# Patient Record
Sex: FEMALE | Race: BLACK OR AFRICAN AMERICAN | NOT HISPANIC OR LATINO | Employment: UNEMPLOYED | URBAN - METROPOLITAN AREA
[De-identification: names, ages, dates, MRNs, and addresses within clinical notes are randomized per-mention and may not be internally consistent; named-entity substitution may affect disease eponyms.]

---

## 2017-01-19 RX ORDER — TIZANIDINE HYDROCHLORIDE 2 MG/1
2 CAPSULE, GELATIN COATED ORAL 3 TIMES DAILY PRN
Qty: 90 CAPSULE | Refills: 1 | Status: SHIPPED | OUTPATIENT
Start: 2017-01-19 | End: 2017-04-20 | Stop reason: SDUPTHER

## 2017-02-09 ENCOUNTER — TELEPHONE (OUTPATIENT)
Dept: PAIN MEDICINE | Facility: CLINIC | Age: 57
End: 2017-02-09

## 2017-02-09 NOTE — TELEPHONE ENCOUNTER
----- Message from Beverley Esparza sent at 2/9/2017  1:11 PM CST -----  Contact: self  Patient called regarding refill of medication. Please contact [phone] ([phone]    [id number] - [place], [place] - [address] [address]  Phone: [phone] Fax: [phone]

## 2017-02-10 ENCOUNTER — PATIENT MESSAGE (OUTPATIENT)
Dept: PAIN MEDICINE | Facility: CLINIC | Age: 57
End: 2017-02-10

## 2017-02-10 RX ORDER — HYDROCODONE BITARTRATE AND ACETAMINOPHEN 10; 325 MG/1; MG/1
1 TABLET ORAL 3 TIMES DAILY PRN
Qty: 90 TABLET | Refills: 0 | Status: SHIPPED | OUTPATIENT
Start: 2017-02-10 | End: 2017-02-16 | Stop reason: SDUPTHER

## 2017-02-16 ENCOUNTER — OFFICE VISIT (OUTPATIENT)
Dept: PAIN MEDICINE | Facility: CLINIC | Age: 57
End: 2017-02-16
Payer: COMMERCIAL

## 2017-02-16 VITALS
RESPIRATION RATE: 18 BRPM | HEART RATE: 74 BPM | DIASTOLIC BLOOD PRESSURE: 80 MMHG | BODY MASS INDEX: 33.73 KG/M2 | WEIGHT: 209 LBS | TEMPERATURE: 99 F | SYSTOLIC BLOOD PRESSURE: 130 MMHG

## 2017-02-16 DIAGNOSIS — M54.12 CERVICAL RADICULOPATHY: Primary | ICD-10-CM

## 2017-02-16 DIAGNOSIS — M48.02 CERVICAL SPINAL STENOSIS: ICD-10-CM

## 2017-02-16 DIAGNOSIS — M50.30 DDD (DEGENERATIVE DISC DISEASE), CERVICAL: ICD-10-CM

## 2017-02-16 DIAGNOSIS — M79.642 LEFT HAND PAIN: ICD-10-CM

## 2017-02-16 PROCEDURE — 99213 OFFICE O/P EST LOW 20 MIN: CPT | Mod: S$GLB,,, | Performed by: PHYSICIAN ASSISTANT

## 2017-02-16 PROCEDURE — 99999 PR PBB SHADOW E&M-EST. PATIENT-LVL IV: CPT | Mod: PBBFAC,,, | Performed by: PHYSICIAN ASSISTANT

## 2017-02-16 RX ORDER — HYDROCODONE BITARTRATE AND ACETAMINOPHEN 10; 325 MG/1; MG/1
1 TABLET ORAL 3 TIMES DAILY PRN
Qty: 90 TABLET | Refills: 0 | Status: SHIPPED | OUTPATIENT
Start: 2017-05-11 | End: 2017-03-10 | Stop reason: SDUPTHER

## 2017-02-16 RX ORDER — HYDROCODONE BITARTRATE AND ACETAMINOPHEN 10; 325 MG/1; MG/1
1 TABLET ORAL 3 TIMES DAILY PRN
Qty: 90 TABLET | Refills: 0 | Status: SHIPPED | OUTPATIENT
Start: 2017-04-11 | End: 2017-04-12 | Stop reason: SDUPTHER

## 2017-02-16 RX ORDER — HYDROCODONE BITARTRATE AND ACETAMINOPHEN 10; 325 MG/1; MG/1
1 TABLET ORAL 3 TIMES DAILY PRN
Qty: 90 TABLET | Refills: 0 | Status: SHIPPED | OUTPATIENT
Start: 2017-03-12 | End: 2017-04-11

## 2017-02-16 NOTE — PROGRESS NOTES
This note was completed with dictation software and grammatical errors may exist.    CC: Neck pain, arm pain    HPI: The patient is a 56-year-old woman with a history of diabetes, hypertension who presents in referral from [facility] for neck pain radiating into the arms and low back pain radiating into the left leg.  She returns in follow-up today with neck pain.  She complains of bilateral neck pain radiating to the right trapezius muscle and intermittently down the left arm.  She also complains of right hand numbness and left hand pain and numbness into the third and fourth digits.  She states that she cannot close her left hand for the past couple months.  She reports weakness in the left hand.  She denies bladder or bowel incontinence.    Pain intervention history: She has been taking hydrocodone 10/325 3 times a day for the last year, previous to this she was taking Percocet 3 times a day but states that it was causing her panic attacks, did help with her pain slightly better.  She has been taking gabapentin 600mg twice a day and Zanaflex at night with some relief.  She had undergone 3 epidurals for low back pain with only up to 3 weeks of relief each time.  She is status post C7-T1 cervical interlaminar epidural steroid injection on 1/29/16 with 50% relief.   She is status post C7-T1 cervical interlaminar epidural steroid injection on 3/1/16 with mild additional relief.  She is status post C7-T1 cervical interlaminar epidural steroid injection on 5/13/16 with 70-75% relief.  She is status post right C3, 4, 5 and 6 medial branch radiofrequency ablation on 7/15/16 with 30-60% relief.      Urine drug screen 1/5/16  Negative and inconsistent for hydrocodone but positive and consistent for its metabolite hydromorphone    Urine drug screen 2/11/16  Negative and inconsistent for hydrocodone    ROS: She reports headaches, hoarse voice, joint stiffness, joint swelling, back pain, difficulty  sleeping, anxiety and loss of balance.  Balance of review of systems is negative.    Medical, surgical, family and social history reviewed elsewhere in record.    Medications/Allergies: See med card    Vitals:    17 1111   BP: 130/80   Pulse: 74   Resp: 18   Temp: 98.6 °F (37 °C)   TempSrc: Oral   Weight: 94.8 kg (208 lb 15.9 oz)   PainSc:   4   PainLoc: Neck         Physical exam:  Gen: A and O x3, pleasant, well-groomed  Skin: No rashes or obvious lesions  HEENT: PERRLA, no obvious deformities on ears or in canals.Trachea midline.  CVS: Regular rate and rhythm, normal palpable pulses.  Resp: Clear to auscultation bilaterally, no wheezes or rales.  Abdomen: Soft, NT/ND.  Musculoskeletal: No antalgic gait.     Neuro:  Upper extremities: 5/5 strength bilaterally, except for left hand  strength, unable to close hand.   Lower extremities: 5/5 strength bilaterally.  Reflexes: Brachioradialis 2+, Bicep 2+, Tricep 2+.  Patellar and Achilles reflexes 2+ bilaterally.  Sensory: Intact and symmetrical to light touch and pinprick in C2-T1 dermatomes bilaterally.    Cervical Spine:  Cervical spine: Range of motion is mildly reduced with flexion extension and lateral rotation with increased bilateral neck and right trapezius muscle pain during each maneuver.  Spurling's maneuver causes neck pain to either side.    Myofascial exam: Tenderness to palpation throughout right cervical paraspinous and trapezius muscles, greater than left.    Imagin/24/15 C-spine MRI  1. C3-4 left posterior paracentral disk extrusion causing left paracentral spinal cord compression and severe left foraminal stenosis.  2. C4-5 based disk extrusion with spinal cord impingement and moderate bilateral foraminal stenosis.  3. C5-6 posterior central disk extrusion with spinal cord compression and severe bilateral foraminal stenosis.  4. C6-7 mild disk bulge with severe bilateral foraminal stenosis.  5. Solitary mildly enlarged left  submandibular lymph node of uncertain significance.    Assessment:  The patient is a 56-year-old woman with a history of diabetes, hypertension who presents in referral from [facility] for neck pain radiating into the arms and low back pain radiating into the left leg.     1. Cervical radiculopathy     2. Cervical spinal stenosis     3. DDD (degenerative disc disease), cervical     4. Left hand pain           Plan:  1.  She continues to take hydrocodone-acetaminophen 10/325 mg up to 3 times a day as needed for pain.  I have reviewed the [place] Board of Pharmacy website and there are no abberancies.  She will call for her next 2 prescriptions.  2.  If her neck pain worsens, she may benefit from repeating a cervical HERACLIO.  3.  I advised her to make an appointment with orthopedics to evaluate her left hand.  4.  Follow-up in 3 months or sooner as needed.

## 2017-03-10 ENCOUNTER — PATIENT MESSAGE (OUTPATIENT)
Dept: PAIN MEDICINE | Facility: CLINIC | Age: 57
End: 2017-03-10

## 2017-03-10 RX ORDER — HYDROCODONE BITARTRATE AND ACETAMINOPHEN 10; 325 MG/1; MG/1
1 TABLET ORAL 3 TIMES DAILY PRN
Qty: 90 TABLET | Refills: 0 | Status: SHIPPED | OUTPATIENT
Start: 2017-03-10 | End: 2017-04-09

## 2017-03-10 NOTE — TELEPHONE ENCOUNTER
Spoke with Erasmo and that is the day the printer was messed up and patient had to leave and she didn't get the prescription. Erasmo states can you send it to her pharmacy please.

## 2017-03-17 DIAGNOSIS — M79.642 LEFT HAND PAIN: Primary | ICD-10-CM

## 2017-03-20 ENCOUNTER — HOSPITAL ENCOUNTER (OUTPATIENT)
Dept: RADIOLOGY | Facility: HOSPITAL | Age: 57
Discharge: HOME | End: 2017-03-20
Attending: ORTHOPAEDIC SURGERY
Payer: COMMERCIAL

## 2017-03-20 ENCOUNTER — OFFICE VISIT (OUTPATIENT)
Dept: ORTHOPEDICS | Facility: CLINIC | Age: 57
End: 2017-03-20
Payer: COMMERCIAL

## 2017-03-20 VITALS
DIASTOLIC BLOOD PRESSURE: 82 MMHG | WEIGHT: 206.81 LBS | HEART RATE: 84 BPM | SYSTOLIC BLOOD PRESSURE: 132 MMHG | BODY MASS INDEX: 33.24 KG/M2 | HEIGHT: 66 IN

## 2017-03-20 DIAGNOSIS — M65.342 TRIGGER FINGER, LEFT RING FINGER: Primary | ICD-10-CM

## 2017-03-20 DIAGNOSIS — M65.332 TRIGGER FINGER, LEFT MIDDLE FINGER: ICD-10-CM

## 2017-03-20 DIAGNOSIS — M79.642 LEFT HAND PAIN: ICD-10-CM

## 2017-03-20 PROCEDURE — 99999 PR PBB SHADOW E&M-EST. PATIENT-LVL III: CPT | Mod: PBBFAC,,, | Performed by: ORTHOPAEDIC SURGERY

## 2017-03-20 PROCEDURE — 20550 NJX 1 TENDON SHEATH/LIGAMENT: CPT | Mod: 59,F2,S$GLB, | Performed by: ORTHOPAEDIC SURGERY

## 2017-03-20 PROCEDURE — 73130 X-RAY EXAM OF HAND: CPT | Mod: TC,PO,LT

## 2017-03-20 PROCEDURE — 1160F RVW MEDS BY RX/DR IN RCRD: CPT | Mod: S$GLB,,, | Performed by: ORTHOPAEDIC SURGERY

## 2017-03-20 PROCEDURE — 73130 X-RAY EXAM OF HAND: CPT | Mod: 26,LT,, | Performed by: RADIOLOGY

## 2017-03-20 PROCEDURE — 99213 OFFICE O/P EST LOW 20 MIN: CPT | Mod: 25,S$GLB,, | Performed by: ORTHOPAEDIC SURGERY

## 2017-03-20 RX ORDER — TRIAMCINOLONE ACETONIDE 40 MG/ML
40 INJECTION, SUSPENSION INTRA-ARTICULAR; INTRAMUSCULAR
Status: DISCONTINUED | OUTPATIENT
Start: 2017-03-20 | End: 2017-03-20 | Stop reason: HOSPADM

## 2017-03-20 RX ADMIN — TRIAMCINOLONE ACETONIDE 40 MG: 40 INJECTION, SUSPENSION INTRA-ARTICULAR; INTRAMUSCULAR at 09:03

## 2017-03-20 NOTE — PROGRESS NOTES
Subjective:          Chief Complaint: Aaron Garcia is a 56 y.o. female who had concerns including Pain of the Left Hand.    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    Pain: 4/10    She is having right hand numbness and tingling that is stemming from cervical spine pathology that is currently being treated by pain management. She does not have left hand numbness/tingling.      Review of Systems   Constitution: Negative for chills and fever.   Musculoskeletal: Positive for joint pain and stiffness.   Neurological: Positive for numbness and paresthesias.                   Objective:        General: Aaron is well-developed, well-nourished, appears stated age, in no acute distress, alert and oriented to time, place and person.     General    Vitals reviewed.  Constitutional: She is oriented to person, place, and time. She appears well-developed and well-nourished. No distress.   HENT:   Head: Normocephalic and atraumatic.   Nose: Nose normal.   Eyes: Pupils are equal, round, and reactive to light.   Cardiovascular: Normal rate.    Pulmonary/Chest: Effort normal.   Neurological: She is alert and oriented to person, place, and time.   Psychiatric: She has a normal mood and affect. Her behavior is normal. Judgment and thought content normal.             Right Hand/Wrist Exam     Inspection   Scars: Wrist - absent Hand -  absent  Effusion: Wrist - absent Hand -  absent  Bruising: Wrist - absent Hand -  absent  Deformity: Wrist - deformity Hand -  deformity    Tests     Atrophy   Thenar:  negative  Hypothenar:  negative  Intrinsic:  negative  1st Dorsal Interosseous: negative    Other     Neuorologic Exam    Median Distribution: abnormal  Radial Distribution: abnormal    Comments:  Full ROM of the right hand with some decreased  strength      Left Hand/Wrist Exam     Pain   Hand - The patient exhibits pain of the ring MCP and middle MCP.    Swelling   Hand - The patient is swollen on the middle MCP, ring IP, ring MCP and middle IP.    Tenderness   The patient is tender to palpation of the vásquez area.     Range of Motion     Finger Flexion   PIP Middle: abnormal   PIP Ring: abnormal   DIP Middle: abnormal   DIP Ring: abnormal     Tests     Atrophy  Thenar:  Negative  Hypothenar:  negative  Intrinsic: negative  1st Dorsal Interosseous:  negative    Other     Sensory Exam  Median Distribution: normal  Ulnar Distribution: normal  Radial Distribution: normal    Comments:  Decreased  strength secondary to finger stiffness          Muscle Strength   Right Upper Extremity   Wrist Extension: 5/5/5   Wrist Flexion: 5/5/5   : 5/5/5   Index Finger: 5/5  Middle Finger: 5/5  Ring Finger: 5/5  Little Finger: 5/5  Thumb - APB: 5/5  Thumb - FPL: 5/5  Pinch Mechanism: 5/5  Left Upper Extremity  Wrist Extension: 5/5/5   Wrist Flexion: 5/5/5   :  5/5/5   Index Finger: 5/5  Middle Finger: 5/5  Ring Finger: 5/5  Little Finger: 5/5  Thumb - APB: 5/5  Thumb - FPL: 5/5  Pinch Mechanism: 5/5    Vascular Exam       Capillary Refill  Left Hand: normal capillary refill    Current and previous radiographic studies and results were reviewed with the patient:   Findings: There is mild multiarticular degenerative change.  No periarticular erosions.  No acute fracture or dislocation.          Assessment:       Encounter Diagnoses   Name Primary?    Left hand pain Yes    Trigger finger, left middle finger     Trigger finger, left ring finger           Plan:         Left middle and ring tendon sheath injections  Occupational Therapy  Continue with current medications  F/U in 6 weeks

## 2017-03-20 NOTE — MR AVS SNAPSHOT
[facility]  [address] [facility] [address]  Phone: [phone]                  Aaron Garcia   3/20/2017 10:45 AM   Office Visit    Description:  Female : 1960   Provider:  Jef Reyes MD   Department:  [facility]           Reason for Visit     Left Hand - Pain           Diagnoses this Visit        Comments    Left hand pain    -  Primary     Trigger finger, left middle finger         Trigger finger, left ring finger                To Do List           Future Appointments        Provider Department Dept Phone    2017 10:15 AM Jef Reyes MD [facility] [phone]    2017 11:30 AM [facility] [phone]      Goals (5 Years of Data)     None      Follow-Up and Disposition     Return in about 6 weeks (around 2017).      [facility]     [facility] On Call Nurse [facility] -  Assistance  Registered nurses in the [facility] On Call Center provide clinical advisement, health education, appointment booking, and other advisory services.  Call for this free service at [phone].             Medications           Message regarding Medications     Verify the changes and/or additions to your medication regime listed below are the same as discussed with your clinician today.  If any of these changes or additions are incorrect, please notify your healthcare provider.             Verify that the below list of medications is an accurate representation of the medications you are currently taking.  If none reported, the list may be blank. If incorrect, please contact your healthcare provider. Carry this list with you in case of emergency.           Current Medications     ALBUTEROL SULFATE (PROAIR HFA INHL) Inhale into the lungs.    amitriptyline (ELAVIL) 25 MG tablet Take 25 mg by mouth nightly as needed for Insomnia.    amlodipine (NORVASC) 10 MG tablet 10 mg.     benazepril (LOTENSIN) 40 MG  "tablet Take 40 mg by mouth once daily.    diphenhydrAMINE (BENADRYL) 25 mg capsule Take 25 mg by mouth every 6 (six) hours as needed for Itching.    estradiol (ESTRACE) 1 MG tablet Take 1 mg by mouth once daily.    gabapentin (NEURONTIN) 600 MG tablet Take 600 mg by mouth 2 (two) times daily.    GLIPIZIDE ORAL Take 5 mg by mouth 2 (two) times daily.     hydrocodone-acetaminophen 10-325mg (NORCO)  mg Tab Take 1 tablet by mouth 3 (three) times daily as needed.    hydrocodone-acetaminophen 10-325mg (NORCO)  mg Tab Starting on Apr 11, 2017. Take 1 tablet by mouth 3 (three) times daily as needed.    hydrocodone-acetaminophen 10-325mg (NORCO)  mg Tab Take 1 tablet by mouth 3 (three) times daily as needed.    insulin NPH (NOVOLIN N) 100 unit/mL injection Inject into the skin 3 (three) times daily as needed.    medroxyPROGESTERone (PROVERA) 2.5 MG tablet Take 2.5 mg by mouth once daily.    meloxicam (MOBIC) 15 MG tablet Take 1 tablet (15 mg total) by mouth once daily.    nebivolol (BYSTOLIC) 10 MG Tab Take 10 mg by mouth once daily.    tizanidine 2 mg Cap Take 1 capsule (2 mg total) by mouth 3 (three) times daily as needed.           Clinical Reference Information           Your Vitals Were     BP Pulse Height Weight BMI    132/82 84 5' 6" (1.676 m) 93.8 kg (206 lb 12.8 oz) 33.38 kg/m2      Blood Pressure          Most Recent Value    BP  132/82      Allergies as of 3/20/2017     Pcn [Penicillins]      Immunizations Administered on Date of Encounter - 3/20/2017     None      Orders Placed During Today's Visit      Normal Orders This Visit    Ambulatory Referral to Physical/Occupational Therapy       Language Assistance Services     ATTENTION: Language assistance services are available, free of charge. Please call [phone].      ATENCIÓN: Si habla kailash, tiene a vazquez disposición servicios gratuitos de asistencia lingüística. Llame al [phone].     CHÚ Ý: N?u b?n nói Ti?ng Vi?t, có các d?ch v? h? " tr? lilia collins? mi?n phí dành cho b?n. G?i s? [phone].         [facility] complies with applicable Federal civil rights laws and does not discriminate on the basis of race, color, national origin, age, disability, or sex.

## 2017-03-21 NOTE — PROCEDURES
Tendon Sheath  Date/Time: 3/20/2017 9:05 PM  Performed by: BRANT MARTIN  Authorized by: BRANT MARTIN     Consent Done?: Yes (Verbal)  Timeout: prior to procedure the correct patient, procedure, and site was verified   Indications:  Pain  Site marked: the procedure site was marked    Timeout: prior to procedure the correct patient, procedure, and site was verified    Location:  Ring finger  Site:  L ring MCP  Prep: patient was prepped and draped in usual sterile fashion    Ultrasonic guidance for needle placement?: No    Needle size:  25 G  Approach:  Volar  Medications:  40 mg triamcinolone acetonide 40 mg/mL  Patient tolerance:  Patient tolerated the procedure well with no immediate complications

## 2017-03-21 NOTE — PROCEDURES
Tendon Sheath  Date/Time: 3/20/2017 9:04 PM  Performed by: BRANT MARTIN  Authorized by: BRANT MARTIN     Consent Done?:  Yes (Verbal)  Timeout: prior to procedure the correct patient, procedure, and site was verified    Indications:  Pain  Site marked: the procedure site was marked    Timeout: prior to procedure the correct patient, procedure, and site was verified    Location:  Long finger  Site:  L long MCP  Prep: patient was prepped and draped in usual sterile fashion    Ultrasonic guidance for needle placement?: No    Needle size:  25 G  Approach:  Volar  Medications:  40 mg triamcinolone acetonide 40 mg/mL  Patient tolerance:  Patient tolerated the procedure well with no immediate complications

## 2017-03-31 ENCOUNTER — OFFICE VISIT (OUTPATIENT)
Dept: PAIN MEDICINE | Facility: CLINIC | Age: 57
End: 2017-03-31
Payer: COMMERCIAL

## 2017-03-31 VITALS
HEIGHT: 66 IN | RESPIRATION RATE: 20 BRPM | HEART RATE: 78 BPM | BODY MASS INDEX: 33.45 KG/M2 | SYSTOLIC BLOOD PRESSURE: 130 MMHG | WEIGHT: 208.13 LBS | DIASTOLIC BLOOD PRESSURE: 78 MMHG

## 2017-03-31 DIAGNOSIS — M51.36 DDD (DEGENERATIVE DISC DISEASE), LUMBAR: Primary | ICD-10-CM

## 2017-03-31 DIAGNOSIS — M50.30 DDD (DEGENERATIVE DISC DISEASE), CERVICAL: ICD-10-CM

## 2017-03-31 PROCEDURE — 1160F RVW MEDS BY RX/DR IN RCRD: CPT | Mod: S$GLB,,, | Performed by: PHYSICIAN ASSISTANT

## 2017-03-31 PROCEDURE — 99999 PR PBB SHADOW E&M-EST. PATIENT-LVL IV: CPT | Mod: PBBFAC,,, | Performed by: PHYSICIAN ASSISTANT

## 2017-03-31 PROCEDURE — 99214 OFFICE O/P EST MOD 30 MIN: CPT | Mod: S$GLB,,, | Performed by: PHYSICIAN ASSISTANT

## 2017-03-31 RX ORDER — ALPRAZOLAM 1 MG/1
1 TABLET ORAL
Qty: 1 TABLET | Refills: 0 | Status: ON HOLD | OUTPATIENT
Start: 2017-03-31 | End: 2017-05-16 | Stop reason: CLARIF

## 2017-03-31 RX ORDER — ONDANSETRON 4 MG/1
4 TABLET, ORALLY DISINTEGRATING ORAL
Status: ON HOLD | COMMUNITY
Start: 2017-03-20 | End: 2017-12-29

## 2017-04-03 NOTE — PROGRESS NOTES
This note was completed with dictation software and grammatical errors may exist.    CC: Neck pain, arm pain    HPI: The patient is a 56-year-old woman with a history of diabetes, hypertension who presents in referral from [facility] for neck pain radiating into the arms and low back pain radiating into the left leg.  She returns for an early follow-up today for back pain.  She states she was doing laundry yesterday and developed left low back pain radiating to the left lateral thigh and lateral shin.  She states that this happened a few years ago and was relieved with epidural steroid injections.  She describes her pain as sharp, worse with sitting and going from a seated to standing position.  She does have relief with standing for short period time.  She reports mild weakness in her left leg.  She denies numbness, bladder or bowel incontinence.    Pain intervention history: She has been taking hydrocodone 10/325 3 times a day for the last year, previous to this she was taking Percocet 3 times a day but states that it was causing her panic attacks, did help with her pain slightly better.  She has been taking gabapentin 600mg twice a day and Zanaflex at night with some relief.  She had undergone 3 epidurals for low back pain with only up to 3 weeks of relief each time.  She is status post C7-T1 cervical interlaminar epidural steroid injection on 1/29/16 with 50% relief.   She is status post C7-T1 cervical interlaminar epidural steroid injection on 3/1/16 with mild additional relief.  She is status post C7-T1 cervical interlaminar epidural steroid injection on 5/13/16 with 70-75% relief.  She is status post right C3, 4, 5 and 6 medial branch radiofrequency ablation on 7/15/16 with 30-60% relief.      Urine drug screen 1/5/16  Negative and inconsistent for hydrocodone but positive and consistent for its metabolite hydromorphone    Urine drug screen 2/11/16  Negative and inconsistent for  "hydrocodone    ROS: She reports headaches, hoarse voice, joint stiffness, joint swelling, back pain, difficulty sleeping, anxiety and loss of balance.  Balance of review of systems is negative.    Medical, surgical, family and social history reviewed elsewhere in record.    Medications/Allergies: See med card    Vitals:    03/31/17 1434   BP: 130/78   Pulse: 78   Resp: 20   Weight: 94.4 kg (208 lb 1.6 oz)   Height: 5' 6" (1.676 m)   PainSc:   5   PainLoc: Back         Physical exam:  Gen: A and O x3, pleasant, well-groomed  Skin: No rashes or obvious lesions  HEENT: PERRLA, no obvious deformities on ears or in canals.Trachea midline.  CVS: Regular rate and rhythm, normal palpable pulses.  Resp: Clear to auscultation bilaterally, no wheezes or rales.  Abdomen: Soft, NT/ND.  Musculoskeletal: No antalgic gait.     Neuro:  Upper extremities: 5/5 strength bilaterally  Lower extremities: 5/5 strength bilaterally.  Reflexes: Brachioradialis 2+, Bicep 2+, Tricep 2+.  Patellar and Achilles reflexes 2+ bilaterally.  Sensory: Intact and symmetrical to light touch and pinprick in C2-T1 dermatomes bilaterally.    Cervical Spine:  Cervical spine: Range of motion is mildly reduced with flexion extension and lateral rotation with increased bilateral neck and right trapezius muscle pain during each maneuver.  Spurling's maneuver causes neck pain to either side.    Myofascial exam: Tenderness to palpation throughout right cervical paraspinous and trapezius muscles, greater than left.    Lumbar spine:  Range of motion is moderately limited with flexion and severely limited with extension with increased pain in the left low back during each maneuver, especially with flexion and left oblique extension.  Khanh's test is negative bilaterally.  Straight leg raise causes left lateral thigh pain.  Internal and external rotation of hip is negative bilaterally.  Myofascial exam: Mild tenderness palpation to the left lumbar paraspinous " muscles.    Imagin/24/15 C-spine MRI  1. C3-4 left posterior paracentral disk extrusion causing left paracentral spinal cord compression and severe left foraminal stenosis.  2. C4-5 based disk extrusion with spinal cord impingement and moderate bilateral foraminal stenosis.  3. C5-6 posterior central disk extrusion with spinal cord compression and severe bilateral foraminal stenosis.  4. C6-7 mild disk bulge with severe bilateral foraminal stenosis.  5. Solitary mildly enlarged left submandibular lymph node of uncertain significance.    Assessment:  The patient is a 56-year-old woman with a history of diabetes, hypertension who presents in referral from [facility] for neck pain radiating into the arms and low back pain radiating into the left leg.     1. DDD (degenerative disc disease), lumbar  MRI Lumbar Spine Without Contrast   2. DDD (degenerative disc disease), cervical           Plan:  1.  I have ordered a new lumbar spine MRI and we will call her with results and recommendations.  Consideration can be made for a lumbar HERACLIO.  She reports that she is on a sliding scale and she will call us with her latest hemoglobin A1c.    Greater than 50% of this 25 minute visit was spent on counseling the patient.

## 2017-04-10 ENCOUNTER — HOSPITAL ENCOUNTER (OUTPATIENT)
Dept: RADIOLOGY | Facility: HOSPITAL | Age: 57
Discharge: HOME | End: 2017-04-10
Attending: ANESTHESIOLOGY
Payer: COMMERCIAL

## 2017-04-10 DIAGNOSIS — M51.36 DDD (DEGENERATIVE DISC DISEASE), LUMBAR: ICD-10-CM

## 2017-04-10 PROCEDURE — 72148 MRI LUMBAR SPINE W/O DYE: CPT | Mod: TC,PO

## 2017-04-10 PROCEDURE — 72148 MRI LUMBAR SPINE W/O DYE: CPT | Mod: 26,,, | Performed by: RADIOLOGY

## 2017-04-11 RX ORDER — HYDROCODONE BITARTRATE AND ACETAMINOPHEN 10; 325 MG/1; MG/1
1 TABLET ORAL 3 TIMES DAILY PRN
Qty: 90 TABLET | Refills: 0 | Status: CANCELLED | OUTPATIENT
Start: 2017-04-11 | End: 2017-05-11

## 2017-04-12 ENCOUNTER — TELEPHONE (OUTPATIENT)
Dept: PAIN MEDICINE | Facility: CLINIC | Age: 57
End: 2017-04-12

## 2017-04-12 RX ORDER — HYDROCODONE BITARTRATE AND ACETAMINOPHEN 10; 325 MG/1; MG/1
1 TABLET ORAL 3 TIMES DAILY PRN
Qty: 90 TABLET | Refills: 0 | Status: SHIPPED | OUTPATIENT
Start: 2017-04-12 | End: 2017-05-11 | Stop reason: SDUPTHER

## 2017-04-12 NOTE — TELEPHONE ENCOUNTER
----- Message from Louie Garcia sent at 4/12/2017  7:41 AM CDT -----  Contact: self    [phone]  Patient returning the nurse phone call regarding rx norco. Thanks!

## 2017-04-12 NOTE — TELEPHONE ENCOUNTER
Please let the patient know I reviewed her lumbar spine MRI and she has a left-sided disc bulge at L5/S1 likely causing her pain.  I would like to schedule her for a left L5 TF HERACLIO.  During the visit, she told me she was going to find out what her latest hemoglobin A1c was.  We need to know this before we schedule the injection.

## 2017-04-20 DIAGNOSIS — M94.262 CHONDROMALACIA, LEFT KNEE: ICD-10-CM

## 2017-04-20 DIAGNOSIS — M25.562 LEFT KNEE PAIN, UNSPECIFIED CHRONICITY: ICD-10-CM

## 2017-04-20 RX ORDER — MELOXICAM 15 MG/1
15 TABLET ORAL DAILY
Qty: 30 TABLET | Refills: 3 | Status: SHIPPED | OUTPATIENT
Start: 2017-04-20 | End: 2017-08-27 | Stop reason: SDUPTHER

## 2017-04-20 RX ORDER — TIZANIDINE HYDROCHLORIDE 2 MG/1
2 CAPSULE, GELATIN COATED ORAL 3 TIMES DAILY PRN
Qty: 90 CAPSULE | Refills: 1 | Status: SHIPPED | OUTPATIENT
Start: 2017-04-20 | End: 2017-06-14 | Stop reason: SDUPTHER

## 2017-04-27 ENCOUNTER — OFFICE VISIT (OUTPATIENT)
Dept: PAIN MEDICINE | Facility: CLINIC | Age: 57
End: 2017-04-27
Payer: COMMERCIAL

## 2017-04-27 VITALS
HEART RATE: 76 BPM | RESPIRATION RATE: 18 BRPM | WEIGHT: 211.44 LBS | BODY MASS INDEX: 34.12 KG/M2 | TEMPERATURE: 98 F | SYSTOLIC BLOOD PRESSURE: 130 MMHG | DIASTOLIC BLOOD PRESSURE: 70 MMHG

## 2017-04-27 DIAGNOSIS — M48.02 CERVICAL SPINAL STENOSIS: ICD-10-CM

## 2017-04-27 DIAGNOSIS — M50.30 DDD (DEGENERATIVE DISC DISEASE), CERVICAL: ICD-10-CM

## 2017-04-27 DIAGNOSIS — M51.36 DDD (DEGENERATIVE DISC DISEASE), LUMBAR: ICD-10-CM

## 2017-04-27 DIAGNOSIS — M54.16 LUMBAR RADICULOPATHY: Primary | ICD-10-CM

## 2017-04-27 PROCEDURE — 1160F RVW MEDS BY RX/DR IN RCRD: CPT | Mod: S$GLB,,, | Performed by: PHYSICIAN ASSISTANT

## 2017-04-27 PROCEDURE — 99214 OFFICE O/P EST MOD 30 MIN: CPT | Mod: S$GLB,,, | Performed by: PHYSICIAN ASSISTANT

## 2017-04-27 PROCEDURE — 99999 PR PBB SHADOW E&M-EST. PATIENT-LVL V: CPT | Mod: PBBFAC,,, | Performed by: PHYSICIAN ASSISTANT

## 2017-04-27 RX ORDER — ALPRAZOLAM 0.5 MG/1
1 TABLET, ORALLY DISINTEGRATING ORAL ONCE AS NEEDED
Status: CANCELLED | OUTPATIENT
Start: 2017-05-16 | End: 2017-05-16

## 2017-04-28 NOTE — PROGRESS NOTES
This note was completed with dictation software and grammatical errors may exist.    CC: Neck pain, arm pain    HPI: The patient is a 56-year-old woman with a history of diabetes, hypertension who presents in referral from [facility] for neck pain radiating into the arms and low back pain radiating into the left leg.  She returns in follow-up today to review her lumbar spine MRI results.  She complains of sharp right low back pain radiating to the left buttock and left lateral thigh.  She also complains of muscle spasms in her right posterior thigh.  She continues to have neck pain but currently her low back and buttock are bothering her more than her neck.  It is worse with going from a seated to standing position and sitting long periods of time.  She complains of weakness in her left leg.  She denies numbness, bladder or bowel incontinence.    Pain intervention history: She has been taking hydrocodone 10/325 3 times a day for the last year, previous to this she was taking Percocet 3 times a day but states that it was causing her panic attacks, did help with her pain slightly better.  She has been taking gabapentin 600mg twice a day and Zanaflex at night with some relief.  She had undergone 3 epidurals for low back pain with only up to 3 weeks of relief each time.  She is status post C7-T1 cervical interlaminar epidural steroid injection on 1/29/16 with 50% relief.   She is status post C7-T1 cervical interlaminar epidural steroid injection on 3/1/16 with mild additional relief.  She is status post C7-T1 cervical interlaminar epidural steroid injection on 5/13/16 with 70-75% relief.  She is status post right C3, 4, 5 and 6 medial branch radiofrequency ablation on 7/15/16 with 30-60% relief.      Urine drug screen 1/5/16  Negative and inconsistent for hydrocodone but positive and consistent for its metabolite hydromorphone    Urine drug screen 2/11/16  Negative and inconsistent for  hydrocodone    ROS: She reports headaches, hoarse voice, joint stiffness, joint swelling, back pain, difficulty sleeping, anxiety and loss of balance.  Balance of review of systems is negative.    Medical, surgical, family and social history reviewed elsewhere in record.    Medications/Allergies: See med card    Vitals:    04/27/17 0938   BP: 130/70   Pulse: 76   Resp: 18   Temp: 98.1 °F (36.7 °C)   TempSrc: Oral   Weight: 95.9 kg (211 lb 6.7 oz)   PainSc:   4   PainLoc: Neck         Physical exam:  Gen: A and O x3, pleasant, well-groomed  Skin: No rashes or obvious lesions  HEENT: PERRLA, no obvious deformities on ears or in canals.Trachea midline.  CVS: Regular rate and rhythm, normal palpable pulses.  Resp: Clear to auscultation bilaterally, no wheezes or rales.  Abdomen: Soft, NT/ND.  Musculoskeletal: No antalgic gait.     Neuro:  Upper extremities: 5/5 strength bilaterally  Lower extremities: 5/5 strength bilaterally.  Reflexes: Brachioradialis 2+, Bicep 2+, Tricep 2+.  Patellar and Achilles reflexes 2+ bilaterally.  Sensory: Intact and symmetrical to light touch and pinprick in C2-T1 dermatomes bilaterally.    Cervical Spine:  Cervical spine: Range of motion is mildly reduced with flexion extension and lateral rotation with increased bilateral neck and right trapezius muscle pain during each maneuver.  Spurling's maneuver causes neck pain to either side.    Myofascial exam: Tenderness to palpation throughout cervical paraspinous and trapezius muscles.    Lumbar spine:  Range of motion is moderately limited with flexion and severely limited with extension with increased pain in the left low back during each maneuver, especially with flexion and left oblique extension.  Khanh's test is negative bilaterally.  Straight leg raise causes left lateral thigh pain.  Internal and external rotation of hip is negative bilaterally.  Myofascial exam: Mild tenderness palpation to the left lumbar paraspinous  muscles.    Imagin/24/15 C-spine MRI  1. C3-4 left posterior paracentral disk extrusion causing left paracentral spinal cord compression and severe left foraminal stenosis.  2. C4-5 based disk extrusion with spinal cord impingement and moderate bilateral foraminal stenosis.  3. C5-6 posterior central disk extrusion with spinal cord compression and severe bilateral foraminal stenosis.  4. C6-7 mild disk bulge with severe bilateral foraminal stenosis.  5. Solitary mildly enlarged left submandibular lymph node of uncertain significance.    4/10/17 MRI lumbar spine  T12-L1: No central canal or neuroforaminal stenosis. No disc protrusion or extrusion.  L1-L2: There is ligamentum flavum thickening and facet arthropathy.  No central canal or neuroforaminal stenosis. No disc protrusion or extrusion.  L2-L3: There is ligamentum flavum thickening and facet arthropathy.  No central canal or neuroforaminal stenosis. No disc protrusion or extrusion.  L3-L4: There is ligamentum flavum thickening and facet arthropathy present. No central canal or neuroforaminal stenosis. No disc protrusion or extrusion.  L4-L5: There is a broad disc bulge which extends into both neural foramina.  There is ligamentum flavum thickening and facet arthropathy.  There is right lateral recess stenosis.  There is abutment of the descending right L5 nerve in the right lateral recess.  Please correlate clinically for symptoms referable to the right L5 nerve.  There is mild central canal stenosis.  There is mild left neuroforaminal stenosis.  No right neuroforaminal stenosis.  L5-S1: There is a broad central/right paracentral disc protrusion which encroaches upon the descending right S1 nerve in the right lateral recess.  Please correlate clinically for symptoms referable to the right S1 nerve.  There is mild-moderate right neuroforaminal stenosis.  No left neuroforaminal stenosis.  No central canal stenosis.  There is ligamentum flavum thickening and  facet arthropathy.  There is a broad left extraforaminal disc protrusion at L5-S1 which abuts the exited left L5 nerve in the left extraforaminal soft tissues (series 6 image 17), nonspecific.  Please correlate clinically for symptoms referable to the left L5 nerve.      Assessment:  The patient is a 56-year-old woman with a history of diabetes, hypertension who presents in referral from [facility] for neck pain radiating into the arms and low back pain radiating into the left leg.     1. Lumbar radiculopathy  Place in Outpatient    Vital signs    Activity as tolerated    Verify informed consent    Notify physician     Notify physician     Notify physician (specify)    Diet NPO    alprazolam ODT dissolvable tablet 1 mg    Case Request Operating Room: INJECTION-STEROID-EPIDURAL-LUMBAR   2. DDD (degenerative disc disease), lumbar     3. DDD (degenerative disc disease), cervical     4. Cervical spinal stenosis           Plan:  1.  I reviewed the patient's lumbar spine MRI with her and we discussed that she has a disc protrusion at L5-S1 abutting the left L5 nerve likely causing her left buttock and left lateral thigh pain.  I suggest an L5/S1 interlaminar HERACLIO but we need to check her hemoglobin A1c and she states that she recently had this done and will call us tomorrow with the results so we can schedule the injection over the phone.

## 2017-05-11 RX ORDER — HYDROCODONE BITARTRATE AND ACETAMINOPHEN 10; 325 MG/1; MG/1
1 TABLET ORAL 3 TIMES DAILY PRN
Qty: 90 TABLET | Refills: 0 | Status: SHIPPED | OUTPATIENT
Start: 2017-05-12 | End: 2017-06-09 | Stop reason: SDUPTHER

## 2017-05-15 RX ORDER — INSULIN GLARGINE 100 [IU]/ML
45 INJECTION, SOLUTION SUBCUTANEOUS NIGHTLY
COMMUNITY
End: 2019-03-26 | Stop reason: CLARIF

## 2017-05-16 ENCOUNTER — SURGERY (OUTPATIENT)
Age: 57
End: 2017-05-16

## 2017-05-16 ENCOUNTER — HOSPITAL ENCOUNTER (OUTPATIENT)
Facility: HOSPITAL | Age: 57
Discharge: HOME | End: 2017-05-16
Attending: ANESTHESIOLOGY | Admitting: ANESTHESIOLOGY
Payer: COMMERCIAL

## 2017-05-16 ENCOUNTER — HOSPITAL ENCOUNTER (OUTPATIENT)
Dept: RADIOLOGY | Facility: HOSPITAL | Age: 57
Discharge: HOME | End: 2017-05-16
Attending: ANESTHESIOLOGY | Admitting: ANESTHESIOLOGY
Payer: COMMERCIAL

## 2017-05-16 DIAGNOSIS — M54.16 LUMBAR RADICULOPATHY: ICD-10-CM

## 2017-05-16 DIAGNOSIS — M51.36 DDD (DEGENERATIVE DISC DISEASE), LUMBAR: ICD-10-CM

## 2017-05-16 LAB
B-HCG UR QL: NEGATIVE
CTP QC/QA: YES

## 2017-05-16 PROCEDURE — 76000 FLUOROSCOPY <1 HR PHYS/QHP: CPT | Mod: TC,PO

## 2017-05-16 PROCEDURE — 62323 NJX INTERLAMINAR LMBR/SAC: CPT | Mod: ,,, | Performed by: ANESTHESIOLOGY

## 2017-05-16 PROCEDURE — 25500020 PHARM REV CODE 255: Mod: PO | Performed by: ANESTHESIOLOGY

## 2017-05-16 PROCEDURE — 63600175 PHARM REV CODE 636 W HCPCS: Mod: PO | Performed by: ANESTHESIOLOGY

## 2017-05-16 PROCEDURE — 62323 NJX INTERLAMINAR LMBR/SAC: CPT | Mod: PO | Performed by: ANESTHESIOLOGY

## 2017-05-16 PROCEDURE — 81025 URINE PREGNANCY TEST: CPT | Mod: PO | Performed by: ANESTHESIOLOGY

## 2017-05-16 PROCEDURE — 25000003 PHARM REV CODE 250: Mod: PO | Performed by: ANESTHESIOLOGY

## 2017-05-16 PROCEDURE — 62322 NJX INTERLAMINAR LMBR/SAC: CPT | Mod: PO | Performed by: ANESTHESIOLOGY

## 2017-05-16 RX ORDER — ROSUVASTATIN CALCIUM 10 MG/1
10 TABLET, COATED ORAL NIGHTLY
COMMUNITY

## 2017-05-16 RX ORDER — ALPRAZOLAM 0.5 MG/1
1 TABLET, ORALLY DISINTEGRATING ORAL ONCE AS NEEDED
Status: COMPLETED | OUTPATIENT
Start: 2017-05-16 | End: 2017-05-16

## 2017-05-16 RX ORDER — SODIUM CHLORIDE 9 MG/ML
INJECTION, SOLUTION INTRAMUSCULAR; INTRAVENOUS; SUBCUTANEOUS
Status: DISCONTINUED | OUTPATIENT
Start: 2017-05-16 | End: 2017-05-16 | Stop reason: HOSPADM

## 2017-05-16 RX ORDER — LIDOCAINE HYDROCHLORIDE 10 MG/ML
INJECTION, SOLUTION EPIDURAL; INFILTRATION; INTRACAUDAL; PERINEURAL
Status: DISCONTINUED | OUTPATIENT
Start: 2017-05-16 | End: 2017-05-16 | Stop reason: HOSPADM

## 2017-05-16 RX ORDER — METHYLPREDNISOLONE ACETATE 80 MG/ML
INJECTION, SUSPENSION INTRA-ARTICULAR; INTRALESIONAL; INTRAMUSCULAR; SOFT TISSUE
Status: DISCONTINUED | OUTPATIENT
Start: 2017-05-16 | End: 2017-05-16 | Stop reason: HOSPADM

## 2017-05-16 RX ADMIN — ALPRAZOLAM 1 MG: 0.5 TABLET, ORALLY DISINTEGRATING ORAL at 02:05

## 2017-05-16 RX ADMIN — METHYLPREDNISOLONE ACETATE 80 MG: 80 INJECTION, SUSPENSION INTRA-ARTICULAR; INTRALESIONAL; INTRAMUSCULAR; SOFT TISSUE at 04:05

## 2017-05-16 RX ADMIN — SODIUM CHLORIDE 3 ML: 9 INJECTION INTRAMUSCULAR; INTRAVENOUS; SUBCUTANEOUS at 04:05

## 2017-05-16 RX ADMIN — LIDOCAINE HYDROCHLORIDE 10 ML: 10 INJECTION, SOLUTION EPIDURAL; INFILTRATION; INTRACAUDAL; PERINEURAL at 04:05

## 2017-05-16 RX ADMIN — IOHEXOL 3 ML: 300 INJECTION, SOLUTION INTRAVENOUS at 04:05

## 2017-05-16 NOTE — OP NOTE

## 2017-05-16 NOTE — IP AVS SNAPSHOT
[facility]  [address] [facility] [address]  [place] [address]  Phone: [phone]           Patient Discharge Instructions   Our goal is to set you up for success. This packet includes information on your condition, medications, and your home care.  It will help you care for yourself to prevent having to return to the hospital.     Please ask your nurse if you have any questions.      There are many details to remember when preparing to leave the hospital. Here is what you will need to do:    1. Take your medicine. If you are prescribed medications, review your Medication List on the following pages. You may have new medications to  at the pharmacy and others that you'll need to stop taking. Review the instructions for how and when to take your medications. Talk with your doctor or nurses if you are unsure of what to do.     2. Go to your follow-up appointments. Specific follow-up information is listed in the following pages. Your may be contacted by a nurse or clinical provider about future appointments. Be sure we have all of the phone numbers to reach you. Please contact your provider's office if you are unable to make an appointment.     3. Watch for warning signs. Your doctor or nurse will give you detailed warning signs to watch for and when to call for assistance. These instructions may also include educational information about your condition. If you experience any of warning signs to your health, call your doctor.           [facility]  Unless otherwise directed by your provider, please   contact [facility], our nurse care line   that is available for 24/7 assistance.     [phone] (toll-free)     Registered nurses in the [facility]   provide: appointment scheduling, clinical advisement, health education, and other advisory services.                  ** Verify the list of medication(s) below is accurate and up to date. Carry this with you in case of  emergency. If your medications have changed, please notify your healthcare provider.             Medication List      CONTINUE taking these medications        Additional Info                      amitriptyline 25 MG tablet   Commonly known as:  ELAVIL   Refills:  0   Dose:  25 mg    Instructions:  Take 25 mg by mouth nightly as needed for Insomnia.     Begin Date    AM    Noon    PM    Bedtime       amlodipine 10 MG tablet   Commonly known as:  NORVASC   Refills:  0   Dose:  10 mg    Instructions:  10 mg.     Begin Date    AM    Noon    PM    Bedtime       benazepril 40 MG tablet   Commonly known as:  LOTENSIN   Refills:  0   Dose:  40 mg    Instructions:  Take 40 mg by mouth once daily.     Begin Date    AM    Noon    PM    Bedtime       diphenhydrAMINE 25 mg capsule   Commonly known as:  BENADRYL   Refills:  0   Dose:  25 mg    Instructions:  Take 25 mg by mouth every 6 (six) hours as needed for Itching.     Begin Date    AM    Noon    PM    Bedtime       estradiol 1 MG tablet   Commonly known as:  ESTRACE   Refills:  0   Dose:  1 mg    Instructions:  Take 1 mg by mouth once daily.     Begin Date    AM    Noon    PM    Bedtime       gabapentin 600 MG tablet   Commonly known as:  NEURONTIN   Refills:  0   Dose:  600 mg    Instructions:  Take 600 mg by mouth 2 (two) times daily.     Begin Date    AM    Noon    PM    Bedtime       GLIPIZIDE ORAL   Refills:  0   Dose:  5 mg    Instructions:  Take 5 mg by mouth 2 (two) times daily.     Begin Date    AM    Noon    PM    Bedtime       hydrocodone-acetaminophen 10-325mg  mg Tab   Commonly known as:  NORCO   Quantity:  90 tablet   Refills:  0   Dose:  1 tablet    Instructions:  Take 1 tablet by mouth 3 (three) times daily as needed.     Begin Date    AM    Noon    PM    Bedtime       LANTUS 100 unit/mL injection   Refills:  0   Dose:  45 Units   Generic drug:  insulin glargine    Instructions:  Inject 45 Units into the skin every evening.     Begin Date    AM    Noon     PM    Bedtime       medroxyPROGESTERone 2.5 MG tablet   Commonly known as:  PROVERA   Refills:  0   Dose:  2.5 mg    Instructions:  Take 2.5 mg by mouth once daily.     Begin Date    AM    Noon    PM    Bedtime       meloxicam 15 MG tablet   Commonly known as:  MOBIC   Quantity:  30 tablet   Refills:  3   Dose:  15 mg    Instructions:  Take 1 tablet (15 mg total) by mouth once daily.     Begin Date    AM    Noon    PM    Bedtime       nebivolol 10 MG Tab   Commonly known as:  BYSTOLIC   Refills:  0   Dose:  10 mg    Instructions:  Take 10 mg by mouth once daily.     Begin Date    AM    Noon    PM    Bedtime       ondansetron 4 MG Tbdl   Commonly known as:  ZOFRAN-ODT   Refills:  0   Dose:  4 mg    Instructions:  Take 4 mg by mouth.     Begin Date    AM    Noon    PM    Bedtime       PROAIR HFA INHL   Refills:  0    Instructions:  Inhale into the lungs.     Begin Date    AM    Noon    PM    Bedtime       rosuvastatin 10 MG tablet   Commonly known as:  CRESTOR   Refills:  0   Dose:  10 mg    Instructions:  Take 10 mg by mouth once daily.     Begin Date    AM    Noon    PM    Bedtime       tizanidine 2 mg Cap   Quantity:  90 capsule   Refills:  1   Dose:  2 mg    Instructions:  Take 1 capsule (2 mg total) by mouth 3 (three) times daily as needed.     Begin Date    AM    Noon    PM    Bedtime                  Please bring to all follow up appointments:    1. A copy of your discharge instructions.  2. All medicines you are currently taking in their original bottles.  3. Identification and insurance card.    Please arrive 15 minutes ahead of scheduled appointment time.    Please call 24 hours in advance if you must reschedule your appointment and/or time.        Your Scheduled Appointments     May 24, 2017 10:15 AM CDT   Established Patient Visit with MD Kina Valdez - Orthopedics ([facility])    [id number] [facility] [address]   [phone]            Jun 14, 2017 10:30 AM CDT    Established Patient Visit with [facility] - Pain Management ([facility] [place])    [address] [facility] [address] [place] [id number] [phone]                Discharge Instructions     Future Orders    Activity as tolerated     Call MD for:  redness, tenderness, or signs of infection (pain, swelling, redness, odor or green/yellow discharge around incision site)     Call MD for:  severe persistent headache     Call MD for:  severe uncontrolled pain     Call MD for:  temperature >100.4     Diet general     Questions:    Total calories:      Fat restriction, if any:      Protein restriction, if any:      Na restriction, if any:      Fluid restriction:      Additional restrictions:      No dressing needed         Discharge Instructions       Home care instructions  Apply ice pack to the injection site for 20 minutes periods for the first 24 hrs for soreness/discomfort at injection site DO NOT USE HEAT FOR 24 HOURS  Keep site clean and dry for 24 hours, remove bandaid when desired  Do not drive until tomorrow  Take care when walking after a lumbar injection  Avoid strenuous activities for 2 days  Make take 2 weeks to feel the full effects   Resume home medication as prescribed today  Resume Aspirin, Plavix, or Coumadin the day after the procedure unless otherwise instructed.    SEE IMMEDIATE MEDICAL HELP FOR:  Severe increase in your usual pain or appearance of new pain  Prolonged or increasing weakness or numbness in the legs or arms  Drainage, redness, active bleeding, or increased swelling at the injection site  Temperature over 100.0 degrees F.  Headache that increases when your head is upright and decreases when you lie flat    CALL 911 OR GO DIRECTLY TO EMERGENCY DEPARTMENT FOR:  Shortness of breath, chest pain, or problems breathing      Admission Information     Date & Time Provider Department [facility]    5/16/2017  1:57 PM Erik Galan MD [facility] [id number]     "  Care Providers     Provider Role Specialty Primary office phone    Erik Galan MD Attending Provider Pain Medicine [phone]    Erik Galan MD Surgeon  Pain Medicine [phone]      Your Vitals Were     BP Pulse Temp Resp Height Weight    168/79 (BP Location: Left arm, BP Method: Automatic) 80 97.3 °F (36.3 °C) (Skin) 18 5' 6" (1.676 m) 93.4 kg (206 lb)    Last Period SpO2 BMI          02/16/2017 100% 33.25 kg/m2        Recent Lab Values     No lab values to display.      Allergies as of 5/16/2017        Reactions    Pcn [Penicillins] Itching      Advance Directives     An advance directive is a document which, in the event you are no longer able to make decisions for yourself, tells your healthcare team what kind of treatment you do or do not want to receive, or who you would like to make those decisions for you.  If you do not currently have an advance directive, [facility] encourages you to create one.  For more information call:  [phone]), [phone] ([phone]),  or log on to [email or web address].        Language Assistance Services     ATTENTION: Language assistance services are available, free of charge. Please call [phone].      ATENCIÓN: Si habla español, tiene a vazquez disposición servicios gratuitos de asistencia lingüística. Llame al [phone].     CHÚ Ý: N?u b?n nói Ti?ng Vi?t, có các d?ch v? h? tr? ngôn ng? mi?n phí dành cho b?n. G?i s? [phone].         [facility] complies with applicable Federal civil rights laws and does not discriminate on the basis of race, color, national origin, age, disability, or sex.        "

## 2017-05-17 VITALS
TEMPERATURE: 97 F | HEIGHT: 66 IN | WEIGHT: 206 LBS | BODY MASS INDEX: 33.11 KG/M2 | SYSTOLIC BLOOD PRESSURE: 168 MMHG | RESPIRATION RATE: 18 BRPM | HEART RATE: 80 BPM | DIASTOLIC BLOOD PRESSURE: 79 MMHG | OXYGEN SATURATION: 100 %

## 2017-05-24 ENCOUNTER — OFFICE VISIT (OUTPATIENT)
Dept: ORTHOPEDICS | Facility: CLINIC | Age: 57
End: 2017-05-24
Payer: COMMERCIAL

## 2017-05-24 VITALS
HEIGHT: 66 IN | SYSTOLIC BLOOD PRESSURE: 146 MMHG | DIASTOLIC BLOOD PRESSURE: 70 MMHG | BODY MASS INDEX: 33.94 KG/M2 | HEART RATE: 92 BPM | WEIGHT: 211.19 LBS

## 2017-05-24 DIAGNOSIS — M79.642 LEFT HAND PAIN: ICD-10-CM

## 2017-05-24 DIAGNOSIS — R20.0 NUMBNESS AND TINGLING IN RIGHT HAND: ICD-10-CM

## 2017-05-24 DIAGNOSIS — G56.01 RIGHT CARPAL TUNNEL SYNDROME: Primary | ICD-10-CM

## 2017-05-24 DIAGNOSIS — M65.342 TRIGGER FINGER, LEFT RING FINGER: ICD-10-CM

## 2017-05-24 DIAGNOSIS — R20.2 NUMBNESS AND TINGLING IN RIGHT HAND: ICD-10-CM

## 2017-05-24 DIAGNOSIS — M65.332 TRIGGER FINGER, LEFT MIDDLE FINGER: ICD-10-CM

## 2017-05-24 PROCEDURE — 99999 PR PBB SHADOW E&M-EST. PATIENT-LVL III: CPT | Mod: PBBFAC,,, | Performed by: ORTHOPAEDIC SURGERY

## 2017-05-24 PROCEDURE — 99213 OFFICE O/P EST LOW 20 MIN: CPT | Mod: S$GLB,,, | Performed by: ORTHOPAEDIC SURGERY

## 2017-05-24 PROCEDURE — 1160F RVW MEDS BY RX/DR IN RCRD: CPT | Mod: S$GLB,,, | Performed by: ORTHOPAEDIC SURGERY

## 2017-05-24 NOTE — PROGRESS NOTES
Subjective:          Chief Complaint: Aaron Garcia is a 56 y.o. female who had concerns including Pain of the Left Hand.    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 She did have injections 6 weeks ago and started OT    Pain: 4/10    She is having right hand numbness and tingling that is stemming from cervical spine pathology that is currently being treated by pain management.However the symptoms have not gotten any better. She does not have left hand numbness/tingling.      Pain   Associated symptoms include numbness. Pertinent negatives include no chills or fever.       Review of Systems   Constitution: Negative for chills and fever.   Musculoskeletal: Positive for joint pain and stiffness.   Neurological: Positive for numbness and paresthesias.   All other systems reviewed and are negative.                  Objective:        General: Aaron is well-developed, well-nourished, appears stated age, in no acute distress, alert and oriented to time, place and person.     General    Vitals reviewed.  Constitutional: She is oriented to person, place, and time. She appears well-developed and well-nourished. No distress.   HENT:   Head: Normocephalic and atraumatic.   Nose: Nose normal.   Eyes: Pupils are equal, round, and reactive to light.   Cardiovascular: Normal rate.    Pulmonary/Chest: Effort normal.   Neurological: She is alert and oriented to person, place, and time.   Psychiatric: She has a normal mood and affect. Her behavior is normal. Judgment and thought content normal.             Right Hand/Wrist Exam     Inspection   Scars: Wrist - absent Hand -  absent  Effusion: Wrist - absent Hand -  absent  Bruising: Wrist - absent Hand -  absent  Deformity: Wrist - deformity Hand -  deformity    Tests   Phalens Sign: positive  Tinels Sign (Medial Nerve): positive  Finkelstein: negative  Carpal Tunnel  Compression Test: positive    Atrophy   Thenar:  negative  Hypothenar:  negative  Intrinsic:  negative  1st Dorsal Interosseous: negative    Other     Neuorologic Exam    Median Distribution: abnormal  Ulnar Distribution: normal  Radial Distribution: normal    Comments:  Full ROM of the right hand with some decreased strength      Left Hand/Wrist Exam     Inspection   Scars: Wrist - absent Hand -  present  Effusion: Wrist - absent Hand -  absent  Bruising: Wrist - absent Hand -  absent  Deformity: Hand -  absent    Swelling   Hand - The patient is swollen on the middle MCP, ring IP, ring MCP and middle IP.    Tenderness   The patient is tender to palpation of the vásquez area.     Range of Motion     Finger Flexion   PIP Middle: normal   PIP Ring: normal   DIP Middle: normal   DIP Ring: normal     Tests     Atrophy  Thenar:  Negative  Hypothenar:  negative  Intrinsic: negative  1st Dorsal Interosseous:  negative    Other     Sensory Exam  Median Distribution: normal  Ulnar Distribution: normal  Radial Distribution: normal    Comments:  Decreased  strength secondary to finger stiffness          Muscle Strength   Right Upper Extremity   Wrist Extension: 5/5/5   Wrist Flexion: 5/5/5   : 5/5/5   Index Finger: 5/5  Middle Finger: 5/5  Ring Finger: 5/5  Little Finger: 5/5  Thumb - APB: 5/5  Thumb - FPL: 5/5  Pinch Mechanism: 5/5  Left Upper Extremity  Wrist Extension: 5/5/5   Wrist Flexion: 5/5/5   :  5/5/5   Index Finger: 5/5  Middle Finger: 5/5  Ring Finger: 5/5  Little Finger: 5/5  Thumb - APB: 5/5  Thumb - FPL: 5/5  Pinch Mechanism: 5/5    Vascular Exam       Capillary Refill  Right Hand: normal capillary refill  Left Hand: normal capillary refill    Current and previous radiographic studies and results were reviewed with the patient:   Findings: There is mild multiarticular degenerative change.  No periarticular erosions.  No acute fracture or dislocation.    Previous EMG/NCV in early 2000s with evidence  of right CTS      Assessment:       Encounter Diagnoses   Name Primary?    Left hand pain     Trigger finger, left middle finger     Trigger finger, left ring finger     Right carpal tunnel syndrome Yes    Numbness and tingling in right hand           Plan:         Patient will call regarding right CTR surgery  Can continue with activities as tolerated

## 2017-06-09 RX ORDER — HYDROCODONE BITARTRATE AND ACETAMINOPHEN 10; 325 MG/1; MG/1
1 TABLET ORAL 3 TIMES DAILY PRN
Qty: 90 TABLET | Refills: 0 | Status: SHIPPED | OUTPATIENT
Start: 2017-06-11 | End: 2017-06-14 | Stop reason: SDUPTHER

## 2017-06-12 RX ORDER — HYDROCODONE BITARTRATE AND ACETAMINOPHEN 10; 325 MG/1; MG/1
1 TABLET ORAL 3 TIMES DAILY PRN
Qty: 90 TABLET | Refills: 0 | Status: CANCELLED | OUTPATIENT
Start: 2017-06-12 | End: 2017-07-12

## 2017-06-14 ENCOUNTER — OFFICE VISIT (OUTPATIENT)
Dept: PAIN MEDICINE | Facility: CLINIC | Age: 57
End: 2017-06-14
Payer: COMMERCIAL

## 2017-06-14 VITALS
SYSTOLIC BLOOD PRESSURE: 130 MMHG | RESPIRATION RATE: 18 BRPM | WEIGHT: 212.81 LBS | HEIGHT: 66 IN | DIASTOLIC BLOOD PRESSURE: 78 MMHG | BODY MASS INDEX: 34.2 KG/M2

## 2017-06-14 DIAGNOSIS — M48.02 CERVICAL SPINAL STENOSIS: ICD-10-CM

## 2017-06-14 DIAGNOSIS — M50.30 DDD (DEGENERATIVE DISC DISEASE), CERVICAL: ICD-10-CM

## 2017-06-14 DIAGNOSIS — Z79.891 OPIOID CONTRACT EXISTS: ICD-10-CM

## 2017-06-14 DIAGNOSIS — M51.36 DDD (DEGENERATIVE DISC DISEASE), LUMBAR: ICD-10-CM

## 2017-06-14 DIAGNOSIS — M54.16 LUMBAR RADICULOPATHY: Primary | ICD-10-CM

## 2017-06-14 DIAGNOSIS — M54.12 CERVICAL RADICULOPATHY: ICD-10-CM

## 2017-06-14 PROCEDURE — 80307 DRUG TEST PRSMV CHEM ANLYZR: CPT

## 2017-06-14 PROCEDURE — 99213 OFFICE O/P EST LOW 20 MIN: CPT | Mod: S$GLB,,, | Performed by: PHYSICIAN ASSISTANT

## 2017-06-14 PROCEDURE — 99999 PR PBB SHADOW E&M-EST. PATIENT-LVL IV: CPT | Mod: PBBFAC,,, | Performed by: PHYSICIAN ASSISTANT

## 2017-06-14 RX ORDER — AMITRIPTYLINE HYDROCHLORIDE 25 MG/1
25 TABLET, FILM COATED ORAL
COMMUNITY
Start: 2017-06-02 | End: 2018-10-31

## 2017-06-14 RX ORDER — TIZANIDINE HYDROCHLORIDE 2 MG/1
2 CAPSULE, GELATIN COATED ORAL 3 TIMES DAILY PRN
Qty: 90 CAPSULE | Refills: 2 | Status: SHIPPED | OUTPATIENT
Start: 2017-06-14 | End: 2017-09-27 | Stop reason: SDUPTHER

## 2017-06-14 RX ORDER — HYDROCODONE BITARTRATE AND ACETAMINOPHEN 10; 325 MG/1; MG/1
1 TABLET ORAL 3 TIMES DAILY PRN
Qty: 90 TABLET | Refills: 0 | Status: SHIPPED | OUTPATIENT
Start: 2017-08-10 | End: 2017-09-06 | Stop reason: SDUPTHER

## 2017-06-14 RX ORDER — HYDROCODONE BITARTRATE AND ACETAMINOPHEN 10; 325 MG/1; MG/1
1 TABLET ORAL 3 TIMES DAILY PRN
Qty: 90 TABLET | Refills: 0 | Status: SHIPPED | OUTPATIENT
Start: 2017-07-11 | End: 2017-08-10

## 2017-06-15 NOTE — PROGRESS NOTES
This note was completed with dictation software and grammatical errors may exist.    CC: Neck pain, arm pain    HPI: The patient is a 56-year-old woman with a history of diabetes, hypertension who presents in referral from [facility] for neck pain radiating into the arms and low back pain radiating into the left leg.  She is status post L5/S1 interlaminar epidural steroid injection on 5/16/17 with 50% relief.  She describes shocking pain in her low back radiating to her lateral hips but not as intense as prior to the injection.  She continues to have neck pain and reports that this is radiating to her trapezius muscles and shoulders.  Her pain is typically worse with activity and improved with her pain medication.  She currently denies weakness, numbness, bladder or bowel incontinence.    Pain intervention history: She has been taking hydrocodone 10/325 3 times a day for the last year, previous to this she was taking Percocet 3 times a day but states that it was causing her panic attacks, did help with her pain slightly better.  She has been taking gabapentin 600mg twice a day and Zanaflex at night with some relief.  She had undergone 3 epidurals for low back pain with only up to 3 weeks of relief each time.  She is status post C7-T1 cervical interlaminar epidural steroid injection on 1/29/16 with 50% relief.   She is status post C7-T1 cervical interlaminar epidural steroid injection on 3/1/16 with mild additional relief.  She is status post C7-T1 cervical interlaminar epidural steroid injection on 5/13/16 with 70-75% relief.  She is status post right C3, 4, 5 and 6 medial branch radiofrequency ablation on 7/15/16 with 30-60% relief.  She is status post L5/S1 interlaminar epidural steroid injection on 5/16/17 with 50% relief.    Urine drug screen 1/5/16  Negative and inconsistent for hydrocodone but positive and consistent for its metabolite hydromorphone    Urine drug screen 2/11/16  Negative  "and inconsistent for hydrocodone    ROS: She reports headaches, hoarse voice, joint stiffness, joint swelling, back pain, difficulty sleeping, anxiety and loss of balance.  Balance of review of systems is negative.    Medical, surgical, family and social history reviewed elsewhere in record.    Medications/Allergies: See med card    Vitals:    06/14/17 1050   BP: 130/78   Resp: 18   Weight: 96.5 kg (212 lb 12.8 oz)   Height: 5' 6" (1.676 m)   PainSc:   4   PainLoc: Shoulder         Physical exam:  Gen: A and O x3, pleasant, well-groomed  Skin: No rashes or obvious lesions  HEENT: PERRLA, no obvious deformities on ears or in canals.Trachea midline.  CVS: Regular rate and rhythm, normal palpable pulses.  Resp: Clear to auscultation bilaterally, no wheezes or rales.  Abdomen: Soft, NT/ND.  Musculoskeletal: No antalgic gait.     Neuro:  Upper extremities: 5/5 strength bilaterally  Lower extremities: 5/5 strength bilaterally.  Reflexes: Brachioradialis 2+, Bicep 2+, Tricep 2+.  Patellar and Achilles reflexes 2+ bilaterally.  Sensory: Intact and symmetrical to light touch and pinprick in C2-T1 dermatomes bilaterally.    Cervical Spine:  Cervical spine: Range of motion is mildly reduced with flexion extension and lateral rotation with increased bilateral neck and right greater than left trapezius muscle pain during each maneuver.  Spurling's maneuver causes neck pain to either side.    Myofascial exam: Tenderness to palpation throughout cervical paraspinous and right greater than left trapezius muscles.    Lumbar spine:  Range of motion is moderately limited with flexion and moderately limited with extension with mild increased pain in the left low back during each maneuver, especially with flexion and left oblique extension.  Khanh's test is negative bilaterally.  Straight leg raise causes left lateral hip pain.  Internal and external rotation of hip is negative bilaterally.  Myofascial exam: Mild tenderness palpation " to the left lumbar paraspinous muscles.    Imagin/24/15 C-spine MRI  1. C3-4 left posterior paracentral disk extrusion causing left paracentral spinal cord compression and severe left foraminal stenosis.  2. C4-5 based disk extrusion with spinal cord impingement and moderate bilateral foraminal stenosis.  3. C5-6 posterior central disk extrusion with spinal cord compression and severe bilateral foraminal stenosis.  4. C6-7 mild disk bulge with severe bilateral foraminal stenosis.  5. Solitary mildly enlarged left submandibular lymph node of uncertain significance.    4/10/17 MRI lumbar spine  T12-L1: No central canal or neuroforaminal stenosis. No disc protrusion or extrusion.  L1-L2: There is ligamentum flavum thickening and facet arthropathy.  No central canal or neuroforaminal stenosis. No disc protrusion or extrusion.  L2-L3: There is ligamentum flavum thickening and facet arthropathy.  No central canal or neuroforaminal stenosis. No disc protrusion or extrusion.  L3-L4: There is ligamentum flavum thickening and facet arthropathy present. No central canal or neuroforaminal stenosis. No disc protrusion or extrusion.  L4-L5: There is a broad disc bulge which extends into both neural foramina.  There is ligamentum flavum thickening and facet arthropathy.  There is right lateral recess stenosis.  There is abutment of the descending right L5 nerve in the right lateral recess.  Please correlate clinically for symptoms referable to the right L5 nerve.  There is mild central canal stenosis.  There is mild left neuroforaminal stenosis.  No right neuroforaminal stenosis.  L5-S1: There is a broad central/right paracentral disc protrusion which encroaches upon the descending right S1 nerve in the right lateral recess.  Please correlate clinically for symptoms referable to the right S1 nerve.  There is mild-moderate right neuroforaminal stenosis.  No left neuroforaminal stenosis.  No central canal stenosis.  There is  ligamentum flavum thickening and facet arthropathy.  There is a broad left extraforaminal disc protrusion at L5-S1 which abuts the exited left L5 nerve in the left extraforaminal soft tissues (series 6 image 17), nonspecific.  Please correlate clinically for symptoms referable to the left L5 nerve.      Assessment:  The patient is a 56-year-old woman with a history of diabetes, hypertension who presents in referral from [facility] for neck pain radiating into the arms and low back pain radiating into the left leg.     1. Lumbar radiculopathy     2. Opioid contract exists  [facility] Drug Screen   3. DDD (degenerative disc disease), lumbar     4. DDD (degenerative disc disease), cervical     5. Cervical spinal stenosis     6. Cervical radiculopathy           Plan:  1.  The patient did well following the lumbar HERACLIO.  This can be repeated in the future if necessary.  2.  Dr. Galan provided prescriptions for hydrocodone-acetaminophen 10/325 mg up to 3 times a day as needed for pain.  A urine drug screen was completed today.  I have reviewed the [place] [employer] of [employer] website and there are no abberancies.    3.  Follow-up in 3 months or sooner as needed.

## 2017-06-18 LAB
6MAM UR QL: NOT DETECTED
7AMINOCLONAZEPAM UR QL: NOT DETECTED
A-OH ALPRAZ UR QL: NOT DETECTED
ALPRAZ UR QL: NOT DETECTED
AMPHET UR QL SCN: NOT DETECTED
ANNOTATION COMMENT IMP: NORMAL
ANNOTATION COMMENT IMP: NORMAL
BARBITURATES UR QL: NOT DETECTED
BUPRENORPHINE UR QL: NOT DETECTED
BZE UR QL: NOT DETECTED
CARBOXYTHC UR QL: NOT DETECTED
CARISOPRODOL UR QL: NOT DETECTED
CLONAZEPAM UR QL: NOT DETECTED
CODEINE UR QL: NOT DETECTED
CREAT UR-MCNC: 44.6 MG/DL (ref 20–400)
DIAZEPAM UR QL: NOT DETECTED
ETHYL GLUCURONIDE UR QL: NOT DETECTED
FENTANYL UR QL: NOT DETECTED
HYDROCODONE UR QL: PRESENT
HYDROMORPHONE UR QL: NOT DETECTED
LORAZEPAM UR QL: NOT DETECTED
MDA UR QL: NOT DETECTED
MDEA UR QL: NOT DETECTED
MDMA UR QL: NOT DETECTED
ME-PHENIDATE UR QL: NOT DETECTED
MEPERIDINE UR QL: NOT DETECTED
METHADONE UR QL: NOT DETECTED
METHAMPHET UR QL: NOT DETECTED
MIDAZOLAM UR QL SCN: NOT DETECTED
MORPHINE UR QL: NOT DETECTED
NORBUPRENORPHINE UR QL CFM: NOT DETECTED
NORDIAZEPAM UR QL: NOT DETECTED
NORFENTANYL UR QL: NOT DETECTED
NORHYDROCODONE UR QL CFM: PRESENT
NOROXYCODONE UR QL CFM: NOT DETECTED
OXAZEPAM UR QL: NOT DETECTED
OXYCODONE UR QL: NOT DETECTED
OXYMORPHONE UR QL: NOT DETECTED
OXYMORPHONE UR QL: NOT DETECTED
PATHOLOGY STUDY: NORMAL
PCP UR QL: NOT DETECTED
PHENTERMINE UR QL: NOT DETECTED
PROPOXYPH UR QL: NOT DETECTED
SERVICE CMNT-IMP: NORMAL
TAPENTADOL UR QL SCN: NOT DETECTED
TAPENTADOL UR QL SCN: NOT DETECTED
TEMAZEPAM UR QL: NOT DETECTED
TRAMADOL UR QL: NOT DETECTED
ZOLPIDEM UR QL: NOT DETECTED

## 2017-08-27 DIAGNOSIS — M25.562 LEFT KNEE PAIN, UNSPECIFIED CHRONICITY: ICD-10-CM

## 2017-08-27 DIAGNOSIS — M94.262 CHONDROMALACIA, LEFT KNEE: ICD-10-CM

## 2017-08-28 RX ORDER — MELOXICAM 15 MG/1
TABLET ORAL
Qty: 30 TABLET | Refills: 2 | Status: SHIPPED | OUTPATIENT
Start: 2017-08-28 | End: 2017-12-01 | Stop reason: SDUPTHER

## 2017-09-06 ENCOUNTER — OFFICE VISIT (OUTPATIENT)
Dept: PAIN MEDICINE | Facility: CLINIC | Age: 57
End: 2017-09-06
Payer: COMMERCIAL

## 2017-09-06 VITALS
HEART RATE: 80 BPM | SYSTOLIC BLOOD PRESSURE: 126 MMHG | RESPIRATION RATE: 17 BRPM | DIASTOLIC BLOOD PRESSURE: 78 MMHG | HEIGHT: 66 IN | WEIGHT: 204.13 LBS | BODY MASS INDEX: 32.81 KG/M2

## 2017-09-06 DIAGNOSIS — M51.36 DDD (DEGENERATIVE DISC DISEASE), LUMBAR: ICD-10-CM

## 2017-09-06 DIAGNOSIS — Z79.891 OPIOID CONTRACT EXISTS: ICD-10-CM

## 2017-09-06 DIAGNOSIS — M50.30 DDD (DEGENERATIVE DISC DISEASE), CERVICAL: ICD-10-CM

## 2017-09-06 DIAGNOSIS — M54.12 CERVICAL RADICULOPATHY: ICD-10-CM

## 2017-09-06 DIAGNOSIS — M54.16 LUMBAR RADICULOPATHY: Primary | ICD-10-CM

## 2017-09-06 DIAGNOSIS — M48.02 CERVICAL SPINAL STENOSIS: ICD-10-CM

## 2017-09-06 PROCEDURE — 3008F BODY MASS INDEX DOCD: CPT | Mod: S$GLB,,, | Performed by: PHYSICIAN ASSISTANT

## 2017-09-06 PROCEDURE — 99999 PR PBB SHADOW E&M-EST. PATIENT-LVL IV: CPT | Mod: PBBFAC,,, | Performed by: PHYSICIAN ASSISTANT

## 2017-09-06 PROCEDURE — 99213 OFFICE O/P EST LOW 20 MIN: CPT | Mod: S$GLB,,, | Performed by: PHYSICIAN ASSISTANT

## 2017-09-06 RX ORDER — HYDROCODONE BITARTRATE AND ACETAMINOPHEN 10; 325 MG/1; MG/1
1 TABLET ORAL 3 TIMES DAILY PRN
Qty: 90 TABLET | Refills: 0 | Status: SHIPPED | OUTPATIENT
Start: 2017-11-08 | End: 2017-12-06 | Stop reason: SDUPTHER

## 2017-09-06 RX ORDER — HYDROCODONE BITARTRATE AND ACETAMINOPHEN 10; 325 MG/1; MG/1
1 TABLET ORAL 3 TIMES DAILY PRN
Qty: 90 TABLET | Refills: 0 | Status: SHIPPED | OUTPATIENT
Start: 2017-10-09 | End: 2017-11-08

## 2017-09-06 RX ORDER — DESLORATADINE 5 MG/1
5 TABLET ORAL DAILY
COMMUNITY
End: 2018-08-08

## 2017-09-06 RX ORDER — HYDROCODONE BITARTRATE AND ACETAMINOPHEN 10; 325 MG/1; MG/1
1 TABLET ORAL 3 TIMES DAILY PRN
Qty: 90 TABLET | Refills: 0 | Status: SHIPPED | OUTPATIENT
Start: 2017-09-09 | End: 2017-10-09

## 2017-09-06 NOTE — PROGRESS NOTES
This note was completed with dictation software and grammatical errors may exist.    CC: Neck pain, arm pain, back pain and leg pain    HPI: The patient is a 56-year-old woman with a history of diabetes, hypertension who presents in referral from [facility] for neck pain radiating into the arms and low back pain radiating into the left leg.  She returns in follow-up today for neck pain and low back pain.  She complains of bilateral neck pain radiating to the trapezius muscles and shoulders.  She also complains of right low back pain radiating to the right lateral hip and lateral thigh.  She has pain radiating into the right lateral shin but also describes a burning pain in the right medial calf.  Her back pain is worse with sleeping on her right side and she states her calf pain is random.  She has relief with her pain medication.  She would like another lumbar HERACLIO but states that she is going to have sinus surgery in the near future.  She denies weakness, numbness, bladder or bowel incontinence.    Pain intervention history: She has been taking hydrocodone 10/325 3 times a day for the last year, previous to this she was taking Percocet 3 times a day but states that it was causing her panic attacks, did help with her pain slightly better.  She has been taking gabapentin 600mg twice a day and Zanaflex at night with some relief.  She had undergone 3 epidurals for low back pain with only up to 3 weeks of relief each time.  She is status post C7-T1 cervical interlaminar epidural steroid injection on 1/29/16 with 50% relief.   She is status post C7-T1 cervical interlaminar epidural steroid injection on 3/1/16 with mild additional relief.  She is status post C7-T1 cervical interlaminar epidural steroid injection on 5/13/16 with 70-75% relief.  She is status post right C3, 4, 5 and 6 medial branch radiofrequency ablation on 7/15/16 with 30-60% relief.  She is status post L5/S1 interlaminar epidural  "steroid injection on 5/16/17 with 50% relief.    Urine drug screen 1/5/16  Negative and inconsistent for hydrocodone but positive and consistent for its metabolite hydromorphone    Urine drug screen 2/11/16  Negative and inconsistent for hydrocodone    ROS: She reports headaches, hoarse voice, joint stiffness, joint swelling, back pain, difficulty sleeping, anxiety and loss of balance.  Balance of review of systems is negative.    Medical, surgical, family and social history reviewed elsewhere in record.    Medications/Allergies: See med card    Vitals:    09/06/17 1110   BP: 126/78   Pulse: 80   Resp: 17   Weight: 92.6 kg (204 lb 2.3 oz)   Height: 5' 6" (1.676 m)   PainSc:   4   PainLoc: Leg         Physical exam:  Gen: A and O x3, pleasant, well-groomed  Skin: No rashes or obvious lesions  HEENT: PERRLA, no obvious deformities on ears or in canals.Trachea midline.  CVS: Regular rate and rhythm, normal palpable pulses.  Resp: Clear to auscultation bilaterally, no wheezes or rales.  Abdomen: Soft, NT/ND.  Musculoskeletal: No antalgic gait.     Neuro:  Upper extremities: 5/5 strength bilaterally  Lower extremities: 5/5 strength bilaterally.  Reflexes: Brachioradialis 2+, Bicep 2+, Tricep 2+.  Patellar and Achilles reflexes 2+ bilaterally.  Sensory: Intact and symmetrical to light touch and pinprick in C2-T1 dermatomes bilaterally.    Cervical Spine:  Cervical spine: Range of motion is mildly reduced with flexion extension and lateral rotation with increased bilateral neck and right greater than left trapezius muscle pain during each maneuver.  Spurling's maneuver causes neck pain to either side.    Myofascial exam: Tenderness to palpation throughout cervical paraspinous and right trapezius muscle.    Lumbar spine:  Range of motion is moderately limited with flexion and extension with mild increased pain in the right low back during each maneuver.  Khanh's test is negative bilaterally.  Straight leg raise causes " right lateral hip pain.  Internal and external rotation of hip is negative bilaterally.  Myofascial exam: Mild tenderness palpation to the right lumbar paraspinous muscles.    Imagin/24/15 C-spine MRI  1. C3-4 left posterior paracentral disk extrusion causing left paracentral spinal cord compression and severe left foraminal stenosis.  2. C4-5 based disk extrusion with spinal cord impingement and moderate bilateral foraminal stenosis.  3. C5-6 posterior central disk extrusion with spinal cord compression and severe bilateral foraminal stenosis.  4. C6-7 mild disk bulge with severe bilateral foraminal stenosis.  5. Solitary mildly enlarged left submandibular lymph node of uncertain significance.    4/10/17 MRI lumbar spine  T12-L1: No central canal or neuroforaminal stenosis. No disc protrusion or extrusion.  L1-L2: There is ligamentum flavum thickening and facet arthropathy.  No central canal or neuroforaminal stenosis. No disc protrusion or extrusion.  L2-L3: There is ligamentum flavum thickening and facet arthropathy.  No central canal or neuroforaminal stenosis. No disc protrusion or extrusion.  L3-L4: There is ligamentum flavum thickening and facet arthropathy present. No central canal or neuroforaminal stenosis. No disc protrusion or extrusion.  L4-L5: There is a broad disc bulge which extends into both neural foramina.  There is ligamentum flavum thickening and facet arthropathy.  There is right lateral recess stenosis.  There is abutment of the descending right L5 nerve in the right lateral recess.  Please correlate clinically for symptoms referable to the right L5 nerve.  There is mild central canal stenosis.  There is mild left neuroforaminal stenosis.  No right neuroforaminal stenosis.  L5-S1: There is a broad central/right paracentral disc protrusion which encroaches upon the descending right S1 nerve in the right lateral recess.  Please correlate clinically for symptoms referable to the right S1  nerve.  There is mild-moderate right neuroforaminal stenosis.  No left neuroforaminal stenosis.  No central canal stenosis.  There is ligamentum flavum thickening and facet arthropathy.  There is a broad left extraforaminal disc protrusion at L5-S1 which abuts the exited left L5 nerve in the left extraforaminal soft tissues (series 6 image 17), nonspecific.  Please correlate clinically for symptoms referable to the left L5 nerve.      Assessment:  The patient is a 56-year-old woman with a history of diabetes, hypertension who presents in referral from [facility] for neck pain radiating into the arms and low back pain radiating into the left leg.     1. Lumbar radiculopathy     2. DDD (degenerative disc disease), lumbar     3. DDD (degenerative disc disease), cervical     4. Cervical spinal stenosis     5. Cervical radiculopathy     6. Opioid contract exists           Plan:  1.  Dr. Galan provided prescriptions for hydrocodone-acetaminophen 10/325 mg up to 3 times a day as needed for pain.  Her last urine drug screen was consistent.  I have reviewed the [place] Board of Pharmacy website and there are no abberancies.    2.  We discussed repeating another lumbar HERACLIO but she is going to have sinus surgery in the near future.  She will call afterwards to likely schedule an L5/S1 interlaminar HERACLIO.  3.  Follow-up in 3 months or sooner as needed.

## 2017-09-27 RX ORDER — TIZANIDINE HYDROCHLORIDE 2 MG/1
CAPSULE, GELATIN COATED ORAL
Qty: 90 CAPSULE | Refills: 1 | Status: SHIPPED | OUTPATIENT
Start: 2017-09-27 | End: 2017-12-01 | Stop reason: SDUPTHER

## 2017-12-01 DIAGNOSIS — M25.562 LEFT KNEE PAIN, UNSPECIFIED CHRONICITY: ICD-10-CM

## 2017-12-01 DIAGNOSIS — M94.262 CHONDROMALACIA, LEFT KNEE: ICD-10-CM

## 2017-12-04 RX ORDER — TIZANIDINE HYDROCHLORIDE 2 MG/1
CAPSULE, GELATIN COATED ORAL
Qty: 90 CAPSULE | Refills: 0 | Status: SHIPPED | OUTPATIENT
Start: 2017-12-04 | End: 2017-12-29 | Stop reason: SDUPTHER

## 2017-12-06 ENCOUNTER — OFFICE VISIT (OUTPATIENT)
Dept: PAIN MEDICINE | Facility: CLINIC | Age: 57
End: 2017-12-06
Payer: COMMERCIAL

## 2017-12-06 VITALS
BODY MASS INDEX: 32.95 KG/M2 | WEIGHT: 204.13 LBS | TEMPERATURE: 98 F | RESPIRATION RATE: 18 BRPM | DIASTOLIC BLOOD PRESSURE: 74 MMHG | SYSTOLIC BLOOD PRESSURE: 120 MMHG | HEART RATE: 76 BPM

## 2017-12-06 DIAGNOSIS — M50.30 DDD (DEGENERATIVE DISC DISEASE), CERVICAL: ICD-10-CM

## 2017-12-06 DIAGNOSIS — Z79.891 OPIOID CONTRACT EXISTS: ICD-10-CM

## 2017-12-06 DIAGNOSIS — M54.12 CERVICAL RADICULOPATHY: ICD-10-CM

## 2017-12-06 DIAGNOSIS — M51.36 DDD (DEGENERATIVE DISC DISEASE), LUMBAR: ICD-10-CM

## 2017-12-06 DIAGNOSIS — M48.02 CERVICAL SPINAL STENOSIS: ICD-10-CM

## 2017-12-06 DIAGNOSIS — M54.16 LUMBAR RADICULOPATHY: Primary | ICD-10-CM

## 2017-12-06 PROCEDURE — 99213 OFFICE O/P EST LOW 20 MIN: CPT | Mod: S$GLB,,, | Performed by: PHYSICIAN ASSISTANT

## 2017-12-06 PROCEDURE — 99999 PR PBB SHADOW E&M-EST. PATIENT-LVL V: CPT | Mod: PBBFAC,,, | Performed by: PHYSICIAN ASSISTANT

## 2017-12-06 RX ORDER — HYDROCODONE BITARTRATE AND ACETAMINOPHEN 10; 325 MG/1; MG/1
1 TABLET ORAL 3 TIMES DAILY PRN
Qty: 90 TABLET | Refills: 0 | Status: SHIPPED | OUTPATIENT
Start: 2017-12-08 | End: 2018-01-07

## 2017-12-06 RX ORDER — HYDROCODONE BITARTRATE AND ACETAMINOPHEN 10; 325 MG/1; MG/1
1 TABLET ORAL 3 TIMES DAILY PRN
Qty: 90 TABLET | Refills: 0 | Status: SHIPPED | OUTPATIENT
Start: 2018-02-06 | End: 2018-02-23 | Stop reason: SDUPTHER

## 2017-12-06 RX ORDER — ALPRAZOLAM 0.5 MG/1
1 TABLET, ORALLY DISINTEGRATING ORAL ONCE AS NEEDED
Status: CANCELLED | OUTPATIENT
Start: 2017-12-29 | End: 2017-12-29

## 2017-12-06 RX ORDER — MELOXICAM 15 MG/1
TABLET ORAL
Qty: 30 TABLET | Refills: 1 | Status: SHIPPED | OUTPATIENT
Start: 2017-12-06 | End: 2020-02-21

## 2017-12-06 RX ORDER — HYDROCODONE BITARTRATE AND ACETAMINOPHEN 10; 325 MG/1; MG/1
1 TABLET ORAL 3 TIMES DAILY PRN
Qty: 90 TABLET | Refills: 0 | Status: SHIPPED | OUTPATIENT
Start: 2018-01-07 | End: 2018-02-06

## 2017-12-08 NOTE — PROGRESS NOTES
This note was completed with dictation software and grammatical errors may exist.    CC: Neck pain, arm pain, back pain and leg pain    HPI: The patient is a 57-year-old woman with a history of diabetes, hypertension who presents in referral from [facility] for neck pain radiating into the arms and low back pain radiating into the left leg.  She returns in follow-up today with neck and back pain.  She denies any major changes to this.  Her low back is bothering her more than her neck.  This is associated with muscle spasms traveling down her right leg.  Her pain is worse with activity or staying in the same position for an extended period time.  She has relief with her medication.  She feels that it is time for another injection.  She had 50% relief lasting several months earlier this year.  She denies weakness, numbness, bladder or bowel incontinence.    Pain intervention history: She has been taking hydrocodone 10/325 3 times a day for the last year, previous to this she was taking Percocet 3 times a day but states that it was causing her panic attacks, did help with her pain slightly better.  She has been taking gabapentin 600mg twice a day and Zanaflex at night with some relief.  She had undergone 3 epidurals for low back pain with only up to 3 weeks of relief each time.  She is status post C7-T1 cervical interlaminar epidural steroid injection on 1/29/16 with 50% relief.   She is status post C7-T1 cervical interlaminar epidural steroid injection on 3/1/16 with mild additional relief.  She is status post C7-T1 cervical interlaminar epidural steroid injection on 5/13/16 with 70-75% relief.  She is status post right C3, 4, 5 and 6 medial branch radiofrequency ablation on 7/15/16 with 30-60% relief.  She is status post L5/S1 interlaminar epidural steroid injection on 5/16/17 with 50% relief.    Urine drug screen 1/5/16  Negative and inconsistent for hydrocodone but positive and consistent for  its metabolite hydromorphone    Urine drug screen 2/11/16  Negative and inconsistent for hydrocodone    ROS: She reports headaches, hoarse voice, joint stiffness, joint swelling, back pain, difficulty sleeping, anxiety and loss of balance.  Balance of review of systems is negative.    Medical, surgical, family and social history reviewed elsewhere in record.    Medications/Allergies: See med card    Vitals:    12/06/17 1108   BP: 120/74   Pulse: 76   Resp: 18   Temp: 97.8 °F (36.6 °C)   TempSrc: Oral   Weight: 92.6 kg (204 lb 2.3 oz)   PainSc:   4   PainLoc: Back         Physical exam:  Gen: A and O x3, pleasant, well-groomed  Skin: No rashes or obvious lesions  HEENT: PERRLA, no obvious deformities on ears or in canals.Trachea midline.  CVS: Regular rate and rhythm, normal palpable pulses.  Resp: Clear to auscultation bilaterally, no wheezes or rales.  Abdomen: Soft, NT/ND.  Musculoskeletal: No antalgic gait.     Neuro:  Upper extremities: 5/5 strength bilaterally  Lower extremities: 5/5 strength bilaterally.  Reflexes: Brachioradialis 2+, Bicep 2+, Tricep 2+.  Patellar and Achilles reflexes 2+ bilaterally.  Sensory: Intact and symmetrical to light touch and pinprick in C2-T1 dermatomes bilaterally.    Cervical Spine:  Cervical spine: Range of motion is mildly reduced with flexion extension and lateral rotation with increased bilateral neck and right greater than left trapezius muscle pain during each maneuver.  Spurling's maneuver causes neck pain to either side.    Myofascial exam: Tenderness to palpation throughout cervical paraspinous and right trapezius muscle.    Lumbar spine:  Range of motion is moderately limited with flexion and extension with mild increased pain in the right low back during each maneuver.  Khanh's test is negative bilaterally.  Straight leg raise causes right lateral hip pain.  Internal and external rotation of hip is negative bilaterally.  Myofascial exam: Moderate tenderness palpation  to the right lumbar paraspinous muscles.    Imagin/24/15 C-spine MRI  1. C3-4 left posterior paracentral disk extrusion causing left paracentral spinal cord compression and severe left foraminal stenosis.  2. C4-5 based disk extrusion with spinal cord impingement and moderate bilateral foraminal stenosis.  3. C5-6 posterior central disk extrusion with spinal cord compression and severe bilateral foraminal stenosis.  4. C6-7 mild disk bulge with severe bilateral foraminal stenosis.  5. Solitary mildly enlarged left submandibular lymph node of uncertain significance.    4/10/17 MRI lumbar spine  T12-L1: No central canal or neuroforaminal stenosis. No disc protrusion or extrusion.  L1-L2: There is ligamentum flavum thickening and facet arthropathy.  No central canal or neuroforaminal stenosis. No disc protrusion or extrusion.  L2-L3: There is ligamentum flavum thickening and facet arthropathy.  No central canal or neuroforaminal stenosis. No disc protrusion or extrusion.  L3-L4: There is ligamentum flavum thickening and facet arthropathy present. No central canal or neuroforaminal stenosis. No disc protrusion or extrusion.  L4-L5: There is a broad disc bulge which extends into both neural foramina.  There is ligamentum flavum thickening and facet arthropathy.  There is right lateral recess stenosis.  There is abutment of the descending right L5 nerve in the right lateral recess.  Please correlate clinically for symptoms referable to the right L5 nerve.  There is mild central canal stenosis.  There is mild left neuroforaminal stenosis.  No right neuroforaminal stenosis.  L5-S1: There is a broad central/right paracentral disc protrusion which encroaches upon the descending right S1 nerve in the right lateral recess.  Please correlate clinically for symptoms referable to the right S1 nerve.  There is mild-moderate right neuroforaminal stenosis.  No left neuroforaminal stenosis.  No central canal stenosis.  There is  ligamentum flavum thickening and facet arthropathy.  There is a broad left extraforaminal disc protrusion at L5-S1 which abuts the exited left L5 nerve in the left extraforaminal soft tissues (series 6 image 17), nonspecific.  Please correlate clinically for symptoms referable to the left L5 nerve.      Assessment:  The patient is a 57-year-old woman with a history of diabetes, hypertension who presents in referral from [facility] for neck pain radiating into the arms and low back pain radiating into the left leg.     1. Lumbar radiculopathy  Vital signs    Activity as tolerated    Verify informed consent    Notify physician     Notify physician     Notify physician (specify)    Diet NPO    Case Request Operating Room: INJECTION-STEROID-EPIDURAL-LUMBAR    Place in Outpatient    alprazolam ODT dissolvable tablet 1 mg   2. DDD (degenerative disc disease), lumbar     3. DDD (degenerative disc disease), cervical     4. Cervical spinal stenosis     5. Cervical radiculopathy     6. Opioid contract exists           Plan:  1.  Dr. Galan provided prescriptions for hydrocodone-acetaminophen 10/325 mg up to 3 times a day as needed for pain.  I have reviewed the [place] Board of Pharmacy website and there are no abberancies.    2.  I will schedule patient to repeat an L5/S1 interlaminar HERACLIO.  She has done well with this in the past.  3.  Follow-up in 4 weeks post procedure sooner as needed.

## 2017-12-22 DIAGNOSIS — M51.36 DDD (DEGENERATIVE DISC DISEASE), LUMBAR: Primary | ICD-10-CM

## 2017-12-29 ENCOUNTER — HOSPITAL ENCOUNTER (OUTPATIENT)
Facility: HOSPITAL | Age: 57
Discharge: HOME | End: 2017-12-29
Attending: ANESTHESIOLOGY | Admitting: ANESTHESIOLOGY
Payer: COMMERCIAL

## 2017-12-29 ENCOUNTER — HOSPITAL ENCOUNTER (OUTPATIENT)
Dept: RADIOLOGY | Facility: HOSPITAL | Age: 57
Discharge: HOME | End: 2017-12-29
Attending: ANESTHESIOLOGY | Admitting: ANESTHESIOLOGY
Payer: COMMERCIAL

## 2017-12-29 ENCOUNTER — SURGERY (OUTPATIENT)
Age: 57
End: 2017-12-29

## 2017-12-29 VITALS
HEART RATE: 79 BPM | SYSTOLIC BLOOD PRESSURE: 153 MMHG | WEIGHT: 202 LBS | OXYGEN SATURATION: 98 % | BODY MASS INDEX: 32.47 KG/M2 | HEIGHT: 66 IN | TEMPERATURE: 98 F | DIASTOLIC BLOOD PRESSURE: 70 MMHG | RESPIRATION RATE: 16 BRPM

## 2017-12-29 DIAGNOSIS — M54.16 LUMBAR RADICULOPATHY: Primary | ICD-10-CM

## 2017-12-29 DIAGNOSIS — M51.36 DDD (DEGENERATIVE DISC DISEASE), LUMBAR: ICD-10-CM

## 2017-12-29 LAB
B-HCG UR QL: NEGATIVE
CTP QC/QA: NORMAL
GLUCOSE SERPL-MCNC: 137 MG/DL (ref 70–110)

## 2017-12-29 PROCEDURE — 25500020 PHARM REV CODE 255: Mod: PO | Performed by: ANESTHESIOLOGY

## 2017-12-29 PROCEDURE — 81025 URINE PREGNANCY TEST: CPT | Mod: PO | Performed by: ANESTHESIOLOGY

## 2017-12-29 PROCEDURE — 62323 NJX INTERLAMINAR LMBR/SAC: CPT | Mod: ,,, | Performed by: ANESTHESIOLOGY

## 2017-12-29 PROCEDURE — 76000 FLUOROSCOPY <1 HR PHYS/QHP: CPT | Mod: TC,PO

## 2017-12-29 PROCEDURE — A4216 STERILE WATER/SALINE, 10 ML: HCPCS | Mod: PO | Performed by: ANESTHESIOLOGY

## 2017-12-29 PROCEDURE — 62323 NJX INTERLAMINAR LMBR/SAC: CPT | Mod: PO | Performed by: ANESTHESIOLOGY

## 2017-12-29 PROCEDURE — 25000003 PHARM REV CODE 250: Mod: PO | Performed by: ANESTHESIOLOGY

## 2017-12-29 PROCEDURE — 63600175 PHARM REV CODE 636 W HCPCS: Mod: PO | Performed by: ANESTHESIOLOGY

## 2017-12-29 RX ORDER — ALPRAZOLAM 0.5 MG/1
1 TABLET, ORALLY DISINTEGRATING ORAL ONCE AS NEEDED
Status: COMPLETED | OUTPATIENT
Start: 2017-12-29 | End: 2017-12-29

## 2017-12-29 RX ORDER — LIDOCAINE HYDROCHLORIDE 10 MG/ML
INJECTION, SOLUTION EPIDURAL; INFILTRATION; INTRACAUDAL; PERINEURAL
Status: DISCONTINUED | OUTPATIENT
Start: 2017-12-29 | End: 2017-12-29 | Stop reason: HOSPADM

## 2017-12-29 RX ORDER — METHYLPREDNISOLONE ACETATE 80 MG/ML
INJECTION, SUSPENSION INTRA-ARTICULAR; INTRALESIONAL; INTRAMUSCULAR; SOFT TISSUE
Status: DISCONTINUED | OUTPATIENT
Start: 2017-12-29 | End: 2017-12-29 | Stop reason: HOSPADM

## 2017-12-29 RX ORDER — SODIUM CHLORIDE 9 MG/ML
INJECTION, SOLUTION INTRAMUSCULAR; INTRAVENOUS; SUBCUTANEOUS
Status: DISCONTINUED | OUTPATIENT
Start: 2017-12-29 | End: 2017-12-29 | Stop reason: HOSPADM

## 2017-12-29 RX ADMIN — IOHEXOL 3 ML: 300 INJECTION, SOLUTION INTRAVENOUS at 03:12

## 2017-12-29 RX ADMIN — METHYLPREDNISOLONE ACETATE 80 MG: 80 INJECTION, SUSPENSION INTRA-ARTICULAR; INTRALESIONAL; INTRAMUSCULAR; SOFT TISSUE at 03:12

## 2017-12-29 RX ADMIN — LIDOCAINE HYDROCHLORIDE 10 ML: 10 INJECTION, SOLUTION EPIDURAL; INFILTRATION; INTRACAUDAL; PERINEURAL at 03:12

## 2017-12-29 RX ADMIN — ALPRAZOLAM 1 MG: 0.5 TABLET, ORALLY DISINTEGRATING ORAL at 02:12

## 2017-12-29 RX ADMIN — SODIUM CHLORIDE 4 ML: 9 INJECTION INTRAMUSCULAR; INTRAVENOUS; SUBCUTANEOUS at 03:12

## 2017-12-29 NOTE — OP NOTE

## 2017-12-29 NOTE — DISCHARGE SUMMARY
[facility]  Discharge Note  Short Stay    Admit Date: 12/29/2017    Discharge Date: 12/29/2017    Attending Physician: Erik Galan MD     Discharge Provider: Erik Galan    Diagnoses:  Active Hospital Problems    Diagnosis  POA    *Lumbar radiculopathy [M54.16]  Yes      Resolved Hospital Problems    Diagnosis Date Resolved POA   No resolved problems to display.       Discharged Condition: good    Final Diagnoses: Lumbar radiculopathy [M54.16]    Disposition: Home or Self Care    Hospital Course: no complications, uneventful    Outcome of Hospitalization, Treatment, Procedure, or Surgery:  Patient was admitted for outpatient procedure. The patient underwent procedure without complications and are discharged home    Follow up/Patient Instructions:  Follow up as scheduled/Patient has received instructions and follow up date    Medications:  Continue previous medications      Discharge Procedure Orders  Call MD for:  temperature >100.4     Call MD for:  severe uncontrolled pain     Call MD for:  redness, tenderness, or signs of infection (pain, swelling, redness, odor or green/yellow discharge around incision site)     Call MD for:  severe persistent headache     No dressing needed           Discharge Procedure Orders (must include Diet, Follow-up, Activity):    Discharge Procedure Orders (must include Diet, Follow-up, Activity)  Call MD for:  temperature >100.4     Call MD for:  severe uncontrolled pain     Call MD for:  redness, tenderness, or signs of infection (pain, swelling, redness, odor or green/yellow discharge around incision site)     Call MD for:  severe persistent headache     No dressing needed        f

## 2017-12-29 NOTE — PLAN OF CARE
Patient tolerating oral liquids without difficulty. No apparent s&s of distress noted at this time, no complaints voiced at this time. Injection site free from redness and drainage. Discharge instructions reviewed with patient/family/friend with good verbal feedback received. Patient ready for discharge

## 2018-01-02 RX ORDER — TIZANIDINE HYDROCHLORIDE 2 MG/1
CAPSULE, GELATIN COATED ORAL
Qty: 90 CAPSULE | Refills: 0 | Status: SHIPPED | OUTPATIENT
Start: 2018-01-02 | End: 2018-03-19 | Stop reason: SDUPTHER

## 2018-02-23 ENCOUNTER — OFFICE VISIT (OUTPATIENT)
Dept: PAIN MEDICINE | Facility: CLINIC | Age: 58
End: 2018-02-23
Payer: COMMERCIAL

## 2018-02-23 VITALS
HEART RATE: 76 BPM | BODY MASS INDEX: 33.07 KG/M2 | WEIGHT: 204.94 LBS | TEMPERATURE: 98 F | RESPIRATION RATE: 18 BRPM | SYSTOLIC BLOOD PRESSURE: 120 MMHG | DIASTOLIC BLOOD PRESSURE: 70 MMHG

## 2018-02-23 DIAGNOSIS — M50.30 DDD (DEGENERATIVE DISC DISEASE), CERVICAL: ICD-10-CM

## 2018-02-23 DIAGNOSIS — Z79.891 OPIOID CONTRACT EXISTS: ICD-10-CM

## 2018-02-23 DIAGNOSIS — M54.12 CERVICAL RADICULOPATHY: Primary | ICD-10-CM

## 2018-02-23 DIAGNOSIS — M48.02 CERVICAL SPINAL STENOSIS: ICD-10-CM

## 2018-02-23 DIAGNOSIS — M54.16 LUMBAR RADICULOPATHY: ICD-10-CM

## 2018-02-23 DIAGNOSIS — M51.36 DDD (DEGENERATIVE DISC DISEASE), LUMBAR: ICD-10-CM

## 2018-02-23 PROCEDURE — 99214 OFFICE O/P EST MOD 30 MIN: CPT | Mod: S$GLB,,, | Performed by: PHYSICIAN ASSISTANT

## 2018-02-23 PROCEDURE — 99999 PR PBB SHADOW E&M-EST. PATIENT-LVL IV: CPT | Mod: PBBFAC,,, | Performed by: PHYSICIAN ASSISTANT

## 2018-02-23 PROCEDURE — 3008F BODY MASS INDEX DOCD: CPT | Mod: S$GLB,,, | Performed by: PHYSICIAN ASSISTANT

## 2018-02-23 RX ORDER — HYDROCODONE BITARTRATE AND ACETAMINOPHEN 10; 325 MG/1; MG/1
1 TABLET ORAL 3 TIMES DAILY PRN
Qty: 90 TABLET | Refills: 0 | Status: SHIPPED | OUTPATIENT
Start: 2018-03-08 | End: 2018-04-07

## 2018-02-23 RX ORDER — HYDROCODONE BITARTRATE AND ACETAMINOPHEN 10; 325 MG/1; MG/1
1 TABLET ORAL 3 TIMES DAILY PRN
Qty: 90 TABLET | Refills: 0 | Status: SHIPPED | OUTPATIENT
Start: 2018-05-07 | End: 2018-06-06

## 2018-02-23 RX ORDER — HYDROCODONE BITARTRATE AND ACETAMINOPHEN 10; 325 MG/1; MG/1
1 TABLET ORAL 3 TIMES DAILY PRN
Qty: 90 TABLET | Refills: 0 | Status: SHIPPED | OUTPATIENT
Start: 2018-04-07 | End: 2018-04-25 | Stop reason: SDUPTHER

## 2018-02-23 RX ORDER — SODIUM CHLORIDE, SODIUM LACTATE, POTASSIUM CHLORIDE, CALCIUM CHLORIDE 600; 310; 30; 20 MG/100ML; MG/100ML; MG/100ML; MG/100ML
INJECTION, SOLUTION INTRAVENOUS CONTINUOUS
Status: CANCELLED | OUTPATIENT
Start: 2018-03-14

## 2018-02-23 NOTE — PROGRESS NOTES
This note was completed with dictation software and grammatical errors may exist.    CC: Neck pain, arm pain, back pain and leg pain    HPI: The patient is a 57-year-old woman with a history of diabetes, hypertension who presents in referral from [facility] for neck pain radiating into the arms and low back pain radiating into the left leg.  She is status post L5/S1 interlaminar epidural steroid injection on 12/29/17 with excellent relief lasting 2 weeks, now reporting minimal relief of her low back and buttock pain but ongoing 100% relief of her right leg pain.  She reports right greater than left buttock pain worse with sitting and laying down.  However, her main complaint today is worsening neck pain.  She states that she fell off of her toilet and went to the emergency department because she hit her head.  She states she had a cervical spine and brain CT.  The brain CT was negative but she states they told her she has significant disc bulging at C5-6 which we are already aware of.  She complains of left greater than right neck pain radiating to left trapezius muscle, left shoulder down the left arm.  She also reports having muscle spasms.  She reports some twitching in her hands and thinks that tizanidine may be causing this.  She denies numbness, bladder and bowel incontinence.  She reports weakness in her left leg.  She also complains of intermittent swelling in her left leg.    Pain intervention history: She has been taking hydrocodone 10/325 3 times a day for the last year, previous to this she was taking Percocet 3 times a day but states that it was causing her panic attacks, did help with her pain slightly better.  She has been taking gabapentin 600mg twice a day and Zanaflex at night with some relief.  She had undergone 3 epidurals for low back pain with only up to 3 weeks of relief each time.  She is status post C7-T1 cervical interlaminar epidural steroid injection on 1/29/16 with  50% relief.   She is status post C7-T1 cervical interlaminar epidural steroid injection on 3/1/16 with mild additional relief.  She is status post C7-T1 cervical interlaminar epidural steroid injection on 5/13/16 with 70-75% relief.  She is status post right C3, 4, 5 and 6 medial branch radiofrequency ablation on 7/15/16 with 30-60% relief.  She is status post L5/S1 interlaminar epidural steroid injection on 5/16/17 with 50% relief.  She is status post L5/S1 interlaminar epidural steroid injection on 12/29/17 with excellent relief lasting 2 weeks, now reporting minimal relief of her low back and buttock pain but ongoing 100% relief of her right leg pain.    Urine drug screen 1/5/16  Negative and inconsistent for hydrocodone but positive and consistent for its metabolite hydromorphone    Urine drug screen 2/11/16  Negative and inconsistent for hydrocodone    ROS: She reports headaches, hoarse voice, joint stiffness, joint swelling, back pain, difficulty sleeping, anxiety and loss of balance.  Balance of review of systems is negative.    Medical, surgical, family and social history reviewed elsewhere in record.    Medications/Allergies: See med card    Vitals:    02/23/18 0814   BP: 120/70   Pulse: 76   Resp: 18   Temp: 98.3 °F (36.8 °C)   TempSrc: Oral   Weight: 92.9 kg (204 lb 14.7 oz)   PainSc:   4   PainLoc: Back         Physical exam:  Gen: A and O x3, pleasant, well-groomed  Skin: No rashes or obvious lesions  HEENT: PERRLA, no obvious deformities on ears or in canals.Trachea midline.  CVS: Regular rate and rhythm, normal palpable pulses.  Resp: Clear to auscultation bilaterally, no wheezes or rales.  Abdomen: Soft, NT/ND.  Musculoskeletal: No antalgic gait.     Neuro:  Upper extremities: 5/5 strength bilaterally  Lower extremities: 5/5 strength bilaterally.  Reflexes: Brachioradialis 2+, Bicep 2+, Tricep 2+.  Patellar and Achilles reflexes 2+ bilaterally.  Sensory: Intact and symmetrical to light touch and  pinprick in C2-T1 dermatomes bilaterally.    Cervical Spine:  Cervical spine: Range of motion is mildly reduced with flexion extension and lateral rotation with increased bilateral neck and trapezius muscle pain during each maneuver, greater on the left.  Spurling's maneuver causes neck pain to either side.    Myofascial exam: Mild tenderness palpation to the left cervical paraspinous muscles and left greater than right trapezius muscles.    Lumbar spine:  Range of motion is moderately limited with flexion and extension with mild increased pain in the right buttock back during each maneuver.  Khanh's test is negative bilaterally.  Straight leg raise causes right buttock pain.  Internal and external rotation of hip is negative bilaterally.  Myofascial exam: No tenderness palpation to the cervical paraspinous muscles.    Imagin/24/15 C-spine MRI  1. C3-4 left posterior paracentral disk extrusion causing left paracentral spinal cord compression and severe left foraminal stenosis.  2. C4-5 based disk extrusion with spinal cord impingement and moderate bilateral foraminal stenosis.  3. C5-6 posterior central disk extrusion with spinal cord compression and severe bilateral foraminal stenosis.  4. C6-7 mild disk bulge with severe bilateral foraminal stenosis.  5. Solitary mildly enlarged left submandibular lymph node of uncertain significance.    4/10/17 MRI lumbar spine  T12-L1: No central canal or neuroforaminal stenosis. No disc protrusion or extrusion.  L1-L2: There is ligamentum flavum thickening and facet arthropathy.  No central canal or neuroforaminal stenosis. No disc protrusion or extrusion.  L2-L3: There is ligamentum flavum thickening and facet arthropathy.  No central canal or neuroforaminal stenosis. No disc protrusion or extrusion.  L3-L4: There is ligamentum flavum thickening and facet arthropathy present. No central canal or neuroforaminal stenosis. No disc protrusion or extrusion.  L4-L5: There is  a broad disc bulge which extends into both neural foramina.  There is ligamentum flavum thickening and facet arthropathy.  There is right lateral recess stenosis.  There is abutment of the descending right L5 nerve in the right lateral recess.  Please correlate clinically for symptoms referable to the right L5 nerve.  There is mild central canal stenosis.  There is mild left neuroforaminal stenosis.  No right neuroforaminal stenosis.  L5-S1: There is a broad central/right paracentral disc protrusion which encroaches upon the descending right S1 nerve in the right lateral recess.  Please correlate clinically for symptoms referable to the right S1 nerve.  There is mild-moderate right neuroforaminal stenosis.  No left neuroforaminal stenosis.  No central canal stenosis.  There is ligamentum flavum thickening and facet arthropathy.  There is a broad left extraforaminal disc protrusion at L5-S1 which abuts the exited left L5 nerve in the left extraforaminal soft tissues (series 6 image 17), nonspecific.  Please correlate clinically for symptoms referable to the left L5 nerve.      Assessment:  The patient is a 57-year-old woman with a history of diabetes, hypertension who presents in referral from [facility] for neck pain radiating into the arms and low back pain radiating into the left leg.     1. Cervical radiculopathy     2. DDD (degenerative disc disease), cervical     3. Cervical spinal stenosis     4. Lumbar radiculopathy     5. DDD (degenerative disc disease), lumbar     6. Opioid contract exists           Plan:  1.  The patient has ongoing relief of her right leg pain following the lumbar HERACLIO.  This can be repeated in the future if necessary.  2.  We discussed her cervical spine imaging and that she has cord compression at C5-6.  I will set her up for a cervical HERACLIO.  It has almost been 2 years since her last cervical epidural steroid injection.  If she does not have relief, I will have her  see neurosurgery for further evaluation.  3.  I advised her to discuss her intermittent leg swelling with her primary care physician at [place].  4.  She believes tizanidine is causing twitching in her hands.  I advised her to stop the medication and if it resolves we can possibly try a different muscle relaxer for her muscle spasms.  If it does not resolve, she will restart the medication because it helps with her muscle spasms and I will refer her to neurology for further evaluation.  5.  Dr. Galan provided prescription for hydrocodone-acetaminophen 10/325 mg up to 3 times a day as needed for pain.  I have reviewed the [place] Board of Pharmacy website and there are no abberancies.    6.  Follow-up in 4 weeks post procedure or sooner as needed.

## 2018-03-09 DIAGNOSIS — M50.30 DDD (DEGENERATIVE DISC DISEASE), CERVICAL: Primary | ICD-10-CM

## 2018-03-14 ENCOUNTER — HOSPITAL ENCOUNTER (OUTPATIENT)
Facility: HOSPITAL | Age: 58
Discharge: HOME | End: 2018-03-14
Attending: ANESTHESIOLOGY | Admitting: ANESTHESIOLOGY
Payer: COMMERCIAL

## 2018-03-14 ENCOUNTER — SURGERY (OUTPATIENT)
Age: 58
End: 2018-03-14

## 2018-03-14 ENCOUNTER — HOSPITAL ENCOUNTER (OUTPATIENT)
Dept: RADIOLOGY | Facility: HOSPITAL | Age: 58
Discharge: HOME | End: 2018-03-14
Attending: ANESTHESIOLOGY | Admitting: ANESTHESIOLOGY
Payer: COMMERCIAL

## 2018-03-14 VITALS
RESPIRATION RATE: 17 BRPM | SYSTOLIC BLOOD PRESSURE: 129 MMHG | TEMPERATURE: 98 F | OXYGEN SATURATION: 97 % | HEART RATE: 67 BPM | DIASTOLIC BLOOD PRESSURE: 70 MMHG

## 2018-03-14 DIAGNOSIS — M54.12 CERVICAL RADICULOPATHY: Primary | ICD-10-CM

## 2018-03-14 DIAGNOSIS — M50.30 DDD (DEGENERATIVE DISC DISEASE), CERVICAL: ICD-10-CM

## 2018-03-14 LAB
GLUCOSE SERPL-MCNC: 142 MG/DL (ref 70–110)
GLUCOSE SERPL-MCNC: 55 MG/DL (ref 70–110)
GLUCOSE SERPL-MCNC: 64 MG/DL (ref 70–110)

## 2018-03-14 PROCEDURE — A4216 STERILE WATER/SALINE, 10 ML: HCPCS | Mod: PO | Performed by: ANESTHESIOLOGY

## 2018-03-14 PROCEDURE — 63600175 PHARM REV CODE 636 W HCPCS: Mod: PO | Performed by: ANESTHESIOLOGY

## 2018-03-14 PROCEDURE — 62321 NJX INTERLAMINAR CRV/THRC: CPT | Mod: ,,, | Performed by: ANESTHESIOLOGY

## 2018-03-14 PROCEDURE — 99152 MOD SED SAME PHYS/QHP 5/>YRS: CPT | Mod: ,,, | Performed by: ANESTHESIOLOGY

## 2018-03-14 PROCEDURE — 25000003 PHARM REV CODE 250: Mod: PO | Performed by: ANESTHESIOLOGY

## 2018-03-14 PROCEDURE — 25500020 PHARM REV CODE 255: Mod: PO | Performed by: ANESTHESIOLOGY

## 2018-03-14 PROCEDURE — 62321 NJX INTERLAMINAR CRV/THRC: CPT | Mod: PO | Performed by: ANESTHESIOLOGY

## 2018-03-14 PROCEDURE — 76000 FLUOROSCOPY <1 HR PHYS/QHP: CPT | Mod: TC,PO

## 2018-03-14 RX ORDER — DEXTROSE MONOHYDRATE 50 MG/ML
INJECTION, SOLUTION INTRAVENOUS CONTINUOUS
Status: DISCONTINUED | OUTPATIENT
Start: 2018-03-14 | End: 2018-03-14 | Stop reason: HOSPADM

## 2018-03-14 RX ORDER — SODIUM CHLORIDE 9 MG/ML
INJECTION, SOLUTION INTRAMUSCULAR; INTRAVENOUS; SUBCUTANEOUS
Status: DISCONTINUED | OUTPATIENT
Start: 2018-03-14 | End: 2018-03-14 | Stop reason: HOSPADM

## 2018-03-14 RX ORDER — LIDOCAINE HYDROCHLORIDE 10 MG/ML
INJECTION, SOLUTION EPIDURAL; INFILTRATION; INTRACAUDAL; PERINEURAL
Status: DISCONTINUED | OUTPATIENT
Start: 2018-03-14 | End: 2018-03-14 | Stop reason: HOSPADM

## 2018-03-14 RX ORDER — MIDAZOLAM HYDROCHLORIDE 5 MG/ML
INJECTION INTRAMUSCULAR; INTRAVENOUS
Status: DISCONTINUED | OUTPATIENT
Start: 2018-03-14 | End: 2018-03-14 | Stop reason: HOSPADM

## 2018-03-14 RX ORDER — METHYLPREDNISOLONE ACETATE 80 MG/ML
INJECTION, SUSPENSION INTRA-ARTICULAR; INTRALESIONAL; INTRAMUSCULAR; SOFT TISSUE
Status: DISCONTINUED | OUTPATIENT
Start: 2018-03-14 | End: 2018-03-14 | Stop reason: HOSPADM

## 2018-03-14 RX ORDER — SODIUM CHLORIDE, SODIUM LACTATE, POTASSIUM CHLORIDE, CALCIUM CHLORIDE 600; 310; 30; 20 MG/100ML; MG/100ML; MG/100ML; MG/100ML
INJECTION, SOLUTION INTRAVENOUS CONTINUOUS
Status: DISCONTINUED | OUTPATIENT
Start: 2018-03-14 | End: 2018-03-14 | Stop reason: HOSPADM

## 2018-03-14 RX ADMIN — SODIUM CHLORIDE 4 ML: 9 INJECTION INTRAMUSCULAR; INTRAVENOUS; SUBCUTANEOUS at 03:03

## 2018-03-14 RX ADMIN — LIDOCAINE HYDROCHLORIDE 10 ML: 10 INJECTION, SOLUTION EPIDURAL; INFILTRATION; INTRACAUDAL; PERINEURAL at 03:03

## 2018-03-14 RX ADMIN — IOHEXOL 3 ML: 300 INJECTION, SOLUTION INTRAVENOUS at 03:03

## 2018-03-14 RX ADMIN — MIDAZOLAM HYDROCHLORIDE 2 MG: 5 INJECTION INTRAMUSCULAR; INTRAVENOUS at 03:03

## 2018-03-14 RX ADMIN — METHYLPREDNISOLONE ACETATE 80 MG: 80 INJECTION, SUSPENSION INTRA-ARTICULAR; INTRALESIONAL; INTRAMUSCULAR; SOFT TISSUE at 03:03

## 2018-03-14 RX ADMIN — SODIUM CHLORIDE, SODIUM LACTATE, POTASSIUM CHLORIDE, CALCIUM CHLORIDE: 600; 310; 30; 20 INJECTION, SOLUTION INTRAVENOUS at 02:03

## 2018-03-14 NOTE — DISCHARGE INSTRUCTIONS
Recovery After Procedural Sedation (Adult)  You have been given medicine by vein to make you sleep during your surgery. This may have included both a pain medicine and sleeping medicine. Most of the effects have worn off. But you may still have some drowsiness for the next 6 to 8 hours.  Home care  Follow these guidelines when you get home:  · For the next 8 hours, you should be watched by a responsible adult. This person should make sure your condition is not getting worse.  · Don't drink any alcohol for the next 24 hours.  · Don't drive, operate dangerous machinery, or make important business or personal decisions during the next 24 hours.  Note: Your healthcare provider may tell you not to take any medicine by mouth for pain or sleep in the next 4 hours. These medicines may react with the medicines you were given in the hospital. This could cause a much stronger response than usual.  Follow-up care  Follow up with your healthcare provider if you are not alert and back to your usual level of activity within 12 hours.  When to seek medical advice  Call your healthcare provider right away if any of these occur:  · Drowsiness gets worse  · Weakness or dizziness gets worse  · Repeated vomiting  · You can't be awakened   Date Last Reviewed: 10/18/2016  © 5956-8038 [employer]. [address] [place] [address], [place] [address]. All rights reserved. This information is not intended as a substitute for professional medical care. Always follow your healthcare professional's instructions.            Home care instructions  Apply ice pack to the injection site for 20 minutes periods for the first 24 hrs for soreness/discomfort at injection site DO NOT USE HEAT FOR 24 HOURS  Keep site clean and dry for 24 hours, remove bandaid when desired  Do not drive until tomorrow  Take care when walking after a CERVICAL EPIDURAL STEROID injection  Avoid strenuous activities for 2 days  Make take 2 weeks to feel the full effects    Resume home medication as prescribed today  Resume Aspirin, Plavix, or Coumadin the day after the procedure unless otherwise instructed.    SEE IMMEDIATE MEDICAL HELP FOR:  Severe increase in your usual pain or appearance of new pain  Prolonged or increasing weakness or numbness in the legs or arms  Drainage, redness, active bleeding, or increased swelling at the injection site  Temperature over 100.0 degrees F.  Headache that increases when your head is upright and decreases when you lie flat    CALL 911 OR GO DIRECTLY TO EMERGENCY DEPARTMENT FOR:  Shortness of breath, chest pain, or problems breathing

## 2018-03-14 NOTE — H&P (VIEW-ONLY)
This note was completed with dictation software and grammatical errors may exist.    CC: Neck pain, arm pain, back pain and leg pain    HPI: The patient is a 57-year-old woman with a history of diabetes, hypertension who presents in referral from [facility] for neck pain radiating into the arms and low back pain radiating into the left leg.  She is status post L5/S1 interlaminar epidural steroid injection on 12/29/17 with excellent relief lasting 2 weeks, now reporting minimal relief of her low back and buttock pain but ongoing 100% relief of her right leg pain.  She reports right greater than left buttock pain worse with sitting and laying down.  However, her main complaint today is worsening neck pain.  She states that she fell off of her toilet and went to the emergency department because she hit her head.  She states she had a cervical spine and brain CT.  The brain CT was negative but she states they told her she has significant disc bulging at C5-6 which we are already aware of.  She complains of left greater than right neck pain radiating to left trapezius muscle, left shoulder down the left arm.  She also reports having muscle spasms.  She reports some twitching in her hands and thinks that tizanidine may be causing this.  She denies numbness, bladder and bowel incontinence.  She reports weakness in her left leg.  She also complains of intermittent swelling in her left leg.    Pain intervention history: She has been taking hydrocodone 10/325 3 times a day for the last year, previous to this she was taking Percocet 3 times a day but states that it was causing her panic attacks, did help with her pain slightly better.  She has been taking gabapentin 600mg twice a day and Zanaflex at night with some relief.  She had undergone 3 epidurals for low back pain with only up to 3 weeks of relief each time.  She is status post C7-T1 cervical interlaminar epidural steroid injection on 1/29/16 with  50% relief.   She is status post C7-T1 cervical interlaminar epidural steroid injection on 3/1/16 with mild additional relief.  She is status post C7-T1 cervical interlaminar epidural steroid injection on 5/13/16 with 70-75% relief.  She is status post right C3, 4, 5 and 6 medial branch radiofrequency ablation on 7/15/16 with 30-60% relief.  She is status post L5/S1 interlaminar epidural steroid injection on 5/16/17 with 50% relief.  She is status post L5/S1 interlaminar epidural steroid injection on 12/29/17 with excellent relief lasting 2 weeks, now reporting minimal relief of her low back and buttock pain but ongoing 100% relief of her right leg pain.    Urine drug screen 1/5/16  Negative and inconsistent for hydrocodone but positive and consistent for its metabolite hydromorphone    Urine drug screen 2/11/16  Negative and inconsistent for hydrocodone    ROS: She reports headaches, hoarse voice, joint stiffness, joint swelling, back pain, difficulty sleeping, anxiety and loss of balance.  Balance of review of systems is negative.    Medical, surgical, family and social history reviewed elsewhere in record.    Medications/Allergies: See med card    Vitals:    02/23/18 0814   BP: 120/70   Pulse: 76   Resp: 18   Temp: 98.3 °F (36.8 °C)   TempSrc: Oral   Weight: 92.9 kg (204 lb 14.7 oz)   PainSc:   4   PainLoc: Back         Physical exam:  Gen: A and O x3, pleasant, well-groomed  Skin: No rashes or obvious lesions  HEENT: PERRLA, no obvious deformities on ears or in canals.Trachea midline.  CVS: Regular rate and rhythm, normal palpable pulses.  Resp: Clear to auscultation bilaterally, no wheezes or rales.  Abdomen: Soft, NT/ND.  Musculoskeletal: No antalgic gait.     Neuro:  Upper extremities: 5/5 strength bilaterally  Lower extremities: 5/5 strength bilaterally.  Reflexes: Brachioradialis 2+, Bicep 2+, Tricep 2+.  Patellar and Achilles reflexes 2+ bilaterally.  Sensory: Intact and symmetrical to light touch and  pinprick in C2-T1 dermatomes bilaterally.    Cervical Spine:  Cervical spine: Range of motion is mildly reduced with flexion extension and lateral rotation with increased bilateral neck and trapezius muscle pain during each maneuver, greater on the left.  Spurling's maneuver causes neck pain to either side.    Myofascial exam: Mild tenderness palpation to the left cervical paraspinous muscles and left greater than right trapezius muscles.    Lumbar spine:  Range of motion is moderately limited with flexion and extension with mild increased pain in the right buttock back during each maneuver.  Khanh's test is negative bilaterally.  Straight leg raise causes right buttock pain.  Internal and external rotation of hip is negative bilaterally.  Myofascial exam: No tenderness palpation to the cervical paraspinous muscles.    Imagin/24/15 C-spine MRI  1. C3-4 left posterior paracentral disk extrusion causing left paracentral spinal cord compression and severe left foraminal stenosis.  2. C4-5 based disk extrusion with spinal cord impingement and moderate bilateral foraminal stenosis.  3. C5-6 posterior central disk extrusion with spinal cord compression and severe bilateral foraminal stenosis.  4. C6-7 mild disk bulge with severe bilateral foraminal stenosis.  5. Solitary mildly enlarged left submandibular lymph node of uncertain significance.    4/10/17 MRI lumbar spine  T12-L1: No central canal or neuroforaminal stenosis. No disc protrusion or extrusion.  L1-L2: There is ligamentum flavum thickening and facet arthropathy.  No central canal or neuroforaminal stenosis. No disc protrusion or extrusion.  L2-L3: There is ligamentum flavum thickening and facet arthropathy.  No central canal or neuroforaminal stenosis. No disc protrusion or extrusion.  L3-L4: There is ligamentum flavum thickening and facet arthropathy present. No central canal or neuroforaminal stenosis. No disc protrusion or extrusion.  L4-L5: There is  a broad disc bulge which extends into both neural foramina.  There is ligamentum flavum thickening and facet arthropathy.  There is right lateral recess stenosis.  There is abutment of the descending right L5 nerve in the right lateral recess.  Please correlate clinically for symptoms referable to the right L5 nerve.  There is mild central canal stenosis.  There is mild left neuroforaminal stenosis.  No right neuroforaminal stenosis.  L5-S1: There is a broad central/right paracentral disc protrusion which encroaches upon the descending right S1 nerve in the right lateral recess.  Please correlate clinically for symptoms referable to the right S1 nerve.  There is mild-moderate right neuroforaminal stenosis.  No left neuroforaminal stenosis.  No central canal stenosis.  There is ligamentum flavum thickening and facet arthropathy.  There is a broad left extraforaminal disc protrusion at L5-S1 which abuts the exited left L5 nerve in the left extraforaminal soft tissues (series 6 image 17), nonspecific.  Please correlate clinically for symptoms referable to the left L5 nerve.      Assessment:  The patient is a 57-year-old woman with a history of diabetes, hypertension who presents in referral from [facility] for neck pain radiating into the arms and low back pain radiating into the left leg.     1. Cervical radiculopathy     2. DDD (degenerative disc disease), cervical     3. Cervical spinal stenosis     4. Lumbar radiculopathy     5. DDD (degenerative disc disease), lumbar     6. Opioid contract exists           Plan:  1.  The patient has ongoing relief of her right leg pain following the lumbar HERACLIO.  This can be repeated in the future if necessary.  2.  We discussed her cervical spine imaging and that she has cord compression at C5-6.  I will set her up for a cervical HERACLIO.  It has almost been 2 years since her last cervical epidural steroid injection.  If she does not have relief, I will have her  see neurosurgery for further evaluation.  3.  I advised her to discuss her intermittent leg swelling with her primary care physician at [place].  4.  She believes tizanidine is causing twitching in her hands.  I advised her to stop the medication and if it resolves we can possibly try a different muscle relaxer for her muscle spasms.  If it does not resolve, she will restart the medication because it helps with her muscle spasms and I will refer her to neurology for further evaluation.  5.  Dr. Galan provided prescription for hydrocodone-acetaminophen 10/325 mg up to 3 times a day as needed for pain.  I have reviewed the [place] Board of Pharmacy website and there are no abberancies.    6.  Follow-up in 4 weeks post procedure or sooner as needed.

## 2018-03-14 NOTE — OP NOTE
PROCEDURE DATE: 3/14/2018    Procedure: C7-T1 cervical interlaminar epidural steroid injection under utilizing fluoroscopy.    Diagnosis: Cervical Radiculopathy    POSTOP DIAGNOSIS: SAME    Physician: Erik Galan MD    Medications injected:  Methylprednisone 40mg followed by a slow injection of 2 mL sterile, preservative-free normal saline (Had pain with injection so less volume given).    Local anesthetic used: Lidocaine 1%, 4 ml.    Sedation Medications: 4mg versed    Complications:  none    Estimated blood loss: none    Technique:  A time-out was taken to identify patient and procedure prior to starting the procedure.  With the patient laying in a prone position with the neck in a mid-flexed forward position, the area was prepped and draped in the usual sterile fashion using ChloraPrep and a fenestrated drape.  The area was determined under AP fluoroscopic guidance.  Local anesthetic was given using a 25-gauge 1.5 inch needle by raising a wheal and then infiltrating ventrally.  A 3.5 inch 20-gauge Touhy needle was introduced under fluoroscopic guidance to meet the lamina of C7.  The needle was then hinged under the lamina then advanced using loss of resistance technique.  Once the tip of the needle was in the desired position, the contrast dye Omnipaque was injected to determine placement and no uptake.  The steroid was then injected slowly followed by a slow injection of 2 mL of the sterile preservative-free normal saline.  The patient tolerated the procedure well.    The patient was monitored after the procedure and was given post-procedure and discharge instructions to follow at home. The patient was discharged in a stable condition.

## 2018-03-14 NOTE — DISCHARGE SUMMARY
[facility]  Discharge Note  Short Stay    Admit Date: 3/14/2018    Discharge Date: 3/14/2018    Attending Physician: Erik Galan MD     Discharge Provider: Erik Galan    Diagnoses:  Active Hospital Problems    Diagnosis  POA    *Cervical radiculopathy [M54.12]  Yes      Resolved Hospital Problems    Diagnosis Date Resolved POA   No resolved problems to display.       Discharged Condition: good    Final Diagnoses: Cervical radiculopathy [M54.12]    Disposition: Home or Self Care    Hospital Course: no complications, uneventful    Outcome of Hospitalization, Treatment, Procedure, or Surgery:  Patient was admitted for outpatient procedure. The patient underwent procedure without complications and are discharged home    Follow up/Patient Instructions:  Follow up as scheduled/Patient has received instructions and follow up date    Medications:  Continue previous medications      Discharge Procedure Orders  Call MD for:  temperature >100.4     Call MD for:  severe uncontrolled pain     Call MD for:  redness, tenderness, or signs of infection (pain, swelling, redness, odor or green/yellow discharge around incision site)     Call MD for:  severe persistent headache     No dressing needed           Discharge Procedure Orders (must include Diet, Follow-up, Activity):    Discharge Procedure Orders (must include Diet, Follow-up, Activity)  Call MD for:  temperature >100.4     Call MD for:  severe uncontrolled pain     Call MD for:  redness, tenderness, or signs of infection (pain, swelling, redness, odor or green/yellow discharge around incision site)     Call MD for:  severe persistent headache     No dressing needed

## 2018-03-14 NOTE — PLAN OF CARE
Patient states she feels much better now. Denies any low blood sugar symptoms. Family at bedside, call light in reach.

## 2018-03-19 RX ORDER — TIZANIDINE HYDROCHLORIDE 2 MG/1
1 CAPSULE, GELATIN COATED ORAL 3 TIMES DAILY PRN
Qty: 90 CAPSULE | Refills: 2 | Status: SHIPPED | OUTPATIENT
Start: 2018-03-19 | End: 2018-08-13 | Stop reason: SDUPTHER

## 2018-03-27 DIAGNOSIS — M79.642 LEFT HAND PAIN: Primary | ICD-10-CM

## 2018-03-28 ENCOUNTER — OFFICE VISIT (OUTPATIENT)
Dept: ORTHOPEDICS | Facility: CLINIC | Age: 58
End: 2018-03-28
Payer: COMMERCIAL

## 2018-03-28 ENCOUNTER — HOSPITAL ENCOUNTER (OUTPATIENT)
Dept: RADIOLOGY | Facility: HOSPITAL | Age: 58
Discharge: HOME | End: 2018-03-28
Attending: ORTHOPAEDIC SURGERY
Payer: COMMERCIAL

## 2018-03-28 VITALS — WEIGHT: 204 LBS | BODY MASS INDEX: 32.78 KG/M2 | HEIGHT: 66 IN

## 2018-03-28 DIAGNOSIS — M65.342 TRIGGER FINGER, LEFT RING FINGER: Primary | ICD-10-CM

## 2018-03-28 DIAGNOSIS — M79.642 LEFT HAND PAIN: ICD-10-CM

## 2018-03-28 PROCEDURE — 20550 NJX 1 TENDON SHEATH/LIGAMENT: CPT | Mod: F3,S$GLB,, | Performed by: ORTHOPAEDIC SURGERY

## 2018-03-28 PROCEDURE — 99999 PR PBB SHADOW E&M-EST. PATIENT-LVL II: CPT | Mod: PBBFAC,,, | Performed by: ORTHOPAEDIC SURGERY

## 2018-03-28 PROCEDURE — 99213 OFFICE O/P EST LOW 20 MIN: CPT | Mod: 25,S$GLB,, | Performed by: ORTHOPAEDIC SURGERY

## 2018-03-28 PROCEDURE — 73130 X-RAY EXAM OF HAND: CPT | Mod: TC,PO,LT

## 2018-03-28 PROCEDURE — 73130 X-RAY EXAM OF HAND: CPT | Mod: 26,LT,, | Performed by: RADIOLOGY

## 2018-03-28 RX ORDER — TRIAMCINOLONE ACETONIDE 40 MG/ML
40 INJECTION, SUSPENSION INTRA-ARTICULAR; INTRAMUSCULAR
Status: DISCONTINUED | OUTPATIENT
Start: 2018-03-28 | End: 2018-03-28 | Stop reason: HOSPADM

## 2018-03-28 RX ADMIN — TRIAMCINOLONE ACETONIDE 40 MG: 40 INJECTION, SUSPENSION INTRA-ARTICULAR; INTRAMUSCULAR at 11:03

## 2018-03-28 NOTE — LETTER
March 28, 2018      Erik Galan MD  [address] [facility] [address] [place] [address]           [facility]  [address] [facility] [address] [address]  Phone: [phone]          Patient: Aaron Garcia   MR Number: [id number]   YOB: 1960   Date of Visit: 3/28/2018       Dear Dr. Erik Galan:    Thank you for referring Aaron Garcia to me for evaluation. Attached you will find relevant portions of my assessment and plan of care.    If you have questions, please do not hesitate to call me. I look forward to following Aaron Garcia along with you.    Sincerely,    Stone Solomon MD    Enclosure  CC:  No Recipients    If you would like to receive this communication electronically, please contact [email or web address] or [phone] to request more information on [employer] access.    For providers and/or their staff who would like to refer a patient to [facility], please contact us through our one-stop-shop provider referral line, Alberta Jacob, at [phone].    If you feel you have received this communication in error or would no longer like to receive these types of communications, please e-mail [email or web address]

## 2018-03-28 NOTE — PROCEDURES
Tendon Sheath  Date/Time: 3/28/2018 11:50 AM  Performed by: BORIS BENZ  Authorized by: BORIS BENZ     Consent Done?:  Yes (Verbal)  Timeout: prior to procedure the correct patient, procedure, and site was verified    Indications:  Pain  Site marked: the procedure site was marked    Timeout: prior to procedure the correct patient, procedure, and site was verified    Location:  Ring finger (Left)  Ring finger joint: Flexor tendon sheath over the A1 pulley.  Prep: patient was prepped and draped in usual sterile fashion    Ultrasonic guidance for needle placement?: No    Needle size:  25 G  Approach:  Volar  Medications:  40 mg triamcinolone acetonide 40 mg/mL  Patient tolerance:  Patient tolerated the procedure well with no immediate complications

## 2018-03-28 NOTE — PROGRESS NOTES
"DATE: 3/28/2018  PATIENT: Aaron Garcia    Attending Physician: Stone Solomon M.D.    HISTORY:  Aaron Garcia is a 57 y.o. female who returns for follow up evaluation of  her left hand.  She is previously seen by Dr. Reyes.  She is diagnosed with trigger fingers of the long and ring finger.  She states ring finger worse than the long.  She notes mild return of her symptoms.  She denies any jamal catching.  Is reported at 3/10 today.  She presents for reevaluation.    PMH/PSH/FamHx/SocHx:  Reviewed and unchanged from prior visit    ROS:  Constitution: Negative for chills, fever, and sweats. Negative for unexplained weight loss.  HENT: Negative for headaches and blurry vision.   Cardiovascular: Negative for chest pain, irregular heartbeat, leg swelling and palpitations.   Respiratory: Negative for cough and shortness of breath.   Gastrointestinal: Negative for abdominal pain, heartburn, nausea and vomiting.   Genitourinary: Negative for bladder incontinence and dysuria.   Musculoskeletal: Negative for systemic arthritis, joint swelling, muscle weakness and myalgias.   Neurological: Negative for numbness.   Psychiatric/Behavioral: Negative for depression.   Endocrine: Negative for polyuria.   Hematologic/Lymphatic: Negative for bleeding disorders.  Skin: Negative for poor wound healing.       EXAM:  Ht 5' 6" (1.676 m)   Wt 92.5 kg (204 lb)   BMI 32.93 kg/m²   Healthy-appearing female no acute distress.  Examination left hand reveals no ecchymosis, swelling or effusion.  Tenderness over the A1 pulley of the ring and long fingers.  No catching or locking of either digit can be elicited today.  Sensation is intact all digits.    IMAGING:   X-rays a left hand are performed and personally reviewed.  No acute fractures or dislocations are seen.  No bony or soft tissue abnormalities    ASSESSMENT:  Left ring and long trigger fingers    PLAN:  The implications of the patient's evolution of symptoms and " findings were explained to the patient in detail.  Tierra is requesting repeat injections today.  As the ring finger bothers her more in the long not so much, we've agreed to inject just the ring finger.  This was performed today (see procedure note).  She'll monitor her symptoms and return status.  Follow-up as needed.          This note was dictated using voice recognition software. Please excuse any grammatical or typographical errors.

## 2018-04-25 ENCOUNTER — OFFICE VISIT (OUTPATIENT)
Dept: PAIN MEDICINE | Facility: CLINIC | Age: 58
End: 2018-04-25
Payer: COMMERCIAL

## 2018-04-25 VITALS
HEART RATE: 74 BPM | BODY MASS INDEX: 32.33 KG/M2 | TEMPERATURE: 98 F | RESPIRATION RATE: 18 BRPM | SYSTOLIC BLOOD PRESSURE: 130 MMHG | WEIGHT: 200.31 LBS | DIASTOLIC BLOOD PRESSURE: 78 MMHG

## 2018-04-25 DIAGNOSIS — M51.36 DDD (DEGENERATIVE DISC DISEASE), LUMBAR: ICD-10-CM

## 2018-04-25 DIAGNOSIS — M54.12 CERVICAL RADICULOPATHY: Primary | ICD-10-CM

## 2018-04-25 DIAGNOSIS — M50.30 DDD (DEGENERATIVE DISC DISEASE), CERVICAL: ICD-10-CM

## 2018-04-25 DIAGNOSIS — M54.16 LUMBAR RADICULOPATHY: ICD-10-CM

## 2018-04-25 DIAGNOSIS — Z79.891 OPIOID CONTRACT EXISTS: ICD-10-CM

## 2018-04-25 DIAGNOSIS — M48.02 CERVICAL SPINAL STENOSIS: ICD-10-CM

## 2018-04-25 PROCEDURE — 99999 PR PBB SHADOW E&M-EST. PATIENT-LVL IV: CPT | Mod: PBBFAC,,, | Performed by: PHYSICIAN ASSISTANT

## 2018-04-25 PROCEDURE — 99213 OFFICE O/P EST LOW 20 MIN: CPT | Mod: S$GLB,,, | Performed by: PHYSICIAN ASSISTANT

## 2018-04-25 RX ORDER — HYDROCODONE BITARTRATE AND ACETAMINOPHEN 10; 325 MG/1; MG/1
1 TABLET ORAL 3 TIMES DAILY PRN
Qty: 90 TABLET | Refills: 0 | Status: SHIPPED | OUTPATIENT
Start: 2018-06-06 | End: 2018-06-22 | Stop reason: SDUPTHER

## 2018-04-25 RX ORDER — HYDROCODONE BITARTRATE AND ACETAMINOPHEN 10; 325 MG/1; MG/1
1 TABLET ORAL 3 TIMES DAILY PRN
Qty: 90 TABLET | Refills: 0 | Status: SHIPPED | OUTPATIENT
Start: 2018-07-06 | End: 2018-08-05

## 2018-04-25 NOTE — PROGRESS NOTES
This note was completed with dictation software and grammatical errors may exist.    CC: Neck pain, arm pain, back pain and leg pain    HPI: The patient is a 57-year-old woman with a history of diabetes, hypertension who presents in referral from [facility] for neck pain radiating into the arms and low back pain radiating into the left leg.  She is status post C7-T1 cervical interlaminar epidural steroid injection on 3/14/18 with 75% relief.  She complains of mild neck pain at this time but no longer reports left arm pain following the injection.  She continues to have bilateral low back and buttock pain.  She denies any right leg pain at this time.  She reports adequate control of her current pain with her pain medication and is pleased with the results of the cervical HERACLIO.  She reports having numbness in the right lateral thigh as well as weakness in the left greater than right legs.  She denies bladder or bowel incontinence.    Pain intervention history: She has been taking hydrocodone 10/325 3 times a day for the last year, previous to this she was taking Percocet 3 times a day but states that it was causing her panic attacks, did help with her pain slightly better.  She has been taking gabapentin 600mg twice a day and Zanaflex at night with some relief.  She had undergone 3 epidurals for low back pain with only up to 3 weeks of relief each time.  She is status post C7-T1 cervical interlaminar epidural steroid injection on 1/29/16 with 50% relief.   She is status post C7-T1 cervical interlaminar epidural steroid injection on 3/1/16 with mild additional relief.  She is status post C7-T1 cervical interlaminar epidural steroid injection on 5/13/16 with 70-75% relief.  She is status post right C3, 4, 5 and 6 medial branch radiofrequency ablation on 7/15/16 with 30-60% relief.  She is status post L5/S1 interlaminar epidural steroid injection on 5/16/17 with 50% relief.  She is status post L5/S1  interlaminar epidural steroid injection on 12/29/17 with excellent relief lasting 2 weeks, now reporting minimal relief of her low back and buttock pain but ongoing 100% relief of her right leg pain.  She is status post C7-T1 cervical interlaminar epidural steroid injection on 3/14/18 with 75% relief.     Urine drug screen 1/5/16  Negative and inconsistent for hydrocodone but positive and consistent for its metabolite hydromorphone    Urine drug screen 2/11/16  Negative and inconsistent for hydrocodone    ROS: She reports headaches, hoarse voice, joint stiffness, joint swelling, back pain, difficulty sleeping, anxiety and loss of balance.  Balance of review of systems is negative.    Medical, surgical, family and social history reviewed elsewhere in record.    Medications/Allergies: See med card    Vitals:    04/25/18 1126   BP: 130/78   Pulse: 74   Resp: 18   Temp: 98.1 °F (36.7 °C)   TempSrc: Oral   Weight: 90.9 kg (200 lb 4.6 oz)   PainSc:   4   PainLoc: Neck         Physical exam:  Gen: A and O x3, pleasant, well-groomed  Skin: No rashes or obvious lesions  HEENT: PERRLA, no obvious deformities on ears or in canals.Trachea midline.  CVS: Regular rate and rhythm, normal palpable pulses.  Resp: Clear to auscultation bilaterally, no wheezes or rales.  Abdomen: Soft, NT/ND.  Musculoskeletal: No antalgic gait.     Neuro:  Upper extremities: 5/5 strength bilaterally  Lower extremities: 5/5 strength bilaterally.  Reflexes: Brachioradialis 2+, Bicep 2+, Tricep 2+.  Patellar and Achilles reflexes 2+ bilaterally.  Sensory: Intact and symmetrical to light touch and pinprick in C2-T1 dermatomes bilaterally.    Cervical Spine:  Cervical spine: Range of motion is mildly reduced with flexion extension and lateral rotation without increased pain.  Spurling's maneuver causes neck pain to either side.    Myofascial exam: No tenderness to palpation to the cervical paraspinous muscles.    Lumbar spine:  Range of motion is  moderately limited with flexion and extension with mild increased pain in the right buttock back during each maneuver.  Khanh's test is negative bilaterally.  Straight leg raise causes right buttock pain.  Internal and external rotation of hip is negative bilaterally.  Myofascial exam: No tenderness palpation to the cervical paraspinous muscles.    Imagin/24/15 C-spine MRI  1. C3-4 left posterior paracentral disk extrusion causing left paracentral spinal cord compression and severe left foraminal stenosis.  2. C4-5 based disk extrusion with spinal cord impingement and moderate bilateral foraminal stenosis.  3. C5-6 posterior central disk extrusion with spinal cord compression and severe bilateral foraminal stenosis.  4. C6-7 mild disk bulge with severe bilateral foraminal stenosis.  5. Solitary mildly enlarged left submandibular lymph node of uncertain significance.    4/10/17 MRI lumbar spine  T12-L1: No central canal or neuroforaminal stenosis. No disc protrusion or extrusion.  L1-L2: There is ligamentum flavum thickening and facet arthropathy.  No central canal or neuroforaminal stenosis. No disc protrusion or extrusion.  L2-L3: There is ligamentum flavum thickening and facet arthropathy.  No central canal or neuroforaminal stenosis. No disc protrusion or extrusion.  L3-L4: There is ligamentum flavum thickening and facet arthropathy present. No central canal or neuroforaminal stenosis. No disc protrusion or extrusion.  L4-L5: There is a broad disc bulge which extends into both neural foramina.  There is ligamentum flavum thickening and facet arthropathy.  There is right lateral recess stenosis.  There is abutment of the descending right L5 nerve in the right lateral recess.  Please correlate clinically for symptoms referable to the right L5 nerve.  There is mild central canal stenosis.  There is mild left neuroforaminal stenosis.  No right neuroforaminal stenosis.  L5-S1: There is a broad central/right  paracentral disc protrusion which encroaches upon the descending right S1 nerve in the right lateral recess.  Please correlate clinically for symptoms referable to the right S1 nerve.  There is mild-moderate right neuroforaminal stenosis.  No left neuroforaminal stenosis.  No central canal stenosis.  There is ligamentum flavum thickening and facet arthropathy.  There is a broad left extraforaminal disc protrusion at L5-S1 which abuts the exited left L5 nerve in the left extraforaminal soft tissues (series 6 image 17), nonspecific.  Please correlate clinically for symptoms referable to the left L5 nerve.      Assessment:  The patient is a 57-year-old woman with a history of diabetes, hypertension who presents in referral from [facility] for neck pain radiating into the arms and low back pain radiating into the left leg.     1. Cervical radiculopathy     2. DDD (degenerative disc disease), cervical     3. Cervical spinal stenosis     4. Lumbar radiculopathy     5. DDD (degenerative disc disease), lumbar     6. Opioid contract exists           Plan:  1.  The patient very well following the cervical HERACLIO and is now more functional.  This can be repeated in the future if necessary.  2.  If her right leg pain returns, I will set her up to repeat her lumbar HERACLIO.  3.  Dr. Galan provided prescriptions for hydrocodone-acetaminophen 10/325 mg up to 3 times a day as needed pain.  I have reviewed the [place] Board of Pharmacy website and there are no abberancies.    4.  Follow-up in 3 months or sooner as needed.

## 2018-06-22 RX ORDER — HYDROCODONE BITARTRATE AND ACETAMINOPHEN 10; 325 MG/1; MG/1
1 TABLET ORAL 3 TIMES DAILY PRN
Qty: 90 TABLET | Refills: 0 | Status: SHIPPED | OUTPATIENT
Start: 2018-08-05 | End: 2018-09-04

## 2018-06-22 RX ORDER — HYDROCODONE BITARTRATE AND ACETAMINOPHEN 10; 325 MG/1; MG/1
1 TABLET ORAL 3 TIMES DAILY PRN
Qty: 90 TABLET | Refills: 0 | Status: ON HOLD | OUTPATIENT
Start: 2018-09-04 | End: 2018-09-14 | Stop reason: SDUPTHER

## 2018-07-24 ENCOUNTER — OFFICE VISIT (OUTPATIENT)
Dept: PAIN MEDICINE | Facility: CLINIC | Age: 58
End: 2018-07-24
Payer: COMMERCIAL

## 2018-07-24 VITALS
TEMPERATURE: 96 F | DIASTOLIC BLOOD PRESSURE: 68 MMHG | BODY MASS INDEX: 31.78 KG/M2 | HEART RATE: 81 BPM | RESPIRATION RATE: 18 BRPM | OXYGEN SATURATION: 99 % | WEIGHT: 196.88 LBS | SYSTOLIC BLOOD PRESSURE: 150 MMHG

## 2018-07-24 DIAGNOSIS — M48.02 CERVICAL SPINAL STENOSIS: Primary | ICD-10-CM

## 2018-07-24 DIAGNOSIS — Z79.891 OPIOID CONTRACT EXISTS: ICD-10-CM

## 2018-07-24 DIAGNOSIS — M51.36 DDD (DEGENERATIVE DISC DISEASE), LUMBAR: ICD-10-CM

## 2018-07-24 DIAGNOSIS — M50.30 DDD (DEGENERATIVE DISC DISEASE), CERVICAL: ICD-10-CM

## 2018-07-24 DIAGNOSIS — R26.81 GAIT INSTABILITY: ICD-10-CM

## 2018-07-24 PROCEDURE — 99999 PR PBB SHADOW E&M-EST. PATIENT-LVL V: CPT | Mod: PBBFAC,,, | Performed by: PHYSICIAN ASSISTANT

## 2018-07-24 PROCEDURE — 99213 OFFICE O/P EST LOW 20 MIN: CPT | Mod: S$GLB,,, | Performed by: PHYSICIAN ASSISTANT

## 2018-07-24 PROCEDURE — 3008F BODY MASS INDEX DOCD: CPT | Mod: CPTII,S$GLB,, | Performed by: PHYSICIAN ASSISTANT

## 2018-07-25 NOTE — PROGRESS NOTES
This note was completed with dictation software and grammatical errors may exist.    CC: Neck pain, arm pain, back pain and leg pain    HPI: The patient is a 57-year-old woman with a history of diabetes, hypertension who presents in referral from [facility] for neck pain radiating into the arms and low back pain radiating into the left leg.  She returns in follow-up today with neck pain and back pain.  She feels that her pain is worsening and her most recent cervical HERACLIO has worn off.  She continues to have neck pain radiating to the left arm as well as back pain radiating to right leg.  Her pain is worse with walking but also with laying on her right side.  She reports some relief with pain medication but states that she may want to see neurosurgery for her neck in the near future.  She reports some difficulty with her balance as well as bilateral lower extremity weakness.  She continues to have numbness in her right lateral thigh.  She denies bladder or bowel incontinence.    Pain intervention history: She has been taking hydrocodone 10/325 3 times a day for the last year, previous to this she was taking Percocet 3 times a day but states that it was causing her panic attacks, did help with her pain slightly better.  She has been taking gabapentin 600mg twice a day and Zanaflex at night with some relief.  She had undergone 3 epidurals for low back pain with only up to 3 weeks of relief each time.  She is status post C7-T1 cervical interlaminar epidural steroid injection on 1/29/16 with 50% relief.   She is status post C7-T1 cervical interlaminar epidural steroid injection on 3/1/16 with mild additional relief.  She is status post C7-T1 cervical interlaminar epidural steroid injection on 5/13/16 with 70-75% relief.  She is status post right C3, 4, 5 and 6 medial branch radiofrequency ablation on 7/15/16 with 30-60% relief.  She is status post L5/S1 interlaminar epidural steroid injection on  5/16/17 with 50% relief.  She is status post L5/S1 interlaminar epidural steroid injection on 12/29/17 with excellent relief lasting 2 weeks, now reporting minimal relief of her low back and buttock pain but ongoing 100% relief of her right leg pain.  She is status post C7-T1 cervical interlaminar epidural steroid injection on 3/14/18 with 75% relief.     Urine drug screen 1/5/16  Negative and inconsistent for hydrocodone but positive and consistent for its metabolite hydromorphone    Urine drug screen 2/11/16  Negative and inconsistent for hydrocodone    ROS: She reports headaches, hoarse voice, joint stiffness, joint swelling, back pain, difficulty sleeping, anxiety and loss of balance.  Balance of review of systems is negative.    Medical, surgical, family and social history reviewed elsewhere in record.    Medications/Allergies: See med card    Vitals:    07/24/18 1436   BP: (!) 150/68   Pulse: 81   Resp: 18   Temp: 96.3 °F (35.7 °C)   TempSrc: Oral   SpO2: 99%   Weight: 89.3 kg (196 lb 13.9 oz)   PainSc:   4   PainLoc: Neck         Physical exam:  Gen: A and O x3, pleasant, well-groomed  Skin: No rashes or obvious lesions  HEENT: PERRLA, no obvious deformities on ears or in canals.Trachea midline.  CVS: Regular rate and rhythm, normal palpable pulses.  Resp: Clear to auscultation bilaterally, no wheezes or rales.  Abdomen: Soft, NT/ND.  Musculoskeletal: No antalgic gait.     Neuro:  Upper extremities: 5/5 strength bilaterally  Lower extremities: 5/5 strength bilaterally.  Reflexes: Brachioradialis 2+, Bicep 2+, Tricep 2+.  Patellar and Achilles reflexes 2+ bilaterally.  Sensory: Intact and symmetrical to light touch and pinprick in C2-T1 dermatomes bilaterally.    Cervical Spine:  Cervical spine: Range of motion is mildly reduced with flexion extension and lateral rotation with increased bilateral neck pain.  Spurling's maneuver causes neck pain to either side.    Myofascial exam: No tenderness to palpation to  the cervical paraspinous muscles.    Lumbar spine:  Range of motion is moderately limited with flexion and extension with mild increased pain in the right buttock and right greater than left low back during each maneuver.  Khanh's test is negative bilaterally.  Straight leg raise causes right buttock pain.  Internal and external rotation of hip is negative bilaterally.  Myofascial exam: No tenderness palpation to the lumbar paraspinous muscles.    Imagin/24/15 C-spine MRI  1. C3-4 left posterior paracentral disk extrusion causing left paracentral spinal cord compression and severe left foraminal stenosis.  2. C4-5 based disk extrusion with spinal cord impingement and moderate bilateral foraminal stenosis.  3. C5-6 posterior central disk extrusion with spinal cord compression and severe bilateral foraminal stenosis.  4. C6-7 mild disk bulge with severe bilateral foraminal stenosis.  5. Solitary mildly enlarged left submandibular lymph node of uncertain significance.    4/10/17 MRI lumbar spine  T12-L1: No central canal or neuroforaminal stenosis. No disc protrusion or extrusion.  L1-L2: There is ligamentum flavum thickening and facet arthropathy.  No central canal or neuroforaminal stenosis. No disc protrusion or extrusion.  L2-L3: There is ligamentum flavum thickening and facet arthropathy.  No central canal or neuroforaminal stenosis. No disc protrusion or extrusion.  L3-L4: There is ligamentum flavum thickening and facet arthropathy present. No central canal or neuroforaminal stenosis. No disc protrusion or extrusion.  L4-L5: There is a broad disc bulge which extends into both neural foramina.  There is ligamentum flavum thickening and facet arthropathy.  There is right lateral recess stenosis.  There is abutment of the descending right L5 nerve in the right lateral recess.  Please correlate clinically for symptoms referable to the right L5 nerve.  There is mild central canal stenosis.  There is mild left  neuroforaminal stenosis.  No right neuroforaminal stenosis.  L5-S1: There is a broad central/right paracentral disc protrusion which encroaches upon the descending right S1 nerve in the right lateral recess.  Please correlate clinically for symptoms referable to the right S1 nerve.  There is mild-moderate right neuroforaminal stenosis.  No left neuroforaminal stenosis.  No central canal stenosis.  There is ligamentum flavum thickening and facet arthropathy.  There is a broad left extraforaminal disc protrusion at L5-S1 which abuts the exited left L5 nerve in the left extraforaminal soft tissues (series 6 image 17), nonspecific.  Please correlate clinically for symptoms referable to the left L5 nerve.      Assessment:  The patient is a 57-year-old woman with a history of diabetes, hypertension who presents in referral from [facility] for neck pain radiating into the arms and low back pain radiating into the left leg.     1. Cervical spinal stenosis  Ambulatory Referral to Physical/Occupational Therapy   2. DDD (degenerative disc disease), cervical  Ambulatory Referral to Physical/Occupational Therapy   3. DDD (degenerative disc disease), lumbar  Ambulatory Referral to Physical/Occupational Therapy   4. Gait instability     5. Opioid contract exists           Plan:  1.  She did not have lasting relief following the most recent cervical HERACLIO.  We discussed updating her cervical spine MRI and having her see neurosurgery.  She would like to think about this.  2.  Dr. Galan provided prescriptions for hydrocodone-acetaminophen 10/325 mg up to 3 times a day as needed for pain.  I have reviewed the [place] Board of Pharmacy website and there are no abberancies.    3.  I completed orders for physical therapy and [facility] for both the cervical and lumbar spine.  4.  Follow-up in 3 months or sooner as needed.

## 2018-08-08 ENCOUNTER — HOSPITAL ENCOUNTER (OUTPATIENT)
Dept: RADIOLOGY | Facility: HOSPITAL | Age: 58
Discharge: HOME | End: 2018-08-08
Attending: ORTHOPAEDIC SURGERY
Payer: COMMERCIAL

## 2018-08-08 ENCOUNTER — OFFICE VISIT (OUTPATIENT)
Dept: ORTHOPEDICS | Facility: CLINIC | Age: 58
End: 2018-08-08
Payer: COMMERCIAL

## 2018-08-08 VITALS — HEIGHT: 66 IN | BODY MASS INDEX: 31.5 KG/M2 | WEIGHT: 196 LBS

## 2018-08-08 DIAGNOSIS — M79.641 RIGHT HAND PAIN: Primary | ICD-10-CM

## 2018-08-08 DIAGNOSIS — M65.332 TRIGGER FINGER, LEFT MIDDLE FINGER: ICD-10-CM

## 2018-08-08 DIAGNOSIS — M79.641 RIGHT HAND PAIN: ICD-10-CM

## 2018-08-08 DIAGNOSIS — G56.01 CARPAL TUNNEL SYNDROME OF RIGHT WRIST: Primary | ICD-10-CM

## 2018-08-08 PROCEDURE — 99214 OFFICE O/P EST MOD 30 MIN: CPT | Mod: 25,S$GLB,, | Performed by: ORTHOPAEDIC SURGERY

## 2018-08-08 PROCEDURE — 3008F BODY MASS INDEX DOCD: CPT | Mod: CPTII,S$GLB,, | Performed by: ORTHOPAEDIC SURGERY

## 2018-08-08 PROCEDURE — 99999 PR PBB SHADOW E&M-EST. PATIENT-LVL III: CPT | Mod: PBBFAC,,, | Performed by: ORTHOPAEDIC SURGERY

## 2018-08-08 PROCEDURE — 73130 X-RAY EXAM OF HAND: CPT | Mod: 26,RT,, | Performed by: RADIOLOGY

## 2018-08-08 PROCEDURE — 20550 NJX 1 TENDON SHEATH/LIGAMENT: CPT | Mod: F2,S$GLB,, | Performed by: ORTHOPAEDIC SURGERY

## 2018-08-08 PROCEDURE — 73130 X-RAY EXAM OF HAND: CPT | Mod: TC,PO,RT

## 2018-08-08 RX ORDER — BUDESONIDE AND FORMOTEROL FUMARATE DIHYDRATE 160; 4.5 UG/1; UG/1
AEROSOL RESPIRATORY (INHALATION)
COMMUNITY
Start: 2018-08-06 | End: 2021-01-08 | Stop reason: CLARIF

## 2018-08-08 RX ORDER — GLIPIZIDE 5 MG/1
5 TABLET ORAL
COMMUNITY
Start: 2018-04-30 | End: 2019-01-31 | Stop reason: SDUPTHER

## 2018-08-08 RX ORDER — DESLORATADINE 5 MG/1
5 TABLET ORAL NIGHTLY
COMMUNITY
Start: 2018-08-06 | End: 2023-10-04

## 2018-08-08 RX ORDER — NEBIVOLOL 10 MG/1
10 TABLET ORAL NIGHTLY
COMMUNITY
Start: 2018-08-03

## 2018-08-08 RX ORDER — BENZONATATE 100 MG/1
100 CAPSULE ORAL
COMMUNITY
Start: 2018-07-05 | End: 2020-04-28

## 2018-08-08 RX ORDER — MONTELUKAST SODIUM 10 MG/1
10 TABLET ORAL NIGHTLY
COMMUNITY
Start: 2018-08-03

## 2018-08-08 RX ORDER — TRIAMCINOLONE ACETONIDE 40 MG/ML
40 INJECTION, SUSPENSION INTRA-ARTICULAR; INTRAMUSCULAR
Status: DISCONTINUED | OUTPATIENT
Start: 2018-08-08 | End: 2018-08-08 | Stop reason: HOSPADM

## 2018-08-08 RX ORDER — CALCIUM CITRATE/VITAMIN D3 200MG-6.25
TABLET ORAL
COMMUNITY
Start: 2018-08-06

## 2018-08-08 RX ORDER — MOMETASONE FUROATE 50 UG/1
SPRAY, METERED NASAL
COMMUNITY
Start: 2018-08-06 | End: 2020-04-28

## 2018-08-08 RX ADMIN — TRIAMCINOLONE ACETONIDE 40 MG: 40 INJECTION, SUSPENSION INTRA-ARTICULAR; INTRAMUSCULAR at 12:08

## 2018-08-08 NOTE — PROGRESS NOTES
"DATE: 8/8/2018  PATIENT: Aaron Garcia    Attending Physician: Stone Solomon M.D.    HISTORY:  Aaron Garcia is a 57 y.o. female who returns for follow up evaluation of her left ring finger and long finger trigger fingers as well as a new problem regarding her right hand.  She underwent trigger finger injection to the ring finger on 03/28/2018 which she states helped significantly.  She still has locking to the long finger and is requesting injection. She also notes numbness and tingling into the hand on the right.  She has previous history of carpal tunnel surgery on the left feels that it is similar.  She notes pain in her right hand.  She notes that she is dropping things.  Pain is reported at 5/10 today.     PMH/PSH/FamHx/SocHx:  Reviewed and unchanged from prior visit    ROS:  Constitution: Negative for chills, fever, and sweats. Negative for unexplained weight loss.  HENT: Negative for headaches and blurry vision.   Cardiovascular: Negative for chest pain, irregular heartbeat, leg swelling and palpitations.   Respiratory: Negative for cough and shortness of breath.   Gastrointestinal: Negative for abdominal pain, heartburn, nausea and vomiting.   Genitourinary: Negative for bladder incontinence and dysuria.   Musculoskeletal: Negative for systemic arthritis, joint swelling, muscle weakness and myalgias.   Neurological: Negative for numbness.   Psychiatric/Behavioral: Negative for depression.   Endocrine: Negative for polyuria.   Hematologic/Lymphatic: Negative for bleeding disorders.  Skin: Negative for poor wound healing.       EXAM:  Ht 5' 6" (1.676 m)   Wt 88.9 kg (196 lb)   BMI 31.64 kg/m²   Healthy-appearing female in no acute distress. Examination of the left hand reveals no catching of the ring finger but there is still catching but no jamal locking of the long finger.  Sensation is intact all digits.  Examination the right hand reveals numbness in the radial 4 digits. Phalen's is " positive and Tinel's is positive as well. Range of motion of the wrist within normal limits    IMAGING:   X-ray of theright hand is personally reviewed.  No acute fractures or dislocations are seen. No significant degenerative changes noted    ASSESSMENT:  Left long finger trigger finger  Right carpal tunnel syndrome    PLAN:  The implications of the patient's evolution of symptoms and findings were explained to the patient in detail. As she had a good response to cortisone injection to the left ring finger, I recommend injection to the left long finger flexor tendon sheath over the A1 pulley (see procedure note).  With regards to her right hand symptoms, I recommended referral to Dr. singleton that she for nerve conduction velocity/EMG studies and possible cortisone injection.  Should she have severe carpal tunnel syndrome she will be referred to a hand surgeon for definitive treatment.  Follow-up as needed.        This note was dictated using voice recognition software. Please excuse any grammatical or typographical errors.

## 2018-08-08 NOTE — PROCEDURES
Tendon Sheath  Date/Time: 8/8/2018 12:13 PM  Performed by: BORIS BENZ  Authorized by: BORIS BENZ     Consent Done?:  Yes (Verbal)  Timeout: prior to procedure the correct patient, procedure, and site was verified    Indications:  Pain  Site marked: the procedure site was marked    Timeout: prior to procedure the correct patient, procedure, and site was verified    Location:  Long finger  Long finger joint: left long finger flexor tendon sheath at the level of the A1 pulley.  Prep: patient was prepped and draped in usual sterile fashion    Ultrasonic guidance for needle placement?: No    Needle size:  25 G  Approach:  Volar  Medications:  40 mg triamcinolone acetonide 40 mg/mL  Patient tolerance:  Patient tolerated the procedure well with no immediate complications

## 2018-08-13 RX ORDER — TIZANIDINE HYDROCHLORIDE 2 MG/1
1 CAPSULE, GELATIN COATED ORAL 3 TIMES DAILY PRN
Qty: 90 CAPSULE | Refills: 2 | Status: SHIPPED | OUTPATIENT
Start: 2018-08-13 | End: 2018-11-21 | Stop reason: SDUPTHER

## 2018-08-14 ENCOUNTER — INITIAL CONSULT (OUTPATIENT)
Dept: PHYSICAL MEDICINE AND REHAB | Facility: CLINIC | Age: 58
End: 2018-08-14
Payer: COMMERCIAL

## 2018-08-14 VITALS
WEIGHT: 196 LBS | SYSTOLIC BLOOD PRESSURE: 164 MMHG | HEART RATE: 79 BPM | DIASTOLIC BLOOD PRESSURE: 79 MMHG | HEIGHT: 66 IN | BODY MASS INDEX: 31.5 KG/M2

## 2018-08-14 DIAGNOSIS — G56.03 CARPAL TUNNEL SYNDROME, BILATERAL: Primary | ICD-10-CM

## 2018-08-14 PROCEDURE — 99244 OFF/OP CNSLTJ NEW/EST MOD 40: CPT | Mod: S$GLB,,, | Performed by: PHYSICAL MEDICINE & REHABILITATION

## 2018-08-14 PROCEDURE — 99999 PR PBB SHADOW E&M-EST. PATIENT-LVL IV: CPT | Mod: PBBFAC,,, | Performed by: PHYSICAL MEDICINE & REHABILITATION

## 2018-08-14 NOTE — PROGRESS NOTES
[facility]    Consulting Provider: Dr. Stone Solomon    CHIEF COMPLAINT:   Chief Complaint   Patient presents with    Carpal Tunnel     right sided with numbness and tingling     HISTORY OF PRESENT ILLNESS: Aaron Garcia is a 57 y.o. female who presents to me  for evaluation of right hand pain/numbness. She reports numbness/tingling for years in her right hand that has gradually gotten worse. She had an EMG/NCS and subsequent left carpal tunnel release in . The numbness and tingling sensation is primarily in the first 4 digits but sometimes the 5th digit as well on her right hand. She reports dropping objects and difficulty holding on to things. She also reports neck pain with radiation into both arms as well as right elbow pain that shoots into her hand on occasion.     Review of Systems   Constitutional: Negative for fever.   HENT: Negative for drooling.    Eyes: Negative for discharge.   Respiratory: Negative for choking.    Cardiovascular: Negative for chest pain.   Genitourinary: Negative for flank pain.   Skin: Negative for wound.   Allergic/Immunologic: Negative for immunocompromised state.   Neurological: Negative for tremors and syncope.   Psychiatric/Behavioral: Negative for behavioral problems.     Past Medical History:   Past Medical History:   Diagnosis Date    Arthritis     Asthma     Diabetes mellitus, type 2     Hypertension     Mitral valve prolapse     RYLAN (obstructive sleep apnea)     Snores     never had sleep study, unable to lay flat, sleeps elevated    Thyroid disease     Goiter       Past Surgical History:   Past Surgical History:   Procedure Laterality Date    CARPAL TUNNEL RELEASE Left      SECTION      CHOLECYSTECTOMY      DILATION AND CURETTAGE OF UTERUS      Epidural steroid Injection      Pain management, cervical    ulcer on tongue removed      WOUND DEBRIDEMENT Left     leg at ankle level       Family History:   Family  History   Problem Relation Age of Onset    Diabetes Father     Diabetes Sister     Heart disease Brother        Medications:   Current Outpatient Medications on File Prior to Visit   Medication Sig Dispense Refill    ALBUTEROL SULFATE (PROAIR HFA INHL) Inhale into the lungs.      amitriptyline (ELAVIL) 25 MG tablet Take 25 mg by mouth nightly as needed for Insomnia.      amitriptyline (ELAVIL) 25 MG tablet Take 25 mg by mouth.      amlodipine (NORVASC) 10 MG tablet 10 mg.       benazepril (LOTENSIN) 40 MG tablet Take 40 mg by mouth once daily.      benzonatate (TESSALON) 100 MG capsule Take 100 mg by mouth.      desloratadine (CLARINEX) 5 mg tablet Take 5 mg by mouth.      gabapentin (NEURONTIN) 600 MG tablet Take 600 mg by mouth 2 (two) times daily.      glipiZIDE (GLUCOTROL) 5 MG tablet Take 5 mg by mouth.      GLIPIZIDE ORAL Take 5 mg by mouth 2 (two) times daily.       HYDROcodone-acetaminophen (NORCO)  mg per tablet Take 1 tablet by mouth 3 (three) times daily as needed. 90 tablet 0    [START ON 9/4/2018] HYDROcodone-acetaminophen (NORCO)  mg per tablet Take 1 tablet by mouth 3 (three) times daily as needed. 90 tablet 0    insulin glargine (LANTUS) 100 unit/mL injection Inject 45 Units into the skin every evening.      meloxicam (MOBIC) 15 MG tablet TAKE  ONE TABLET BY MOUTH DAILY 30 tablet 1    mometasone (NASONEX) 50 mcg/actuation nasal spray       montelukast (SINGULAIR) 10 mg tablet Take 10 mg by mouth.      nebivolol (BYSTOLIC) 10 MG Tab Take 10 mg by mouth.      ranitidine (ZANTAC) 300 MG tablet Take 300 mg by mouth.      rosuvastatin (CRESTOR) 10 MG tablet Take 10 mg by mouth once daily.      SYMBICORT 160-4.5 mcg/actuation HFAA       tiZANidine 2 mg Cap Take 1 capsule (2 mg total) by mouth 3 (three) times daily as needed. 90 capsule 2    TRUE METRIX GLUCOSE TEST STRIP Strp        No current facility-administered medications on file prior to visit.        Allergies:  "  Review of patient's allergies indicates:   Allergen Reactions    Pcn [penicillins] Itching       Social History:   Social History     Socioeconomic History    Marital status:      Spouse name: None    Number of children: None    Years of education: None    Highest education level: None   Social Needs    Financial resource strain: None    Food insecurity - worry: None    Food insecurity - inability: None    Transportation needs - medical: None    Transportation needs - non-medical: None   Occupational History    None   Tobacco Use    Smoking status: Never Smoker    Smokeless tobacco: Never Used   Substance and Sexual Activity    Alcohol use: No     Alcohol/week: 0.0 oz    Drug use: Yes     Types: Hydrocodone    Sexual activity: Yes     Partners: Male   Other Topics Concern    None   Social History Narrative    None     PHYSICAL EXAMINATION:   General    Vitals:    08/14/18 0906   BP: (!) 164/79   Pulse: 79   Weight: 88.9 kg (195 lb 15.8 oz)   Height: 5' 6" (1.676 m)     Constitutional: Oriented to person, place, and time. No apparent distress. Appears well-developed and well-nourished. Pleasant.  HENT:   Head: Normocephalic and atraumatic.   Eyes: Right eye exhibits no discharge. Left eye exhibits no discharge. No scleral icterus.   Pulmonary/Chest: Effort normal. No respiratory distress.   Abdominal: There is no guarding.   Neurological: Alert and oriented to person, place, and time.   Psychiatric: Behavior is normal.   Right Hand Exam     Tenderness   The patient is experiencing no tenderness.     Range of Motion   Wrist   Extension: 45   Flexion: 80   Pronation: normal   Supination: normal     Muscle Strength   Wrist extension: 5/5   Wrist flexion: 5/5   : 5/5     Tests   Phalens sign: positive  Tinel's sign (median nerve): positive    Other   Erythema: absent  Scars: absent  Sensation: decreased (reduced over median innervated digits)  Pulse: present    Comments:  Weakness in APB, " no obvious atrophy      Left Hand Exam     Tenderness   The patient is experiencing no tenderness.     Muscle Strength   Wrist extension: 5/5   Wrist flexion: 5/5   :  5/5     Other   Erythema: absent  Scars: present  Sensation: normal  Pulse: present        INSPECTION: There is no swelling, ecchymoses, erythema or gross deformity.  Palpation: There is no tenderness to palpation about the cervical spine the midline. There is tenderness to palpation about the right cervical paraspinals and trapezius musculature.  Range of motion: There is approximately 50° of cervical extension with some discomfort, there is 45° of cervical flexion. There is approximately 45° of lateral bending bilaterally. There is approximately 75° of lateral rotation bilaterally.  Neurologic: Spurling's test produces axial neck pain, but no radicular symptoms. Manual muscle testing reveals 5 out of 5 strength throughout bilateral upper extremity's with the exception of weakness in the right APB. Tone is normal about the bilateral upper extremity's. Reflexes are 2+ throughout bilateral upper extremities. Osuna's reflex is absent bilaterally.    Imaging  R hand xray:   No acute osseous or soft tissue abnormality.  Joint spaces maintained.  No erosions.        Data Reviewed: X-ray    Ultrasound Right wrist: brief ultrasound performed today in our clinic demonstrated right median nerve with a cross-sectional area of 25 mm(2)    ASSESSMENT: Right carpal tunnel syndrome    PLAN:     1. Time was spent reviewing the above diagnosis in depth with Aaron today, including acute management and rehabilitation.     2. The patient's history, exam findings, and ultrasound are consistent with right carpal tunnel severity.  Diagnostic ultrasound exam of the right median nerve at the level of the carpal tunnel measures 0.25 centimeters squared.  Given the patient's long history of symptoms, weakness in her right APB, and history of diabetes we recommend EMG  "and nerve conduction studies to assess for severity of CTS and to rule out cervical radiculopathy and cubital tunnel as contributing factors. Consider referral to hand surgeon pending test results.      3. RTC for EMG/NCS    This is a consult from Dr. Stone Solomon. Please see the "Communications" section of Epic to see how the consulting physician received the report of today's findings and recommendations. If it's an [facility] physician, it will be forwarded to his/her "in basket".    The above note was completed, in part, with the aid of Dragon dictation software/hardware. Translation errors may be present.    "

## 2018-08-14 NOTE — LETTER
August 14, 2018      Stone Solomon MD  [address] [facility] [address] [place] [address]           [facility]  [address] [facility] [address] [place] [address]  Phone: [phone]  Fax: [phone]          Patient: Aaron Garcia   MR Number: [id number]   YOB: 1960   Date of Visit: 8/14/2018       Dear Dr. Stone Solomon:    Thank you for referring Aaron Garcia to me for evaluation. Attached you will find relevant portions of my assessment and plan of care.    If you have questions, please do not hesitate to call me. I look forward to following Aaron Garcia along with you.    Sincerely,    Nitesh Cleveland MD    Enclosure  CC:  No Recipients    If you would like to receive this communication electronically, please contact [email or web address] or [phone] to request more information on [employer] Link access.    For providers and/or their staff who would like to refer a patient to [facility], please contact us through our one-stop-shop provider referral line, [facility], at [phone].    If you feel you have received this communication in error or would no longer like to receive these types of communications, please e-mail [email or web address]

## 2018-09-06 ENCOUNTER — OFFICE VISIT (OUTPATIENT)
Dept: PHYSICAL MEDICINE AND REHAB | Facility: CLINIC | Age: 58
End: 2018-09-06
Payer: COMMERCIAL

## 2018-09-06 DIAGNOSIS — G56.03 CARPAL TUNNEL SYNDROME, BILATERAL: ICD-10-CM

## 2018-09-06 PROCEDURE — 99499 UNLISTED E&M SERVICE: CPT | Mod: S$GLB,,, | Performed by: PHYSICAL MEDICINE & REHABILITATION

## 2018-09-06 PROCEDURE — 99999 PR PBB SHADOW E&M-EST. PATIENT-LVL I: CPT | Mod: PBBFAC,,, | Performed by: PHYSICAL MEDICINE & REHABILITATION

## 2018-09-06 PROCEDURE — 95910 NRV CNDJ TEST 7-8 STUDIES: CPT | Mod: S$GLB,,, | Performed by: PHYSICAL MEDICINE & REHABILITATION

## 2018-09-06 PROCEDURE — 95886 MUSC TEST DONE W/N TEST COMP: CPT | Mod: S$GLB,,, | Performed by: PHYSICAL MEDICINE & REHABILITATION

## 2018-09-07 NOTE — PROGRESS NOTES
[facility]  [address] [place] [address]             Full Name: KAIA RUBY Gender: Female  Patient ID: [id number] YOB: 1960  History: Pt reports right hand paresthesias.      Visit Date: 9/6/2018 12:39  Age: 57 Years 10 Months Old  Examining Physician: MIKAL  Referring Physician: MIKAL      Sensory NCS      Nerve / Sites Rec. Site Onset Lat Peak Lat NP Amp PP Amp Segments Distance Velocity     ms ms µV µV  cm m/s   L Median - Digit III (Antidromic)      Wrist Dig III 3.65 4.53 29.1 61.6 Wrist - Dig III 14 38      Mid palm Dig III 1.25 2.14 48.5 81.1 Mid palm - Dig III 7 56   R Median - Digit III (Antidromic)      Wrist Dig III NR NR NR NR Wrist - Dig III 14 NR      Mid palm Dig III NR NR NR NR Mid palm - Dig III 7 NR   L Ulnar - Digit V (Antidromic)      Wrist Dig V 2.71 3.59 34.2 56.3 Wrist - Dig V 14 52   R Ulnar - Digit V (Antidromic)      Wrist Dig V 2.76 3.65 33.2 55.4 Wrist - Dig V 14 51       Motor NCS      Nerve / Sites Muscle Latency Amplitude Amp % Duration Segments Distance Lat Diff Velocity     ms mV % ms  cm ms m/s   R Median - APB      Wrist APB 5.52 4.9 100 6.61 Wrist - APB 8        Elbow APB 11.46 3.1 64.5 6.41 Elbow - Wrist 25 5.94 42   L Median - APB      Wrist APB 4.38 10.4 100 7.76 Wrist - APB 8        Elbow APB 9.43 9.9 95.2 7.86 Elbow - Wrist 25 5.05 49   R Ulnar - ADM      Wrist ADM 3.23 9.9 100 7.40 Wrist - ADM 8        B.Elbow ADM 6.88 9.9 100 7.55 B.Elbow - Wrist 20 3.65 55      A.Elbow ADM 8.54 9.4 94.9 7.81 A.Elbow - B.Elbow 10 1.67 60       EMG         EMG Summary Table     Spontaneous MUAP Recruitment   Muscle IA Fib PSW Fasc H.F. Amp Dur. PPP Pattern   R. Deltoid N None None None None N N N N   R. Biceps brachii N None None None None N N N N   R. Triceps brachii N None None None None N N N N   R. Pronator teres N None None None None N N N N   R. First dorsal interosseous N None None None None N N N N   R. Abductor pollicis brevis 1+ None  2+ None None N 1+ N N   R. Cervical paraspinals 1+ None None None None           Summary    The motor conduction test was performed on 3 nerve(s). The results were normal in 2 nerve(s): L Median - APB, R Ulnar - ADM. Results outside the specified normal range were found in 1 nerve(s), as follows:   In the R Median - APB study  o the take off latency result was increased for Wrist stimulation  o the take off velocity result was reduced for Elbow - Wrist segment    The sensory conduction test had results within normal limits in 2 of the tested nerves: R Ulnar - Digit V (Antidromic) , L Ulnar - Digit V (Antidromic) .  There were results outside of the specified normal range in 2 of the tested nerves:   In the L Median - Digit III (Antidromic) study  o the peak latency result was increased for Wrist stimulation   In the R Median - Digit III (Antidromic) study  o the response was considered absent for Wrist stimulation  o the response was considered absent for Mid palm stimulation    The needle EMG examination was performed in 7 muscles. It was normal in 5 muscle(s): R. Deltoid, R. Biceps brachii, R. Triceps brachii, R. Pronator teres, R. First dorsal interosseous. The study was abnormal in 2 muscle(s), with the following distribution:   Abnormal spontaneous/insertional activity was found in R. Abductor pollicis brevis, R. Cervical paraspinals.   The MUP waveform abnormality was found in R. Abductor pollicis brevis.      Electrodiagnostic Impression:  1. There is electrodiagnostic evidence of mild left and severe right median nerve neuropathy at the wrists.  Needle EMG exam of the median nerve innervated adductor pollicis brevis muscle on the right showed signs of active axonal denervation and chronic neurogenic change.  2. Needle EMG exam of the right cervical paraspinal musculature showed findings of chronic neurogenic change.  This may represent a history of cervical radiculopathy.  3. There was insufficient  electrodiagnostic evidence for diagnoses of peripheral polyneuropathy, myopathy, or active axonal cervical radiculopathy/brachial plexopathy of the right upper extremity.    Recommendations:  Ms. Garcia has chronic his symptoms, right thumb weakness, significant enlargement of the median nerve seen on diagnostic ultrasound exam, and the electrodiagnostic findings of severe carpal tunnel syndrome with active axonal denervation.  I have recommended she seek surgical consultation for carpal tunnel release.  We will help facilitate her being set up with 1 of our surgeons.      -------------------------------  Nitesh Cleveland M.D.

## 2018-09-12 ENCOUNTER — OFFICE VISIT (OUTPATIENT)
Dept: ORTHOPEDICS | Facility: CLINIC | Age: 58
End: 2018-09-12
Payer: COMMERCIAL

## 2018-09-12 ENCOUNTER — LAB VISIT (OUTPATIENT)
Dept: LAB | Facility: HOSPITAL | Age: 58
End: 2018-09-12
Attending: ORTHOPAEDIC SURGERY
Payer: COMMERCIAL

## 2018-09-12 VITALS
HEART RATE: 64 BPM | DIASTOLIC BLOOD PRESSURE: 83 MMHG | SYSTOLIC BLOOD PRESSURE: 135 MMHG | WEIGHT: 195 LBS | BODY MASS INDEX: 31.34 KG/M2 | HEIGHT: 66 IN

## 2018-09-12 DIAGNOSIS — Z01.818 PREOP EXAMINATION: Primary | ICD-10-CM

## 2018-09-12 DIAGNOSIS — Z01.818 PREOP EXAMINATION: ICD-10-CM

## 2018-09-12 DIAGNOSIS — G56.01 CARPAL TUNNEL SYNDROME OF RIGHT WRIST: Primary | ICD-10-CM

## 2018-09-12 LAB
ANION GAP SERPL CALC-SCNC: 8 MMOL/L
BASOPHILS # BLD AUTO: 0.05 K/UL
BASOPHILS NFR BLD: 0.7 %
BUN SERPL-MCNC: 9 MG/DL
CALCIUM SERPL-MCNC: 9.7 MG/DL
CHLORIDE SERPL-SCNC: 104 MMOL/L
CO2 SERPL-SCNC: 30 MMOL/L
CREAT SERPL-MCNC: 0.7 MG/DL
DIFFERENTIAL METHOD: ABNORMAL
EOSINOPHIL # BLD AUTO: 0.2 K/UL
EOSINOPHIL NFR BLD: 2.8 %
ERYTHROCYTE [DISTWIDTH] IN BLOOD BY AUTOMATED COUNT: 12.5 %
EST. GFR  (AFRICAN AMERICAN): >60 ML/MIN/1.73 M^2
EST. GFR  (NON AFRICAN AMERICAN): >60 ML/MIN/1.73 M^2
GLUCOSE SERPL-MCNC: 78 MG/DL
HCT VFR BLD AUTO: 41.1 %
HGB BLD-MCNC: 13.1 G/DL
IMM GRANULOCYTES # BLD AUTO: 0.01 K/UL
IMM GRANULOCYTES NFR BLD AUTO: 0.1 %
LYMPHOCYTES # BLD AUTO: 3.1 K/UL
LYMPHOCYTES NFR BLD: 42.5 %
MCH RBC QN AUTO: 30.2 PG
MCHC RBC AUTO-ENTMCNC: 31.9 G/DL
MCV RBC AUTO: 95 FL
MONOCYTES # BLD AUTO: 1 K/UL
MONOCYTES NFR BLD: 14.1 %
NEUTROPHILS # BLD AUTO: 2.9 K/UL
NEUTROPHILS NFR BLD: 39.8 %
NRBC BLD-RTO: 0 /100 WBC
PLATELET # BLD AUTO: 303 K/UL
PMV BLD AUTO: 11.1 FL
POTASSIUM SERPL-SCNC: 4.4 MMOL/L
RBC # BLD AUTO: 4.34 M/UL
SODIUM SERPL-SCNC: 142 MMOL/L
WBC # BLD AUTO: 7.39 K/UL

## 2018-09-12 PROCEDURE — 99244 OFF/OP CNSLTJ NEW/EST MOD 40: CPT | Mod: 57,S$GLB,, | Performed by: ORTHOPAEDIC SURGERY

## 2018-09-12 PROCEDURE — 36415 COLL VENOUS BLD VENIPUNCTURE: CPT | Mod: PO

## 2018-09-12 PROCEDURE — 99999 PR PBB SHADOW E&M-EST. PATIENT-LVL V: CPT | Mod: PBBFAC,,, | Performed by: ORTHOPAEDIC SURGERY

## 2018-09-12 PROCEDURE — 85025 COMPLETE CBC W/AUTO DIFF WBC: CPT

## 2018-09-12 PROCEDURE — 80048 BASIC METABOLIC PNL TOTAL CA: CPT

## 2018-09-12 RX ORDER — SODIUM CHLORIDE 9 MG/ML
INJECTION, SOLUTION INTRAVENOUS CONTINUOUS
Status: CANCELLED | OUTPATIENT
Start: 2018-09-12

## 2018-09-12 RX ORDER — MUPIROCIN 20 MG/G
OINTMENT TOPICAL
Status: CANCELLED | OUTPATIENT
Start: 2018-09-12

## 2018-09-12 NOTE — LETTER
September 12, 2018      Nitesh Cleveland MD  [address] [place] [address] [address] [facility] [address]  [facility] [address]           [facility]  [address] [facility] [address] [place] [address]  Phone: [phone]          Patient: Aaron Garcia   MR Number: [id number]   YOB: 1960   Date of Visit: 9/12/2018       Dear Dr. Nitesh Cleveland:    Thank you for referring Aaron Garcia to me for evaluation. Attached you will find relevant portions of my assessment and plan of care.    If you have questions, please do not hesitate to call me. I look forward to following Aaron Garcia along with you.    Sincerely,    Anurag Mathis MD    Enclosure  CC:  No Recipients    If you would like to receive this communication electronically, please contact [email or web address] or [phone] to request more information on [employer] Link access.    For providers and/or their staff who would like to refer a patient to [facility], please contact us through our one-stop-shop provider referral line, [facility], at [phone].    If you feel you have received this communication in error or would no longer like to receive these types of communications, please e-mail [email or web address]

## 2018-09-12 NOTE — H&P (VIEW-ONLY)
Past Medical History:   Diagnosis Date    Arthritis     Asthma     Diabetes mellitus, type 2     Hypertension     Mitral valve prolapse     RYLAN (obstructive sleep apnea)     Snores     never had sleep study, unable to lay flat, sleeps elevated    Thyroid disease     Goiter       Past Surgical History:   Procedure Laterality Date    BLOCK-NERVE-MEDIAL BRANCH-CERVICAL C 3,4,5,6 Right 2016    [facility] OR    CARPAL TUNNEL RELEASE Left      SECTION      CHOLECYSTECTOMY      DILATION AND CURETTAGE OF UTERUS      Epidural steroid Injection      Pain management, cervical    INJECTION-STEROID-EPIDURAL-CERVICAL N/A 3/14/2018    [facility] at [facility] OR    INJECTION-STEROID-EPIDURAL-CERVICAL N/A 2016    [facility] at [facility] OR    INJECTION-STEROID-EPIDURAL-CERVICAL N/A 3/1/2016    [facility] at [facility] OR    INJECTION-STEROID-EPIDURAL-CERVICAL N/A 2016    [facility] at [facility] OR    INJECTION-STEROID-EPIDURAL-LUMBAR N/A 2017    [facility] at [facility] OR    INJECTION-STEROID-EPIDURAL-LUMBAR N/A 2017    [facility] at [facility] OR    RADIOFREQUENCY THERMOCOAGULATION (RFTC)-NERVE-MEDIAN BRANCH-CERVICAL C3,4,5,6 Right 7/15/2016    [facility] at [facility] OR    ulcer on tongue removed      WOUND DEBRIDEMENT Left     leg at ankle level       Current Outpatient Medications   Medication Sig    ALBUTEROL SULFATE (PROAIR HFA INHL) Inhale into the lungs.    amitriptyline (ELAVIL) 25 MG tablet Take 25 mg by mouth nightly as needed for Insomnia.    amitriptyline (ELAVIL) 25 MG tablet Take 25 mg by mouth.    amlodipine (NORVASC) 10 MG tablet 10 mg.     benazepril (LOTENSIN) 40 MG tablet Take 40 mg by mouth once daily.    benzonatate (TESSALON) 100 MG capsule Take 100 mg by mouth.     desloratadine (CLARINEX) 5 mg tablet Take 5 mg by mouth.    gabapentin (NEURONTIN) 600 MG tablet Take 600 mg by mouth 2 (two) times daily.    glipiZIDE (GLUCOTROL) 5 MG tablet Take 5 mg by mouth.    GLIPIZIDE ORAL Take 5 mg by mouth 2 (two) times daily.     HYDROcodone-acetaminophen (NORCO)  mg per tablet Take 1 tablet by mouth 3 (three) times daily as needed.    insulin glargine (LANTUS) 100 unit/mL injection Inject 45 Units into the skin every evening.    meloxicam (MOBIC) 15 MG tablet TAKE  ONE TABLET BY MOUTH DAILY    mometasone (NASONEX) 50 mcg/actuation nasal spray     montelukast (SINGULAIR) 10 mg tablet Take 10 mg by mouth.    nebivolol (BYSTOLIC) 10 MG Tab Take 10 mg by mouth.    ranitidine (ZANTAC) 300 MG tablet Take 300 mg by mouth.    rosuvastatin (CRESTOR) 10 MG tablet Take 10 mg by mouth once daily.    SYMBICORT 160-4.5 mcg/actuation HFAA     tiZANidine 2 mg Cap Take 1 capsule (2 mg total) by mouth 3 (three) times daily as needed.    TRUE METRIX GLUCOSE TEST STRIP Strp      No current facility-administered medications for this visit.        Review of patient's allergies indicates:   Allergen Reactions    Pcn [penicillins] Itching       Family History   Problem Relation Age of Onset    Diabetes Father     Diabetes Sister     Heart disease Brother        Social History     Socioeconomic History    Marital status:      Spouse name: Not on file    Number of children: Not on file    Years of education: Not on file    Highest education level: Not on file   Social Needs    Financial resource strain: Not on file    Food insecurity - worry: Not on file    Food insecurity - inability: Not on file    Transportation needs - medical: Not on file    Transportation needs - non-medical: Not on file   Occupational History    Not on file   Tobacco Use    Smoking status: Never Smoker    Smokeless tobacco: Never Used   Substance and Sexual Activity    Alcohol use: No      Alcohol/week: 0.0 oz    Drug use: Yes     Types: Hydrocodone    Sexual activity: Yes     Partners: Male   Other Topics Concern    Not on file   Social History Narrative    Not on file       Chief Complaint:   Chief Complaint   Patient presents with    Right Wrist - Pain       History of present illness:  This is a 57-year-old female seen in consultation for Dr. Cleveland.  Patient has had a history of bilateral carpal tunnel syndrome for several years now.  She had her left carpal tunnel released a few years ago because that was the 1 that hurts her more even though the nerve study showed the right was worse than the left.  EMG done recently showed severe right carpal tunnel syndrome.  Pain is a 5/10.  Starting to drop things.      Review of Systems:    Constitution: Negative for chills, fever, and sweats.  Negative for unexplained weight loss.    HENT:  Negative for headaches and blurry vision.    Cardiovascular:Negative for chest pain or irregular heart beat. Negative for hypertension.    Respiratory:  Negative for cough and shortness of breath.    Gastrointestinal: Negative for abdominal pain, heartburn, melena, nausea, and vomitting.    Genitourinary:  Negative bladder incontinence and dysuria.    Musculoskeletal:  See HPI    Neurological:  Positive for numbness.    Psychiatric/Behavioral: Negative for depression.  The patient is not nervous/anxious.      Endocrine: Negative for polyuria    Hematologic/Lymphatic: Negative for bleeding problem.  Does not bruise/bleed easily.    Skin: Negative for poor would healing and rash      Physical Examination:    Vital Signs:    Vitals:    09/12/18 1404   BP: 135/83   Pulse: 64       Body mass index is 31.47 kg/m².    This a well-developed, well nourished patient in no acute distress.  They are alert and oriented and cooperative to examination.  Pt. walks without an antalgic gait.      Examination of the right hand and wrist shows no signs of rashes or erythema.  Patient has no masses ecchymosis or effusions. Patient has full range of motion of the wrist in flexion and extension as well as ulnar and radial deviation. The patient also has full range of motion of all joints in the hand. There are 2+ radial pulse and intact light touch sensation in all 5 digits. Nontender over the anatomic snuffbox.  Positive median Tinel's. Negative Finkelstein's test.     Examination of the left hand and wrist shows no signs of rashes or erythema.  Previous carpal tunnel scar noted. Patient has no masses ecchymosis or effusions. Patient has full range of motion of the wrist in flexion and extension as well as ulnar and radial deviation. The patient also has full range of motion of all joints in the hand. There are 2+ radial pulse and intact light touch sensation in all 5 digits. Nontender over the anatomic snuffbox. Negative Tinel's. Negative Finkelstein's test.         X-rays:  None     Assessment::  Severe right carpal tunnel syndrome    Plan:  I reviewed the findings with her today.  She is very familiar with the problem.  We discussed carpal tunnel release.  She will schedule at her convenience.  Risks, benefits, and alternatives to the procedure were explained to the patient including but not limited to damage to nerves, arteries, blood vessels, bones, tendons, ligaments, stiffness, instability, infection, DVT, PE, as well as general anesthetic complications including seizure, stroke, heart attack and even death. The patient understood these risks and wished to proceed and signed the informed consent.       This note was created using [employer] voice recognition software that occasionally misinterpreted phrases or words.    Consult note is delivered via Epic messaging service.

## 2018-09-12 NOTE — PROGRESS NOTES
Past Medical History:   Diagnosis Date    Arthritis     Asthma     Diabetes mellitus, type 2     Hypertension     Mitral valve prolapse     RYLAN (obstructive sleep apnea)     Snores     never had sleep study, unable to lay flat, sleeps elevated    Thyroid disease     Goiter       Past Surgical History:   Procedure Laterality Date    BLOCK-NERVE-MEDIAL BRANCH-CERVICAL C 3,4,5,6 Right 2016    [facility] OR    CARPAL TUNNEL RELEASE Left      SECTION      CHOLECYSTECTOMY      DILATION AND CURETTAGE OF UTERUS      Epidural steroid Injection      Pain management, cervical    INJECTION-STEROID-EPIDURAL-CERVICAL N/A 3/14/2018    [facility]    INJECTION-STEROID-EPIDURAL-CERVICAL N/A 2016    [facility] OR    INJECTION-STEROID-EPIDURAL-CERVICAL N/A 3/1/2016    [facility]    INJECTION-STEROID-EPIDURAL-CERVICAL N/A 2016    [facility]    INJECTION-STEROID-EPIDURAL-LUMBAR N/A 2017    [facility]    INJECTION-STEROID-EPIDURAL-LUMBAR N/A 2017    [facility] OR    RADIOFREQUENCY THERMOCOAGULATION (RFTC)-NERVE-MEDIAN BRANCH-CERVICAL C3,4,5,6 Right 7/15/2016    [facility] OR    ulcer on tongue removed      WOUND DEBRIDEMENT Left     leg at ankle level       Current Outpatient Medications   Medication Sig    ALBUTEROL SULFATE (PROAIR HFA INHL) Inhale into the lungs.    amitriptyline (ELAVIL) 25 MG tablet Take 25 mg by mouth nightly as needed for Insomnia.    amitriptyline (ELAVIL) 25 MG tablet Take 25 mg by mouth.    amlodipine (NORVASC) 10 MG tablet 10 mg.     benazepril (LOTENSIN) 40 MG tablet Take 40 mg by mouth once daily.    benzonatate (TESSALON) 100 MG capsule Take 100 mg by mouth.     desloratadine (CLARINEX) 5 mg tablet Take 5 mg by mouth.    gabapentin (NEURONTIN) 600 MG tablet Take 600 mg by mouth 2 (two) times daily.    glipiZIDE (GLUCOTROL) 5 MG tablet Take 5 mg by mouth.    GLIPIZIDE ORAL Take 5 mg by mouth 2 (two) times daily.     HYDROcodone-acetaminophen (NORCO)  mg per tablet Take 1 tablet by mouth 3 (three) times daily as needed.    insulin glargine (LANTUS) 100 unit/mL injection Inject 45 Units into the skin every evening.    meloxicam (MOBIC) 15 MG tablet TAKE  ONE TABLET BY MOUTH DAILY    mometasone (NASONEX) 50 mcg/actuation nasal spray     montelukast (SINGULAIR) 10 mg tablet Take 10 mg by mouth.    nebivolol (BYSTOLIC) 10 MG Tab Take 10 mg by mouth.    ranitidine (ZANTAC) 300 MG tablet Take 300 mg by mouth.    rosuvastatin (CRESTOR) 10 MG tablet Take 10 mg by mouth once daily.    SYMBICORT 160-4.5 mcg/actuation HFAA     tiZANidine 2 mg Cap Take 1 capsule (2 mg total) by mouth 3 (three) times daily as needed.    TRUE METRIX GLUCOSE TEST STRIP Strp      No current facility-administered medications for this visit.        Review of patient's allergies indicates:   Allergen Reactions    Pcn [penicillins] Itching       Family History   Problem Relation Age of Onset    Diabetes Father     Diabetes Sister     Heart disease Brother        Social History     Socioeconomic History    Marital status:      Spouse name: Not on file    Number of children: Not on file    Years of education: Not on file    Highest education level: Not on file   Social Needs    Financial resource strain: Not on file    Food insecurity - worry: Not on file    Food insecurity - inability: Not on file    Transportation needs - medical: Not on file    Transportation needs - non-medical: Not on file   Occupational History    Not on file   Tobacco Use    Smoking status: Never Smoker    Smokeless tobacco: Never Used   Substance and Sexual Activity    Alcohol use: No      Alcohol/week: 0.0 oz    Drug use: Yes     Types: Hydrocodone    Sexual activity: Yes     Partners: Male   Other Topics Concern    Not on file   Social History Narrative    Not on file       Chief Complaint:   Chief Complaint   Patient presents with    Right Wrist - Pain       History of present illness:  This is a 57-year-old female seen in consultation for Dr. Cleveland.  Patient has had a history of bilateral carpal tunnel syndrome for several years now.  She had her left carpal tunnel released a few years ago because that was the 1 that hurts her more even though the nerve study showed the right was worse than the left.  EMG done recently showed severe right carpal tunnel syndrome.  Pain is a 5/10.  Starting to drop things.      Review of Systems:    Constitution: Negative for chills, fever, and sweats.  Negative for unexplained weight loss.    HENT:  Negative for headaches and blurry vision.    Cardiovascular:Negative for chest pain or irregular heart beat. Negative for hypertension.    Respiratory:  Negative for cough and shortness of breath.    Gastrointestinal: Negative for abdominal pain, heartburn, melena, nausea, and vomitting.    Genitourinary:  Negative bladder incontinence and dysuria.    Musculoskeletal:  See HPI    Neurological:  Positive for numbness.    Psychiatric/Behavioral: Negative for depression.  The patient is not nervous/anxious.      Endocrine: Negative for polyuria    Hematologic/Lymphatic: Negative for bleeding problem.  Does not bruise/bleed easily.    Skin: Negative for poor would healing and rash      Physical Examination:    Vital Signs:    Vitals:    09/12/18 1404   BP: 135/83   Pulse: 64       Body mass index is 31.47 kg/m².    This a well-developed, well nourished patient in no acute distress.  They are alert and oriented and cooperative to examination.  Pt. walks without an antalgic gait.      Examination of the right hand and wrist shows no signs of rashes or erythema.  Patient has no masses ecchymosis or effusions. Patient has full range of motion of the wrist in flexion and extension as well as ulnar and radial deviation. The patient also has full range of motion of all joints in the hand. There are 2+ radial pulse and intact light touch sensation in all 5 digits. Nontender over the anatomic snuffbox.  Positive median Tinel's. Negative Finkelstein's test.     Examination of the left hand and wrist shows no signs of rashes or erythema.  Previous carpal tunnel scar noted. Patient has no masses ecchymosis or effusions. Patient has full range of motion of the wrist in flexion and extension as well as ulnar and radial deviation. The patient also has full range of motion of all joints in the hand. There are 2+ radial pulse and intact light touch sensation in all 5 digits. Nontender over the anatomic snuffbox. Negative Tinel's. Negative Finkelstein's test.         X-rays:  None     Assessment::  Severe right carpal tunnel syndrome    Plan:  I reviewed the findings with her today.  She is very familiar with the problem.  We discussed carpal tunnel release.  She will schedule at her convenience.  Risks, benefits, and alternatives to the procedure were explained to the patient including but not limited to damage to nerves, arteries, blood vessels, bones, tendons, ligaments, stiffness, instability, infection, DVT, PE, as well as general anesthetic complications including seizure, stroke, heart attack and even death. The patient understood these risks and wished to proceed and signed the informed consent.       This note was created using [employer] voice recognition software that occasionally misinterpreted phrases or words.    Consult note is delivered via Epic messaging service.

## 2018-09-13 ENCOUNTER — ANESTHESIA EVENT (OUTPATIENT)
Dept: SURGERY | Facility: HOSPITAL | Age: 58
End: 2018-09-13
Payer: COMMERCIAL

## 2018-09-14 ENCOUNTER — HOSPITAL ENCOUNTER (OUTPATIENT)
Facility: HOSPITAL | Age: 58
Discharge: HOME | End: 2018-09-14
Attending: ORTHOPAEDIC SURGERY | Admitting: ORTHOPAEDIC SURGERY
Payer: COMMERCIAL

## 2018-09-14 ENCOUNTER — ANESTHESIA (OUTPATIENT)
Dept: SURGERY | Facility: HOSPITAL | Age: 58
End: 2018-09-14
Payer: COMMERCIAL

## 2018-09-14 VITALS
SYSTOLIC BLOOD PRESSURE: 132 MMHG | TEMPERATURE: 97 F | HEART RATE: 71 BPM | RESPIRATION RATE: 12 BRPM | DIASTOLIC BLOOD PRESSURE: 68 MMHG | OXYGEN SATURATION: 100 %

## 2018-09-14 DIAGNOSIS — G56.01 CARPAL TUNNEL SYNDROME OF RIGHT WRIST: ICD-10-CM

## 2018-09-14 LAB — GLUCOSE SERPL-MCNC: 149 MG/DL (ref 70–110)

## 2018-09-14 PROCEDURE — D9220A PRA ANESTHESIA: Mod: CRNA,,, | Performed by: NURSE ANESTHETIST, CERTIFIED REGISTERED

## 2018-09-14 PROCEDURE — 82962 GLUCOSE BLOOD TEST: CPT | Mod: PO | Performed by: ORTHOPAEDIC SURGERY

## 2018-09-14 PROCEDURE — 25000003 PHARM REV CODE 250: Mod: PO | Performed by: ORTHOPAEDIC SURGERY

## 2018-09-14 PROCEDURE — 71000033 HC RECOVERY, INTIAL HOUR: Mod: PO | Performed by: ORTHOPAEDIC SURGERY

## 2018-09-14 PROCEDURE — 64721 CARPAL TUNNEL SURGERY: CPT | Mod: RT,,, | Performed by: ORTHOPAEDIC SURGERY

## 2018-09-14 PROCEDURE — 25000003 PHARM REV CODE 250: Mod: PO | Performed by: NURSE ANESTHETIST, CERTIFIED REGISTERED

## 2018-09-14 PROCEDURE — 36000707: Mod: PO | Performed by: ORTHOPAEDIC SURGERY

## 2018-09-14 PROCEDURE — D9220A PRA ANESTHESIA: Mod: ANES,,, | Performed by: ANESTHESIOLOGY

## 2018-09-14 PROCEDURE — 37000008 HC ANESTHESIA 1ST 15 MINUTES: Mod: PO | Performed by: ORTHOPAEDIC SURGERY

## 2018-09-14 PROCEDURE — S0077 INJECTION, CLINDAMYCIN PHOSP: HCPCS | Mod: PO | Performed by: ORTHOPAEDIC SURGERY

## 2018-09-14 PROCEDURE — S0020 INJECTION, BUPIVICAINE HYDRO: HCPCS | Mod: PO | Performed by: ORTHOPAEDIC SURGERY

## 2018-09-14 PROCEDURE — 63600175 PHARM REV CODE 636 W HCPCS: Mod: PO | Performed by: NURSE ANESTHETIST, CERTIFIED REGISTERED

## 2018-09-14 PROCEDURE — 37000009 HC ANESTHESIA EA ADD 15 MINS: Mod: PO | Performed by: ORTHOPAEDIC SURGERY

## 2018-09-14 PROCEDURE — 36000706: Mod: PO | Performed by: ORTHOPAEDIC SURGERY

## 2018-09-14 RX ORDER — HYDROCODONE BITARTRATE AND ACETAMINOPHEN 10; 325 MG/1; MG/1
1 TABLET ORAL EVERY 6 HOURS PRN
Qty: 20 TABLET | Refills: 0 | Status: SHIPPED | OUTPATIENT
Start: 2018-09-14 | End: 2018-10-14

## 2018-09-14 RX ORDER — CLINDAMYCIN PHOSPHATE 900 MG/50ML
900 INJECTION, SOLUTION INTRAVENOUS
Status: COMPLETED | OUTPATIENT
Start: 2018-09-14 | End: 2018-09-14

## 2018-09-14 RX ORDER — FENTANYL CITRATE 50 UG/ML
25 INJECTION, SOLUTION INTRAMUSCULAR; INTRAVENOUS EVERY 5 MIN PRN
Status: DISCONTINUED | OUTPATIENT
Start: 2018-09-14 | End: 2018-09-14 | Stop reason: HOSPADM

## 2018-09-14 RX ORDER — PROPOFOL 10 MG/ML
VIAL (ML) INTRAVENOUS
Status: DISCONTINUED | OUTPATIENT
Start: 2018-09-14 | End: 2018-09-14

## 2018-09-14 RX ORDER — SODIUM CHLORIDE 0.9 % (FLUSH) 0.9 %
3 SYRINGE (ML) INJECTION
Status: DISCONTINUED | OUTPATIENT
Start: 2018-09-14 | End: 2018-09-14 | Stop reason: HOSPADM

## 2018-09-14 RX ORDER — LIDOCAINE HCL/PF 100 MG/5ML
SYRINGE (ML) INTRAVENOUS
Status: DISCONTINUED | OUTPATIENT
Start: 2018-09-14 | End: 2018-09-14

## 2018-09-14 RX ORDER — MUPIROCIN 20 MG/G
OINTMENT TOPICAL
Status: DISCONTINUED | OUTPATIENT
Start: 2018-09-14 | End: 2018-09-14 | Stop reason: HOSPADM

## 2018-09-14 RX ORDER — METOCLOPRAMIDE HYDROCHLORIDE 5 MG/ML
10 INJECTION INTRAMUSCULAR; INTRAVENOUS EVERY 10 MIN PRN
Status: DISCONTINUED | OUTPATIENT
Start: 2018-09-14 | End: 2018-09-14 | Stop reason: HOSPADM

## 2018-09-14 RX ORDER — SODIUM CHLORIDE 9 MG/ML
INJECTION, SOLUTION INTRAVENOUS CONTINUOUS
Status: DISCONTINUED | OUTPATIENT
Start: 2018-09-14 | End: 2018-09-14

## 2018-09-14 RX ORDER — KETAMINE HYDROCHLORIDE 100 MG/ML
INJECTION, SOLUTION INTRAMUSCULAR; INTRAVENOUS
Status: DISCONTINUED | OUTPATIENT
Start: 2018-09-14 | End: 2018-09-14

## 2018-09-14 RX ORDER — MIDAZOLAM HYDROCHLORIDE 1 MG/ML
INJECTION, SOLUTION INTRAMUSCULAR; INTRAVENOUS
Status: DISCONTINUED | OUTPATIENT
Start: 2018-09-14 | End: 2018-09-14

## 2018-09-14 RX ORDER — ONDANSETRON 2 MG/ML
INJECTION INTRAMUSCULAR; INTRAVENOUS
Status: DISCONTINUED | OUTPATIENT
Start: 2018-09-14 | End: 2018-09-14

## 2018-09-14 RX ORDER — ACETAMINOPHEN 10 MG/ML
INJECTION, SOLUTION INTRAVENOUS
Status: DISCONTINUED | OUTPATIENT
Start: 2018-09-14 | End: 2018-09-14

## 2018-09-14 RX ORDER — KETOROLAC TROMETHAMINE 30 MG/ML
INJECTION, SOLUTION INTRAMUSCULAR; INTRAVENOUS
Status: DISCONTINUED | OUTPATIENT
Start: 2018-09-14 | End: 2018-09-14

## 2018-09-14 RX ORDER — BUPIVACAINE HYDROCHLORIDE 5 MG/ML
INJECTION, SOLUTION EPIDURAL; INTRACAUDAL
Status: DISCONTINUED | OUTPATIENT
Start: 2018-09-14 | End: 2018-09-14 | Stop reason: HOSPADM

## 2018-09-14 RX ORDER — OXYCODONE HYDROCHLORIDE 5 MG/1
5 TABLET ORAL
Status: DISCONTINUED | OUTPATIENT
Start: 2018-09-14 | End: 2018-09-14 | Stop reason: HOSPADM

## 2018-09-14 RX ADMIN — KETOROLAC TROMETHAMINE 30 MG: 30 INJECTION, SOLUTION INTRAMUSCULAR; INTRAVENOUS at 07:09

## 2018-09-14 RX ADMIN — CLINDAMYCIN IN 5 PERCENT DEXTROSE 900 MG: 18 INJECTION, SOLUTION INTRAVENOUS at 06:09

## 2018-09-14 RX ADMIN — MUPIROCIN: 20 OINTMENT TOPICAL at 06:09

## 2018-09-14 RX ADMIN — MIDAZOLAM HYDROCHLORIDE 2 MG: 1 INJECTION, SOLUTION INTRAMUSCULAR; INTRAVENOUS at 06:09

## 2018-09-14 RX ADMIN — LIDOCAINE HYDROCHLORIDE 50 MG: 20 INJECTION PARENTERAL at 06:09

## 2018-09-14 RX ADMIN — SODIUM CHLORIDE, SODIUM LACTATE, POTASSIUM CHLORIDE, AND CALCIUM CHLORIDE 250 ML: .6; .31; .03; .02 INJECTION, SOLUTION INTRAVENOUS at 06:09

## 2018-09-14 RX ADMIN — SODIUM CHLORIDE, SODIUM LACTATE, POTASSIUM CHLORIDE, AND CALCIUM CHLORIDE 1000 ML: .6; .31; .03; .02 INJECTION, SOLUTION INTRAVENOUS at 06:09

## 2018-09-14 RX ADMIN — ONDANSETRON 4 MG: 2 INJECTION, SOLUTION INTRAMUSCULAR; INTRAVENOUS at 07:09

## 2018-09-14 RX ADMIN — ACETAMINOPHEN 1000 MG: 10 INJECTION, SOLUTION INTRAVENOUS at 07:09

## 2018-09-14 RX ADMIN — KETAMINE HYDROCHLORIDE 20 MG: 100 INJECTION, SOLUTION, CONCENTRATE INTRAMUSCULAR; INTRAVENOUS at 07:09

## 2018-09-14 RX ADMIN — PROPOFOL 250 MG: 10 INJECTION, EMULSION INTRAVENOUS at 06:09

## 2018-09-14 NOTE — ANESTHESIA PREPROCEDURE EVALUATION
09/14/2018  Aaron Garcia is a 57 y.o., female.    Anesthesia Evaluation    I have reviewed the Patient Summary Reports.    I have reviewed the Nursing Notes.   I have reviewed the Medications.     Review of Systems  Anesthesia Hx:  Denies Family Hx of Anesthesia complications.   Denies Personal Hx of Anesthesia complications.   Cardiovascular:   Hypertension    Pulmonary:   Asthma Sleep Apnea Claustrophobia- can't tolerate    Musculoskeletal:   Arthritis     Neurological:   Chronic Pain Syndrome   Endocrine:   Diabetes, type 2        Physical Exam  General:  Obesity    Airway/Jaw/Neck:  Airway Findings: Mouth Opening: Normal Tongue: Normal  General Airway Assessment: Adult  Mallampati: III  TM Distance: Normal, at least 6 cm  Jaw/Neck Findings:  Neck ROM: Normal ROM  Neck Findings:  Girth Increased      Dental:  Dental Findings: Upper Dentures   Chest/Lungs:  Chest/Lungs Clear    Heart/Vascular:  Heart Findings: Normal            Anesthesia Plan  Type of Anesthesia, risks & benefits discussed:  Anesthesia Type:  general  Patient's Preference:   Intra-op Monitoring Plan: standard ASA monitors  Intra-op Monitoring Plan Comments:   Post Op Pain Control Plan:   Post Op Pain Control Plan Comments:   Induction:   IV  Beta Blocker:  Patient is on a Beta-Blocker and has received one dose within the past 24 hours (No further documentation required).       Informed Consent: Patient understands risks and agrees with Anesthesia plan.  Questions answered. Anesthesia consent signed with patient.  ASA Score: 3     Day of Surgery Review of History & Physical:    H&P update referred to the surgeon.         Ready For Surgery From Anesthesia Perspective.

## 2018-09-14 NOTE — INTERVAL H&P NOTE
The patient has been examined and the H&P has been reviewed:    I concur with the findings and no changes have occurred since H&P was written.    Anesthesia/Surgery risks, benefits and alternative options discussed and understood by patient/family.          Active Hospital Problems    Diagnosis  POA    Carpal tunnel syndrome of right wrist [G56.01]  Yes      Resolved Hospital Problems   No resolved problems to display.

## 2018-09-14 NOTE — OP NOTE
[facility]  Orthopedic Surgery  Operative Note    SUMMARY     Date of Procedure: 9/14/2018     Procedure: Procedure(s) (LRB):  RELEASE, CARPAL TUNNEL (Right)       Surgeon(s) and Role:     * Anurag Mathis MD - Primary    Assistant: Mitch Wolfe    Pre-Operative Diagnosis: Carpal tunnel syndrome of right wrist [G56.01]    Post-Operative Diagnosis: Post-Op Diagnosis Codes:     * Carpal tunnel syndrome of right wrist [G56.01]    Anesthesia: General    Complications: No    Estimated Blood Loss (EBL): 3mL    Tourniquet time: 3min at 250mmHg           Implants: * No implants in log *    Specimens:   Specimen (12h ago, onward)    None                  Condition: Good    Disposition: PACU - hemodynamically stable.    Attestation: I was present and scrubbed for the entire procedure.    Indications for the procedure: This is a 57 -year-old female with a history of carpal tunnel syndrome.  Patient had an EMG and nerve conduction velocity that confirmed the diagnosis.  Patient has failed conservative measures and wished to proceed with surgery.    Procedure IN DETAIL: The risks, benefits and alternatives of the procedure were   explained to the patient including, but not limited to damage to nerves, arteries   and blood vessels. I also explained risk of infection, pillar pain, possible   continued pain due to permanent injury to the nerve as well as recurrence and   general anesthetic complications including seizure, stroke, heart attack and   death. Pt understood this and signed informed consent. The patient's  Right  Hand was marked prior to coming to the operating room. Once there, a formal   time out was done in which the correct patient, procedure, and op site were all   correctly identified and confirmed by the entire operating team,2g of   Ancef was given prior to surgical incision. General anesthesia was induced. The Right  upper extremity was prepped and draped in normal sterile  fashion. Arm was exsanguinated and tourniquet inflated up to 250 mmHg.   Standard carpal tunnel incision was made just ulnar to the central palmar   crease. This was taken down through skin and soft tissue until the transverse   carpal ligament was identified. Under loupe magnification the transverse carpal   ligament was incised proximally, once a little gap was made in the carpal   tunnel, a hemostat was used to slip underneath the ligament and under direct   visualization, the ligament was sharply incised directly over the median nerve.   Blunt dissection was then used to release the tissue distally. Once it was   completely free we then dorsiflexed the wrist and under direct visualization   released the rest of the proximal portion of the transverse carpal ligament.   The nerve was then inspected and seemed to be intact. There were no masses in the carpal tunnel under the nerve or flexor tendons. Hemostat was used to   spread both proximally and distally and there were no residual adhesions. We   then proceeded with closing. The wound was copiously irrigated using normal   saline with  irrigant. Tourniquet was released. Meticulous hemostasis was   obtained with the bipolar cautery. We then closed the skin using a 3-0 nylon in   a simple interrupted fashion. A sterile dressing was applied including   Adaptic, 4 x 4s, Kerlex as well as an Coban. The patient was   awakened, transferred from the operating room to the recovery room in stable   condition. Postop course will be for a carpal tunnel release.

## 2018-09-14 NOTE — TRANSFER OF CARE
Anesthesia Transfer of Care Note    Patient: Aaron Garcia    Procedure(s) Performed: Procedure(s) (LRB):  RELEASE, CARPAL TUNNEL (Right)    Patient location: PACU    Anesthesia Type: general    Transport from OR: Transported from OR on room air with adequate spontaneous ventilation    Post pain: adequate analgesia    Post assessment: no apparent anesthetic complications    Post vital signs: stable    Level of consciousness: sedated    Nausea/Vomiting: no nausea/vomiting    Complications: none    Transfer of care protocol was followed      Last vitals:   Visit Vitals  BP (!) 104/59   Pulse 65   Temp 36.2 °C (97.2 °F) (Skin)   Resp 12   LMP 03/13/2017   SpO2 (!) 94%

## 2018-09-14 NOTE — PLAN OF CARE
Patient tolerating oral liquids without difficulty. No apparent s&s of distress noted at this time, no complaints voiced at this time. Dressing C,D, intact to right hand.  Discharge instructions reviewed with patient/family/friend with good verbal feedback received. Patient ready for discharge

## 2018-09-14 NOTE — DISCHARGE INSTRUCTIONS
POST-OPERATIVE DISCHARGE INSTRUCTIONS  CARPAL TUNNEL RELEASE  Overview:  Although night time symptoms are often relieved immediately, other symptoms, such as constant numbness, weakness or clumsiness are due to nerve damage and may not be completely relieved. These resolve very gradually, and recovery may be incomplete. Maximum improvement may take 6 to 12 months. Occasionally, the numbness may be more obvious after surgery because the pain and tingling are improved.   Gentle exercise and light use of the hand are encouraged beginning the day after surgery. Tenderness around the scar usually lasts for 8-12 weeks after surgery. This may take 6 months to resolve completely.  You may not be able to return to all activities at home or at work immediately after surgery and activity modification and work modification may be necessary. A cast or splint may be helpful if strenuous activity is unavoidable.   Range of motion of the fingers is important after surgery and encouraged. It helps the nerve to glide so adhesions dont form. However, simultaneous wrist and finger flexion should be avoided for at least 3 weeks after surgery.       As the wound heals the scar tissue shrinks and matures. This results in adhesions that pull on the median nerve and often result in brief shooting or electrical pains with motion. This commonly happens when you stretch your hand out to reach an item at arm's length. Sudden shooting or electrical shock pains may also occur spontaneously while you are doing nothing. Both of these are normal and improve with time.   Scar formation results in a lump at the base of the palm. This is noticeable when you lean on the hand or push off. This is normal and improves with time and massage. Edema (swelling) control, scar massage and desensitization are initiated when the incision is healed. Scar massage can be started approximately 2 weeks after surgery if the wound has healed. Use a non-perfumed lotion  (Vitamin E lotion is OK). Gentle but firm pressure is applied to the wound and massaged in a circular fashion. This can be performed for a few minutes 5 times a day at first and over time less frequently as the tenderness improves.  strength is usually weak for 2 to 3 months following surgery. Full recovery is expected but may take 6 months.   You can resume light activities within your own tolerance, including driving, as soon as you feel comfortable enough to do so. Please use common sense and avoid activities that hurt. Sutures are removed approximately 2 weeks after surgery.      AFTER HAND SURGERY    DOS:   Keep affected wrist elevated above the level of your heart   Check circulation frequently in fingers by pressing on the nail bed. Nail bed should turn white and then pink when released.   Exercise fingers to promote circulation.   Advance diet as tolerated   Resume home medications ________________________    Carpal Tunnel/Trigger Finger Release  Remove dressing in two days.    DONT:   No driving for 24 hours or while taking narcotic pain medication   DO NOT TAKE ADDITIONAL TYLENOL/ACETAMINOPHEN WHILE TAKING NARCOTIC PAIN MEDICATION THAT CONTAINS TYLENOL/ACETAMINOPHEN.    CALL PHYSICIAN FOR:   Obvious bleeding   Excessive swelling   Drainage (pus) from the wound   Pain unrelieved by pain medication.  Make return appointment for _____2 weeks___________________________  FOR EMERGENCIES:  Contact  _______Del______________ at [phone]      Discharge Instructions: After Your Surgery  Youve just had surgery. During surgery, you were given medicine called anesthesia to keep you relaxed and free of pain. After surgery, you may have some pain or nausea. This is common. Here are some tips for feeling better and getting well after surgery.     Stay on schedule with your medicine.   Going home  Your healthcare provider will show you how to take care of yourself when you go home. He or she  will also answer your questions. Have an adult family member or friend drive you home. For the first 24 hours after your surgery:  · Do not drive or use heavy equipment.  · Do not make important decisions or sign legal papers.  · Do not drink alcohol.  · Have someone stay with you, if needed. He or she can watch for problems and help keep you safe.  Be sure to go to all follow-up visits with your healthcare provider. And rest after your surgery for as long as your healthcare provider tells you to.  Coping with pain  If you have pain after surgery, pain medicine will help you feel better. Take it as told, before pain becomes severe. Also, ask your healthcare provider or pharmacist about other ways to control pain. This might be with heat, ice, or relaxation. And follow any other instructions your surgeon or nurse gives you.  Tips for taking pain medicine  To get the best relief possible, remember these points:  · Pain medicines can upset your stomach. Taking them with a little food may help.  · Most pain relievers taken by mouth need at least 20 to 30 minutes to start to work.  · Taking medicine on a schedule can help you remember to take it. Try to time your medicine so that you can take it before starting an activity. This might be before you get dressed, go for a walk, or sit down for dinner.  · Constipation is a common side effect of pain medicines. Call your healthcare provider before taking any medicines such as laxatives or stool softeners to help ease constipation. Also ask if you should skip any foods. Drinking lots of fluids and eating foods such as fruits and vegetables that are high in fiber can also help. Remember, do not take laxatives unless your surgeon has prescribed them.  · Drinking alcohol and taking pain medicine can cause dizziness and slow your breathing. It can even be deadly. Do not drink alcohol while taking pain medicine.  · Pain medicine can make you react more slowly to things. Do not  drive or run machinery while taking pain medicine.  Your healthcare provider may tell you to take acetaminophen to help ease your pain. Ask him or her how much you are supposed to take each day. Acetaminophen or other pain relievers may interact with your prescription medicines or other over-the-counter (OTC) medicines. Some prescription medicines have acetaminophen and other ingredients. Using both prescription and OTC acetaminophen for pain can cause you to overdose. Read the labels on your OTC medicines with care. This will help you to clearly know the list of ingredients, how much to take, and any warnings. It may also help you not take too much acetaminophen. If you have questions or do not understand the information, ask your pharmacist or healthcare provider to explain it to you before you take the OTC medicine.  Managing nausea  Some people have an upset stomach after surgery. This is often because of anesthesia, pain, or pain medicine, or the stress of surgery. These tips will help you handle nausea and eat healthy foods as you get better. If you were on a special food plan before surgery, ask your healthcare provider if you should follow it while you get better. These tips may help:  · Do not push yourself to eat. Your body will tell you when to eat and how much.  · Start off with clear liquids and soup. They are easier to digest.  · Next try semi-solid foods, such as mashed potatoes, applesauce, and gelatin, as you feel ready.  · Slowly move to solid foods. Dont eat fatty, rich, or spicy foods at first.  · Do not force yourself to have 3 large meals a day. Instead eat smaller amounts more often.  · Take pain medicines with a small amount of solid food, such as crackers or toast, to avoid nausea.     Call your surgeon if  · You still have pain an hour after taking medicine. The medicine may not be strong enough.  · You feel too sleepy, dizzy, or groggy. The medicine may be too strong.  · You have side  effects like nausea, vomiting, or skin changes, such as rash, itching, or hives.       If you have obstructive sleep apnea  You were given anesthesia medicine during surgery to keep you comfortable and free of pain. After surgery, you may have more apnea spells because of this medicine and other medicines you were given. The spells may last longer than usual.   At home:  · Keep using the continuous positive airway pressure (CPAP) device when you sleep. Unless your healthcare provider tells you not to, use it when you sleep, day or night. CPAP is a common device used to treat obstructive sleep apnea.  · Talk with your provider before taking any pain medicine, muscle relaxants, or sedatives. Your provider will tell you about the possible dangers of taking these medicines.  Date Last Reviewed: 12/1/2016  © 7360-2515 [employer]. [address] [place] [address], [place] [address]. All rights reserved. This information is not intended as a substitute for professional medical care. Always follow your healthcare professional's instructions.

## 2018-09-14 NOTE — DISCHARGE SUMMARY
[facility]  Discharge Note  Short Stay    Admit Date: 9/14/2018    Discharge Date and Time: 9/14/2018    Attending Physician: Anurag Mathis MD     Discharge Provider: Anurag Mathis    Diagnoses:  Active Hospital Problems    Diagnosis  POA    *Carpal tunnel syndrome of right wrist [G56.01]  Yes      Resolved Hospital Problems   No resolved problems to display.       Discharged Condition: good    Hospital Course: Patient was admitted for an outpatient procedure and tolerated the procedure well with no complications.    Final Diagnoses: Same as principal problem.    Disposition: Home or Self Care    Follow up/Patient Instructions:    Medications:  Reconciled Home Medications:      Medication List      CHANGE how you take these medications    HYDROcodone-acetaminophen  mg per tablet  Commonly known as:  NORCO  Take 1 tablet by mouth every 6 (six) hours as needed.  What changed:  when to take this        CONTINUE taking these medications    * amitriptyline 25 MG tablet  Commonly known as:  ELAVIL  Take 25 mg by mouth nightly as needed for Insomnia.     * amitriptyline 25 MG tablet  Commonly known as:  ELAVIL  Take 25 mg by mouth.     amLODIPine 10 MG tablet  Commonly known as:  NORVASC  10 mg.     benazepril 40 MG tablet  Commonly known as:  LOTENSIN  Take 40 mg by mouth once daily.     benzonatate 100 MG capsule  Commonly known as:  TESSALON  Take 100 mg by mouth.     desloratadine 5 mg tablet  Commonly known as:  CLARINEX  Take 5 mg by mouth.     gabapentin 600 MG tablet  Commonly known as:  NEURONTIN  Take 600 mg by mouth 2 (two) times daily.     * GLIPIZIDE ORAL  Take 5 mg by mouth 2 (two) times daily.     * glipiZIDE 5 MG tablet  Commonly known as:  GLUCOTROL  Take 5 mg by mouth.     LANTUS U-100 INSULIN 100 unit/mL injection  Generic drug:  insulin glargine  Inject 45 Units into the skin every evening.     meloxicam 15 MG tablet  Commonly known as:  MOBIC  TAKE  ONE TABLET  BY MOUTH DAILY     mometasone 50 mcg/actuation nasal spray  Commonly known as:  NASONEX     montelukast 10 mg tablet  Commonly known as:  SINGULAIR  Take 10 mg by mouth.     nebivolol 10 MG Tab  Commonly known as:  BYSTOLIC  Take 10 mg by mouth.     PROAIR HFA INHL  Inhale into the lungs.     ranitidine 300 MG tablet  Commonly known as:  ZANTAC  Take 300 mg by mouth.     rosuvastatin 10 MG tablet  Commonly known as:  CRESTOR  Take 10 mg by mouth once daily.     SYMBICORT 160-4.5 mcg/actuation Hfaa  Generic drug:  budesonide-formoterol 160-4.5 mcg     tiZANidine 2 mg Cap  Take 1 capsule (2 mg total) by mouth 3 (three) times daily as needed.     TRUE METRIX GLUCOSE TEST STRIP Strp  Generic drug:  blood sugar diagnostic         * This list has 4 medication(s) that are the same as other medications prescribed for you. Read the directions carefully, and ask your doctor or other care provider to review them with you.              Discharge Procedure Orders   Call MD for:  temperature >100.4     Call MD for:  severe uncontrolled pain     Call MD for:  redness, tenderness, or signs of infection (pain, swelling, redness, odor or green/yellow discharge around incision site)     Remove dressing in 48 hours         Discharge Procedure Orders (must include Diet, Follow-up, Activity):   Discharge Procedure Orders (must include Diet, Follow-up, Activity)   Call MD for:  temperature >100.4     Call MD for:  severe uncontrolled pain     Call MD for:  redness, tenderness, or signs of infection (pain, swelling, redness, odor or green/yellow discharge around incision site)     Remove dressing in 48 hours

## 2018-09-14 NOTE — ANESTHESIA POSTPROCEDURE EVALUATION
Anesthesia Post Evaluation    Patient: Aaron Garcia    Procedure(s) Performed: Procedure(s) (LRB):  RELEASE, CARPAL TUNNEL (Right)    Final Anesthesia Type: general  Patient location during evaluation: PACU  Patient participation: Yes- Able to Participate  Level of consciousness: awake and alert  Post-procedure vital signs: reviewed and stable  Pain management: adequate  Airway patency: patent  PONV status at discharge: No PONV  Anesthetic complications: no      Cardiovascular status: blood pressure returned to baseline  Respiratory status: unassisted  Hydration status: euvolemic  Follow-up not needed.        Visit Vitals  /68   Pulse 71   Temp 36.2 °C (97.2 °F) (Skin)   Resp 12   LMP 03/13/2017   SpO2 100%       Pain/Ml Score: Pain Assessment Performed: Yes (9/14/2018  7:25 AM)  Presence of Pain: denies (9/14/2018  8:15 AM)  Ml Score: 10 (9/14/2018  8:15 AM)

## 2018-09-26 ENCOUNTER — OFFICE VISIT (OUTPATIENT)
Dept: ORTHOPEDICS | Facility: CLINIC | Age: 58
End: 2018-09-26
Payer: COMMERCIAL

## 2018-09-26 VITALS
BODY MASS INDEX: 31.34 KG/M2 | HEIGHT: 66 IN | WEIGHT: 195 LBS | DIASTOLIC BLOOD PRESSURE: 74 MMHG | SYSTOLIC BLOOD PRESSURE: 152 MMHG | HEART RATE: 78 BPM

## 2018-09-26 DIAGNOSIS — G56.01 CARPAL TUNNEL SYNDROME OF RIGHT WRIST: Primary | ICD-10-CM

## 2018-09-26 PROCEDURE — 99999 PR PBB SHADOW E&M-EST. PATIENT-LVL III: CPT | Mod: PBBFAC,,, | Performed by: ORTHOPAEDIC SURGERY

## 2018-09-26 PROCEDURE — 99024 POSTOP FOLLOW-UP VISIT: CPT | Mod: S$GLB,,, | Performed by: ORTHOPAEDIC SURGERY

## 2018-09-26 NOTE — PROGRESS NOTES
Past Medical History:   Diagnosis Date    Arthritis     Asthma     Diabetes mellitus, type 2     Hypertension     Mitral valve prolapse     RYLAN (obstructive sleep apnea)     Snores     never had sleep study, unable to lay flat, sleeps elevated    Thyroid disease     Goiter       Past Surgical History:   Procedure Laterality Date    BLOCK-NERVE-MEDIAL BRANCH-CERVICAL C 3,4,5,6 Right 2016    Performed by Erik Galan MD at [facility] OR    CARPAL TUNNEL RELEASE Left     CARPAL TUNNEL RELEASE Right 2018    Procedure: RELEASE, CARPAL TUNNEL;  Surgeon: Anurag Mathis MD;  Location: [facility] OR;  Service: Orthopedics;  Laterality: Right;     SECTION      CHOLECYSTECTOMY      DILATION AND CURETTAGE OF UTERUS      Epidural steroid Injection      Pain management, cervical    INJECTION-STEROID-EPIDURAL-CERVICAL N/A 3/14/2018    Performed by [facility] at [facility] OR    INJECTION-STEROID-EPIDURAL-CERVICAL N/A 2016    Performed by Erik [facility]an [facility] at [facility] OR    INJECTION-STEROID-EPIDURAL-CERVICAL N/A 3/1/2016    Performed by [facility] at [facility] OR    INJECTION-STEROID-EPIDURAL-CERVICAL N/A 2016    Performed by Erik Galan MD at [facility] OR    INJECTION-STEROID-EPIDURAL-LUMBAR N/A 2017    Performed by [facility] at [facility] OR    INJECTION-STEROID-EPIDURAL-LUMBAR N/A 2017    Performed by [facility]an [facility] at [facility] OR    RADIOFREQUENCY THERMOCOAGULATION (RFTC)-NERVE-MEDIAN BRANCH-CERVICAL C3,4,5,6 Right 7/15/2016    Performed by Erik [facility]an [facility] at [facility] OR    RELEASE, CARPAL TUNNEL Right 2018    Performed by [facility]urag Mathis MD at [facility] OR    ulcer on tongue removed      WOUND DEBRIDEMENT Left     leg at ankle level       Current Outpatient Medications   Medication Sig    ALBUTEROL SULFATE (PROAIR HFA INHL) Inhale into the lungs.    amitriptyline (ELAVIL) 25 MG tablet Take 25 mg by mouth nightly as  needed for Insomnia.    amitriptyline (ELAVIL) 25 MG tablet Take 25 mg by mouth.    amlodipine (NORVASC) 10 MG tablet 10 mg.     benazepril (LOTENSIN) 40 MG tablet Take 40 mg by mouth once daily.    benzonatate (TESSALON) 100 MG capsule Take 100 mg by mouth.    desloratadine (CLARINEX) 5 mg tablet Take 5 mg by mouth.    gabapentin (NEURONTIN) 600 MG tablet Take 600 mg by mouth 2 (two) times daily.    glipiZIDE (GLUCOTROL) 5 MG tablet Take 5 mg by mouth.    GLIPIZIDE ORAL Take 5 mg by mouth 2 (two) times daily.     HYDROcodone-acetaminophen (NORCO)  mg per tablet Take 1 tablet by mouth every 6 (six) hours as needed.    insulin glargine (LANTUS) 100 unit/mL injection Inject 45 Units into the skin every evening.    meloxicam (MOBIC) 15 MG tablet TAKE  ONE TABLET BY MOUTH DAILY    mometasone (NASONEX) 50 mcg/actuation nasal spray     montelukast (SINGULAIR) 10 mg tablet Take 10 mg by mouth.    nebivolol (BYSTOLIC) 10 MG Tab Take 10 mg by mouth.    ranitidine (ZANTAC) 300 MG tablet Take 300 mg by mouth.    rosuvastatin (CRESTOR) 10 MG tablet Take 10 mg by mouth once daily.    SYMBICORT 160-4.5 mcg/actuation HFAA     tiZANidine 2 mg Cap Take 1 capsule (2 mg total) by mouth 3 (three) times daily as needed.    TRUE METRIX GLUCOSE TEST STRIP Strp      No current facility-administered medications for this visit.        Review of patient's allergies indicates:   Allergen Reactions    Pcn [penicillins] Itching       Family History   Problem Relation Age of Onset    Diabetes Father     Diabetes Sister     Heart disease Brother        Social History     Socioeconomic History    Marital status:      Spouse name: Not on file    Number of children: Not on file    Years of education: Not on file    Highest education level: Not on file   Social Needs    Financial resource strain: Not on file    Food insecurity - worry: Not on file    Food insecurity - inability: Not on file    Transportation  needs - medical: Not on file    Transportation needs - non-medical: Not on file   Occupational History    Not on file   Tobacco Use    Smoking status: Never Smoker    Smokeless tobacco: Never Used   Substance and Sexual Activity    Alcohol use: No     Alcohol/week: 0.0 oz    Drug use: Yes     Types: Hydrocodone    Sexual activity: Yes     Partners: Male   Other Topics Concern    Not on file   Social History Narrative    Not on file       Chief Complaint:   Chief Complaint   Patient presents with    Hand Pain     R hand s/p CTR 9/14/18       Date of surgery:  September 14, 2018    History of present illness:  57-year-old female who underwent right carpal tunnel release.  Patient is doing well. Pain is a 2/10.  Numbness and tingling in the ulnar distribution is improved but still has some in the median distribution but not as severe.  No wound issues      Review of Systems:    Musculoskeletal:  See HPI        Physical Examination:    Vital Signs:    Vitals:    09/26/18 1306   BP: (!) 152/74   Pulse: 78       Body mass index is 31.47 kg/m².    This a well-developed, well nourished patient in no acute distress.  They are alert and oriented and cooperative to examination.  Pt. walks without an antalgic gait.      Examination of the right hand shows well-healing surgical incision.  No erythema or drainage.  Full range of motion.    X-rays:  None     Assessment::  Status post right carpal tunnel Release    Plan:  I took out the stitches and placed some Steri-Strips.  Placed her in Gel-Foam brace.  I will check her wound again in 2 weeks.    This note was created using [employer] voice recognition software that occasionally misinterpreted phrases or words.

## 2018-10-03 ENCOUNTER — TELEPHONE (OUTPATIENT)
Dept: PAIN MEDICINE | Facility: CLINIC | Age: 58
End: 2018-10-03

## 2018-10-03 NOTE — TELEPHONE ENCOUNTER
----- Message from Debra Robles sent at 10/3/2018 12:52 PM CDT -----  Type: Needs Medication ordered    Who Called:  Patient  Best Call Back Number: [phone] or [phone]  Additional Information: Requesting to speak with nurse concerning medication: Norco/stated was deleted in system and unable to refill in system/please call patient back to advise.

## 2018-10-04 ENCOUNTER — TELEPHONE (OUTPATIENT)
Dept: PAIN MEDICINE | Facility: CLINIC | Age: 58
End: 2018-10-04

## 2018-10-04 NOTE — TELEPHONE ENCOUNTER
----- Message from Terrie Schwab sent at 10/4/2018  3:54 PM CDT -----  Contact: patient  Type: Needs Medical Advice    Who Called:  patient  Symptoms (please be specific):    How long has patient had these symptoms:    Pharmacy name and phone #:    Best Call Back Number: [phone]  Additional Information: called regarding pain medication.requesting a call back

## 2018-10-04 NOTE — TELEPHONE ENCOUNTER
Patient needs a refill on her pain medication. She stated she has a printed prescription from Dr. Mathis dated 9/14. She stated she has not filled this because you typically prescribe her pain medication. Please advise on this.

## 2018-10-05 ENCOUNTER — PATIENT MESSAGE (OUTPATIENT)
Dept: PAIN MEDICINE | Facility: CLINIC | Age: 58
End: 2018-10-05

## 2018-10-05 RX ORDER — HYDROCODONE BITARTRATE AND ACETAMINOPHEN 10; 325 MG/1; MG/1
1 TABLET ORAL 3 TIMES DAILY PRN
Qty: 90 TABLET | Refills: 0 | Status: SHIPPED | OUTPATIENT
Start: 2018-10-05 | End: 2018-10-26 | Stop reason: SDUPTHER

## 2018-10-26 ENCOUNTER — OFFICE VISIT (OUTPATIENT)
Dept: PAIN MEDICINE | Facility: CLINIC | Age: 58
End: 2018-10-26
Payer: COMMERCIAL

## 2018-10-26 VITALS
DIASTOLIC BLOOD PRESSURE: 60 MMHG | HEART RATE: 81 BPM | SYSTOLIC BLOOD PRESSURE: 129 MMHG | BODY MASS INDEX: 33.06 KG/M2 | TEMPERATURE: 97 F | OXYGEN SATURATION: 99 % | RESPIRATION RATE: 18 BRPM | WEIGHT: 204.81 LBS

## 2018-10-26 DIAGNOSIS — Z79.891 OPIOID CONTRACT EXISTS: ICD-10-CM

## 2018-10-26 DIAGNOSIS — M48.02 CERVICAL SPINAL STENOSIS: Primary | ICD-10-CM

## 2018-10-26 DIAGNOSIS — M51.36 DDD (DEGENERATIVE DISC DISEASE), LUMBAR: ICD-10-CM

## 2018-10-26 DIAGNOSIS — M50.30 DDD (DEGENERATIVE DISC DISEASE), CERVICAL: ICD-10-CM

## 2018-10-26 PROCEDURE — 99213 OFFICE O/P EST LOW 20 MIN: CPT | Mod: S$GLB,,, | Performed by: PHYSICIAN ASSISTANT

## 2018-10-26 PROCEDURE — 99999 PR PBB SHADOW E&M-EST. PATIENT-LVL V: CPT | Mod: PBBFAC,,, | Performed by: PHYSICIAN ASSISTANT

## 2018-10-26 PROCEDURE — 3008F BODY MASS INDEX DOCD: CPT | Mod: CPTII,S$GLB,, | Performed by: PHYSICIAN ASSISTANT

## 2018-10-26 RX ORDER — HYDROCODONE BITARTRATE AND ACETAMINOPHEN 10; 325 MG/1; MG/1
1 TABLET ORAL 3 TIMES DAILY PRN
Qty: 90 TABLET | Refills: 0 | Status: SHIPPED | OUTPATIENT
Start: 2018-11-04 | End: 2018-12-04

## 2018-10-26 RX ORDER — HYDROCODONE BITARTRATE AND ACETAMINOPHEN 10; 325 MG/1; MG/1
1 TABLET ORAL 3 TIMES DAILY PRN
Qty: 90 TABLET | Refills: 0 | Status: SHIPPED | OUTPATIENT
Start: 2018-12-04 | End: 2019-01-03

## 2018-10-26 RX ORDER — HYDROCODONE BITARTRATE AND ACETAMINOPHEN 10; 325 MG/1; MG/1
1 TABLET ORAL 3 TIMES DAILY PRN
Qty: 90 TABLET | Refills: 0 | Status: SHIPPED | OUTPATIENT
Start: 2019-01-03 | End: 2018-12-05 | Stop reason: SDUPTHER

## 2018-10-26 NOTE — PROGRESS NOTES
This note was completed with dictation software and grammatical errors may exist.    CC: Neck pain, arm pain, back pain and leg pain    HPI: The patient is a 57-year-old woman with a history of diabetes, hypertension who presents in referral from [facility] for neck pain radiating into the arms and low back pain radiating into the left leg.  She returns in follow-up today with neck pain and back pain. Since her last visit, she underwent right carpal tunnel surgery and also had a septoplasty.  She is recovering from these 2 surgeries and continues to have her same right neck pain radiating into her right arm and low back pain radiating to the right lateral thigh and lateral shin.  Her pain is worse with activity, improved with medication and rest.  She reports having right lateral thigh numbness and intermittent weakness in her right leg.  She denies bladder or bowel incontinence.    Pain intervention history: She has been taking hydrocodone 10/325 3 times a day for the last year, previous to this she was taking Percocet 3 times a day but states that it was causing her panic attacks, did help with her pain slightly better.  She has been taking gabapentin 600mg twice a day and Zanaflex at night with some relief.  She had undergone 3 epidurals for low back pain with only up to 3 weeks of relief each time.  She is status post C7-T1 cervical interlaminar epidural steroid injection on 1/29/16 with 50% relief.   She is status post C7-T1 cervical interlaminar epidural steroid injection on 3/1/16 with mild additional relief.  She is status post C7-T1 cervical interlaminar epidural steroid injection on 5/13/16 with 70-75% relief.  She is status post right C3, 4, 5 and 6 medial branch radiofrequency ablation on 7/15/16 with 30-60% relief.  She is status post L5/S1 interlaminar epidural steroid injection on 5/16/17 with 50% relief.  She is status post L5/S1 interlaminar epidural steroid injection on  12/29/17 with excellent relief lasting 2 weeks, now reporting minimal relief of her low back and buttock pain but ongoing 100% relief of her right leg pain.  She is status post C7-T1 cervical interlaminar epidural steroid injection on 3/14/18 with 75% relief.     Urine drug screen 1/5/16  Negative and inconsistent for hydrocodone but positive and consistent for its metabolite hydromorphone    Urine drug screen 2/11/16  Negative and inconsistent for hydrocodone    ROS: She reports headaches, hoarse voice, joint stiffness, joint swelling, back pain, difficulty sleeping, anxiety and loss of balance.  Balance of review of systems is negative.    Medical, surgical, family and social history reviewed elsewhere in record.    Medications/Allergies: See med card    Vitals:    10/26/18 0909   BP: 129/60   Pulse: 81   Resp: 18   Temp: 97.4 °F (36.3 °C)   TempSrc: Oral   SpO2: 99%   Weight: 92.9 kg (204 lb 12.9 oz)   PainSc:   4   PainLoc: Neck         Physical exam:  Gen: A and O x3, pleasant, well-groomed  Skin: No rashes or obvious lesions  HEENT: PERRLA, no obvious deformities on ears or in canals.Trachea midline.  CVS: Regular rate and rhythm, normal palpable pulses.  Resp: Clear to auscultation bilaterally, no wheezes or rales.  Abdomen: Soft, NT/ND.  Musculoskeletal: No antalgic gait.     Neuro:  Upper extremities: 5/5 strength bilaterally  Lower extremities: 5/5 strength left leg, 4/5 strength right leg  Reflexes: Brachioradialis 2+, Bicep 2+, Tricep 2+.  Patellar and Achilles reflexes 2+ bilaterally.  Sensory: Intact and symmetrical to light touch and pinprick in C2-T1 dermatomes bilaterally.    Cervical Spine:  Cervical spine: Range of motion is mildly reduced with flexion extension and lateral rotation with increased bilateral neck pain.  Spurling's maneuver causes neck pain to either side.    Myofascial exam: No tenderness to palpation to the cervical paraspinous muscles.    Lumbar spine:  Range of motion is  moderately limited with flexion and extension with mild increased pain in the right buttock and right greater than left low back during each maneuver.  Khanh's test is negative bilaterally.  Straight leg raise causes right buttock pain.  Internal and external rotation of hip is negative bilaterally.  Myofascial exam: No tenderness palpation to the lumbar paraspinous muscles.    Imagin/24/15 C-spine MRI  1. C3-4 left posterior paracentral disk extrusion causing left paracentral spinal cord compression and severe left foraminal stenosis.  2. C4-5 based disk extrusion with spinal cord impingement and moderate bilateral foraminal stenosis.  3. C5-6 posterior central disk extrusion with spinal cord compression and severe bilateral foraminal stenosis.  4. C6-7 mild disk bulge with severe bilateral foraminal stenosis.  5. Solitary mildly enlarged left submandibular lymph node of uncertain significance.    4/10/17 MRI lumbar spine  T12-L1: No central canal or neuroforaminal stenosis. No disc protrusion or extrusion.  L1-L2: There is ligamentum flavum thickening and facet arthropathy.  No central canal or neuroforaminal stenosis. No disc protrusion or extrusion.  L2-L3: There is ligamentum flavum thickening and facet arthropathy.  No central canal or neuroforaminal stenosis. No disc protrusion or extrusion.  L3-L4: There is ligamentum flavum thickening and facet arthropathy present. No central canal or neuroforaminal stenosis. No disc protrusion or extrusion.  L4-L5: There is a broad disc bulge which extends into both neural foramina.  There is ligamentum flavum thickening and facet arthropathy.  There is right lateral recess stenosis.  There is abutment of the descending right L5 nerve in the right lateral recess.  Please correlate clinically for symptoms referable to the right L5 nerve.  There is mild central canal stenosis.  There is mild left neuroforaminal stenosis.  No right neuroforaminal stenosis.  L5-S1:  There is a broad central/right paracentral disc protrusion which encroaches upon the descending right S1 nerve in the right lateral recess.  Please correlate clinically for symptoms referable to the right S1 nerve.  There is mild-moderate right neuroforaminal stenosis.  No left neuroforaminal stenosis.  No central canal stenosis.  There is ligamentum flavum thickening and facet arthropathy.  There is a broad left extraforaminal disc protrusion at L5-S1 which abuts the exited left L5 nerve in the left extraforaminal soft tissues (series 6 image 17), nonspecific.  Please correlate clinically for symptoms referable to the left L5 nerve.      Assessment:  The patient is a 57-year-old woman with a history of diabetes, hypertension who presents in referral from [facility] for neck pain radiating into the arms and low back pain radiating into the left leg.     1. Cervical spinal stenosis     2. DDD (degenerative disc disease), cervical     3. DDD (degenerative disc disease), lumbar     4. Opioid contract exists           Plan:  1.  Dr. Galan provided prescriptions for hydrocodone-acetaminophen 10/325 mg up to 3 times a day as needed for pain.  I have reviewed the [place] Board of Pharmacy website and there are no abberancies.  She received Percocet following her septoplasty.  2.  Once she recovers from her surgeries, she may call to update her cervical spine MRI and see Neurosurgery.  3.  Follow-up in 3 months or sooner as needed.

## 2018-10-31 ENCOUNTER — OFFICE VISIT (OUTPATIENT)
Dept: ORTHOPEDICS | Facility: CLINIC | Age: 58
End: 2018-10-31
Payer: COMMERCIAL

## 2018-10-31 VITALS
BODY MASS INDEX: 32.92 KG/M2 | DIASTOLIC BLOOD PRESSURE: 76 MMHG | HEART RATE: 66 BPM | SYSTOLIC BLOOD PRESSURE: 119 MMHG | WEIGHT: 204.81 LBS | HEIGHT: 66 IN

## 2018-10-31 DIAGNOSIS — G56.01 CARPAL TUNNEL SYNDROME OF RIGHT WRIST: Primary | ICD-10-CM

## 2018-10-31 PROCEDURE — 99024 POSTOP FOLLOW-UP VISIT: CPT | Mod: S$GLB,,, | Performed by: ORTHOPAEDIC SURGERY

## 2018-10-31 PROCEDURE — 99999 PR PBB SHADOW E&M-EST. PATIENT-LVL III: CPT | Mod: PBBFAC,,, | Performed by: ORTHOPAEDIC SURGERY

## 2018-10-31 NOTE — PROGRESS NOTES
Past Medical History:   Diagnosis Date    Arthritis     Asthma     Diabetes mellitus, type 2     Hypertension     Mitral valve prolapse     RYLAN (obstructive sleep apnea)     Snores     never had sleep study, unable to lay flat, sleeps elevated    Thyroid disease     Goiter       Past Surgical History:   Procedure Laterality Date    BLOCK-NERVE-MEDIAL BRANCH-CERVICAL C 3,4,5,6 Right 2016    Performed by Erik [facility]an [facility] at [facility] OR    CARPAL TUNNEL RELEASE Left     CARPAL TUNNEL RELEASE Right 2018    Procedure: RELEASE, CARPAL TUNNEL;  Surgeon: Anurag Mathis MD;  Location: [facility] OR;  Service: Orthopedics;  Laterality: Right;     SECTION      CHOLECYSTECTOMY      DILATION AND CURETTAGE OF UTERUS      Epidural steroid Injection      Pain management, cervical    INJECTION-STEROID-EPIDURAL-CERVICAL N/A 3/14/2018    [facility]    INJECTION-STEROID-EPIDURAL-CERVICAL N/A 2016    [facility] at [facility]    INJECTION-STEROID-EPIDURAL-CERVICAL N/A 3/1/2016    [facility] at [facility]    INJECTION-STEROID-EPIDURAL-CERVICAL N/A 2016    [facility] at [facility]    INJECTION-STEROID-EPIDURAL-LUMBAR N/A 2017    [facility]    INJECTION-STEROID-EPIDURAL-LUMBAR N/A 2017    [facility] at [facility]    RADIOFREQUENCY THERMOCOAGULATION (RFTC)-NERVE-MEDIAN BRANCH-CERVICAL C3,4,5,6 Right 7/15/2016    [facility] at [facility] OR    RELEASE, CARPAL TUNNEL Right 2018    [facility]is [facility] at [facility] OR    ulcer on tongue removed      WOUND DEBRIDEMENT Left     leg at ankle level       Current Outpatient Medications   Medication Sig    ALBUTEROL SULFATE (PROAIR HFA INHL) Inhale into the lungs.    amitriptyline (ELAVIL) 25 MG tablet Take 25 mg by mouth nightly as  needed for Insomnia.    amlodipine (NORVASC) 10 MG tablet 10 mg.     benazepril (LOTENSIN) 40 MG tablet Take 40 mg by mouth once daily.    benzonatate (TESSALON) 100 MG capsule Take 100 mg by mouth.    desloratadine (CLARINEX) 5 mg tablet Take 5 mg by mouth.    gabapentin (NEURONTIN) 600 MG tablet Take 600 mg by mouth 2 (two) times daily.    glipiZIDE (GLUCOTROL) 5 MG tablet Take 5 mg by mouth.    [START ON 11/4/2018] HYDROcodone-acetaminophen (NORCO)  mg per tablet Take 1 tablet by mouth 3 (three) times daily as needed.    [START ON 12/4/2018] HYDROcodone-acetaminophen (NORCO)  mg per tablet Take 1 tablet by mouth 3 (three) times daily as needed.    [START ON 1/3/2019] HYDROcodone-acetaminophen (NORCO)  mg per tablet Take 1 tablet by mouth 3 (three) times daily as needed.    insulin glargine (LANTUS) 100 unit/mL injection Inject 45 Units into the skin every evening.    meloxicam (MOBIC) 15 MG tablet TAKE  ONE TABLET BY MOUTH DAILY    mometasone (NASONEX) 50 mcg/actuation nasal spray     montelukast (SINGULAIR) 10 mg tablet Take 10 mg by mouth.    nebivolol (BYSTOLIC) 10 MG Tab Take 10 mg by mouth.    ranitidine (ZANTAC) 300 MG tablet Take 300 mg by mouth.    rosuvastatin (CRESTOR) 10 MG tablet Take 10 mg by mouth once daily.    SYMBICORT 160-4.5 mcg/actuation HFAA     tiZANidine 2 mg Cap Take 1 capsule (2 mg total) by mouth 3 (three) times daily as needed.    TRUE METRIX GLUCOSE TEST STRIP Strp      No current facility-administered medications for this visit.        Review of patient's allergies indicates:   Allergen Reactions    Pcn [penicillins] Itching       Family History   Problem Relation Age of Onset    Diabetes Father     Diabetes Sister     Heart disease Brother        Social History     Socioeconomic History    Marital status:      Spouse name: Not on file    Number of children: Not on file    Years of education: Not on file    Highest education level:  Not on file   Social Needs    Financial resource strain: Not on file    Food insecurity - worry: Not on file    Food insecurity - inability: Not on file    Transportation needs - medical: Not on file    Transportation needs - non-medical: Not on file   Occupational History    Not on file   Tobacco Use    Smoking status: Never Smoker    Smokeless tobacco: Never Used   Substance and Sexual Activity    Alcohol use: No     Alcohol/week: 0.0 oz    Drug use: Yes     Types: Hydrocodone    Sexual activity: Yes     Partners: Male   Other Topics Concern    Not on file   Social History Narrative    Not on file       Chief Complaint:   Chief Complaint   Patient presents with    Right Hand - Post-op Evaluation    Post-op Evaluation     S/P CTR 9/14/18       Date of surgery:  September 14, 2018    History of present illness:  57-year-old female who underwent right carpal tunnel release.  Patient is doing well. Pain is a 0/10.  Numbness and tingling in the ulnar distribution is improved but still has some in the median distribution but not as severe.  No wound issues      Review of Systems:    Musculoskeletal:  See HPI        Physical Examination:    Vital Signs:    Vitals:    10/31/18 1111   BP: 119/76   Pulse: 66       Body mass index is 33.06 kg/m².    This a well-developed, well nourished patient in no acute distress.  They are alert and oriented and cooperative to examination.  Pt. walks without an antalgic gait.      Examination of the right hand shows healed surgical incision.  No erythema or drainage.  Full range of motion.    X-rays:  None     Assessment::  Status post right carpal tunnel Release    Plan:  Wound is fully healed.  Follow-up as needed.    This note was created using [employer] voice recognition software that occasionally misinterpreted phrases or words.

## 2018-11-26 RX ORDER — TIZANIDINE HYDROCHLORIDE 2 MG/1
1 CAPSULE, GELATIN COATED ORAL 3 TIMES DAILY PRN
Qty: 90 CAPSULE | Refills: 2 | Status: SHIPPED | OUTPATIENT
Start: 2018-11-26 | End: 2019-02-01 | Stop reason: SDUPTHER

## 2018-11-26 RX ORDER — TIZANIDINE HYDROCHLORIDE 2 MG/1
CAPSULE, GELATIN COATED ORAL
Qty: 90 CAPSULE | Refills: 1 | Status: SHIPPED | OUTPATIENT
Start: 2018-11-26 | End: 2018-12-05 | Stop reason: SDUPTHER

## 2018-12-05 ENCOUNTER — OFFICE VISIT (OUTPATIENT)
Dept: ENDOCRINOLOGY | Facility: CLINIC | Age: 58
End: 2018-12-05
Payer: COMMERCIAL

## 2018-12-05 ENCOUNTER — LAB VISIT (OUTPATIENT)
Dept: LAB | Facility: HOSPITAL | Age: 58
End: 2018-12-05
Attending: NURSE PRACTITIONER
Payer: COMMERCIAL

## 2018-12-05 VITALS — RESPIRATION RATE: 18 BRPM | SYSTOLIC BLOOD PRESSURE: 144 MMHG | DIASTOLIC BLOOD PRESSURE: 84 MMHG | HEART RATE: 72 BPM

## 2018-12-05 DIAGNOSIS — G89.29 OTHER CHRONIC PAIN: ICD-10-CM

## 2018-12-05 DIAGNOSIS — Z79.4 TYPE 2 DIABETES MELLITUS WITHOUT COMPLICATION, WITH LONG-TERM CURRENT USE OF INSULIN: ICD-10-CM

## 2018-12-05 DIAGNOSIS — E78.5 HYPERLIPIDEMIA, UNSPECIFIED HYPERLIPIDEMIA TYPE: ICD-10-CM

## 2018-12-05 DIAGNOSIS — E11.9 TYPE 2 DIABETES MELLITUS WITHOUT COMPLICATION, WITH LONG-TERM CURRENT USE OF INSULIN: ICD-10-CM

## 2018-12-05 DIAGNOSIS — Z79.4 TYPE 2 DIABETES MELLITUS WITHOUT COMPLICATION, WITH LONG-TERM CURRENT USE OF INSULIN: Primary | ICD-10-CM

## 2018-12-05 DIAGNOSIS — E11.9 TYPE 2 DIABETES MELLITUS WITHOUT COMPLICATION, WITH LONG-TERM CURRENT USE OF INSULIN: Primary | ICD-10-CM

## 2018-12-05 DIAGNOSIS — I10 HYPERTENSION, UNSPECIFIED TYPE: ICD-10-CM

## 2018-12-05 LAB
25(OH)D3+25(OH)D2 SERPL-MCNC: 5 NG/ML
ALBUMIN SERPL BCP-MCNC: 4 G/DL
ALP SERPL-CCNC: 68 U/L
ALT SERPL W/O P-5'-P-CCNC: 15 U/L
ANION GAP SERPL CALC-SCNC: 7 MMOL/L
AST SERPL-CCNC: 18 U/L
BASOPHILS # BLD AUTO: 0.07 K/UL
BASOPHILS NFR BLD: 0.9 %
BILIRUB SERPL-MCNC: 0.6 MG/DL
BUN SERPL-MCNC: 6 MG/DL
CALCIUM SERPL-MCNC: 9.4 MG/DL
CHLORIDE SERPL-SCNC: 109 MMOL/L
CHOLEST SERPL-MCNC: 156 MG/DL
CHOLEST/HDLC SERPL: 3.1 {RATIO}
CO2 SERPL-SCNC: 28 MMOL/L
CREAT SERPL-MCNC: 0.7 MG/DL
DIFFERENTIAL METHOD: NORMAL
EOSINOPHIL # BLD AUTO: 0.2 K/UL
EOSINOPHIL NFR BLD: 2.2 %
ERYTHROCYTE [DISTWIDTH] IN BLOOD BY AUTOMATED COUNT: 12.9 %
EST. GFR  (AFRICAN AMERICAN): >60 ML/MIN/1.73 M^2
EST. GFR  (NON AFRICAN AMERICAN): >60 ML/MIN/1.73 M^2
ESTIMATED AVG GLUCOSE: 160 MG/DL
GLUCOSE SERPL-MCNC: 93 MG/DL
HBA1C MFR BLD HPLC: 7.2 %
HCT VFR BLD AUTO: 41.2 %
HDLC SERPL-MCNC: 50 MG/DL
HDLC SERPL: 32.1 %
HGB BLD-MCNC: 13.4 G/DL
IMM GRANULOCYTES # BLD AUTO: 0.02 K/UL
IMM GRANULOCYTES NFR BLD AUTO: 0.3 %
LDLC SERPL CALC-MCNC: 87.6 MG/DL
LYMPHOCYTES # BLD AUTO: 2.5 K/UL
LYMPHOCYTES NFR BLD: 32.6 %
MCH RBC QN AUTO: 30.5 PG
MCHC RBC AUTO-ENTMCNC: 32.5 G/DL
MCV RBC AUTO: 94 FL
MONOCYTES # BLD AUTO: 0.9 K/UL
MONOCYTES NFR BLD: 11.3 %
NEUTROPHILS # BLD AUTO: 4 K/UL
NEUTROPHILS NFR BLD: 52.7 %
NONHDLC SERPL-MCNC: 106 MG/DL
NRBC BLD-RTO: 0 /100 WBC
PLATELET # BLD AUTO: 281 K/UL
PMV BLD AUTO: 11 FL
POTASSIUM SERPL-SCNC: 4.1 MMOL/L
PROT SERPL-MCNC: 8.1 G/DL
RBC # BLD AUTO: 4.39 M/UL
SODIUM SERPL-SCNC: 144 MMOL/L
TRIGL SERPL-MCNC: 92 MG/DL
TSH SERPL DL<=0.005 MIU/L-ACNC: 0.84 UIU/ML
WBC # BLD AUTO: 7.6 K/UL

## 2018-12-05 PROCEDURE — 82306 VITAMIN D 25 HYDROXY: CPT

## 2018-12-05 PROCEDURE — 84443 ASSAY THYROID STIM HORMONE: CPT

## 2018-12-05 PROCEDURE — 99999 PR PBB SHADOW E&M-EST. PATIENT-LVL V: CPT | Mod: PBBFAC,,, | Performed by: NURSE PRACTITIONER

## 2018-12-05 PROCEDURE — 80053 COMPREHEN METABOLIC PANEL: CPT

## 2018-12-05 PROCEDURE — 80061 LIPID PANEL: CPT

## 2018-12-05 PROCEDURE — 83036 HEMOGLOBIN GLYCOSYLATED A1C: CPT

## 2018-12-05 PROCEDURE — 99204 OFFICE O/P NEW MOD 45 MIN: CPT | Mod: S$GLB,,, | Performed by: NURSE PRACTITIONER

## 2018-12-05 PROCEDURE — 85025 COMPLETE CBC W/AUTO DIFF WBC: CPT

## 2018-12-05 PROCEDURE — 36415 COLL VENOUS BLD VENIPUNCTURE: CPT | Mod: PO

## 2018-12-05 RX ORDER — INSULIN GLARGINE 300 [IU]/ML
45 INJECTION, SOLUTION SUBCUTANEOUS NIGHTLY
COMMUNITY
Start: 2018-10-31 | End: 2019-05-02 | Stop reason: SDUPTHER

## 2018-12-05 RX ORDER — INSULIN ASPART 100 [IU]/ML
INJECTION, SOLUTION INTRAVENOUS; SUBCUTANEOUS
Qty: 1 BOX | Status: SHIPPED | OUTPATIENT
Start: 2018-12-05 | End: 2019-12-24

## 2018-12-05 NOTE — PROGRESS NOTES
Subjective:       Patient ID: Aaron Garcia is a 58 y.o. female.    Chief Complaint: Type 2 Dm     HPI Pt is a 58 y.o. AAF  with a diagnosis of Type 2 diabetes mellitus diagnosed approximately 2001, as well as chronic conditions pending review including HTN, HLP.  Other pertinent medical and social information noted includes, but not limited to: chronic pain r/t cervical radiculopathy affecting UA.  Pt self referred in response to ER visit and admit several months ago.  Pt with R CTS surgery, and then surgery for Deviated septum and also tongue ulcer in October.  States glucoses were markedly elevated in 300's. Pt states she  got up at one point and Left leg was numb, unable to walk, also had a burning through her stomach.  Went to ER via ambulance.  States dehydrated, treated with fluid and insulin. She was also somnolent, and having trouble staying awake, and initially medical personnel treated her with Narcan.  Pt does not know what glucoses were on arrival.  Outside labs reviewed but nothing >300 mg/dl noted. She has also had 3 steroid injection in her neck over the last year with the last on approx Summer 2018. She is taking Toujeo 45 qhs, but sounds like alternating glipizide on some days, and Tradjenta on the other.  She was given a regular insulin with sliding scale for an  Abbreviated use.  She states if glucoses were >200, she was to start with 10 units.  .     States checking glucoses 4 or 5 times a day   bert  Lunch 150-160  Supper-150-160  -150     Interim Events:     Diabetes Flow Sheet:  Diabetes Medications: Toujeo 45 qhs, tradjenta 5 mg, glipizide 5 mg bid       If insulin vial or pen preference:   Prior failed/or not tolerated medication therapies:  Diabetes Complications:   Aspirin:    Statin: crestor 10   ACE/ARB:benazepril 40   Last Urine Microalbumin:    Last Eye exam:   Last Diabetic Education: 18 mo   Glucometer:Truemetrix       Review of Systems   Constitutional: Negative  for activity change and fatigue.   HENT: Negative for hearing loss and trouble swallowing.    Eyes: Negative for photophobia and visual disturbance.        Last Eye Exam   Respiratory: Negative for cough and shortness of breath.    Cardiovascular: Negative for chest pain and palpitations.   Gastrointestinal: Negative for constipation and diarrhea.   Genitourinary: Negative for frequency and urgency.   Musculoskeletal: Negative for arthralgias and myalgias.   Skin: Negative for rash and wound.   Allergic/Immunologic: Negative for food allergies.   Neurological: Negative for weakness and numbness.   Psychiatric/Behavioral: Negative for sleep disturbance. The patient is not nervous/anxious.        Objective:      Physical Exam   Constitutional: She is oriented to person, place, and time. She appears well-developed and well-nourished.   Appears approx age, appropriately groomed    HENT:   Head: Normocephalic and atraumatic.   Nose: Nose normal.   Mouth/Throat: Oropharynx is clear and moist.   Upper dentures, MM pale    Eyes: Conjunctivae and EOM are normal. Pupils are equal, round, and reactive to light.   Neck: Normal range of motion. Neck supple. No tracheal deviation present. Thyromegaly present.   Cardiovascular: Normal rate, regular rhythm, normal heart sounds and intact distal pulses.   Pulmonary/Chest: Effort normal and breath sounds normal.   Musculoskeletal: Normal range of motion. She exhibits no deformity.   Feet: no open wounds or calluses.  Good pedal care.   Vibratory sensation to feet intact bilaterally,    Neurological: She is alert and oriented to person, place, and time.   Skin: Skin is warm and dry.   Acanthosis nigricans   Psychiatric: She has a normal mood and affect. Her behavior is normal. Judgment and thought content normal.           Assessment:       1. Type 2 diabetes mellitus without complication, with long-term current use of insulin  HM DIABETES FOOT EXAM  Chronic-uncontrolled-see plan      Comprehensive metabolic panel    Hemoglobin A1c    Lipid panel    Microalbumin/creatinine urine ratio    TSH    Vitamin D    CBC auto differential    Ambulatory referral to Optometry    Ambulatory Referral to Diabetes Education    insulin aspart U-100 (NOVOLOG FLEXPEN U-100 INSULIN) 100 unit/mL InPn pen     DIABETES FOOT EXAM    Hemoglobin A1c   2. Hypertension, unspecified type  Chronic-above goal-continue benazepril 40, and amlodipine    3. Hyperlipidemia, unspecified hyperlipidemia type  -chronic-monitor-cont 10 mg rosuvastatin    4. Other chronic pain  -crhonic-exacerbation can raise glucoses          Plan:       Advised pt not to alternate glipizide and tradjenta--take both everyday as directed.   Continue  Toujeo 45 qhs  Add Novolog ss prn --especially in event glucoses are higher with illness or steroids.   Bring logs to all visits.     ORDERS 12/05/2018  Today--fasting cmp,  Lipids, a1c, tsh, vit d, cbc, urine m/c     Cons diabetes ed--log review--dm review, check on use of SS, sick day care--    Cons optometry    F/u me in 3 mo with a1c.

## 2018-12-07 ENCOUNTER — TELEPHONE (OUTPATIENT)
Dept: ENDOCRINOLOGY | Facility: CLINIC | Age: 58
End: 2018-12-07

## 2018-12-07 DIAGNOSIS — E55.9 VITAMIN D DEFICIENCY: Primary | ICD-10-CM

## 2018-12-07 RX ORDER — ERGOCALCIFEROL 1.25 MG/1
50000 CAPSULE ORAL
Qty: 12 CAPSULE | Refills: 3 | Status: SHIPPED | OUTPATIENT
Start: 2018-12-07 | End: 2020-04-28

## 2019-01-25 ENCOUNTER — OFFICE VISIT (OUTPATIENT)
Dept: OPTOMETRY | Facility: CLINIC | Age: 59
End: 2019-01-25
Payer: COMMERCIAL

## 2019-01-25 ENCOUNTER — OFFICE VISIT (OUTPATIENT)
Dept: PAIN MEDICINE | Facility: CLINIC | Age: 59
End: 2019-01-25
Payer: COMMERCIAL

## 2019-01-25 VITALS
RESPIRATION RATE: 20 BRPM | HEART RATE: 72 BPM | BODY MASS INDEX: 32.19 KG/M2 | SYSTOLIC BLOOD PRESSURE: 150 MMHG | OXYGEN SATURATION: 100 % | WEIGHT: 199.44 LBS | DIASTOLIC BLOOD PRESSURE: 69 MMHG | TEMPERATURE: 97 F

## 2019-01-25 DIAGNOSIS — M48.02 CERVICAL SPINAL STENOSIS: Primary | ICD-10-CM

## 2019-01-25 DIAGNOSIS — H43.393 VITREOUS FLOATERS, BILATERAL: ICD-10-CM

## 2019-01-25 DIAGNOSIS — M50.30 DDD (DEGENERATIVE DISC DISEASE), CERVICAL: ICD-10-CM

## 2019-01-25 DIAGNOSIS — M51.36 DDD (DEGENERATIVE DISC DISEASE), LUMBAR: ICD-10-CM

## 2019-01-25 DIAGNOSIS — Z79.891 OPIOID CONTRACT EXISTS: ICD-10-CM

## 2019-01-25 DIAGNOSIS — H25.13 NUCLEAR SCLEROSIS, BILATERAL: ICD-10-CM

## 2019-01-25 DIAGNOSIS — H52.203 HYPEROPIA WITH ASTIGMATISM AND PRESBYOPIA, BILATERAL: ICD-10-CM

## 2019-01-25 DIAGNOSIS — Z13.5 GLAUCOMA SCREENING: ICD-10-CM

## 2019-01-25 DIAGNOSIS — H52.4 HYPEROPIA WITH ASTIGMATISM AND PRESBYOPIA, BILATERAL: ICD-10-CM

## 2019-01-25 DIAGNOSIS — H52.03 HYPEROPIA WITH ASTIGMATISM AND PRESBYOPIA, BILATERAL: ICD-10-CM

## 2019-01-25 DIAGNOSIS — E11.9 DIABETES MELLITUS TYPE 2 WITHOUT RETINOPATHY: Primary | ICD-10-CM

## 2019-01-25 PROCEDURE — 3077F PR MOST RECENT SYSTOLIC BLOOD PRESSURE >= 140 MM HG: ICD-10-PCS | Mod: CPTII,S$GLB,, | Performed by: PHYSICIAN ASSISTANT

## 2019-01-25 PROCEDURE — 99213 OFFICE O/P EST LOW 20 MIN: CPT | Mod: S$GLB,,, | Performed by: PHYSICIAN ASSISTANT

## 2019-01-25 PROCEDURE — 92015 PR REFRACTION: ICD-10-PCS | Mod: S$GLB,,, | Performed by: OPTOMETRIST

## 2019-01-25 PROCEDURE — 92015 DETERMINE REFRACTIVE STATE: CPT | Mod: S$GLB,,, | Performed by: OPTOMETRIST

## 2019-01-25 PROCEDURE — 99999 PR PBB SHADOW E&M-EST. PATIENT-LVL III: CPT | Mod: PBBFAC,,, | Performed by: OPTOMETRIST

## 2019-01-25 PROCEDURE — 3008F BODY MASS INDEX DOCD: CPT | Mod: CPTII,S$GLB,, | Performed by: PHYSICIAN ASSISTANT

## 2019-01-25 PROCEDURE — 99999 PR PBB SHADOW E&M-EST. PATIENT-LVL III: ICD-10-PCS | Mod: PBBFAC,,, | Performed by: OPTOMETRIST

## 2019-01-25 PROCEDURE — 3077F SYST BP >= 140 MM HG: CPT | Mod: CPTII,S$GLB,, | Performed by: PHYSICIAN ASSISTANT

## 2019-01-25 PROCEDURE — 92004 COMPRE OPH EXAM NEW PT 1/>: CPT | Mod: S$GLB,,, | Performed by: OPTOMETRIST

## 2019-01-25 PROCEDURE — 3078F PR MOST RECENT DIASTOLIC BLOOD PRESSURE < 80 MM HG: ICD-10-PCS | Mod: CPTII,S$GLB,, | Performed by: PHYSICIAN ASSISTANT

## 2019-01-25 PROCEDURE — 99999 PR PBB SHADOW E&M-EST. PATIENT-LVL V: CPT | Mod: PBBFAC,,, | Performed by: PHYSICIAN ASSISTANT

## 2019-01-25 PROCEDURE — 3078F DIAST BP <80 MM HG: CPT | Mod: CPTII,S$GLB,, | Performed by: PHYSICIAN ASSISTANT

## 2019-01-25 PROCEDURE — 3008F PR BODY MASS INDEX (BMI) DOCUMENTED: ICD-10-PCS | Mod: CPTII,S$GLB,, | Performed by: PHYSICIAN ASSISTANT

## 2019-01-25 PROCEDURE — 99213 PR OFFICE/OUTPT VISIT, EST, LEVL III, 20-29 MIN: ICD-10-PCS | Mod: S$GLB,,, | Performed by: PHYSICIAN ASSISTANT

## 2019-01-25 PROCEDURE — 99999 PR PBB SHADOW E&M-EST. PATIENT-LVL V: ICD-10-PCS | Mod: PBBFAC,,, | Performed by: PHYSICIAN ASSISTANT

## 2019-01-25 PROCEDURE — 92004 PR EYE EXAM, NEW PATIENT,COMPREHESV: ICD-10-PCS | Mod: S$GLB,,, | Performed by: OPTOMETRIST

## 2019-01-25 RX ORDER — HYDROCODONE BITARTRATE AND ACETAMINOPHEN 10; 325 MG/1; MG/1
1 TABLET ORAL 3 TIMES DAILY PRN
Qty: 90 TABLET | Refills: 0 | Status: SHIPPED | OUTPATIENT
Start: 2019-02-02 | End: 2019-03-04

## 2019-01-25 RX ORDER — HYDROCODONE BITARTRATE AND ACETAMINOPHEN 10; 325 MG/1; MG/1
1 TABLET ORAL 3 TIMES DAILY PRN
Qty: 90 TABLET | Refills: 0 | Status: SHIPPED | OUTPATIENT
Start: 2019-04-03 | End: 2019-04-26 | Stop reason: SDUPTHER

## 2019-01-25 RX ORDER — HYDROCODONE BITARTRATE AND ACETAMINOPHEN 10; 325 MG/1; MG/1
1 TABLET ORAL 3 TIMES DAILY PRN
Qty: 90 TABLET | Refills: 0 | Status: SHIPPED | OUTPATIENT
Start: 2019-03-04 | End: 2019-04-03

## 2019-01-25 NOTE — PROGRESS NOTES
HPI     Annual Exam      Additional comments: DLE x 2 yrs (outside [facility])   ocular health exam              Diabetic Eye Exam      Additional comments: BSL fluctuates              Blurred Vision      Additional comments: at both near & distance               Comments     Hemoglobin A1C       Date                     Value               Ref Range             Status                12/05/2018               7.2 (H)             4.0 - 5.6 %           Final              Comment:    ADA Screening Guidelines:  5.7-6.4%  Consistent with   prediabetes  >or=6.5%  Consistent with diabetes  High levels of fetal   hemoglobin interfere with the HbA1C  assay. Heterozygous hemoglobin   variants (HbS, HgC, etc)do  not significantly interfere with this assay.     However, presence of multiple variants may affect accuracy.    ----------    DM2 x 15 + years  Notes transient blurred vision  BP reported doing well  otc only for near            Last edited by LIZ Menchaca, OD on 1/25/2019 11:05 AM. (History)        ROS     Positive for: Eyes    Negative for: Constitutional, Gastrointestinal, Neurological, Skin,   Genitourinary, Musculoskeletal, HENT, Endocrine, Cardiovascular,   Respiratory, Psychiatric, Allergic/Imm, Heme/Lymph    Last edited by LIZ Menchaca, OD on 1/25/2019 11:05 AM. (History)        Assessment /Plan     For exam results, see Encounter Report.    Diabetes mellitus type 2 without retinopathy    Vitreous floaters, bilateral    Nuclear sclerosis, bilateral    Glaucoma screening    Hyperopia with astigmatism and presbyopia, bilateral      1. No ret/ no csme, gave info, control glucose, annual DFE  2. Mild OU, RD precautions given  3. Very early, not vis sig--monitor for changes  4. Not suspect   5. Updated specs rx, gave copy--advised for driving ivana at night    Discussed and educated patient on current findings /plan.  RTC 1 year, prn if any changes / issues

## 2019-01-25 NOTE — LETTER
January 25, 2019      EVELIN Gomes,ANP-C  [address] [facility] [address] [place] [address]           [facility]  [address] [facility] [address] [place] [address]  Phone: [phone]  Fax: [phone]          Patient: Aaron Garcia   MR Number: [id number]   YOB: 1960   Date of Visit: 1/25/2019       Dear Karissa Rasheed:    Thank you for referring Aaron Garcia to me for evaluation. Attached you will find relevant portions of my assessment and plan of care.    If you have questions, please do not hesitate to call me. I look forward to following Aaron Garcia along with you.    Sincerely,    LIZ Menchaca, OD    Enclosure  CC:  No Recipients    If you would like to receive this communication electronically, please contact [email or web address] or [phone] to request more information on [facility] access.    For providers and/or their staff who would like to refer a patient to [facility], please contact us through our one-stop-shop provider referral line, [facility], at [phone].    If you feel you have received this communication in error or would no longer like to receive these types of communications, please e-mail [email or web address]

## 2019-01-27 NOTE — PROGRESS NOTES
This note was completed with dictation software and grammatical errors may exist.    CC: Neck pain, arm pain, back pain and leg pain    HPI: The patient is a 58-year-old woman with a history of diabetes, hypertension who presents in referral from [facility] for neck pain radiating into the arms and low back pain radiating into the left leg.  She returns in follow-up today with neck pain greater than back pain. She complains of right-sided neck pain radiating throughout her right arm as well as low back pain radiating to the right leg.  Her pain is worse with activity, improved with medication.  She reports weakness in her right leg, right arm as well as numbness in her right thigh.  She denies bladder or bowel incontinence.    Pain intervention history: She has been taking hydrocodone 10/325 3 times a day for the last year, previous to this she was taking Percocet 3 times a day but states that it was causing her panic attacks, did help with her pain slightly better.  She has been taking gabapentin 600mg twice a day and Zanaflex at night with some relief.  She had undergone 3 epidurals for low back pain with only up to 3 weeks of relief each time.  She is status post C7-T1 cervical interlaminar epidural steroid injection on 1/29/16 with 50% relief.   She is status post C7-T1 cervical interlaminar epidural steroid injection on 3/1/16 with mild additional relief.  She is status post C7-T1 cervical interlaminar epidural steroid injection on 5/13/16 with 70-75% relief.  She is status post right C3, 4, 5 and 6 medial branch radiofrequency ablation on 7/15/16 with 30-60% relief.  She is status post L5/S1 interlaminar epidural steroid injection on 5/16/17 with 50% relief.  She is status post L5/S1 interlaminar epidural steroid injection on 12/29/17 with excellent relief lasting 2 weeks, now reporting minimal relief of her low back and buttock pain but ongoing 100% relief of her right leg pain.  She is  status post C7-T1 cervical interlaminar epidural steroid injection on 3/14/18 with 75% relief but this was not long lasting.     Urine drug screen 1/5/16  Negative and inconsistent for hydrocodone but positive and consistent for its metabolite hydromorphone    Urine drug screen 2/11/16  Negative and inconsistent for hydrocodone    ROS: She reports headaches, hoarse voice, joint stiffness, joint swelling, back pain, difficulty sleeping, anxiety and loss of balance.  Balance of review of systems is negative.    Medical, surgical, family and social history reviewed elsewhere in record.    Medications/Allergies: See med card    Vitals:    01/25/19 0940   BP: (!) 150/69   Pulse: 72   Resp: 20   Temp: 97.4 °F (36.3 °C)   TempSrc: Oral   SpO2: 100%   Weight: 90.5 kg (199 lb 6.5 oz)   PainSc:   6   PainLoc: Neck         Physical exam:  Gen: A and O x3, pleasant, well-groomed  Skin: No rashes or obvious lesions  HEENT: PERRLA, no obvious deformities on ears or in canals.Trachea midline.  CVS: Regular rate and rhythm, normal palpable pulses.  Resp: Clear to auscultation bilaterally, no wheezes or rales.  Abdomen: Soft, NT/ND.  Musculoskeletal: No antalgic gait.     Neuro:  Upper extremities: 5/5 strength bilaterally  Lower extremities: 5/5 strength left leg, 4/5 strength right leg  Reflexes: Brachioradialis 2+, Bicep 2+, Tricep 2+.  Patellar and Achilles reflexes 2+ bilaterally.  Sensory: Intact and symmetrical to light touch and pinprick in C2-T1 dermatomes bilaterally.    Cervical Spine:  Cervical spine: Range of motion is mildly reduced with flexion extension and lateral rotation with increased bilateral neck pain.  Spurling's maneuver causes neck pain to either side.    Myofascial exam: No tenderness to palpation to the cervical paraspinous muscles.    Lumbar spine:  Range of motion is moderately limited with flexion and extension with mild increased pain in the right buttock and right greater than left low back during  each maneuver.  Khanh's test is negative bilaterally.  Straight leg raise causes right buttock pain.  Internal and external rotation of hip is negative bilaterally.  Myofascial exam: No tenderness palpation to the lumbar paraspinous muscles.    Imagin/24/15 C-spine MRI  1. C3-4 left posterior paracentral disk extrusion causing left paracentral spinal cord compression and severe left foraminal stenosis.  2. C4-5 based disk extrusion with spinal cord impingement and moderate bilateral foraminal stenosis.  3. C5-6 posterior central disk extrusion with spinal cord compression and severe bilateral foraminal stenosis.  4. C6-7 mild disk bulge with severe bilateral foraminal stenosis.  5. Solitary mildly enlarged left submandibular lymph node of uncertain significance.    4/10/17 MRI lumbar spine  T12-L1: No central canal or neuroforaminal stenosis. No disc protrusion or extrusion.  L1-L2: There is ligamentum flavum thickening and facet arthropathy.  No central canal or neuroforaminal stenosis. No disc protrusion or extrusion.  L2-L3: There is ligamentum flavum thickening and facet arthropathy.  No central canal or neuroforaminal stenosis. No disc protrusion or extrusion.  L3-L4: There is ligamentum flavum thickening and facet arthropathy present. No central canal or neuroforaminal stenosis. No disc protrusion or extrusion.  L4-L5: There is a broad disc bulge which extends into both neural foramina.  There is ligamentum flavum thickening and facet arthropathy.  There is right lateral recess stenosis.  There is abutment of the descending right L5 nerve in the right lateral recess.  Please correlate clinically for symptoms referable to the right L5 nerve.  There is mild central canal stenosis.  There is mild left neuroforaminal stenosis.  No right neuroforaminal stenosis.  L5-S1: There is a broad central/right paracentral disc protrusion which encroaches upon the descending right S1 nerve in the right lateral recess.   Please correlate clinically for symptoms referable to the right S1 nerve.  There is mild-moderate right neuroforaminal stenosis.  No left neuroforaminal stenosis.  No central canal stenosis.  There is ligamentum flavum thickening and facet arthropathy.  There is a broad left extraforaminal disc protrusion at L5-S1 which abuts the exited left L5 nerve in the left extraforaminal soft tissues (series 6 image 17), nonspecific.  Please correlate clinically for symptoms referable to the left L5 nerve.      Assessment:  The patient is a 58-year-old woman with a history of diabetes, hypertension who presents in referral from [facility] for neck pain radiating into the arms and low back pain radiating into the left leg.     1. Cervical spinal stenosis     2. DDD (degenerative disc disease), cervical     3. DDD (degenerative disc disease), lumbar     4. Opioid contract exists           Plan:  1.  Dr. Galan provided prescriptions for hydrocodone-acetaminophen 10/325 mg up to 3 times a day as needed for pain.  I have reviewed the [place] Board of Pharmacy website and there are no abberancies.    2.  I again suggested updating her cervical and lumbar spine MRIs and having her see Neurosurgery.  She has declined for now.  She states she may want to try another lumbar HERACLIO in the future.  I also suggested physical therapy and aquatic therapy which she also declined for now.  3.  Follow-up in 3 months or sooner as needed.

## 2019-01-31 RX ORDER — GLIPIZIDE 5 MG/1
5 TABLET ORAL 2 TIMES DAILY WITH MEALS
Qty: 60 TABLET | Refills: 11 | Status: SHIPPED | OUTPATIENT
Start: 2019-01-31 | End: 2020-02-26

## 2019-01-31 NOTE — TELEPHONE ENCOUNTER
----- Message from Ulises Lagos sent at 1/31/2019  3:22 PM CST -----  Contact: Patient  Type:  RX Refill Request    Who Called:  Patient  Refill or New Rx:  Refill  RX Name and Strength:  glipiZIDE (GLUCOTROL) 5 MG tablet  How is the patient currently taking it? (ex. 1XDay):  x2 per day  Is this a 30 day or 90 day RX:  30  Preferred Pharmacy with phone number:    [id number] - [place], [place] - [address] [address]  Phone: [phone] Fax: [phone]  Local or Mail Order:  Local  Ordering Provider:  Wili Sr Call Back Number:  [phone]  Additional Information:  Patient is currently out of medication. Her primary told patient to have Wili fill medication. Please advise

## 2019-02-01 RX ORDER — TIZANIDINE HYDROCHLORIDE 2 MG/1
1 CAPSULE, GELATIN COATED ORAL 3 TIMES DAILY PRN
Qty: 90 CAPSULE | Refills: 2 | Status: SHIPPED | OUTPATIENT
Start: 2019-02-01 | End: 2019-09-20 | Stop reason: SDUPTHER

## 2019-02-27 ENCOUNTER — LAB VISIT (OUTPATIENT)
Dept: LAB | Facility: HOSPITAL | Age: 59
End: 2019-02-27
Attending: RADIOLOGY
Payer: COMMERCIAL

## 2019-02-27 DIAGNOSIS — E11.9 TYPE 2 DIABETES MELLITUS WITHOUT COMPLICATION, WITH LONG-TERM CURRENT USE OF INSULIN: ICD-10-CM

## 2019-02-27 DIAGNOSIS — Z79.4 TYPE 2 DIABETES MELLITUS WITHOUT COMPLICATION, WITH LONG-TERM CURRENT USE OF INSULIN: ICD-10-CM

## 2019-02-27 DIAGNOSIS — E55.9 VITAMIN D DEFICIENCY: ICD-10-CM

## 2019-02-27 LAB
25(OH)D3+25(OH)D2 SERPL-MCNC: 29 NG/ML
ESTIMATED AVG GLUCOSE: 186 MG/DL
HBA1C MFR BLD HPLC: 8.1 %

## 2019-02-27 PROCEDURE — 83036 HEMOGLOBIN GLYCOSYLATED A1C: CPT

## 2019-02-27 PROCEDURE — 36415 COLL VENOUS BLD VENIPUNCTURE: CPT | Mod: PO

## 2019-02-27 PROCEDURE — 82306 VITAMIN D 25 HYDROXY: CPT

## 2019-02-28 ENCOUNTER — PATIENT MESSAGE (OUTPATIENT)
Dept: ENDOCRINOLOGY | Facility: CLINIC | Age: 59
End: 2019-02-28

## 2019-03-26 ENCOUNTER — OFFICE VISIT (OUTPATIENT)
Dept: ENDOCRINOLOGY | Facility: CLINIC | Age: 59
End: 2019-03-26
Payer: COMMERCIAL

## 2019-03-26 VITALS
BODY MASS INDEX: 31.18 KG/M2 | SYSTOLIC BLOOD PRESSURE: 142 MMHG | HEIGHT: 66 IN | HEART RATE: 73 BPM | WEIGHT: 194 LBS | DIASTOLIC BLOOD PRESSURE: 78 MMHG

## 2019-03-26 DIAGNOSIS — Z79.4 TYPE 2 DIABETES MELLITUS WITHOUT COMPLICATION, WITH LONG-TERM CURRENT USE OF INSULIN: Primary | ICD-10-CM

## 2019-03-26 DIAGNOSIS — E78.49 OTHER HYPERLIPIDEMIA: ICD-10-CM

## 2019-03-26 DIAGNOSIS — E11.9 TYPE 2 DIABETES MELLITUS WITHOUT COMPLICATION, WITH LONG-TERM CURRENT USE OF INSULIN: Primary | ICD-10-CM

## 2019-03-26 DIAGNOSIS — E55.9 VITAMIN D DEFICIENCY: ICD-10-CM

## 2019-03-26 DIAGNOSIS — I10 ESSENTIAL HYPERTENSION: ICD-10-CM

## 2019-03-26 PROCEDURE — 99999 PR PBB SHADOW E&M-EST. PATIENT-LVL V: ICD-10-PCS | Mod: PBBFAC,,, | Performed by: NURSE PRACTITIONER

## 2019-03-26 PROCEDURE — 99214 OFFICE O/P EST MOD 30 MIN: CPT | Mod: S$GLB,,, | Performed by: NURSE PRACTITIONER

## 2019-03-26 PROCEDURE — 99999 PR PBB SHADOW E&M-EST. PATIENT-LVL V: CPT | Mod: PBBFAC,,, | Performed by: NURSE PRACTITIONER

## 2019-03-26 PROCEDURE — 99214 PR OFFICE/OUTPT VISIT, EST, LEVL IV, 30-39 MIN: ICD-10-PCS | Mod: S$GLB,,, | Performed by: NURSE PRACTITIONER

## 2019-03-26 NOTE — PROGRESS NOTES
Attempted to contact the parents of patient. No answer and unable to leave a message (mailbox full). Subjective:       Patient ID: Aaron Garcia is a 58 y.o. female.    Chief Complaint: Type 2 Dm      Pt is a 58 y.o. AAF  with a diagnosis of Type 2 diabetes mellitus diagnosed approximately 2001, as well as chronic conditions pending review including HTN, HLP.  Other pertinent medical and social information noted includes, but not limited to: chronic pain r/t cervical radiculopathy affecting UA.  Pt self referred in response to ER visit and admit several months ago.  Pt with R CTS surgery, and then surgery for Deviated septum and also tongue ulcer in October.  States glucoses were markedly elevated in 300's. Pt states she  got up at one point and Left leg was numb, unable to walk, also had a burning through her stomach.  Went to ER via ambulance.  States dehydrated, treated with fluid and insulin. She was also somnolent, and having trouble staying awake, and initially medical personnel treated her with Narcan.  Pt does not know what glucoses were on arrival.  Outside labs reviewed but nothing >300 mg/dl noted. She has also had 3 steroid injection in her neck over the last year with the last on approx Summer 2018. She is taking Toujeo 45 qhs, but sounds like alternating glipizide on some days, and Tradjenta on the other.  She was given a regular insulin with sliding scale for an  Abbreviated use.  She states if glucoses were >200, she was to start with 10 units.  .       Interim Events:  Out of tradjenta since January r/t costs. Brings glucose logs.  Readings look fairly reasonable. Did have sinusitis and some elevated glucoses are reflected during that time.  No reports of hypoglycemia.             Diabetes Flow Sheet:  Diabetes Medications: Toujeo 45 qhs, tradjenta 5 mg, glipizide 5 mg bid       If insulin vial or pen preference:   Prior failed/or not tolerated medication therapies:  Diabetes Complications:   Aspirin:    Statin: crestor 10   ACE/ARB:benazepril 40   Last Urine Microalbumin:    Last Eye exam:    Last Diabetic Education: 18 mo   Glucometer:Truemetrix       Review of Systems   Constitutional: Negative for activity change and fatigue.   HENT: Negative for hearing loss and trouble swallowing.    Eyes: Negative for photophobia and visual disturbance.        Last Eye Exam   Respiratory: Negative for cough and shortness of breath.    Cardiovascular: Negative for chest pain and palpitations.   Gastrointestinal: Negative for constipation and diarrhea.   Genitourinary: Negative for frequency and urgency.   Musculoskeletal: Negative for arthralgias and myalgias.   Skin: Negative for rash and wound.   Allergic/Immunologic: Negative for food allergies.   Neurological: Negative for weakness and numbness.   Psychiatric/Behavioral: Negative for sleep disturbance. The patient is not nervous/anxious.        Objective:      Physical Exam   Constitutional: She is oriented to person, place, and time. She appears well-developed and well-nourished.   Appears approx age, appropriately groomed    HENT:   Head: Normocephalic and atraumatic.   Nose: Nose normal.   Mouth/Throat: Oropharynx is clear and moist.   Upper dentures, MM pale    Eyes: Pupils are equal, round, and reactive to light. Conjunctivae and EOM are normal.   Neck: Normal range of motion. Neck supple. No tracheal deviation present. Thyromegaly present.   Cardiovascular: Normal rate, regular rhythm, normal heart sounds and intact distal pulses.   Pulmonary/Chest: Effort normal and breath sounds normal.   Musculoskeletal: Normal range of motion. She exhibits no deformity.   Deferred  Feet: no open wounds or calluses.  Good pedal care.   Vibratory sensation to feet intact bilaterally,    Neurological: She is alert and oriented to person, place, and time.   Skin: Skin is warm and dry.   Acanthosis nigricans   Psychiatric: She has a normal mood and affect. Her behavior is normal. Judgment and thought content normal.       Hemoglobin A1C   Date Value Ref Range Status    02/27/2019 8.1 (H) 4.0 - 5.6 % Final     Comment:     ADA Screening Guidelines:  5.7-6.4%  Consistent with prediabetes  >or=6.5%  Consistent with diabetes  High levels of fetal hemoglobin interfere with the HbA1C  assay. Heterozygous hemoglobin variants (HbS, HgC, etc)do  not significantly interfere with this assay.   However, presence of multiple variants may affect accuracy.     12/05/2018 7.2 (H) 4.0 - 5.6 % Final     Comment:     ADA Screening Guidelines:  5.7-6.4%  Consistent with prediabetes  >or=6.5%  Consistent with diabetes  High levels of fetal hemoglobin interfere with the HbA1C  assay. Heterozygous hemoglobin variants (HbS, HgC, etc)do  not significantly interfere with this assay.   However, presence of multiple variants may affect accuracy.         Chemistry        Component Value Date/Time     12/05/2018 1132    K 4.1 12/05/2018 1132     12/05/2018 1132    CO2 28 12/05/2018 1132    BUN 6 12/05/2018 1132    CREATININE 0.7 12/05/2018 1132    GLU 93 12/05/2018 1132        Component Value Date/Time    CALCIUM 9.4 12/05/2018 1132    ALKPHOS 68 12/05/2018 1132    AST 18 12/05/2018 1132    ALT 15 12/05/2018 1132    BILITOT 0.6 12/05/2018 1132    ESTGFRAFRICA >60.0 12/05/2018 1132    EGFRNONAA >60.0 12/05/2018 1132        Lab Results   Component Value Date    LDLCALC 87.6 12/05/2018     Lab Results   Component Value Date    TSH 0.836 12/05/2018         Assessment:         1. Type 2 diabetes mellitus without complication, with long-term current use of insulin  Hemoglobin A1c  Chronic-stable-no change    2. Essential hypertension  Chronic-stable-cont benazepril 40 mg    3. Other hyperlipidemia  knmniuf-jfuuep-sodo rosuvastatin 40 mg    4. Vitamin D deficiency  mtwlohc-[id number]-[id number] Vit D 2000 iu qd         Plan:         Continue  Toujeo 45 qhs  Continue glipizide  Coupon for tradjenta provided--pt meeting deductible so will be able to resume.     Add Novolog ss prn --especially in event glucoses are  higher with illness or steroids.   Bring logs to all visits.     ORDERS 03/26/2019    F/u me in 3 mo with a1c.

## 2019-04-26 ENCOUNTER — OFFICE VISIT (OUTPATIENT)
Dept: PAIN MEDICINE | Facility: CLINIC | Age: 59
End: 2019-04-26
Payer: COMMERCIAL

## 2019-04-26 VITALS
DIASTOLIC BLOOD PRESSURE: 78 MMHG | BODY MASS INDEX: 31.18 KG/M2 | HEIGHT: 66 IN | SYSTOLIC BLOOD PRESSURE: 125 MMHG | WEIGHT: 194 LBS | HEART RATE: 79 BPM

## 2019-04-26 DIAGNOSIS — M51.36 DDD (DEGENERATIVE DISC DISEASE), LUMBAR: ICD-10-CM

## 2019-04-26 DIAGNOSIS — M48.02 CERVICAL SPINAL STENOSIS: ICD-10-CM

## 2019-04-26 DIAGNOSIS — M50.30 DDD (DEGENERATIVE DISC DISEASE), CERVICAL: Primary | ICD-10-CM

## 2019-04-26 DIAGNOSIS — Z79.891 OPIOID CONTRACT EXISTS: ICD-10-CM

## 2019-04-26 PROCEDURE — 80307 DRUG TEST PRSMV CHEM ANLYZR: CPT

## 2019-04-26 PROCEDURE — 99213 PR OFFICE/OUTPT VISIT, EST, LEVL III, 20-29 MIN: ICD-10-PCS | Mod: S$GLB,,, | Performed by: PHYSICIAN ASSISTANT

## 2019-04-26 PROCEDURE — 3008F PR BODY MASS INDEX (BMI) DOCUMENTED: ICD-10-PCS | Mod: CPTII,S$GLB,, | Performed by: PHYSICIAN ASSISTANT

## 2019-04-26 PROCEDURE — 3078F PR MOST RECENT DIASTOLIC BLOOD PRESSURE < 80 MM HG: ICD-10-PCS | Mod: CPTII,S$GLB,, | Performed by: PHYSICIAN ASSISTANT

## 2019-04-26 PROCEDURE — 3008F BODY MASS INDEX DOCD: CPT | Mod: CPTII,S$GLB,, | Performed by: PHYSICIAN ASSISTANT

## 2019-04-26 PROCEDURE — 3074F PR MOST RECENT SYSTOLIC BLOOD PRESSURE < 130 MM HG: ICD-10-PCS | Mod: CPTII,S$GLB,, | Performed by: PHYSICIAN ASSISTANT

## 2019-04-26 PROCEDURE — 3074F SYST BP LT 130 MM HG: CPT | Mod: CPTII,S$GLB,, | Performed by: PHYSICIAN ASSISTANT

## 2019-04-26 PROCEDURE — 99213 OFFICE O/P EST LOW 20 MIN: CPT | Mod: S$GLB,,, | Performed by: PHYSICIAN ASSISTANT

## 2019-04-26 PROCEDURE — 3078F DIAST BP <80 MM HG: CPT | Mod: CPTII,S$GLB,, | Performed by: PHYSICIAN ASSISTANT

## 2019-04-26 PROCEDURE — 99999 PR PBB SHADOW E&M-EST. PATIENT-LVL V: ICD-10-PCS | Mod: PBBFAC,,, | Performed by: PHYSICIAN ASSISTANT

## 2019-04-26 PROCEDURE — 99999 PR PBB SHADOW E&M-EST. PATIENT-LVL V: CPT | Mod: PBBFAC,,, | Performed by: PHYSICIAN ASSISTANT

## 2019-04-26 RX ORDER — HYDROCODONE BITARTRATE AND ACETAMINOPHEN 10; 325 MG/1; MG/1
1 TABLET ORAL 3 TIMES DAILY PRN
Qty: 90 TABLET | Refills: 0 | Status: SHIPPED | OUTPATIENT
Start: 2019-06-02 | End: 2019-07-02

## 2019-04-26 RX ORDER — HYDROCODONE BITARTRATE AND ACETAMINOPHEN 10; 325 MG/1; MG/1
1 TABLET ORAL 3 TIMES DAILY PRN
Qty: 90 TABLET | Refills: 0 | Status: SHIPPED | OUTPATIENT
Start: 2019-07-02 | End: 2019-07-17 | Stop reason: SDUPTHER

## 2019-04-26 RX ORDER — HYDROCODONE BITARTRATE AND ACETAMINOPHEN 10; 325 MG/1; MG/1
1 TABLET ORAL 3 TIMES DAILY PRN
Qty: 90 TABLET | Refills: 0 | Status: SHIPPED | OUTPATIENT
Start: 2019-05-03 | End: 2019-06-02

## 2019-04-26 NOTE — PROGRESS NOTES
This note was completed with dictation software and grammatical errors may exist.    CC: Neck pain, arm pain, back pain and leg pain    HPI: The patient is a 58-year-old woman with a history of diabetes, hypertension who presents in referral from [facility] for neck pain radiating into the arms and low back pain radiating into the left leg.  She returns in follow-up today with worsening neck and back pain. She describes neck pain that radiates the right greater than left arms and low back pain that radiates to the right buttock, posterior thigh and lower leg as well. The pain is worse with activity and improved with medication but she feels that things are getting worse.  She reports intermittent weakness in her right leg and right arm.  She reports numbness in her right thigh.  She denies bladder or bowel incontinence.    Pain intervention history: She has been taking hydrocodone 10/325 3 times a day for the last year, previous to this she was taking Percocet 3 times a day but states that it was causing her panic attacks, did help with her pain slightly better.  She has been taking gabapentin 600mg twice a day and Zanaflex at night with some relief.  She had undergone 3 epidurals for low back pain with only up to 3 weeks of relief each time.  She is status post C7-T1 cervical interlaminar epidural steroid injection on 1/29/16 with 50% relief.   She is status post C7-T1 cervical interlaminar epidural steroid injection on 3/1/16 with mild additional relief.  She is status post C7-T1 cervical interlaminar epidural steroid injection on 5/13/16 with 70-75% relief.  She is status post right C3, 4, 5 and 6 medial branch radiofrequency ablation on 7/15/16 with 30-60% relief.  She is status post L5/S1 interlaminar epidural steroid injection on 5/16/17 with 50% relief.  She is status post L5/S1 interlaminar epidural steroid injection on 12/29/17 with excellent relief lasting 2 weeks, now reporting  "minimal relief of her low back and buttock pain but ongoing 100% relief of her right leg pain.  She is status post C7-T1 cervical interlaminar epidural steroid injection on 3/14/18 with 75% relief but this was not long lasting.     Urine drug screen 1/5/16  Negative and inconsistent for hydrocodone but positive and consistent for its metabolite hydromorphone    Urine drug screen 2/11/16  Negative and inconsistent for hydrocodone    ROS: She reports headaches, hoarse voice, joint stiffness, joint swelling, back pain, difficulty sleeping, anxiety and loss of balance.  Balance of review of systems is negative.    Medical, surgical, family and social history reviewed elsewhere in record.    Medications/Allergies: See med card    Vitals:    04/26/19 0944   BP: 125/78   Pulse: 79   Weight: 88 kg (194 lb 0.1 oz)   Height: 5' 6" (1.676 m)   PainSc:   3   PainLoc: Back         Physical exam:  Gen: A and O x3, pleasant, well-groomed  Skin: No rashes or obvious lesions  HEENT: PERRLA, no obvious deformities on ears or in canals.Trachea midline.  CVS: Regular rate and rhythm, normal palpable pulses.  Resp: Clear to auscultation bilaterally, no wheezes or rales.  Abdomen: Soft, NT/ND.  Musculoskeletal: No antalgic gait.     Neuro:  Upper extremities: 5/5 strength bilaterally  Lower extremities: 5/5 strength left leg, 4/5 strength right leg  Reflexes: Brachioradialis 2+, Bicep 2+, Tricep 2+.  Patellar and Achilles reflexes 2+ bilaterally.  Sensory: Intact and symmetrical to light touch and pinprick in C2-T1 dermatomes bilaterally.    Cervical Spine:  Cervical spine: Range of motion is mildly reduced with flexion extension and lateral rotation with increased bilateral neck pain.  Spurling's maneuver causes neck pain to either side.    Myofascial exam: No tenderness to palpation to the cervical paraspinous muscles.    Lumbar spine:  Range of motion is moderately limited with flexion and extension with mild increased pain in the " right buttock and right greater than left low back during each maneuver.  Khanh's test is negative bilaterally.  Straight leg raise causes right buttock pain.  Internal and external rotation of hip is negative bilaterally.  Myofascial exam: No tenderness palpation to the lumbar paraspinous muscles.    Imagin/24/15 C-spine MRI  1. C3-4 left posterior paracentral disk extrusion causing left paracentral spinal cord compression and severe left foraminal stenosis.  2. C4-5 based disk extrusion with spinal cord impingement and moderate bilateral foraminal stenosis.  3. C5-6 posterior central disk extrusion with spinal cord compression and severe bilateral foraminal stenosis.  4. C6-7 mild disk bulge with severe bilateral foraminal stenosis.  5. Solitary mildly enlarged left submandibular lymph node of uncertain significance.    4/10/17 MRI lumbar spine  T12-L1: No central canal or neuroforaminal stenosis. No disc protrusion or extrusion.  L1-L2: There is ligamentum flavum thickening and facet arthropathy.  No central canal or neuroforaminal stenosis. No disc protrusion or extrusion.  L2-L3: There is ligamentum flavum thickening and facet arthropathy.  No central canal or neuroforaminal stenosis. No disc protrusion or extrusion.  L3-L4: There is ligamentum flavum thickening and facet arthropathy present. No central canal or neuroforaminal stenosis. No disc protrusion or extrusion.  L4-L5: There is a broad disc bulge which extends into both neural foramina.  There is ligamentum flavum thickening and facet arthropathy.  There is right lateral recess stenosis.  There is abutment of the descending right L5 nerve in the right lateral recess.  Please correlate clinically for symptoms referable to the right L5 nerve.  There is mild central canal stenosis.  There is mild left neuroforaminal stenosis.  No right neuroforaminal stenosis.  L5-S1: There is a broad central/right paracentral disc protrusion which encroaches upon  the descending right S1 nerve in the right lateral recess.  Please correlate clinically for symptoms referable to the right S1 nerve.  There is mild-moderate right neuroforaminal stenosis.  No left neuroforaminal stenosis.  No central canal stenosis.  There is ligamentum flavum thickening and facet arthropathy.  There is a broad left extraforaminal disc protrusion at L5-S1 which abuts the exited left L5 nerve in the left extraforaminal soft tissues (series 6 image 17), nonspecific.  Please correlate clinically for symptoms referable to the left L5 nerve.      Assessment:  The patient is a 58-year-old woman with a history of diabetes, hypertension who presents in referral from [facility] for neck pain radiating into the arms and low back pain radiating into the left leg.     1. DDD (degenerative disc disease), cervical  MRI Cervical Spine Without Contrast   2. DDD (degenerative disc disease), lumbar  MRI Lumbar Spine Without Contrast   3. Cervical spinal stenosis     4. Opioid contract exists  [facility] Drug Screen         Plan:  1.  Dr. Galan provided prescriptions for hydrocodone-acetaminophen 10/325 mg up to 3 times a day as needed for pain.  I have reviewed the [place] Board of Pharmacy website and there are no abberancies.  A urine drug screen was completed today.  2.  I have ordered a new lumbar and cervical spine MRIs.  I will have her follow-up to review and we may consider a referral to Neurosurgery at that time.

## 2019-04-30 LAB
6MAM UR QL: NOT DETECTED
7AMINOCLONAZEPAM UR QL: NOT DETECTED
A-OH ALPRAZ UR QL: NOT DETECTED
ALPRAZ UR QL: NOT DETECTED
AMPHET UR QL SCN: NOT DETECTED
ANNOTATION COMMENT IMP: NORMAL
ANNOTATION COMMENT IMP: NORMAL
BARBITURATES UR QL: NOT DETECTED
BUPRENORPHINE UR QL: NOT DETECTED
BZE UR QL: NOT DETECTED
CARBOXYTHC UR QL: NOT DETECTED
CARISOPRODOL UR QL: NOT DETECTED
CLONAZEPAM UR QL: NOT DETECTED
CODEINE UR QL: NOT DETECTED
CREAT UR-MCNC: 141.8 MG/DL (ref 20–400)
DIAZEPAM UR QL: NOT DETECTED
ETHYL GLUCURONIDE UR QL: NOT DETECTED
FENTANYL UR QL: NOT DETECTED
HYDROCODONE UR QL: PRESENT
HYDROMORPHONE UR QL: NOT DETECTED
LORAZEPAM UR QL: NOT DETECTED
MDA UR QL: NOT DETECTED
MDEA UR QL: NOT DETECTED
MDMA UR QL: NOT DETECTED
ME-PHENIDATE UR QL: NOT DETECTED
MEPERIDINE UR QL: NOT DETECTED
METHADONE UR QL: NOT DETECTED
METHAMPHET UR QL: NOT DETECTED
MIDAZOLAM UR QL SCN: NOT DETECTED
MORPHINE UR QL: NOT DETECTED
NORBUPRENORPHINE UR QL CFM: NOT DETECTED
NORDIAZEPAM UR QL: NOT DETECTED
NORFENTANYL UR QL: NOT DETECTED
NORHYDROCODONE UR QL CFM: PRESENT
NOROXYCODONE UR QL CFM: NOT DETECTED
NOROXYMORPHONE: NOT DETECTED
OXAZEPAM UR QL: NOT DETECTED
OXYCODONE UR QL: NOT DETECTED
OXYMORPHONE UR QL: NOT DETECTED
PATHOLOGY STUDY: NORMAL
PCP UR QL: NOT DETECTED
PHENTERMINE UR QL: NOT DETECTED
PROPOXYPH UR QL: NOT DETECTED
SERVICE CMNT-IMP: NORMAL
TAPENTADOL UR QL SCN: NOT DETECTED
TAPENTADOL-O-SULF: NOT DETECTED
TEMAZEPAM UR QL: NOT DETECTED
TRAMADOL UR QL: NOT DETECTED
ZOLPIDEM UR QL: NOT DETECTED

## 2019-05-02 RX ORDER — INSULIN GLARGINE 300 [IU]/ML
45 INJECTION, SOLUTION SUBCUTANEOUS NIGHTLY
Qty: 5 ML | Refills: 11 | Status: SHIPPED | OUTPATIENT
Start: 2019-05-02 | End: 2020-04-03

## 2019-05-02 NOTE — TELEPHONE ENCOUNTER
----- Message from Анна Campa sent at 5/2/2019 10:09 AM CDT -----  Contact: pt  Pt calling states needing her insulin glargine, TOUJEO, (TOUJEO SOLOSTAR U-300 INSULIN) 300 unit/mL (1.5 mL) InPn pen.Route: Inject 45 Units into the skin every evening. - Subcutaneous. Because her primary will not fill her diabetic medication....[phone]        .  [facility] [id number] [place], [place]  [address] [place] [id number]  Phone: [phone] Fax: [phone]

## 2019-05-07 DIAGNOSIS — M79.642 LEFT HAND PAIN: Primary | ICD-10-CM

## 2019-05-15 ENCOUNTER — HOSPITAL ENCOUNTER (OUTPATIENT)
Dept: RADIOLOGY | Facility: HOSPITAL | Age: 59
Discharge: HOME | End: 2019-05-15
Attending: ORTHOPAEDIC SURGERY
Payer: COMMERCIAL

## 2019-05-15 ENCOUNTER — OFFICE VISIT (OUTPATIENT)
Dept: ORTHOPEDICS | Facility: CLINIC | Age: 59
End: 2019-05-15
Payer: COMMERCIAL

## 2019-05-15 VITALS — RESPIRATION RATE: 20 BRPM | WEIGHT: 194 LBS | BODY MASS INDEX: 31.18 KG/M2 | HEIGHT: 66 IN

## 2019-05-15 DIAGNOSIS — M79.642 LEFT HAND PAIN: ICD-10-CM

## 2019-05-15 DIAGNOSIS — M65.342 TRIGGER FINGER, LEFT RING FINGER: Primary | ICD-10-CM

## 2019-05-15 PROCEDURE — 3008F BODY MASS INDEX DOCD: CPT | Mod: CPTII,S$GLB,, | Performed by: ORTHOPAEDIC SURGERY

## 2019-05-15 PROCEDURE — 99213 OFFICE O/P EST LOW 20 MIN: CPT | Mod: 25,S$GLB,, | Performed by: ORTHOPAEDIC SURGERY

## 2019-05-15 PROCEDURE — 73130 X-RAY EXAM OF HAND: CPT | Mod: TC,PO,LT

## 2019-05-15 PROCEDURE — 99213 PR OFFICE/OUTPT VISIT, EST, LEVL III, 20-29 MIN: ICD-10-PCS | Mod: 25,S$GLB,, | Performed by: ORTHOPAEDIC SURGERY

## 2019-05-15 PROCEDURE — 20550 NJX 1 TENDON SHEATH/LIGAMENT: CPT | Mod: LT,S$GLB,, | Performed by: ORTHOPAEDIC SURGERY

## 2019-05-15 PROCEDURE — 99999 PR PBB SHADOW E&M-EST. PATIENT-LVL III: CPT | Mod: PBBFAC,,, | Performed by: ORTHOPAEDIC SURGERY

## 2019-05-15 PROCEDURE — 3008F PR BODY MASS INDEX (BMI) DOCUMENTED: ICD-10-PCS | Mod: CPTII,S$GLB,, | Performed by: ORTHOPAEDIC SURGERY

## 2019-05-15 PROCEDURE — 20550 TENDON SHEATH: L RING MCP: ICD-10-PCS | Mod: LT,S$GLB,, | Performed by: ORTHOPAEDIC SURGERY

## 2019-05-15 PROCEDURE — 73130 X-RAY EXAM OF HAND: CPT | Mod: 26,LT,, | Performed by: RADIOLOGY

## 2019-05-15 PROCEDURE — 73130 XR HAND COMPLETE 3 VIEW LEFT: ICD-10-PCS | Mod: 26,LT,, | Performed by: RADIOLOGY

## 2019-05-15 PROCEDURE — 99999 PR PBB SHADOW E&M-EST. PATIENT-LVL III: ICD-10-PCS | Mod: PBBFAC,,, | Performed by: ORTHOPAEDIC SURGERY

## 2019-05-15 RX ORDER — TRIAMCINOLONE ACETONIDE 40 MG/ML
40 INJECTION, SUSPENSION INTRA-ARTICULAR; INTRAMUSCULAR
Status: DISCONTINUED | OUTPATIENT
Start: 2019-05-15 | End: 2019-05-15 | Stop reason: HOSPADM

## 2019-05-15 RX ADMIN — TRIAMCINOLONE ACETONIDE 40 MG: 40 INJECTION, SUSPENSION INTRA-ARTICULAR; INTRAMUSCULAR at 01:05

## 2019-05-15 NOTE — PROGRESS NOTES
Past Medical History:   Diagnosis Date    Arthritis     Asthma     Cataract     OU    Diabetes mellitus, type 2     Gastric ulcer     Hypertension     Mitral valve prolapse     RYLAN (obstructive sleep apnea)     Snores     never had sleep study, unable to lay flat, sleeps elevated    Thyroid disease     Goiter       Past Surgical History:   Procedure Laterality Date    BLOCK-NERVE-MEDIAL BRANCH-CERVICAL C 3,4,5,6 Right 2016    [facility] OR    CARPAL TUNNEL RELEASE Left      SECTION      CHOLECYSTECTOMY      DILATION AND CURETTAGE OF UTERUS      Epidural steroid Injection      Pain management, cervical    ESOPHAGEAL DILATION      INJECTION-STEROID-EPIDURAL-CERVICAL N/A 3/14/2018    [facility]    INJECTION-STEROID-EPIDURAL-CERVICAL N/A 2016    [facility]    INJECTION-STEROID-EPIDURAL-CERVICAL N/A 3/1/2016    [facility]    INJECTION-STEROID-EPIDURAL-CERVICAL N/A 2016    [facility]    INJECTION-STEROID-EPIDURAL-LUMBAR N/A 2017    [facility]    INJECTION-STEROID-EPIDURAL-LUMBAR N/A 2017    [facility] at [facility]    RADIOFREQUENCY THERMOCOAGULATION (RFTC)-NERVE-MEDIAN BRANCH-CERVICAL C3,4,5,6 Right 7/15/2016    [facility] at [facility] OR    RELEASE, CARPAL TUNNEL Right 2018    [facility]ag Mathis [facility] at [facility] OR    ulcer on tongue removed      WOUND DEBRIDEMENT Left     leg at ankle level       Current Outpatient Medications   Medication Sig    ALBUTEROL SULFATE (PROAIR HFA INHL) Inhale into the lungs.    amitriptyline (ELAVIL) 25 MG tablet Take 25 mg by mouth nightly as needed for Insomnia.    amlodipine (NORVASC) 10 MG tablet 10 mg.     benazepril (LOTENSIN) 40 MG tablet Take  40 mg by mouth once daily.    benzonatate (TESSALON) 100 MG capsule Take 100 mg by mouth.    desloratadine (CLARINEX) 5 mg tablet Take 5 mg by mouth.    ergocalciferol (ERGOCALCIFEROL) 50,000 unit Cap Take 1 capsule (50,000 Units total) by mouth every 7 days.    gabapentin (NEURONTIN) 600 MG tablet Take 600 mg by mouth 2 (two) times daily.    glipiZIDE (GLUCOTROL) 5 MG tablet Take 1 tablet (5 mg total) by mouth 2 (two) times daily with meals.    HYDROcodone-acetaminophen (NORCO)  mg per tablet Take 1 tablet by mouth 3 (three) times daily as needed.    [START ON 6/2/2019] HYDROcodone-acetaminophen (NORCO)  mg per tablet Take 1 tablet by mouth 3 (three) times daily as needed.    [START ON 7/2/2019] HYDROcodone-acetaminophen (NORCO)  mg per tablet Take 1 tablet by mouth 3 (three) times daily as needed.    insulin aspart U-100 (NOVOLOG FLEXPEN U-100 INSULIN) 100 unit/mL InPn pen Use on premeal readings; 150-200=+2, 201-250=+4; 251-300=+6; 301-350=+8, over 350, + 10 unitsUse on premeal readings; 150-200=+2, 201-250=+4; 251-300=+6; 301-350=+8, over 350, + 10 units    insulin glargine, TOUJEO, (TOUJEO SOLOSTAR U-300 INSULIN) 300 unit/mL (1.5 mL) InPn pen Inject 45 Units into the skin every evening.    linagliptin (TRADJENTA) 5 mg Tab tablet Take 5 mg by mouth.    meloxicam (MOBIC) 15 MG tablet TAKE  ONE TABLET BY MOUTH DAILY    mometasone (NASONEX) 50 mcg/actuation nasal spray     montelukast (SINGULAIR) 10 mg tablet Take 10 mg by mouth.    nebivolol (BYSTOLIC) 10 MG Tab Take 10 mg by mouth.    ranitidine (ZANTAC) 300 MG tablet Take 300 mg by mouth.    rosuvastatin (CRESTOR) 10 MG tablet Take 10 mg by mouth once daily.    SYMBICORT 160-4.5 mcg/actuation HFAA     tiZANidine 2 mg Cap Take 1 capsule (2 mg total) by mouth 3 (three) times daily as needed.    TRUE METRIX GLUCOSE TEST STRIP Strp      No current facility-administered medications for this visit.        Review of patient's  allergies indicates:   Allergen Reactions    Pcn [penicillins] Itching       Family History   Problem Relation Age of Onset    Diabetes Father     Diabetes Sister     Heart disease Brother     Glaucoma Paternal Aunt     Glaucoma Daughter         suspect       Social History     Socioeconomic History    Marital status:      Spouse name: Not on file    Number of children: Not on file    Years of education: Not on file    Highest education level: Not on file   Occupational History    Not on file   Social Needs    Financial resource strain: Not on file    Food insecurity:     Worry: Not on file     Inability: Not on file    Transportation needs:     Medical: Not on file     Non-medical: Not on file   Tobacco Use    Smoking status: Never Smoker    Smokeless tobacco: Never Used   Substance and Sexual Activity    Alcohol use: No     Alcohol/week: 0.0 oz    Drug use: Yes     Types: Hydrocodone    Sexual activity: Yes     Partners: Male   Lifestyle    Physical activity:     Days per week: Not on file     Minutes per session: Not on file    Stress: Not on file   Relationships    Social connections:     Talks on phone: Not on file     Gets together: Not on file     Attends Sabianism service: Not on file     Active member of club or organization: Not on file     Attends meetings of clubs or organizations: Not on file     Relationship status: Not on file   Other Topics Concern    Not on file   Social History Narrative        5 children    Prior secretarial work        Chief Complaint:   Chief Complaint   Patient presents with    Left Hand - Pain       Date of surgery:  September 14, 2018    History of present illness:  58-year-old female right-hand-dominant female seen for recurrent left ring trigger ring.  Patient last had an injection by Dr. Solomon back in August of 2018.  It does not lock yet on her.  Pain when grabbing or bending the finger.  Pain is up to 6/10 again.    Review of  Systems:    Musculoskeletal:  See HPI        Physical Examination:    Vital Signs:    Vitals:    05/15/19 1337   Resp: 20       Body mass index is 31.31 kg/m².    This a well-developed, well nourished patient in no acute distress.  They are alert and oriented and cooperative to examination.  Pt. walks without an antalgic gait.      Examination of the left hand shows no rashes or erythema.  Pain over the A1 pulley of the ring finger.  Full range of motion of the hand and wrist. 2+ radial pulse.  Intact light touch sensation.    X-rays:  X-rays left hand are ordered and reviewed which show no significant pathology     Assessment:  Left ring trigger finger    Plan:  I reviewed the findings with her today.  We talked about repeating the trigger finger injections and she agreed.  We talked about trigger finger release as well.  I injected her left ring trigger finger.  Follow-up as needed.    This note was created using M Mod[employer] voice recognition software that occasionally misinterpreted phrases or words.

## 2019-05-15 NOTE — PROCEDURES
Tendon Sheath: L ring MCP  Date/Time: 5/15/2019 1:58 PM  Performed by: Anurag Mathis MD  Authorized by: Anurag Mathis MD     Consent Done?: Yes (Verbal)  Timeout: prior to procedure the correct patient, procedure, and site was verified   Indications:  Pain  Site marked: the procedure site was marked    Timeout: prior to procedure the correct patient, procedure, and site was verified    Location:  Ring finger  Site:  L ring MCP  Prep: patient was prepped and draped in usual sterile fashion    Ultrasonic guidance for needle placement?: No    Needle size:  22 G  Approach:  Volar  Medications:  40 mg triamcinolone acetonide 40 mg/mL  Patient tolerance:  Patient tolerated the procedure well with no immediate complications

## 2019-05-28 ENCOUNTER — HOSPITAL ENCOUNTER (OUTPATIENT)
Dept: RADIOLOGY | Facility: HOSPITAL | Age: 59
Discharge: HOME | End: 2019-05-28
Attending: PHYSICIAN ASSISTANT
Payer: COMMERCIAL

## 2019-05-28 DIAGNOSIS — M51.36 DDD (DEGENERATIVE DISC DISEASE), LUMBAR: ICD-10-CM

## 2019-05-28 DIAGNOSIS — M50.30 DDD (DEGENERATIVE DISC DISEASE), CERVICAL: ICD-10-CM

## 2019-05-28 PROCEDURE — 72141 MRI NECK SPINE W/O DYE: CPT | Mod: TC,PO

## 2019-05-28 PROCEDURE — 72141 MRI CERVICAL SPINE WITHOUT CONTRAST: ICD-10-PCS | Mod: 26,,, | Performed by: RADIOLOGY

## 2019-05-28 PROCEDURE — 72141 MRI NECK SPINE W/O DYE: CPT | Mod: 26,,, | Performed by: RADIOLOGY

## 2019-05-28 PROCEDURE — 72148 MRI LUMBAR SPINE WITHOUT CONTRAST: ICD-10-PCS | Mod: 26,,, | Performed by: RADIOLOGY

## 2019-05-28 PROCEDURE — 72148 MRI LUMBAR SPINE W/O DYE: CPT | Mod: TC,PO

## 2019-05-28 PROCEDURE — 72148 MRI LUMBAR SPINE W/O DYE: CPT | Mod: 26,,, | Performed by: RADIOLOGY

## 2019-07-10 ENCOUNTER — LAB VISIT (OUTPATIENT)
Dept: LAB | Facility: HOSPITAL | Age: 59
End: 2019-07-10
Attending: NURSE PRACTITIONER
Payer: COMMERCIAL

## 2019-07-10 DIAGNOSIS — E11.9 TYPE 2 DIABETES MELLITUS WITHOUT COMPLICATION, WITH LONG-TERM CURRENT USE OF INSULIN: ICD-10-CM

## 2019-07-10 DIAGNOSIS — Z79.4 TYPE 2 DIABETES MELLITUS WITHOUT COMPLICATION, WITH LONG-TERM CURRENT USE OF INSULIN: ICD-10-CM

## 2019-07-10 LAB
ESTIMATED AVG GLUCOSE: 146 MG/DL (ref 68–131)
HBA1C MFR BLD HPLC: 6.7 % (ref 4–5.6)

## 2019-07-10 PROCEDURE — 83036 HEMOGLOBIN GLYCOSYLATED A1C: CPT

## 2019-07-10 PROCEDURE — 36415 COLL VENOUS BLD VENIPUNCTURE: CPT | Mod: PO

## 2019-07-17 ENCOUNTER — OFFICE VISIT (OUTPATIENT)
Dept: ENDOCRINOLOGY | Facility: CLINIC | Age: 59
End: 2019-07-17
Payer: COMMERCIAL

## 2019-07-17 ENCOUNTER — OFFICE VISIT (OUTPATIENT)
Dept: PAIN MEDICINE | Facility: CLINIC | Age: 59
End: 2019-07-17
Payer: COMMERCIAL

## 2019-07-17 VITALS
HEART RATE: 71 BPM | DIASTOLIC BLOOD PRESSURE: 70 MMHG | TEMPERATURE: 97 F | WEIGHT: 199.06 LBS | BODY MASS INDEX: 32.13 KG/M2 | SYSTOLIC BLOOD PRESSURE: 147 MMHG | RESPIRATION RATE: 18 BRPM | OXYGEN SATURATION: 97 %

## 2019-07-17 VITALS
DIASTOLIC BLOOD PRESSURE: 78 MMHG | HEART RATE: 71 BPM | WEIGHT: 196.44 LBS | HEIGHT: 66 IN | SYSTOLIC BLOOD PRESSURE: 150 MMHG | BODY MASS INDEX: 31.57 KG/M2

## 2019-07-17 DIAGNOSIS — E78.49 OTHER HYPERLIPIDEMIA: ICD-10-CM

## 2019-07-17 DIAGNOSIS — M54.12 CERVICAL RADICULOPATHY: Primary | ICD-10-CM

## 2019-07-17 DIAGNOSIS — M50.30 DDD (DEGENERATIVE DISC DISEASE), CERVICAL: ICD-10-CM

## 2019-07-17 DIAGNOSIS — T38.0X5S ADVERSE EFFECT OF CORTICOSTEROIDS, SEQUELA: ICD-10-CM

## 2019-07-17 DIAGNOSIS — E11.9 TYPE 2 DIABETES MELLITUS WITHOUT COMPLICATION, WITH LONG-TERM CURRENT USE OF INSULIN: Primary | ICD-10-CM

## 2019-07-17 DIAGNOSIS — I10 ESSENTIAL HYPERTENSION: ICD-10-CM

## 2019-07-17 DIAGNOSIS — M51.36 DDD (DEGENERATIVE DISC DISEASE), LUMBAR: ICD-10-CM

## 2019-07-17 DIAGNOSIS — Z79.4 TYPE 2 DIABETES MELLITUS WITHOUT COMPLICATION, WITH LONG-TERM CURRENT USE OF INSULIN: Primary | ICD-10-CM

## 2019-07-17 DIAGNOSIS — M54.16 LUMBAR RADICULOPATHY: ICD-10-CM

## 2019-07-17 DIAGNOSIS — E55.9 VITAMIN D DEFICIENCY: ICD-10-CM

## 2019-07-17 DIAGNOSIS — M48.02 CERVICAL SPINAL STENOSIS: ICD-10-CM

## 2019-07-17 DIAGNOSIS — Z79.891 OPIOID CONTRACT EXISTS: ICD-10-CM

## 2019-07-17 PROCEDURE — 99214 OFFICE O/P EST MOD 30 MIN: CPT | Mod: S$GLB,,, | Performed by: PHYSICIAN ASSISTANT

## 2019-07-17 PROCEDURE — 99214 PR OFFICE/OUTPT VISIT, EST, LEVL IV, 30-39 MIN: ICD-10-PCS | Mod: S$GLB,,, | Performed by: NURSE PRACTITIONER

## 2019-07-17 PROCEDURE — 99999 PR PBB SHADOW E&M-EST. PATIENT-LVL V: ICD-10-PCS | Mod: PBBFAC,,, | Performed by: PHYSICIAN ASSISTANT

## 2019-07-17 PROCEDURE — 3077F PR MOST RECENT SYSTOLIC BLOOD PRESSURE >= 140 MM HG: ICD-10-PCS | Mod: CPTII,S$GLB,, | Performed by: PHYSICIAN ASSISTANT

## 2019-07-17 PROCEDURE — 3077F SYST BP >= 140 MM HG: CPT | Mod: CPTII,S$GLB,, | Performed by: PHYSICIAN ASSISTANT

## 2019-07-17 PROCEDURE — 99214 OFFICE O/P EST MOD 30 MIN: CPT | Mod: S$GLB,,, | Performed by: NURSE PRACTITIONER

## 2019-07-17 PROCEDURE — 3008F PR BODY MASS INDEX (BMI) DOCUMENTED: ICD-10-PCS | Mod: CPTII,S$GLB,, | Performed by: PHYSICIAN ASSISTANT

## 2019-07-17 PROCEDURE — 99999 PR PBB SHADOW E&M-EST. PATIENT-LVL V: CPT | Mod: PBBFAC,,, | Performed by: PHYSICIAN ASSISTANT

## 2019-07-17 PROCEDURE — 99214 PR OFFICE/OUTPT VISIT, EST, LEVL IV, 30-39 MIN: ICD-10-PCS | Mod: S$GLB,,, | Performed by: PHYSICIAN ASSISTANT

## 2019-07-17 PROCEDURE — 99999 PR PBB SHADOW E&M-EST. PATIENT-LVL V: ICD-10-PCS | Mod: PBBFAC,,, | Performed by: NURSE PRACTITIONER

## 2019-07-17 PROCEDURE — 3078F PR MOST RECENT DIASTOLIC BLOOD PRESSURE < 80 MM HG: ICD-10-PCS | Mod: CPTII,S$GLB,, | Performed by: PHYSICIAN ASSISTANT

## 2019-07-17 PROCEDURE — 99999 PR PBB SHADOW E&M-EST. PATIENT-LVL V: CPT | Mod: PBBFAC,,, | Performed by: NURSE PRACTITIONER

## 2019-07-17 PROCEDURE — 3078F DIAST BP <80 MM HG: CPT | Mod: CPTII,S$GLB,, | Performed by: PHYSICIAN ASSISTANT

## 2019-07-17 PROCEDURE — 3008F BODY MASS INDEX DOCD: CPT | Mod: CPTII,S$GLB,, | Performed by: PHYSICIAN ASSISTANT

## 2019-07-17 RX ORDER — SODIUM CHLORIDE, SODIUM LACTATE, POTASSIUM CHLORIDE, CALCIUM CHLORIDE 600; 310; 30; 20 MG/100ML; MG/100ML; MG/100ML; MG/100ML
INJECTION, SOLUTION INTRAVENOUS CONTINUOUS
Status: CANCELLED | OUTPATIENT
Start: 2019-08-07

## 2019-07-17 RX ORDER — HYDROCODONE BITARTRATE AND ACETAMINOPHEN 10; 325 MG/1; MG/1
1 TABLET ORAL 3 TIMES DAILY PRN
Qty: 90 TABLET | Refills: 0 | Status: SHIPPED | OUTPATIENT
Start: 2019-08-01 | End: 2019-08-31

## 2019-07-17 RX ORDER — HYDROCODONE BITARTRATE AND ACETAMINOPHEN 10; 325 MG/1; MG/1
1 TABLET ORAL 3 TIMES DAILY PRN
Qty: 90 TABLET | Refills: 0 | Status: SHIPPED | OUTPATIENT
Start: 2019-09-30 | End: 2019-10-08 | Stop reason: SDUPTHER

## 2019-07-17 RX ORDER — HYDROCODONE BITARTRATE AND ACETAMINOPHEN 10; 325 MG/1; MG/1
1 TABLET ORAL 3 TIMES DAILY PRN
Qty: 90 TABLET | Refills: 0 | Status: SHIPPED | OUTPATIENT
Start: 2019-08-31 | End: 2019-09-30

## 2019-07-17 NOTE — PROGRESS NOTES
Subjective:       Patient ID: Aaron Garcia is a 58 y.o. female.    Chief Complaint: Type 2 Dm      Pt is a 58 y.o. AAF  with a diagnosis of Type 2 diabetes mellitus diagnosed approximately 2001, as well as chronic conditions pending review including HTN, HLP.  Other pertinent medical and social information noted includes, but not limited to: chronic pain r/t cervical radiculopathy affecting UA.      Interim Events:  No new acute events.  Pending another steroid shot in neck early August.  Forgot logs, but glucoses much improved.  Some c/o mild hypoglycemia--glucoses in 60's but it was a day she was also out shopping.  If Bt glucose in , she may only take 25 Toujeo.       FBS--less than 150,  But has been in the 60's.   Lunch--130-140   Supper--160-170  BT--120.          Diabetes Flow Sheet:  Diabetes Medications: Toujeo 45 qhs, tradjenta 5 mg, glipizide 5 mg bid       If insulin vial or pen preference:   Prior failed/or not tolerated medication therapies:  Diabetes Complications:   Aspirin:    Statin: crestor 10   ACE/ARB:benazepril 40   Last Urine Microalbumin:    Last Eye exam:   Last Diabetic Education: 18 mo   Glucometer:Truemetrix       Review of Systems   Constitutional: Negative for activity change and fatigue.   HENT: Negative for hearing loss and trouble swallowing.    Eyes: Negative for photophobia and visual disturbance.        Last Eye Exam   Respiratory: Negative for cough and shortness of breath.    Cardiovascular: Negative for chest pain and palpitations.   Gastrointestinal: Negative for constipation and diarrhea.   Genitourinary: Negative for frequency and urgency.   Musculoskeletal: Negative for arthralgias and myalgias.   Skin: Negative for rash and wound.   Allergic/Immunologic: Negative for food allergies.   Neurological: Positive for dizziness and light-headedness (in the evening with bp 90/60). Negative for weakness and numbness.   Psychiatric/Behavioral: Negative for sleep  disturbance. The patient is not nervous/anxious.        Objective:      Physical Exam   Constitutional: She is oriented to person, place, and time. She appears well-developed and well-nourished.   Appears approx age, appropriately groomed    HENT:   Head: Normocephalic and atraumatic.   Nose: Nose normal.   Mouth/Throat: Oropharynx is clear and moist.   Upper dentures, MM pale    Eyes: Pupils are equal, round, and reactive to light. Conjunctivae and EOM are normal.   Neck: Normal range of motion. Neck supple. No tracheal deviation present. Thyromegaly present.   Cardiovascular: Normal rate, regular rhythm, normal heart sounds and intact distal pulses.   Pulmonary/Chest: Effort normal and breath sounds normal.   Musculoskeletal: Normal range of motion. She exhibits no deformity.   Feet: no open wounds or calluses.  Good pedal care.   Vibratory sensation to feet intact bilaterally,    Neurological: She is alert and oriented to person, place, and time.   Skin: Skin is warm and dry.   Acanthosis nigricans   Psychiatric: She has a normal mood and affect. Her behavior is normal. Judgment and thought content normal.       Hemoglobin A1C   Date Value Ref Range Status   07/10/2019 6.7 (H) 4.0 - 5.6 % Final     Comment:     ADA Screening Guidelines:  5.7-6.4%  Consistent with prediabetes  >or=6.5%  Consistent with diabetes  High levels of fetal hemoglobin interfere with the HbA1C  assay. Heterozygous hemoglobin variants (HbS, HgC, etc)do  not significantly interfere with this assay.   However, presence of multiple variants may affect accuracy.     02/27/2019 8.1 (H) 4.0 - 5.6 % Final     Comment:     ADA Screening Guidelines:  5.7-6.4%  Consistent with prediabetes  >or=6.5%  Consistent with diabetes  High levels of fetal hemoglobin interfere with the HbA1C  assay. Heterozygous hemoglobin variants (HbS, HgC, etc)do  not significantly interfere with this assay.   However, presence of multiple variants may affect accuracy.      12/05/2018 7.2 (H) 4.0 - 5.6 % Final     Comment:     ADA Screening Guidelines:  5.7-6.4%  Consistent with prediabetes  >or=6.5%  Consistent with diabetes  High levels of fetal hemoglobin interfere with the HbA1C  assay. Heterozygous hemoglobin variants (HbS, HgC, etc)do  not significantly interfere with this assay.   However, presence of multiple variants may affect accuracy.         Chemistry        Component Value Date/Time     12/05/2018 1132    K 4.1 12/05/2018 1132     12/05/2018 1132    CO2 28 12/05/2018 1132    BUN 6 12/05/2018 1132    CREATININE 0.7 12/05/2018 1132    GLU 93 12/05/2018 1132        Component Value Date/Time    CALCIUM 9.4 12/05/2018 1132    ALKPHOS 68 12/05/2018 1132    AST 18 12/05/2018 1132    ALT 15 12/05/2018 1132    BILITOT 0.6 12/05/2018 1132    ESTGFRAFRICA >60.0 12/05/2018 1132    EGFRNONAA >60.0 12/05/2018 1132        Lab Results   Component Value Date    LDLCALC 87.6 12/05/2018     Lab Results   Component Value Date    TSH 0.836 12/05/2018         Assessment:         1. Type 2 diabetes mellitus without complication, with long-term current use of insulin  Hemoglobin A1c  Chronic-stable-no change    2. Essential hypertension  Chronic-stable-cont benazepril 40 mg    3. Other hyperlipidemia  tsnxmow-kwxxpc-juen rosuvastatin 40 mg    4. Vitamin D deficiency  ijrihly-rtimly-qzco Vit D 2000 iu qd   5.     Adverse effect of steroids.        Plan:         Decrease   Toujeo 45   To 40 units  qhs  Continue glipizide  Continue tradjenta     See D/c instructions for recs with steroids.     ORDERS 07/17/2019    F/u me in 4  mo with fasting cmp, lipids, a1c, urine m/c prior

## 2019-07-17 NOTE — PATIENT INSTRUCTIONS
The day you get steroid  Shot:      IF glucose by that evening is > 350----increase Toujeo from 40 to 50 units for ONE NIGHT ONLY.      If glucoses are only in 250 range--can increase to 45 units ONE TIME ONLY    YOu may also add the Novolog ss to premeal, and prebedtime readings if needed.         Cut benazepril in 1/2--try for a week , see if dizziness improves and monitor you BP.  F/u with primary.

## 2019-07-21 NOTE — PROGRESS NOTES
This note was completed with dictation software and grammatical errors may exist.    CC: Neck pain, arm pain, back pain and leg pain    HPI: The patient is a 58-year-old woman with a history of diabetes, hypertension who presents in referral from [facility] for neck pain radiating into the arms and low back pain radiating into the left leg.  She returns in follow-up today to review her new lumbar and cervical spine MRIs.  She complains of neck pain greater than back pain.  Her neck pain radiates throughout her right greater than left arms and her back pain radiates to the right buttock, posterior thigh and right lower leg.  Her pain is worse with activity and mildly improved with rest and medication.  She reports some numbness in her right thigh and intermittent numbness in her arms.  She reports intermittent weakness in her right leg and right greater than left arms.  She denies bladder or bowel incontinence.    Pain intervention history: She has been taking hydrocodone 10/325 3 times a day for the last year, previous to this she was taking Percocet 3 times a day but states that it was causing her panic attacks, did help with her pain slightly better.  She has been taking gabapentin 600mg twice a day and Zanaflex at night with some relief.  She had undergone 3 epidurals for low back pain with only up to 3 weeks of relief each time.  She is status post C7-T1 cervical interlaminar epidural steroid injection on 1/29/16 with 50% relief.   She is status post C7-T1 cervical interlaminar epidural steroid injection on 3/1/16 with mild additional relief.  She is status post C7-T1 cervical interlaminar epidural steroid injection on 5/13/16 with 70-75% relief.  She is status post right C3, 4, 5 and 6 medial branch radiofrequency ablation on 7/15/16 with 30-60% relief.  She is status post L5/S1 interlaminar epidural steroid injection on 5/16/17 with 50% relief.  She is status post L5/S1 interlaminar  epidural steroid injection on 12/29/17 with excellent relief lasting 2 weeks, now reporting minimal relief of her low back and buttock pain but ongoing 100% relief of her right leg pain.  She is status post C7-T1 cervical interlaminar epidural steroid injection on 3/14/18 with 75% relief but this was not long lasting.     Urine drug screen 1/5/16  Negative and inconsistent for hydrocodone but positive and consistent for its metabolite hydromorphone    Urine drug screen 2/11/16  Negative and inconsistent for hydrocodone    ROS: She reports headaches, hoarse voice, joint stiffness, joint swelling, back pain, difficulty sleeping, anxiety and loss of balance.  Balance of review of systems is negative.    Medical, surgical, family and social history reviewed elsewhere in record.    Medications/Allergies: See med card    Vitals:    07/17/19 1316   BP: (!) 147/70   Pulse: 71   Resp: 18   Temp: 96.7 °F (35.9 °C)   TempSrc: Oral   SpO2: 97%   Weight: 90.3 kg (199 lb 1.2 oz)   PainSc:   6   PainLoc: Back         Physical exam:  Gen: A and O x3, pleasant, well-groomed  Skin: No rashes or obvious lesions  HEENT: PERRLA, no obvious deformities on ears or in canals.Trachea midline.  CVS: Regular rate and rhythm, normal palpable pulses.  Resp: Clear to auscultation bilaterally, no wheezes or rales.  Abdomen: Soft, NT/ND.  Musculoskeletal: No antalgic gait.     Neuro:  Upper extremities: 5/5 strength bilaterally  Lower extremities: 5/5 strength left leg, 4/5 strength right leg  Reflexes: Brachioradialis 2+, Bicep 2+, Tricep 2+.  Patellar and Achilles reflexes 2+ bilaterally.  Sensory: Intact and symmetrical to light touch and pinprick in C2-T1 dermatomes bilaterally.    Cervical Spine:  Cervical spine: Range of motion is mildly reduced with flexion extension and lateral rotation with increased bilateral neck pain.  Spurling's maneuver causes neck pain to either side.    Myofascial exam: No tenderness to palpation to the cervical  paraspinous muscles.    Lumbar spine:  Range of motion is moderately limited with flexion and extension with mild increased pain in the right buttock and right greater than left low back during each maneuver.  Khanh's test is negative bilaterally.  Straight leg raise causes right buttock pain.  Internal and external rotation of hip is negative bilaterally.  Myofascial exam: No tenderness palpation to the lumbar paraspinous muscles.    Imagin/24/15 C-spine MRI  1. C3-4 left posterior paracentral disk extrusion causing left paracentral spinal cord compression and severe left foraminal stenosis.  2. C4-5 based disk extrusion with spinal cord impingement and moderate bilateral foraminal stenosis.  3. C5-6 posterior central disk extrusion with spinal cord compression and severe bilateral foraminal stenosis.  4. C6-7 mild disk bulge with severe bilateral foraminal stenosis.  5. Solitary mildly enlarged left submandibular lymph node of uncertain significance.    4/10/17 MRI lumbar spine  T12-L1: No central canal or neuroforaminal stenosis. No disc protrusion or extrusion.  L1-L2: There is ligamentum flavum thickening and facet arthropathy.  No central canal or neuroforaminal stenosis. No disc protrusion or extrusion.  L2-L3: There is ligamentum flavum thickening and facet arthropathy.  No central canal or neuroforaminal stenosis. No disc protrusion or extrusion.  L3-L4: There is ligamentum flavum thickening and facet arthropathy present. No central canal or neuroforaminal stenosis. No disc protrusion or extrusion.  L4-L5: There is a broad disc bulge which extends into both neural foramina.  There is ligamentum flavum thickening and facet arthropathy.  There is right lateral recess stenosis.  There is abutment of the descending right L5 nerve in the right lateral recess.  Please correlate clinically for symptoms referable to the right L5 nerve.  There is mild central canal stenosis.  There is mild left  neuroforaminal stenosis.  No right neuroforaminal stenosis.  L5-S1: There is a broad central/right paracentral disc protrusion which encroaches upon the descending right S1 nerve in the right lateral recess.  Please correlate clinically for symptoms referable to the right S1 nerve.  There is mild-moderate right neuroforaminal stenosis.  No left neuroforaminal stenosis.  No central canal stenosis.  There is ligamentum flavum thickening and facet arthropathy.  There is a broad left extraforaminal disc protrusion at L5-S1 which abuts the exited left L5 nerve in the left extraforaminal soft tissues (series 6 image 17), nonspecific.  Please correlate clinically for symptoms referable to the left L5 nerve.    05/28/2019 MRI cervical spine  C2-3: No disc herniation, spinal canal narrowing, or neuroforaminal narrowing.  C3-4: There is moderate broad-based posterior disc osteophyte complex formation with left paracentral predominance.  This along with facet arthropathy contributes to mild spinal canal narrowing and moderate left neuroforaminal narrowing.  C4-5: There is moderate broad-based posterior disc osteophyte complex formation with left paracentral prominence.  This along with facet arthropathy contributes to mild bilateral neuroforaminal narrowing and mild spinal canal narrowing.  C5-6: Moderate broad-based posterior disc osteophyte complex formation and facet arthropathy result in moderate spinal canal narrowing and moderate right and mild left neuroforaminal narrowing.  C6-7: Moderate broad-based posterior disc osteophyte complex formation and facet arthropathy result in mild spinal canal narrowing along with moderate right and severe left neuroforaminal narrowing.  C7-T1: Minimal broad-based posterior disc osteophyte complex formation and bilateral facet arthropathy result in mild bilateral neuroforaminal narrowing.      05/28/2019 MRI lumbar spine  T12-L1: No disc herniation, spinal canal narrowing, or  neuroforaminal narrowing.  L1-2: No disc herniation, spinal canal narrowing, or neuroforaminal narrowing.  L2-3: No disc herniation, spinal canal narrowing, or neuroforaminal narrowing.  L3-4: No disc herniation, spinal canal narrowing, or neuroforaminal narrowing.  L4-5: There is moderate generalized disc bulging and bilateral facet arthropathy, which result in mild spinal canal narrowing and mild bilateral neuroforaminal narrowing.  L5-S1: There is a small right paracentral disc protrusion.  This effaces the right lateral recess and may impinge upon the descending right S1 nerve root.  Bilateral facet arthropathy is present and there is resultant overall mild spinal canal narrowing.  Moderate right and mild left neuroforaminal narrowing are also present.        Assessment:  The patient is a 58-year-old woman with a history of diabetes, hypertension who presents in referral from [facility] for neck pain radiating into the arms and low back pain radiating into the left leg.     1. Cervical radiculopathy  Vital signs    Place 18-22 gauage peripheral IV     Verify informed consent    Notify physician     Notify physician     Notify physician (specify)    Diet NPO    Case Request Operating Room: Injection-steroid-epidural-cervical    Place in Outpatient    lactated ringers infusion   2. DDD (degenerative disc disease), cervical     3. Cervical spinal stenosis     4. Lumbar radiculopathy     5. DDD (degenerative disc disease), lumbar     6. Opioid contract exists           Plan:  1.  I reviewed her MRI results with her and we discussed that her neck pain is likely due to several levels of canal stenosis but most significantly at C5-6.  Her low back and right leg pain is likely due to L5/S1 where she has a right disc protrusion possibly impinging the descending right S1 nerve.  We discussed seeing Neurosurgery but she would like to avoid this for now.  I will set her up for a cervical HERACLIO but if she  does not have relief I am going to have her see Neurosurgery for further evaluation.  We can consider a lumbar HERACLIO in the future as well.  2.  Dr. Galan provided prescriptions for hydrocodone-acetaminophen 10/325 mg up to 3 times a day as needed for pain.  I have reviewed the [place] Board of Pharmacy website and there are no abberancies.   3.  Follow-up in 4 weeks postprocedure or sooner as needed.

## 2019-08-05 ENCOUNTER — TELEPHONE (OUTPATIENT)
Dept: SURGERY | Facility: HOSPITAL | Age: 59
End: 2019-08-05

## 2019-08-05 NOTE — TELEPHONE ENCOUNTER
When doing pre op call, patient stated that she was currently on azithromycin for a sinus infection. Her last dose will be tomorrow, August 6th. Her cervical HERACLIO is scheduled for Wednesday, August 7th.  She is aware that her procedure will most likely be cancelled. She would like confirmation of this from the office, and to reschedule as soon as possible if necessary.  Thank you

## 2019-08-05 NOTE — TELEPHONE ENCOUNTER
Spoke with the patient and her injection has been rescheduled to Aug 14. She was advised that if her antibiotics get extended we will need to move the injection further back.

## 2019-08-12 ENCOUNTER — TELEPHONE (OUTPATIENT)
Dept: PAIN MEDICINE | Facility: CLINIC | Age: 59
End: 2019-08-12

## 2019-08-12 NOTE — TELEPHONE ENCOUNTER
Spoke with the patient and she is trying to get in to see her ENT on Wednesday. She will call us back to get her injection scheduled. This is in the depot for future booking.

## 2019-08-12 NOTE — TELEPHONE ENCOUNTER
----- Message from Brielle Chester sent at 8/12/2019  2:26 PM CDT -----  Contact: self [phone]  She is supposed to have a procedure on Wednesday, 8/14, she said she still has a sinus infection, can she still have the procedure?  Please call her.  Thank you!

## 2019-08-29 ENCOUNTER — TELEPHONE (OUTPATIENT)
Dept: PAIN MEDICINE | Facility: CLINIC | Age: 59
End: 2019-08-29

## 2019-08-29 NOTE — TELEPHONE ENCOUNTER
Spoke to patient and she is currently done with antibiotics and symptom free. She has been rescheduled.

## 2019-08-29 NOTE — TELEPHONE ENCOUNTER
----- Message from Louie Garcia sent at 8/29/2019  8:55 AM CDT -----  Type:  Sooner Apoointment Request    Caller is requesting a sooner appointment.  Caller declined first available appointment listed below.  Caller will not accept being placed on the waitlist and is requesting a message be sent to doctor.    Name of Caller:  Self   When is the first available appointment?    Symptoms:    Best Call Back Number:  [phone]  Additional Information:  Patient requesting to reschedule procedure.

## 2019-09-16 DIAGNOSIS — M50.30 DDD (DEGENERATIVE DISC DISEASE), CERVICAL: Primary | ICD-10-CM

## 2019-09-17 ENCOUNTER — HOSPITAL ENCOUNTER (OUTPATIENT)
Dept: RADIOLOGY | Facility: HOSPITAL | Age: 59
Discharge: HOME | End: 2019-09-17
Attending: ANESTHESIOLOGY
Payer: COMMERCIAL

## 2019-09-17 ENCOUNTER — TELEPHONE (OUTPATIENT)
Dept: SURGERY | Facility: HOSPITAL | Age: 59
End: 2019-09-17

## 2019-09-17 ENCOUNTER — TELEPHONE (OUTPATIENT)
Dept: PAIN MEDICINE | Facility: CLINIC | Age: 59
End: 2019-09-17

## 2019-09-17 DIAGNOSIS — M50.30 DDD (DEGENERATIVE DISC DISEASE), CERVICAL: ICD-10-CM

## 2019-09-17 NOTE — TELEPHONE ENCOUNTER
----- Message from Zee Rivera sent at 9/17/2019 10:37 AM CDT -----  Contact: Sarah Beth Ramirez  Type: Needs Medical Advice    Who Called:  Sarah Beth Ramirez  Best Call Back Number: [phone]  Additional Information: patient going to hospital & couldn't get procedure today--please advise--thank you

## 2019-09-17 NOTE — TELEPHONE ENCOUNTER
Attempted to reach patient regarding procedure scheduled for today with Dr. Galan. Patient called [facility] and states that she left a message earlier today that she is ill and will not make it in today for her procedure. Please contact patient to reschedule as indicated.

## 2019-09-18 NOTE — TELEPHONE ENCOUNTER
Spoke with patient and she has an ongoing sinus infection and will call once this has cleared up to schedule.

## 2019-09-21 RX ORDER — TIZANIDINE HYDROCHLORIDE 2 MG/1
1 CAPSULE, GELATIN COATED ORAL 3 TIMES DAILY PRN
Qty: 90 CAPSULE | Refills: 2 | Status: SHIPPED | OUTPATIENT
Start: 2019-09-21 | End: 2020-01-02 | Stop reason: SDUPTHER

## 2019-10-08 ENCOUNTER — OFFICE VISIT (OUTPATIENT)
Dept: PAIN MEDICINE | Facility: CLINIC | Age: 59
End: 2019-10-08
Payer: COMMERCIAL

## 2019-10-08 VITALS
BODY MASS INDEX: 32.38 KG/M2 | DIASTOLIC BLOOD PRESSURE: 63 MMHG | SYSTOLIC BLOOD PRESSURE: 150 MMHG | RESPIRATION RATE: 20 BRPM | OXYGEN SATURATION: 99 % | HEART RATE: 75 BPM | WEIGHT: 200.63 LBS | TEMPERATURE: 96 F

## 2019-10-08 DIAGNOSIS — M50.30 DDD (DEGENERATIVE DISC DISEASE), CERVICAL: ICD-10-CM

## 2019-10-08 DIAGNOSIS — M54.12 CERVICAL RADICULOPATHY: Primary | ICD-10-CM

## 2019-10-08 DIAGNOSIS — M48.02 CERVICAL SPINAL STENOSIS: ICD-10-CM

## 2019-10-08 DIAGNOSIS — Z79.891 OPIOID CONTRACT EXISTS: ICD-10-CM

## 2019-10-08 DIAGNOSIS — M54.16 LUMBAR RADICULOPATHY: ICD-10-CM

## 2019-10-08 DIAGNOSIS — M51.36 DDD (DEGENERATIVE DISC DISEASE), LUMBAR: ICD-10-CM

## 2019-10-08 PROCEDURE — 3077F SYST BP >= 140 MM HG: CPT | Mod: CPTII,S$GLB,, | Performed by: PHYSICIAN ASSISTANT

## 2019-10-08 PROCEDURE — 99213 PR OFFICE/OUTPT VISIT, EST, LEVL III, 20-29 MIN: ICD-10-PCS | Mod: S$GLB,,, | Performed by: PHYSICIAN ASSISTANT

## 2019-10-08 PROCEDURE — 3008F BODY MASS INDEX DOCD: CPT | Mod: CPTII,S$GLB,, | Performed by: PHYSICIAN ASSISTANT

## 2019-10-08 PROCEDURE — 3077F PR MOST RECENT SYSTOLIC BLOOD PRESSURE >= 140 MM HG: ICD-10-PCS | Mod: CPTII,S$GLB,, | Performed by: PHYSICIAN ASSISTANT

## 2019-10-08 PROCEDURE — 3078F PR MOST RECENT DIASTOLIC BLOOD PRESSURE < 80 MM HG: ICD-10-PCS | Mod: CPTII,S$GLB,, | Performed by: PHYSICIAN ASSISTANT

## 2019-10-08 PROCEDURE — 99999 PR PBB SHADOW E&M-EST. PATIENT-LVL V: CPT | Mod: PBBFAC,,, | Performed by: PHYSICIAN ASSISTANT

## 2019-10-08 PROCEDURE — 99999 PR PBB SHADOW E&M-EST. PATIENT-LVL V: ICD-10-PCS | Mod: PBBFAC,,, | Performed by: PHYSICIAN ASSISTANT

## 2019-10-08 PROCEDURE — 99213 OFFICE O/P EST LOW 20 MIN: CPT | Mod: S$GLB,,, | Performed by: PHYSICIAN ASSISTANT

## 2019-10-08 PROCEDURE — 3078F DIAST BP <80 MM HG: CPT | Mod: CPTII,S$GLB,, | Performed by: PHYSICIAN ASSISTANT

## 2019-10-08 PROCEDURE — 3008F PR BODY MASS INDEX (BMI) DOCUMENTED: ICD-10-PCS | Mod: CPTII,S$GLB,, | Performed by: PHYSICIAN ASSISTANT

## 2019-10-08 RX ORDER — HYDROCODONE BITARTRATE AND ACETAMINOPHEN 10; 325 MG/1; MG/1
1 TABLET ORAL 3 TIMES DAILY PRN
Qty: 90 TABLET | Refills: 0 | Status: SHIPPED | OUTPATIENT
Start: 2019-12-29 | End: 2020-01-07 | Stop reason: SDUPTHER

## 2019-10-08 RX ORDER — HYDROCODONE BITARTRATE AND ACETAMINOPHEN 10; 325 MG/1; MG/1
1 TABLET ORAL 3 TIMES DAILY PRN
Qty: 90 TABLET | Refills: 0 | Status: SHIPPED | OUTPATIENT
Start: 2019-10-30 | End: 2019-11-29

## 2019-10-08 RX ORDER — HYDROCODONE BITARTRATE AND ACETAMINOPHEN 10; 325 MG/1; MG/1
1 TABLET ORAL 3 TIMES DAILY PRN
Qty: 90 TABLET | Refills: 0 | Status: SHIPPED | OUTPATIENT
Start: 2019-11-29 | End: 2019-12-29

## 2019-10-08 NOTE — PROGRESS NOTES
This note was completed with dictation software and grammatical errors may exist.    CC: Neck pain, arm pain, back pain and leg pain    HPI: The patient is a 58-year-old woman with a history of diabetes, hypertension who presents in referral from [facility] for neck pain radiating into the arms and low back pain radiating into the left leg.  She returns in follow-up today with neck pain and back pain. At last visit I had schedule her for a cervical HERACLIO.  However, she has canceled several times due to recurrent suspected sinus infections.  She follows up with her ENT he today.  She continues to have neck pain radiating to the arms and low back pain radiating to the right buttock and right leg intermittently.  She has been taking pain medication with relief but hopes to be well soon so that she can have procedures.  She also reports spasms in her neck, arms and low back.  She reports intermittent weakness in her right leg and both arms.  She reports intermittent numbness in her arms and anterior thighs.  She denies bladder or bowel incontinence.    Pain intervention history: She has been taking hydrocodone 10/325 3 times a day for the last year, previous to this she was taking Percocet 3 times a day but states that it was causing her panic attacks, did help with her pain slightly better.  She has been taking gabapentin 600mg twice a day and Zanaflex at night with some relief.  She had undergone 3 epidurals for low back pain with only up to 3 weeks of relief each time.  She is status post C7-T1 cervical interlaminar epidural steroid injection on 1/29/16 with 50% relief.   She is status post C7-T1 cervical interlaminar epidural steroid injection on 3/1/16 with mild additional relief.  She is status post C7-T1 cervical interlaminar epidural steroid injection on 5/13/16 with 70-75% relief.  She is status post right C3, 4, 5 and 6 medial branch radiofrequency ablation on 7/15/16 with 30-60% relief.   She is status post L5/S1 interlaminar epidural steroid injection on 5/16/17 with 50% relief.  She is status post L5/S1 interlaminar epidural steroid injection on 12/29/17 with excellent relief lasting 2 weeks, now reporting minimal relief of her low back and buttock pain but ongoing 100% relief of her right leg pain.  She is status post C7-T1 cervical interlaminar epidural steroid injection on 3/14/18 with 75% relief but this was not long lasting.     Urine drug screen 1/5/16  Negative and inconsistent for hydrocodone but positive and consistent for its metabolite hydromorphone    Urine drug screen 2/11/16  Negative and inconsistent for hydrocodone    ROS: She reports headaches, hoarse voice, joint stiffness, joint swelling, back pain, difficulty sleeping, anxiety and loss of balance.  Balance of review of systems is negative.    Medical, surgical, family and social history reviewed elsewhere in record.    Medications/Allergies: See med card    Vitals:    10/08/19 1004   BP: (!) 150/63   Pulse: 75   Resp: 20   Temp: 96 °F (35.6 °C)   TempSrc: Oral   SpO2: 99%   Weight: 91 kg (200 lb 9.9 oz)   PainSc:   6   PainLoc: Neck         Physical exam:  Gen: A and O x3, pleasant, well-groomed  Skin: No rashes or obvious lesions  HEENT: PERRLA, no obvious deformities on ears or in canals.Trachea midline.  CVS: Regular rate and rhythm, normal palpable pulses.  Resp: Clear to auscultation bilaterally, no wheezes or rales.  Abdomen: Soft, NT/ND.  Musculoskeletal: No antalgic gait.     Neuro:  Upper extremities: 4/5 strength bilaterally but question effort  Lower extremities:  4/5 strength bilaterally but question effort  Reflexes: Brachioradialis 2+, Bicep 2+, Tricep 2+.  Patellar and Achilles reflexes 2+ bilaterally.  Sensory: Intact and symmetrical to light touch and pinprick in C2-T1 dermatomes bilaterally.    Cervical Spine:  Cervical spine: Range of motion is mildly reduced with flexion extension and lateral rotation with  increased bilateral neck pain.  Spurling's maneuver causes neck pain to either side.    Myofascial exam: No tenderness to palpation to the cervical paraspinous muscles.    Lumbar spine:  Range of motion is moderately limited with flexion and extension with mild increased pain in the right buttock and right greater than left low back during each maneuver.  Khanh's test is negative bilaterally.  Straight leg raise causes right buttock pain.  Internal and external rotation of hip is negative bilaterally.  Myofascial exam: No tenderness palpation to the lumbar paraspinous muscles.    Imagin/24/15 C-spine MRI  1. C3-4 left posterior paracentral disk extrusion causing left paracentral spinal cord compression and severe left foraminal stenosis.  2. C4-5 based disk extrusion with spinal cord impingement and moderate bilateral foraminal stenosis.  3. C5-6 posterior central disk extrusion with spinal cord compression and severe bilateral foraminal stenosis.  4. C6-7 mild disk bulge with severe bilateral foraminal stenosis.  5. Solitary mildly enlarged left submandibular lymph node of uncertain significance.    4/10/17 MRI lumbar spine  T12-L1: No central canal or neuroforaminal stenosis. No disc protrusion or extrusion.  L1-L2: There is ligamentum flavum thickening and facet arthropathy.  No central canal or neuroforaminal stenosis. No disc protrusion or extrusion.  L2-L3: There is ligamentum flavum thickening and facet arthropathy.  No central canal or neuroforaminal stenosis. No disc protrusion or extrusion.  L3-L4: There is ligamentum flavum thickening and facet arthropathy present. No central canal or neuroforaminal stenosis. No disc protrusion or extrusion.  L4-L5: There is a broad disc bulge which extends into both neural foramina.  There is ligamentum flavum thickening and facet arthropathy.  There is right lateral recess stenosis.  There is abutment of the descending right L5 nerve in the right lateral recess.   Please correlate clinically for symptoms referable to the right L5 nerve.  There is mild central canal stenosis.  There is mild left neuroforaminal stenosis.  No right neuroforaminal stenosis.  L5-S1: There is a broad central/right paracentral disc protrusion which encroaches upon the descending right S1 nerve in the right lateral recess.  Please correlate clinically for symptoms referable to the right S1 nerve.  There is mild-moderate right neuroforaminal stenosis.  No left neuroforaminal stenosis.  No central canal stenosis.  There is ligamentum flavum thickening and facet arthropathy.  There is a broad left extraforaminal disc protrusion at L5-S1 which abuts the exited left L5 nerve in the left extraforaminal soft tissues (series 6 image 17), nonspecific.  Please correlate clinically for symptoms referable to the left L5 nerve.    05/28/2019 MRI cervical spine  C2-3: No disc herniation, spinal canal narrowing, or neuroforaminal narrowing.  C3-4: There is moderate broad-based posterior disc osteophyte complex formation with left paracentral predominance.  This along with facet arthropathy contributes to mild spinal canal narrowing and moderate left neuroforaminal narrowing.  C4-5: There is moderate broad-based posterior disc osteophyte complex formation with left paracentral prominence.  This along with facet arthropathy contributes to mild bilateral neuroforaminal narrowing and mild spinal canal narrowing.  C5-6: Moderate broad-based posterior disc osteophyte complex formation and facet arthropathy result in moderate spinal canal narrowing and moderate right and mild left neuroforaminal narrowing.  C6-7: Moderate broad-based posterior disc osteophyte complex formation and facet arthropathy result in mild spinal canal narrowing along with moderate right and severe left neuroforaminal narrowing.  C7-T1: Minimal broad-based posterior disc osteophyte complex formation and bilateral facet arthropathy result in mild  bilateral neuroforaminal narrowing.      05/28/2019 MRI lumbar spine  T12-L1: No disc herniation, spinal canal narrowing, or neuroforaminal narrowing.  L1-2: No disc herniation, spinal canal narrowing, or neuroforaminal narrowing.  L2-3: No disc herniation, spinal canal narrowing, or neuroforaminal narrowing.  L3-4: No disc herniation, spinal canal narrowing, or neuroforaminal narrowing.  L4-5: There is moderate generalized disc bulging and bilateral facet arthropathy, which result in mild spinal canal narrowing and mild bilateral neuroforaminal narrowing.  L5-S1: There is a small right paracentral disc protrusion.  This effaces the right lateral recess and may impinge upon the descending right S1 nerve root.  Bilateral facet arthropathy is present and there is resultant overall mild spinal canal narrowing.  Moderate right and mild left neuroforaminal narrowing are also present.        Assessment:  The patient is a 58-year-old woman with a history of diabetes, hypertension who presents in referral from [facility] for neck pain radiating into the arms and low back pain radiating into the left leg.     1. Cervical radiculopathy     2. DDD (degenerative disc disease), cervical     3. Cervical spinal stenosis     4. Lumbar radiculopathy     5. DDD (degenerative disc disease), lumbar     6. Opioid contract exists           Plan:  1.  Dr. Galan provided prescriptions for hydrocodone-acetaminophen 10/325 mg up to 3 times daily as needed for pain. Her last urine drug screen was consistent.  This will be repeated when she is due again for prescriptions.  I have reviewed the [place] Board of Pharmacy website and there are no abberancies.    2.  She would likely benefit from a cervical HERACLIO.  However, she has been having recurrent sinus infections and is going to follow-up with ENT this afternoon.  Once this resolves, she will call to schedule a cervical HERACLIO.  If she does not have lasting relief, I  will send her to Neurosurgery.  We also discussed scheduling a lumbar epidural steroid injection in the future if necessary.  3.  Follow-up in 3 months or sooner as needed.

## 2019-11-14 ENCOUNTER — LAB VISIT (OUTPATIENT)
Dept: LAB | Facility: HOSPITAL | Age: 59
End: 2019-11-14
Attending: NURSE PRACTITIONER
Payer: COMMERCIAL

## 2019-11-14 DIAGNOSIS — E11.9 TYPE 2 DIABETES MELLITUS WITHOUT COMPLICATION, WITH LONG-TERM CURRENT USE OF INSULIN: ICD-10-CM

## 2019-11-14 DIAGNOSIS — Z79.4 TYPE 2 DIABETES MELLITUS WITHOUT COMPLICATION, WITH LONG-TERM CURRENT USE OF INSULIN: ICD-10-CM

## 2019-11-14 LAB
ALBUMIN SERPL BCP-MCNC: 4 G/DL (ref 3.5–5.2)
ALP SERPL-CCNC: 66 U/L (ref 55–135)
ALT SERPL W/O P-5'-P-CCNC: 15 U/L (ref 10–44)
ANION GAP SERPL CALC-SCNC: 10 MMOL/L (ref 8–16)
AST SERPL-CCNC: 17 U/L (ref 10–40)
BILIRUB SERPL-MCNC: 0.7 MG/DL (ref 0.1–1)
BUN SERPL-MCNC: 8 MG/DL (ref 6–20)
CALCIUM SERPL-MCNC: 9.8 MG/DL (ref 8.7–10.5)
CHLORIDE SERPL-SCNC: 107 MMOL/L (ref 95–110)
CHOLEST SERPL-MCNC: 130 MG/DL (ref 120–199)
CHOLEST/HDLC SERPL: 3.2 {RATIO} (ref 2–5)
CO2 SERPL-SCNC: 25 MMOL/L (ref 23–29)
CREAT SERPL-MCNC: 0.7 MG/DL (ref 0.5–1.4)
EST. GFR  (AFRICAN AMERICAN): >60 ML/MIN/1.73 M^2
EST. GFR  (NON AFRICAN AMERICAN): >60 ML/MIN/1.73 M^2
ESTIMATED AVG GLUCOSE: 177 MG/DL (ref 68–131)
GLUCOSE SERPL-MCNC: 133 MG/DL (ref 70–110)
HBA1C MFR BLD HPLC: 7.8 % (ref 4–5.6)
HDLC SERPL-MCNC: 41 MG/DL (ref 40–75)
HDLC SERPL: 31.5 % (ref 20–50)
LDLC SERPL CALC-MCNC: 70.8 MG/DL (ref 63–159)
NONHDLC SERPL-MCNC: 89 MG/DL
POTASSIUM SERPL-SCNC: 3.7 MMOL/L (ref 3.5–5.1)
PROT SERPL-MCNC: 7.8 G/DL (ref 6–8.4)
SODIUM SERPL-SCNC: 142 MMOL/L (ref 136–145)
TRIGL SERPL-MCNC: 91 MG/DL (ref 30–150)

## 2019-11-14 PROCEDURE — 80053 COMPREHEN METABOLIC PANEL: CPT

## 2019-11-14 PROCEDURE — 83036 HEMOGLOBIN GLYCOSYLATED A1C: CPT

## 2019-11-14 PROCEDURE — 36415 COLL VENOUS BLD VENIPUNCTURE: CPT | Mod: PO

## 2019-11-14 PROCEDURE — 80061 LIPID PANEL: CPT

## 2019-11-27 ENCOUNTER — OFFICE VISIT (OUTPATIENT)
Dept: ORTHOPEDICS | Facility: CLINIC | Age: 59
End: 2019-11-27
Payer: COMMERCIAL

## 2019-11-27 VITALS — HEIGHT: 66 IN | RESPIRATION RATE: 18 BRPM | BODY MASS INDEX: 32.24 KG/M2 | WEIGHT: 200.63 LBS

## 2019-11-27 DIAGNOSIS — M65.30 ACQUIRED TRIGGER FINGER: Primary | ICD-10-CM

## 2019-11-27 PROCEDURE — 20550 TENDON SHEATH: L THUMB MCP: ICD-10-PCS | Mod: 51,F5,S$GLB, | Performed by: ORTHOPAEDIC SURGERY

## 2019-11-27 PROCEDURE — 99213 OFFICE O/P EST LOW 20 MIN: CPT | Mod: 25,S$GLB,, | Performed by: ORTHOPAEDIC SURGERY

## 2019-11-27 PROCEDURE — 3008F BODY MASS INDEX DOCD: CPT | Mod: CPTII,S$GLB,, | Performed by: ORTHOPAEDIC SURGERY

## 2019-11-27 PROCEDURE — 99999 PR PBB SHADOW E&M-EST. PATIENT-LVL III: ICD-10-PCS | Mod: PBBFAC,,, | Performed by: ORTHOPAEDIC SURGERY

## 2019-11-27 PROCEDURE — 99999 PR PBB SHADOW E&M-EST. PATIENT-LVL III: CPT | Mod: PBBFAC,,, | Performed by: ORTHOPAEDIC SURGERY

## 2019-11-27 PROCEDURE — 20550 NJX 1 TENDON SHEATH/LIGAMENT: CPT | Mod: 51,F5,S$GLB, | Performed by: ORTHOPAEDIC SURGERY

## 2019-11-27 PROCEDURE — 3008F PR BODY MASS INDEX (BMI) DOCUMENTED: ICD-10-PCS | Mod: CPTII,S$GLB,, | Performed by: ORTHOPAEDIC SURGERY

## 2019-11-27 PROCEDURE — 20550 NJX 1 TENDON SHEATH/LIGAMENT: CPT | Mod: F7,S$GLB,, | Performed by: ORTHOPAEDIC SURGERY

## 2019-11-27 PROCEDURE — 99213 PR OFFICE/OUTPT VISIT, EST, LEVL III, 20-29 MIN: ICD-10-PCS | Mod: 25,S$GLB,, | Performed by: ORTHOPAEDIC SURGERY

## 2019-11-27 RX ORDER — TRIAMCINOLONE ACETONIDE 40 MG/ML
40 INJECTION, SUSPENSION INTRA-ARTICULAR; INTRAMUSCULAR
Status: DISCONTINUED | OUTPATIENT
Start: 2019-11-27 | End: 2019-11-27 | Stop reason: HOSPADM

## 2019-11-27 RX ADMIN — TRIAMCINOLONE ACETONIDE 40 MG: 40 INJECTION, SUSPENSION INTRA-ARTICULAR; INTRAMUSCULAR at 01:11

## 2019-11-27 NOTE — PROCEDURES
Tendon Sheath: R long MCP  Date/Time: 11/27/2019 1:00 PM  Performed by: Anurag Mathis MD  Authorized by: Anurag Mathis MD     Consent Done?: Yes (Verbal)  Timeout: prior to procedure the correct patient, procedure, and site was verified   Indications:  Pain  Site marked: the procedure site was marked    Timeout: prior to procedure the correct patient, procedure, and site was verified    Location:  Long finger  Site:  R long MCP  Prep: patient was prepped and draped in usual sterile fashion    Ultrasonic guidance for needle placement?: No    Needle size:  22 G  Approach:  Volar  Medications:  40 mg triamcinolone acetonide 40 mg/mL  Patient tolerance:  Patient tolerated the procedure well with no immediate complications

## 2019-11-27 NOTE — PROGRESS NOTES
Past Medical History:   Diagnosis Date    Arthritis     Asthma     Cataract     OU    Diabetes mellitus, type 2     Gastric ulcer     Hypertension     Mitral valve prolapse     RYLAN (obstructive sleep apnea)     Snores     never had sleep study, unable to lay flat, sleeps elevated    Thyroid disease     Goiter       Past Surgical History:   Procedure Laterality Date    CARPAL TUNNEL RELEASE Left     CARPAL TUNNEL RELEASE Right 2018    Procedure: RELEASE, CARPAL TUNNEL;  Surgeon: Anurag Mathis MD;  Location: [facility];  Service: Orthopedics;  Laterality: Right;     SECTION      CHOLECYSTECTOMY      DILATION AND CURETTAGE OF UTERUS      Epidural steroid Injection      Pain management, cervical    ESOPHAGEAL DILATION      ulcer on tongue removed      WOUND DEBRIDEMENT Left     leg at ankle level       Current Outpatient Medications   Medication Sig    ALBUTEROL SULFATE (PROAIR HFA INHL) Inhale into the lungs.    amitriptyline (ELAVIL) 25 MG tablet Take 25 mg by mouth nightly as needed for Insomnia.    amlodipine (NORVASC) 10 MG tablet 10 mg.     benazepril (LOTENSIN) 40 MG tablet Take 40 mg by mouth once daily.    benzonatate (TESSALON) 100 MG capsule Take 100 mg by mouth.    desloratadine (CLARINEX) 5 mg tablet Take 5 mg by mouth.    ergocalciferol (ERGOCALCIFEROL) 50,000 unit Cap Take 1 capsule (50,000 Units total) by mouth every 7 days.    gabapentin (NEURONTIN) 600 MG tablet Take 600 mg by mouth 2 (two) times daily.    glipiZIDE (GLUCOTROL) 5 MG tablet Take 1 tablet (5 mg total) by mouth 2 (two) times daily with meals.    HYDROcodone-acetaminophen (NORCO)  mg per tablet Take 1 tablet by mouth 3 (three) times daily as needed.    [START ON 2019] HYDROcodone-acetaminophen (NORCO)  mg per tablet Take 1 tablet by mouth 3 (three) times daily as needed.    [START ON 2019] HYDROcodone-acetaminophen (NORCO)  mg per tablet Take 1 tablet  by mouth 3 (three) times daily as needed.    insulin aspart U-100 (NOVOLOG FLEXPEN U-100 INSULIN) 100 unit/mL InPn pen Use on premeal readings; 150-200=+2, 201-250=+4; 251-300=+6; 301-350=+8, over 350, + 10 unitsUse on premeal readings; 150-200=+2, 201-250=+4; 251-300=+6; 301-350=+8, over 350, + 10 units    insulin glargine, TOUJEO, (TOUJEO SOLOSTAR U-300 INSULIN) 300 unit/mL (1.5 mL) InPn pen Inject 45 Units into the skin every evening.    linaGLIPtin (TRADJENTA) 5 mg Tab tablet Take 1 tablet (5 mg total) by mouth once daily.    meloxicam (MOBIC) 15 MG tablet TAKE  ONE TABLET BY MOUTH DAILY    mometasone (NASONEX) 50 mcg/actuation nasal spray     montelukast (SINGULAIR) 10 mg tablet Take 10 mg by mouth.    nebivolol (BYSTOLIC) 10 MG Tab Take 10 mg by mouth.    ranitidine (ZANTAC) 300 MG tablet Take 300 mg by mouth.    rosuvastatin (CRESTOR) 10 MG tablet Take 10 mg by mouth once daily.    SYMBICORT 160-4.5 mcg/actuation HFAA     tiZANidine 2 mg Cap Take 1 capsule (2 mg total) by mouth 3 (three) times daily as needed.    TRUE METRIX GLUCOSE TEST STRIP Strp      No current facility-administered medications for this visit.        Review of patient's allergies indicates:   Allergen Reactions    Pcn [penicillins] Itching       Family History   Problem Relation Age of Onset    Diabetes Father     Diabetes Sister     Heart disease Brother     Glaucoma Paternal Aunt     Glaucoma Daughter         suspect       Social History     Socioeconomic History    Marital status:      Spouse name: Not on file    Number of children: Not on file    Years of education: Not on file    Highest education level: Not on file   Occupational History    Not on file   Social Needs    Financial resource strain: Not on file    Food insecurity:     Worry: Not on file     Inability: Not on file    Transportation needs:     Medical: Not on file     Non-medical: Not on file   Tobacco Use    Smoking status: Never Smoker     Smokeless tobacco: Never Used   Substance and Sexual Activity    Alcohol use: No     Alcohol/week: 0.0 standard drinks    Drug use: Yes     Types: Hydrocodone    Sexual activity: Yes     Partners: Male   Lifestyle    Physical activity:     Days per week: Not on file     Minutes per session: Not on file    Stress: Not on file   Relationships    Social connections:     Talks on phone: Not on file     Gets together: Not on file     Attends Confucianism service: Not on file     Active member of club or organization: Not on file     Attends meetings of clubs or organizations: Not on file     Relationship status: Not on file   Other Topics Concern    Not on file   Social History Narrative        5 children    Prior secretarial work        Chief Complaint:   Chief Complaint   Patient presents with    Hand Pain     R hand        Date of surgery:  September 14, 2018    History of present illness:  59-year-old female right-hand-dominant female seen for right middle finger triggering as well as some left thumb pain and weakness. Pain is 6/10.  No prior treatment for either of these digits.    Review of Systems:    Musculoskeletal:  See HPI        Physical Examination:    Vital Signs:    Vitals:    11/27/19 1323   Resp: 18       Body mass index is 32.38 kg/m².    This a well-developed, well nourished patient in no acute distress.  They are alert and oriented and cooperative to examination.  Pt. walks without an antalgic gait.      Examination of the left hand shows no rashes or erythema.  Pain over the A1 pulley of the thumb.  Full range of motion of the hand and wrist. 2+ radial pulse.  Intact light touch sensation.    Examination of the right hand shows no rashes or erythema.  Tenderness over the A1 pulley of the middle finger.  She is able to make a full fist.  2+ radial pulse intact light touch sensation.    X-rays:  X-rays left hand are  reviewed which show no significant pathology     Assessment:  Right  middle finger trigger finger.  Left thumb tendinitis    Plan:  I reviewed the findings with her today.  We talked about repeating the trigger finger injections and she agreed.  We talked about trigger finger release as well.  I injected her right middle and left thumb trigger finger.  Follow-up as needed.    This note was created using [employer] voice recognition software that occasionally misinterpreted phrases or words.

## 2019-11-27 NOTE — PROCEDURES
Tendon Sheath: L thumb MCP  Date/Time: 11/27/2019 1:00 PM  Performed by: Anurag Mathis MD  Authorized by: Anurag Mathis MD     Consent Done?: Yes (Verbal)  Timeout: prior to procedure the correct patient, procedure, and site was verified   Indications:  Pain  Site marked: the procedure site was marked    Timeout: prior to procedure the correct patient, procedure, and site was verified    Location:  Thumb  Site:  L thumb MCP  Prep: patient was prepped and draped in usual sterile fashion    Ultrasonic guidance for needle placement?: No    Needle size:  22 G  Approach:  Volar  Medications:  40 mg triamcinolone acetonide 40 mg/mL  Patient tolerance:  Patient tolerated the procedure well with no immediate complications

## 2019-12-10 ENCOUNTER — OFFICE VISIT (OUTPATIENT)
Dept: ENDOCRINOLOGY | Facility: CLINIC | Age: 59
End: 2019-12-10
Payer: COMMERCIAL

## 2019-12-10 VITALS
WEIGHT: 198.94 LBS | HEIGHT: 66 IN | SYSTOLIC BLOOD PRESSURE: 136 MMHG | BODY MASS INDEX: 31.97 KG/M2 | RESPIRATION RATE: 16 BRPM | HEART RATE: 74 BPM | DIASTOLIC BLOOD PRESSURE: 70 MMHG

## 2019-12-10 DIAGNOSIS — E11.9 TYPE 2 DIABETES MELLITUS WITHOUT COMPLICATION, WITH LONG-TERM CURRENT USE OF INSULIN: Primary | ICD-10-CM

## 2019-12-10 DIAGNOSIS — Z79.4 TYPE 2 DIABETES MELLITUS WITHOUT COMPLICATION, WITH LONG-TERM CURRENT USE OF INSULIN: Primary | ICD-10-CM

## 2019-12-10 PROCEDURE — 99214 PR OFFICE/OUTPT VISIT, EST, LEVL IV, 30-39 MIN: ICD-10-PCS | Mod: S$GLB,,, | Performed by: NURSE PRACTITIONER

## 2019-12-10 PROCEDURE — 99214 OFFICE O/P EST MOD 30 MIN: CPT | Mod: S$GLB,,, | Performed by: NURSE PRACTITIONER

## 2019-12-10 PROCEDURE — 99999 PR PBB SHADOW E&M-EST. PATIENT-LVL IV: ICD-10-PCS | Mod: PBBFAC,,, | Performed by: NURSE PRACTITIONER

## 2019-12-10 PROCEDURE — 99999 PR PBB SHADOW E&M-EST. PATIENT-LVL IV: CPT | Mod: PBBFAC,,, | Performed by: NURSE PRACTITIONER

## 2019-12-10 NOTE — PROGRESS NOTES
Subjective:       Patient ID: Aaron Garcia is a 59 y.o. female.    Chief Complaint: Type 2 Dm      Pt is a 59 y.o. AAF  with a diagnosis of Type 2 diabetes mellitus diagnosed approximately 2001, as well as chronic conditions pending review including HTN, HLP.  Other pertinent medical and social information noted includes, but not limited to: chronic pain r/t cervical radiculopathy affecting UA.      Interim Events: States had sinusitis in interim, as well as steroid shot hands, and also TG, so glucoses were elevated. Remote isolated incident of hypoglycemia associated with use of novolog ss during illness.  No focal complaints.     .  Checks glucoses 3 x a day   FBS--100 or slightly over   Lunch--140-150  Supper--130-140.     Diabetes Flow Sheet:  Diabetes Medications: Toujeo 45 qhs, tradjenta 5 mg, glipizide 5 mg bid       If insulin vial or pen preference:   Prior failed/or not tolerated medication therapies:  Diabetes Complications:   Aspirin:    Statin: crestor 10   ACE/ARB:benazepril 40   Last Urine Microalbumin:    Last Eye exam:   Last Diabetic Education: 18 mo   Glucometer:Truemetrix       Review of Systems   Constitutional: Negative for activity change and fatigue.   HENT: Negative for hearing loss and trouble swallowing.    Eyes: Negative for photophobia and visual disturbance.        Last Eye Exam   Respiratory: Negative for cough and shortness of breath.    Cardiovascular: Negative for chest pain and palpitations.   Gastrointestinal: Negative for constipation and diarrhea.   Genitourinary: Negative for frequency and urgency.   Musculoskeletal: Negative for arthralgias and myalgias.   Skin: Negative for rash and wound.   Allergic/Immunologic: Negative for food allergies.   Neurological: Negative for dizziness, weakness, light-headedness and numbness.   Psychiatric/Behavioral: Negative for sleep disturbance. The patient is not nervous/anxious.        Objective:      Physical Exam   Constitutional:  She is oriented to person, place, and time. She appears well-developed and well-nourished.   Appears approx age, appropriately groomed    HENT:   Head: Normocephalic and atraumatic.   Nose: Nose normal.   Mouth/Throat: Oropharynx is clear and moist.   Upper dentures, MM pale    Eyes: Pupils are equal, round, and reactive to light. Conjunctivae and EOM are normal.   Neck: Normal range of motion. Neck supple. No tracheal deviation present. Thyromegaly present.   Cardiovascular: Normal rate, regular rhythm, normal heart sounds and intact distal pulses.   Pulmonary/Chest: Effort normal and breath sounds normal.   Musculoskeletal: Normal range of motion. She exhibits no deformity.   Deferred  Feet: no open wounds or calluses.  Good pedal care.   Vibratory sensation to feet intact bilaterally,    Neurological: She is alert and oriented to person, place, and time.   Skin: Skin is warm and dry.   Acanthosis nigricans   Psychiatric: She has a normal mood and affect. Her behavior is normal. Judgment and thought content normal.       Hemoglobin A1C   Date Value Ref Range Status   11/14/2019 7.8 (H) 4.0 - 5.6 % Final     Comment:     ADA Screening Guidelines:  5.7-6.4%  Consistent with prediabetes  >or=6.5%  Consistent with diabetes  High levels of fetal hemoglobin interfere with the HbA1C  assay. Heterozygous hemoglobin variants (HbS, HgC, etc)do  not significantly interfere with this assay.   However, presence of multiple variants may affect accuracy.     07/10/2019 6.7 (H) 4.0 - 5.6 % Final     Comment:     ADA Screening Guidelines:  5.7-6.4%  Consistent with prediabetes  >or=6.5%  Consistent with diabetes  High levels of fetal hemoglobin interfere with the HbA1C  assay. Heterozygous hemoglobin variants (HbS, HgC, etc)do  not significantly interfere with this assay.   However, presence of multiple variants may affect accuracy.     02/27/2019 8.1 (H) 4.0 - 5.6 % Final     Comment:     ADA Screening Guidelines:  5.7-6.4%   Consistent with prediabetes  >or=6.5%  Consistent with diabetes  High levels of fetal hemoglobin interfere with the HbA1C  assay. Heterozygous hemoglobin variants (HbS, HgC, etc)do  not significantly interfere with this assay.   However, presence of multiple variants may affect accuracy.         Chemistry        Component Value Date/Time     11/14/2019 1113    K 3.7 11/14/2019 1113     11/14/2019 1113    CO2 25 11/14/2019 1113    BUN 8 11/14/2019 1113    CREATININE 0.7 11/14/2019 1113     (H) 11/14/2019 1113        Component Value Date/Time    CALCIUM 9.8 11/14/2019 1113    ALKPHOS 66 11/14/2019 1113    AST 17 11/14/2019 1113    ALT 15 11/14/2019 1113    BILITOT 0.7 11/14/2019 1113    ESTGFRAFRICA >60.0 11/14/2019 1113    EGFRNONAA >60.0 11/14/2019 1113        Lab Results   Component Value Date    LDLCALC 70.8 11/14/2019     Lab Results   Component Value Date    TSH 0.836 12/05/2018         Assessment:         1. Type 2 diabetes mellitus without complication, with long-term current use of insulin  Hemoglobin A1c  Chronic-stable-no change    2. Essential hypertension  Chronic-stable-cont benazepril 40 mg    3. Other hyperlipidemia  [id number] rosuvastatin 40 mg    4. Vitamin D deficiency  [id number] Vit D 2000 iu qd   5.     Adverse effect of steroids.        Plan:         Toujeo 45    40 units  qhs  Continue glipizide  Continue tradjenta   Novolog SS prn (steroid use).     See D/c instructions for recs with steroids.     ORDERS 12/10/2019    F/u me in 6  Mo with a1c prior

## 2019-12-22 DIAGNOSIS — Z79.4 TYPE 2 DIABETES MELLITUS WITHOUT COMPLICATION, WITH LONG-TERM CURRENT USE OF INSULIN: ICD-10-CM

## 2019-12-22 DIAGNOSIS — E11.9 TYPE 2 DIABETES MELLITUS WITHOUT COMPLICATION, WITH LONG-TERM CURRENT USE OF INSULIN: ICD-10-CM

## 2019-12-24 DIAGNOSIS — Z79.4 TYPE 2 DIABETES MELLITUS WITHOUT COMPLICATION, WITH LONG-TERM CURRENT USE OF INSULIN: ICD-10-CM

## 2019-12-24 DIAGNOSIS — E11.9 TYPE 2 DIABETES MELLITUS WITHOUT COMPLICATION, WITH LONG-TERM CURRENT USE OF INSULIN: ICD-10-CM

## 2019-12-24 RX ORDER — INSULIN ASPART 100 [IU]/ML
INJECTION, SOLUTION INTRAVENOUS; SUBCUTANEOUS
Qty: 3 ML | Status: SHIPPED | OUTPATIENT
Start: 2019-12-24 | End: 2020-04-28

## 2019-12-24 RX ORDER — INSULIN ASPART 100 [IU]/ML
INJECTION, SOLUTION INTRAVENOUS; SUBCUTANEOUS
Qty: 1 BOX | Status: SHIPPED | OUTPATIENT
Start: 2019-12-24 | End: 2020-04-28

## 2020-01-02 RX ORDER — TIZANIDINE HYDROCHLORIDE 2 MG/1
1 CAPSULE, GELATIN COATED ORAL 3 TIMES DAILY PRN
Qty: 90 CAPSULE | Refills: 2 | Status: SHIPPED | OUTPATIENT
Start: 2020-01-02 | End: 2020-04-06 | Stop reason: SDUPTHER

## 2020-01-07 ENCOUNTER — OFFICE VISIT (OUTPATIENT)
Dept: PAIN MEDICINE | Facility: CLINIC | Age: 60
End: 2020-01-07
Payer: COMMERCIAL

## 2020-01-07 VITALS
TEMPERATURE: 97 F | RESPIRATION RATE: 18 BRPM | DIASTOLIC BLOOD PRESSURE: 66 MMHG | OXYGEN SATURATION: 95 % | BODY MASS INDEX: 32.36 KG/M2 | WEIGHT: 200.5 LBS | HEART RATE: 77 BPM | SYSTOLIC BLOOD PRESSURE: 139 MMHG

## 2020-01-07 DIAGNOSIS — Z79.891 OPIOID CONTRACT EXISTS: ICD-10-CM

## 2020-01-07 DIAGNOSIS — M54.16 LUMBAR RADICULOPATHY: ICD-10-CM

## 2020-01-07 DIAGNOSIS — M54.12 CERVICAL RADICULOPATHY: Primary | ICD-10-CM

## 2020-01-07 DIAGNOSIS — M51.36 DDD (DEGENERATIVE DISC DISEASE), LUMBAR: ICD-10-CM

## 2020-01-07 DIAGNOSIS — M50.30 DDD (DEGENERATIVE DISC DISEASE), CERVICAL: ICD-10-CM

## 2020-01-07 DIAGNOSIS — M48.02 CERVICAL SPINAL STENOSIS: ICD-10-CM

## 2020-01-07 PROCEDURE — 80307 DRUG TEST PRSMV CHEM ANLYZR: CPT

## 2020-01-07 PROCEDURE — 3008F BODY MASS INDEX DOCD: CPT | Mod: CPTII,S$GLB,, | Performed by: PHYSICIAN ASSISTANT

## 2020-01-07 PROCEDURE — 3075F SYST BP GE 130 - 139MM HG: CPT | Mod: CPTII,S$GLB,, | Performed by: PHYSICIAN ASSISTANT

## 2020-01-07 PROCEDURE — 99999 PR PBB SHADOW E&M-EST. PATIENT-LVL V: ICD-10-PCS | Mod: PBBFAC,,, | Performed by: PHYSICIAN ASSISTANT

## 2020-01-07 PROCEDURE — 3008F PR BODY MASS INDEX (BMI) DOCUMENTED: ICD-10-PCS | Mod: CPTII,S$GLB,, | Performed by: PHYSICIAN ASSISTANT

## 2020-01-07 PROCEDURE — 99213 OFFICE O/P EST LOW 20 MIN: CPT | Mod: S$GLB,,, | Performed by: PHYSICIAN ASSISTANT

## 2020-01-07 PROCEDURE — 3075F PR MOST RECENT SYSTOLIC BLOOD PRESS GE 130-139MM HG: ICD-10-PCS | Mod: CPTII,S$GLB,, | Performed by: PHYSICIAN ASSISTANT

## 2020-01-07 PROCEDURE — 99213 PR OFFICE/OUTPT VISIT, EST, LEVL III, 20-29 MIN: ICD-10-PCS | Mod: S$GLB,,, | Performed by: PHYSICIAN ASSISTANT

## 2020-01-07 PROCEDURE — 3078F DIAST BP <80 MM HG: CPT | Mod: CPTII,S$GLB,, | Performed by: PHYSICIAN ASSISTANT

## 2020-01-07 PROCEDURE — 99999 PR PBB SHADOW E&M-EST. PATIENT-LVL V: CPT | Mod: PBBFAC,,, | Performed by: PHYSICIAN ASSISTANT

## 2020-01-07 PROCEDURE — 3078F PR MOST RECENT DIASTOLIC BLOOD PRESSURE < 80 MM HG: ICD-10-PCS | Mod: CPTII,S$GLB,, | Performed by: PHYSICIAN ASSISTANT

## 2020-01-07 RX ORDER — HYDROCODONE BITARTRATE AND ACETAMINOPHEN 10; 325 MG/1; MG/1
1 TABLET ORAL 3 TIMES DAILY PRN
Qty: 90 TABLET | Refills: 0 | Status: SHIPPED | OUTPATIENT
Start: 2020-01-28 | End: 2020-02-27

## 2020-01-07 RX ORDER — HYDROCODONE BITARTRATE AND ACETAMINOPHEN 10; 325 MG/1; MG/1
1 TABLET ORAL 3 TIMES DAILY PRN
Qty: 90 TABLET | Refills: 0 | Status: SHIPPED | OUTPATIENT
Start: 2020-02-27 | End: 2020-03-28

## 2020-01-07 RX ORDER — HYDROCODONE BITARTRATE AND ACETAMINOPHEN 10; 325 MG/1; MG/1
1 TABLET ORAL 3 TIMES DAILY PRN
Qty: 90 TABLET | Refills: 0 | Status: SHIPPED | OUTPATIENT
Start: 2020-03-28 | End: 2020-04-27 | Stop reason: SDUPTHER

## 2020-01-08 NOTE — PROGRESS NOTES
This note was completed with dictation software and grammatical errors may exist.    CC: Neck pain, arm pain, back pain and leg pain    HPI: The patient is a 59-year-old woman with a history of diabetes, hypertension who presents in referral from [facility] for neck pain radiating into the arms and low back pain radiating into the left leg.  She returns in follow-up today with neck pain greater than back pain. She states that her neck pain is about the same and her back pain may be slightly better than usual.  Her neck pain radiates into both arms in her low back pain radiates into the right buttock and right leg.  She had wanted to have an epidural steroid injection for her neck but has been having some recurrent sinus infections and has been following with ENT.  She reports having some intermittent upper extremity weakness.  She reports intermittent bladder incontinence.  She denies bowel incontinence or any numbness at this time.    Pain intervention history: She has been taking hydrocodone 10/325 3 times a day for the last year, previous to this she was taking Percocet 3 times a day but states that it was causing her panic attacks, did help with her pain slightly better.  She has been taking gabapentin 600mg twice a day and Zanaflex at night with some relief.  She had undergone 3 epidurals for low back pain with only up to 3 weeks of relief each time.  She is status post C7-T1 cervical interlaminar epidural steroid injection on 1/29/16 with 50% relief.   She is status post C7-T1 cervical interlaminar epidural steroid injection on 3/1/16 with mild additional relief.  She is status post C7-T1 cervical interlaminar epidural steroid injection on 5/13/16 with 70-75% relief.  She is status post right C3, 4, 5 and 6 medial branch radiofrequency ablation on 7/15/16 with 30-60% relief.  She is status post L5/S1 interlaminar epidural steroid injection on 5/16/17 with 50% relief.  She is status post  L5/S1 interlaminar epidural steroid injection on 12/29/17 with excellent relief lasting 2 weeks, now reporting minimal relief of her low back and buttock pain but ongoing 100% relief of her right leg pain.  She is status post C7-T1 cervical interlaminar epidural steroid injection on 3/14/18 with 75% relief but this was not long lasting.     Urine drug screen 1/5/16  Negative and inconsistent for hydrocodone but positive and consistent for its metabolite hydromorphone    Urine drug screen 2/11/16  Negative and inconsistent for hydrocodone    ROS: She reports headaches, hoarse voice, joint stiffness, joint swelling, back pain, difficulty sleeping, anxiety and loss of balance.  Balance of review of systems is negative.    Medical, surgical, family and social history reviewed elsewhere in record.    Medications/Allergies: See med card    Vitals:    01/07/20 1026   BP: 139/66   Pulse: 77   Resp: 18   Temp: 97.3 °F (36.3 °C)   TempSrc: Oral   SpO2: 95%   Weight: 90.9 kg (200 lb 8.1 oz)   PainSc:   4   PainLoc: Neck         Physical exam:  Gen: A and O x3, pleasant, well-groomed  Skin: No rashes or obvious lesions  HEENT: PERRLA, no obvious deformities on ears or in canals.Trachea midline.  CVS: Regular rate and rhythm, normal palpable pulses.  Resp: Clear to auscultation bilaterally, no wheezes or rales.  Abdomen: Soft, NT/ND.  Musculoskeletal: No antalgic gait.     Neuro:  Upper extremities: 4/5 strength bilaterally but question effort  Lower extremities:  4/5 strength bilaterally but question effort  Reflexes: Brachioradialis 2+, Bicep 2+, Tricep 2+.  Patellar and Achilles reflexes 2+ bilaterally.  Sensory: Intact and symmetrical to light touch and pinprick in C2-T1 dermatomes bilaterally.    Cervical Spine:  Cervical spine: Range of motion is mildly reduced with flexion extension and lateral rotation with increased bilateral neck pain.  Spurling's maneuver causes neck pain to either side.    Myofascial exam: No  tenderness to palpation to the cervical paraspinous muscles.    Lumbar spine:  Range of motion is moderately limited with flexion and extension with mild increased pain in the right buttock and right greater than left low back during each maneuver.  Khanh's test is negative bilaterally.  Straight leg raise causes right buttock pain.  Internal and external rotation of hip is negative bilaterally.  Myofascial exam: No tenderness palpation to the lumbar paraspinous muscles.    Imagin/24/15 C-spine MRI  1. C3-4 left posterior paracentral disk extrusion causing left paracentral spinal cord compression and severe left foraminal stenosis.  2. C4-5 based disk extrusion with spinal cord impingement and moderate bilateral foraminal stenosis.  3. C5-6 posterior central disk extrusion with spinal cord compression and severe bilateral foraminal stenosis.  4. C6-7 mild disk bulge with severe bilateral foraminal stenosis.  5. Solitary mildly enlarged left submandibular lymph node of uncertain significance.    4/10/17 MRI lumbar spine  T12-L1: No central canal or neuroforaminal stenosis. No disc protrusion or extrusion.  L1-L2: There is ligamentum flavum thickening and facet arthropathy.  No central canal or neuroforaminal stenosis. No disc protrusion or extrusion.  L2-L3: There is ligamentum flavum thickening and facet arthropathy.  No central canal or neuroforaminal stenosis. No disc protrusion or extrusion.  L3-L4: There is ligamentum flavum thickening and facet arthropathy present. No central canal or neuroforaminal stenosis. No disc protrusion or extrusion.  L4-L5: There is a broad disc bulge which extends into both neural foramina.  There is ligamentum flavum thickening and facet arthropathy.  There is right lateral recess stenosis.  There is abutment of the descending right L5 nerve in the right lateral recess.  Please correlate clinically for symptoms referable to the right L5 nerve.  There is mild central canal  stenosis.  There is mild left neuroforaminal stenosis.  No right neuroforaminal stenosis.  L5-S1: There is a broad central/right paracentral disc protrusion which encroaches upon the descending right S1 nerve in the right lateral recess.  Please correlate clinically for symptoms referable to the right S1 nerve.  There is mild-moderate right neuroforaminal stenosis.  No left neuroforaminal stenosis.  No central canal stenosis.  There is ligamentum flavum thickening and facet arthropathy.  There is a broad left extraforaminal disc protrusion at L5-S1 which abuts the exited left L5 nerve in the left extraforaminal soft tissues (series 6 image 17), nonspecific.  Please correlate clinically for symptoms referable to the left L5 nerve.    05/28/2019 MRI cervical spine  C2-3: No disc herniation, spinal canal narrowing, or neuroforaminal narrowing.  C3-4: There is moderate broad-based posterior disc osteophyte complex formation with left paracentral predominance.  This along with facet arthropathy contributes to mild spinal canal narrowing and moderate left neuroforaminal narrowing.  C4-5: There is moderate broad-based posterior disc osteophyte complex formation with left paracentral prominence.  This along with facet arthropathy contributes to mild bilateral neuroforaminal narrowing and mild spinal canal narrowing.  C5-6: Moderate broad-based posterior disc osteophyte complex formation and facet arthropathy result in moderate spinal canal narrowing and moderate right and mild left neuroforaminal narrowing.  C6-7: Moderate broad-based posterior disc osteophyte complex formation and facet arthropathy result in mild spinal canal narrowing along with moderate right and severe left neuroforaminal narrowing.  C7-T1: Minimal broad-based posterior disc osteophyte complex formation and bilateral facet arthropathy result in mild bilateral neuroforaminal narrowing.      05/28/2019 MRI lumbar spine  T12-L1: No disc herniation, spinal  canal narrowing, or neuroforaminal narrowing.  L1-2: No disc herniation, spinal canal narrowing, or neuroforaminal narrowing.  L2-3: No disc herniation, spinal canal narrowing, or neuroforaminal narrowing.  L3-4: No disc herniation, spinal canal narrowing, or neuroforaminal narrowing.  L4-5: There is moderate generalized disc bulging and bilateral facet arthropathy, which result in mild spinal canal narrowing and mild bilateral neuroforaminal narrowing.  L5-S1: There is a small right paracentral disc protrusion.  This effaces the right lateral recess and may impinge upon the descending right S1 nerve root.  Bilateral facet arthropathy is present and there is resultant overall mild spinal canal narrowing.  Moderate right and mild left neuroforaminal narrowing are also present.        Assessment:  The patient is a 59-year-old woman with a history of diabetes, hypertension who presents in referral from [facility] for neck pain radiating into the arms and low back pain radiating into the left leg.     1. Cervical radiculopathy     2. DDD (degenerative disc disease), cervical     3. Cervical spinal stenosis     4. Lumbar radiculopathy     5. DDD (degenerative disc disease), lumbar     6. Opioid contract exists  [facility] Drug Screen         Plan:  1.  Dr. Galan provided prescriptions for hydrocodone-acetaminophen 10/325 mg up to 3 times daily as needed for pain. A urine drug screen was completed today.  I have reviewed the [place] Board of Pharmacy website and there are no abberancies.    2.  She will call in the future to possibly schedule a cervical HERACLIO but we will need to make sure her hemoglobin A1c is within an acceptable range.  3.  Follow-up in 3 months or sooner as needed.

## 2020-01-11 LAB
6MAM UR QL: NOT DETECTED
7AMINOCLONAZEPAM UR QL: NOT DETECTED
A-OH ALPRAZ UR QL: NOT DETECTED
ALPRAZ UR QL: NOT DETECTED
AMPHET UR QL SCN: NOT DETECTED
ANNOTATION COMMENT IMP: NORMAL
ANNOTATION COMMENT IMP: NORMAL
BARBITURATES UR QL: NOT DETECTED
BUPRENORPHINE UR QL: NOT DETECTED
BZE UR QL: NOT DETECTED
CARBOXYTHC UR QL: NOT DETECTED
CARISOPRODOL UR QL: NOT DETECTED
CLONAZEPAM UR QL: NOT DETECTED
CODEINE UR QL: NOT DETECTED
CREAT UR-MCNC: 71.8 MG/DL (ref 20–400)
DIAZEPAM UR QL: NOT DETECTED
ETHYL GLUCURONIDE UR QL: NOT DETECTED
FENTANYL UR QL: NOT DETECTED
HYDROCODONE UR QL: PRESENT
HYDROMORPHONE UR QL: NOT DETECTED
LORAZEPAM UR QL: NOT DETECTED
MDA UR QL: NOT DETECTED
MDEA UR QL: NOT DETECTED
MDMA UR QL: NOT DETECTED
ME-PHENIDATE UR QL: NOT DETECTED
MEPERIDINE UR QL: NOT DETECTED
METHADONE UR QL: NOT DETECTED
METHAMPHET UR QL: NOT DETECTED
MIDAZOLAM UR QL SCN: NOT DETECTED
MORPHINE UR QL: NOT DETECTED
NORBUPRENORPHINE UR QL CFM: NOT DETECTED
NORDIAZEPAM UR QL: NOT DETECTED
NORFENTANYL UR QL: NOT DETECTED
NORHYDROCODONE UR QL CFM: PRESENT
NOROXYCODONE UR QL CFM: NOT DETECTED
NOROXYMORPHONE: NOT DETECTED
OXAZEPAM UR QL: NOT DETECTED
OXYCODONE UR QL: NOT DETECTED
OXYMORPHONE UR QL: NOT DETECTED
PATHOLOGY STUDY: NORMAL
PCP UR QL: NOT DETECTED
PHENTERMINE UR QL: NOT DETECTED
PROPOXYPH UR QL: NOT DETECTED
SERVICE CMNT-IMP: NORMAL
TAPENTADOL UR QL SCN: NOT DETECTED
TAPENTADOL-O-SULF: NOT DETECTED
TEMAZEPAM UR QL: NOT DETECTED
TRAMADOL UR QL: NOT DETECTED
ZOLPIDEM UR QL: NOT DETECTED

## 2020-02-21 ENCOUNTER — TELEPHONE (OUTPATIENT)
Dept: PAIN MEDICINE | Facility: CLINIC | Age: 60
End: 2020-02-21

## 2020-02-21 ENCOUNTER — OFFICE VISIT (OUTPATIENT)
Dept: PAIN MEDICINE | Facility: CLINIC | Age: 60
End: 2020-02-21
Payer: COMMERCIAL

## 2020-02-21 ENCOUNTER — HOSPITAL ENCOUNTER (OUTPATIENT)
Dept: RADIOLOGY | Facility: HOSPITAL | Age: 60
Discharge: HOME | End: 2020-02-21
Attending: PHYSICIAN ASSISTANT
Payer: COMMERCIAL

## 2020-02-21 VITALS
TEMPERATURE: 96 F | BODY MASS INDEX: 33.23 KG/M2 | RESPIRATION RATE: 18 BRPM | OXYGEN SATURATION: 97 % | SYSTOLIC BLOOD PRESSURE: 118 MMHG | WEIGHT: 205.94 LBS | HEART RATE: 68 BPM | DIASTOLIC BLOOD PRESSURE: 60 MMHG

## 2020-02-21 DIAGNOSIS — Z79.891 OPIOID CONTRACT EXISTS: ICD-10-CM

## 2020-02-21 DIAGNOSIS — M54.16 LUMBAR RADICULOPATHY: ICD-10-CM

## 2020-02-21 DIAGNOSIS — M50.30 DDD (DEGENERATIVE DISC DISEASE), CERVICAL: ICD-10-CM

## 2020-02-21 DIAGNOSIS — M51.36 DDD (DEGENERATIVE DISC DISEASE), LUMBAR: Primary | ICD-10-CM

## 2020-02-21 DIAGNOSIS — M51.36 DDD (DEGENERATIVE DISC DISEASE), LUMBAR: ICD-10-CM

## 2020-02-21 DIAGNOSIS — M48.02 CERVICAL SPINAL STENOSIS: ICD-10-CM

## 2020-02-21 DIAGNOSIS — M54.12 CERVICAL RADICULOPATHY: ICD-10-CM

## 2020-02-21 PROCEDURE — 3008F BODY MASS INDEX DOCD: CPT | Mod: CPTII,S$GLB,, | Performed by: PHYSICIAN ASSISTANT

## 2020-02-21 PROCEDURE — 99999 PR PBB SHADOW E&M-EST. PATIENT-LVL V: ICD-10-PCS | Mod: PBBFAC,,, | Performed by: PHYSICIAN ASSISTANT

## 2020-02-21 PROCEDURE — 3078F DIAST BP <80 MM HG: CPT | Mod: CPTII,S$GLB,, | Performed by: PHYSICIAN ASSISTANT

## 2020-02-21 PROCEDURE — 72110 X-RAY EXAM L-2 SPINE 4/>VWS: CPT | Mod: TC,PO

## 2020-02-21 PROCEDURE — 99999 PR PBB SHADOW E&M-EST. PATIENT-LVL V: CPT | Mod: PBBFAC,,, | Performed by: PHYSICIAN ASSISTANT

## 2020-02-21 PROCEDURE — 72110 XR LUMBAR SPINE COMPLETE 5 VIEW: ICD-10-PCS | Mod: 26,,, | Performed by: RADIOLOGY

## 2020-02-21 PROCEDURE — 99214 PR OFFICE/OUTPT VISIT, EST, LEVL IV, 30-39 MIN: ICD-10-PCS | Mod: S$GLB,,, | Performed by: PHYSICIAN ASSISTANT

## 2020-02-21 PROCEDURE — 72110 X-RAY EXAM L-2 SPINE 4/>VWS: CPT | Mod: 26,,, | Performed by: RADIOLOGY

## 2020-02-21 PROCEDURE — 3074F PR MOST RECENT SYSTOLIC BLOOD PRESSURE < 130 MM HG: ICD-10-PCS | Mod: CPTII,S$GLB,, | Performed by: PHYSICIAN ASSISTANT

## 2020-02-21 PROCEDURE — 3074F SYST BP LT 130 MM HG: CPT | Mod: CPTII,S$GLB,, | Performed by: PHYSICIAN ASSISTANT

## 2020-02-21 PROCEDURE — 3008F PR BODY MASS INDEX (BMI) DOCUMENTED: ICD-10-PCS | Mod: CPTII,S$GLB,, | Performed by: PHYSICIAN ASSISTANT

## 2020-02-21 PROCEDURE — 99214 OFFICE O/P EST MOD 30 MIN: CPT | Mod: S$GLB,,, | Performed by: PHYSICIAN ASSISTANT

## 2020-02-21 PROCEDURE — 3078F PR MOST RECENT DIASTOLIC BLOOD PRESSURE < 80 MM HG: ICD-10-PCS | Mod: CPTII,S$GLB,, | Performed by: PHYSICIAN ASSISTANT

## 2020-02-21 RX ORDER — KETOROLAC TROMETHAMINE 30 MG/ML
60 INJECTION, SOLUTION INTRAMUSCULAR; INTRAVENOUS
Status: DISCONTINUED | OUTPATIENT
Start: 2020-02-21 | End: 2020-04-28

## 2020-02-21 NOTE — TELEPHONE ENCOUNTER
Please let patient know that I reviewed her x-rays and she does have any fractures.  Have her contact us next week with update on how she is doing.

## 2020-02-23 NOTE — PROGRESS NOTES
This note was completed with dictation software and grammatical errors may exist.    CC: Neck pain, arm pain, back pain and leg pain    HPI: The patient is a 59-year-old woman with a history of diabetes, hypertension who presents in referral from [facility] for neck pain radiating into the arms and low back pain radiating into the left leg.  She returns for an early follow-up today because of a fall.  This occurred 2 nights ago.  She was playing with her grandchild and slipped and fell onto her buttocks in her home.  Since then she has had an increased low back pain that she describes as stabbing.  Was located across the low back with radiation into both legs.  She describes burning in her left leg with walking and pain in her right leg when lying on her right side.  Her low back pain is much worse with standing.  She continues to have neck pain and spasms mainly on the right.  She denies numbness but reports the same intermittent upper extremity weakness.  She denies bowel incontinence.  She reports intermittent bladder incontinence.    Pain intervention history: She has been taking hydrocodone 10/325 3 times a day for the last year, previous to this she was taking Percocet 3 times a day but states that it was causing her panic attacks, did help with her pain slightly better.  She has been taking gabapentin 600mg twice a day and Zanaflex at night with some relief.  She had undergone 3 epidurals for low back pain with only up to 3 weeks of relief each time.  She is status post C7-T1 cervical interlaminar epidural steroid injection on 1/29/16 with 50% relief.   She is status post C7-T1 cervical interlaminar epidural steroid injection on 3/1/16 with mild additional relief.  She is status post C7-T1 cervical interlaminar epidural steroid injection on 5/13/16 with 70-75% relief.  She is status post right C3, 4, 5 and 6 medial branch radiofrequency ablation on 7/15/16 with 30-60% relief.  She is  status post L5/S1 interlaminar epidural steroid injection on 5/16/17 with 50% relief.  She is status post L5/S1 interlaminar epidural steroid injection on 12/29/17 with excellent relief lasting 2 weeks, now reporting minimal relief of her low back and buttock pain but ongoing 100% relief of her right leg pain.  She is status post C7-T1 cervical interlaminar epidural steroid injection on 3/14/18 with 75% relief but this was not long lasting.     Urine drug screen 1/5/16  Negative and inconsistent for hydrocodone but positive and consistent for its metabolite hydromorphone    Urine drug screen 2/11/16  Negative and inconsistent for hydrocodone    ROS: She reports headaches, hoarse voice, joint stiffness, joint swelling, back pain, difficulty sleeping, anxiety and loss of balance.  Balance of review of systems is negative.    Medical, surgical, family and social history reviewed elsewhere in record.    Medications/Allergies: See med card    Vitals:    02/21/20 1306   BP: 118/60   Pulse: 68   Resp: 18   Temp: 96.1 °F (35.6 °C)   TempSrc: Oral   SpO2: 97%   Weight: 93.4 kg (205 lb 14.6 oz)   PainSc:   6   PainLoc: Back         Physical exam:  Gen: A and O x3, pleasant, well-groomed  Skin: No rashes or obvious lesions  HEENT: PERRLA, no obvious deformities on ears or in canals.Trachea midline.  CVS: Regular rate and rhythm, normal palpable pulses.  Resp: Clear to auscultation bilaterally, no wheezes or rales.  Abdomen: Soft, NT/ND.  Musculoskeletal: No antalgic gait.     Neuro:  Upper extremities: 4/5 strength bilaterally but question effort  Lower extremities:  4/5 strength bilaterally but question effort  Reflexes: Brachioradialis 2+, Bicep 2+, Tricep 2+.  Patellar and Achilles reflexes 2+ bilaterally.  Sensory: Intact and symmetrical to light touch and pinprick in C2-T1 dermatomes bilaterally.    Cervical Spine:  Cervical spine: Range of motion is mildly reduced with flexion extension and lateral rotation with  increased bilateral neck pain, worse on the right.  Spurling's maneuver causes neck pain to either side.    Myofascial exam: No tenderness to palpation to the cervical paraspinous muscles.    Lumbar spine:  Range of motion is severely reduced with flexion, extension, oblique extension and lateral rotation with increased midline low back pain during each maneuver.  Khanh's test is negative bilaterally.  Straight leg raise causes back pain only.  Internal and external rotation of hip is negative bilaterally.  Myofascial exam:  Moderate tenderness to palpation to the lumbar paraspinous muscles and lower lumbar spinous processes.  Slight tenderness to percussion.    Imagin/24/15 C-spine MRI  1. C3-4 left posterior paracentral disk extrusion causing left paracentral spinal cord compression and severe left foraminal stenosis.  2. C4-5 based disk extrusion with spinal cord impingement and moderate bilateral foraminal stenosis.  3. C5-6 posterior central disk extrusion with spinal cord compression and severe bilateral foraminal stenosis.  4. C6-7 mild disk bulge with severe bilateral foraminal stenosis.  5. Solitary mildly enlarged left submandibular lymph node of uncertain significance.    4/10/17 MRI lumbar spine  T12-L1: No central canal or neuroforaminal stenosis. No disc protrusion or extrusion.  L1-L2: There is ligamentum flavum thickening and facet arthropathy.  No central canal or neuroforaminal stenosis. No disc protrusion or extrusion.  L2-L3: There is ligamentum flavum thickening and facet arthropathy.  No central canal or neuroforaminal stenosis. No disc protrusion or extrusion.  L3-L4: There is ligamentum flavum thickening and facet arthropathy present. No central canal or neuroforaminal stenosis. No disc protrusion or extrusion.  L4-L5: There is a broad disc bulge which extends into both neural foramina.  There is ligamentum flavum thickening and facet arthropathy.  There is right lateral recess  stenosis.  There is abutment of the descending right L5 nerve in the right lateral recess.  Please correlate clinically for symptoms referable to the right L5 nerve.  There is mild central canal stenosis.  There is mild left neuroforaminal stenosis.  No right neuroforaminal stenosis.  L5-S1: There is a broad central/right paracentral disc protrusion which encroaches upon the descending right S1 nerve in the right lateral recess.  Please correlate clinically for symptoms referable to the right S1 nerve.  There is mild-moderate right neuroforaminal stenosis.  No left neuroforaminal stenosis.  No central canal stenosis.  There is ligamentum flavum thickening and facet arthropathy.  There is a broad left extraforaminal disc protrusion at L5-S1 which abuts the exited left L5 nerve in the left extraforaminal soft tissues (series 6 image 17), nonspecific.  Please correlate clinically for symptoms referable to the left L5 nerve.    05/28/2019 MRI cervical spine  C2-3: No disc herniation, spinal canal narrowing, or neuroforaminal narrowing.  C3-4: There is moderate broad-based posterior disc osteophyte complex formation with left paracentral predominance.  This along with facet arthropathy contributes to mild spinal canal narrowing and moderate left neuroforaminal narrowing.  C4-5: There is moderate broad-based posterior disc osteophyte complex formation with left paracentral prominence.  This along with facet arthropathy contributes to mild bilateral neuroforaminal narrowing and mild spinal canal narrowing.  C5-6: Moderate broad-based posterior disc osteophyte complex formation and facet arthropathy result in moderate spinal canal narrowing and moderate right and mild left neuroforaminal narrowing.  C6-7: Moderate broad-based posterior disc osteophyte complex formation and facet arthropathy result in mild spinal canal narrowing along with moderate right and severe left neuroforaminal narrowing.  C7-T1: Minimal broad-based  posterior disc osteophyte complex formation and bilateral facet arthropathy result in mild bilateral neuroforaminal narrowing.      05/28/2019 MRI lumbar spine  T12-L1: No disc herniation, spinal canal narrowing, or neuroforaminal narrowing.  L1-2: No disc herniation, spinal canal narrowing, or neuroforaminal narrowing.  L2-3: No disc herniation, spinal canal narrowing, or neuroforaminal narrowing.  L3-4: No disc herniation, spinal canal narrowing, or neuroforaminal narrowing.  L4-5: There is moderate generalized disc bulging and bilateral facet arthropathy, which result in mild spinal canal narrowing and mild bilateral neuroforaminal narrowing.  L5-S1: There is a small right paracentral disc protrusion.  This effaces the right lateral recess and may impinge upon the descending right S1 nerve root.  Bilateral facet arthropathy is present and there is resultant overall mild spinal canal narrowing.  Moderate right and mild left neuroforaminal narrowing are also present.        Assessment:  The patient is a 59-year-old woman with a history of diabetes, hypertension who presents in referral from [facility] for neck pain radiating into the arms and low back pain radiating into the left leg.     1. DDD (degenerative disc disease), lumbar  X-Ray Lumbar Spine Complete 5 View   2. Lumbar radiculopathy     3. Cervical spinal stenosis     4. Cervical radiculopathy     5. DDD (degenerative disc disease), cervical     6. Opioid contract exists           Plan:  1.  I have ordered x-rays to rule out a fracture.  I reviewed these and she does not have any fractures visualized.  We provided a Toradol injection for her today and discussed the risks involved.  2.  She has a follow-up appointment for medication refill with Dr. Galan.  I advised her to keep this appointment.  She has had several follow-up appointments with Dr. Galan that she has switched to me for convenience but I explained that she needs to  see the physician as well and she verbalizes her understanding.  She continues to take hydrocodone 3 times a day and currently does not need a prescription because this was an early follow-up.  3.  During our last visit we had discussed considering a cervical HERACLIO but we will need to check her hemoglobin A1c 1st.  4.  Follow-up in 6 weeks or sooner as needed.

## 2020-02-26 RX ORDER — GLIPIZIDE 5 MG/1
5 TABLET ORAL 2 TIMES DAILY WITH MEALS
Qty: 180 TABLET | Refills: 3 | Status: SHIPPED | OUTPATIENT
Start: 2020-02-26 | End: 2020-06-25 | Stop reason: ALTCHOICE

## 2020-02-27 DIAGNOSIS — M51.36 DDD (DEGENERATIVE DISC DISEASE), LUMBAR: Primary | ICD-10-CM

## 2020-02-27 NOTE — TELEPHONE ENCOUNTER
Spoke with patient and she stated the Toradol shot only lasted a couple of day and is requesting an MRI. She complains of mid lower back back pain going into the hips and thighs. Please advise. Thanks.

## 2020-02-27 NOTE — TELEPHONE ENCOUNTER
----- Message from Wili Fairbanks sent at 2/26/2020 11:03 AM CST -----  Contact: Ptnt  [phone]  Type: Needs Medical Advice    Who Called:  Ptnt  [phone]    Symptoms (please be specific): Has fallen recently. Has back pain radiating down her leg and neck pain to shoulder.    How long has patient had these symptoms:  Since 02-19-20     Best Call Back Number: Ptnt  [phone]    Additional Information: Advised would like to have an MRI scheduled. Please advise when orders are issued for scheduling.

## 2020-03-03 RX ORDER — OXYCODONE AND ACETAMINOPHEN 10; 325 MG/1; MG/1
1 TABLET ORAL
Qty: 1 TABLET | Refills: 0 | Status: SHIPPED | OUTPATIENT
Start: 2020-03-03 | End: 2020-04-28

## 2020-03-03 NOTE — TELEPHONE ENCOUNTER
Reviewed with Dr. Galan.  He will send a prescription for Percocet # 1 tablet to take prior to the MRI.  Please advise her to have someone drive her.

## 2020-03-03 NOTE — TELEPHONE ENCOUNTER
Patient stopped by the clinic and stated that she was unable to do MRI because she cannot lay flat on her back. Please advise. She also has an appointment with you on Thursday. Please advise. Thanks.

## 2020-03-07 ENCOUNTER — HOSPITAL ENCOUNTER (OUTPATIENT)
Dept: RADIOLOGY | Facility: HOSPITAL | Age: 60
Discharge: HOME | End: 2020-03-07
Attending: PHYSICIAN ASSISTANT
Payer: COMMERCIAL

## 2020-03-07 DIAGNOSIS — M51.36 DDD (DEGENERATIVE DISC DISEASE), LUMBAR: ICD-10-CM

## 2020-03-07 PROCEDURE — 72148 MRI LUMBAR SPINE W/O DYE: CPT | Mod: 26,,, | Performed by: RADIOLOGY

## 2020-03-07 PROCEDURE — 72148 MRI LUMBAR SPINE W/O DYE: CPT | Mod: TC,PO

## 2020-03-07 PROCEDURE — 72148 MRI LUMBAR SPINE WITHOUT CONTRAST: ICD-10-PCS | Mod: 26,,, | Performed by: RADIOLOGY

## 2020-03-13 ENCOUNTER — TELEPHONE (OUTPATIENT)
Dept: PAIN MEDICINE | Facility: CLINIC | Age: 60
End: 2020-03-13

## 2020-03-13 ENCOUNTER — CLINICAL SUPPORT (OUTPATIENT)
Dept: PAIN MEDICINE | Facility: CLINIC | Age: 60
End: 2020-03-13
Payer: COMMERCIAL

## 2020-03-13 DIAGNOSIS — M54.16 LUMBAR RADICULOPATHY: Primary | ICD-10-CM

## 2020-03-13 PROCEDURE — 96372 THER/PROPH/DIAG INJ SC/IM: CPT | Mod: S$GLB,,, | Performed by: PHYSICIAN ASSISTANT

## 2020-03-13 PROCEDURE — 99999 PR PBB SHADOW E&M-EST. PATIENT-LVL III: ICD-10-PCS | Mod: PBBFAC,,, | Performed by: PHYSICIAN ASSISTANT

## 2020-03-13 PROCEDURE — 96372 PR INJECTION,THERAP/PROPH/DIAG2ST, IM OR SUBCUT: ICD-10-PCS | Mod: S$GLB,,, | Performed by: PHYSICIAN ASSISTANT

## 2020-03-13 PROCEDURE — 99999 PR PBB SHADOW E&M-EST. PATIENT-LVL III: CPT | Mod: PBBFAC,,, | Performed by: PHYSICIAN ASSISTANT

## 2020-03-13 RX ORDER — KETOROLAC TROMETHAMINE 30 MG/ML
60 INJECTION, SOLUTION INTRAMUSCULAR; INTRAVENOUS ONCE
Status: COMPLETED | OUTPATIENT
Start: 2020-03-13 | End: 2020-03-13

## 2020-03-13 RX ADMIN — KETOROLAC TROMETHAMINE 60 MG: 30 INJECTION, SOLUTION INTRAMUSCULAR; INTRAVENOUS at 03:03

## 2020-03-13 NOTE — TELEPHONE ENCOUNTER
She can come in this afternoon and get a shot of Toradol 60 mg if she has not taken any anti-inflammatories today.

## 2020-03-13 NOTE — TELEPHONE ENCOUNTER
Spoke with patient and she is coming in for a Toradol injection. She has not taken any antiinflammatories today.

## 2020-03-13 NOTE — TELEPHONE ENCOUNTER
----- Message from Huber CLINE Frisard sent at 3/13/2020 10:01 AM CDT -----  Contact: same  Patient called in and stated she has an appointment on Monday 3/16/2020 but wanted to see if she could come in today Friday 3/13/2020 to get some type of injection.  Patient stated she is in a lot of pain.    Patient call back number is [phone]

## 2020-03-13 NOTE — TELEPHONE ENCOUNTER
Patient is complaining of lower back pain into her left buttock. She is requesting to come in today to get a shot to help with her pain. Please advise

## 2020-03-19 ENCOUNTER — TELEPHONE (OUTPATIENT)
Dept: PAIN MEDICINE | Facility: CLINIC | Age: 60
End: 2020-03-19

## 2020-03-20 ENCOUNTER — TELEPHONE (OUTPATIENT)
Dept: PAIN MEDICINE | Facility: CLINIC | Age: 60
End: 2020-03-20

## 2020-03-20 NOTE — TELEPHONE ENCOUNTER
----- Message from Corinne Stiles sent at 3/20/2020 11:41 AM CDT -----  Contact: Patient  Type: Needs Medical Advice    Who Called: Patient  Best Call Back Number: [phone] (home)   Additional Information: The pt is asking for a call back she said she needs to speak to a nurse in regards to the results of an MRI

## 2020-03-20 NOTE — TELEPHONE ENCOUNTER
Spoke with patient for telephone visit in place of her clinic visit.  She came in for a Toradol injection 1 week ago which did give her some relief.  However, she continues to have worsening low back pain with radiation into both legs.  The pain is worse with standing and walking and going from a seated to standing position.  She reports some relief with medicine and rest.  However, she has difficulty sleeping at night when she lays on her side and has to constantly change positions.  We discussed her MRI results and there have been no changes but she does have moderate canal stenosis at L4/5 which these symptoms are likely related to.  We discuss that when she fell this likely created significant nerve irritation.  She reports current poor glucose control.  I advised her to work on this over the next month and contact me.  We can consider an HERACLIO once scheduling verses a follow-up with Neurosurgery.

## 2020-03-26 ENCOUNTER — TELEPHONE (OUTPATIENT)
Dept: PAIN MEDICINE | Facility: CLINIC | Age: 60
End: 2020-03-26

## 2020-03-26 RX ORDER — NABUMETONE 750 MG/1
750 TABLET, FILM COATED ORAL 2 TIMES DAILY
Qty: 12 TABLET | Refills: 0 | Status: SHIPPED | OUTPATIENT
Start: 2020-03-26 | End: 2020-04-28 | Stop reason: ALTCHOICE

## 2020-03-26 NOTE — TELEPHONE ENCOUNTER
Spoke with patient. Informed patient that medication refill is not due until 3/28. Patient stated she is not requesting more pain medication. Patient stated is there a medication that can be prescribed for inflammation that will ease her leg pain.

## 2020-03-26 NOTE — TELEPHONE ENCOUNTER
----- Message from Adelina Ronquillo sent at 3/26/2020  9:07 AM CDT -----  Contact: self [phone]  Patient is requesting a call back from the nurse stated she's having a lot of pain to back and left leg. Patient requesting medication to be called in.   Please call the patient upon request at phone number [phone].     Patient will be using   [id number] [place], [place] - [address] [address]  Phone: [phone] Fax: [phone]    Thanks!

## 2020-04-02 ENCOUNTER — PATIENT MESSAGE (OUTPATIENT)
Dept: ENDOCRINOLOGY | Facility: CLINIC | Age: 60
End: 2020-04-02

## 2020-04-03 DIAGNOSIS — Z79.4 TYPE 2 DIABETES MELLITUS WITHOUT COMPLICATION, WITH LONG-TERM CURRENT USE OF INSULIN: Primary | ICD-10-CM

## 2020-04-03 DIAGNOSIS — E11.9 TYPE 2 DIABETES MELLITUS WITHOUT COMPLICATION, WITH LONG-TERM CURRENT USE OF INSULIN: Primary | ICD-10-CM

## 2020-04-03 RX ORDER — INSULIN GLARGINE 300 [IU]/ML
60 INJECTION, SOLUTION SUBCUTANEOUS NIGHTLY
Qty: 4 SYRINGE | Refills: 11 | Status: SHIPPED | OUTPATIENT
Start: 2020-04-03 | End: 2021-01-20

## 2020-04-06 RX ORDER — TIZANIDINE HYDROCHLORIDE 2 MG/1
1 CAPSULE, GELATIN COATED ORAL 3 TIMES DAILY PRN
Qty: 90 CAPSULE | Refills: 2 | Status: SHIPPED | OUTPATIENT
Start: 2020-04-06 | End: 2020-07-02 | Stop reason: SDUPTHER

## 2020-04-27 RX ORDER — HYDROCODONE BITARTRATE AND ACETAMINOPHEN 10; 325 MG/1; MG/1
1 TABLET ORAL 3 TIMES DAILY PRN
Qty: 90 TABLET | Refills: 0 | Status: SHIPPED | OUTPATIENT
Start: 2020-04-27 | End: 2020-04-28

## 2020-04-28 ENCOUNTER — OFFICE VISIT (OUTPATIENT)
Dept: ENDOCRINOLOGY | Facility: CLINIC | Age: 60
End: 2020-04-28
Payer: COMMERCIAL

## 2020-04-28 ENCOUNTER — PATIENT MESSAGE (OUTPATIENT)
Dept: ENDOCRINOLOGY | Facility: CLINIC | Age: 60
End: 2020-04-28

## 2020-04-28 DIAGNOSIS — J01.10 ACUTE NON-RECURRENT FRONTAL SINUSITIS: ICD-10-CM

## 2020-04-28 DIAGNOSIS — E11.9 TYPE 2 DIABETES MELLITUS WITHOUT COMPLICATION, WITH LONG-TERM CURRENT USE OF INSULIN: Primary | ICD-10-CM

## 2020-04-28 DIAGNOSIS — Z79.4 TYPE 2 DIABETES MELLITUS WITHOUT COMPLICATION, WITH LONG-TERM CURRENT USE OF INSULIN: Primary | ICD-10-CM

## 2020-04-28 DIAGNOSIS — E78.49 OTHER HYPERLIPIDEMIA: ICD-10-CM

## 2020-04-28 DIAGNOSIS — I10 ESSENTIAL HYPERTENSION: ICD-10-CM

## 2020-04-28 PROBLEM — J32.1 FRONTAL SINUSITIS: Status: ACTIVE | Noted: 2020-04-28

## 2020-04-28 PROCEDURE — 99213 OFFICE O/P EST LOW 20 MIN: CPT | Mod: 95,,, | Performed by: NURSE PRACTITIONER

## 2020-04-28 PROCEDURE — 99213 PR OFFICE/OUTPT VISIT, EST, LEVL III, 20-29 MIN: ICD-10-PCS | Mod: 95,,, | Performed by: NURSE PRACTITIONER

## 2020-04-28 RX ORDER — INSULIN ASPART 100 [IU]/ML
INJECTION, SOLUTION INTRAVENOUS; SUBCUTANEOUS
Qty: 1 BOX | OUTPATIENT
Start: 2020-04-28 | End: 2020-05-13 | Stop reason: SDUPTHER

## 2020-04-28 RX ORDER — PANTOPRAZOLE SODIUM 40 MG/1
40 TABLET, DELAYED RELEASE ORAL DAILY
COMMUNITY
End: 2023-10-04

## 2020-04-28 RX ORDER — AMOXICILLIN AND CLAVULANATE POTASSIUM 500; 125 MG/1; MG/1
1 TABLET, FILM COATED ORAL 3 TIMES DAILY
Qty: 15 TABLET | Refills: 0 | Status: SHIPPED | OUTPATIENT
Start: 2020-04-28 | End: 2020-06-25 | Stop reason: ALTCHOICE

## 2020-04-28 NOTE — PROGRESS NOTES
"Established Patient - Audio Only Telehealth Visit      The patient location is: home   The chief complaint leading to consultation is:DM d/u   Visit type: Virtual visit with audio only (telephone)     The reason for the audio only service rather than synchronous audio and video virtual visit was related to technical difficulties or patient preference/necessity.     Each patient to whom I provide medical services by telemedicine is:  (1) informed of the relationship between the physician and patient and the respective role of any other health care provider with respect to management of the patient; and (2) notified that they may decline to receive medical services by telemedicine and may withdraw from such care at any time. Patient verbally consented to receive this service via voice-only telephone call.     r/t COvid, poor internet servicse  TIme 2:39-3:10 =31 minues   Subjective:       Patient ID: Aaron Garcia is a 59 y.o. female.    Chief Complaint: Type 2 Dm      Pt is a 59 y.o. AAF  with a diagnosis of Type 2 diabetes mellitus diagnosed approximately 2001, as well as chronic conditions pending review including HTN, HLP.  Other pertinent medical and social information noted includes, but not limited to: chronic pain r/t cervical radiculopathy affecting UA.      Interim Events:  C/o sinus infection and marked hyperglycemia.  States frontal headache, with sinus drainage "like [place]" for a month, but turning more green for the last week.  Also Ear pain, L >R , rates as a 4.  Denies fever.    Had reported to nurse glucoses running 350-400 last couple of weeks, but reports differently to me. Toujeo had been increased from 45 to 60 over the last couple of weeks.     Reports FBS: 160-170, though outlier of 102  Lunches 245 (which is 2 hr pp)  Supper 195-200  .   .      Diabetes Flow Sheet:  Diabetes Medications: Toujeo 60 qhs, tradjenta 5 mg, glipizide 5 mg bid       If insulin vial or pen preference:   Prior " failed/or not tolerated medication therapies:metformin, diarrhea  Diabetes Complications:   Aspirin:    Statin: crestor 10   ACE/ARB:benazepril 40   Last Urine Microalbumin:    Last Eye exam:   Last Diabetic Education: 18 mo   Glucometer:Truemetrix       Review of Systems   Constitutional: Negative for activity change and fatigue., denies fever    HENT: Negative for hearing loss and trouble swallowing. + ear pain, rhinorrea   Eyes: Negative for photophobia and visual disturbance.        Last Eye Exam   Respiratory: Negative for cough and shortness of breath.    Cardiovascular: Negative for chest pain and palpitations.   Gastrointestinal: Negative for constipation and diarrhea. +nausea  Genitourinary: Negative for frequency and urgency.   Musculoskeletal: Negative for arthralgias and myalgias.   Skin: Negative for rash and wound.   Allergic/Immunologic: Negative for food allergies.   Neurological: Negative for dizziness, weakness, light-headedness and numbness.   Psychiatric/Behavioral: Negative for sleep disturbance. The patient is not nervous/anxious.        Objective:      behavior is normal. Judgment and thought content normal.       Hemoglobin A1C   Date Value Ref Range Status   11/14/2019 7.8 (H) 4.0 - 5.6 % Final     Comment:     ADA Screening Guidelines:  5.7-6.4%  Consistent with prediabetes  >or=6.5%  Consistent with diabetes  High levels of fetal hemoglobin interfere with the HbA1C  assay. Heterozygous hemoglobin variants (HbS, HgC, etc)do  not significantly interfere with this assay.   However, presence of multiple variants may affect accuracy.     07/10/2019 6.7 (H) 4.0 - 5.6 % Final     Comment:     ADA Screening Guidelines:  5.7-6.4%  Consistent with prediabetes  >or=6.5%  Consistent with diabetes  High levels of fetal hemoglobin interfere with the HbA1C  assay. Heterozygous hemoglobin variants (HbS, HgC, etc)do  not significantly interfere with this assay.   However, presence of multiple variants may  affect accuracy.     02/27/2019 8.1 (H) 4.0 - 5.6 % Final     Comment:     ADA Screening Guidelines:  5.7-6.4%  Consistent with prediabetes  >or=6.5%  Consistent with diabetes  High levels of fetal hemoglobin interfere with the HbA1C  assay. Heterozygous hemoglobin variants (HbS, HgC, etc)do  not significantly interfere with this assay.   However, presence of multiple variants may affect accuracy.         Chemistry        Component Value Date/Time     11/14/2019 1113    K 3.7 11/14/2019 1113     11/14/2019 1113    CO2 25 11/14/2019 1113    BUN 8 11/14/2019 1113    CREATININE 0.7 11/14/2019 1113     (H) 11/14/2019 1113        Component Value Date/Time    CALCIUM 9.8 11/14/2019 1113    ALKPHOS 66 11/14/2019 1113    AST 17 11/14/2019 1113    ALT 15 11/14/2019 1113    BILITOT 0.7 11/14/2019 1113    ESTGFRAFRICA >60.0 11/14/2019 1113    EGFRNONAA >60.0 11/14/2019 1113        Lab Results   Component Value Date    LDLCALC 70.8 11/14/2019     Lab Results   Component Value Date    TSH 0.836 12/05/2018         Assessment:        steroids.      1. Type 2 diabetes mellitus without complication, with long-term current use of insulin  insulin aspart U-100 (NOVOLOG FLEXPEN U-100 INSULIN) 100 unit/mL (3 mL) InPn pen  Chronic-uncontrolled-see plan    2. Essential hypertension  Chronic-cont benazepril   3. Other hyperlipidemia  Chronic-continue rosuvasatin    4. Acute non-recurrent frontal sinusitis    New dx-acute.          Plan:        Continue Toujeo 60 units at bedtime   Add  Novolog 10 units with any meal containing any starch/carbohydrates---and add a Novolog SS dose to the meal dose if needed based on that premeal glucose reading.    Check glucoses 4 times a day,  Log glucoses and amount of insulin taken.    Send log to me via portal in 2 weeks.      Continue glipizide (for now)  Continue tradjenta (For now)     Augmentin 500 mg bid.     ORDERS 04/28/2020    2 mo with a1c prior--     This service was not  originating from a related E/M service provided within the previous 7 days nor will  to an E/M service or procedure within the next 24 hours or my soonest available appointment.  Prevailing standard of care was able to be met in this audio-only visit.    Time 2:39-3:10--this was a complicated visit r/t audio and trying to explain basal/bolus, carb concept, and obtain info r/t sinus  infection

## 2020-04-28 NOTE — PATIENT INSTRUCTIONS
Continue Toujeo 60 units at bedtime    Add  Novolog 10 units with any meal containing any starch/carbohydrates---and add a Novolog SS dose to the meal dose if needed based on that premeal glucose reading.      Check glucoses 4 times a day,  Log glucoses and amount of insulin taken.    Send log to me via portal in 2 weeks.        Continue glipizide (for now)    ontinue tradjenta (For now)     amoxicillan clauvanate 3 times a day for sinus infection.

## 2020-05-13 ENCOUNTER — PATIENT MESSAGE (OUTPATIENT)
Dept: ENDOCRINOLOGY | Facility: CLINIC | Age: 60
End: 2020-05-13

## 2020-05-13 DIAGNOSIS — E11.9 TYPE 2 DIABETES MELLITUS WITHOUT COMPLICATION, WITH LONG-TERM CURRENT USE OF INSULIN: ICD-10-CM

## 2020-05-13 DIAGNOSIS — Z79.4 TYPE 2 DIABETES MELLITUS WITHOUT COMPLICATION, WITH LONG-TERM CURRENT USE OF INSULIN: ICD-10-CM

## 2020-05-13 RX ORDER — INSULIN ASPART 100 [IU]/ML
INJECTION, SOLUTION INTRAVENOUS; SUBCUTANEOUS
Qty: 18 ML | Refills: 11 | Status: SHIPPED | OUTPATIENT
Start: 2020-05-13 | End: 2021-02-14 | Stop reason: SDUPTHER

## 2020-05-18 ENCOUNTER — OFFICE VISIT (OUTPATIENT)
Dept: PHYSICAL MEDICINE AND REHAB | Facility: CLINIC | Age: 60
End: 2020-05-18
Payer: COMMERCIAL

## 2020-05-18 ENCOUNTER — LAB VISIT (OUTPATIENT)
Dept: LAB | Facility: HOSPITAL | Age: 60
End: 2020-05-18
Attending: STUDENT IN AN ORGANIZED HEALTH CARE EDUCATION/TRAINING PROGRAM
Payer: COMMERCIAL

## 2020-05-18 VITALS — HEIGHT: 66 IN | BODY MASS INDEX: 32.95 KG/M2 | WEIGHT: 205 LBS

## 2020-05-18 DIAGNOSIS — M25.50 ARTHRALGIA, UNSPECIFIED JOINT: ICD-10-CM

## 2020-05-18 DIAGNOSIS — M25.541 ARTHRALGIA OF BOTH HANDS: Primary | ICD-10-CM

## 2020-05-18 DIAGNOSIS — M25.542 ARTHRALGIA OF BOTH HANDS: Primary | ICD-10-CM

## 2020-05-18 LAB
CRP SERPL-MCNC: 2 MG/L (ref 0–8.2)
ERYTHROCYTE [SEDIMENTATION RATE] IN BLOOD BY WESTERGREN METHOD: 16 MM/HR (ref 0–20)

## 2020-05-18 PROCEDURE — 86039 ANTINUCLEAR ANTIBODIES (ANA): CPT

## 2020-05-18 PROCEDURE — 86140 C-REACTIVE PROTEIN: CPT

## 2020-05-18 PROCEDURE — 85651 RBC SED RATE NONAUTOMATED: CPT | Mod: PO

## 2020-05-18 PROCEDURE — 36415 COLL VENOUS BLD VENIPUNCTURE: CPT | Mod: PO

## 2020-05-18 PROCEDURE — 86431 RHEUMATOID FACTOR QUANT: CPT

## 2020-05-18 PROCEDURE — 99999 PR PBB SHADOW E&M-EST. PATIENT-LVL II: CPT | Mod: PBBFAC,,, | Performed by: PHYSICAL MEDICINE & REHABILITATION

## 2020-05-18 PROCEDURE — 86038 ANTINUCLEAR ANTIBODIES: CPT

## 2020-05-18 PROCEDURE — 3008F PR BODY MASS INDEX (BMI) DOCUMENTED: ICD-10-PCS | Mod: CPTII,S$GLB,, | Performed by: PHYSICAL MEDICINE & REHABILITATION

## 2020-05-18 PROCEDURE — 99214 OFFICE O/P EST MOD 30 MIN: CPT | Mod: S$GLB,,, | Performed by: PHYSICAL MEDICINE & REHABILITATION

## 2020-05-18 PROCEDURE — 86235 NUCLEAR ANTIGEN ANTIBODY: CPT | Mod: 59

## 2020-05-18 PROCEDURE — 99999 PR PBB SHADOW E&M-EST. PATIENT-LVL II: ICD-10-PCS | Mod: PBBFAC,,, | Performed by: PHYSICAL MEDICINE & REHABILITATION

## 2020-05-18 PROCEDURE — 99214 PR OFFICE/OUTPT VISIT, EST, LEVL IV, 30-39 MIN: ICD-10-PCS | Mod: S$GLB,,, | Performed by: PHYSICAL MEDICINE & REHABILITATION

## 2020-05-18 PROCEDURE — 3008F BODY MASS INDEX DOCD: CPT | Mod: CPTII,S$GLB,, | Performed by: PHYSICAL MEDICINE & REHABILITATION

## 2020-05-18 RX ORDER — DICLOFENAC SODIUM 10 MG/G
2 GEL TOPICAL 3 TIMES DAILY PRN
Qty: 2 TUBE | Refills: 3 | Status: ON HOLD | OUTPATIENT
Start: 2020-05-18 | End: 2021-01-21 | Stop reason: HOSPADM

## 2020-05-18 NOTE — PROGRESS NOTES
[facility]    CHIEF COMPLAINT:   Chief Complaint   Patient presents with    Hand Pain     Bilateral     HISTORY OF PRESENT ILLNESS: Aaron Garcia is a 59 y.o. female who presents to me for f/u of BL hand pain that has been present for years. Her LCV was on 8/14 then performed EMG/NCS on 9/6 that diagnosed her with mild left and severe carpal tunnel syndrome with active axonal denervation. We subsequently sent her to Dr. Mathis who performed a release on the right. She additionally had a release for the left CTS in 2002.     Today, she complains pain in both of her hands and forearms. For the left hand, this has been present for the last 2 months. She a history of trigger finger in her thumb, long and ring fingers on the left. She has had injections for trigger finger in the past which only provided temporary relief. She reports 80% relief that lasted approx 3 months of relief. She denies current triggering in her fingers. She notes diffuse swelling and stiffness as well as weakness from time to time. She has taken gabapentin, zanaflex and norco without significant relief.     For the right hand, she noticed a tenderness about the MTP joints for the last year. She notes a swelling similar to the left hand. She denies any weakness. She has been diagnosed with CTS and has a release sometime in 2018 from what she can recall. She has tried relefen without improvement.     She has neck pain that radiates into both of her arms (L>R). She has had multiple neck and back injections with him in the past.     Review of Systems   Constitutional: Negative for fever.   HENT: Negative for drooling.    Eyes: Negative for discharge.   Respiratory: Negative for choking.    Cardiovascular: Negative for chest pain.   Genitourinary: Negative for flank pain.   Skin: Negative for wound.   Allergic/Immunologic: Negative for immunocompromised state.   Neurological: Negative for tremors and syncope.    Psychiatric/Behavioral: Negative for behavioral problems.     Past Medical History:   Past Medical History:   Diagnosis Date    Arthritis     Asthma     Cataract     OU    Diabetes mellitus, type 2     Gastric ulcer     Hypertension     Mitral valve prolapse     RYLAN (obstructive sleep apnea)     Snores     never had sleep study, unable to lay flat, sleeps elevated    Thyroid disease     Goiter       Past Surgical History:   Past Surgical History:   Procedure Laterality Date    CARPAL TUNNEL RELEASE Left     CARPAL TUNNEL RELEASE Right 2018    Procedure: RELEASE, CARPAL TUNNEL;  Surgeon: Anurag Mathis MD;  Location: [facility];  Service: Orthopedics;  Laterality: Right;     SECTION      CHOLECYSTECTOMY      DILATION AND CURETTAGE OF UTERUS      Epidural steroid Injection      Pain management, cervical    ESOPHAGEAL DILATION      ulcer on tongue removed      WOUND DEBRIDEMENT Left     leg at ankle level       Family History:   Family History   Problem Relation Age of Onset    Diabetes Father     Diabetes Sister     Heart disease Brother     Glaucoma Paternal Aunt     Glaucoma Daughter         suspect       Medications:   Current Outpatient Medications on File Prior to Visit   Medication Sig Dispense Refill    ALBUTEROL SULFATE (PROAIR HFA INHL) Inhale into the lungs.      amitriptyline (ELAVIL) 25 MG tablet Take 25 mg by mouth nightly as needed for Insomnia.      amlodipine (NORVASC) 10 MG tablet 10 mg.       amoxicillin-clavulanate 500-125mg (AUGMENTIN) 500-125 mg Tab Take 1 tablet (500 mg total) by mouth 3 (three) times daily. 15 tablet 0    benazepril (LOTENSIN) 40 MG tablet Take 40 mg by mouth once daily.      desloratadine (CLARINEX) 5 mg tablet Take 5 mg by mouth.      gabapentin (NEURONTIN) 600 MG tablet Take 600 mg by mouth 2 (two) times daily.      glipiZIDE (GLUCOTROL) 5 MG tablet TAKE 1 TABLET (5 MG TOTAL) BY MOUTH 2 (TWO) TIMES DAILY WITH MEALS.  180 tablet 3    insulin aspart U-100 (NOVOLOG FLEXPEN U-100 INSULIN) 100 unit/mL (3 mL) InPn pen 10 units with meals plus sliding scale.  150-200=+2, 201-250=+4; 251-300=+6; 301-350=+8, over 350, + 10 units 18 mL 11    insulin glargine, TOUJEO, (TOUJEO SOLOSTAR U-300 INSULIN) 300 unit/mL (1.5 mL) InPn pen Inject 60 Units into the skin every evening. 4 Syringe 11    linaGLIPtin (TRADJENTA) 5 mg Tab tablet Take 1 tablet (5 mg total) by mouth once daily. 30 tablet 5    montelukast (SINGULAIR) 10 mg tablet Take 10 mg by mouth.      nebivolol (BYSTOLIC) 10 MG Tab Take 10 mg by mouth.      pantoprazole (PROTONIX) 40 MG tablet Take 40 mg by mouth once daily.      rosuvastatin (CRESTOR) 10 MG tablet Take 10 mg by mouth once daily.      SYMBICORT 160-4.5 mcg/actuation HFAA       tiZANidine 2 mg Cap Take 1 capsule (2 mg total) by mouth 3 (three) times daily as needed. 90 capsule 2    TRUE METRIX GLUCOSE TEST STRIP Strp        No current facility-administered medications on file prior to visit.        Allergies:   Review of patient's allergies indicates:   Allergen Reactions    Pcn [penicillins] Itching       Social History:   Social History     Socioeconomic History    Marital status:      Spouse name: Not on file    Number of children: Not on file    Years of education: Not on file    Highest education level: Not on file   Occupational History    Not on file   Social Needs    Financial resource strain: Not on file    Food insecurity:     Worry: Not on file     Inability: Not on file    Transportation needs:     Medical: Not on file     Non-medical: Not on file   Tobacco Use    Smoking status: Never Smoker    Smokeless tobacco: Never Used   Substance and Sexual Activity    Alcohol use: No     Alcohol/week: 0.0 standard drinks    Drug use: Yes     Types: Hydrocodone    Sexual activity: Yes     Partners: Male   Lifestyle    Physical activity:     Days per week: Not on file     Minutes per  "session: Not on file    Stress: Not on file   Relationships    Social connections:     Talks on phone: Not on file     Gets together: Not on file     Attends Sikhism service: Not on file     Active member of club or organization: Not on file     Attends meetings of clubs or organizations: Not on file     Relationship status: Not on file   Other Topics Concern    Not on file   Social History Narrative        5 children    Prior secretarial work      PHYSICAL EXAMINATION:   General    Vitals:    05/18/20 1333   Weight: 93 kg (205 lb)   Height: 5' 6" (1.676 m)     Constitutional: Oriented to person, place, and time. No apparent distress. Pleasant.  HENT:   Head: Normocephalic and atraumatic.   Eyes: Right eye exhibits no discharge. Left eye exhibits no discharge. No scleral icterus.   Pulmonary/Chest: Effort normal. No respiratory distress.   Abdominal: There is no guarding.   Neurological: Alert and oriented to person, place, and time.   Psychiatric: Behavior is normal.   Right Hand Exam     Tenderness   The patient is experiencing tenderness in the vásquez area.    Range of Motion   Wrist   Extension: 45   Flexion: 80     Muscle Strength   Wrist extension: 5/5   Wrist flexion: 5/5   : 5/5     Tests   Phalens Sign: negative  Tinel's sign (median nerve): positive  Finkelstein's test: negative    Other   Erythema: absent  Scars: present  Sensation: decreased (reduced over median innervated digits)  Pulse: present    Comments:  (-) Collette      Left Hand Exam     Tenderness   The patient is experiencing tenderness in the vásquez area.     Range of Motion   Wrist   Extension: 45   Flexion: 80     Muscle Strength   Wrist extension: 5/5   Wrist flexion: 5/5   :  5/5     Tests   Phalens Sign: negative  Tinel's sign (median nerve): negative  Finkelstein's test: negative    Other   Erythema: absent  Scars: present  Sensation: normal  Pulse: present    Comments:  (-) Lans      Right Elbow Exam     Muscle " Strength   Pronation:  5/5   Supination:  5/5     Comments:  (-) Prerna      Left Elbow Exam     Muscle Strength   Pronation:  5/5   Supination:  5/5     Comments:  (-) Prerna        INSPECTION: There is no ecchymoses, erythema or gross deformity of the hands.  No gross thenar atrophy bilaterally.  Palpation: There is no tenderness to palpation about the cervical spine the midline.  Neurologic: Spurling's test produces axial neck pain, but no radicular symptoms. Manual muscle testing reveals 5 out of 5 strength throughout bilateral upper extremities. Tone is normal about the bilateral upper extremity's. Reflexes are 2+ throughout bilateral upper extremities. Osuna's reflex is absent bilaterally.    Imaging  R hand xray:   No acute osseous or soft tissue abnormality.  Joint spaces maintained.  No erosions.        Data Reviewed: X-ray    Ultrasound Right wrist: brief ultrasound performed today in our clinic demonstrated right median nerve with a cross-sectional area of 25 mm(2)    ASSESSMENT:  Encounter Diagnosis   Name Primary?    Arthralgia of both hands Yes     PLAN:     1. Time was spent reviewing the above diagnosis in depth with Aaron today, including acute management and rehabilitation.     2. The patient's history, exam findings, and ultrasound suggest CTS vs rheumatologic disorder.  Diagnostic ultrasound exam of the right median nerve at the level of the carpal tunnel measures 0.25 centimeters squared which is similar to the previous US exam from 2018. Previous NCS findings suggested CTS although her diffuse symptoms throughout her hands and forearms as well as her daughter's history of rheumatoid arthritis question some rheumatologic component. Ordered ESR, CRP, ZAIRA, Rf for rheumatological w/u. If negative, consider repeat NCS/EMG of both UEs vs BL CTS injections.     3. She reports taking gabapentin, Zanaflex and norco as well as a trial of Relafen without significant improvement. We will order  diclofenac gel prn for her hands.     4.  If lab values return normal, we will likely schedule for EMG/NCS.    The above note was completed, in part, with the aid of Dragon dictation software/hardware. Translation errors may be present.

## 2020-05-19 LAB — RHEUMATOID FACT SERPL-ACNC: <10 IU/ML (ref 0–15)

## 2020-05-20 LAB
ANA PATTERN 1: NORMAL
ANA SER QL IF: POSITIVE
ANA TITR SER IF: NORMAL {TITER}

## 2020-05-21 ENCOUNTER — TELEPHONE (OUTPATIENT)
Dept: PHYSICAL MEDICINE AND REHAB | Facility: CLINIC | Age: 60
End: 2020-05-21

## 2020-05-21 DIAGNOSIS — M25.541 ARTHRALGIA OF BOTH HANDS: Primary | ICD-10-CM

## 2020-05-21 DIAGNOSIS — G56.03 CARPAL TUNNEL SYNDROME, BILATERAL: ICD-10-CM

## 2020-05-21 DIAGNOSIS — M25.542 ARTHRALGIA OF BOTH HANDS: Primary | ICD-10-CM

## 2020-05-21 LAB
ANTI SM ANTIBODY: 0.13 RATIO (ref 0–0.99)
ANTI SM/RNP ANTIBODY: 0.08 RATIO (ref 0–0.99)
ANTI-SM INTERPRETATION: NEGATIVE
ANTI-SM/RNP INTERPRETATION: NEGATIVE
ANTI-SSA ANTIBODY: 0.06 RATIO (ref 0–0.99)
ANTI-SSA INTERPRETATION: NEGATIVE
ANTI-SSB ANTIBODY: 0.1 RATIO (ref 0–0.99)
ANTI-SSB INTERPRETATION: NEGATIVE
DSDNA AB SER-ACNC: NORMAL [IU]/ML

## 2020-05-21 NOTE — TELEPHONE ENCOUNTER
----- Message from Nitesh Cleveland MD sent at 5/21/2020 10:51 AM CDT -----  She needs a rheum referral, and an appt with me for EMG.

## 2020-05-26 ENCOUNTER — TELEPHONE (OUTPATIENT)
Dept: PAIN MEDICINE | Facility: CLINIC | Age: 60
End: 2020-05-26

## 2020-05-26 ENCOUNTER — TELEPHONE (OUTPATIENT)
Dept: RHEUMATOLOGY | Facility: CLINIC | Age: 60
End: 2020-05-26

## 2020-05-26 ENCOUNTER — OFFICE VISIT (OUTPATIENT)
Dept: PHYSICAL MEDICINE AND REHAB | Facility: CLINIC | Age: 60
End: 2020-05-26
Payer: COMMERCIAL

## 2020-05-26 DIAGNOSIS — M25.542 ARTHRALGIA OF BOTH HANDS: ICD-10-CM

## 2020-05-26 DIAGNOSIS — G56.03 CARPAL TUNNEL SYNDROME, BILATERAL: ICD-10-CM

## 2020-05-26 DIAGNOSIS — M25.541 ARTHRALGIA OF BOTH HANDS: ICD-10-CM

## 2020-05-26 DIAGNOSIS — G56.01 CARPAL TUNNEL SYNDROME OF RIGHT WRIST: ICD-10-CM

## 2020-05-26 PROCEDURE — 99999 PR PBB SHADOW E&M-EST. PATIENT-LVL II: ICD-10-PCS | Mod: PBBFAC,,, | Performed by: PHYSICAL MEDICINE & REHABILITATION

## 2020-05-26 PROCEDURE — 99999 PR PBB SHADOW E&M-EST. PATIENT-LVL II: CPT | Mod: PBBFAC,,, | Performed by: PHYSICAL MEDICINE & REHABILITATION

## 2020-05-26 PROCEDURE — 95886 MUSC TEST DONE W/N TEST COMP: CPT | Mod: S$GLB,,, | Performed by: PHYSICAL MEDICINE & REHABILITATION

## 2020-05-26 PROCEDURE — 99499 NO LOS: ICD-10-PCS | Mod: S$GLB,,, | Performed by: PHYSICAL MEDICINE & REHABILITATION

## 2020-05-26 PROCEDURE — 95910 PR NERVE CONDUCTION STUDY; 7-8 STUDIES: ICD-10-PCS | Mod: S$GLB,,, | Performed by: PHYSICAL MEDICINE & REHABILITATION

## 2020-05-26 PROCEDURE — 99499 UNLISTED E&M SERVICE: CPT | Mod: S$GLB,,, | Performed by: PHYSICAL MEDICINE & REHABILITATION

## 2020-05-26 PROCEDURE — 95886 PR EMG COMPLETE, W/ NERVE CONDUCTION STUDIES, 5+ MUSCLES: ICD-10-PCS | Mod: S$GLB,,, | Performed by: PHYSICAL MEDICINE & REHABILITATION

## 2020-05-26 PROCEDURE — 95910 NRV CNDJ TEST 7-8 STUDIES: CPT | Mod: S$GLB,,, | Performed by: PHYSICAL MEDICINE & REHABILITATION

## 2020-05-26 RX ORDER — HYDROCODONE BITARTRATE AND ACETAMINOPHEN 10; 325 MG/1; MG/1
1 TABLET ORAL 3 TIMES DAILY PRN
Qty: 90 TABLET | Refills: 0 | Status: SHIPPED | OUTPATIENT
Start: 2020-05-27 | End: 2020-06-25 | Stop reason: SDUPTHER

## 2020-05-26 NOTE — TELEPHONE ENCOUNTER
----- Message from Ivonne Irizarry sent at 5/26/2020  3:56 PM CDT -----  Contact: Patient  Type: Needs Medical Advice  Who Called:  Patient  Best Call Back Number:   Additional Information: Calling to schedule new patient appointment from referral.

## 2020-05-26 NOTE — TELEPHONE ENCOUNTER
----- Message from Zulay Calderon sent at 5/26/2020  9:59 AM CDT -----  Contact: Self [phone]  Patient is calling to get refills on her medication sent to [id number] - [place], [place] - [address] [facility] [phone] (Phone) [phone] (Fax)    1. HYDROcodone-acetaminophen (NORCO)  mg per tablet 90 tablet

## 2020-05-26 NOTE — PROGRESS NOTES
[facility]  [address] [place] [address]             Full Name: KAIA RUBY Gender: Female  Patient ID: [id number] YOB: 1960  History: Pt reports bilateral hand paresthesias.      Visit Date: 5/26/2020 08:44  Age: 59 Years 6 Months Old  Examining Physician: Dr. Cleveland   Referring Physician: Dr. Cleveland  Height: 5 feet 6 inch      Sensory NCS      Nerve / Sites Rec. Site Onset Lat Peak Lat NP Amp PP Amp Segments Distance Velocity     ms ms µV µV  cm m/s   L Median - Digit III (Antidromic)      Wrist Dig III 3.13 4.01 22.2 45.8 Wrist - Dig III 14 45      Mid palm Dig III 1.35 1.98 31.4 8.6 Mid palm - Dig III 7 52   R Median - Digit III (Antidromic)      Wrist Dig III 6.09 7.60 1.7 7.0 Wrist - Dig III 14 23      Mid palm Dig III NR NR NR NR Mid palm - Dig III 7 NR   L Ulnar - Digit V (Antidromic)      Wrist Dig V 2.50 3.39 21.1 29.6 Wrist - Dig V 14 56   R Ulnar - Digit V (Antidromic)      Wrist Dig V 2.81 3.65 19.5 26.2 Wrist - Dig V 14 50       Motor NCS      Nerve / Sites Muscle Latency Amplitude Amp % Duration Segments Distance Lat Diff Velocity     ms mV % ms  cm ms m/s   R Median - APB      Wrist APB 6.15 1.0 100 8.75 Wrist - APB 8        Elbow APB 10.94 0.8 79.6 11.77 Elbow - Wrist 27 4.79 56   L Median - APB      Wrist APB 4.22 2.9 100 9.01 Wrist - APB 8        Elbow APB 9.38 3.0 103 7.81 Elbow - Wrist 27 5.16 52   R Ulnar - ADM      Wrist ADM 3.02 6.4 100 7.86 Wrist - ADM 8        B.Elbow ADM 6.98 7.1 111 7.86 B.Elbow - Wrist 21 3.96 53      A.Elbow ADM 8.65 7.1 110 8.02 A.Elbow - B.Elbow 10 1.67 60       EMG         EMG Summary Table     Spontaneous MUAP Recruitment   Muscle IA Fib PSW Fasc H.F. Amp Dur. PPP Pattern   R. Deltoid N None None None None N N N N   R. Triceps brachii N None None None None N N N N   R. Pronator teres N None None None None N N N N   R. First dorsal interosseous N None None None None N N N N   R. Abductor pollicis brevis 1+ None 1+ None  None 1+ N N N   L. Abductor pollicis brevis N None None None None N N N N       Summary    The motor conduction test was performed on 3 nerve(s). The results were normal in 1 nerve(s): R Ulnar - ADM. Results outside the specified normal range were found in 2 nerve(s), as follows:   In the R Median - APB study  o the take off latency result was increased for Wrist stimulation  o the peak amplitude result was reduced for Wrist stimulation   In the L Median - APB study  o the peak amplitude result was reduced for Wrist stimulation    The sensory conduction test had results within normal range in 2 of the tested nerves: L Ulnar - Digit V (Antidromic), R Ulnar - Digit V (Antidromic). There were results outside of the specified normal range in 2 of the tested nerves:   In the L Median - Digit III (Antidromic) study  o the peak latency result was increased for Wrist stimulation   In the R Median - Digit III (Antidromic) study  o the peak latency result was increased for Wrist stimulation  o the peak amplitude result was reduced for Wrist stimulation  o the response was considered absent for Mid palm stimulation    The needle EMG examination was performed in 6 muscles. It was normal in 5 muscle(s): R. Deltoid, R. Triceps brachii, R. Pronator teres, R. First dorsal interosseous, L. Abductor pollicis brevis. The study was abnormal in 1 muscle(s), with the following distribution:   The R. Abductor pollicis brevis had abnormal spontaneous activity, abnormal MUP waveforms.      Electrodiagnostic Impression:  1. 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  Needle EMG examination of the median nerve innervated abductor pollicis brevis muscle on the right showed signs of active axonal denervation in addition to chronic neurogenic change.  2. There is insufficient electrodiagnostic evidence for diagnoses of peripheral polyneuropathy, myopathy, or active  axonal cervical radiculopathy/brachial plexopathy of the right upper extremity.    Summary:  Moderate left and severe right carpal tunnel syndrome.    Plan:  Today's electrodiagnostic test results are somewhat worsened compared to previous EMG/nerve conduction studies from 2018, despite surgical release on the right side.  Additionally, there is obvious reduction in amplitude of the left median nerve on motor nerve conduction studies in comparison to previous testing from 2018.  At this point, I recommended consulting with our hand surgeon, Dr. Mata.       -------------------------------  Nitesh Cleveland M.D.

## 2020-06-18 ENCOUNTER — LAB VISIT (OUTPATIENT)
Dept: LAB | Facility: HOSPITAL | Age: 60
End: 2020-06-18
Attending: NURSE PRACTITIONER
Payer: COMMERCIAL

## 2020-06-18 DIAGNOSIS — Z79.4 TYPE 2 DIABETES MELLITUS WITHOUT COMPLICATION, WITH LONG-TERM CURRENT USE OF INSULIN: ICD-10-CM

## 2020-06-18 DIAGNOSIS — E11.9 TYPE 2 DIABETES MELLITUS WITHOUT COMPLICATION, WITH LONG-TERM CURRENT USE OF INSULIN: ICD-10-CM

## 2020-06-18 LAB
ESTIMATED AVG GLUCOSE: 171 MG/DL (ref 68–131)
HBA1C MFR BLD HPLC: 7.6 % (ref 4–5.6)

## 2020-06-18 PROCEDURE — 83036 HEMOGLOBIN GLYCOSYLATED A1C: CPT

## 2020-06-18 PROCEDURE — 36415 COLL VENOUS BLD VENIPUNCTURE: CPT | Mod: PO

## 2020-06-22 ENCOUNTER — OFFICE VISIT (OUTPATIENT)
Dept: ORTHOPEDICS | Facility: CLINIC | Age: 60
End: 2020-06-22
Payer: COMMERCIAL

## 2020-06-22 VITALS
HEIGHT: 66 IN | SYSTOLIC BLOOD PRESSURE: 141 MMHG | DIASTOLIC BLOOD PRESSURE: 65 MMHG | BODY MASS INDEX: 35.29 KG/M2 | TEMPERATURE: 100 F | RESPIRATION RATE: 18 BRPM | HEART RATE: 80 BPM | WEIGHT: 219.56 LBS

## 2020-06-22 DIAGNOSIS — M65.332 TRIGGER FINGER, LEFT MIDDLE FINGER: ICD-10-CM

## 2020-06-22 DIAGNOSIS — Z01.818 PRE-OP TESTING: Primary | ICD-10-CM

## 2020-06-22 DIAGNOSIS — M65.312 TRIGGER FINGER OF LEFT THUMB: ICD-10-CM

## 2020-06-22 PROCEDURE — 3008F PR BODY MASS INDEX (BMI) DOCUMENTED: ICD-10-PCS | Mod: CPTII,S$GLB,, | Performed by: ORTHOPAEDIC SURGERY

## 2020-06-22 PROCEDURE — 3078F PR MOST RECENT DIASTOLIC BLOOD PRESSURE < 80 MM HG: ICD-10-PCS | Mod: CPTII,S$GLB,, | Performed by: ORTHOPAEDIC SURGERY

## 2020-06-22 PROCEDURE — 99999 PR PBB SHADOW E&M-EST. PATIENT-LVL V: CPT | Mod: PBBFAC,,, | Performed by: ORTHOPAEDIC SURGERY

## 2020-06-22 PROCEDURE — 3077F SYST BP >= 140 MM HG: CPT | Mod: CPTII,S$GLB,, | Performed by: ORTHOPAEDIC SURGERY

## 2020-06-22 PROCEDURE — 99999 PR PBB SHADOW E&M-EST. PATIENT-LVL V: ICD-10-PCS | Mod: PBBFAC,,, | Performed by: ORTHOPAEDIC SURGERY

## 2020-06-22 PROCEDURE — 3078F DIAST BP <80 MM HG: CPT | Mod: CPTII,S$GLB,, | Performed by: ORTHOPAEDIC SURGERY

## 2020-06-22 PROCEDURE — 3077F PR MOST RECENT SYSTOLIC BLOOD PRESSURE >= 140 MM HG: ICD-10-PCS | Mod: CPTII,S$GLB,, | Performed by: ORTHOPAEDIC SURGERY

## 2020-06-22 PROCEDURE — 3008F BODY MASS INDEX DOCD: CPT | Mod: CPTII,S$GLB,, | Performed by: ORTHOPAEDIC SURGERY

## 2020-06-22 PROCEDURE — 99203 PR OFFICE/OUTPT VISIT, NEW, LEVL III, 30-44 MIN: ICD-10-PCS | Mod: S$GLB,,, | Performed by: ORTHOPAEDIC SURGERY

## 2020-06-22 PROCEDURE — 99203 OFFICE O/P NEW LOW 30 MIN: CPT | Mod: S$GLB,,, | Performed by: ORTHOPAEDIC SURGERY

## 2020-06-22 RX ORDER — PEN NEEDLE, DIABETIC 31 GX5/16"
NEEDLE, DISPOSABLE MISCELLANEOUS
COMMUNITY
Start: 2020-06-05

## 2020-06-22 RX ORDER — AZELASTINE 1 MG/ML
1 SPRAY, METERED NASAL DAILY PRN
COMMUNITY
Start: 2020-05-11

## 2020-06-22 RX ORDER — FLUTICASONE PROPIONATE 50 MCG
1 SPRAY, SUSPENSION (ML) NASAL DAILY PRN
COMMUNITY
Start: 2020-05-11

## 2020-06-22 RX ORDER — LANCETS 30 GAUGE
EACH MISCELLANEOUS
COMMUNITY
Start: 2020-03-27

## 2020-06-22 NOTE — H&P (VIEW-ONLY)
2020    Chief Complaint:  Chief Complaint   Patient presents with    Right Hand - Pain    Left Hand - Pain       HPI:  Aaron Garcia is a 59 y.o. female, who presents to clinic today she has a history of bilateral hand pain.  She states that the left is worse than the right.  She does have a history of bilateral carpal tunnel releases.  She states that she still has occasional numbness and tingling in her fingers.  States that the most significant problem is the pain in her hands and catching that she experiences.  States that she has had multiple injections for trigger fingers in both hands.  States that her left thumb and middle finger been injected several times.  She is here today for treatment options and further workup.  She recently had a repeat nerve conduction study.    PMHX:  Past Medical History:   Diagnosis Date    Arthritis     Asthma     Cataract     OU    Diabetes mellitus, type 2     Gastric ulcer     Hypertension     Mitral valve prolapse     RYLAN (obstructive sleep apnea)     Snores     never had sleep study, unable to lay flat, sleeps elevated    Thyroid disease     Goiter       PSHX:  Past Surgical History:   Procedure Laterality Date    CARPAL TUNNEL RELEASE Left     CARPAL TUNNEL RELEASE Right 2018    Procedure: RELEASE, CARPAL TUNNEL;  Surgeon: Anurag Mathis MD;  Location: [facility] OR;  Service: Orthopedics;  Laterality: Right;     SECTION      CHOLECYSTECTOMY      DILATION AND CURETTAGE OF UTERUS      Epidural steroid Injection      Pain management, cervical    ESOPHAGEAL DILATION      ulcer on tongue removed      WOUND DEBRIDEMENT Left     leg at ankle level       FMHX:  Family History   Problem Relation Age of Onset    Diabetes Father     Diabetes Sister     Heart disease Brother     Glaucoma Paternal Aunt     Glaucoma Daughter         suspect       SOCHX:  Social History     Tobacco Use    Smoking status: Never Smoker     "Smokeless tobacco: Never Used   Substance Use Topics    Alcohol use: No     Alcohol/week: 0.0 standard drinks       ALLERGIES:  Hydrocodone, Metformin, Penicillins, and [place]    CURRENT MEDICATIONS:  Current Outpatient Medications on File Prior to Visit   Medication Sig Dispense Refill    ALBUTEROL SULFATE (PROAIR HFA INHL) Inhale into the lungs.      amitriptyline (ELAVIL) 25 MG tablet Take 25 mg by mouth nightly as needed for Insomnia.      amlodipine (NORVASC) 10 MG tablet 10 mg.       azelastine (ASTELIN) 137 mcg (0.1 %) nasal spray       BD ULTRA-FINE GISELA PEN NEEDLE 32 gauge x 5/32" Ndle       benazepril (LOTENSIN) 40 MG tablet Take 40 mg by mouth once daily.      desloratadine (CLARINEX) 5 mg tablet Take 5 mg by mouth.      diclofenac sodium (VOLTAREN) 1 % Gel Apply 2 g topically 3 (three) times daily as needed. To both hands 2 Tube 3    fluticasone propionate (FLONASE) 50 mcg/actuation nasal spray       gabapentin (NEURONTIN) 600 MG tablet Take 600 mg by mouth 2 (two) times daily.      glipiZIDE (GLUCOTROL) 5 MG tablet TAKE 1 TABLET (5 MG TOTAL) BY MOUTH 2 (TWO) TIMES DAILY WITH MEALS. 180 tablet 3    HYDROcodone-acetaminophen (NORCO)  mg per tablet Take 1 tablet by mouth 3 (three) times daily as needed. 90 tablet 0    insulin aspart U-100 (NOVOLOG FLEXPEN U-100 INSULIN) 100 unit/mL (3 mL) InPn pen 10 units with meals plus sliding scale.  150-200=+2, 201-250=+4; 251-300=+6; 301-350=+8, over 350, + 10 units 18 mL 11    insulin glargine, TOUJEO, (TOUJEO SOLOSTAR U-300 INSULIN) 300 unit/mL (1.5 mL) InPn pen Inject 60 Units into the skin every evening. 4 Syringe 11    linaGLIPtin (TRADJENTA) 5 mg Tab tablet Take 1 tablet (5 mg total) by mouth once daily. 30 tablet 5    montelukast (SINGULAIR) 10 mg tablet Take 10 mg by mouth.      nebivolol (BYSTOLIC) 10 MG Tab Take 10 mg by mouth.      ON CALL LANCET 30 gauge Misc       pantoprazole (PROTONIX) 40 MG tablet Take 40 mg by mouth once " "daily.      rosuvastatin (CRESTOR) 10 MG tablet Take 10 mg by mouth once daily.      SYMBICORT 160-4.5 mcg/actuation HFAA       tiZANidine 2 mg Cap Take 1 capsule (2 mg total) by mouth 3 (three) times daily as needed. 90 capsule 2    TRUE METRIX GLUCOSE TEST STRIP Strp       amoxicillin-clavulanate 500-125mg (AUGMENTIN) 500-125 mg Tab Take 1 tablet (500 mg total) by mouth 3 (three) times daily. 15 tablet 0     No current facility-administered medications on file prior to visit.        REVIEW OF SYSTEMS:  Review of Systems   Constitutional: Negative.    HENT: Negative.    Eyes: Negative.    Respiratory: Negative.    Cardiovascular: Negative.    Gastrointestinal: Negative.    Genitourinary: Negative.    Musculoskeletal: Positive for back pain, joint pain and neck pain. Negative for falls and myalgias.   Skin: Negative.    Neurological: Negative.    Endo/Heme/Allergies: Positive for environmental allergies. Negative for polydipsia. Does not bruise/bleed easily.   Psychiatric/Behavioral: Negative.        GENERAL PHYSICAL EXAM:   BP (!) 141/65   Pulse 80   Temp 99.6 °F (37.6 °C) (Temporal)   Resp 18   Ht 5' 6" (1.676 m)   Wt 99.6 kg (219 lb 9.3 oz)   BMI 35.44 kg/m²    GEN: well developed, well nourished, no acute distress   HENT: Normocephalic, atraumatic   EYES: No discharge, conjunctiva normal   NECK: Supple, non-tender   PULM: No wheezing, no respiratory distress   CV: RRR   ABD: Soft, non-tender    ORTHO EXAM:   Examination of bilateral hands and wrist reveals that there is no edema.  There are no major skin changes.  She does have well-healed incisions over the carpal tunnels.  Palpation produces moderate tenderness over the A1 pulley of the left thumb and left middle finger as well as mildly over the right ring finger.  Flexion and extension reveals that there is no active triggering of any of the fingers on the right.  She does have very mild active triggering of the left thumb and left middle fingers. "  She does report mild decreased sensation in the median distribution bilaterally with intact ulnar and radial sensation.  She has bilateral negative Tinel's and bilateral negative Durkan's.  She does have 5/5 thenar muscular strength bilaterally.  She does have 2+ radial pulses.    EMG/nerve conduction study:   Nerve conduction study is consistent with moderate left and severe right carpal tunnel syndrome.  When compared with her nerve conduction study from 2018 there is some decrease in amplitude over the left median nerve as well as minimally on the right.    ASSESSMENT:   Left thumb and middle finger triggering, possible bilateral carpal tunnel recurrence    PLAN:  1.  I have discussed treatment with the patient.  She states that currently she is not having problems with numbness and tingling.  It is only rare that she has any numbness and tingling to the hands other than what is her baseline.  She is complaining significantly about the pain overlying the A1 pulleys about the triggers.  She would like to have further treatment for those.  I did discuss treatment options however she has limited options due to multiple injections to those trigger fingers.  I did discuss the possibility of trigger finger release.  I discussed the risks and benefits of the procedure informed consent has been obtained    2.  Will proceed with left thumb and middle finger trigger release under general anesthesia    3.  I will send her for preoperative CBC, BMP, and EKG    4.  She will follow up with me 2 weeks postoperatively

## 2020-06-22 NOTE — PATIENT INSTRUCTIONS
Surgery Instructions:     Your surgery is scheduled on 7/16/2020  at the surgery center: [address], 1st floor, second entrance.    The pre-op department will be in contact with you prior to your procedure to review medications and instructions.       Nothing to eat or drink after midnight prior to day of surgery.    All preoperative testing should be done as soon as possible as it may be needed to obtain clearance.  An EKG and blood work has been ordered that needs to be performed prior to your surgery.    Your pre op COVID-19 test collection is scheduled for 7/13/2020 at 9:10 AM.  Please arrive at the drive thru at entrance 3 for this test collection.  You will not need to get out of your car.    The surgery center will contact you the day prior to surgery to advise you of your arrival time for surgery.     Your post op appointment is scheduled on 7/29/2020 at 8:40 AM.

## 2020-06-22 NOTE — LETTER
June 23, 2020      Nitesh Cleveland MD  [address] [place] [address] Dr  [address]  [facility] [address]           [facility]  [address] [place] [address]  Phone: [phone]          Patient: Aaron Garcia   MR Number: [id number]   YOB: 1960   Date of Visit: 6/22/2020       Dear Dr. Nitesh Cleveland:    Thank you for referring Aaron Garcia to me for evaluation. Attached you will find relevant portions of my assessment and plan of care.    If you have questions, please do not hesitate to call me. I look forward to following Aaron Garcia along with you.    Sincerely,    Jose Mata MD    Enclosure  CC:  No Recipients    If you would like to receive this communication electronically, please contact [email or web address] or [phone] to request more information on [employer] access.    For providers and/or their staff who would like to refer a patient to [facility], please contact us through our one-stop-shop provider referral line, [facility] , at [phone].    If you feel you have received this communication in error or would no longer like to receive these types of communications, please e-mail [email or web address]

## 2020-06-23 PROBLEM — M65.312 TRIGGER FINGER OF LEFT THUMB: Status: ACTIVE | Noted: 2020-06-23

## 2020-06-23 PROBLEM — M65.332 TRIGGER FINGER, LEFT MIDDLE FINGER: Status: ACTIVE | Noted: 2020-06-23

## 2020-06-23 PROBLEM — Z01.818 PRE-OP TESTING: Status: ACTIVE | Noted: 2020-06-23

## 2020-06-23 RX ORDER — LIDOCAINE HYDROCHLORIDE 10 MG/ML
1 INJECTION, SOLUTION EPIDURAL; INFILTRATION; INTRACAUDAL; PERINEURAL ONCE
Status: CANCELLED | OUTPATIENT
Start: 2020-06-23 | End: 2020-06-23

## 2020-06-23 RX ORDER — MUPIROCIN 20 MG/G
OINTMENT TOPICAL
Status: CANCELLED | OUTPATIENT
Start: 2020-06-23

## 2020-06-24 ENCOUNTER — TELEPHONE (OUTPATIENT)
Dept: ORTHOPEDICS | Facility: CLINIC | Age: 60
End: 2020-06-24

## 2020-06-24 NOTE — TELEPHONE ENCOUNTER
----- Message from Vanna Baker sent at 6/24/2020  2:05 PM CDT -----  Regarding: questions before procedure  Contact: Aaron  Type: Needs Medical Advice  Who Called:  Aaron Sr Call Back Number: [phone]  Additional Information: pt has questions regarding her upcoming procedurer

## 2020-06-24 NOTE — TELEPHONE ENCOUNTER
Pt states she is having problems with her left ring finger as well and would like to know if she can have surgery the same day as well.  Informed pt that I will seek Dr. Mata's advice then call her back.

## 2020-06-25 ENCOUNTER — OFFICE VISIT (OUTPATIENT)
Dept: ENDOCRINOLOGY | Facility: CLINIC | Age: 60
End: 2020-06-25
Payer: COMMERCIAL

## 2020-06-25 ENCOUNTER — CLINICAL SUPPORT (OUTPATIENT)
Dept: CARDIOLOGY | Facility: CLINIC | Age: 60
End: 2020-06-25
Payer: COMMERCIAL

## 2020-06-25 ENCOUNTER — LAB VISIT (OUTPATIENT)
Dept: LAB | Facility: HOSPITAL | Age: 60
End: 2020-06-25
Attending: ORTHOPAEDIC SURGERY
Payer: COMMERCIAL

## 2020-06-25 VITALS
DIASTOLIC BLOOD PRESSURE: 78 MMHG | HEIGHT: 66 IN | SYSTOLIC BLOOD PRESSURE: 134 MMHG | HEART RATE: 74 BPM | WEIGHT: 199.88 LBS | BODY MASS INDEX: 32.12 KG/M2

## 2020-06-25 DIAGNOSIS — E55.9 VITAMIN D DEFICIENCY: ICD-10-CM

## 2020-06-25 DIAGNOSIS — E78.49 OTHER HYPERLIPIDEMIA: ICD-10-CM

## 2020-06-25 DIAGNOSIS — Z01.818 PRE-OP TESTING: ICD-10-CM

## 2020-06-25 DIAGNOSIS — E11.9 TYPE 2 DIABETES MELLITUS WITHOUT COMPLICATION, WITH LONG-TERM CURRENT USE OF INSULIN: Primary | ICD-10-CM

## 2020-06-25 DIAGNOSIS — I10 ESSENTIAL HYPERTENSION: ICD-10-CM

## 2020-06-25 DIAGNOSIS — Z79.4 TYPE 2 DIABETES MELLITUS WITHOUT COMPLICATION, WITH LONG-TERM CURRENT USE OF INSULIN: Primary | ICD-10-CM

## 2020-06-25 LAB
ANION GAP SERPL CALC-SCNC: 11 MMOL/L (ref 8–16)
BASOPHILS # BLD AUTO: 0.07 K/UL (ref 0–0.2)
BASOPHILS NFR BLD: 0.8 % (ref 0–1.9)
BUN SERPL-MCNC: 8 MG/DL (ref 6–20)
CALCIUM SERPL-MCNC: 9.8 MG/DL (ref 8.7–10.5)
CHLORIDE SERPL-SCNC: 109 MMOL/L (ref 95–110)
CO2 SERPL-SCNC: 26 MMOL/L (ref 23–29)
CREAT SERPL-MCNC: 0.7 MG/DL (ref 0.5–1.4)
DIFFERENTIAL METHOD: ABNORMAL
EOSINOPHIL # BLD AUTO: 0.2 K/UL (ref 0–0.5)
EOSINOPHIL NFR BLD: 2.7 % (ref 0–8)
ERYTHROCYTE [DISTWIDTH] IN BLOOD BY AUTOMATED COUNT: 12.8 % (ref 11.5–14.5)
EST. GFR  (AFRICAN AMERICAN): >60 ML/MIN/1.73 M^2
EST. GFR  (NON AFRICAN AMERICAN): >60 ML/MIN/1.73 M^2
GLUCOSE SERPL-MCNC: 100 MG/DL (ref 70–110)
HCT VFR BLD AUTO: 43.1 % (ref 37–48.5)
HGB BLD-MCNC: 13.5 G/DL (ref 12–16)
IMM GRANULOCYTES # BLD AUTO: 0.02 K/UL (ref 0–0.04)
IMM GRANULOCYTES NFR BLD AUTO: 0.2 % (ref 0–0.5)
LYMPHOCYTES # BLD AUTO: 2.9 K/UL (ref 1–4.8)
LYMPHOCYTES NFR BLD: 33.8 % (ref 18–48)
MCH RBC QN AUTO: 29.8 PG (ref 27–31)
MCHC RBC AUTO-ENTMCNC: 31.3 G/DL (ref 32–36)
MCV RBC AUTO: 95 FL (ref 82–98)
MONOCYTES # BLD AUTO: 1 K/UL (ref 0.3–1)
MONOCYTES NFR BLD: 11.4 % (ref 4–15)
NEUTROPHILS # BLD AUTO: 4.3 K/UL (ref 1.8–7.7)
NEUTROPHILS NFR BLD: 51.1 % (ref 38–73)
NRBC BLD-RTO: 0 /100 WBC
PLATELET # BLD AUTO: 298 K/UL (ref 150–350)
PMV BLD AUTO: 11.3 FL (ref 9.2–12.9)
POTASSIUM SERPL-SCNC: 3.9 MMOL/L (ref 3.5–5.1)
RBC # BLD AUTO: 4.53 M/UL (ref 4–5.4)
SODIUM SERPL-SCNC: 146 MMOL/L (ref 136–145)
WBC # BLD AUTO: 8.44 K/UL (ref 3.9–12.7)

## 2020-06-25 PROCEDURE — 99999 PR PBB SHADOW E&M-EST. PATIENT-LVL IV: ICD-10-PCS | Mod: PBBFAC,,, | Performed by: NURSE PRACTITIONER

## 2020-06-25 PROCEDURE — 80048 BASIC METABOLIC PNL TOTAL CA: CPT

## 2020-06-25 PROCEDURE — 85025 COMPLETE CBC W/AUTO DIFF WBC: CPT

## 2020-06-25 PROCEDURE — 99214 OFFICE O/P EST MOD 30 MIN: CPT | Mod: S$GLB,,, | Performed by: NURSE PRACTITIONER

## 2020-06-25 PROCEDURE — 3051F PR MOST RECENT HEMOGLOBIN A1C LEVEL 7.0 - < 8.0%: ICD-10-PCS | Mod: CPTII,S$GLB,, | Performed by: NURSE PRACTITIONER

## 2020-06-25 PROCEDURE — 3051F HG A1C>EQUAL 7.0%<8.0%: CPT | Mod: CPTII,S$GLB,, | Performed by: NURSE PRACTITIONER

## 2020-06-25 PROCEDURE — 99214 PR OFFICE/OUTPT VISIT, EST, LEVL IV, 30-39 MIN: ICD-10-PCS | Mod: S$GLB,,, | Performed by: NURSE PRACTITIONER

## 2020-06-25 PROCEDURE — 36415 COLL VENOUS BLD VENIPUNCTURE: CPT | Mod: PO

## 2020-06-25 PROCEDURE — 99999 PR PBB SHADOW E&M-EST. PATIENT-LVL IV: CPT | Mod: PBBFAC,,, | Performed by: NURSE PRACTITIONER

## 2020-06-25 PROCEDURE — 93000 ELECTROCARDIOGRAM COMPLETE: CPT | Mod: S$GLB,,, | Performed by: INTERNAL MEDICINE

## 2020-06-25 PROCEDURE — 93000 EKG 12-LEAD: ICD-10-PCS | Mod: S$GLB,,, | Performed by: INTERNAL MEDICINE

## 2020-06-25 NOTE — PROGRESS NOTES
Subjective:       Patient ID: Aaron Garcia is a 59 y.o. female.    Chief Complaint: Type 2 Dm      Pt is a 59 y.o. AAF  with a diagnosis of Type 2 diabetes mellitus diagnosed approximately 2001, as well as chronic conditions pending review including HTN, HLP.  Other pertinent medical and social information noted includes, but not limited to: chronic pain r/t cervical radiculopathy affecting UA.      Interim Events: Last month did audio visit--pt was  C/o sinus infection and marked hyperglycemia.  Pt was given augmentin, and this has since resolved.  Had reported to nurseat that time  glucoses running 350-400 last couple of weeks, but reports differently to me. Toujeo had been increased from 45 to 60 over the last couple of weeks. This biggest intevention I recommended at that time was to take 10  Units of Novolog with any meal containing starches.      She is currently taking 60 of Toujeo--She is taking 5 untis of novolog with most meals, but will increase to 10 units if she knows good amount of starches.   Forgot logs    -150--5  Units--and often eats cheerios--but may have banana   Lunch--165-170--Novolog 5 units--likes steamed fish, chick veggies, sometimes small amount spagetti. \  Supper--150's,   BT--130-135--          Reports FBS: 160-170, though outlier of 102  Lunches 245 (which is 2 hr pp)  Supper 195-200  .   .      Diabetes Flow Sheet:  Diabetes Medications: Toujeo 60 qhs, tradjenta 5 mg, glipizide 5 mg bid       If insulin vial or pen preference:   Prior failed/or not tolerated medication therapies:metformin, diarrhea  Diabetes Complications:   Aspirin:    Statin: crestor 10   ACE/ARB:benazepril 40   Last Urine Microalbumin:    Last Eye exam:   Last Diabetic Education: 18 mo   Glucometer:Truemetrix       Review of Systems   Constitutional: Negative for activity change and fatigue., denies fever    HENT: Negative for hearing loss and trouble swallowing. + ear pain, rhinorrea   Eyes:  Negative for photophobia and visual disturbance.        Last Eye Exam   Respiratory: Negative for cough and shortness of breath.    Cardiovascular: Negative for chest pain and palpitations.   Gastrointestinal: Negative for constipation and diarrhea. +nausea  Genitourinary: Negative for frequency and urgency.   Musculoskeletal: Negative for arthralgias and myalgias.--pain from neck radiating down left shoulder, arm and chest--w/u revealed neck related.  Also pending L hand sugery for tendonitis    Skin: Negative for rash and wound.   Allergic/Immunologic: Negative for food allergies.   Neurological: Negative for dizziness, weakness, light-headedness and numbness.   Psychiatric/Behavioral: Negative for sleep disturbance. The patient is not nervous/anxious.        Objective:      behavior is normal. Judgment and thought content normal.       Hemoglobin A1C   Date Value Ref Range Status   06/18/2020 7.6 (H) 4.0 - 5.6 % Final     Comment:     ADA Screening Guidelines:  5.7-6.4%  Consistent with prediabetes  >or=6.5%  Consistent with diabetes  High levels of fetal hemoglobin interfere with the HbA1C  assay. Heterozygous hemoglobin variants (HbS, HgC, etc)do  not significantly interfere with this assay.   However, presence of multiple variants may affect accuracy.     11/14/2019 7.8 (H) 4.0 - 5.6 % Final     Comment:     ADA Screening Guidelines:  5.7-6.4%  Consistent with prediabetes  >or=6.5%  Consistent with diabetes  High levels of fetal hemoglobin interfere with the HbA1C  assay. Heterozygous hemoglobin variants (HbS, HgC, etc)do  not significantly interfere with this assay.   However, presence of multiple variants may affect accuracy.     07/10/2019 6.7 (H) 4.0 - 5.6 % Final     Comment:     ADA Screening Guidelines:  5.7-6.4%  Consistent with prediabetes  >or=6.5%  Consistent with diabetes  High levels of fetal hemoglobin interfere with the HbA1C  assay. Heterozygous hemoglobin variants (HbS, HgC, etc)do  not  significantly interfere with this assay.   However, presence of multiple variants may affect accuracy.         Chemistry        Component Value Date/Time     11/14/2019 1113    K 3.7 11/14/2019 1113     11/14/2019 1113    CO2 25 11/14/2019 1113    BUN 8 11/14/2019 1113    CREATININE 0.7 11/14/2019 1113     (H) 11/14/2019 1113        Component Value Date/Time    CALCIUM 9.8 11/14/2019 1113    ALKPHOS 66 11/14/2019 1113    AST 17 11/14/2019 1113    ALT 15 11/14/2019 1113    BILITOT 0.7 11/14/2019 1113    ESTGFRAFRICA >60.0 11/14/2019 1113    EGFRNONAA >60.0 11/14/2019 1113        Lab Results   Component Value Date    LDLCALC 70.8 11/14/2019     Lab Results   Component Value Date    TSH 0.836 12/05/2018         Assessment:        steroids.      1. Type 2 diabetes mellitus without complication, with long-term current use of insulin  insulin aspart U-100 (NOVOLOG FLEXPEN U-100 INSULIN) 100 unit/mL (3 mL) InPn pen  Chronic-uncontrolled-see plan    2. Essential hypertension  Chronic-cont benazepril   3. Other hyperlipidemia  Chronic-continue rosuvasatin                 Plan:        Continue Toujeo 60 units at bedtime    Contineu novolog 5 with meals  Plus ss     Stop the glipizide    Stop Tradjenta---if you find you are needing a lot more sliding scale novolog--we might restart tradjenta rather than keep increasing insulin.     ORDERS 06/25/2020    3 mo with fasting cmp, lipids, a1c, tsh, urine m/c  --likes mid morning    Appt.

## 2020-07-07 ENCOUNTER — HOSPITAL ENCOUNTER (OUTPATIENT)
Dept: RADIOLOGY | Facility: HOSPITAL | Age: 60
Discharge: HOME | End: 2020-07-07
Attending: INTERNAL MEDICINE
Payer: COMMERCIAL

## 2020-07-07 ENCOUNTER — OFFICE VISIT (OUTPATIENT)
Dept: RHEUMATOLOGY | Facility: CLINIC | Age: 60
End: 2020-07-07
Payer: COMMERCIAL

## 2020-07-07 ENCOUNTER — LAB VISIT (OUTPATIENT)
Dept: LAB | Facility: HOSPITAL | Age: 60
End: 2020-07-07
Attending: INTERNAL MEDICINE
Payer: COMMERCIAL

## 2020-07-07 VITALS
DIASTOLIC BLOOD PRESSURE: 88 MMHG | HEART RATE: 73 BPM | WEIGHT: 200.75 LBS | SYSTOLIC BLOOD PRESSURE: 145 MMHG | BODY MASS INDEX: 32.4 KG/M2

## 2020-07-07 DIAGNOSIS — M54.16 LUMBAR RADICULOPATHY: ICD-10-CM

## 2020-07-07 DIAGNOSIS — Z79.4 TYPE 2 DIABETES MELLITUS WITHOUT COMPLICATION, WITH LONG-TERM CURRENT USE OF INSULIN: ICD-10-CM

## 2020-07-07 DIAGNOSIS — M77.9 TENDINITIS: Primary | ICD-10-CM

## 2020-07-07 DIAGNOSIS — M19.90 CHRONIC INFLAMMATORY ARTHRITIS: ICD-10-CM

## 2020-07-07 DIAGNOSIS — E11.9 TYPE 2 DIABETES MELLITUS WITHOUT COMPLICATION, WITH LONG-TERM CURRENT USE OF INSULIN: ICD-10-CM

## 2020-07-07 DIAGNOSIS — M65.332 TRIGGER FINGER, LEFT MIDDLE FINGER: ICD-10-CM

## 2020-07-07 DIAGNOSIS — G56.01 CARPAL TUNNEL SYNDROME OF RIGHT WRIST: ICD-10-CM

## 2020-07-07 DIAGNOSIS — R76.8 ANA POSITIVE: ICD-10-CM

## 2020-07-07 DIAGNOSIS — M50.30 DDD (DEGENERATIVE DISC DISEASE), CERVICAL: ICD-10-CM

## 2020-07-07 DIAGNOSIS — M77.9 TENDINITIS: ICD-10-CM

## 2020-07-07 PROCEDURE — 73630 X-RAY EXAM OF FOOT: CPT | Mod: 26,,, | Performed by: RADIOLOGY

## 2020-07-07 PROCEDURE — 86200 CCP ANTIBODY: CPT

## 2020-07-07 PROCEDURE — 73130 X-RAY EXAM OF HAND: CPT | Mod: 26,,, | Performed by: RADIOLOGY

## 2020-07-07 PROCEDURE — 99244 OFF/OP CNSLTJ NEW/EST MOD 40: CPT | Mod: S$GLB,,, | Performed by: INTERNAL MEDICINE

## 2020-07-07 PROCEDURE — 80074 ACUTE HEPATITIS PANEL: CPT

## 2020-07-07 PROCEDURE — 73130 X-RAY EXAM OF HAND: CPT | Mod: TC,50

## 2020-07-07 PROCEDURE — 36415 COLL VENOUS BLD VENIPUNCTURE: CPT

## 2020-07-07 PROCEDURE — 73130 XR HAND COMPLETE 3 VIEWS BILATERAL: ICD-10-PCS | Mod: 26,,, | Performed by: RADIOLOGY

## 2020-07-07 PROCEDURE — 99999 PR PBB SHADOW E&M-EST. PATIENT-LVL IV: ICD-10-PCS | Mod: PBBFAC,,, | Performed by: INTERNAL MEDICINE

## 2020-07-07 PROCEDURE — 99244 PR OFFICE CONSULTATION,LEVEL IV: ICD-10-PCS | Mod: S$GLB,,, | Performed by: INTERNAL MEDICINE

## 2020-07-07 PROCEDURE — 86480 TB TEST CELL IMMUN MEASURE: CPT

## 2020-07-07 PROCEDURE — 73630 X-RAY EXAM OF FOOT: CPT | Mod: TC,50

## 2020-07-07 PROCEDURE — 99999 PR PBB SHADOW E&M-EST. PATIENT-LVL IV: CPT | Mod: PBBFAC,,, | Performed by: INTERNAL MEDICINE

## 2020-07-07 PROCEDURE — 73630 XR FOOT COMPLETE 3 VIEW BILATERAL: ICD-10-PCS | Mod: 26,,, | Performed by: RADIOLOGY

## 2020-07-07 RX ORDER — METHOCARBAMOL 750 MG/1
750 TABLET, FILM COATED ORAL 2 TIMES DAILY PRN
Qty: 60 TABLET | Refills: 2 | Status: ON HOLD | OUTPATIENT
Start: 2020-07-07 | End: 2021-01-21 | Stop reason: HOSPADM

## 2020-07-07 RX ORDER — LINAGLIPTIN 5 MG/1
TABLET, FILM COATED ORAL
COMMUNITY
Start: 2020-07-06 | End: 2021-01-08 | Stop reason: CLARIF

## 2020-07-07 RX ORDER — HYDROXYCHLOROQUINE SULFATE 200 MG/1
200 TABLET, FILM COATED ORAL 2 TIMES DAILY
Qty: 60 TABLET | Refills: 6 | Status: SHIPPED | OUTPATIENT
Start: 2020-07-07 | End: 2021-01-08 | Stop reason: CLARIF

## 2020-07-07 NOTE — PROGRESS NOTES
CC:  Chief Complaint   Patient presents with    New Patient     generalized joint pain & swelling    Positive ZAIRA     Referred by Dr Campbell     History of Present Illness:  Aaron Cochran a 59 y.o.yo female who presents for further evaluation of a recently noted positive ZAIRA  1:160, speckled pattern negative profile  She was seen by , PM&R for bilateral chronic tendinitis, with trigger fingers  She also has history of bilateral carpal tunnel syndrome and had release surgeries in the past, however recent EMG studies suggested  Moderate left and severe right carpal tunnel syndrome.   She was referred to hand surgeon and is scheduled to haveleft thumb and middle finger trigger release surgery later this month    She also complains of bilateral hand pain and stiffness  She notices it in the morning but however it gets worse with activity later in the day  No other joint swelling noted  No history of gout    Nonsmoker  + family history of autoimmune disease      Review of Systems:  Constitutional: Denies fever, chills. No recent weight changes.   Fatigue: no  Muscle weakness: no  Headaches: no new headaches  Eyes: No redness .  No recent visual changes.  ENT:  No oral or nasal ulcers.  Card: No chest pain.  Resp: No cough or sob.   Gastro: No nausea or vomiting.  No heartburn.  Constipation: no  Diarrhea: no  Genito:  No dysuria.  No genital ulcers.  Skin: No rash.  Raynauds:no  Neuro: No numbness / tingling.   Psych: No depression, anxiety  Endo:  no excess thirst.  Heme: no abnormal bleeding or bruising  Clots:none   Miscarriages : none     Past Medical History:   Diagnosis Date    Arthritis     Asthma     Cataract     OU    Diabetes mellitus, type 2 2001    Gastric ulcer     Hypertension     Mitral valve prolapse     RYLAN (obstructive sleep apnea)     Snores     never had sleep study, unable to lay flat, sleeps elevated    Thyroid disease     Goiter       Past Surgical History:    Procedure Laterality Date    CARPAL TUNNEL RELEASE Left     CARPAL TUNNEL RELEASE Right 2018    Procedure: RELEASE, CARPAL TUNNEL;  Surgeon: Anurag Mathis MD;  Location: [facility] OR;  Service: Orthopedics;  Laterality: Right;     SECTION      CHOLECYSTECTOMY      DILATION AND CURETTAGE OF UTERUS      Epidural steroid Injection      Pain management, cervical    ESOPHAGEAL DILATION      ulcer on tongue removed      WOUND DEBRIDEMENT Left     leg at ankle level         Social History     Tobacco Use    Smoking status: Never Smoker    Smokeless tobacco: Never Used   Substance Use Topics    Alcohol use: No     Alcohol/week: 0.0 standard drinks    Drug use: Yes     Types: Hydrocodone       Family History   Problem Relation Age of Onset    Diabetes Father     Diabetes Sister     Heart disease Brother     Glaucoma Paternal Aunt     Glaucoma Daughter         suspect       Review of patient's allergies indicates:   Allergen Reactions    Hydrocodone Itching    Metformin Diarrhea     With extended release preparation as well    Penicillins Itching     Swelling (extremities)^  Swelling (extremities)^      [place] Itching     throat         OBJECTIVE:     Vital Signs   Vitals:    20 1152   BP: (!) 145/88   Pulse: 73     Physical Exam:  General Appearance:  NAD.   Gait: not favoring.  HEENT: PERRL.  Eyes not dry or injected.  No nasal ulcers.  Mouth not dry, no oral lesions.  Lymph: cervical, supraclavicular or axillary nodes: none abnormal   Cardio: no irregularity of S1 or S2.  No gallops or rubs.   Resp: Normal respiratory motion. Clear to auscultation bilaterally.   No abnormal chest conformation.  Abd: Soft, non-tender, nondistended.  No masses.   Skin: Head and neck,  and extremities examined. No rashes.   Neuro: Ox3.   Cranial nerves II-XII grossly intact.   Sensation intact  in both distal LE and upper extremities to light touch.  Musculoskeletal Exam:    Tenderness  present over MCP joints on palmar aspect  Mild puffy fingers    Inability to make a complete fist    Laboratory: I have reviewed all of the patient's relevant lab work available in the medical record and have utilized this in my evaluation and management recommendations today    Lab Results   Component Value Date    RF <10.0 05/18/2020     Lab Results   Component Value Date    SEDRATE 16 05/18/2020     Lab Results   Component Value Date    CRP 2.0 05/18/2020         Imaging : I have reviewed all of the patient's diagnostic/imaging results available in the medical record and have utilized this in my evaluation and management recommendations today.    Notes reviewed  Other procedures:  Reviewed EMG nerve conduction studies    ASSESSMENT/PLAN:     Tendinitis  -     Cyclic Citrullinated Peptide Antibody, IgG; Future; Expected date: 07/07/2020  -     Hepatitis Panel, Acute; Future  -     Quantiferon Gold TB; Future; Expected date: 07/07/2020  -     hydroxychloroquine (PLAQUENIL) 200 mg tablet; Take 1 tablet (200 mg total) by mouth 2 (two) times daily.  Dispense: 60 tablet; Refill: 6    Chronic inflammatory arthritis  -     Cyclic Citrullinated Peptide Antibody, IgG; Future; Expected date: 07/07/2020  -     Hepatitis Panel, Acute; Future  -     Quantiferon Gold TB; Future; Expected date: 07/07/2020  -     hydroxychloroquine (PLAQUENIL) 200 mg tablet; Take 1 tablet (200 mg total) by mouth 2 (two) times daily.  Dispense: 60 tablet; Refill: 6  -     IN OFFICE EKG 12-LEAD (to Muse)  -     X-Ray Hand 3 View Bilateral; Future; Expected date: 07/07/2020  -     Cancel: X-Ray Foot AP Bilateral; Future; Expected date: 07/07/2020    DDD (degenerative disc disease), cervical  -     methocarbamoL (ROBAXIN) 750 MG Tab; Take 1 tablet (750 mg total) by mouth 2 (two) times daily as needed.  Dispense: 60 tablet; Refill: 2    Lumbar radiculopathy  -     methocarbamoL (ROBAXIN) 750 MG Tab; Take 1 tablet (750 mg total) by mouth 2 (two) times  daily as needed.  Dispense: 60 tablet; Refill: 2    ZAIRA positive  -     C3 complement; Standing; Expected date: 07/08/2020  -     C4 complement; Standing; Expected date: 07/08/2020  -     DRVVT; Future; Expected date: 07/08/2020  -     Cardiolipin antibody; Future; Expected date: 07/08/2020  -     Beta-2 glycoprotein Abs (IgA, IgG, IgM); Future; Expected date: 07/08/2020  -     Urinalysis; Standing  -     Protein / creatinine ratio, urine; Standing    Type 2 diabetes mellitus without complication, with long-term current use of insulin    Carpal tunnel syndrome of right wrist    Trigger finger, left middle finger      Bilateral hand arthritis/tendinitis with positive ZAIRA 1:160, speckled pattern  RF negative, CCP pending  Normal ESR and CRP  Start Plaquenil 200 mg p.o. b.i.d.  Skin pigmentation, ocular toxicity need for annual eye exam, rare myositis, EKG monitoring reviewed  She will follow-up with her ophthalmologist    Will re-evaluate her after tendon release surgery, if persistent symptoms will consider adding sulfasalazine- check hep panel, Tb gold   Other labs as above to assess disease activity  Longstanding IDDM, could also potentially aggravate her problem, counseled on better control of diabetes  Exercise, weight loss    History of cervical and lumbar degenerative disc disease:  Uses Zanaflex as needed  Due to interaction with Plaquenil will stop Zanaflex and instead use Robaxin as needed    Return 4-6 weeks    Risks vs Benefits and potential side effects of medication prescribed today were discussed with patient. Medication literature given to patient up discharge  Went over uptodate information /literature on the meds prescribed today    Patient advised to hold DMARD and/or biologic therapy for signs of infection or for surgery. If you are unsure what to do please call our office for instruction. [facility] [phone]    Thank you for allowing us to participate in the care of this  patient    Disclaimer: This note was prepared using voice recognition system and is likely to have sound alike errors and is not proof read.  Please call me with any questions.

## 2020-07-07 NOTE — TELEPHONE ENCOUNTER
Pt informed she will need to return to clinic for evaluation of her complaint for left ring finger triggering.  Pt agreeable to returning to clinic for this evaluation.  Pt scheduled for 7/13/20 at 2:00 PM.  Pt had no other concerns to address at this time.

## 2020-07-07 NOTE — PATIENT INSTRUCTIONS
Hydroxychloroquine tablets  What is this medicine?  HYDROXYCHLOROQUINE (sheila drox ee KLOR oh kwin) is used to treat rheumatoid arthritis and systemic lupus erythematosus. It is also used to treat malaria.  How should I use this medicine?  Take this medicine by mouth with a glass of water. Follow the directions on the prescription label. If this medicine upsets your stomach take it with food or milk. Take your doses at regular intervals. Do not take your medicine more often than directed.  Talk to your pediatrician regarding the use of this medicine in children. Special care may be needed.  What side effects may I notice from receiving this medicine?  Side effects that you should report to your doctor or health care professional as soon as possible:  · allergic reactions like skin rash, itching or hives, swelling of the face, lips, or tongue  · change in vision  · fever, infection  · hearing loss or ringing  · muscle weakness, tremor, or numbness  · redness, blistering, peeling or loosening of the skin, including inside the mouth  · seizures  · unusual bleeding or bruising  · unusually weak or tired  Side effects that usually do not require medical attention (report to your doctor or health care professional if they continue or are bothersome):  · change in coloration of the mouth or skin  · dizziness  · hair loss, lightening  · headache  · irritability, nervousness, nightmares  · loss of appetite  · stomach upset, diarrhea  What may interact with this medicine?  · antacids  · botulinum toxins  · digoxin  · kaolin  · penicillamine  What if I miss a dose?  If you miss a dose, take it as soon as you can. If it is almost time for your next dose, take only that dose. Do not take double or extra doses.  Where should I keep my medicine?  Keep out of the reach of children. In children, this medicine can cause overdose with small doses.  Store at room temperature between 15 and 30 degrees C (59 and 86 degrees F). Protect  from moisture and light. Throw away any unused medicine after the expiration date.  What should I tell my health care provider before I take this medicine?  They need to know if you have any of these conditions:  · alcoholism  · anemia or other blood disorder  · eye disease  · glucose 6-phosphate dehydrogenase (G6PD) deficiency  · liver disease  · porphyria  · psoriasis  · an unusual or allergic reaction to chloroquine, hydroxychloroquine, other medicines, foods, dyes, or preservatives  · pregnant or trying to get pregnant  · breast-feeding  What should I watch for while using this medicine?  Visit your doctor or health care professional for regular check ups. Tell your doctor if your symptoms do not improve. Arthritis symptoms may take several weeks to improve. If you are taking this medicine for a long time, you will need important blood work done. You will also need to have your eyes checked as directed.  This medicine can make you more sensitive to the sun. Keep out of the sun. If you cannot avoid being in the sun, wear protective clothing and use sunscreen. Do not use sun lamps or tanning beds/booths.  Avoid antacids and kaolin containing products for 2 hours before and after taking a dose of this medicine.  NOTE:This sheet is a summary. It may not cover all possible information. If you have questions about this medicine, talk to your doctor, pharmacist, or health care provider. Copyright© 2017 [employer]

## 2020-07-08 LAB
CCP AB SER IA-ACNC: <0.5 U/ML
HAV IGM SERPL QL IA: NEGATIVE
HBV CORE IGM SERPL QL IA: NEGATIVE
HBV SURFACE AG SERPL QL IA: NEGATIVE
HCV AB SERPL QL IA: NEGATIVE

## 2020-07-09 LAB
GAMMA INTERFERON BACKGROUND BLD IA-ACNC: 0.02 IU/ML
M TB IFN-G CD4+ BCKGRND COR BLD-ACNC: -0.01 IU/ML
MITOGEN IGNF BCKGRD COR BLD-ACNC: 6.5 IU/ML
TB GOLD PLUS: NEGATIVE
TB2 - NIL: -0.01 IU/ML

## 2020-07-13 ENCOUNTER — OFFICE VISIT (OUTPATIENT)
Dept: ORTHOPEDICS | Facility: CLINIC | Age: 60
End: 2020-07-13
Payer: COMMERCIAL

## 2020-07-13 ENCOUNTER — LAB VISIT (OUTPATIENT)
Dept: FAMILY MEDICINE | Facility: CLINIC | Age: 60
End: 2020-07-13
Payer: COMMERCIAL

## 2020-07-13 VITALS
HEIGHT: 66 IN | DIASTOLIC BLOOD PRESSURE: 87 MMHG | BODY MASS INDEX: 32.27 KG/M2 | HEART RATE: 76 BPM | SYSTOLIC BLOOD PRESSURE: 143 MMHG | WEIGHT: 200.81 LBS

## 2020-07-13 DIAGNOSIS — M65.342 TRIGGER FINGER, LEFT RING FINGER: ICD-10-CM

## 2020-07-13 DIAGNOSIS — M65.312 TRIGGER FINGER OF LEFT THUMB: Primary | ICD-10-CM

## 2020-07-13 DIAGNOSIS — Z01.818 PRE-OP TESTING: ICD-10-CM

## 2020-07-13 DIAGNOSIS — M65.332 TRIGGER FINGER, LEFT MIDDLE FINGER: ICD-10-CM

## 2020-07-13 PROCEDURE — 99999 PR PBB SHADOW E&M-EST. PATIENT-LVL IV: ICD-10-PCS | Mod: PBBFAC,,, | Performed by: ORTHOPAEDIC SURGERY

## 2020-07-13 PROCEDURE — 3008F PR BODY MASS INDEX (BMI) DOCUMENTED: ICD-10-PCS | Mod: CPTII,S$GLB,, | Performed by: ORTHOPAEDIC SURGERY

## 2020-07-13 PROCEDURE — 3077F PR MOST RECENT SYSTOLIC BLOOD PRESSURE >= 140 MM HG: ICD-10-PCS | Mod: CPTII,S$GLB,, | Performed by: ORTHOPAEDIC SURGERY

## 2020-07-13 PROCEDURE — U0003 INFECTIOUS AGENT DETECTION BY NUCLEIC ACID (DNA OR RNA); SEVERE ACUTE RESPIRATORY SYNDROME CORONAVIRUS 2 (SARS-COV-2) (CORONAVIRUS DISEASE [COVID-19]), AMPLIFIED PROBE TECHNIQUE, MAKING USE OF HIGH THROUGHPUT TECHNOLOGIES AS DESCRIBED BY CMS-2020-01-R: HCPCS

## 2020-07-13 PROCEDURE — 99999 PR PBB SHADOW E&M-EST. PATIENT-LVL IV: CPT | Mod: PBBFAC,,, | Performed by: ORTHOPAEDIC SURGERY

## 2020-07-13 PROCEDURE — 3079F PR MOST RECENT DIASTOLIC BLOOD PRESSURE 80-89 MM HG: ICD-10-PCS | Mod: CPTII,S$GLB,, | Performed by: ORTHOPAEDIC SURGERY

## 2020-07-13 PROCEDURE — 99212 PR OFFICE/OUTPT VISIT, EST, LEVL II, 10-19 MIN: ICD-10-PCS | Mod: S$GLB,,, | Performed by: ORTHOPAEDIC SURGERY

## 2020-07-13 PROCEDURE — 3077F SYST BP >= 140 MM HG: CPT | Mod: CPTII,S$GLB,, | Performed by: ORTHOPAEDIC SURGERY

## 2020-07-13 PROCEDURE — 99212 OFFICE O/P EST SF 10 MIN: CPT | Mod: S$GLB,,, | Performed by: ORTHOPAEDIC SURGERY

## 2020-07-13 PROCEDURE — 3008F BODY MASS INDEX DOCD: CPT | Mod: CPTII,S$GLB,, | Performed by: ORTHOPAEDIC SURGERY

## 2020-07-13 PROCEDURE — 3079F DIAST BP 80-89 MM HG: CPT | Mod: CPTII,S$GLB,, | Performed by: ORTHOPAEDIC SURGERY

## 2020-07-14 LAB — SARS-COV-2 RNA RESP QL NAA+PROBE: NOT DETECTED

## 2020-07-15 ENCOUNTER — OFFICE VISIT (OUTPATIENT)
Dept: OPTOMETRY | Facility: CLINIC | Age: 60
End: 2020-07-15
Payer: COMMERCIAL

## 2020-07-15 ENCOUNTER — ANESTHESIA EVENT (OUTPATIENT)
Dept: SURGERY | Facility: HOSPITAL | Age: 60
End: 2020-07-15
Payer: COMMERCIAL

## 2020-07-15 DIAGNOSIS — H25.13 NUCLEAR SCLEROSIS, BILATERAL: ICD-10-CM

## 2020-07-15 DIAGNOSIS — Z79.899 LONG-TERM USE OF PLAQUENIL: ICD-10-CM

## 2020-07-15 DIAGNOSIS — Z13.5 GLAUCOMA SCREENING: ICD-10-CM

## 2020-07-15 DIAGNOSIS — E11.9 DIABETES MELLITUS TYPE 2 WITHOUT RETINOPATHY: Primary | ICD-10-CM

## 2020-07-15 DIAGNOSIS — M19.90 CHRONIC INFLAMMATORY ARTHRITIS: ICD-10-CM

## 2020-07-15 DIAGNOSIS — H43.393 VITREOUS FLOATERS, BILATERAL: ICD-10-CM

## 2020-07-15 PROCEDURE — 92014 PR EYE EXAM, EST PATIENT,COMPREHESV: ICD-10-PCS | Mod: S$GLB,,, | Performed by: OPTOMETRIST

## 2020-07-15 PROCEDURE — 99999 PR PBB SHADOW E&M-EST. PATIENT-LVL IV: ICD-10-PCS | Mod: PBBFAC,,, | Performed by: OPTOMETRIST

## 2020-07-15 PROCEDURE — 92014 COMPRE OPH EXAM EST PT 1/>: CPT | Mod: S$GLB,,, | Performed by: OPTOMETRIST

## 2020-07-15 PROCEDURE — 92134 POSTERIOR SEGMENT OCT RETINA (OCULAR COHERENCE TOMOGRAPHY)-BOTH EYES: ICD-10-PCS | Mod: S$GLB,,, | Performed by: OPTOMETRIST

## 2020-07-15 PROCEDURE — 99999 PR PBB SHADOW E&M-EST. PATIENT-LVL IV: CPT | Mod: PBBFAC,,, | Performed by: OPTOMETRIST

## 2020-07-15 PROCEDURE — 92134 CPTRZ OPH DX IMG PST SGM RTA: CPT | Mod: S$GLB,,, | Performed by: OPTOMETRIST

## 2020-07-15 PROCEDURE — 92015 DETERMINE REFRACTIVE STATE: CPT | Mod: S$GLB,,, | Performed by: OPTOMETRIST

## 2020-07-15 PROCEDURE — 92015 PR REFRACTION: ICD-10-PCS | Mod: S$GLB,,, | Performed by: OPTOMETRIST

## 2020-07-15 NOTE — PROGRESS NOTES
HPI     Routine DM and plaq check-dle-1/25/19    Pt complains of occasional blurred vision. No eye pain. No flashes or   floaters. BSL fluctuates-starting new meds. Pt saw rheum last week in [place]   and started plaquenil once daily.    Last edited by Alee Del Castillo on 7/15/2020  1:42 PM. (History)        ROS     Positive for: Eyes    Negative for: Constitutional, Gastrointestinal, Neurological, Skin,   Genitourinary, Musculoskeletal, HENT, Endocrine, Cardiovascular,   Respiratory, Psychiatric, Allergic/Imm, Heme/Lymph    Last edited by LIZ Menchaca, OD on 7/15/2020  1:55 PM. (History)        Assessment /Plan     For exam results, see Encounter Report.    Diabetes mellitus type 2 without retinopathy    Vitreous floaters, bilateral    Nuclear sclerosis, bilateral    Glaucoma screening    Chronic inflammatory arthritis      ***

## 2020-07-15 NOTE — PROGRESS NOTES
HPI     Routine DM and plaq check-dle-1/25/19    Pt complains of occasional blurred vision. No eye pain. No flashes or   floaters. BSL fluctuates-starting new meds. Pt saw rheum last week in [place]   and started plaquenil once daily.    Last edited by Alee Del Castillo on 7/15/2020  1:42 PM. (History)        ROS     Positive for: Eyes    Negative for: Constitutional, Gastrointestinal, Neurological, Skin,   Genitourinary, Musculoskeletal, HENT, Endocrine, Cardiovascular,   Respiratory, Psychiatric, Allergic/Imm, Heme/Lymph    Last edited by LIZ Menchaca, OD on 7/15/2020  1:55 PM. (History)        Assessment /Plan     For exam results, see Encounter Report.    Diabetes mellitus type 2 without retinopathy    Chronic inflammatory arthritis  -     Posterior Segment OCT Retina-Both eyes    Long-term use of Plaquenil  -     Posterior Segment OCT Retina-Both eyes    Vitreous floaters, bilateral    Glaucoma screening    Nuclear sclerosis, bilateral      1. No stu/ retinopathy, no csme, gave Diabetic Retinopathy info, control glucose and BP  Advise annual DFE  2,3. No ocular manifestations  baseline OCT macula today  Normal fovea OU    4. Mild OU, RD precautions given and reviewed. Patient knows to call/ message if any further changes in symptoms occur.  5. Not suspect  6. No vis sig, early by definition    10-2 fields when convenient      Discussed and educated patient on current findings /plan.  RTC 1 year, prn if any changes / issues

## 2020-07-15 NOTE — PATIENT INSTRUCTIONS
"DRY EYES -- BURNING OR CARLOS MANUEL SYMPTOMS:  Use Over The Counter artificial tears as needed for dry eye symptoms.   Some common brands include:  Systane, Optive, Refresh, and Thera-Tears.  These drops can be used as frequently as desired, but may be most helpful use during long periods of concentrated work.  For example, reading / working at the computer. Start with 3-4x per day.     Nighttime Ophthalmic gel or ointments are available: Refresh PM, Genteal, and Lacrilube.    Avoid drops that "get redness out" (Visine, Murine, Clear Eyes), as these may contain medication that could further irritate the eyes, especially with chronic use.    ALLERGY EYES -- ITCHING SYMPTOMS:  Over the counter medications include--Pataday, Zaditor, and Alaway.  Use as directed 1-2 drops daily for symptoms of itching / watering eyes.  These drops will not help for dry eye or exposure symptoms.    REDNESS RELIEF:  Lumify---is a good redness reliever that will not cause irritation if used chronically.         FLASHES / FLOATERS / POSTERIOR VITREOUS DETACHMENT    Call the clinic if you have any further changes in symptoms.  Including:  Increased numbers of floaters or flashing lights, dimness or darkness that moves through or stays constant in your vision, or any pain in the eye (s).    You may sometimes see small specks or clouds moving in your field of vision.  They are called FLOATERS.  You can often see them when looking at a plain background, like a blank wall or blue narda.  Floaters are actually tiny clumps of gel or cells inside the VITREOUS, the clear jelly-like fluid that fills the inside of your eye.    While these objects look like they are in front of your eye, they are actually floating inside.  What you see are the shadows they cast on the RETINA, the nerve layer at the back of the eye that senses light and allows you to see.      POSTERIOR VITREOUS DETACHMENT    The appearance of new floaters may be alarming.  If you suddenly " develop new floaters, you should contact your eye care professional  right away.    The retina can tear if the shrinking vitreous pulls away from the wall of the eye.  This sometimes causes a small amount of bleeding in the eye that may appear as new floaters.    A torn retina is always a serious problem, since it can lead to a retinal detachment.  You should see your eye care professional as soon as possible if:     even one new floater appears suddenly;   you see sudden flashes of light;   you notice other symptoms, like the loss of side vision, or a curtain closes down in your vision        POSTERIOR VITREOUS DETACHMENT is more common for people who:     are nearsighted;   have had cataract surgery;   have had YAG laser surgery of the eye;   have had inflammation inside the eye;   are over age 60.      While some floaters may remain visible, many of them will fade over time and become less noticeable.  Even if you've had some floaters for years, you should have your eyes checked as soon as possible if you notice new ones.    FLASHING LIGHTS    When the vitreous gel rubs or pulls on the retina, you may see what look like flashing lights or lightning streaks.  These flashes can appear off and on for several weeks or months.      Some people experience flashes of light that appear as jagged lines or heat waves in both eyes, lasting 10-20 minutes.  These flashes are caused by a spasm of blood vessels in the brain, which is called a migraine.    If a headache follows these flashes, it's called a migraine headache.  If   no headache occurs, these flashes are called Ophthalmic or Ocular Migraine.

## 2020-07-16 ENCOUNTER — ANESTHESIA (OUTPATIENT)
Dept: SURGERY | Facility: HOSPITAL | Age: 60
End: 2020-07-16
Payer: COMMERCIAL

## 2020-07-16 ENCOUNTER — HOSPITAL ENCOUNTER (OUTPATIENT)
Facility: HOSPITAL | Age: 60
Discharge: HOME | End: 2020-07-16
Attending: ORTHOPAEDIC SURGERY | Admitting: ORTHOPAEDIC SURGERY
Payer: COMMERCIAL

## 2020-07-16 VITALS
DIASTOLIC BLOOD PRESSURE: 90 MMHG | RESPIRATION RATE: 14 BRPM | WEIGHT: 198 LBS | TEMPERATURE: 34 F | HEART RATE: 76 BPM | HEIGHT: 66 IN | BODY MASS INDEX: 31.82 KG/M2 | SYSTOLIC BLOOD PRESSURE: 186 MMHG | OXYGEN SATURATION: 98 %

## 2020-07-16 DIAGNOSIS — Z01.818 PRE-OP TESTING: ICD-10-CM

## 2020-07-16 DIAGNOSIS — M65.332 TRIGGER FINGER, LEFT MIDDLE FINGER: ICD-10-CM

## 2020-07-16 DIAGNOSIS — M65.342 TRIGGER FINGER, LEFT RING FINGER: Primary | ICD-10-CM

## 2020-07-16 DIAGNOSIS — M65.312 TRIGGER FINGER OF LEFT THUMB: ICD-10-CM

## 2020-07-16 LAB — GLUCOSE SERPL-MCNC: 117 MG/DL (ref 70–110)

## 2020-07-16 PROCEDURE — 63600175 PHARM REV CODE 636 W HCPCS: Mod: PO | Performed by: ANESTHESIOLOGY

## 2020-07-16 PROCEDURE — 26055 INCISE FINGER TENDON SHEATH: CPT | Mod: FA,,, | Performed by: ORTHOPAEDIC SURGERY

## 2020-07-16 PROCEDURE — 36000706: Mod: PO | Performed by: ORTHOPAEDIC SURGERY

## 2020-07-16 PROCEDURE — 63600175 PHARM REV CODE 636 W HCPCS: Mod: PO | Performed by: NURSE ANESTHETIST, CERTIFIED REGISTERED

## 2020-07-16 PROCEDURE — 36000707: Mod: PO | Performed by: ORTHOPAEDIC SURGERY

## 2020-07-16 PROCEDURE — 25000003 PHARM REV CODE 250: Mod: PO | Performed by: NURSE ANESTHETIST, CERTIFIED REGISTERED

## 2020-07-16 PROCEDURE — 25000003 PHARM REV CODE 250: Mod: PO | Performed by: ORTHOPAEDIC SURGERY

## 2020-07-16 PROCEDURE — 26055 PR INCISE FINGER TENDON SHEATH: ICD-10-PCS | Mod: FA,,, | Performed by: ORTHOPAEDIC SURGERY

## 2020-07-16 PROCEDURE — 37000008 HC ANESTHESIA 1ST 15 MINUTES: Mod: PO | Performed by: ORTHOPAEDIC SURGERY

## 2020-07-16 PROCEDURE — D9220A PRA ANESTHESIA: Mod: CRNA,,, | Performed by: NURSE ANESTHETIST, CERTIFIED REGISTERED

## 2020-07-16 PROCEDURE — 71000015 HC POSTOP RECOV 1ST HR: Mod: PO | Performed by: ORTHOPAEDIC SURGERY

## 2020-07-16 PROCEDURE — 71000033 HC RECOVERY, INTIAL HOUR: Mod: PO | Performed by: ORTHOPAEDIC SURGERY

## 2020-07-16 PROCEDURE — 82962 GLUCOSE BLOOD TEST: CPT | Mod: PO | Performed by: ORTHOPAEDIC SURGERY

## 2020-07-16 PROCEDURE — D9220A PRA ANESTHESIA: ICD-10-PCS | Mod: ANES,,, | Performed by: ANESTHESIOLOGY

## 2020-07-16 PROCEDURE — 37000009 HC ANESTHESIA EA ADD 15 MINS: Mod: PO | Performed by: ORTHOPAEDIC SURGERY

## 2020-07-16 PROCEDURE — D9220A PRA ANESTHESIA: ICD-10-PCS | Mod: CRNA,,, | Performed by: NURSE ANESTHETIST, CERTIFIED REGISTERED

## 2020-07-16 PROCEDURE — D9220A PRA ANESTHESIA: Mod: ANES,,, | Performed by: ANESTHESIOLOGY

## 2020-07-16 RX ORDER — LIDOCAINE HCL/PF 100 MG/5ML
SYRINGE (ML) INTRAVENOUS
Status: DISCONTINUED | OUTPATIENT
Start: 2020-07-16 | End: 2020-07-16

## 2020-07-16 RX ORDER — SODIUM CHLORIDE 0.9 G/100ML
IRRIGANT IRRIGATION
Status: DISCONTINUED | OUTPATIENT
Start: 2020-07-16 | End: 2020-07-16 | Stop reason: HOSPADM

## 2020-07-16 RX ORDER — CLINDAMYCIN PHOSPHATE 600 MG/50ML
600 INJECTION, SOLUTION INTRAVENOUS
Status: COMPLETED | OUTPATIENT
Start: 2020-07-16 | End: 2020-07-16

## 2020-07-16 RX ORDER — SODIUM CHLORIDE, SODIUM LACTATE, POTASSIUM CHLORIDE, CALCIUM CHLORIDE 600; 310; 30; 20 MG/100ML; MG/100ML; MG/100ML; MG/100ML
INJECTION, SOLUTION INTRAVENOUS CONTINUOUS
Status: DISCONTINUED | OUTPATIENT
Start: 2020-07-16 | End: 2020-07-16 | Stop reason: HOSPADM

## 2020-07-16 RX ORDER — LIDOCAINE HYDROCHLORIDE 10 MG/ML
1 INJECTION, SOLUTION EPIDURAL; INFILTRATION; INTRACAUDAL; PERINEURAL ONCE
Status: DISCONTINUED | OUTPATIENT
Start: 2020-07-16 | End: 2020-07-16 | Stop reason: HOSPADM

## 2020-07-16 RX ORDER — ONDANSETRON 8 MG/1
8 TABLET, ORALLY DISINTEGRATING ORAL EVERY 8 HOURS PRN
Qty: 3 TABLET | Refills: 0 | Status: SHIPPED | OUTPATIENT
Start: 2020-07-16 | End: 2021-01-08 | Stop reason: CLARIF

## 2020-07-16 RX ORDER — MIDAZOLAM HYDROCHLORIDE 1 MG/ML
INJECTION, SOLUTION INTRAMUSCULAR; INTRAVENOUS
Status: DISCONTINUED | OUTPATIENT
Start: 2020-07-16 | End: 2020-07-16

## 2020-07-16 RX ORDER — LIDOCAINE HYDROCHLORIDE 10 MG/ML
INJECTION, SOLUTION EPIDURAL; INFILTRATION; INTRACAUDAL; PERINEURAL
Status: DISCONTINUED | OUTPATIENT
Start: 2020-07-16 | End: 2020-07-16 | Stop reason: HOSPADM

## 2020-07-16 RX ORDER — FENTANYL CITRATE 50 UG/ML
25 INJECTION, SOLUTION INTRAMUSCULAR; INTRAVENOUS EVERY 5 MIN PRN
Status: DISCONTINUED | OUTPATIENT
Start: 2020-07-16 | End: 2020-07-16 | Stop reason: HOSPADM

## 2020-07-16 RX ORDER — HYDROMORPHONE HYDROCHLORIDE 2 MG/ML
0.2 INJECTION, SOLUTION INTRAMUSCULAR; INTRAVENOUS; SUBCUTANEOUS EVERY 5 MIN PRN
Status: DISCONTINUED | OUTPATIENT
Start: 2020-07-16 | End: 2020-07-16 | Stop reason: HOSPADM

## 2020-07-16 RX ORDER — KETAMINE HYDROCHLORIDE 100 MG/ML
INJECTION, SOLUTION INTRAMUSCULAR; INTRAVENOUS
Status: DISCONTINUED | OUTPATIENT
Start: 2020-07-16 | End: 2020-07-16

## 2020-07-16 RX ORDER — BUPIVACAINE HYDROCHLORIDE 2.5 MG/ML
INJECTION, SOLUTION EPIDURAL; INFILTRATION; INTRACAUDAL
Status: DISCONTINUED | OUTPATIENT
Start: 2020-07-16 | End: 2020-07-16 | Stop reason: HOSPADM

## 2020-07-16 RX ORDER — OXYCODONE HYDROCHLORIDE 5 MG/1
5 TABLET ORAL
Status: DISCONTINUED | OUTPATIENT
Start: 2020-07-16 | End: 2020-07-16 | Stop reason: HOSPADM

## 2020-07-16 RX ORDER — PROPOFOL 10 MG/ML
VIAL (ML) INTRAVENOUS
Status: DISCONTINUED | OUTPATIENT
Start: 2020-07-16 | End: 2020-07-16

## 2020-07-16 RX ORDER — MUPIROCIN 20 MG/G
OINTMENT TOPICAL
Status: DISCONTINUED | OUTPATIENT
Start: 2020-07-16 | End: 2020-07-16 | Stop reason: HOSPADM

## 2020-07-16 RX ORDER — FENTANYL CITRATE 50 UG/ML
INJECTION, SOLUTION INTRAMUSCULAR; INTRAVENOUS
Status: DISCONTINUED | OUTPATIENT
Start: 2020-07-16 | End: 2020-07-16

## 2020-07-16 RX ORDER — ONDANSETRON 2 MG/ML
INJECTION INTRAMUSCULAR; INTRAVENOUS
Status: DISCONTINUED | OUTPATIENT
Start: 2020-07-16 | End: 2020-07-16

## 2020-07-16 RX ORDER — ACETAMINOPHEN 10 MG/ML
INJECTION, SOLUTION INTRAVENOUS
Status: DISCONTINUED | OUTPATIENT
Start: 2020-07-16 | End: 2020-07-16

## 2020-07-16 RX ADMIN — FENTANYL CITRATE 50 MCG: 50 INJECTION, SOLUTION INTRAMUSCULAR; INTRAVENOUS at 12:07

## 2020-07-16 RX ADMIN — CLINDAMYCIN IN 5 PERCENT DEXTROSE 600 MG: 12 INJECTION, SOLUTION INTRAVENOUS at 10:07

## 2020-07-16 RX ADMIN — SODIUM CHLORIDE, SODIUM LACTATE, POTASSIUM CHLORIDE, AND CALCIUM CHLORIDE: .6; .31; .03; .02 INJECTION, SOLUTION INTRAVENOUS at 10:07

## 2020-07-16 RX ADMIN — ONDANSETRON 4 MG: 2 INJECTION, SOLUTION INTRAMUSCULAR; INTRAVENOUS at 12:07

## 2020-07-16 RX ADMIN — FENTANYL CITRATE 25 MCG: 50 INJECTION, SOLUTION INTRAMUSCULAR; INTRAVENOUS at 12:07

## 2020-07-16 RX ADMIN — MIDAZOLAM 2 MG: 1 INJECTION INTRAMUSCULAR; INTRAVENOUS at 12:07

## 2020-07-16 RX ADMIN — PROPOFOL 150 MG: 10 INJECTION, EMULSION INTRAVENOUS at 12:07

## 2020-07-16 RX ADMIN — LIDOCAINE HYDROCHLORIDE 75 MG: 20 INJECTION PARENTERAL at 12:07

## 2020-07-16 RX ADMIN — KETAMINE HYDROCHLORIDE 20 MG: 100 INJECTION, SOLUTION, CONCENTRATE INTRAMUSCULAR; INTRAVENOUS at 12:07

## 2020-07-16 RX ADMIN — MUPIROCIN: 20 OINTMENT TOPICAL at 10:07

## 2020-07-16 RX ADMIN — ACETAMINOPHEN 1000 MG: 10 INJECTION, SOLUTION INTRAVENOUS at 12:07

## 2020-07-16 RX ADMIN — FENTANYL CITRATE 25 MCG: 50 INJECTION, SOLUTION INTRAMUSCULAR; INTRAVENOUS at 01:07

## 2020-07-16 NOTE — PLAN OF CARE
Patient tolerating oral liquids without difficulty. No apparent s&s of distress noted at this time, no complaints voiced at this time. Dressing c,d,i to left hand. Discharge instructions reviewed with patient/family/friend with good verbal feedback received. Patient ready for discharge

## 2020-07-16 NOTE — INTERVAL H&P NOTE
The patient has been examined and the H&P has been reviewed:    I concur with the findings and changes have been noted since the H&P was written: The patient has elected to include trigger finger releases of the thumb, middle finger, and ring finger.  She was seen in clinic and the consents have been updated in the patient has provided informed consent to proceed with left thumb, middle, and ring finger trigger releases    Anesthesia/Surgery risks, benefits and alternative options discussed and understood by patient/family.          Active Hospital Problems    Diagnosis  POA    Pre-op testing [Z01.818]  Not Applicable      Resolved Hospital Problems   No resolved problems to display.

## 2020-07-16 NOTE — DISCHARGE INSTRUCTIONS
Procedure:  Trigger Finger Release    1.  Please keep the dressing clean and dry.  Do not take it off and do not get it wet.    2.  Please keep the surgical arm and hand elevated for the 1st 24-48 hours to prevent swelling    3.  The dressing to the surgical hand can be taken off after 7 days.  You can begin washing your hand under running water after the dressing is removed.  Please cover the wounds with a Band-Aid to prevent irritation of the wounds after the operative dressing has been removed.    4.  Please limit weight-bearing to the surgical hand.  Do not lift more than 2-3 lb and do not push off with the surgical arm or hand    5.  Flexion and extension of the fingers is encouraged to prevent stiffness    6.  Nausea medication has been prescribed an you can take it previously prescribed pain medication if any pain arises    7.  If there are any questions or concerns please call Dr. Mata office    8.  Follow up with Dr. Mata in 2 weeks

## 2020-07-16 NOTE — DISCHARGE SUMMARY
[facility]  Discharge Note  Short Stay    Procedure(s) (LRB):  RELEASE, TRIGGER FINGER//Left thumb, middle and ring finger trigger release (Left)    OUTCOME: Patient tolerated treatment/procedure well without complication and is now ready for discharge.    DISPOSITION: Home or Self Care    FINAL DIAGNOSIS:  <principal problem not specified>    FOLLOWUP: In clinic    DISCHARGE INSTRUCTIONS:    Discharge Procedure Orders   Diet general     Activity as tolerated     Keep surgical extremity elevated     Lifting restrictions     Call MD for:  temperature >100.4     Call MD for:  persistent nausea and vomiting     Call MD for:  severe uncontrolled pain     Call MD for:  difficulty breathing, headache or visual disturbances     Call MD for:  redness, tenderness, or signs of infection (pain, swelling, redness, odor or green/yellow discharge around incision site)     Call MD for:  hives     Call MD for:  persistent dizziness or light-headedness     Call MD for:  extreme fatigue     Leave dressing on - Keep it clean, dry, and intact until clinic visit

## 2020-07-16 NOTE — TRANSFER OF CARE
"Anesthesia Transfer of Care Note    Patient: Aaron Garcia    Procedure(s) Performed: Procedure(s) (LRB):  RELEASE, TRIGGER FINGER//Left thumb, middle and ring finger trigger release (Left)    Patient location: PACU    Anesthesia Type: general    Transport from OR: Transported from OR on room air with adequate spontaneous ventilation    Post pain: adequate analgesia    Post assessment: no apparent anesthetic complications and tolerated procedure well    Post vital signs: stable    Level of consciousness: awake and sedated    Nausea/Vomiting: no nausea/vomiting    Complications: none    Transfer of care protocol was followed      Last vitals:   Visit Vitals  BP (!) 171/87 (BP Location: Right arm, Patient Position: Lying)   Pulse 74   Temp 36.6 °C (97.9 °F) (Skin)   Resp 16   Ht 5' 6" (1.676 m)   Wt 89.8 kg (198 lb)   LMP 03/13/2017   SpO2 100%   Breastfeeding No   BMI 31.96 kg/m²     "

## 2020-07-16 NOTE — OR NURSING
Spoke with pt. Pt is aware of delay in procedure today with Dr. Mata. Upon arrival this AM, the air conditioner was out in the OSC and there was water on the floor in the OR. Pt states she will wait in the parking lot and pt notified that RN will call patient as soon as we are given the ok to resume OR cases. Pt verbalizes understanding.

## 2020-07-16 NOTE — OP NOTE
Aaron Garcia  1960    DATE OF SURGERY: 7/16/2020     PRE-OPERATIVE DIAGNOSIS:  Left thumb, middle, and ring finger trigger    POST-OPERATIVE DIAGNOSIS:  Left thumb, middle, and ring finger trigger     ANESTHESIA TYPE:  General    BLOOD LOSS:  Minimal    TOURNIQUET TIME:  Approximately 15 min    SURGEON: Dr Mata    ASSISTANT: Bud Lara    PROCEDURE:   1.  Left thumb A1 pulley release  2.  Left middle finger A1 pulley release  3.  Left ring finger A1 pulley release    IMPLANTS:  None     SPECIMENS:  None    INDICATION:     Ms. Garcia has a history of multiple finger triggering.  She has undergone steroid injections on several occasions.  She has had continued return of the triggering.  After discussion of the risks and benefits of the surgical procedure informed consent has been obtained    PROCEDURE IN DETAIL:     Ms. Garcia was transported to the operating room and was placed supine on the operating room table. All appropriate points were padded. The left arm and hand was prepped and draped in the normal sterile fashion. Time out was called. The correct patient, correct operative site, correct procedure, antibiotic administration which consisted of 900 mg of Cleocin, and allergies to medications which are to Metformin, Penicillins, and [place]  were reviewed. Time in was then called.     Attention was turned to the left hand.  A 1 cm incision was made overlying the A1 pulley of the left ring finger.  The incision was carried through the skin.  Subcutaneous tissues were dissected bluntly with tenotomy scissors.  The A1 pulley and flexor tendon sheath were identified.  The A1 pulley was incised with tenotomy scissors.  A complete release of the A1 pulley was confirmed.  Flexion and extension of the ring finger revealed that there was no further triggering.    Attention was turned to the left middle finger.  A 1 cm incision was made overlying the A1 pulley of the middle finger.  The incision  was carried through the skin.  Subcutaneous tissues were dissected bluntly with tenotomy scissors.  The A1 pulley and flexor tendon sheath were identified.  The A1 pulley was incised with tenotomy scissors.  A complete release of the A1 pulley was confirmed.  The finger was taken through range of motion including flexion and extension and there was no further triggering noted.    Attention was turned to the left thumb.  1 cm incision was made directly over the A1 pulley of the thumb.  The incision was carried through the skin.  Subcutaneous tissues were dissected bluntly with tenotomy scissors.  The A1 pulley and flexor tendon sheath were identified.  The A1 pulley was incised under direct visualization with tenotomy scissors.  Care was taken to confirm that the digital neurovascular bundles were protected.  A complete release of the A1 pulley was then confirmed.  The thumb was taken through range of motion including flexion and extension.  There was no further triggering noted.    The wounds at all 3 fingers were then copiously irrigated.  The tourniquet was let down and hemostasis was obtained at all 3 wound sites.  The wounds were then closed superficially with 4-0 nylon suture.  The wounds were dressed with Xeroform, gauze padding, cast padding and an Ace wrap was placed to complete a bulky soft dressing.    The patient was awakened from anesthesia and was transported to the recovery room in stable condition. All lap, needle, sponge, and equipment counts were correct at the end of the case.    POST-OPERATIVE PLAN:     The patient keep this dressing in place for 1 week at which time it can be removed.  She will begin more aggressive range of motion at that time but she will have a 1-2 lb weight limit to the left hand for weeks.

## 2020-07-16 NOTE — ANESTHESIA POSTPROCEDURE EVALUATION
Anesthesia Post Evaluation    Patient: Aaron Garcia    Procedure(s) Performed: Procedure(s) (LRB):  RELEASE, TRIGGER FINGER//Left thumb, middle and ring finger trigger release (Left)    Final Anesthesia Type: general    Patient location during evaluation: PACU  Patient participation: Yes- Able to Participate  Level of consciousness: sedated and awake  Post-procedure vital signs: reviewed and stable  Pain management: adequate  Airway patency: patent    PONV status at discharge: No PONV  Anesthetic complications: no      Cardiovascular status: hypertensive and blood pressure returned to baseline  Respiratory status: spontaneous ventilation  Hydration status: euvolemic  Follow-up not needed.          Vitals Value Taken Time   /88 07/16/20 1341   Temp 1 °C (33.8 °F) 07/16/20 1330   Pulse 76 07/16/20 1406   Resp 14 07/16/20 1406   SpO2 98 % 07/16/20 1406         Event Time   Out of Recovery 14:06:25         Pain/Ml Score: Ml Score: 9 (7/16/2020  1:41 PM)

## 2020-07-16 NOTE — PROGRESS NOTES
Ms Garcia returns to clinic today.  She has a history of left thumb and middle finger triggering.  She was scheduled for trigger finger release of those to.  She states that she is having significant triggering of her finger would like to add that to the surgical plan.    Physical exam:  Examination of the left hand reveals that there are no major skin changes.  There is no edema.  Palpation does produce tenderness over the left thumb left middle and left ring finger A1 pulleys.  Flexion and extension of the fingers reveals that she does have active triggering of all 3 of those fingers.  She does have sensation which is grossly intact in the median radial ulnar distribution.  Capillary refill is less than 2 sec in the digits.    Assessment:  Left thumb, left middle, and left ring finger triggering    Plan:    1.  We have modified the patient's consent to include the left ring finger triggering.  I have also discussed the risks and benefits of adding the ring finger trigger.  The patient has provided informed consent    2.  Will proceed with left thumb, middle, and ring finger trigger releases.    3.  She will follow up with me 2 weeks postoperatively

## 2020-07-28 ENCOUNTER — TELEPHONE (OUTPATIENT)
Dept: PAIN MEDICINE | Facility: CLINIC | Age: 60
End: 2020-07-28

## 2020-07-28 NOTE — TELEPHONE ENCOUNTER
----- Message from Omaira Mclean sent at 7/27/2020 12:42 PM CDT -----  Contact: pt  Type:  RX Refill Request    Who Called:  Pt    Refill or New Rx:  refill  RX Name and Strength:  norco  How is the patient currently taking it? (ex. 1XDay):  As Directed  Is this a 30 day or 90 day RX:  as Directed  Preferred Pharmacy with phone number:    [id number] - [place], [place] - [address] [address]  Phone: [phone] Fax: [phone]  Best Call Back Number:  [phone]  Additional Information:  Please Advise ---Thank you

## 2020-07-28 NOTE — TELEPHONE ENCOUNTER
----- Message from Gin Pereira sent at 7/27/2020  4:10 PM CDT -----  Contact: self  Patient ran out of her NORCO yesterday states she sent a refill request over on Friday via [employer] and then called at lunch time and sent a refill request through us.  Please call her back if there is an issue at [phone] (home), she does have an appt w/[facility] next Friday.

## 2020-07-29 ENCOUNTER — OFFICE VISIT (OUTPATIENT)
Dept: ORTHOPEDICS | Facility: CLINIC | Age: 60
End: 2020-07-29
Payer: COMMERCIAL

## 2020-07-29 VITALS
WEIGHT: 198 LBS | HEIGHT: 66 IN | HEART RATE: 76 BPM | DIASTOLIC BLOOD PRESSURE: 86 MMHG | BODY MASS INDEX: 31.82 KG/M2 | SYSTOLIC BLOOD PRESSURE: 141 MMHG

## 2020-07-29 DIAGNOSIS — M65.342 TRIGGER FINGER, LEFT RING FINGER: ICD-10-CM

## 2020-07-29 DIAGNOSIS — M65.332 TRIGGER FINGER, LEFT MIDDLE FINGER: Primary | ICD-10-CM

## 2020-07-29 DIAGNOSIS — M65.312 TRIGGER FINGER OF LEFT THUMB: ICD-10-CM

## 2020-07-29 PROCEDURE — 99024 POSTOP FOLLOW-UP VISIT: CPT | Mod: S$GLB,,, | Performed by: ORTHOPAEDIC SURGERY

## 2020-07-29 PROCEDURE — 99999 PR PBB SHADOW E&M-EST. PATIENT-LVL V: CPT | Mod: PBBFAC,,, | Performed by: ORTHOPAEDIC SURGERY

## 2020-07-29 PROCEDURE — 99999 PR PBB SHADOW E&M-EST. PATIENT-LVL V: ICD-10-PCS | Mod: PBBFAC,,, | Performed by: ORTHOPAEDIC SURGERY

## 2020-07-29 PROCEDURE — 99024 PR POST-OP FOLLOW-UP VISIT: ICD-10-PCS | Mod: S$GLB,,, | Performed by: ORTHOPAEDIC SURGERY

## 2020-07-29 NOTE — PROGRESS NOTES
Ms. Garcia returns to clinic today.  She is approximately 2 weeks status post left thumb, middle, and ring finger trigger releases.  She is overall doing well but still has some stiffness and swelling.  She has no other complaints    Physical exam:  Examination of the left hand reveals that there are incisions which are healing well.  There are sutures in place.  There is mild edema to the hand.  There is no erythema or drainage from the incision sites.  Palpation produces mild tenderness around the areas of the incisions but there are no areas of fluctuance.  Attempts at composite fist leave her 1 cm short of her distal palmar crease and she is able to fully extend.  She does have sensation intact in the median radial ulnar distribution    Assessment:  Status post left thumb middle and ring finger trigger releases    Plan:    1.  Sutures were removed today and Steri-Strips are placed    2.  She will be increasing weight-bearing up to 2-3 lb    3.  Will send her to occupational therapy for edema control and range of motion    4.  Follow-up with me in 3 weeks for repeat evaluation

## 2020-08-04 ENCOUNTER — TELEPHONE (OUTPATIENT)
Dept: RHEUMATOLOGY | Facility: CLINIC | Age: 60
End: 2020-08-04

## 2020-08-04 NOTE — TELEPHONE ENCOUNTER
Spoke with Ms. Garcia and rescheduled her 08/04/2020 appts with Lab/UA/EKG and Dr. Campos for 08/05/2020. Ms. Garcia states understanding of appt and location.

## 2020-08-04 NOTE — TELEPHONE ENCOUNTER
----- Message from Albertina Robert sent at 8/4/2020  8:29 AM CDT -----  Regarding: reschedjohn  Pt had a appt today, she woke up not feeling well and will not make it. The next available appt is 09/01/2020. Please call back if something is available sooner. [phone]  Thanks

## 2020-08-05 ENCOUNTER — LAB VISIT (OUTPATIENT)
Dept: LAB | Facility: HOSPITAL | Age: 60
End: 2020-08-05
Attending: INTERNAL MEDICINE
Payer: COMMERCIAL

## 2020-08-05 ENCOUNTER — OFFICE VISIT (OUTPATIENT)
Dept: RHEUMATOLOGY | Facility: CLINIC | Age: 60
End: 2020-08-05
Attending: INTERNAL MEDICINE
Payer: COMMERCIAL

## 2020-08-05 ENCOUNTER — HOSPITAL ENCOUNTER (OUTPATIENT)
Dept: CARDIOLOGY | Facility: HOSPITAL | Age: 60
Discharge: HOME | End: 2020-08-05
Attending: INTERNAL MEDICINE
Payer: COMMERCIAL

## 2020-08-05 VITALS
SYSTOLIC BLOOD PRESSURE: 142 MMHG | HEIGHT: 66 IN | BODY MASS INDEX: 32.53 KG/M2 | DIASTOLIC BLOOD PRESSURE: 86 MMHG | WEIGHT: 202.38 LBS | HEART RATE: 73 BPM

## 2020-08-05 DIAGNOSIS — M65.331 TRIGGER MIDDLE FINGER OF RIGHT HAND: ICD-10-CM

## 2020-08-05 DIAGNOSIS — R76.8 ANA POSITIVE: ICD-10-CM

## 2020-08-05 DIAGNOSIS — M35.9 UNDIFFERENTIATED CONNECTIVE TISSUE DISEASE: Primary | ICD-10-CM

## 2020-08-05 DIAGNOSIS — M77.9 TENDINITIS: ICD-10-CM

## 2020-08-05 LAB
BILIRUB UR QL STRIP: NEGATIVE
CLARITY UR: CLEAR
COLOR UR: NORMAL
GLUCOSE UR QL STRIP: NEGATIVE
HGB UR QL STRIP: NEGATIVE
KETONES UR QL STRIP: NEGATIVE
LEUKOCYTE ESTERASE UR QL STRIP: NEGATIVE
NITRITE UR QL STRIP: NEGATIVE
PH UR STRIP: 7 [PH] (ref 5–8)
PROT UR QL STRIP: NEGATIVE
SP GR UR STRIP: <=1.005 (ref 1–1.03)
URN SPEC COLLECT METH UR: NORMAL

## 2020-08-05 PROCEDURE — 84156 ASSAY OF PROTEIN URINE: CPT

## 2020-08-05 PROCEDURE — 86146 BETA-2 GLYCOPROTEIN ANTIBODY: CPT | Mod: 59

## 2020-08-05 PROCEDURE — 93005 ELECTROCARDIOGRAM TRACING: CPT

## 2020-08-05 PROCEDURE — 81003 URINALYSIS AUTO W/O SCOPE: CPT

## 2020-08-05 PROCEDURE — 3077F PR MOST RECENT SYSTOLIC BLOOD PRESSURE >= 140 MM HG: ICD-10-PCS | Mod: CPTII,S$GLB,, | Performed by: INTERNAL MEDICINE

## 2020-08-05 PROCEDURE — 85598 HEXAGNAL PHOSPH PLTLT NEUTRL: CPT

## 2020-08-05 PROCEDURE — 86160 COMPLEMENT ANTIGEN: CPT

## 2020-08-05 PROCEDURE — 3077F SYST BP >= 140 MM HG: CPT | Mod: CPTII,S$GLB,, | Performed by: INTERNAL MEDICINE

## 2020-08-05 PROCEDURE — 86147 CARDIOLIPIN ANTIBODY EA IG: CPT | Mod: 59

## 2020-08-05 PROCEDURE — 99999 PR PBB SHADOW E&M-EST. PATIENT-LVL IV: CPT | Mod: PBBFAC,,, | Performed by: INTERNAL MEDICINE

## 2020-08-05 PROCEDURE — 3079F PR MOST RECENT DIASTOLIC BLOOD PRESSURE 80-89 MM HG: ICD-10-PCS | Mod: CPTII,S$GLB,, | Performed by: INTERNAL MEDICINE

## 2020-08-05 PROCEDURE — 3079F DIAST BP 80-89 MM HG: CPT | Mod: CPTII,S$GLB,, | Performed by: INTERNAL MEDICINE

## 2020-08-05 PROCEDURE — 99214 OFFICE O/P EST MOD 30 MIN: CPT | Mod: S$GLB,,, | Performed by: INTERNAL MEDICINE

## 2020-08-05 PROCEDURE — 99999 PR PBB SHADOW E&M-EST. PATIENT-LVL IV: ICD-10-PCS | Mod: PBBFAC,,, | Performed by: INTERNAL MEDICINE

## 2020-08-05 PROCEDURE — 36415 COLL VENOUS BLD VENIPUNCTURE: CPT

## 2020-08-05 PROCEDURE — 85613 RUSSELL VIPER VENOM DILUTED: CPT

## 2020-08-05 PROCEDURE — 3008F PR BODY MASS INDEX (BMI) DOCUMENTED: ICD-10-PCS | Mod: CPTII,S$GLB,, | Performed by: INTERNAL MEDICINE

## 2020-08-05 PROCEDURE — 93010 ELECTROCARDIOGRAM REPORT: CPT | Mod: S$GLB,,, | Performed by: INTERNAL MEDICINE

## 2020-08-05 PROCEDURE — 99214 PR OFFICE/OUTPT VISIT, EST, LEVL IV, 30-39 MIN: ICD-10-PCS | Mod: S$GLB,,, | Performed by: INTERNAL MEDICINE

## 2020-08-05 PROCEDURE — 3008F BODY MASS INDEX DOCD: CPT | Mod: CPTII,S$GLB,, | Performed by: INTERNAL MEDICINE

## 2020-08-05 PROCEDURE — 86160 COMPLEMENT ANTIGEN: CPT | Mod: 59

## 2020-08-05 PROCEDURE — 93010 EKG 12-LEAD: ICD-10-PCS | Mod: S$GLB,,, | Performed by: INTERNAL MEDICINE

## 2020-08-05 NOTE — PROGRESS NOTES
CC:  Chief Complaint   Patient presents with    Arthritis    Joint Pain     bilat hands/legs     Referred by Dr Campbell     History of Present Illness:  Aaron Cochran a 59 y.o.yo female who presents for further evaluation of a recently noted positive ZAIRA  1:160, speckled pattern negative profile   RF/CCP negative   normal hep panel and TB gold  She was seen by , PM&R for bilateral chronic tendinitis, with trigger fingers  She also has history of bilateral carpal tunnel syndrome and had release surgeries in the past, however recent EMG studies suggested  Moderate left and severe right carpal tunnel syndrome.   She was referred to hand surgeon and  Since last visit had tendon release surgery of left 1, 3, 4 fingers   she is also due to have a release surgery in right 3rd finger soon    She also complains of bilateral hand pain and stiffness  She notices it in the morning but however it gets worse with activity later in the day   she was started on Plaquenil 200 mg p.o. b.i.d. last visit and is tolerating well had her eye checkup   prior EKG reviewed normal QT interval  No other joint swelling noted  No history of gout    Nonsmoker  + family history of autoimmune disease      Review of Systems:  Constitutional: Denies fever, chills. No recent weight changes.   Fatigue: no  Muscle weakness: no  Headaches: no new headaches  Eyes: No redness .  No recent visual changes.  ENT:  No oral or nasal ulcers.  Card: No chest pain.  Resp: No cough or sob.   Gastro: No nausea or vomiting.  No heartburn.  Constipation: no  Diarrhea: no  Genito:  No dysuria.  No genital ulcers.  Skin: No rash.  Raynauds:no  Neuro: No numbness / tingling.   Psych: No depression, anxiety  Endo:  no excess thirst.  Heme: no abnormal bleeding or bruising  Clots:none   Miscarriages : none     Past Medical History:   Diagnosis Date    Arthritis     Asthma     Cataract     OU    Diabetes mellitus, type 2 2001    Gastric ulcer      Hypertension     Mitral valve prolapse     RYLAN (obstructive sleep apnea)     can't tolerate-claustraphobic    Snores      unable to lay flat, sleeps elevated    Thyroid disease     Goiter       Past Surgical History:   Procedure Laterality Date    CARPAL TUNNEL RELEASE Left     CARPAL TUNNEL RELEASE Right 2018    Procedure: RELEASE, CARPAL TUNNEL;  Surgeon: Anurag Mathis MD;  Location: [facility] OR;  Service: Orthopedics;  Laterality: Right;     SECTION      CHOLECYSTECTOMY      DILATION AND CURETTAGE OF UTERUS      Epidural steroid Injection      Pain management, cervical    ESOPHAGEAL DILATION      TRIGGER FINGER RELEASE Left 2020    Procedure: RELEASE, TRIGGER FINGER//Left thumb, middle and ring finger trigger release;  Surgeon: Jose Mata MD;  Location: [facility] OR;  Service: Orthopedics;  Laterality: Left;    ulcer on tongue removed      WOUND DEBRIDEMENT Left     leg at ankle level         Social History     Tobacco Use    Smoking status: Never Smoker    Smokeless tobacco: Never Used   Substance Use Topics    Alcohol use: No     Alcohol/week: 0.0 standard drinks    Drug use: Yes     Types: Hydrocodone       Family History   Problem Relation Age of Onset    Diabetes Father     Diabetes Sister     Heart disease Brother     Glaucoma Paternal Aunt     Glaucoma Daughter         suspect       Review of patient's allergies indicates:   Allergen Reactions    Metformin Diarrhea     With extended release preparation as well    Penicillins Itching     Swelling (extremities)^  Swelling (extremities)^      [place] Itching     throat         OBJECTIVE:     Vital Signs   Vitals:    20 1421   BP: (!) 142/86   Pulse: 73     Physical Exam:  General Appearance:  NAD.   Gait: not favoring.  HEENT: PERRL.  Eyes not dry or injected.  No nasal ulcers.  Mouth not dry, no oral lesions.  Lymph: cervical, supraclavicular or axillary nodes: none abnormal   Cardio: no  irregularity of S1 or S2.  No gallops or rubs.   Resp: Normal respiratory motion. Clear to auscultation bilaterally.   No abnormal chest conformation.  Abd: Soft, non-tender, nondistended.  No masses.   Skin: Head and neck,  and extremities examined. No rashes.   Neuro: Ox3.   Cranial nerves II-XII grossly intact.   Sensation intact  in both distal LE and upper extremities to light touch.  Musculoskeletal Exam:     well-healed scars noted on the palmar aspect of the left hand   tenderness noted on the palmar aspect of right 3rd MCP joint-  Likely more related to tendinitis than joint arthritis  Mild puffy fingers    Inability to make a complete fist    Laboratory: I have reviewed all of the patient's relevant lab work available in the medical record and have utilized this in my evaluation and management recommendations today    Lab Results   Component Value Date    RF <10.0 05/18/2020     Lab Results   Component Value Date    SEDRATE 16 05/18/2020     Lab Results   Component Value Date    CRP 2.0 05/18/2020         Imaging : I have reviewed all of the patient's diagnostic/imaging results available in the medical record and have utilized this in my evaluation and management recommendations today.    Notes reviewed  Other procedures:  Reviewed EMG nerve conduction studies    ASSESSMENT/PLAN:     Undifferentiated connective tissue disease  -     CBC auto differential; Standing  -     Comprehensive metabolic panel; Standing  -     C-Reactive Protein; Standing  -     Sedimentation rate; Standing  -     C4 complement; Standing; Expected date: 08/05/2020  -     C3 complement; Standing; Expected date: 08/05/2020  -     Anti-DNA antibody, double-stranded; Standing; Expected date: 08/05/2020  -     Urinalysis; Standing  -     Protein / creatinine ratio, urine; Standing      Bilateral hand arthritis/tendinitis with positive ZAIRA 1:160, speckled pattern  RF negative, CCP negative   Normal ESR and CRP   UA normal  c/w Plaquenil  200 mg p.o. b.i.d.  Skin pigmentation, ocular toxicity need for annual eye exam, rare myositis, EKG monitoring reviewed  She did see an ophthalmologist , Dr De La Garza     Will re-evaluate her after tendon release surgery, if persistent symptoms will consider adding sulfasalazine- N  hep panel, Tb gold   Other labs as above to assess disease activity  Longstanding IDDM, could also potentially aggravate her problem, counseled on better control of diabetes  Exercise, weight loss    History of cervical and lumbar degenerative disc disease:   Robaxin p.r.n.    6  Month return    Risks vs Benefits and potential side effects of medication prescribed today were discussed with patient. Medication literature given to patient up discharge  Went over uptodate information /literature on the meds prescribed today    Patient advised to hold DMARD and/or biologic therapy for signs of infection or for surgery. If you are unsure what to do please call our office for instruction. [facility] [phone]        Disclaimer: This note was prepared using voice recognition system and is likely to have sound alike errors and is not proof read.  Please call me with any questions.

## 2020-08-06 LAB
C3 SERPL-MCNC: 192 MG/DL (ref 50–180)
C4 SERPL-MCNC: 60 MG/DL (ref 11–44)
CREAT UR-MCNC: 16 MG/DL (ref 15–325)
PROT UR-MCNC: <7 MG/DL (ref 0–15)
PROT/CREAT UR: NORMAL MG/G{CREAT} (ref 0–0.2)

## 2020-08-07 ENCOUNTER — OFFICE VISIT (OUTPATIENT)
Dept: PAIN MEDICINE | Facility: CLINIC | Age: 60
End: 2020-08-07
Payer: COMMERCIAL

## 2020-08-07 VITALS
HEIGHT: 66 IN | DIASTOLIC BLOOD PRESSURE: 79 MMHG | BODY MASS INDEX: 32.47 KG/M2 | WEIGHT: 202 LBS | HEART RATE: 74 BPM | SYSTOLIC BLOOD PRESSURE: 125 MMHG | RESPIRATION RATE: 18 BRPM

## 2020-08-07 DIAGNOSIS — M54.16 LUMBAR RADICULOPATHY: Primary | ICD-10-CM

## 2020-08-07 DIAGNOSIS — M50.30 DDD (DEGENERATIVE DISC DISEASE), CERVICAL: ICD-10-CM

## 2020-08-07 DIAGNOSIS — Z79.891 OPIOID CONTRACT EXISTS: ICD-10-CM

## 2020-08-07 DIAGNOSIS — M48.02 CERVICAL SPINAL STENOSIS: ICD-10-CM

## 2020-08-07 DIAGNOSIS — M51.36 DDD (DEGENERATIVE DISC DISEASE), LUMBAR: ICD-10-CM

## 2020-08-07 PROCEDURE — 3008F PR BODY MASS INDEX (BMI) DOCUMENTED: ICD-10-PCS | Mod: CPTII,S$GLB,, | Performed by: PHYSICIAN ASSISTANT

## 2020-08-07 PROCEDURE — 99999 PR PBB SHADOW E&M-EST. PATIENT-LVL IV: CPT | Mod: PBBFAC,,, | Performed by: PHYSICIAN ASSISTANT

## 2020-08-07 PROCEDURE — 99213 OFFICE O/P EST LOW 20 MIN: CPT | Mod: S$GLB,,, | Performed by: PHYSICIAN ASSISTANT

## 2020-08-07 PROCEDURE — 99213 PR OFFICE/OUTPT VISIT, EST, LEVL III, 20-29 MIN: ICD-10-PCS | Mod: S$GLB,,, | Performed by: PHYSICIAN ASSISTANT

## 2020-08-07 PROCEDURE — 3008F BODY MASS INDEX DOCD: CPT | Mod: CPTII,S$GLB,, | Performed by: PHYSICIAN ASSISTANT

## 2020-08-07 PROCEDURE — 3078F DIAST BP <80 MM HG: CPT | Mod: CPTII,S$GLB,, | Performed by: PHYSICIAN ASSISTANT

## 2020-08-07 PROCEDURE — 99999 PR PBB SHADOW E&M-EST. PATIENT-LVL IV: ICD-10-PCS | Mod: PBBFAC,,, | Performed by: PHYSICIAN ASSISTANT

## 2020-08-07 PROCEDURE — 3074F PR MOST RECENT SYSTOLIC BLOOD PRESSURE < 130 MM HG: ICD-10-PCS | Mod: CPTII,S$GLB,, | Performed by: PHYSICIAN ASSISTANT

## 2020-08-07 PROCEDURE — 3074F SYST BP LT 130 MM HG: CPT | Mod: CPTII,S$GLB,, | Performed by: PHYSICIAN ASSISTANT

## 2020-08-07 PROCEDURE — 3078F PR MOST RECENT DIASTOLIC BLOOD PRESSURE < 80 MM HG: ICD-10-PCS | Mod: CPTII,S$GLB,, | Performed by: PHYSICIAN ASSISTANT

## 2020-08-07 RX ORDER — HYDROCODONE BITARTRATE AND ACETAMINOPHEN 10; 325 MG/1; MG/1
1 TABLET ORAL 3 TIMES DAILY PRN
Qty: 90 TABLET | Refills: 0 | Status: SHIPPED | OUTPATIENT
Start: 2020-08-26 | End: 2020-08-26 | Stop reason: SDUPTHER

## 2020-08-07 RX ORDER — HYDROCODONE BITARTRATE AND ACETAMINOPHEN 10; 325 MG/1; MG/1
1 TABLET ORAL 3 TIMES DAILY PRN
Qty: 90 TABLET | Refills: 0 | Status: SHIPPED | OUTPATIENT
Start: 2020-09-25 | End: 2020-10-25

## 2020-08-07 RX ORDER — HYDROCODONE BITARTRATE AND ACETAMINOPHEN 10; 325 MG/1; MG/1
1 TABLET ORAL 3 TIMES DAILY PRN
Qty: 90 TABLET | Refills: 0 | Status: SHIPPED | OUTPATIENT
Start: 2020-10-25 | End: 2020-11-06 | Stop reason: SDUPTHER

## 2020-08-07 NOTE — PROGRESS NOTES
This note was completed with dictation software and grammatical errors may exist.    CC: Neck pain, arm pain, back pain and leg pain    HPI: The patient is a 59-year-old woman with a history of diabetes, hypertension who presents in referral from [facility] for neck pain radiating into the arms and low back pain radiating into the left leg.  She returns in follow-up today with neck pain and back pain.  Since her last visit she states that her back pain has improved but her neck pain has slightly worsened.  She complains of left greater than right neck pain with pins and needles in her left neck.  She denies any radiation into her arms at this time.  She complains of bilateral low back pain with intermittent radiation into the right buttock and posterior thigh.  She feels that currently her pain is tolerable and well controlled with her pain medication.  She recently underwent trigger finger release on the left hand and is going to this done on the right hand as well.  She denies weakness or numbness.  She denies changes to her bladder incontinence, denies bowel incontinence.    Pain intervention history: She has been taking hydrocodone 10/325 3 times a day for the last year, previous to this she was taking Percocet 3 times a day but states that it was causing her panic attacks, did help with her pain slightly better.  She has been taking gabapentin 600mg twice a day and Zanaflex at night with some relief.  She had undergone 3 epidurals for low back pain with only up to 3 weeks of relief each time.  She is status post C7-T1 cervical interlaminar epidural steroid injection on 1/29/16 with 50% relief.   She is status post C7-T1 cervical interlaminar epidural steroid injection on 3/1/16 with mild additional relief.  She is status post C7-T1 cervical interlaminar epidural steroid injection on 5/13/16 with 70-75% relief.  She is status post right C3, 4, 5 and 6 medial branch radiofrequency ablation on  "7/15/16 with 30-60% relief.  She is status post L5/S1 interlaminar epidural steroid injection on 5/16/17 with 50% relief.  She is status post L5/S1 interlaminar epidural steroid injection on 12/29/17 with excellent relief lasting 2 weeks, now reporting minimal relief of her low back and buttock pain but ongoing 100% relief of her right leg pain.  She is status post C7-T1 cervical interlaminar epidural steroid injection on 3/14/18 with 75% relief but this was not long lasting.     Urine drug screen 1/5/16  Negative and inconsistent for hydrocodone but positive and consistent for its metabolite hydromorphone    Urine drug screen 2/11/16  Negative and inconsistent for hydrocodone    ROS: She reports headaches, hoarse voice, joint stiffness, joint swelling, back pain, difficulty sleeping, anxiety and loss of balance.  Balance of review of systems is negative.    Medical, surgical, family and social history reviewed elsewhere in record.    Medications/Allergies: See med card    Vitals:    08/07/20 1109   BP: 125/79   Pulse: 74   Resp: 18   Weight: 91.6 kg (202 lb)   Height: 5' 6" (1.676 m)   PainSc:   3   PainLoc: Neck         Physical exam:  Gen: A and O x3, pleasant, well-groomed  Skin: No rashes or obvious lesions  HEENT: PERRLA, no obvious deformities on ears or in canals.Trachea midline.  CVS: Regular rate and rhythm, normal palpable pulses.  Resp: Clear to auscultation bilaterally, no wheezes or rales.  Abdomen: Soft, NT/ND.  Musculoskeletal: No antalgic gait.     Neuro:  Upper extremities: 4/5 strength bilaterally but question effort  Lower extremities:  4/5 strength bilaterally but question effort  Reflexes: Brachioradialis 2+, Bicep 2+, Tricep 2+.  Patellar and Achilles reflexes 2+ bilaterally.  Sensory: Intact and symmetrical to light touch and pinprick in C2-T1 dermatomes bilaterally.    Cervical Spine:  Cervical spine: Range of motion is mildly reduced with flexion extension and lateral rotation with " increased bilateral neck pain, worse on the left.  Spurling's maneuver causes neck pain to either side.    Myofascial exam: Mild tenderness to palpation to the left cervical paraspinous muscles.    Lumbar spine:  Range of motion is moderately reduced with flexion, extension and oblique extension with increased low back pain during each maneuver.  Khanh's test is negative bilaterally.  Straight leg raise causes back pain only.  Internal and external rotation of hip is negative bilaterally.  Myofascial exam:  Mild tenderness to palpation to the lumbar paraspinous muscles.    Imagin/24/15 C-spine MRI  1. C3-4 left posterior paracentral disk extrusion causing left paracentral spinal cord compression and severe left foraminal stenosis.  2. C4-5 based disk extrusion with spinal cord impingement and moderate bilateral foraminal stenosis.  3. C5-6 posterior central disk extrusion with spinal cord compression and severe bilateral foraminal stenosis.  4. C6-7 mild disk bulge with severe bilateral foraminal stenosis.  5. Solitary mildly enlarged left submandibular lymph node of uncertain significance.    4/10/17 MRI lumbar spine  T12-L1: No central canal or neuroforaminal stenosis. No disc protrusion or extrusion.  L1-L2: There is ligamentum flavum thickening and facet arthropathy.  No central canal or neuroforaminal stenosis. No disc protrusion or extrusion.  L2-L3: There is ligamentum flavum thickening and facet arthropathy.  No central canal or neuroforaminal stenosis. No disc protrusion or extrusion.  L3-L4: There is ligamentum flavum thickening and facet arthropathy present. No central canal or neuroforaminal stenosis. No disc protrusion or extrusion.  L4-L5: There is a broad disc bulge which extends into both neural foramina.  There is ligamentum flavum thickening and facet arthropathy.  There is right lateral recess stenosis.  There is abutment of the descending right L5 nerve in the right lateral recess.   Please correlate clinically for symptoms referable to the right L5 nerve.  There is mild central canal stenosis.  There is mild left neuroforaminal stenosis.  No right neuroforaminal stenosis.  L5-S1: There is a broad central/right paracentral disc protrusion which encroaches upon the descending right S1 nerve in the right lateral recess.  Please correlate clinically for symptoms referable to the right S1 nerve.  There is mild-moderate right neuroforaminal stenosis.  No left neuroforaminal stenosis.  No central canal stenosis.  There is ligamentum flavum thickening and facet arthropathy.  There is a broad left extraforaminal disc protrusion at L5-S1 which abuts the exited left L5 nerve in the left extraforaminal soft tissues (series 6 image 17), nonspecific.  Please correlate clinically for symptoms referable to the left L5 nerve.    05/28/2019 MRI cervical spine  C2-3: No disc herniation, spinal canal narrowing, or neuroforaminal narrowing.  C3-4: There is moderate broad-based posterior disc osteophyte complex formation with left paracentral predominance.  This along with facet arthropathy contributes to mild spinal canal narrowing and moderate left neuroforaminal narrowing.  C4-5: There is moderate broad-based posterior disc osteophyte complex formation with left paracentral prominence.  This along with facet arthropathy contributes to mild bilateral neuroforaminal narrowing and mild spinal canal narrowing.  C5-6: Moderate broad-based posterior disc osteophyte complex formation and facet arthropathy result in moderate spinal canal narrowing and moderate right and mild left neuroforaminal narrowing.  C6-7: Moderate broad-based posterior disc osteophyte complex formation and facet arthropathy result in mild spinal canal narrowing along with moderate right and severe left neuroforaminal narrowing.  C7-T1: Minimal broad-based posterior disc osteophyte complex formation and bilateral facet arthropathy result in mild  bilateral neuroforaminal narrowing.      05/28/2019 MRI lumbar spine  T12-L1: No disc herniation, spinal canal narrowing, or neuroforaminal narrowing.  L1-2: No disc herniation, spinal canal narrowing, or neuroforaminal narrowing.  L2-3: No disc herniation, spinal canal narrowing, or neuroforaminal narrowing.  L3-4: No disc herniation, spinal canal narrowing, or neuroforaminal narrowing.  L4-5: There is moderate generalized disc bulging and bilateral facet arthropathy, which result in mild spinal canal narrowing and mild bilateral neuroforaminal narrowing.  L5-S1: There is a small right paracentral disc protrusion.  This effaces the right lateral recess and may impinge upon the descending right S1 nerve root.  Bilateral facet arthropathy is present and there is resultant overall mild spinal canal narrowing.  Moderate right and mild left neuroforaminal narrowing are also present.        Assessment:  The patient is a 59-year-old woman with a history of diabetes, hypertension who presents in referral from [facility] for neck pain radiating into the arms and low back pain radiating into the left leg.     1. Lumbar radiculopathy     2. DDD (degenerative disc disease), lumbar     3. Cervical spinal stenosis     4. DDD (degenerative disc disease), cervical     5. Opioid contract exists           Plan:  1.  Overall the patient is doing well.  Dr. Galan provided prescriptions for hydrocodone-acetaminophen 10/325 milligrams up to 3 times a day as needed for pain.  I have reviewed the [place] Board of Pharmacy website and there are no abberancies.  Her last urine drug screen was consistent.  2.  If her pain worsens we can consider interventional procedures but we will need to make sure her hemoglobin A1c is a safe range.  3.  I charli have her follow-up with Dr. Galan in 3 months.  She has has several follow-up appointments scheduled with Dr Galan that were changed to me due to convenience on  patient's part but she will need follow-up with the physician as well.

## 2020-08-09 LAB
B2 GLYCOPROT1 IGA SER QL: <9 SAU
B2 GLYCOPROT1 IGG SER QL: <9 SGU
B2 GLYCOPROT1 IGM SER QL: <9 SMU

## 2020-08-10 ENCOUNTER — CLINICAL SUPPORT (OUTPATIENT)
Dept: REHABILITATION | Facility: HOSPITAL | Age: 60
End: 2020-08-10
Attending: ORTHOPAEDIC SURGERY
Payer: COMMERCIAL

## 2020-08-10 DIAGNOSIS — R29.898 DECREASED GRIP STRENGTH OF LEFT HAND: ICD-10-CM

## 2020-08-10 DIAGNOSIS — M65.312 TRIGGER FINGER OF LEFT THUMB: ICD-10-CM

## 2020-08-10 DIAGNOSIS — R29.898 DECREASED PINCH STRENGTH: ICD-10-CM

## 2020-08-10 DIAGNOSIS — M65.342 TRIGGER FINGER, LEFT RING FINGER: ICD-10-CM

## 2020-08-10 DIAGNOSIS — M25.642 DECREASED RANGE OF MOTION OF FINGER OF LEFT HAND: ICD-10-CM

## 2020-08-10 DIAGNOSIS — M79.642 LEFT HAND PAIN: Primary | ICD-10-CM

## 2020-08-10 DIAGNOSIS — M65.332 TRIGGER FINGER, LEFT MIDDLE FINGER: ICD-10-CM

## 2020-08-10 DIAGNOSIS — Z78.9 DECREASED ACTIVITIES OF DAILY LIVING (ADL): ICD-10-CM

## 2020-08-10 LAB
APTT HEX PL PPP: NEGATIVE S
CARDIOLIPIN IGG SER IA-ACNC: <9.4 GPL (ref 0–14.99)
CARDIOLIPIN IGM SER IA-ACNC: <9.4 MPL (ref 0–12.49)

## 2020-08-10 PROCEDURE — 97166 OT EVAL MOD COMPLEX 45 MIN: CPT | Mod: PO

## 2020-08-10 PROCEDURE — 97110 THERAPEUTIC EXERCISES: CPT | Mod: PO

## 2020-08-10 NOTE — PLAN OF CARE
[facility] Occupational Therapy  Initial Evaluation     Date: 8/10/2020  Patient: Aaron Garcia  Chart Number: [id number]    Therapy Diagnosis: L hand pain, decreased rom, decreased /pinch/ADL  Physician: Jose Mata MD    Physician Orders: Please begin therapy for the left hand.  Include edema control, range of motion, desensitization  Medical Diagnosis:   M65.332 (ICD-10-CM) - Trigger finger, left middle finger   M65.342 (ICD-10-CM) - Trigger finger, left ring finger   M65.312 (ICD-10-CM) - Trigger finger of left thumb       Evaluation Date: 8/10/2020  Insurance Authorization period Expiration: 7/29/21  Plan of Care Expiration Period: 11/8/2020    Visit # / Visits Authortized: 1 / 1  Time In:10:45 am  Time Out: 11:30   Total Billable Time: 45 minutes    Precautions: Standard and Diabetes  Date of  Surgery: 7/16/2020   S/P: 3 weeks on 8/6/20    Subjective     Involved Side: Left  Dominant Side: Right  Date of Onset: Several years prior  Mechanism of Injury/ History of Current Condition: Pt reports triggering had occurred for years and was managed with CSI, but then the pain was unbearable and she was unable to make a fist.   Surgical Procedure: L thumb, MF, and RF  TFR  Imaging: X rays: No acute fracture or dislocation. Mild degenerative changes seen scattered throughout the interphalangeal joints.  No erosive changes identified   NCS: Moderate left and severe right carpal tunnel syndrome.     Previous Therapy: had therapy for R  CTR   10/2018    Patient's Goals for Therapy: to get strength back, reduce swelling    Pain:  Functional Pain Scale Rating 0-10:   2/10 now   2/10 at best  6/10 at worst  Location: MF and RF and radiate into volar FA  Description: Aching  Aggravating Factors: functional use,  dropping objects  Easing Factors: Voltaren gel    Occupation:  Not working due to back and neck problems    Functional Limitations/Social History:    Previous functional status  includes: Independent with all ADLs.     Current FunctionalStatus   Home/Living environment : lives with their family      Limitation of Functional Status as follows:   ADLs/IADLs: Difficulty with holding and dropping objects, housework, laundry    - Feeding: Difficulty cutting food    - Bathing: Difficulty but is able to perform    - Dressing/Grooming: Difficulty but managing    - Driving: using R hand more     Leisure: games on phone      Past Medical History/Physical Systems Review:   Aaron Garcia  has a past medical history of Arthritis, Asthma, Cataract, Diabetes mellitus, type 2, Gastric ulcer, Hypertension, Mitral valve prolapse, RYLAN (obstructive sleep apnea), Snores, and Thyroid disease.    Aaron Garcia  has a past surgical history that includes Carpal tunnel release (Left); Cholecystectomy; Wound debridement (Left); Dilation and curettage of uterus (); Epidural steroid Injection;  section; ulcer on tongue removed; Carpal tunnel release (Right, 2018); Esophageal dilation; and Trigger finger release (Left, 2020).    Aaron has a current medication list which includes the following prescription(s): albuterol sulfate, amitriptyline, amlodipine, azelastine, bd ultra-fine luis fernando pen needle, benazepril, desloratadine, diclofenac sodium, fluticasone propionate, gabapentin, hydrocodone-acetaminophen, hydroxychloroquine, insulin aspart u-100, insulin glargine (toujeo), methocarbamol, montelukast, nebivolol, on call lancet, ondansetron, pantoprazole, rosuvastatin, symbicort, tradjenta, and true metrix glucose test strip.    Review of patient's allergies indicates:   Allergen Reactions    Metformin Diarrhea     With extended release preparation as well    Penicillins Itching     Swelling (extremities)^  Swelling (extremities)^      [place] Itching     throat          Objective     Observation/Inspection: Unable to perform straight fist B, DIPs flex     Sensation: Pt denies  parestheasias. Intact to light touch.    Scar Minimal tenderness to palpation. Scar is  mildly -moderately thickened and raised, with mild -mod adherence. Incisions are well healed, with thumb incision observed to be dry.        Edema:   Mild visible edema at volar palm vs R          (in cm) L R   Thumb Proximal Phalanx 7.5 7.2   Ring Proximal Phalanx 7.2 7.4   Long Proximal Phalanx 7.4 7.9         Range of Motion:   Opposition to radial RF andSF     Range of Motion: left Active  (Ext/Flex) Middle L  Middle R Ring L Ring R   MCP Jt 0/57° NT-triggering  0/60° 0/65°   PIP Jt 11/88° 10/ 12/91° 0/95°   DIP Jt 0/50° /° 0/42° 0/55°   CRUZ ° ° ° °   TPM ° ° ° °     (Ext/Flex) Thumb   MCP Jt L 0/46° R 0/29   IP Jt 0+/46° R 0/65                                             Manual Muscle Test:  Not tested                                       and Pinch Strength: not tested at this time due to post operative status.      Special Tests: none performed         CMS Impairment/Limitation/Restriction for Quick DASH Survey    Therapist reviewed Quick DASH scores for Aaron Garcia on 8/10/2020.   Quick DASH documents entered into [employer] - see Media section.    The Quick DASH Questionnaire- The following scores are based on patient reported assessment at the time of the initial occupational therapy evaluation:    Activity:  1. Open a tight jar: unable   2. Do heavy household chores: severe Difficulty  3. Carry a shopping bag or briefcase: severe Difficulty  4. Wash your back: moderate Difficulty  5.Use a knife to cut food:severe Difficulty  6.. Recreational activities requiring force through arm: unablle     7. Social Limitation: quite a bit Limited  8. Work/ADL Limitation: moderately  Limited    Severity of Symptoms (over the past week)  9. Pain: moderate  10. Tingling: none    11. Sleeping Limitation: moderate Difficulty    Limitation Score: 63%         Treatment     Treatment Time In: 11:15  Treatment Time Out: 11:35 am  Total  Treatment time separate from Evaluation time:20 min    Aaron received the following manual therapy techniques for 5 minutes:   -Performed scar massage to incision(s) for 3 minutes to decrease adhesions and improve tensile glide.   Retrograde massage to L  digits/hand/wrist x 2 min to stimulate lymphatics to decrease pain,  edema and increase AROM and functional use.     Aaron received therapeutic exercises for 15 minutes including:  -.AROM x 10 reps each:   wave, hook, fist, straight fist, abd/add, lifts, PIP extension with MP flexed, fist-hook-point  PIP and DIPJ jt blocking exercises to MF and RF  Thumb AROM: IP/MPJ blocks, composite flexion, circumduction, palmar and radial abduction, retroposition, opposition     Home Exercise Program/Education:  Issued HEP (see patient instructions in EMR) and educated on modality use for pain management . Exercises were reviewed and Aaron was able to demonstrate them prior to the end of the session.   Pt received a written copy of exercises to perform at home. Aaron demonstrated good  understanding of the education provided.  Pt was advised to perform these exercises free of pain, and to stop performing them if pain occurs.    Patient/Family Education: role of OT, goals for OT, scheduling/cancellations - pt verbalized understanding. Discussed insurance limitations with patient.        Assessment     Aaron Garcia is a 59 y.o. female referred to outpatient occupational/hand therapy and presents with a medical diagnosis of 3 weeks s/p L thumb, MF, and RF trigger releases, resulting in  pain, edema, decreased A/PROM, strength, and functional use of L non-dominant UE and demonstrates limitations as described in the chart below.     The patient's rehab potential is Good.     Anticipated barriers to occupational therapy: diabetes   Pt has no cultural, educational or language barriers to learning provided.    Profile and History Assessment of Occupational  Performance Level of Clinical Decision Making Complexity Score   Occupational Profile:   Aaron Garcia is a 59 y.o. female who lives with their family and is currently unemployed. Aaron Garcia has difficulty with  holding and grasping objects  driving/transportation management, shopping, phone/computer use and housework/household chores  affecting his/her daily functional abilities. His/her main goal for therapy is regain strength and reduce swelling.     Comorbidities:   CAD, COPD/asthma, diabetes, HTN and RYLAN, arthritis, thyroid    Medical and Therapy History Review:   Expanded               Performance Deficits    Physical:  Joint Mobility  Muscle Power/Strength  Muscle Endurance  Skin Integrity/Scar Formation  Edema   Strength  Pinch Strength  Fine Motor Coordination  Pain    Cognitive:  No Deficits    Psychosocial:    No Deficits     Clinical Decision Making:  moderate    Assessment Process:  Detailed Assessments    Modification/Need for Assistance:  Minimal-Moderate Modifications/Assistance    Intervention Selection:  Multiple Treatment Options       moderate  Based on PMHX, co morbidities , data from assessments and functional level of assistance required with task and clinical presentation directly impacting function.       The following goals were discussed with the patient and patient is in agreement with them as to be addressed in the treatment plan.     Goals:   Short Term Goals: (4 weeks)  1. Pt will be independent with HEP in 2 visits.  2. Pt will report decreased pain to 4-5/10 at worst.  3. Pt will  Increase thumb, MF, and RF CRUZ by 10 degrees to enable dressing, grooming activities.    Long Term Goals: (by discharge)  1. Pt will report decreased pain to 1-2/10 with ADLs.   2. Pt will exhibit a full flat composite L fist and full extension.   3. Pt will exhibit L   strength at 75% of R comparative measures to allow a firm grasp on cooking utensils, steering wheel, etc.  4. Pt will  exhibit 9# of functional pinch strength to allow writing,opening containers, and turning keys.  5. Pt will exhibit a significant increase (30-40 points) in the Quick DASH assessment.  6. Pt will return to PLOF.     Plan   Certification Period/Plan of care expiration: 8/10/2020 to 11/8/2020.    Outpatient Occupational Therapy 2 times weekly for 4 weeks to include the following interventions:     Modalities to decrease pain (moist heat, paraffin, fluidotherapy, US ), edema control, scar mobilization, manual therapy/joint mobilizations,A/PROM, therapeutic exercises/activities, strengtheningjoint protections/energry conservation/adaptive equipment/activity modification  HEP/education as well as any other treatments deemed necessary based on the patient's needs or progress.    Updates Next Visit:     MATTIE Holloway CHT

## 2020-08-12 LAB — LA PPP-IMP: NEGATIVE

## 2020-08-18 NOTE — PROGRESS NOTES
Occupational Therapy Daily Treatment Note     Name: Aaron Garcia  Clinic Number: [id number]      Visit Date: 8/19/2020     Therapy Diagnosis: L hand pain, decreased rom, decreased /pinch/ADL  Physician: Jose Mata MD     Physician Orders: Please begin therapy for the left hand.  Include edema control, range of motion, desensitization  Medical Diagnosis: s/p  L thumb, MF, and RF trigger release  M65.332 (ICD-10-CM) - Trigger finger, left middle finger   M65.342 (ICD-10-CM) - Trigger finger, left ring finger   M65.312 (ICD-10-CM) - Trigger finger of left thumb         Evaluation Date: 8/10/2020  Insurance Authorization period Expiration: 8/12/21  Plan of Care Expiration Period: 11/8/2020     Visit # / Visits Authortized: 2 / 12  Time In:11:15 am  Time Out: 12:15 pm  Total Billable Time: 60 minutes     Precautions: Standard and Diabetes  Date of  Surgery: 7/16/2020                          S/P: 5 weeks on 8/20/20      Subjective     Pt reports: she has had car trouble and been unable to attend. Saw MD today who prescribed Mobic  she was compliant with home exercise program given last session.   Response to previous treatment:Good  Functional change: dropping objects less; hold objects better    Pain: Functional Pain Scale Rating 0-10:   2/10 now   0/10 at best (decreased 2 levels)  5/10 at worst (decreased 1 level)  Location: L MF and RF and radiate into volar FA     Objective     Range of Motion: left Active  (Ext/Flex) Middle L  Middle R Ring L Ring R   MCP Jt 0/65° (+8) NT-triggering  0/78° (+18) 0/65°    PIP Jt 16/91° (-5/+3) 10/ 15/89°(-3/-2) 0/95°   DIP Jt 0/51° (+1) /° 0/43° (+1) 0/55°   CRUZ ° 191(+7) ° ° 195 (+14) °   TPM ° ° ° °             (Ext/Flex) Thumb   MCP Jt L 0/40° (-6) R 0/29   IP Jt 0+/55° (+9) R 0/65                         Aaron received the following supervised modalities after being cleared for contradictions for 5 minutes:   -Moist Heat applied to L hand x 10  min to decrease pain and  increase blood flow and tissue elasticity prior to treatment.     Aaron received the following manual therapy techniques for 6 minutes:   -Performed scar massage to incision(s) for 3 minutes to decrease adhesions and improve tensile glide.   Retrograde massage to L  digits/hand/wrist x 2 min to stimulate lymphatics to decrease pain,  edema and increase AROM and functional use.      Aaron received therapeutic exercises for 34 minutes including:  -.AROM x 10 reps each:   wave, hook, fist, straight fist, abd/add, lifts, PIP extension with MP flexed, fist-hook-point  PIP and DIPJ jt blocking exercises to MF and RF  Thumb AROM: IP/MPJ blocks, composite flexion, circumduction, palmar and radial abduction, retroposition, opposition  -isolated FDS glides all fingers  -gentle passive stretch isolated and composite joints, MF, RF, and thumb with composite extension stretching as well and isolated PIP ext stretch     Aaron received therapeutic activities for 15 minutes including:  -towel scrunches 10x  -isospheres 3 min  -in hand manipulation with medium pom poms 1 container  -pen walks and rotations  -yellow putty molding 3 min    Home Exercises and Education Provided     Education provided:   - Added self hook and extensions stretching to HEP.  -Reissued written instructions at pt request.      Written Home Exercises Provided: Patient instructed to cont prior HEP. Added self hook and extensions stretching.  Exercises were reviewed and Aaron was able to demonstrate them prior to the end of the session.  Aaron demonstrated fair  understanding of the education provided.   Required review and instruction for majority of exercises.   .   See EMR under Media for exercises provided prior visit.        Assessment     Pt would continue to benefit from skilled OT. Subjective reports of pain have decreased, with patient now reporting some painfree times.  CRUZ increased 7 degrees at L MF and  14 at L RF. Improved ability to flex MF.  Continues with inability to form a straight fist. Required review and instruction for majority of exercises in HEP to perform correctly.    - Progress towards goals: STG 2 met, STG 3 partially met     Aaron is progressing well towards her goals and there are no updates to goals at this time. Pt prognosis is Good.     Pt will continue to benefit from skilled outpatient occupational therapy to address the deficits listed in the problem list on initial evaluation provide pt/family education and to maximize pt's level of independence in the home and community environment.     Anticipated barriers to occupational therapy: diabetes    Pt's spiritual, cultural and educational needs considered and pt agreeable to plan of care and goals.    Goals:  Short Term Goals: (4 weeks)  1. Pt will be independent with HEP in 2 visits.   2. Pt will report decreased pain to 4-5/10 at worst. (met 8/19/20)  3. Pt will  Increase thumb, MF, and RF CRUZ by 10 degrees to enable dressing, grooming activities. (met for RF 8/19/20; partially met for MF)     Long Term Goals: (by discharge)  1. Pt will report decreased pain to 1-2/10 with ADLs.   2. Pt will exhibit a full flat composite L fist and full extension.   3. Pt will exhibit L   strength at 75% of R comparative measures to allow a firm grasp on cooking utensils, steering wheel, etc.  4. Pt will exhibit 9# of functional pinch strength to allow writing,opening containers, and turning keys.  5. Pt will exhibit a significant increase (30-40 points) in the Quick DASH assessment.  6. Pt will return to PLOF.     Plan     Certification Period/Plan of care expiration: 8/10/2020 to 11/8/2020.  Continue Occupational Therapy 2  times per week for 4 weeks to decrease pain and edema, and increase A/PROM, strength, and functional use of L upper extremity.    Updates/Grading for next session: progress as able      MATTIE Holloway, FAIZANT

## 2020-08-19 ENCOUNTER — CLINICAL SUPPORT (OUTPATIENT)
Dept: REHABILITATION | Facility: HOSPITAL | Age: 60
End: 2020-08-19
Attending: ORTHOPAEDIC SURGERY
Payer: COMMERCIAL

## 2020-08-19 ENCOUNTER — OFFICE VISIT (OUTPATIENT)
Dept: ORTHOPEDICS | Facility: CLINIC | Age: 60
End: 2020-08-19
Payer: COMMERCIAL

## 2020-08-19 VITALS
BODY MASS INDEX: 32.45 KG/M2 | WEIGHT: 201.94 LBS | DIASTOLIC BLOOD PRESSURE: 84 MMHG | HEIGHT: 66 IN | SYSTOLIC BLOOD PRESSURE: 147 MMHG | HEART RATE: 72 BPM

## 2020-08-19 DIAGNOSIS — M25.642 DECREASED RANGE OF MOTION OF FINGER OF LEFT HAND: ICD-10-CM

## 2020-08-19 DIAGNOSIS — M65.312 TRIGGER FINGER OF LEFT THUMB: Primary | ICD-10-CM

## 2020-08-19 DIAGNOSIS — Z78.9 DECREASED ACTIVITIES OF DAILY LIVING (ADL): ICD-10-CM

## 2020-08-19 DIAGNOSIS — R29.898 DECREASED PINCH STRENGTH: ICD-10-CM

## 2020-08-19 DIAGNOSIS — M65.332 TRIGGER FINGER, LEFT MIDDLE FINGER: ICD-10-CM

## 2020-08-19 DIAGNOSIS — M79.642 LEFT HAND PAIN: Primary | ICD-10-CM

## 2020-08-19 DIAGNOSIS — R29.898 DECREASED GRIP STRENGTH OF LEFT HAND: ICD-10-CM

## 2020-08-19 PROCEDURE — 97110 THERAPEUTIC EXERCISES: CPT | Mod: PO

## 2020-08-19 PROCEDURE — 99024 PR POST-OP FOLLOW-UP VISIT: ICD-10-PCS | Mod: S$GLB,,, | Performed by: ORTHOPAEDIC SURGERY

## 2020-08-19 PROCEDURE — 99024 POSTOP FOLLOW-UP VISIT: CPT | Mod: S$GLB,,, | Performed by: ORTHOPAEDIC SURGERY

## 2020-08-19 PROCEDURE — 99999 PR PBB SHADOW E&M-EST. PATIENT-LVL IV: ICD-10-PCS | Mod: PBBFAC,,, | Performed by: ORTHOPAEDIC SURGERY

## 2020-08-19 PROCEDURE — 99999 PR PBB SHADOW E&M-EST. PATIENT-LVL IV: CPT | Mod: PBBFAC,,, | Performed by: ORTHOPAEDIC SURGERY

## 2020-08-19 PROCEDURE — 97530 THERAPEUTIC ACTIVITIES: CPT | Mod: PO

## 2020-08-19 RX ORDER — TIZANIDINE HYDROCHLORIDE 2 MG/1
CAPSULE, GELATIN COATED ORAL
COMMUNITY
Start: 2020-08-15 | End: 2021-01-08 | Stop reason: CLARIF

## 2020-08-19 NOTE — PROGRESS NOTES
Ms Gilbert returns to clinic today.  She is approximately 4-5 weeks status post left thumb, middle, and ring finger trigger releases.  She states that she has only had 1 therapy visit because she had sub car issues.  She states that she is doing the home program and has improved some.  She is here today for further evaluation    Physical exam:  Examination left hand reveals that there is still mild edema.  All wounds are well-healed.  Palpation over the wounds only produces minimal tenderness but there is a small amount of scar tissue noted.  She is able to flex to within 1 cm of the distal palmar crease and fully extend without triggering.  Flexion and extension of thumb is full without triggering.  She is neurovascularly intact.    Assessment:  Status post left thumb, middle, and ring finger trigger release.    Plan:    1.  We will continue occupational therapy for edema control range of motion and scar massage    2.  She can continue home therapy program    3.  I will start her on Mobic 15 mg per day    4.  Follow-up with me in 4 weeks for final evaluation

## 2020-08-20 DIAGNOSIS — M65.312 TRIGGER FINGER OF LEFT THUMB: Primary | ICD-10-CM

## 2020-08-20 RX ORDER — MELOXICAM 15 MG/1
15 TABLET ORAL DAILY
Qty: 30 TABLET | Refills: 0 | Status: SHIPPED | OUTPATIENT
Start: 2020-08-20 | End: 2021-01-08 | Stop reason: CLARIF

## 2020-08-20 NOTE — TELEPHONE ENCOUNTER
----- Message from Baldo Woodruff sent at 8/20/2020  3:33 PM CDT -----  Regarding: where is Rx  Contact: pt  [phone]  Please call

## 2020-08-24 NOTE — PROGRESS NOTES
Occupational Therapy Daily Treatment Note     Name: Aaron Garcia  [facility] Number: [id number]      Visit Date: 8/26/2020     Therapy Diagnosis: L hand pain, decreased rom, decreased /pinch/ADL  Physician: Jose Mata MD     Physician Orders: Please begin therapy for the left hand.  Include edema control, range of motion, desensitization  Medical Diagnosis: s/p  L thumb, MF, and RF trigger release  M65.332 (ICD-10-CM) - Trigger finger, left middle finger   M65.342 (ICD-10-CM) - Trigger finger, left ring finger   M65.312 (ICD-10-CM) - Trigger finger of left thumb         Evaluation Date: 8/10/2020  Insurance Authorization period Expiration: 8/12/21  Plan of Care Expiration Period: 11/8/2020     Visit # / Visits Authortized: 3 / 12  Time In:2:45 pm  Time Out: 3:30 pm  Total Billable Time: 45 minutes     Precautions: Standard and Diabetes  Date of  Surgery: 7/16/2020                          S/P: 5 weeks on 8/20/20      Subjective     Pt reports: you have some good days and then others are back to square one.  Pt reports she has had a +ZAIRA and had additional testing, but has not received the results   she was compliant with home exercise program given last session.   Response to previous treatment:Good-no soreness after last session.   Functional change: lifting objects, not dropping objects as much    Pain: Functional Pain Scale Rating 0-10:   2/10 now   0/10 at best (decreased 2 levels)  5/10 at worst (decreased 1 level)  Location: L MF and RF and radiate into volar FA     Objective     Not measured this date  Range of Motion: left Active  (Ext/Flex) Middle L  Middle R Ring L Ring R   MCP Jt 0/65° (+8) NT-triggering  0/78° (+18) 0/65°    PIP Jt 16/91° (-5/+3) 10/ 15/89°(-3/-2) 0/95°   DIP Jt 0/51° (+1) /° 0/43° (+1) 0/55°   CRUZ ° 191(+7) ° ° 195 (+14) °   TPM ° ° ° °             (Ext/Flex) Thumb   MCP Jt L 0/40° (-6) R 0/29   IP Jt 0+/55° (+9) R 0/65                         Aaron received  the following supervised modalities after being cleared for contradictions for 5 minutes:   -Moist Heat applied to L hand x 5 min to decrease pain and  increase blood flow and tissue elasticity prior to treatment.     Aaron received the following manual therapy techniques for 5 minutes:   -Performed scar massage to incision(s) for 3 minutes to decrease adhesions and improve tensile glide.   Retrograde massage to L  digits/hand/wrist x 2 min to stimulate lymphatics to decrease pain,  edema and increase AROM and functional use.      Aaron received therapeutic exercises for 15 minutes including:  -.AROM x 10 reps each:  (NT)   wave, hook, fist, straight fist, abd/add, lifts, PIP extension with MP flexed, fist-hook-point  PIP and DIPJ jt blocking exercises to MF and RF  Thumb AROM: IP/MPJ blocks, composite flexion, circumduction, palmar and radial abduction, retroposition, opposition  -isolated FDS glides all fingers (NT)   -gentle passive stretch isolated and composite joints, MF, RF, and thumb with composite extension stretching as well and isolated PIP ext stretch     Aaron received therapeutic activities for 20  minutes including:  -functional pinches red CP 20x ea  -red x  for ext only 25x  -in hand manipulation with medium pom poms 1 container (NT)  -composite thumb flexion in yellow putty 20x  -yellow putty squeezing 3 min  -red flexbar rolling for scar and extension stretch 3 min    Home Exercises and Education Provided     Education provided:   -Reviewed self hook and extensions stretching to HEP.  -      Written Home Exercises Provided: Patient instructed to cont prior HEP.   Exercises were reviewed and Aaron was able to demonstrate them prior to the end of the session.  Aaron demonstrated fair  understanding of the education provided.   Required review and instruction for majority of exercises.   .   See EMR under Media for exercises provided prior visit.        Assessment     Pt  would continue to benefit from skilled OT.  Pt able to make a full, flat fist before treatment and exhibits good extension.  Progressing well. Scars are softening.     - Progress towards goals: LTG 2 met     Aaron is progressing well towards her goals and there are no updates to goals at this time. Pt prognosis is Good.     Pt will continue to benefit from skilled outpatient occupational therapy to address the deficits listed in the problem list on initial evaluation provide pt/family education and to maximize pt's level of independence in the home and community environment.     Anticipated barriers to occupational therapy: diabetes    Pt's spiritual, cultural and educational needs considered and pt agreeable to plan of care and goals.    Goals:  Short Term Goals: (4 weeks)  1. Pt will be independent with HEP in 2 visits.   2. Pt will report decreased pain to 4-5/10 at worst. (met 8/19/20)  3. Pt will  Increase thumb, MF, and RF CRUZ by 10 degrees to enable dressing, grooming activities. (met for RF 8/19/20; partially met for MF)     Long Term Goals: (by discharge)  1. Pt will report decreased pain to 1-2/10 with ADLs.   2. Pt will exhibit a full flat composite L fist and full extension.  ( met 8/26/20)  3. Pt will exhibit L   strength at 75% of R comparative measures to allow a firm grasp on cooking utensils, steering wheel, etc.  4. Pt will exhibit 9# of functional pinch strength to allow writing,opening containers, and turning keys.  5. Pt will exhibit a significant increase (30-40 points) in the Quick DASH assessment.  6. Pt will return to PLOF.     Plan     Certification Period/Plan of care expiration: 8/10/2020 to 11/8/2020.  Continue Occupational Therapy 2  times per week for 4 weeks to decrease pain and edema, and increase A/PROM, strength, and functional use of L upper extremity.    Updates/Grading for next session: progress as able      MATTIE Holloway, FAIZANT

## 2020-08-26 ENCOUNTER — CLINICAL SUPPORT (OUTPATIENT)
Dept: REHABILITATION | Facility: HOSPITAL | Age: 60
End: 2020-08-26
Attending: ORTHOPAEDIC SURGERY
Payer: COMMERCIAL

## 2020-08-26 DIAGNOSIS — R29.898 DECREASED PINCH STRENGTH: ICD-10-CM

## 2020-08-26 DIAGNOSIS — Z78.9 DECREASED ACTIVITIES OF DAILY LIVING (ADL): ICD-10-CM

## 2020-08-26 DIAGNOSIS — M25.642 DECREASED RANGE OF MOTION OF FINGER OF LEFT HAND: ICD-10-CM

## 2020-08-26 DIAGNOSIS — M79.642 LEFT HAND PAIN: Primary | ICD-10-CM

## 2020-08-26 DIAGNOSIS — R29.898 DECREASED GRIP STRENGTH OF LEFT HAND: ICD-10-CM

## 2020-08-26 PROCEDURE — 97110 THERAPEUTIC EXERCISES: CPT | Mod: PO

## 2020-08-26 PROCEDURE — 97530 THERAPEUTIC ACTIVITIES: CPT | Mod: PO

## 2020-08-31 NOTE — PROGRESS NOTES
Occupational Therapy Daily Treatment Note     Name: Aaron Garcia  Clinic Number: [id number]      Visit Date: 9/2/2020     Therapy Diagnosis: L hand pain, decreased rom, decreased /pinch/ADL  Physician: Jose Mata MD     Physician Orders: Please begin therapy for the left hand.  Include edema control, range of motion, desensitization  Medical Diagnosis: s/p  L thumb, MF, and RF trigger release  M65.332 (ICD-10-CM) - Trigger finger, left middle finger   M65.342 (ICD-10-CM) - Trigger finger, left ring finger   M65.312 (ICD-10-CM) - Trigger finger of left thumb         Evaluation Date: 8/10/2020  Insurance Authorization period Expiration: 8/12/21  Plan of Care Expiration Period: 11/8/2020     Visit # / Visits Authortized: 4 / 12  Time In:11:45 pm  Time Out: 12:30 pm  Total Billable Time: 45 minutes     Precautions: Standard and Diabetes  Date of  Surgery: 7/16/2020                          S/P: 5 weeks on 8/20/20      Subjective     Pt reports: she feels like her hands take one step forward and two steps back. She's having pain at night, but in both hands. She missed last session due to sinus headaches.   she was compliant with home exercise program given last session.   Response to previous treatment:Good-no soreness after last session.   Functional change: lifting objects, not dropping objects as much    Pain: Functional Pain Scale Rating 0-10:   2/10 now   0/10 at best (decreased 2 levels)  5/10 at worst (decreased 1 level)  Location: L MF and RF and radiate into volar FA     Objective     Not measured this date  Range of Motion: left Active  (Ext/Flex) Middle L  Middle R Ring L Ring R   MCP Jt 0/65° (+8) NT-triggering  0/78° (+18) 0/65°    PIP Jt 16/91° (-5/+3) 10/ 15/89°(-3/-2) 0/95°   DIP Jt 0/51° (+1) /° 0/43° (+1) 0/55°   CRUZ ° 191(+7) ° ° 195 (+14) °   TPM ° ° ° °             (Ext/Flex) Thumb   MCP Jt L 0/40° (-6) R 0/29   IP Jt 0+/55° (+9) R 0/65                         Aaron  received the following supervised modalities after being cleared for contradictions for 8 minutes:   -Ultrasound:  3 Mhz, 50 % duty cycle, 1.2 w/cm2 for 8 minutes to  decrease adhesions, and improve tissue extensibility      Aaron received the following manual therapy techniques for 2 minutes:   -Performed scar massage to incision(s) for 3 minutes to decrease adhesions and improve tensile glide.  (NT)  Retrograde massage to L  digits/hand/wrist x 2 min to stimulate lymphatics to decrease pain,  edema and increase AROM and functional use.      Aaron received therapeutic exercises for 15 minutes including:  -.AROM x 10 reps each:  (NT)   wave, hook, fist, straight fist, abd/add, lifts, PIP extension with MP flexed, fist-hook-point  PIP and DIPJ jt blocking exercises to MF and RF  Thumb AROM: IP/MPJ blocks, composite flexion, circumduction, palmar and radial abduction, retroposition, opposition  -isolated FDS glides all fingers (NT)   -gentle passive stretch isolated and composite joints, MF, RF, and thumb with composite extension stretching as well and isolated PIP ext stretch  -prayer stretch      Aaron received therapeutic activities for 20  minutes including:  -functional pinches red CP 20x ea  -red x  for ext only 25x  -in hand manipulation with small pom poms 1 container   -composite thumb flexion in yellow putty 20x  -yellow putty squeezing 3 min  -red flexbar rolling for scar and extension stretch 3 min  -ressited add with pink sponges 20x     Home Exercises and Education Provided     Education provided:   -Issued yellow putty and instructed pt to perform gripping and pinching exercises qod. Handout also covers instrinsic, which is optional for pt to perform at home.   -      Written Home Exercises Provided: Patient instructed to cont prior HEP.  Updated to include strengthening.   Exercises were reviewed and Aaron was able to demonstrate them prior to the end of the session.  Aaron  demonstrated fair  understanding of the education provided.   Required review and instruction for majority of exercises.   .   See EMR under Media for exercises provided prior visit.        Assessment     Pt would continue to benefit from skilled OT.  Initiated US to scars in palm. Stinging pain reported in RF after gentle passive stretch. Added light intrinsic strengthening.     - Progress towards goals: LTG 2 met     Aaron is progressing well towards her goals and there are no updates to goals at this time. Pt prognosis is Good.     Pt will continue to benefit from skilled outpatient occupational therapy to address the deficits listed in the problem list on initial evaluation provide pt/family education and to maximize pt's level of independence in the home and community environment.     Anticipated barriers to occupational therapy: diabetes    Pt's spiritual, cultural and educational needs considered and pt agreeable to plan of care and goals.    Goals:  Short Term Goals: (4 weeks)  1. Pt will be independent with HEP in 2 visits.   2. Pt will report decreased pain to 4-5/10 at worst. (met 8/19/20)  3. Pt will  Increase thumb, MF, and RF CRUZ by 10 degrees to enable dressing, grooming activities. (met for RF 8/19/20; partially met for MF)     Long Term Goals: (by discharge)  1. Pt will report decreased pain to 1-2/10 with ADLs.   2. Pt will exhibit a full flat composite L fist and full extension.  ( met 8/26/20)  3. Pt will exhibit L   strength at 75% of R comparative measures to allow a firm grasp on cooking utensils, steering wheel, etc.  4. Pt will exhibit 9# of functional pinch strength to allow writing,opening containers, and turning keys.  5. Pt will exhibit a significant increase (30-40 points) in the Quick DASH assessment.  6. Pt will return to PLOF.     Plan     Certification Period/Plan of care expiration: 8/10/2020 to 11/8/2020.  Continue Occupational Therapy 2  times per week for 4 weeks to  decrease pain and edema, and increase A/PROM, strength, and functional use of L upper extremity.    Updates/Grading for next session: progress as able      MATTIE Holloway, FAIZANT

## 2020-09-02 ENCOUNTER — CLINICAL SUPPORT (OUTPATIENT)
Dept: REHABILITATION | Facility: HOSPITAL | Age: 60
End: 2020-09-02
Attending: ORTHOPAEDIC SURGERY
Payer: COMMERCIAL

## 2020-09-02 DIAGNOSIS — R29.898 DECREASED GRIP STRENGTH OF LEFT HAND: ICD-10-CM

## 2020-09-02 DIAGNOSIS — M79.642 LEFT HAND PAIN: Primary | ICD-10-CM

## 2020-09-02 DIAGNOSIS — Z78.9 DECREASED ACTIVITIES OF DAILY LIVING (ADL): ICD-10-CM

## 2020-09-02 DIAGNOSIS — R29.898 DECREASED PINCH STRENGTH: ICD-10-CM

## 2020-09-02 DIAGNOSIS — M25.642 DECREASED RANGE OF MOTION OF FINGER OF LEFT HAND: ICD-10-CM

## 2020-09-02 PROCEDURE — 97035 APP MDLTY 1+ULTRASOUND EA 15: CPT | Mod: PO

## 2020-09-02 PROCEDURE — 97530 THERAPEUTIC ACTIVITIES: CPT | Mod: PO

## 2020-09-02 PROCEDURE — 97110 THERAPEUTIC EXERCISES: CPT | Mod: PO

## 2020-09-03 NOTE — PROGRESS NOTES
Occupational Therapy Daily Treatment Note     Name: Aaron Garcia  Clinic Number: [id number]      Visit Date: 9/9/2020     Therapy Diagnosis: L hand pain, decreased rom, decreased /pinch/ADL  Physician: Jose Mata MD     Physician Orders: Please begin therapy for the left hand.  Include edema control, range of motion, desensitization  Medical Diagnosis: s/p  L thumb, MF, and RF trigger release  M65.332 (ICD-10-CM) - Trigger finger, left middle finger   M65.342 (ICD-10-CM) - Trigger finger, left ring finger   M65.312 (ICD-10-CM) - Trigger finger of left thumb         Evaluation Date: 8/10/2020  Insurance Authorization period Expiration: 8/12/21  Plan of Care Expiration Period: 11/8/2020     Visit # / Visits Authortized: 5 / 12  Time In:11:45 pm  Time Out: 12:30 pm  Total Billable Time: 45 minutes     Precautions: Standard and Diabetes  Date of  Surgery: 7/16/2020                          S/P: 7 weeks on 9/3/20      Subjective     Pt reports: she is feeling much better now, less pain, stronger, and less stiffness.   sshe was compliant with home exercise program given last session.   Response to previous treatment:Good-no soreness after last session.   Functional change: lifting objects, not dropping objects as much    Pain: Functional Pain Scale Rating 0-10:   1/10 now   0/10 at best (decreased 2 levels)  2/10 at worst (decreased 3 level)s  Location: L MF and RF and radiate into volar FA     Objective      and pinch  measured this date  Range of Motion: left Active  (Ext/Flex) Middle L  Middle R Ring L Ring R   MCP Jt 0/65° (+8) NT-triggering  0/78° (+18) 0/65°    PIP Jt 16/91° (-5/+3) 10/ 15/89°(-3/-2) 0/95°   DIP Jt 0/51° (+1) /° 0/43° (+1) 0/55°   CRUZ ° 191(+7) ° ° 195 (+14) °   TPM ° ° ° °             (Ext/Flex) Thumb   MCP Jt L 0/40° (-6) R 0/29   IP Jt 0+/55° (+9) R 0/65                        and Pinch Strength (in pounds, psi's):   Left Right    II 43 47    III     Lateral  16 17   Tripod 13 12   Tip 12 9.5             Aaron received the following supervised modalities after being cleared for contradictions for 8 minutes:   -Ultrasound:  3 Mhz, 50 % duty cycle, 1.2 w/cm2 for 8 minutes to  decrease adhesions, and improve tissue extensibility      Aaron received the following manual therapy techniques for 3 minutes:   -Performed scar massage to incision(s) for 3 minutes to decrease adhesions and improve tensile glide.    Retrograde massage to L  digits/hand/wrist x 2 min to stimulate lymphatics to decrease pain,  edema and increase AROM and functional use.  (NT)     Aaron received therapeutic exercises for 15 minutes including:  -.AROM x 10 reps each:  (NT)   wave, hook, fist, straight fist, abd/add, lifts, PIP extension with MP flexed, fist-hook-point  PIP and DIPJ jt blocking exercises to MF and RF  Thumb AROM: IP/MPJ blocks, composite flexion, circumduction, palmar and radial abduction, retroposition, opposition  -isolated FDS glides all fingers (NT)   -gentle passive stretch isolated and composite joints, MF, RF, and thumb with composite extension stretching as well and isolated PIP ext stretch  -prayer stretch  (NT)     Aaron received therapeutic activities for 19  minutes including:  -functional pinches red CP 20x ea  -red x  for ext only 25x  -in hand manipulation with small pom poms 2 containers  -calibrated gripper 25# 3/20  -red flexbar rolling for scar and extension stretch 3 min  -ressited add and abd  with pink sponges and RB 20x   -thumbciser 1 thick RB 30x  -red digiflex 20x    Home Exercises and Education Provided     Education provided:   -none new  -      Written Home Exercises Provided: Patient instructed to cont prior HEP.    Exercises were reviewed and Aaron was able to demonstrate them prior to the end of the session.  Jean Claudeyayo demonstrated fair  understanding of the education provided.   Required review and instruction for majority of  exercises.   .   See EMR under Media for exercises provided prior visit.        Assessment     Pt would continue to benefit from skilled OT. Baseline  and pinch measures taken. L  is 91% of contralateral R  dominant side. Pt exhibits good pinch strength on L hand. Progressed strengthening slightly, which pt tolerated well. Pain level at worst decreased 3 levels.  - Progress towards goals: LTG 1, 3, and 4 met     Aaron is progressing well towards her goals and there are no updates to goals at this time. Pt prognosis is Good.     Pt will continue to benefit from skilled outpatient occupational therapy to address the deficits listed in the problem list on initial evaluation provide pt/family education and to maximize pt's level of independence in the home and community environment.     Anticipated barriers to occupational therapy: diabetes    Pt's spiritual, cultural and educational needs considered and pt agreeable to plan of care and goals.    Goals:  Short Term Goals: (4 weeks)  1. Pt will be independent with HEP in 2 visits.  (met)   2. Pt will report decreased pain to 4-5/10 at worst. (met 8/19/20)  3. Pt will  Increase thumb, MF, and RF CRUZ by 10 degrees to enable dressing, grooming activities. (met for RF 8/19/20; partially met for MF)     Long Term Goals: (by discharge)  1. Pt will report decreased pain to 1-2/10 with ADLs. (met 9/9/20)  2. Pt will exhibit a full flat composite L fist and full extension.  ( met 8/26/20)  3. Pt will exhibit L   strength at 75% of R comparative measures to allow a firm grasp on cooking utensils, steering wheel, etc. (met 9/9/20)  4. Pt will exhibit 9# of functional pinch strength to allow writing,opening containers, and turning keys. (met 9/9/20)  5. Pt will exhibit a significant increase (30-40 points) in the Quick DASH assessment.  6. Pt will return to PLOF.     Plan     Certification Period/Plan of care expiration: 8/10/2020 to 11/8/2020.  Continue Occupational  Therapy 2  times per week for 4 weeks to decrease pain and edema, and increase A/PROM, strength, and functional use of L upper extremity.    Updates/Grading for next session: progress as able      MATTIE Holloway, FAIZANT

## 2020-09-09 ENCOUNTER — LAB VISIT (OUTPATIENT)
Dept: LAB | Facility: HOSPITAL | Age: 60
End: 2020-09-09
Attending: NURSE PRACTITIONER
Payer: COMMERCIAL

## 2020-09-09 ENCOUNTER — CLINICAL SUPPORT (OUTPATIENT)
Dept: REHABILITATION | Facility: HOSPITAL | Age: 60
End: 2020-09-09
Attending: ORTHOPAEDIC SURGERY
Payer: COMMERCIAL

## 2020-09-09 DIAGNOSIS — E11.9 TYPE 2 DIABETES MELLITUS WITHOUT COMPLICATION, WITH LONG-TERM CURRENT USE OF INSULIN: ICD-10-CM

## 2020-09-09 DIAGNOSIS — Z78.9 DECREASED ACTIVITIES OF DAILY LIVING (ADL): ICD-10-CM

## 2020-09-09 DIAGNOSIS — M79.642 LEFT HAND PAIN: Primary | ICD-10-CM

## 2020-09-09 DIAGNOSIS — Z79.4 TYPE 2 DIABETES MELLITUS WITHOUT COMPLICATION, WITH LONG-TERM CURRENT USE OF INSULIN: ICD-10-CM

## 2020-09-09 DIAGNOSIS — M25.642 DECREASED RANGE OF MOTION OF FINGER OF LEFT HAND: ICD-10-CM

## 2020-09-09 DIAGNOSIS — R29.898 DECREASED GRIP STRENGTH OF LEFT HAND: ICD-10-CM

## 2020-09-09 DIAGNOSIS — R29.898 DECREASED PINCH STRENGTH: ICD-10-CM

## 2020-09-09 PROCEDURE — 84443 ASSAY THYROID STIM HORMONE: CPT

## 2020-09-09 PROCEDURE — 97035 APP MDLTY 1+ULTRASOUND EA 15: CPT | Mod: PO

## 2020-09-09 PROCEDURE — 80061 LIPID PANEL: CPT

## 2020-09-09 PROCEDURE — 83036 HEMOGLOBIN GLYCOSYLATED A1C: CPT

## 2020-09-09 PROCEDURE — 36415 COLL VENOUS BLD VENIPUNCTURE: CPT | Mod: PO

## 2020-09-09 PROCEDURE — 97530 THERAPEUTIC ACTIVITIES: CPT | Mod: PO

## 2020-09-09 PROCEDURE — 80053 COMPREHEN METABOLIC PANEL: CPT

## 2020-09-09 PROCEDURE — 97110 THERAPEUTIC EXERCISES: CPT | Mod: PO

## 2020-09-10 LAB
ALBUMIN SERPL BCP-MCNC: 4.2 G/DL (ref 3.5–5.2)
ALP SERPL-CCNC: 67 U/L (ref 55–135)
ALT SERPL W/O P-5'-P-CCNC: 14 U/L (ref 10–44)
ANION GAP SERPL CALC-SCNC: 8 MMOL/L (ref 8–16)
AST SERPL-CCNC: 19 U/L (ref 10–40)
BILIRUB SERPL-MCNC: 0.6 MG/DL (ref 0.1–1)
BUN SERPL-MCNC: 10 MG/DL (ref 6–20)
CALCIUM SERPL-MCNC: 9.4 MG/DL (ref 8.7–10.5)
CHLORIDE SERPL-SCNC: 108 MMOL/L (ref 95–110)
CHOLEST SERPL-MCNC: 120 MG/DL (ref 120–199)
CHOLEST/HDLC SERPL: 2.9 {RATIO} (ref 2–5)
CO2 SERPL-SCNC: 28 MMOL/L (ref 23–29)
CREAT SERPL-MCNC: 0.7 MG/DL (ref 0.5–1.4)
EST. GFR  (AFRICAN AMERICAN): >60 ML/MIN/1.73 M^2
EST. GFR  (NON AFRICAN AMERICAN): >60 ML/MIN/1.73 M^2
ESTIMATED AVG GLUCOSE: 169 MG/DL (ref 68–131)
GLUCOSE SERPL-MCNC: 127 MG/DL (ref 70–110)
HBA1C MFR BLD HPLC: 7.5 % (ref 4–5.6)
HDLC SERPL-MCNC: 41 MG/DL (ref 40–75)
HDLC SERPL: 34.2 % (ref 20–50)
LDLC SERPL CALC-MCNC: 62.4 MG/DL (ref 63–159)
NONHDLC SERPL-MCNC: 79 MG/DL
POTASSIUM SERPL-SCNC: 4 MMOL/L (ref 3.5–5.1)
PROT SERPL-MCNC: 7.6 G/DL (ref 6–8.4)
SODIUM SERPL-SCNC: 144 MMOL/L (ref 136–145)
TRIGL SERPL-MCNC: 83 MG/DL (ref 30–150)
TSH SERPL DL<=0.005 MIU/L-ACNC: 1.01 UIU/ML (ref 0.4–4)

## 2020-09-16 ENCOUNTER — TELEPHONE (OUTPATIENT)
Dept: PHYSICAL MEDICINE AND REHAB | Facility: CLINIC | Age: 60
End: 2020-09-16

## 2020-11-06 ENCOUNTER — OFFICE VISIT (OUTPATIENT)
Dept: PAIN MEDICINE | Facility: CLINIC | Age: 60
End: 2020-11-06
Payer: COMMERCIAL

## 2020-11-06 VITALS
HEART RATE: 74 BPM | DIASTOLIC BLOOD PRESSURE: 60 MMHG | TEMPERATURE: 98 F | RESPIRATION RATE: 20 BRPM | OXYGEN SATURATION: 98 % | SYSTOLIC BLOOD PRESSURE: 116 MMHG | BODY MASS INDEX: 32.68 KG/M2 | WEIGHT: 202.5 LBS

## 2020-11-06 DIAGNOSIS — M48.02 SPINAL STENOSIS, CERVICAL REGION: ICD-10-CM

## 2020-11-06 DIAGNOSIS — M50.30 DDD (DEGENERATIVE DISC DISEASE), CERVICAL: Primary | ICD-10-CM

## 2020-11-06 DIAGNOSIS — M54.16 BILATERAL LUMBAR RADICULOPATHY: ICD-10-CM

## 2020-11-06 DIAGNOSIS — M51.36 DDD (DEGENERATIVE DISC DISEASE), LUMBAR: ICD-10-CM

## 2020-11-06 DIAGNOSIS — M54.12 CERVICAL RADICULOPATHY: ICD-10-CM

## 2020-11-06 PROCEDURE — 3078F PR MOST RECENT DIASTOLIC BLOOD PRESSURE < 80 MM HG: ICD-10-PCS | Mod: CPTII,S$GLB,, | Performed by: ANESTHESIOLOGY

## 2020-11-06 PROCEDURE — 99214 OFFICE O/P EST MOD 30 MIN: CPT | Mod: S$GLB,,, | Performed by: ANESTHESIOLOGY

## 2020-11-06 PROCEDURE — 3078F DIAST BP <80 MM HG: CPT | Mod: CPTII,S$GLB,, | Performed by: ANESTHESIOLOGY

## 2020-11-06 PROCEDURE — 3008F BODY MASS INDEX DOCD: CPT | Mod: CPTII,S$GLB,, | Performed by: ANESTHESIOLOGY

## 2020-11-06 PROCEDURE — 3074F SYST BP LT 130 MM HG: CPT | Mod: CPTII,S$GLB,, | Performed by: ANESTHESIOLOGY

## 2020-11-06 PROCEDURE — 99999 PR PBB SHADOW E&M-EST. PATIENT-LVL V: ICD-10-PCS | Mod: PBBFAC,,, | Performed by: ANESTHESIOLOGY

## 2020-11-06 PROCEDURE — 99999 PR PBB SHADOW E&M-EST. PATIENT-LVL V: CPT | Mod: PBBFAC,,, | Performed by: ANESTHESIOLOGY

## 2020-11-06 PROCEDURE — 3008F PR BODY MASS INDEX (BMI) DOCUMENTED: ICD-10-PCS | Mod: CPTII,S$GLB,, | Performed by: ANESTHESIOLOGY

## 2020-11-06 PROCEDURE — 3074F PR MOST RECENT SYSTOLIC BLOOD PRESSURE < 130 MM HG: ICD-10-PCS | Mod: CPTII,S$GLB,, | Performed by: ANESTHESIOLOGY

## 2020-11-06 PROCEDURE — 99214 PR OFFICE/OUTPT VISIT, EST, LEVL IV, 30-39 MIN: ICD-10-PCS | Mod: S$GLB,,, | Performed by: ANESTHESIOLOGY

## 2020-11-06 RX ORDER — HYDROCODONE BITARTRATE AND ACETAMINOPHEN 10; 325 MG/1; MG/1
1 TABLET ORAL 3 TIMES DAILY PRN
Qty: 90 TABLET | Refills: 0 | Status: SHIPPED | OUTPATIENT
Start: 2020-12-24 | End: 2021-01-20 | Stop reason: SDUPTHER

## 2020-11-06 RX ORDER — HYDROCODONE BITARTRATE AND ACETAMINOPHEN 10; 325 MG/1; MG/1
1 TABLET ORAL 3 TIMES DAILY PRN
Qty: 90 TABLET | Refills: 0 | Status: SHIPPED | OUTPATIENT
Start: 2020-11-24 | End: 2020-12-24

## 2020-11-06 NOTE — PROGRESS NOTES
This note was completed with dictation software and grammatical errors may exist.    CC:  Bilateral arm pain, leg pain, imbalance    HPI: The patient is a 60-year-old woman with a history of diabetes, hypertension who presents in referral from [facility] for neck pain radiating into the arms and low back pain radiating into the left leg.  The patient returns in follow-up today for regularly scheduled visit.  She does have chronic neck pain radiating into the arms and low back pain radiating into the legs, largely managed with pain medications and has been doing well.  However, she reports having concerns over the last couple months that she is getting tightness in her biceps and forearms and feels that they are aching and she is getting similar situations in her posterior thighs and calves.  She states that she has been more clumsy, having difficulty gripping objects and dropping things.  She reports seeing Rheumatology because she thought her joints were hurting in her arms and hands.  She is going to be following up with them in several months but there were no obvious lab abnormalities suggesting rheumatoid arthritis.  She denies any recent falls, denies any focal motor deficits.  She denies any bowel or bladder incontinence.      Pain intervention history: She has been taking hydrocodone 10/325 3 times a day for the last year, previous to this she was taking Percocet 3 times a day but states that it was causing her panic attacks, did help with her pain slightly better.  She has been taking gabapentin 600mg twice a day and Zanaflex at night with some relief.  She had undergone 3 epidurals for low back pain with only up to 3 weeks of relief each time.  She is status post C7-T1 cervical interlaminar epidural steroid injection on 1/29/16 with 50% relief.   She is status post C7-T1 cervical interlaminar epidural steroid injection on 3/1/16 with mild additional relief.  She is status post C7-T1  [de-identified] : Comes in for follow-up\par Moved to [place]\par No side effects\par Taking Vagifem for dyspareunia\par Told to take Ophena\par \par Ovarian cyst\par CT abdomen/pelvis negative cervical interlaminar epidural steroid injection on 5/13/16 with 70-75% relief.  She is status post right C3, 4, 5 and 6 medial branch radiofrequency ablation on 7/15/16 with 30-60% relief.  She is status post L5/S1 interlaminar epidural steroid injection on 5/16/17 with 50% relief.  She is status post L5/S1 interlaminar epidural steroid injection on 12/29/17 with excellent relief lasting 2 weeks, now reporting minimal relief of her low back and buttock pain but ongoing 100% relief of her right leg pain.  She is status post C7-T1 cervical interlaminar epidural steroid injection on 3/14/18 with 75% relief but this was not long lasting.     Urine drug screen 1/5/16  Negative and inconsistent for hydrocodone but positive and consistent for its metabolite hydromorphone    Urine drug screen 2/11/16  Negative and inconsistent for hydrocodone    ROS: She reports headaches, hoarse voice, joint stiffness, joint swelling, back pain, difficulty sleeping, anxiety and loss of balance.  Balance of review of systems is negative.    Medical, surgical, family and social history reviewed elsewhere in record.    Medications/Allergies: See med card    Vitals:    11/06/20 1141   BP: 116/60   Pulse: 74   Resp: 20   Temp: 98.4 °F (36.9 °C)   TempSrc: Temporal   SpO2: 98%   Weight: 91.8 kg (202 lb 7.9 oz)   PainSc:   6   PainLoc: Back         Physical exam:  Gen: A and O x3, pleasant, well-groomed  Skin: No rashes or obvious lesions  HEENT: PERRLA, no obvious deformities on ears or in canals.Trachea midline.  CVS: Regular rate and rhythm, normal palpable pulses.  Resp: Clear to auscultation bilaterally, no wheezes or rales.  Abdomen: Soft, NT/ND.  Musculoskeletal: No antalgic gait.     Neuro:  Upper extremities: 4/5 strength bilaterally but question effort  Lower extremities:  4/5 strength bilaterally but question effort  Reflexes: Brachioradialis 2+, Bicep 2+, Tricep 2+.  Patellar and Achilles reflexes 2+ bilaterally.  Sensory: Intact and  symmetrical to light touch and pinprick in C2-T1 dermatomes bilaterally.  Osuna's negative.  Romberg positive for imbalance, abnormal tandem gait test.    Cervical Spine:  Cervical spine: Range of motion is mildly reduced with flexion extension and lateral rotation with increased bilateral neck pain, worse on the left.  Spurling's maneuver causes neck pain to either side.    Myofascial exam: Mild tenderness to palpation to the left cervical paraspinous muscles.    Lumbar spine:  Range of motion is moderately reduced with flexion, extension and oblique extension with increased low back pain during each maneuver.  Khanh's test is negative bilaterally.  Straight leg raise causes back pain only.  Internal and external rotation of hip is negative bilaterally.  Myofascial exam:  Mild tenderness to palpation to the lumbar paraspinous muscles.    Imagin/24/15 C-spine MRI  1. C3-4 left posterior paracentral disk extrusion causing left paracentral spinal cord compression and severe left foraminal stenosis.  2. C4-5 based disk extrusion with spinal cord impingement and moderate bilateral foraminal stenosis.  3. C5-6 posterior central disk extrusion with spinal cord compression and severe bilateral foraminal stenosis.  4. C6-7 mild disk bulge with severe bilateral foraminal stenosis.  5. Solitary mildly enlarged left submandibular lymph node of uncertain significance.    4/10/17 MRI lumbar spine  T12-L1: No central canal or neuroforaminal stenosis. No disc protrusion or extrusion.  L1-L2: There is ligamentum flavum thickening and facet arthropathy.  No central canal or neuroforaminal stenosis. No disc protrusion or extrusion.  L2-L3: There is ligamentum flavum thickening and facet arthropathy.  No central canal or neuroforaminal stenosis. No disc protrusion or extrusion.  L3-L4: There is ligamentum flavum thickening and facet arthropathy present. No central canal or neuroforaminal stenosis. No disc protrusion or  extrusion.  L4-L5: There is a broad disc bulge which extends into both neural foramina.  There is ligamentum flavum thickening and facet arthropathy.  There is right lateral recess stenosis.  There is abutment of the descending right L5 nerve in the right lateral recess.  Please correlate clinically for symptoms referable to the right L5 nerve.  There is mild central canal stenosis.  There is mild left neuroforaminal stenosis.  No right neuroforaminal stenosis.  L5-S1: There is a broad central/right paracentral disc protrusion which encroaches upon the descending right S1 nerve in the right lateral recess.  Please correlate clinically for symptoms referable to the right S1 nerve.  There is mild-moderate right neuroforaminal stenosis.  No left neuroforaminal stenosis.  No central canal stenosis.  There is ligamentum flavum thickening and facet arthropathy.  There is a broad left extraforaminal disc protrusion at L5-S1 which abuts the exited left L5 nerve in the left extraforaminal soft tissues (series 6 image 17), nonspecific.  Please correlate clinically for symptoms referable to the left L5 nerve.    05/28/2019 MRI cervical spine  C2-3: No disc herniation, spinal canal narrowing, or neuroforaminal narrowing.  C3-4: There is moderate broad-based posterior disc osteophyte complex formation with left paracentral predominance.  This along with facet arthropathy contributes to mild spinal canal narrowing and moderate left neuroforaminal narrowing.  C4-5: There is moderate broad-based posterior disc osteophyte complex formation with left paracentral prominence.  This along with facet arthropathy contributes to mild bilateral neuroforaminal narrowing and mild spinal canal narrowing.  C5-6: Moderate broad-based posterior disc osteophyte complex formation and facet arthropathy result in moderate spinal canal narrowing and moderate right and mild left neuroforaminal narrowing.  C6-7: Moderate broad-based posterior disc  osteophyte complex formation and facet arthropathy result in mild spinal canal narrowing along with moderate right and severe left neuroforaminal narrowing.  C7-T1: Minimal broad-based posterior disc osteophyte complex formation and bilateral facet arthropathy result in mild bilateral neuroforaminal narrowing.      05/28/2019 MRI lumbar spine  T12-L1: No disc herniation, spinal canal narrowing, or neuroforaminal narrowing.  L1-2: No disc herniation, spinal canal narrowing, or neuroforaminal narrowing.  L2-3: No disc herniation, spinal canal narrowing, or neuroforaminal narrowing.  L3-4: No disc herniation, spinal canal narrowing, or neuroforaminal narrowing.  L4-5: There is moderate generalized disc bulging and bilateral facet arthropathy, which result in mild spinal canal narrowing and mild bilateral neuroforaminal narrowing.  L5-S1: There is a small right paracentral disc protrusion.  This effaces the right lateral recess and may impinge upon the descending right S1 nerve root.  Bilateral facet arthropathy is present and there is resultant overall mild spinal canal narrowing.  Moderate right and mild left neuroforaminal narrowing are also present.        Assessment:  The patient is a 60-year-old woman with a history of diabetes, hypertension who presents in referral from [facility] for neck pain radiating into the arms and low back pain radiating into the left leg.     1. DDD (degenerative disc disease), cervical  MRI Cervical Spine Without Contrast   2. Spinal stenosis, cervical region  MRI Cervical Spine Without Contrast   3. Cervical radiculopathy  MRI Cervical Spine Without Contrast   4. DDD (degenerative disc disease), lumbar     5. Bilateral lumbar radiculopathy         Plan:  1.  We have been managing the patient with occasional epidural steroid injections, pain medication.  However, I am concerned that she is now having some more obvious deficits.  She does seem to have some weakness  in her upper extremities, balance issues.   She does not have abnormal reflexes though. We reviewed her last cervical spine MRI which was from about a year and half ago and did show stenosis but no cord changes.  I am going to get a new MRI of her cervical spine and call her with the results.  We discussed that if there is worsening, I may have her see 1 of the neurosurgeons.  We could always discuss further epidural steroid injections to see if this helps with the discomfort in her arms and also in her low back, this will need to depend on her glucose control as well.  2.  I have refilled her hydrocodone 10/325 to take 3 times daily which continues to provide her some relief.  She asked to switch back to tizanidine which her rheumatologist had discontinued.  However she has been on Plaquenil and I soon this is why her rheumatologist switched her to Robaxin as the tizanidine and Plaquenil both cause QT prolongation.  I told her that she would need to discuss this with her rheumatologist.

## 2020-11-12 ENCOUNTER — TELEPHONE (OUTPATIENT)
Dept: PAIN MEDICINE | Facility: CLINIC | Age: 60
End: 2020-11-12

## 2020-11-12 RX ORDER — ALPRAZOLAM 1 MG/1
1 TABLET ORAL
Qty: 1 TABLET | Refills: 0 | Status: SHIPPED | OUTPATIENT
Start: 2020-11-12 | End: 2021-01-08 | Stop reason: CLARIF

## 2020-11-12 NOTE — TELEPHONE ENCOUNTER
----- Message from Milagros Meza sent at 11/12/2020  2:53 PM CST -----  Regarding: advice  Contact: Patient/[phone]  Type: Needs Medical Advice  Who Called:  Patient/[phone]    Pharmacy name and phone #:    [id number] - [place], [place] - [address] [facility]  [address] [place] [id number]  Phone: [phone] Fax: [phone]     Additional Information: Has her MRI scheduled for Saturday and states she is claustrophobic. Is asking for something to calm her nerves. Please call to advise.

## 2020-11-14 ENCOUNTER — HOSPITAL ENCOUNTER (OUTPATIENT)
Dept: RADIOLOGY | Facility: HOSPITAL | Age: 60
Discharge: HOME | End: 2020-11-14
Attending: ANESTHESIOLOGY
Payer: COMMERCIAL

## 2020-11-14 DIAGNOSIS — M48.02 SPINAL STENOSIS, CERVICAL REGION: ICD-10-CM

## 2020-11-14 DIAGNOSIS — M50.30 DDD (DEGENERATIVE DISC DISEASE), CERVICAL: ICD-10-CM

## 2020-11-14 DIAGNOSIS — M54.12 CERVICAL RADICULOPATHY: ICD-10-CM

## 2020-11-14 PROCEDURE — 72141 MRI NECK SPINE W/O DYE: CPT | Mod: 26,,, | Performed by: RADIOLOGY

## 2020-11-14 PROCEDURE — 72141 MRI CERVICAL SPINE WITHOUT CONTRAST: ICD-10-PCS | Mod: 26,,, | Performed by: RADIOLOGY

## 2020-11-14 PROCEDURE — 72141 MRI NECK SPINE W/O DYE: CPT | Mod: TC,PO

## 2020-11-23 ENCOUNTER — TELEPHONE (OUTPATIENT)
Dept: PAIN MEDICINE | Facility: CLINIC | Age: 60
End: 2020-11-23

## 2020-11-23 DIAGNOSIS — M48.02 SPINAL STENOSIS, CERVICAL REGION: Primary | ICD-10-CM

## 2020-11-23 NOTE — TELEPHONE ENCOUNTER
Please let the patient know that I reviewed her cervical spine MRI, does not necessarily look much worse but since it is so severely narrowed and she is beginning to have problems with her arms and hands, I would like her to see Neurosurgery.  I will place this order.

## 2020-12-01 ENCOUNTER — OFFICE VISIT (OUTPATIENT)
Dept: NEUROSURGERY | Facility: CLINIC | Age: 60
End: 2020-12-01
Payer: COMMERCIAL

## 2020-12-01 VITALS
SYSTOLIC BLOOD PRESSURE: 137 MMHG | WEIGHT: 202 LBS | HEIGHT: 66 IN | BODY MASS INDEX: 32.47 KG/M2 | HEART RATE: 82 BPM | DIASTOLIC BLOOD PRESSURE: 88 MMHG

## 2020-12-01 DIAGNOSIS — M48.02 SPINAL STENOSIS, CERVICAL REGION: ICD-10-CM

## 2020-12-01 DIAGNOSIS — M47.12 CERVICAL SPONDYLOSIS WITH MYELOPATHY: Primary | ICD-10-CM

## 2020-12-01 DIAGNOSIS — M51.36 DDD (DEGENERATIVE DISC DISEASE), LUMBAR: ICD-10-CM

## 2020-12-01 PROCEDURE — 3008F PR BODY MASS INDEX (BMI) DOCUMENTED: ICD-10-PCS | Mod: CPTII,S$GLB,, | Performed by: NEUROLOGICAL SURGERY

## 2020-12-01 PROCEDURE — 99244 OFF/OP CNSLTJ NEW/EST MOD 40: CPT | Mod: S$GLB,,, | Performed by: NEUROLOGICAL SURGERY

## 2020-12-01 PROCEDURE — 1125F AMNT PAIN NOTED PAIN PRSNT: CPT | Mod: S$GLB,,, | Performed by: NEUROLOGICAL SURGERY

## 2020-12-01 PROCEDURE — 3072F LOW RISK FOR RETINOPATHY: CPT | Mod: S$GLB,,, | Performed by: NEUROLOGICAL SURGERY

## 2020-12-01 PROCEDURE — 99244 PR OFFICE CONSULTATION,LEVEL IV: ICD-10-PCS | Mod: S$GLB,,, | Performed by: NEUROLOGICAL SURGERY

## 2020-12-01 PROCEDURE — 3072F PR LOW RISK FOR RETINOPATHY: ICD-10-PCS | Mod: S$GLB,,, | Performed by: NEUROLOGICAL SURGERY

## 2020-12-01 PROCEDURE — 3008F BODY MASS INDEX DOCD: CPT | Mod: CPTII,S$GLB,, | Performed by: NEUROLOGICAL SURGERY

## 2020-12-01 PROCEDURE — 99999 PR PBB SHADOW E&M-EST. PATIENT-LVL III: ICD-10-PCS | Mod: PBBFAC,,, | Performed by: NEUROLOGICAL SURGERY

## 2020-12-01 PROCEDURE — 1125F PR PAIN SEVERITY QUANTIFIED, PAIN PRESENT: ICD-10-PCS | Mod: S$GLB,,, | Performed by: NEUROLOGICAL SURGERY

## 2020-12-01 PROCEDURE — 99999 PR PBB SHADOW E&M-EST. PATIENT-LVL III: CPT | Mod: PBBFAC,,, | Performed by: NEUROLOGICAL SURGERY

## 2020-12-01 NOTE — PROGRESS NOTES
Neurosurgery History & Physical    Patient ID: Aaron Garcia is a 60 y.o. female.    Chief Complaint   Patient presents with    Back Pain     back pain since 2006.. pt hurt back while at worke lifting a box.. lower back pain goes down both legs    Neck Pain     hurt neck in 2006, tv fell off a dresser and onto her neck... neck pain goes down both arms       Review of Systems   Constitutional: Negative for chills, diaphoresis, fatigue and fever.   HENT: Negative for congestion, ear pain, rhinorrhea, sneezing, sore throat and tinnitus.    Eyes: Negative for photophobia, pain, redness and visual disturbance.   Respiratory: Negative for cough, chest tightness, shortness of breath and wheezing.    Cardiovascular: Negative for chest pain, palpitations and leg swelling.   Gastrointestinal: Negative for abdominal distention, abdominal pain, constipation, diarrhea, nausea and vomiting.   Genitourinary: Negative for difficulty urinating, dysuria, frequency and urgency.   Musculoskeletal: Positive for back pain, gait problem and neck pain. Negative for myalgias.   Skin: Negative for pallor and rash.   Neurological: Positive for weakness and numbness. Negative for dizziness, seizures, speech difficulty and headaches.   Psychiatric/Behavioral: Negative for confusion and hallucinations.       Past Medical History:   Diagnosis Date    Arthritis     Asthma     Cataract     OU    Diabetes mellitus, type 2 2001    Gastric ulcer     Hypertension     Mitral valve prolapse     RYLAN (obstructive sleep apnea)     can't tolerate-claustraphobic    Snores      unable to lay flat, sleeps elevated    Thyroid disease     Goiter     Social History     Socioeconomic History    Marital status:      Spouse name: Not on file    Number of children: Not on file    Years of education: Not on file    Highest education level: Not on file   Occupational History    Not on file   Social Needs    Financial resource strain: Not on  "file    Food insecurity     Worry: Not on file     Inability: Not on file    Transportation needs     Medical: Not on file     Non-medical: Not on file   Tobacco Use    Smoking status: Never Smoker    Smokeless tobacco: Never Used   Substance and Sexual Activity    Alcohol use: No     Alcohol/week: 0.0 standard drinks    Drug use: Yes     Types: Hydrocodone    Sexual activity: Yes     Partners: Male   Lifestyle    Physical activity     Days per week: Not on file     Minutes per session: Not on file    Stress: Not on file   Relationships    Social connections     Talks on phone: Not on file     Gets together: Not on file     Attends [employer] service: Not on file     Active member of club or organization: Not on file     Attends meetings of clubs or organizations: Not on file     Relationship status: Not on file   Other Topics Concern    Not on file   Social History Narrative        5 children    Prior secretarial work      Family History   Problem Relation Age of Onset    Diabetes Father     Diabetes Sister     Heart disease Brother     Glaucoma Paternal Aunt     Glaucoma Daughter         suspect     Review of patient's allergies indicates:   Allergen Reactions    Metformin Diarrhea     With extended release preparation as well    Penicillins Itching     Swelling (extremities)^  Swelling (extremities)^      Vaiden Itching     throat       Current Outpatient Medications:     ALBUTEROL SULFATE (PROAIR HFA INHL), Inhale into the lungs., Disp: , Rfl:     ALPRAZolam (XANAX) 1 MG tablet, Take 1 tablet (1 mg total) by mouth On call Procedure for Anxiety. Take 30min prior to procedure, Disp: 1 tablet, Rfl: 0    amitriptyline (ELAVIL) 25 MG tablet, Take 25 mg by mouth nightly as needed for Insomnia., Disp: , Rfl:     amlodipine (NORVASC) 10 MG tablet, 10 mg. , Disp: , Rfl:     azelastine (ASTELIN) 137 mcg (0.1 %) nasal spray, , Disp: , Rfl:     BD ULTRA-FINE GISELA PEN NEEDLE 32 gauge x 5/32" " Ndle, , Disp: , Rfl:     benazepril (LOTENSIN) 40 MG tablet, Take 40 mg by mouth once daily., Disp: , Rfl:     desloratadine (CLARINEX) 5 mg tablet, Take 5 mg by mouth., Disp: , Rfl:     diclofenac sodium (VOLTAREN) 1 % Gel, Apply 2 g topically 3 (three) times daily as needed. To both hands, Disp: 2 Tube, Rfl: 3    fluticasone propionate (FLONASE) 50 mcg/actuation nasal spray, , Disp: , Rfl:     gabapentin (NEURONTIN) 600 MG tablet, Take 600 mg by mouth 2 (two) times daily., Disp: , Rfl:     HYDROcodone-acetaminophen (NORCO)  mg per tablet, Take 1 tablet by mouth 3 (three) times daily as needed., Disp: 90 tablet, Rfl: 0    [START ON 12/24/2020] HYDROcodone-acetaminophen (NORCO)  mg per tablet, Take 1 tablet by mouth 3 (three) times daily as needed., Disp: 90 tablet, Rfl: 0    hydroxychloroquine (PLAQUENIL) 200 mg tablet, Take 1 tablet (200 mg total) by mouth 2 (two) times daily., Disp: 60 tablet, Rfl: 6    insulin aspart U-100 (NOVOLOG FLEXPEN U-100 INSULIN) 100 unit/mL (3 mL) InPn pen, 10 units with meals plus sliding scale.  150-200=+2, 201-250=+4; 251-300=+6; 301-350=+8, over 350, + 10 units, Disp: 18 mL, Rfl: 11    insulin glargine, TOUJEO, (TOUJEO SOLOSTAR U-300 INSULIN) 300 unit/mL (1.5 mL) InPn pen, Inject 60 Units into the skin every evening., Disp: 4 Syringe, Rfl: 11    meloxicam (MOBIC) 15 MG tablet, Take 1 tablet (15 mg total) by mouth once daily., Disp: 30 tablet, Rfl: 0    methocarbamoL (ROBAXIN) 750 MG Tab, Take 1 tablet (750 mg total) by mouth 2 (two) times daily as needed., Disp: 60 tablet, Rfl: 2    montelukast (SINGULAIR) 10 mg tablet, Take 10 mg by mouth., Disp: , Rfl:     nebivolol (BYSTOLIC) 10 MG Tab, Take 10 mg by mouth., Disp: , Rfl:     ON CALL LANCET 30 gauge Misc, , Disp: , Rfl:     ondansetron (ZOFRAN-ODT) 8 MG TbDL, Take 1 tablet (8 mg total) by mouth every 8 (eight) hours as needed (Nausea)., Disp: 3 tablet, Rfl: 0    pantoprazole (PROTONIX) 40 MG tablet,  "Take 40 mg by mouth once daily., Disp: , Rfl:     rosuvastatin (CRESTOR) 10 MG tablet, Take 10 mg by mouth once daily., Disp: , Rfl:     SYMBICORT 160-4.5 mcg/actuation HFAA, , Disp: , Rfl:     tiZANidine 2 mg Cap, , Disp: , Rfl:     TRADJENTA 5 mg Tab tablet, , Disp: , Rfl:     TRUE METRIX GLUCOSE TEST STRIP Strp, , Disp: , Rfl:   Blood pressure 137/88, pulse 82, height 5' 6" (1.676 m), weight 91.6 kg (202 lb), last menstrual period 03/13/2017.      Neurologic Exam     Mental Status   Oriented to person, place, and time.   Oriented to person.   Oriented to place.   Oriented to time.   Follows 3 step commands.   Attention: normal. Concentration: normal.   Speech: speech is normal   Level of consciousness: alert  Knowledge: consistent with education.   Able to name object. Able to read. Able to repeat. Able to write. Normal comprehension.      Cranial Nerves      CN II   Visual acuity: normal  Right visual field deficit: none  Left visual field deficit: none      CN III, IV, VI   Pupils are equal, round, and reactive to light.  Right pupil: Size: 3 mm. Shape: regular. Reactivity: brisk. Consensual response: intact.   Left pupil: Size: 3 mm. Shape: regular. Reactivity: brisk. Consensual response: intact.   CN III: no CN III palsy  CN VI: no CN VI palsy  Nystagmus: none   Diplopia: none  Ophthalmoparesis: none  Conjugate gaze: present     CN V   Right facial sensation deficit: none  Left facial sensation deficit: none     CN VII   Right facial weakness: none  Left facial weakness: none     CN VIII   Hearing: intact     CN IX, X   CN IX normal.   CN X normal.      CN XI   Right sternocleidomastoid strength: normal  Left sternocleidomastoid strength: normal  Right trapezius strength: normal  Left trapezius strength: normal     CN XII   Fasciculations: absent  Tongue deviation: none     Motor Exam   Muscle bulk: normal  Overall muscle tone: normal  Right arm pronator drift: absent  Left arm pronator drift: " absent     Strength   Right deltoid: 5/5  Left deltoid: 5/5  Right biceps: 5/5  Left biceps: 4/5  Right triceps: 4/5  Left triceps: 4/5  Right wrist flexion: 4/5  Left wrist flexion: 5/5  Right wrist extension: 5/5  Left wrist extension: 5/5  Right interossei: 4/5  Left interossei: 4/5  Right iliopsoas: 4/5  Left iliopsoas: 4/5  Right quadriceps: 5/5  Left quadriceps: 5/5  Right hamstrin/5  Left hamstrin/5  Right anterior tibial: 5/5  Left anterior tibial: 5/5  Right posterior tibial: 5/5  Left posterior tibial: 5/5  Right peroneal: 5/5  Left peroneal: 5/5  Right gastroc: 4/5  Left gastroc: 5/5     Sensory Exam   Right arm light touch: normal  Left arm light touch: normal  Right leg light touch: normal  Left leg light touch: normal    Gait, Coordination, and Reflexes      Gait  Gait: normal      Coordination   Romberg: positive  Finger to nose coordination: normal  Heel to shin coordination: normal  Tandem walking coordination: normal     Tremor   Resting tremor: absent  Intention tremor: absent  Action tremor: absent     Reflexes   Right brachioradialis: 2+  Left brachioradialis: 2+  Right biceps: 2+  Left biceps: 2+  Right triceps: 2+  Left triceps: 2+  Right patellar: 2+  Left patellar: 2+  Right achilles: 2+  Left achilles: 2+  Right Osuna: absent  Left Osuna: absent  Right ankle clonus: 2 beats present  Left ankle clonus:  2 beats present  Right plantar: normal  Left plantar: normal        Physical Exam  Constitutional: Oriented to person, place, and time. Appears well-developed and well-nourished.   HENT:   Head: Normocephalic and atraumatic.   Eyes: Pupils are equal, round, and reactive to light.   Neck: Normal range of motion. Neck supple.   Cardiovascular: Normal rate.    Pulmonary/Chest: Effort normal.   Musculoskeletal: Normal range of motion. Exhibits no edema.   Neurological: Alert and oriented to person, place, and time. Normal Finger-Nose-Finger Test, a normal Heel to Portillo Test, a normal  Romberg Test and a normal Tandem Gait Test. Gait normal.   Reflex Scores:       Tricep reflexes are 2+ on the right side and 2+ on the left side.       Bicep reflexes are 2+ on the right side and 2+ on the left side.       Brachioradialis reflexes are 2+ on the right side and 2+ on the left side.       Patellar reflexes are 2+ on the right side and 2+ on the left side.       Achilles reflexes are 2+ on the right side and 2+ on the left side.  Skin: Skin is warm, dry and intact.   Psychiatric: Normal mood and affect. Speech is normal and behavior is normal. Judgment and thought content normal.   Nursing note and vitals reviewed.    Provider dictation:  I reviewed the imaging.   MRI of the cervical spine shows reversal of the lordosis in multilevel severe stenosis from C3-C4 through C6-C7.  Possible OPLL.  There is spinal cord compression at each of the involved levels without any obvious T2 signal in the spinal cord.    MRI of the lumbar spine shows spondylotic bilateral lateral recess stenosis at L4-5 as well as a right-sided suspected disc osteophyte complex at L5-S1 compressing the right S1 nerve root.    The patient is a 60 year old female who presents for neurosurgical consultation referred by Dr. Galan for neck and back pain. Patient has a history of chronic neck and back pain that has been treated by Dr. Galan conservatively over the years. Patient reports numbness in the right lower extremity when sitting or laying in bed that started a few months ago. She also reports lower back pain that shoots into the right hip and posterior thigh into the lateral part of the calf. She wakes up with numbness in her right foot which is most prominent in the plantar aspect. Back pain is worse with standing or bending over. Her pain improves with neurontin. She also reports neck pain which shoots into bilateral shoulders, left > right. This has been progressively worsening over the past 5 years. She has numbness in the  fingertips of her left hand, but improved following carpal tunnel release surgery last year. She reports bilateral hand weakness and dropping objects from her hands over the past 3 years. She states this is progressively worsening. She also reports difficulty with fine motor skills and gait instability that is progressively worsening. She had multiple cervical and lumbar ESIs over the years with short term relief.     On exam patient has 4/5 B/L hand  and hand intrinsic weakness. 4/5 right tricep and wrist flexion weakness. 4/5 left bicep and left tricep weakness. Sensation intact. 4/5 B/L hip flexion weakness and right PF weakness. 2 beats of clonus present B/L. No jim sign present. + Romberg and impaired repetitive hand motion.     We will obtain a CT of the cervical spine to evaluate for OPLL and x-rays of the cervical spine with flex/ext to assess for instability. We will also obtain x-ray of the lumbar spine with flex/ext to assess for instability. Patient will follow-up after imaging is complete to further discuss surgical options.     1. Cervical spondylosis with myelopathy  CT Cervical Spine Without Contrast    X-Ray Cervical Spine AP Lat with Flexion  Extension   2. Spinal stenosis, cervical region  Ambulatory referral/consult to Neurosurgery    CT Cervical Spine Without Contrast    X-Ray Cervical Spine AP Lat with Flexion  Extension   3. DDD (degenerative disc disease), lumbar  X-Ray Lumbar Complete With Flex And Ext

## 2020-12-01 NOTE — PROGRESS NOTES
I have seen the patient, reviewed the Advanced Practice Provider's history and physical, assessment and plan. I have personally interviewed and examined the patient at bedside and interpreted the relevant imaging and lab work and I agree with the findings. I personally performed the documented services. See below for any additional comments.    Chronic low back and neck pain treated conservatively by Dr. Galan.  Endorses progressive bilateral upper extremity weakness with progressive clumsiness in dropping things involuntarily.  History of bilateral carpal tunnel status post decompression.  Also history of trigger finger surgery.  Reports that the neck pain is worse with motion and radiates to the bilateral shoulders and upper extremities more so on the left.  Low back pain radiates to the right buttock, posterior thigh and lateral calf and in the mornings she wakes up with her right foot completely numb.  The numbness is most prominent in the plantar aspect of the foot.  She also reports some difficulty with right plantar flexion.  She also endorses progressively worsening imbalance.  MRI of the cervical spine shows reversal of the lordosis in multilevel severe stenosis from C3-C4 through C6-C7.  Possible OPLL.  There is spinal cord compression at each of the involved levels without any obvious T2 signal in the spinal cord.  MRI of the lumbar spine shows spondylotic bilateral lateral recess stenosis at L4-5 as well as a right-sided suspected disc osteophyte complex at L5-S1 compressing the right S1 nerve root.    Exam is significant for bilateral upper extremity weakness, bilateral hip flexion weakness in right plantar flexion weakness.  No myelopathic reflexes.  Positive Romberg and impairment of repetitive hand motion.    I suspect that her progressive bilateral upper extremity weakness, clumsiness in gait and balance are secondary to cervical myelopathy.  Her right sided sciatica and right plantar flexion  weakness is likely secondary to the disc herniation at L5-S1.  She will need a CT of the cervical spine without contrast to establish whether she has OPLL and dynamic x-rays of the cervical and lumbar spine for further evaluation.  Follow-up after the above.

## 2020-12-01 NOTE — LETTER
December 2, 2020      Erik Galan MD  [address] [facility] [address] [place] [address]           [facility]  [address] [place] [address]  Phone: [phone]  Fax: [phone]          Patient: Aaron Garcia   MR Number: [id number]   YOB: 1960   Date of Visit: 12/1/2020       Dear Dr. Erik Galan:    Thank you for referring Aaron Garcia to me for evaluation. Attached you will find relevant portions of my assessment and plan of care.    If you have questions, please do not hesitate to call me. I look forward to following Aaron Garcia along with you.    Sincerely,    Denise Real MD    Enclosure  CC:  No Recipients    If you would like to receive this communication electronically, please contact [email or web address] or [phone] to request more information on [facility] access.    For providers and/or their staff who would like to refer a patient to [facility], please contact us through our one-stop-shop provider referral line, [facility], at [phone].    If you feel you have received this communication in error or would no longer like to receive these types of communications, please e-mail [email or web address]

## 2020-12-03 ENCOUNTER — OFFICE VISIT (OUTPATIENT)
Dept: ENDOCRINOLOGY | Facility: CLINIC | Age: 60
End: 2020-12-03
Payer: COMMERCIAL

## 2020-12-03 VITALS
DIASTOLIC BLOOD PRESSURE: 80 MMHG | WEIGHT: 200.75 LBS | HEART RATE: 62 BPM | BODY MASS INDEX: 32.26 KG/M2 | SYSTOLIC BLOOD PRESSURE: 142 MMHG | HEIGHT: 66 IN

## 2020-12-03 DIAGNOSIS — M54.12 CERVICAL RADICULOPATHY: ICD-10-CM

## 2020-12-03 DIAGNOSIS — E78.49 OTHER HYPERLIPIDEMIA: ICD-10-CM

## 2020-12-03 DIAGNOSIS — I10 ESSENTIAL HYPERTENSION: ICD-10-CM

## 2020-12-03 DIAGNOSIS — E55.9 VITAMIN D DEFICIENCY: ICD-10-CM

## 2020-12-03 DIAGNOSIS — E11.9 TYPE 2 DIABETES MELLITUS WITHOUT COMPLICATION, WITH LONG-TERM CURRENT USE OF INSULIN: Primary | ICD-10-CM

## 2020-12-03 DIAGNOSIS — Z79.4 TYPE 2 DIABETES MELLITUS WITHOUT COMPLICATION, WITH LONG-TERM CURRENT USE OF INSULIN: Primary | ICD-10-CM

## 2020-12-03 PROCEDURE — 1125F PR PAIN SEVERITY QUANTIFIED, PAIN PRESENT: ICD-10-PCS | Mod: S$GLB,,, | Performed by: NURSE PRACTITIONER

## 2020-12-03 PROCEDURE — 99999 PR PBB SHADOW E&M-EST. PATIENT-LVL III: CPT | Mod: PBBFAC,,, | Performed by: NURSE PRACTITIONER

## 2020-12-03 PROCEDURE — 3008F PR BODY MASS INDEX (BMI) DOCUMENTED: ICD-10-PCS | Mod: CPTII,S$GLB,, | Performed by: NURSE PRACTITIONER

## 2020-12-03 PROCEDURE — 1125F AMNT PAIN NOTED PAIN PRSNT: CPT | Mod: S$GLB,,, | Performed by: NURSE PRACTITIONER

## 2020-12-03 PROCEDURE — 3072F PR LOW RISK FOR RETINOPATHY: ICD-10-PCS | Mod: S$GLB,,, | Performed by: NURSE PRACTITIONER

## 2020-12-03 PROCEDURE — 99214 PR OFFICE/OUTPT VISIT, EST, LEVL IV, 30-39 MIN: ICD-10-PCS | Mod: S$GLB,,, | Performed by: NURSE PRACTITIONER

## 2020-12-03 PROCEDURE — 3072F LOW RISK FOR RETINOPATHY: CPT | Mod: S$GLB,,, | Performed by: NURSE PRACTITIONER

## 2020-12-03 PROCEDURE — 3051F HG A1C>EQUAL 7.0%<8.0%: CPT | Mod: CPTII,S$GLB,, | Performed by: NURSE PRACTITIONER

## 2020-12-03 PROCEDURE — 3051F PR MOST RECENT HEMOGLOBIN A1C LEVEL 7.0 - < 8.0%: ICD-10-PCS | Mod: CPTII,S$GLB,, | Performed by: NURSE PRACTITIONER

## 2020-12-03 PROCEDURE — 99999 PR PBB SHADOW E&M-EST. PATIENT-LVL III: ICD-10-PCS | Mod: PBBFAC,,, | Performed by: NURSE PRACTITIONER

## 2020-12-03 PROCEDURE — 99214 OFFICE O/P EST MOD 30 MIN: CPT | Mod: S$GLB,,, | Performed by: NURSE PRACTITIONER

## 2020-12-03 PROCEDURE — 3008F BODY MASS INDEX DOCD: CPT | Mod: CPTII,S$GLB,, | Performed by: NURSE PRACTITIONER

## 2020-12-03 NOTE — PATIENT INSTRUCTIONS
Continue Toujeo 60 qhs,     Novolog 12-12-12---plus ss.    If eating light, can cut Novlog down a couple of units to 1/2 dose.     Preop    NIght before when you are nothing by mouth---only take 45 units of Touejo.       I would recommend in patient care to consider    50 Lantus/Levemir at bedtime      And at least 5 units of Novolog plus ss.     May need to be much more aggressive with meal doses if steroids added.

## 2020-12-03 NOTE — PROGRESS NOTES
Subjective:       Patient ID: Aaron Garcia is a 60 y.o. female.    Chief Complaint: Type 2 Dm      Pt is a 60 y.o. AAF  with a diagnosis of Type 2 diabetes mellitus diagnosed approximately 2001, as well as chronic conditions pending review including HTN, HLP.  Other pertinent medical and social information noted includes, but not limited to: chronic pain r/t cervical radiculopathy affecting UA.      Interim Events: no new acute events. We added prandial insulin at last visit and d/c glipizide and Tradjenta.  Pending neck surgery for cervical radiculopathy after being hit on head by falling TV years ago.  Brings glucose logs.  No c/o hypoglycemia.      She is currently taking 60 of Toujeo--She is taking 5 untis of novolog with most meals, but will increase to 10 units if she knows good amount of starches.   Forgot logs    Pt is checking glucoses QID,   Pt is taking insulin QID.   Pt is adjusting insulin doses based on fluctuating glucoses.     -150--5  Units--and often eats cheerios--but may have banana   Lunch--165-170--Novolog 5 units--likes steamed fish, chick veggies, sometimes small amount spagetti. \  Supper--150's,   BT--130-135--        Diabetes Flow Sheet:  Diabetes Medications: Toujeo 60 qhs, Novolog 12-12-12-      If insulin vial or pen preference:   Prior failed/or not tolerated medication therapies:metformin, diarrhea. tradjenta 5 mg, glipizide 5 mg bid    Diabetes Complications:   Aspirin:    Statin: crestor 10   ACE/ARB:benazepril 40   Last Urine Microalbumin:    Last Eye exam:   Last Diabetic Education: 18 mo   Glucometer:Truemetrix       Review of Systems   Constitutional: Negative for activity change and fatigue., denies fever    HENT: Negative for hearing loss and trouble swallowing. + ear pain, rhinorrea   Eyes: Negative for photophobia and visual disturbance.        Last Eye Exam   Respiratory: Negative for cough and shortness of breath.    Cardiovascular: Negative for chest pain  and palpitations.   Gastrointestinal: Negative for constipation and diarrhea. +nausea  Genitourinary: Negative for frequency and urgency.   Musculoskeletal: Negative for arthralgias and myalgias.--pain from neck radiating down left shoulder, arm and chest--w/u revealed neck related.  Also pending L hand sugery for tendonitis    Skin: Negative for rash and wound.   Allergic/Immunologic: Negative for food allergies.   Neurological: Negative for dizziness, weakness, light-headedness and numbness.   Psychiatric/Behavioral: Negative for sleep disturbance. The patient is not nervous/anxious.        Objective:      behavior is normal. Judgment and thought content normal.   PERRLA  HR rrr  Lungs CTA        Hemoglobin A1C   Date Value Ref Range Status   09/09/2020 7.5 (H) 4.0 - 5.6 % Final     Comment:     ADA Screening Guidelines:  5.7-6.4%  Consistent with prediabetes  >or=6.5%  Consistent with diabetes  High levels of fetal hemoglobin interfere with the HbA1C  assay. Heterozygous hemoglobin variants (HbS, HgC, etc)do  not significantly interfere with this assay.   However, presence of multiple variants may affect accuracy.     06/18/2020 7.6 (H) 4.0 - 5.6 % Final     Comment:     ADA Screening Guidelines:  5.7-6.4%  Consistent with prediabetes  >or=6.5%  Consistent with diabetes  High levels of fetal hemoglobin interfere with the HbA1C  assay. Heterozygous hemoglobin variants (HbS, HgC, etc)do  not significantly interfere with this assay.   However, presence of multiple variants may affect accuracy.     11/14/2019 7.8 (H) 4.0 - 5.6 % Final     Comment:     ADA Screening Guidelines:  5.7-6.4%  Consistent with prediabetes  >or=6.5%  Consistent with diabetes  High levels of fetal hemoglobin interfere with the HbA1C  assay. Heterozygous hemoglobin variants (HbS, HgC, etc)do  not significantly interfere with this assay.   However, presence of multiple variants may affect accuracy.         Chemistry        Component Value  Date/Time     09/09/2020 1248    K 4.0 09/09/2020 1248     09/09/2020 1248    CO2 28 09/09/2020 1248    BUN 10 09/09/2020 1248    CREATININE 0.7 09/09/2020 1248     (H) 09/09/2020 1248        Component Value Date/Time    CALCIUM 9.4 09/09/2020 1248    ALKPHOS 67 09/09/2020 1248    AST 19 09/09/2020 1248    ALT 14 09/09/2020 1248    BILITOT 0.6 09/09/2020 1248    ESTGFRAFRICA >60.0 09/09/2020 1248    EGFRNONAA >60.0 09/09/2020 1248        Lab Results   Component Value Date    LDLCALC 62.4 (L) 09/09/2020     Lab Results   Component Value Date    TSH 1.006 09/09/2020         Assessment:        steroids.      1. Type 2 diabetes mellitus without complication, with long-term current use of insulin  Hemoglobin A1C  Chronic-uncontrolled-see plan    2. Essential hypertension  Chronic-stble-cont benazepriol    3. Other hyperlipidemia  Benazepril 20 mg   Chronic-cont rosuvastatin 10    4. Vitamin D deficiency  Vitamin D--needs rechecking    5. Cervical radiculopathy  Chronic-pain can increase glucoses.          Plan:        ontinue Toujeo 60 qhs,     Novolog 12-12-12---plus ss.    If eating light, can cut Novlog down a couple of units to 1/2 dose.     Preop    NIght before when you are nothing by mouth---only take 45 units of Touejo.       I would recommend in patient care to consider    50 Lantus/Levemir at bedtime      And at least 5 units of Novolog plus ss.     May need to be much more aggressive with meal doses if steroids added.           RX for Continuous Glucose Monitor   -Recommend  Altaf Continuous Glucose monitor                         Pt tests glucoses a minimum of 4 x a day.                          Pt takes a minimum of 4 injections of insulin a day.                          Pt would benefit from therapeutic continuous glucose monitor to be able                         to make frequent insulin adjustments, based on current glucoses.                            ORDERS 12/03/2020    3 mo with  a1c, vit D  prior.

## 2020-12-07 ENCOUNTER — HOSPITAL ENCOUNTER (OUTPATIENT)
Dept: RADIOLOGY | Facility: HOSPITAL | Age: 60
Discharge: HOME | End: 2020-12-07
Attending: PHYSICIAN ASSISTANT
Payer: COMMERCIAL

## 2020-12-07 DIAGNOSIS — M47.12 CERVICAL SPONDYLOSIS WITH MYELOPATHY: ICD-10-CM

## 2020-12-07 DIAGNOSIS — M51.36 DDD (DEGENERATIVE DISC DISEASE), LUMBAR: ICD-10-CM

## 2020-12-07 DIAGNOSIS — M48.02 SPINAL STENOSIS, CERVICAL REGION: ICD-10-CM

## 2020-12-07 PROCEDURE — 72110 X-RAY EXAM L-2 SPINE 4/>VWS: CPT | Mod: TC,FY,PO

## 2020-12-07 PROCEDURE — 72110 X-RAY EXAM L-2 SPINE 4/>VWS: CPT | Mod: 26,,, | Performed by: RADIOLOGY

## 2020-12-07 PROCEDURE — 72050 XR CERVICAL SPINE AP LAT WITH FLEX EXTEN: ICD-10-PCS | Mod: 26,,, | Performed by: RADIOLOGY

## 2020-12-07 PROCEDURE — 72050 X-RAY EXAM NECK SPINE 4/5VWS: CPT | Mod: TC,FY,PO

## 2020-12-07 PROCEDURE — 72125 CT CERVICAL SPINE WITHOUT CONTRAST: ICD-10-PCS | Mod: 26,,, | Performed by: RADIOLOGY

## 2020-12-07 PROCEDURE — 72050 X-RAY EXAM NECK SPINE 4/5VWS: CPT | Mod: 26,,, | Performed by: RADIOLOGY

## 2020-12-07 PROCEDURE — 72125 CT NECK SPINE W/O DYE: CPT | Mod: 26,,, | Performed by: RADIOLOGY

## 2020-12-07 PROCEDURE — 72125 CT NECK SPINE W/O DYE: CPT | Mod: TC,PO

## 2020-12-07 PROCEDURE — 72110 XR LUMBAR SPINE 5 VIEW WITH FLEX AND EXT: ICD-10-PCS | Mod: 26,,, | Performed by: RADIOLOGY

## 2020-12-16 ENCOUNTER — TELEPHONE (OUTPATIENT)
Dept: NEUROSURGERY | Facility: CLINIC | Age: 60
End: 2020-12-16

## 2020-12-16 NOTE — TELEPHONE ENCOUNTER
----- Message from Jolly Solano sent at 12/15/2020  4:24 PM CST -----  Contact: pt  Type: Needs Medical Advice    Who Called: pt  Best Call Back Number: [phone]  Additional Info-pt is calling to discuss tests results from 12.7.2020.  Please Advise-Thank you~

## 2021-01-04 ENCOUNTER — TELEPHONE (OUTPATIENT)
Dept: NEUROSURGERY | Facility: CLINIC | Age: 61
End: 2021-01-04

## 2021-01-04 ENCOUNTER — OFFICE VISIT (OUTPATIENT)
Dept: NEUROSURGERY | Facility: CLINIC | Age: 61
End: 2021-01-04
Payer: COMMERCIAL

## 2021-01-04 VITALS
SYSTOLIC BLOOD PRESSURE: 114 MMHG | BODY MASS INDEX: 32.24 KG/M2 | HEIGHT: 66 IN | DIASTOLIC BLOOD PRESSURE: 68 MMHG | TEMPERATURE: 98 F | WEIGHT: 200.63 LBS

## 2021-01-04 DIAGNOSIS — M54.17 LUMBOSACRAL RADICULOPATHY: Primary | ICD-10-CM

## 2021-01-04 DIAGNOSIS — M48.02 STENOSIS OF CERVICAL SPINE WITH MYELOPATHY: ICD-10-CM

## 2021-01-04 DIAGNOSIS — G99.2 STENOSIS OF CERVICAL SPINE WITH MYELOPATHY: ICD-10-CM

## 2021-01-04 DIAGNOSIS — Z11.9 SCREENING EXAMINATION FOR INFECTIOUS DISEASE: Primary | ICD-10-CM

## 2021-01-04 PROCEDURE — 3008F PR BODY MASS INDEX (BMI) DOCUMENTED: ICD-10-PCS | Mod: CPTII,S$GLB,, | Performed by: NEUROLOGICAL SURGERY

## 2021-01-04 PROCEDURE — 3008F BODY MASS INDEX DOCD: CPT | Mod: CPTII,S$GLB,, | Performed by: NEUROLOGICAL SURGERY

## 2021-01-04 PROCEDURE — 3072F LOW RISK FOR RETINOPATHY: CPT | Mod: S$GLB,,, | Performed by: NEUROLOGICAL SURGERY

## 2021-01-04 PROCEDURE — 3072F PR LOW RISK FOR RETINOPATHY: ICD-10-PCS | Mod: S$GLB,,, | Performed by: NEUROLOGICAL SURGERY

## 2021-01-04 PROCEDURE — 1125F AMNT PAIN NOTED PAIN PRSNT: CPT | Mod: S$GLB,,, | Performed by: NEUROLOGICAL SURGERY

## 2021-01-04 PROCEDURE — 3074F PR MOST RECENT SYSTOLIC BLOOD PRESSURE < 130 MM HG: ICD-10-PCS | Mod: CPTII,S$GLB,, | Performed by: NEUROLOGICAL SURGERY

## 2021-01-04 PROCEDURE — 3078F PR MOST RECENT DIASTOLIC BLOOD PRESSURE < 80 MM HG: ICD-10-PCS | Mod: CPTII,S$GLB,, | Performed by: NEUROLOGICAL SURGERY

## 2021-01-04 PROCEDURE — 99215 OFFICE O/P EST HI 40 MIN: CPT | Mod: S$GLB,,, | Performed by: NEUROLOGICAL SURGERY

## 2021-01-04 PROCEDURE — 1125F PR PAIN SEVERITY QUANTIFIED, PAIN PRESENT: ICD-10-PCS | Mod: S$GLB,,, | Performed by: NEUROLOGICAL SURGERY

## 2021-01-04 PROCEDURE — 99999 PR PBB SHADOW E&M-EST. PATIENT-LVL IV: CPT | Mod: PBBFAC,,, | Performed by: NEUROLOGICAL SURGERY

## 2021-01-04 PROCEDURE — 3078F DIAST BP <80 MM HG: CPT | Mod: CPTII,S$GLB,, | Performed by: NEUROLOGICAL SURGERY

## 2021-01-04 PROCEDURE — 3074F SYST BP LT 130 MM HG: CPT | Mod: CPTII,S$GLB,, | Performed by: NEUROLOGICAL SURGERY

## 2021-01-04 PROCEDURE — 99215 PR OFFICE/OUTPT VISIT, EST, LEVL V, 40-54 MIN: ICD-10-PCS | Mod: S$GLB,,, | Performed by: NEUROLOGICAL SURGERY

## 2021-01-04 PROCEDURE — 99999 PR PBB SHADOW E&M-EST. PATIENT-LVL IV: ICD-10-PCS | Mod: PBBFAC,,, | Performed by: NEUROLOGICAL SURGERY

## 2021-01-05 ENCOUNTER — TELEPHONE (OUTPATIENT)
Dept: PAIN MEDICINE | Facility: CLINIC | Age: 61
End: 2021-01-05

## 2021-01-07 ENCOUNTER — PATIENT MESSAGE (OUTPATIENT)
Dept: NEUROSURGERY | Facility: CLINIC | Age: 61
End: 2021-01-07

## 2021-01-07 DIAGNOSIS — M48.02 STENOSIS OF CERVICAL SPINE WITH MYELOPATHY: Primary | ICD-10-CM

## 2021-01-07 DIAGNOSIS — G99.2 STENOSIS OF CERVICAL SPINE WITH MYELOPATHY: Primary | ICD-10-CM

## 2021-01-07 DIAGNOSIS — M54.16 LUMBAR RADICULOPATHY: Primary | ICD-10-CM

## 2021-01-07 RX ORDER — LIDOCAINE HYDROCHLORIDE 10 MG/ML
1 INJECTION, SOLUTION EPIDURAL; INFILTRATION; INTRACAUDAL; PERINEURAL ONCE
Status: CANCELLED | OUTPATIENT
Start: 2021-01-07 | End: 2021-01-07

## 2021-01-07 RX ORDER — SODIUM CHLORIDE, SODIUM LACTATE, POTASSIUM CHLORIDE, CALCIUM CHLORIDE 600; 310; 30; 20 MG/100ML; MG/100ML; MG/100ML; MG/100ML
INJECTION, SOLUTION INTRAVENOUS CONTINUOUS
Status: CANCELLED | OUTPATIENT
Start: 2021-01-11

## 2021-01-07 RX ORDER — SODIUM CHLORIDE, SODIUM LACTATE, POTASSIUM CHLORIDE, CALCIUM CHLORIDE 600; 310; 30; 20 MG/100ML; MG/100ML; MG/100ML; MG/100ML
INJECTION, SOLUTION INTRAVENOUS CONTINUOUS
Status: CANCELLED | OUTPATIENT
Start: 2021-01-07

## 2021-01-11 ENCOUNTER — HOSPITAL ENCOUNTER (OUTPATIENT)
Dept: RADIOLOGY | Facility: HOSPITAL | Age: 61
Discharge: HOME | End: 2021-01-11
Attending: ANESTHESIOLOGY | Admitting: ANESTHESIOLOGY
Payer: COMMERCIAL

## 2021-01-11 ENCOUNTER — HOSPITAL ENCOUNTER (OUTPATIENT)
Facility: HOSPITAL | Age: 61
Discharge: HOME | End: 2021-01-11
Attending: ANESTHESIOLOGY | Admitting: ANESTHESIOLOGY
Payer: COMMERCIAL

## 2021-01-11 ENCOUNTER — TELEPHONE (OUTPATIENT)
Dept: NEUROSURGERY | Facility: CLINIC | Age: 61
End: 2021-01-11

## 2021-01-11 DIAGNOSIS — M54.16 LUMBAR RADICULOPATHY: Primary | ICD-10-CM

## 2021-01-11 DIAGNOSIS — Z71.89 ADVICE GIVEN ABOUT 2019 NOVEL CORONAVIRUS BY TELEPHONE: ICD-10-CM

## 2021-01-11 DIAGNOSIS — R52 PAIN: ICD-10-CM

## 2021-01-11 LAB
GLUCOSE SERPL-MCNC: 138 MG/DL (ref 70–110)
SARS-COV-2 RDRP RESP QL NAA+PROBE: NEGATIVE

## 2021-01-11 PROCEDURE — 64484 NJX AA&/STRD TFRM EPI L/S EA: CPT | Mod: RT,,, | Performed by: ANESTHESIOLOGY

## 2021-01-11 PROCEDURE — 99152 MOD SED SAME PHYS/QHP 5/>YRS: CPT | Mod: ,,, | Performed by: ANESTHESIOLOGY

## 2021-01-11 PROCEDURE — 76000 FLUOROSCOPY <1 HR PHYS/QHP: CPT | Mod: TC,PO

## 2021-01-11 PROCEDURE — 64483 NJX AA&/STRD TFRM EPI L/S 1: CPT | Mod: PO | Performed by: ANESTHESIOLOGY

## 2021-01-11 PROCEDURE — 64483 PR EPIDURAL INJ, ANES/STEROID, TRANSFORAMINAL, LUMB/SACR, SNGL LEVL: ICD-10-PCS | Mod: RT,,, | Performed by: ANESTHESIOLOGY

## 2021-01-11 PROCEDURE — 64483 NJX AA&/STRD TFRM EPI L/S 1: CPT | Mod: RT,,, | Performed by: ANESTHESIOLOGY

## 2021-01-11 PROCEDURE — 64484 PRA INJECT ANES/STEROID FORAMEN LUMBAR/SACRAL W IMG GUIDE ,EA ADD LEVEL: ICD-10-PCS | Mod: RT,,, | Performed by: ANESTHESIOLOGY

## 2021-01-11 PROCEDURE — U0002 COVID-19 LAB TEST NON-CDC: HCPCS | Mod: PO

## 2021-01-11 PROCEDURE — 25000003 PHARM REV CODE 250: Mod: PO | Performed by: ANESTHESIOLOGY

## 2021-01-11 PROCEDURE — 64484 NJX AA&/STRD TFRM EPI L/S EA: CPT | Mod: PO | Performed by: ANESTHESIOLOGY

## 2021-01-11 PROCEDURE — 99152 PR MOD CONSCIOUS SEDATION, SAME PHYS, 5+ YRS, FIRST 15 MIN: ICD-10-PCS | Mod: ,,, | Performed by: ANESTHESIOLOGY

## 2021-01-11 PROCEDURE — 82962 GLUCOSE BLOOD TEST: CPT | Mod: PO | Performed by: ANESTHESIOLOGY

## 2021-01-11 PROCEDURE — 25500020 PHARM REV CODE 255: Mod: PO | Performed by: ANESTHESIOLOGY

## 2021-01-11 PROCEDURE — 63600175 PHARM REV CODE 636 W HCPCS: Mod: PO | Performed by: ANESTHESIOLOGY

## 2021-01-11 PROCEDURE — A4216 STERILE WATER/SALINE, 10 ML: HCPCS | Mod: PO | Performed by: ANESTHESIOLOGY

## 2021-01-11 RX ORDER — LIDOCAINE HYDROCHLORIDE 10 MG/ML
INJECTION, SOLUTION EPIDURAL; INFILTRATION; INTRACAUDAL; PERINEURAL
Status: DISCONTINUED | OUTPATIENT
Start: 2021-01-11 | End: 2021-01-11 | Stop reason: HOSPADM

## 2021-01-11 RX ORDER — SODIUM CHLORIDE 9 MG/ML
INJECTION, SOLUTION INTRAMUSCULAR; INTRAVENOUS; SUBCUTANEOUS
Status: DISCONTINUED | OUTPATIENT
Start: 2021-01-11 | End: 2021-01-11 | Stop reason: HOSPADM

## 2021-01-11 RX ORDER — TRIAMCINOLONE ACETONIDE 40 MG/ML
INJECTION, SUSPENSION INTRA-ARTICULAR; INTRAMUSCULAR
Status: DISCONTINUED | OUTPATIENT
Start: 2021-01-11 | End: 2021-01-11 | Stop reason: HOSPADM

## 2021-01-11 RX ORDER — BUPIVACAINE HYDROCHLORIDE 2.5 MG/ML
INJECTION, SOLUTION EPIDURAL; INFILTRATION; INTRACAUDAL
Status: DISCONTINUED | OUTPATIENT
Start: 2021-01-11 | End: 2021-01-11 | Stop reason: HOSPADM

## 2021-01-11 RX ORDER — MIDAZOLAM HYDROCHLORIDE 2 MG/2ML
INJECTION, SOLUTION INTRAMUSCULAR; INTRAVENOUS
Status: DISCONTINUED | OUTPATIENT
Start: 2021-01-11 | End: 2021-01-11 | Stop reason: HOSPADM

## 2021-01-11 RX ORDER — SODIUM BICARBONATE 42 MG/ML
INJECTION, SOLUTION INTRAVENOUS
Status: DISCONTINUED | OUTPATIENT
Start: 2021-01-11 | End: 2021-01-11 | Stop reason: HOSPADM

## 2021-01-11 RX ORDER — SODIUM CHLORIDE, SODIUM LACTATE, POTASSIUM CHLORIDE, CALCIUM CHLORIDE 600; 310; 30; 20 MG/100ML; MG/100ML; MG/100ML; MG/100ML
INJECTION, SOLUTION INTRAVENOUS CONTINUOUS
Status: DISCONTINUED | OUTPATIENT
Start: 2021-01-11 | End: 2021-01-11 | Stop reason: HOSPADM

## 2021-01-11 RX ADMIN — SODIUM CHLORIDE, SODIUM LACTATE, POTASSIUM CHLORIDE, AND CALCIUM CHLORIDE: .6; .31; .03; .02 INJECTION, SOLUTION INTRAVENOUS at 09:01

## 2021-01-12 VITALS
OXYGEN SATURATION: 97 % | RESPIRATION RATE: 15 BRPM | HEART RATE: 67 BPM | SYSTOLIC BLOOD PRESSURE: 142 MMHG | HEIGHT: 66 IN | DIASTOLIC BLOOD PRESSURE: 71 MMHG | WEIGHT: 198 LBS | TEMPERATURE: 98 F | BODY MASS INDEX: 31.82 KG/M2

## 2021-01-13 ENCOUNTER — TELEPHONE (OUTPATIENT)
Dept: NEUROSURGERY | Facility: CLINIC | Age: 61
End: 2021-01-13

## 2021-01-15 ENCOUNTER — PATIENT MESSAGE (OUTPATIENT)
Dept: NEUROSURGERY | Facility: CLINIC | Age: 61
End: 2021-01-15

## 2021-01-19 ENCOUNTER — PATIENT MESSAGE (OUTPATIENT)
Dept: NEUROSURGERY | Facility: CLINIC | Age: 61
End: 2021-01-19

## 2021-01-19 DIAGNOSIS — Z79.4 TYPE 2 DIABETES MELLITUS WITHOUT COMPLICATION, WITH LONG-TERM CURRENT USE OF INSULIN: ICD-10-CM

## 2021-01-19 DIAGNOSIS — E11.9 TYPE 2 DIABETES MELLITUS WITHOUT COMPLICATION, WITH LONG-TERM CURRENT USE OF INSULIN: ICD-10-CM

## 2021-01-20 DIAGNOSIS — M48.02 STENOSIS OF CERVICAL SPINE WITH MYELOPATHY: Primary | ICD-10-CM

## 2021-01-20 DIAGNOSIS — G99.2 STENOSIS OF CERVICAL SPINE WITH MYELOPATHY: Primary | ICD-10-CM

## 2021-01-20 DIAGNOSIS — Z01.818 PRE-OP TESTING: Primary | ICD-10-CM

## 2021-01-20 DIAGNOSIS — E11.9 TYPE 2 DIABETES MELLITUS WITHOUT COMPLICATION, WITH LONG-TERM CURRENT USE OF INSULIN: ICD-10-CM

## 2021-01-20 DIAGNOSIS — Z79.4 TYPE 2 DIABETES MELLITUS WITHOUT COMPLICATION, WITH LONG-TERM CURRENT USE OF INSULIN: ICD-10-CM

## 2021-01-20 RX ORDER — INSULIN GLARGINE 300 U/ML
INJECTION, SOLUTION SUBCUTANEOUS
Qty: 18 ML | Refills: 3 | Status: SHIPPED | OUTPATIENT
Start: 2021-01-20 | End: 2021-02-14 | Stop reason: SDUPTHER

## 2021-01-20 RX ORDER — INSULIN GLARGINE 300 U/ML
INJECTION, SOLUTION SUBCUTANEOUS
Qty: 6 ML | Refills: 12 | Status: SHIPPED | OUTPATIENT
Start: 2021-01-20 | End: 2021-01-20 | Stop reason: SDUPTHER

## 2021-01-21 PROBLEM — M48.02 STENOSIS OF CERVICAL SPINE WITH MYELOPATHY: Status: ACTIVE | Noted: 2021-01-21

## 2021-01-21 PROBLEM — G99.2 STENOSIS OF CERVICAL SPINE WITH MYELOPATHY: Status: ACTIVE | Noted: 2021-01-21

## 2021-01-22 PROBLEM — J45.20 MILD INTERMITTENT ASTHMA WITHOUT COMPLICATION: Chronic | Status: ACTIVE | Noted: 2021-01-22

## 2021-01-24 PROCEDURE — G0180 PR HOME HEALTH MD CERTIFICATION: ICD-10-PCS | Mod: ,,, | Performed by: NEUROLOGICAL SURGERY

## 2021-01-24 PROCEDURE — G0180 MD CERTIFICATION HHA PATIENT: HCPCS | Mod: ,,, | Performed by: NEUROLOGICAL SURGERY

## 2021-02-01 ENCOUNTER — CLINICAL SUPPORT (OUTPATIENT)
Dept: NEUROSURGERY | Facility: CLINIC | Age: 61
End: 2021-02-01
Payer: COMMERCIAL

## 2021-02-01 DIAGNOSIS — Z98.1 S/P CERVICAL SPINAL FUSION: Primary | ICD-10-CM

## 2021-02-01 PROCEDURE — 99999 PR PBB SHADOW E&M-EST. PATIENT-LVL I: ICD-10-PCS | Mod: PBBFAC,,,

## 2021-02-01 PROCEDURE — 99999 PR PBB SHADOW E&M-EST. PATIENT-LVL I: CPT | Mod: PBBFAC,,,

## 2021-02-01 RX ORDER — OXYCODONE AND ACETAMINOPHEN 7.5; 325 MG/1; MG/1
1 TABLET ORAL EVERY 6 HOURS PRN
Qty: 28 TABLET | Refills: 0 | Status: SHIPPED | OUTPATIENT
Start: 2021-02-01 | End: 2021-02-13 | Stop reason: SDUPTHER

## 2021-02-01 RX ORDER — TIZANIDINE 4 MG/1
4 TABLET ORAL EVERY 8 HOURS PRN
Qty: 30 TABLET | Refills: 0 | Status: SHIPPED | OUTPATIENT
Start: 2021-02-01 | End: 2021-03-23 | Stop reason: SDUPTHER

## 2021-02-02 ENCOUNTER — EXTERNAL HOME HEALTH (OUTPATIENT)
Dept: HOME HEALTH SERVICES | Facility: HOSPITAL | Age: 61
End: 2021-02-02
Payer: COMMERCIAL

## 2021-02-13 DIAGNOSIS — Z98.1 S/P CERVICAL SPINAL FUSION: ICD-10-CM

## 2021-02-15 RX ORDER — OXYCODONE AND ACETAMINOPHEN 7.5; 325 MG/1; MG/1
1 TABLET ORAL EVERY 6 HOURS PRN
Qty: 28 TABLET | Refills: 0 | Status: SHIPPED | OUTPATIENT
Start: 2021-02-15 | End: 2021-03-01 | Stop reason: SDUPTHER

## 2021-02-16 DIAGNOSIS — Z98.1 S/P CERVICAL SPINAL FUSION: ICD-10-CM

## 2021-02-17 ENCOUNTER — PATIENT MESSAGE (OUTPATIENT)
Dept: ENDOCRINOLOGY | Facility: CLINIC | Age: 61
End: 2021-02-17

## 2021-02-17 RX ORDER — OXYCODONE AND ACETAMINOPHEN 7.5; 325 MG/1; MG/1
1 TABLET ORAL EVERY 6 HOURS PRN
Qty: 28 TABLET | Refills: 0 | Status: CANCELLED | OUTPATIENT
Start: 2021-02-17

## 2021-02-17 RX ORDER — OXYCODONE AND ACETAMINOPHEN 7.5; 325 MG/1; MG/1
1 TABLET ORAL EVERY 6 HOURS PRN
Qty: 28 TABLET | Refills: 0 | OUTPATIENT
Start: 2021-02-17

## 2021-02-19 ENCOUNTER — TELEPHONE (OUTPATIENT)
Dept: ENDOCRINOLOGY | Facility: CLINIC | Age: 61
End: 2021-02-19

## 2021-02-22 ENCOUNTER — OFFICE VISIT (OUTPATIENT)
Dept: NEUROSURGERY | Facility: CLINIC | Age: 61
End: 2021-02-22
Payer: COMMERCIAL

## 2021-02-22 ENCOUNTER — HOSPITAL ENCOUNTER (OUTPATIENT)
Dept: RADIOLOGY | Facility: HOSPITAL | Age: 61
Discharge: HOME | End: 2021-02-22
Attending: NEUROLOGICAL SURGERY
Payer: COMMERCIAL

## 2021-02-22 VITALS
HEIGHT: 66 IN | BODY MASS INDEX: 31.82 KG/M2 | DIASTOLIC BLOOD PRESSURE: 92 MMHG | SYSTOLIC BLOOD PRESSURE: 135 MMHG | TEMPERATURE: 97 F | HEART RATE: 68 BPM | RESPIRATION RATE: 18 BRPM | WEIGHT: 198 LBS

## 2021-02-22 DIAGNOSIS — G99.2 STENOSIS OF CERVICAL SPINE WITH MYELOPATHY: ICD-10-CM

## 2021-02-22 DIAGNOSIS — Z98.1 S/P CERVICAL SPINAL FUSION: Primary | ICD-10-CM

## 2021-02-22 DIAGNOSIS — M48.02 STENOSIS OF CERVICAL SPINE WITH MYELOPATHY: ICD-10-CM

## 2021-02-22 DIAGNOSIS — M51.26 LUMBAR HERNIATED DISC: ICD-10-CM

## 2021-02-22 PROCEDURE — 1125F PR PAIN SEVERITY QUANTIFIED, PAIN PRESENT: ICD-10-PCS | Mod: S$GLB,,, | Performed by: PHYSICIAN ASSISTANT

## 2021-02-22 PROCEDURE — 3008F PR BODY MASS INDEX (BMI) DOCUMENTED: ICD-10-PCS | Mod: CPTII,S$GLB,, | Performed by: PHYSICIAN ASSISTANT

## 2021-02-22 PROCEDURE — 1125F AMNT PAIN NOTED PAIN PRSNT: CPT | Mod: S$GLB,,, | Performed by: PHYSICIAN ASSISTANT

## 2021-02-22 PROCEDURE — 99999 PR PBB SHADOW E&M-EST. PATIENT-LVL V: ICD-10-PCS | Mod: PBBFAC,,, | Performed by: PHYSICIAN ASSISTANT

## 2021-02-22 PROCEDURE — 3072F LOW RISK FOR RETINOPATHY: CPT | Mod: S$GLB,,, | Performed by: PHYSICIAN ASSISTANT

## 2021-02-22 PROCEDURE — 99024 POSTOP FOLLOW-UP VISIT: CPT | Mod: S$GLB,,, | Performed by: PHYSICIAN ASSISTANT

## 2021-02-22 PROCEDURE — 3072F PR LOW RISK FOR RETINOPATHY: ICD-10-PCS | Mod: S$GLB,,, | Performed by: PHYSICIAN ASSISTANT

## 2021-02-22 PROCEDURE — 72040 X-RAY EXAM NECK SPINE 2-3 VW: CPT | Mod: 26,,, | Performed by: RADIOLOGY

## 2021-02-22 PROCEDURE — 99999 PR PBB SHADOW E&M-EST. PATIENT-LVL V: CPT | Mod: PBBFAC,,, | Performed by: PHYSICIAN ASSISTANT

## 2021-02-22 PROCEDURE — 3008F BODY MASS INDEX DOCD: CPT | Mod: CPTII,S$GLB,, | Performed by: PHYSICIAN ASSISTANT

## 2021-02-22 PROCEDURE — 72040 X-RAY EXAM NECK SPINE 2-3 VW: CPT | Mod: TC,FY,PO

## 2021-02-22 PROCEDURE — 72040 XR CERVICAL SPINE AP LATERAL: ICD-10-PCS | Mod: 26,,, | Performed by: RADIOLOGY

## 2021-02-22 PROCEDURE — 99024 PR POST-OP FOLLOW-UP VISIT: ICD-10-PCS | Mod: S$GLB,,, | Performed by: PHYSICIAN ASSISTANT

## 2021-02-24 ENCOUNTER — TELEPHONE (OUTPATIENT)
Dept: ENDOCRINOLOGY | Facility: CLINIC | Age: 61
End: 2021-02-24

## 2021-02-25 DIAGNOSIS — Z98.1 S/P CERVICAL SPINAL FUSION: ICD-10-CM

## 2021-02-25 RX ORDER — OXYCODONE AND ACETAMINOPHEN 7.5; 325 MG/1; MG/1
1 TABLET ORAL EVERY 6 HOURS PRN
Qty: 28 TABLET | Refills: 0 | OUTPATIENT
Start: 2021-02-25

## 2021-02-26 RX ORDER — OXYCODONE AND ACETAMINOPHEN 7.5; 325 MG/1; MG/1
1 TABLET ORAL EVERY 6 HOURS PRN
Qty: 28 TABLET | Refills: 0 | OUTPATIENT
Start: 2021-02-26

## 2021-03-01 ENCOUNTER — TELEPHONE (OUTPATIENT)
Dept: NEUROSURGERY | Facility: CLINIC | Age: 61
End: 2021-03-01

## 2021-03-01 DIAGNOSIS — Z98.1 S/P CERVICAL SPINAL FUSION: ICD-10-CM

## 2021-03-01 RX ORDER — OXYCODONE AND ACETAMINOPHEN 7.5; 325 MG/1; MG/1
1 TABLET ORAL EVERY 6 HOURS PRN
Qty: 28 TABLET | Refills: 0 | Status: SHIPPED | OUTPATIENT
Start: 2021-03-01 | End: 2021-06-17 | Stop reason: CLARIF

## 2021-03-03 ENCOUNTER — LAB VISIT (OUTPATIENT)
Dept: LAB | Facility: HOSPITAL | Age: 61
End: 2021-03-03
Attending: NURSE PRACTITIONER
Payer: COMMERCIAL

## 2021-03-03 DIAGNOSIS — E55.9 VITAMIN D DEFICIENCY: ICD-10-CM

## 2021-03-03 DIAGNOSIS — E11.9 TYPE 2 DIABETES MELLITUS WITHOUT COMPLICATION, WITH LONG-TERM CURRENT USE OF INSULIN: ICD-10-CM

## 2021-03-03 DIAGNOSIS — Z79.4 TYPE 2 DIABETES MELLITUS WITHOUT COMPLICATION, WITH LONG-TERM CURRENT USE OF INSULIN: ICD-10-CM

## 2021-03-03 PROCEDURE — 82306 VITAMIN D 25 HYDROXY: CPT | Performed by: NURSE PRACTITIONER

## 2021-03-03 PROCEDURE — 83036 HEMOGLOBIN GLYCOSYLATED A1C: CPT | Performed by: NURSE PRACTITIONER

## 2021-03-03 PROCEDURE — 36415 COLL VENOUS BLD VENIPUNCTURE: CPT | Mod: PO | Performed by: NURSE PRACTITIONER

## 2021-03-04 LAB
ESTIMATED AVG GLUCOSE: 169 MG/DL (ref 68–131)
HBA1C MFR BLD: 7.5 % (ref 4–5.6)

## 2021-03-05 LAB — 25(OH)D3+25(OH)D2 SERPL-MCNC: 25 NG/ML (ref 30–96)

## 2021-03-11 ENCOUNTER — TELEPHONE (OUTPATIENT)
Dept: PAIN MEDICINE | Facility: CLINIC | Age: 61
End: 2021-03-11

## 2021-03-11 ENCOUNTER — OFFICE VISIT (OUTPATIENT)
Dept: PAIN MEDICINE | Facility: CLINIC | Age: 61
End: 2021-03-11
Payer: COMMERCIAL

## 2021-03-11 VITALS
WEIGHT: 199.63 LBS | RESPIRATION RATE: 18 BRPM | DIASTOLIC BLOOD PRESSURE: 63 MMHG | BODY MASS INDEX: 32.22 KG/M2 | OXYGEN SATURATION: 100 % | TEMPERATURE: 98 F | SYSTOLIC BLOOD PRESSURE: 131 MMHG | HEART RATE: 84 BPM

## 2021-03-11 DIAGNOSIS — Z79.891 OPIOID CONTRACT EXISTS: ICD-10-CM

## 2021-03-11 DIAGNOSIS — M50.30 DDD (DEGENERATIVE DISC DISEASE), CERVICAL: ICD-10-CM

## 2021-03-11 DIAGNOSIS — M51.36 DDD (DEGENERATIVE DISC DISEASE), LUMBAR: Primary | ICD-10-CM

## 2021-03-11 PROCEDURE — 99214 OFFICE O/P EST MOD 30 MIN: CPT | Mod: S$GLB,,, | Performed by: PHYSICIAN ASSISTANT

## 2021-03-11 PROCEDURE — 3078F PR MOST RECENT DIASTOLIC BLOOD PRESSURE < 80 MM HG: ICD-10-PCS | Mod: CPTII,S$GLB,, | Performed by: PHYSICIAN ASSISTANT

## 2021-03-11 PROCEDURE — 99214 PR OFFICE/OUTPT VISIT, EST, LEVL IV, 30-39 MIN: ICD-10-PCS | Mod: S$GLB,,, | Performed by: PHYSICIAN ASSISTANT

## 2021-03-11 PROCEDURE — 3075F PR MOST RECENT SYSTOLIC BLOOD PRESS GE 130-139MM HG: ICD-10-PCS | Mod: CPTII,S$GLB,, | Performed by: PHYSICIAN ASSISTANT

## 2021-03-11 PROCEDURE — 3008F PR BODY MASS INDEX (BMI) DOCUMENTED: ICD-10-PCS | Mod: CPTII,S$GLB,, | Performed by: PHYSICIAN ASSISTANT

## 2021-03-11 PROCEDURE — 3072F PR LOW RISK FOR RETINOPATHY: ICD-10-PCS | Mod: S$GLB,,, | Performed by: PHYSICIAN ASSISTANT

## 2021-03-11 PROCEDURE — 3008F BODY MASS INDEX DOCD: CPT | Mod: CPTII,S$GLB,, | Performed by: PHYSICIAN ASSISTANT

## 2021-03-11 PROCEDURE — 99999 PR PBB SHADOW E&M-EST. PATIENT-LVL V: CPT | Mod: PBBFAC,,, | Performed by: PHYSICIAN ASSISTANT

## 2021-03-11 PROCEDURE — 3072F LOW RISK FOR RETINOPATHY: CPT | Mod: S$GLB,,, | Performed by: PHYSICIAN ASSISTANT

## 2021-03-11 PROCEDURE — 1125F AMNT PAIN NOTED PAIN PRSNT: CPT | Mod: S$GLB,,, | Performed by: PHYSICIAN ASSISTANT

## 2021-03-11 PROCEDURE — 3075F SYST BP GE 130 - 139MM HG: CPT | Mod: CPTII,S$GLB,, | Performed by: PHYSICIAN ASSISTANT

## 2021-03-11 PROCEDURE — 99999 PR PBB SHADOW E&M-EST. PATIENT-LVL V: ICD-10-PCS | Mod: PBBFAC,,, | Performed by: PHYSICIAN ASSISTANT

## 2021-03-11 PROCEDURE — 1125F PR PAIN SEVERITY QUANTIFIED, PAIN PRESENT: ICD-10-PCS | Mod: S$GLB,,, | Performed by: PHYSICIAN ASSISTANT

## 2021-03-11 PROCEDURE — 3078F DIAST BP <80 MM HG: CPT | Mod: CPTII,S$GLB,, | Performed by: PHYSICIAN ASSISTANT

## 2021-03-11 RX ORDER — HYDROCODONE BITARTRATE AND ACETAMINOPHEN 10; 325 MG/1; MG/1
1 TABLET ORAL 3 TIMES DAILY PRN
Qty: 90 TABLET | Refills: 0 | Status: SHIPPED | OUTPATIENT
Start: 2021-03-11 | End: 2021-04-10

## 2021-03-11 RX ORDER — HYDROCODONE BITARTRATE AND ACETAMINOPHEN 10; 325 MG/1; MG/1
1 TABLET ORAL 3 TIMES DAILY PRN
Qty: 90 TABLET | Refills: 0 | Status: SHIPPED | OUTPATIENT
Start: 2021-04-10 | End: 2021-05-06 | Stop reason: SDUPTHER

## 2021-03-16 ENCOUNTER — TELEPHONE (OUTPATIENT)
Dept: ENDOCRINOLOGY | Facility: CLINIC | Age: 61
End: 2021-03-16

## 2021-03-23 ENCOUNTER — TELEPHONE (OUTPATIENT)
Dept: PAIN MEDICINE | Facility: CLINIC | Age: 61
End: 2021-03-23

## 2021-03-23 RX ORDER — TIZANIDINE 4 MG/1
4 TABLET ORAL EVERY 8 HOURS PRN
Qty: 60 TABLET | Refills: 2 | Status: SHIPPED | OUTPATIENT
Start: 2021-03-23 | End: 2021-06-07 | Stop reason: SDUPTHER

## 2021-03-25 ENCOUNTER — OFFICE VISIT (OUTPATIENT)
Dept: ENDOCRINOLOGY | Facility: CLINIC | Age: 61
End: 2021-03-25
Payer: COMMERCIAL

## 2021-03-25 VITALS
HEART RATE: 73 BPM | HEIGHT: 66 IN | WEIGHT: 201.19 LBS | SYSTOLIC BLOOD PRESSURE: 132 MMHG | BODY MASS INDEX: 32.33 KG/M2 | DIASTOLIC BLOOD PRESSURE: 78 MMHG

## 2021-03-25 DIAGNOSIS — E55.9 VITAMIN D DEFICIENCY: ICD-10-CM

## 2021-03-25 DIAGNOSIS — Z79.4 TYPE 2 DIABETES MELLITUS WITHOUT COMPLICATION, WITH LONG-TERM CURRENT USE OF INSULIN: Primary | ICD-10-CM

## 2021-03-25 DIAGNOSIS — E11.9 TYPE 2 DIABETES MELLITUS WITHOUT COMPLICATION, WITH LONG-TERM CURRENT USE OF INSULIN: Primary | ICD-10-CM

## 2021-03-25 DIAGNOSIS — I10 ESSENTIAL HYPERTENSION: ICD-10-CM

## 2021-03-25 DIAGNOSIS — E78.49 OTHER HYPERLIPIDEMIA: ICD-10-CM

## 2021-03-25 DIAGNOSIS — M54.16 LUMBAR RADICULOPATHY: ICD-10-CM

## 2021-03-25 PROCEDURE — 99214 OFFICE O/P EST MOD 30 MIN: CPT | Mod: S$GLB,,, | Performed by: NURSE PRACTITIONER

## 2021-03-25 PROCEDURE — 3008F BODY MASS INDEX DOCD: CPT | Mod: CPTII,S$GLB,, | Performed by: NURSE PRACTITIONER

## 2021-03-25 PROCEDURE — 3051F PR MOST RECENT HEMOGLOBIN A1C LEVEL 7.0 - < 8.0%: ICD-10-PCS | Mod: CPTII,S$GLB,, | Performed by: NURSE PRACTITIONER

## 2021-03-25 PROCEDURE — 99999 PR PBB SHADOW E&M-EST. PATIENT-LVL V: CPT | Mod: PBBFAC,,, | Performed by: NURSE PRACTITIONER

## 2021-03-25 PROCEDURE — 3072F LOW RISK FOR RETINOPATHY: CPT | Mod: S$GLB,,, | Performed by: NURSE PRACTITIONER

## 2021-03-25 PROCEDURE — 3072F PR LOW RISK FOR RETINOPATHY: ICD-10-PCS | Mod: S$GLB,,, | Performed by: NURSE PRACTITIONER

## 2021-03-25 PROCEDURE — 99999 PR PBB SHADOW E&M-EST. PATIENT-LVL V: ICD-10-PCS | Mod: PBBFAC,,, | Performed by: NURSE PRACTITIONER

## 2021-03-25 PROCEDURE — 1125F PR PAIN SEVERITY QUANTIFIED, PAIN PRESENT: ICD-10-PCS | Mod: S$GLB,,, | Performed by: NURSE PRACTITIONER

## 2021-03-25 PROCEDURE — 3051F HG A1C>EQUAL 7.0%<8.0%: CPT | Mod: CPTII,S$GLB,, | Performed by: NURSE PRACTITIONER

## 2021-03-25 PROCEDURE — 3008F PR BODY MASS INDEX (BMI) DOCUMENTED: ICD-10-PCS | Mod: CPTII,S$GLB,, | Performed by: NURSE PRACTITIONER

## 2021-03-25 PROCEDURE — 99214 PR OFFICE/OUTPT VISIT, EST, LEVL IV, 30-39 MIN: ICD-10-PCS | Mod: S$GLB,,, | Performed by: NURSE PRACTITIONER

## 2021-03-25 PROCEDURE — 1125F AMNT PAIN NOTED PAIN PRSNT: CPT | Mod: S$GLB,,, | Performed by: NURSE PRACTITIONER

## 2021-04-19 ENCOUNTER — PATIENT MESSAGE (OUTPATIENT)
Dept: NEUROSURGERY | Facility: CLINIC | Age: 61
End: 2021-04-19

## 2021-04-19 ENCOUNTER — TELEPHONE (OUTPATIENT)
Dept: NEUROSURGERY | Facility: CLINIC | Age: 61
End: 2021-04-19

## 2021-04-19 RX ORDER — ALPRAZOLAM 0.5 MG/1
0.5 TABLET ORAL ONCE AS NEEDED
Qty: 2 TABLET | Refills: 0 | Status: SHIPPED | OUTPATIENT
Start: 2021-04-19 | End: 2021-06-17 | Stop reason: CLARIF

## 2021-04-20 ENCOUNTER — HOSPITAL ENCOUNTER (OUTPATIENT)
Dept: RADIOLOGY | Facility: HOSPITAL | Age: 61
Discharge: HOME | End: 2021-04-20
Attending: PHYSICIAN ASSISTANT
Payer: COMMERCIAL

## 2021-04-20 ENCOUNTER — OFFICE VISIT (OUTPATIENT)
Dept: NEUROSURGERY | Facility: CLINIC | Age: 61
End: 2021-04-20
Payer: COMMERCIAL

## 2021-04-20 VITALS
HEIGHT: 66 IN | DIASTOLIC BLOOD PRESSURE: 76 MMHG | SYSTOLIC BLOOD PRESSURE: 126 MMHG | BODY MASS INDEX: 31.5 KG/M2 | WEIGHT: 196 LBS

## 2021-04-20 DIAGNOSIS — Z98.1 S/P CERVICAL SPINAL FUSION: ICD-10-CM

## 2021-04-20 DIAGNOSIS — M51.26 LUMBAR HERNIATED DISC: ICD-10-CM

## 2021-04-20 DIAGNOSIS — M54.16 LUMBAR RADICULOPATHY: Primary | ICD-10-CM

## 2021-04-20 PROCEDURE — 72148 MRI LUMBAR SPINE W/O DYE: CPT | Mod: TC,PO

## 2021-04-20 PROCEDURE — 99999 PR PBB SHADOW E&M-EST. PATIENT-LVL IV: CPT | Mod: PBBFAC,,, | Performed by: NEUROLOGICAL SURGERY

## 2021-04-20 PROCEDURE — 3051F PR MOST RECENT HEMOGLOBIN A1C LEVEL 7.0 - < 8.0%: ICD-10-PCS | Mod: CPTII,S$GLB,, | Performed by: NEUROLOGICAL SURGERY

## 2021-04-20 PROCEDURE — 3072F PR LOW RISK FOR RETINOPATHY: ICD-10-PCS | Mod: S$GLB,,, | Performed by: NEUROLOGICAL SURGERY

## 2021-04-20 PROCEDURE — 3008F PR BODY MASS INDEX (BMI) DOCUMENTED: ICD-10-PCS | Mod: CPTII,S$GLB,, | Performed by: NEUROLOGICAL SURGERY

## 2021-04-20 PROCEDURE — 3051F HG A1C>EQUAL 7.0%<8.0%: CPT | Mod: CPTII,S$GLB,, | Performed by: NEUROLOGICAL SURGERY

## 2021-04-20 PROCEDURE — 99024 PR POST-OP FOLLOW-UP VISIT: ICD-10-PCS | Mod: S$GLB,,, | Performed by: NEUROLOGICAL SURGERY

## 2021-04-20 PROCEDURE — 72148 MRI LUMBAR SPINE WITHOUT CONTRAST: ICD-10-PCS | Mod: 26,,, | Performed by: RADIOLOGY

## 2021-04-20 PROCEDURE — 72040 X-RAY EXAM NECK SPINE 2-3 VW: CPT | Mod: 26,,, | Performed by: RADIOLOGY

## 2021-04-20 PROCEDURE — 72040 XR CERVICAL SPINE AP LATERAL: ICD-10-PCS | Mod: 26,,, | Performed by: RADIOLOGY

## 2021-04-20 PROCEDURE — 1125F AMNT PAIN NOTED PAIN PRSNT: CPT | Mod: S$GLB,,, | Performed by: NEUROLOGICAL SURGERY

## 2021-04-20 PROCEDURE — 3072F LOW RISK FOR RETINOPATHY: CPT | Mod: S$GLB,,, | Performed by: NEUROLOGICAL SURGERY

## 2021-04-20 PROCEDURE — 72148 MRI LUMBAR SPINE W/O DYE: CPT | Mod: 26,,, | Performed by: RADIOLOGY

## 2021-04-20 PROCEDURE — 1125F PR PAIN SEVERITY QUANTIFIED, PAIN PRESENT: ICD-10-PCS | Mod: S$GLB,,, | Performed by: NEUROLOGICAL SURGERY

## 2021-04-20 PROCEDURE — 99999 PR PBB SHADOW E&M-EST. PATIENT-LVL IV: ICD-10-PCS | Mod: PBBFAC,,, | Performed by: NEUROLOGICAL SURGERY

## 2021-04-20 PROCEDURE — 72040 X-RAY EXAM NECK SPINE 2-3 VW: CPT | Mod: TC,FY,PO

## 2021-04-20 PROCEDURE — 3008F BODY MASS INDEX DOCD: CPT | Mod: CPTII,S$GLB,, | Performed by: NEUROLOGICAL SURGERY

## 2021-04-20 PROCEDURE — 99024 POSTOP FOLLOW-UP VISIT: CPT | Mod: S$GLB,,, | Performed by: NEUROLOGICAL SURGERY

## 2021-04-27 ENCOUNTER — TELEPHONE (OUTPATIENT)
Dept: ENDOCRINOLOGY | Facility: CLINIC | Age: 61
End: 2021-04-27

## 2021-04-28 DIAGNOSIS — M54.16 LUMBAR RADICULOPATHY: Primary | ICD-10-CM

## 2021-04-28 RX ORDER — SODIUM CHLORIDE, SODIUM LACTATE, POTASSIUM CHLORIDE, CALCIUM CHLORIDE 600; 310; 30; 20 MG/100ML; MG/100ML; MG/100ML; MG/100ML
INJECTION, SOLUTION INTRAVENOUS CONTINUOUS
Status: CANCELLED | OUTPATIENT
Start: 2021-04-28

## 2021-04-28 RX ORDER — LIDOCAINE HYDROCHLORIDE 10 MG/ML
1 INJECTION, SOLUTION EPIDURAL; INFILTRATION; INTRACAUDAL; PERINEURAL ONCE
Status: CANCELLED | OUTPATIENT
Start: 2021-04-28 | End: 2021-04-28

## 2021-04-29 ENCOUNTER — PATIENT MESSAGE (OUTPATIENT)
Dept: NEUROSURGERY | Facility: CLINIC | Age: 61
End: 2021-04-29

## 2021-05-03 ENCOUNTER — PATIENT MESSAGE (OUTPATIENT)
Dept: NEUROSURGERY | Facility: CLINIC | Age: 61
End: 2021-05-03

## 2021-05-05 ENCOUNTER — TELEPHONE (OUTPATIENT)
Dept: NEUROSURGERY | Facility: CLINIC | Age: 61
End: 2021-05-05

## 2021-05-11 ENCOUNTER — TELEPHONE (OUTPATIENT)
Dept: ENDOCRINOLOGY | Facility: CLINIC | Age: 61
End: 2021-05-11

## 2021-05-11 DIAGNOSIS — E11.9 TYPE 2 DIABETES MELLITUS WITHOUT COMPLICATION, WITH LONG-TERM CURRENT USE OF INSULIN: Primary | ICD-10-CM

## 2021-05-11 DIAGNOSIS — Z79.4 TYPE 2 DIABETES MELLITUS WITHOUT COMPLICATION, WITH LONG-TERM CURRENT USE OF INSULIN: Primary | ICD-10-CM

## 2021-05-12 RX ORDER — FLASH GLUCOSE SENSOR
KIT MISCELLANEOUS
Qty: 2 KIT | Refills: 11 | Status: SHIPPED | OUTPATIENT
Start: 2021-05-12 | End: 2022-05-14 | Stop reason: SDUPTHER

## 2021-05-20 ENCOUNTER — TELEPHONE (OUTPATIENT)
Dept: NEUROSURGERY | Facility: CLINIC | Age: 61
End: 2021-05-20

## 2021-06-02 ENCOUNTER — OFFICE VISIT (OUTPATIENT)
Dept: ENDOCRINOLOGY | Facility: CLINIC | Age: 61
End: 2021-06-02
Payer: COMMERCIAL

## 2021-06-02 VITALS
BODY MASS INDEX: 31.52 KG/M2 | WEIGHT: 196.13 LBS | HEIGHT: 66 IN | SYSTOLIC BLOOD PRESSURE: 156 MMHG | HEART RATE: 79 BPM | DIASTOLIC BLOOD PRESSURE: 88 MMHG

## 2021-06-02 DIAGNOSIS — J32.9 SINUSITIS, UNSPECIFIED CHRONICITY, UNSPECIFIED LOCATION: Primary | ICD-10-CM

## 2021-06-02 DIAGNOSIS — Z79.4 TYPE 2 DIABETES MELLITUS WITHOUT COMPLICATION, WITH LONG-TERM CURRENT USE OF INSULIN: ICD-10-CM

## 2021-06-02 DIAGNOSIS — I10 ESSENTIAL HYPERTENSION: ICD-10-CM

## 2021-06-02 DIAGNOSIS — E78.49 OTHER HYPERLIPIDEMIA: ICD-10-CM

## 2021-06-02 DIAGNOSIS — E11.9 TYPE 2 DIABETES MELLITUS WITHOUT COMPLICATION, WITH LONG-TERM CURRENT USE OF INSULIN: ICD-10-CM

## 2021-06-02 DIAGNOSIS — E11.649 HYPOGLYCEMIA ASSOCIATED WITH DIABETES: ICD-10-CM

## 2021-06-02 DIAGNOSIS — J01.10 ACUTE NON-RECURRENT FRONTAL SINUSITIS: ICD-10-CM

## 2021-06-02 PROCEDURE — 3051F PR MOST RECENT HEMOGLOBIN A1C LEVEL 7.0 - < 8.0%: ICD-10-PCS | Mod: CPTII,S$GLB,, | Performed by: NURSE PRACTITIONER

## 2021-06-02 PROCEDURE — 3072F PR LOW RISK FOR RETINOPATHY: ICD-10-PCS | Mod: S$GLB,,, | Performed by: NURSE PRACTITIONER

## 2021-06-02 PROCEDURE — 99999 PR PBB SHADOW E&M-EST. PATIENT-LVL V: ICD-10-PCS | Mod: PBBFAC,,, | Performed by: NURSE PRACTITIONER

## 2021-06-02 PROCEDURE — 1125F PR PAIN SEVERITY QUANTIFIED, PAIN PRESENT: ICD-10-PCS | Mod: S$GLB,,, | Performed by: NURSE PRACTITIONER

## 2021-06-02 PROCEDURE — 3051F HG A1C>EQUAL 7.0%<8.0%: CPT | Mod: CPTII,S$GLB,, | Performed by: NURSE PRACTITIONER

## 2021-06-02 PROCEDURE — 3008F BODY MASS INDEX DOCD: CPT | Mod: CPTII,S$GLB,, | Performed by: NURSE PRACTITIONER

## 2021-06-02 PROCEDURE — 3008F PR BODY MASS INDEX (BMI) DOCUMENTED: ICD-10-PCS | Mod: CPTII,S$GLB,, | Performed by: NURSE PRACTITIONER

## 2021-06-02 PROCEDURE — 99214 OFFICE O/P EST MOD 30 MIN: CPT | Mod: S$GLB,,, | Performed by: NURSE PRACTITIONER

## 2021-06-02 PROCEDURE — 3072F LOW RISK FOR RETINOPATHY: CPT | Mod: S$GLB,,, | Performed by: NURSE PRACTITIONER

## 2021-06-02 PROCEDURE — 1125F AMNT PAIN NOTED PAIN PRSNT: CPT | Mod: S$GLB,,, | Performed by: NURSE PRACTITIONER

## 2021-06-02 PROCEDURE — 99214 PR OFFICE/OUTPT VISIT, EST, LEVL IV, 30-39 MIN: ICD-10-PCS | Mod: S$GLB,,, | Performed by: NURSE PRACTITIONER

## 2021-06-02 PROCEDURE — 99999 PR PBB SHADOW E&M-EST. PATIENT-LVL V: CPT | Mod: PBBFAC,,, | Performed by: NURSE PRACTITIONER

## 2021-06-02 RX ORDER — AMOXICILLIN AND CLAVULANATE POTASSIUM 500; 125 MG/1; MG/1
1 TABLET, FILM COATED ORAL 2 TIMES DAILY
Qty: 14 TABLET | Refills: 0 | Status: SHIPPED | OUTPATIENT
Start: 2021-06-02 | End: 2021-06-17 | Stop reason: CLARIF

## 2021-06-11 ENCOUNTER — TELEPHONE (OUTPATIENT)
Dept: ENDOCRINOLOGY | Facility: CLINIC | Age: 61
End: 2021-06-11

## 2021-06-14 ENCOUNTER — TELEPHONE (OUTPATIENT)
Dept: ENDOCRINOLOGY | Facility: CLINIC | Age: 61
End: 2021-06-14

## 2021-06-22 ENCOUNTER — TELEPHONE (OUTPATIENT)
Dept: NEUROSURGERY | Facility: CLINIC | Age: 61
End: 2021-06-22

## 2021-06-25 PROBLEM — G89.18 POST-OP PAIN: Status: ACTIVE | Noted: 2021-06-25

## 2021-06-25 PROBLEM — R79.89 LOW TSH LEVEL: Status: ACTIVE | Noted: 2021-06-25

## 2021-07-06 ENCOUNTER — CLINICAL SUPPORT (OUTPATIENT)
Dept: NEUROSURGERY | Facility: CLINIC | Age: 61
End: 2021-07-06
Payer: COMMERCIAL

## 2021-07-06 ENCOUNTER — IMMUNIZATION (OUTPATIENT)
Dept: FAMILY MEDICINE | Facility: CLINIC | Age: 61
End: 2021-07-06
Payer: COMMERCIAL

## 2021-07-06 VITALS — TEMPERATURE: 97 F

## 2021-07-06 DIAGNOSIS — Z23 NEED FOR VACCINATION: Primary | ICD-10-CM

## 2021-07-06 PROCEDURE — 99999 PR PBB SHADOW E&M-EST. PATIENT-LVL I: ICD-10-PCS | Mod: PBBFAC,,,

## 2021-07-06 PROCEDURE — 99999 PR PBB SHADOW E&M-EST. PATIENT-LVL I: CPT | Mod: PBBFAC,,,

## 2021-07-06 PROCEDURE — 0031A COVID-19,VECTOR-NR,RS-AD26,PF,0.5 ML DOSE VACCINE (JANSSEN): CPT | Mod: PBBFAC | Performed by: FAMILY MEDICINE

## 2021-07-06 RX ORDER — OXYCODONE AND ACETAMINOPHEN 7.5; 325 MG/1; MG/1
1 TABLET ORAL EVERY 6 HOURS PRN
Qty: 28 TABLET | Refills: 0 | Status: SHIPPED | OUTPATIENT
Start: 2021-07-06 | End: 2022-03-14

## 2021-07-07 ENCOUNTER — LAB VISIT (OUTPATIENT)
Dept: LAB | Facility: HOSPITAL | Age: 61
End: 2021-07-07
Attending: NURSE PRACTITIONER
Payer: COMMERCIAL

## 2021-07-07 DIAGNOSIS — Z79.4 TYPE 2 DIABETES MELLITUS WITHOUT COMPLICATION, WITH LONG-TERM CURRENT USE OF INSULIN: ICD-10-CM

## 2021-07-07 DIAGNOSIS — E11.9 TYPE 2 DIABETES MELLITUS WITHOUT COMPLICATION, WITH LONG-TERM CURRENT USE OF INSULIN: ICD-10-CM

## 2021-07-07 LAB
ESTIMATED AVG GLUCOSE: 174 MG/DL (ref 68–131)
HBA1C MFR BLD: 7.7 % (ref 4–5.6)

## 2021-07-07 PROCEDURE — 83036 HEMOGLOBIN GLYCOSYLATED A1C: CPT | Performed by: NURSE PRACTITIONER

## 2021-07-07 PROCEDURE — 36415 COLL VENOUS BLD VENIPUNCTURE: CPT | Mod: PO | Performed by: NURSE PRACTITIONER

## 2021-07-19 ENCOUNTER — PATIENT MESSAGE (OUTPATIENT)
Dept: PAIN MEDICINE | Facility: CLINIC | Age: 61
End: 2021-07-19

## 2021-07-20 ENCOUNTER — TELEPHONE (OUTPATIENT)
Dept: PAIN MEDICINE | Facility: CLINIC | Age: 61
End: 2021-07-20

## 2021-07-20 ENCOUNTER — LAB VISIT (OUTPATIENT)
Dept: LAB | Facility: HOSPITAL | Age: 61
End: 2021-07-20
Payer: COMMERCIAL

## 2021-07-20 DIAGNOSIS — E11.9 TYPE 2 DIABETES MELLITUS WITHOUT COMPLICATION, WITH LONG-TERM CURRENT USE OF INSULIN: ICD-10-CM

## 2021-07-20 DIAGNOSIS — Z79.4 TYPE 2 DIABETES MELLITUS WITHOUT COMPLICATION, WITH LONG-TERM CURRENT USE OF INSULIN: ICD-10-CM

## 2021-07-20 LAB
ALBUMIN SERPL BCP-MCNC: 3.9 G/DL (ref 3.5–5.2)
ALP SERPL-CCNC: 73 U/L (ref 55–135)
ALT SERPL W/O P-5'-P-CCNC: 14 U/L (ref 10–44)
ANION GAP SERPL CALC-SCNC: 10 MMOL/L (ref 8–16)
AST SERPL-CCNC: 20 U/L (ref 10–40)
BILIRUB SERPL-MCNC: 0.9 MG/DL (ref 0.1–1)
BUN SERPL-MCNC: 12 MG/DL (ref 6–20)
CALCIUM SERPL-MCNC: 9.9 MG/DL (ref 8.7–10.5)
CHLORIDE SERPL-SCNC: 108 MMOL/L (ref 95–110)
CHOLEST SERPL-MCNC: 115 MG/DL (ref 120–199)
CHOLEST/HDLC SERPL: 3.1 {RATIO} (ref 2–5)
CO2 SERPL-SCNC: 27 MMOL/L (ref 23–29)
CREAT SERPL-MCNC: 0.7 MG/DL (ref 0.5–1.4)
EST. GFR  (AFRICAN AMERICAN): >60 ML/MIN/1.73 M^2
EST. GFR  (NON AFRICAN AMERICAN): >60 ML/MIN/1.73 M^2
ESTIMATED AVG GLUCOSE: 163 MG/DL (ref 68–131)
GLUCOSE SERPL-MCNC: 100 MG/DL (ref 70–110)
HBA1C MFR BLD: 7.3 % (ref 4–5.6)
HDLC SERPL-MCNC: 37 MG/DL (ref 40–75)
HDLC SERPL: 32.2 % (ref 20–50)
LDLC SERPL CALC-MCNC: 60 MG/DL (ref 63–159)
NONHDLC SERPL-MCNC: 78 MG/DL
POTASSIUM SERPL-SCNC: 4 MMOL/L (ref 3.5–5.1)
PROT SERPL-MCNC: 7.6 G/DL (ref 6–8.4)
SODIUM SERPL-SCNC: 145 MMOL/L (ref 136–145)
TRIGL SERPL-MCNC: 90 MG/DL (ref 30–150)

## 2021-07-20 PROCEDURE — 80053 COMPREHEN METABOLIC PANEL: CPT | Performed by: NURSE PRACTITIONER

## 2021-07-20 PROCEDURE — 36415 COLL VENOUS BLD VENIPUNCTURE: CPT | Mod: PO | Performed by: NURSE PRACTITIONER

## 2021-07-20 PROCEDURE — 80061 LIPID PANEL: CPT | Performed by: NURSE PRACTITIONER

## 2021-07-20 PROCEDURE — 83036 HEMOGLOBIN GLYCOSYLATED A1C: CPT | Performed by: NURSE PRACTITIONER

## 2021-07-20 RX ORDER — HYDROCODONE BITARTRATE AND ACETAMINOPHEN 10; 325 MG/1; MG/1
1 TABLET ORAL 3 TIMES DAILY PRN
Qty: 90 TABLET | Refills: 0 | Status: SHIPPED | OUTPATIENT
Start: 2021-07-20 | End: 2021-08-18 | Stop reason: SDUPTHER

## 2021-07-27 ENCOUNTER — OFFICE VISIT (OUTPATIENT)
Dept: ENDOCRINOLOGY | Facility: CLINIC | Age: 61
End: 2021-07-27
Payer: COMMERCIAL

## 2021-07-27 ENCOUNTER — OFFICE VISIT (OUTPATIENT)
Dept: NEUROSURGERY | Facility: CLINIC | Age: 61
End: 2021-07-27
Payer: COMMERCIAL

## 2021-07-27 VITALS
WEIGHT: 191.94 LBS | DIASTOLIC BLOOD PRESSURE: 76 MMHG | SYSTOLIC BLOOD PRESSURE: 124 MMHG | BODY MASS INDEX: 30.85 KG/M2 | HEART RATE: 78 BPM | HEIGHT: 66 IN

## 2021-07-27 VITALS
WEIGHT: 195.13 LBS | BODY MASS INDEX: 31.36 KG/M2 | DIASTOLIC BLOOD PRESSURE: 79 MMHG | HEIGHT: 66 IN | SYSTOLIC BLOOD PRESSURE: 123 MMHG | HEART RATE: 70 BPM

## 2021-07-27 DIAGNOSIS — E78.49 OTHER HYPERLIPIDEMIA: ICD-10-CM

## 2021-07-27 DIAGNOSIS — E11.9 TYPE 2 DIABETES MELLITUS WITHOUT COMPLICATION, WITH LONG-TERM CURRENT USE OF INSULIN: Primary | ICD-10-CM

## 2021-07-27 DIAGNOSIS — I10 ESSENTIAL HYPERTENSION: ICD-10-CM

## 2021-07-27 DIAGNOSIS — E11.649 HYPOGLYCEMIA ASSOCIATED WITH DIABETES: ICD-10-CM

## 2021-07-27 DIAGNOSIS — Z98.890 S/P LUMBAR LAMINECTOMY: Primary | ICD-10-CM

## 2021-07-27 DIAGNOSIS — Z98.1 S/P CERVICAL SPINAL FUSION: ICD-10-CM

## 2021-07-27 DIAGNOSIS — Z79.4 TYPE 2 DIABETES MELLITUS WITHOUT COMPLICATION, WITH LONG-TERM CURRENT USE OF INSULIN: Primary | ICD-10-CM

## 2021-07-27 PROCEDURE — 3051F PR MOST RECENT HEMOGLOBIN A1C LEVEL 7.0 - < 8.0%: ICD-10-PCS | Mod: CPTII,S$GLB,, | Performed by: PHYSICIAN ASSISTANT

## 2021-07-27 PROCEDURE — 1126F AMNT PAIN NOTED NONE PRSNT: CPT | Mod: CPTII,S$GLB,, | Performed by: NURSE PRACTITIONER

## 2021-07-27 PROCEDURE — 3074F PR MOST RECENT SYSTOLIC BLOOD PRESSURE < 130 MM HG: ICD-10-PCS | Mod: CPTII,S$GLB,, | Performed by: NURSE PRACTITIONER

## 2021-07-27 PROCEDURE — 3051F HG A1C>EQUAL 7.0%<8.0%: CPT | Mod: CPTII,S$GLB,, | Performed by: NURSE PRACTITIONER

## 2021-07-27 PROCEDURE — 99214 OFFICE O/P EST MOD 30 MIN: CPT | Mod: 25,S$GLB,, | Performed by: NURSE PRACTITIONER

## 2021-07-27 PROCEDURE — 3078F PR MOST RECENT DIASTOLIC BLOOD PRESSURE < 80 MM HG: ICD-10-PCS | Mod: CPTII,S$GLB,, | Performed by: NURSE PRACTITIONER

## 2021-07-27 PROCEDURE — 3008F BODY MASS INDEX DOCD: CPT | Mod: CPTII,S$GLB,, | Performed by: NURSE PRACTITIONER

## 2021-07-27 PROCEDURE — 1125F AMNT PAIN NOTED PAIN PRSNT: CPT | Mod: CPTII,S$GLB,, | Performed by: PHYSICIAN ASSISTANT

## 2021-07-27 PROCEDURE — 1159F PR MEDICATION LIST DOCUMENTED IN MEDICAL RECORD: ICD-10-PCS | Mod: CPTII,S$GLB,, | Performed by: PHYSICIAN ASSISTANT

## 2021-07-27 PROCEDURE — 1125F PR PAIN SEVERITY QUANTIFIED, PAIN PRESENT: ICD-10-PCS | Mod: CPTII,S$GLB,, | Performed by: PHYSICIAN ASSISTANT

## 2021-07-27 PROCEDURE — 99999 PR PBB SHADOW E&M-EST. PATIENT-LVL V: CPT | Mod: PBBFAC,,, | Performed by: NURSE PRACTITIONER

## 2021-07-27 PROCEDURE — 1159F MED LIST DOCD IN RCRD: CPT | Mod: CPTII,S$GLB,, | Performed by: NURSE PRACTITIONER

## 2021-07-27 PROCEDURE — 3074F PR MOST RECENT SYSTOLIC BLOOD PRESSURE < 130 MM HG: ICD-10-PCS | Mod: CPTII,S$GLB,, | Performed by: PHYSICIAN ASSISTANT

## 2021-07-27 PROCEDURE — 3072F PR LOW RISK FOR RETINOPATHY: ICD-10-PCS | Mod: CPTII,S$GLB,, | Performed by: NURSE PRACTITIONER

## 2021-07-27 PROCEDURE — 99024 POSTOP FOLLOW-UP VISIT: CPT | Mod: S$GLB,,, | Performed by: PHYSICIAN ASSISTANT

## 2021-07-27 PROCEDURE — 3008F PR BODY MASS INDEX (BMI) DOCUMENTED: ICD-10-PCS | Mod: CPTII,S$GLB,, | Performed by: PHYSICIAN ASSISTANT

## 2021-07-27 PROCEDURE — 1160F PR REVIEW ALL MEDS BY PRESCRIBER/CLIN PHARMACIST DOCUMENTED: ICD-10-PCS | Mod: CPTII,S$GLB,, | Performed by: NURSE PRACTITIONER

## 2021-07-27 PROCEDURE — 3074F SYST BP LT 130 MM HG: CPT | Mod: CPTII,S$GLB,, | Performed by: PHYSICIAN ASSISTANT

## 2021-07-27 PROCEDURE — 99214 PR OFFICE/OUTPT VISIT, EST, LEVL IV, 30-39 MIN: ICD-10-PCS | Mod: 25,S$GLB,, | Performed by: NURSE PRACTITIONER

## 2021-07-27 PROCEDURE — 1160F RVW MEDS BY RX/DR IN RCRD: CPT | Mod: CPTII,S$GLB,, | Performed by: NURSE PRACTITIONER

## 2021-07-27 PROCEDURE — 3078F DIAST BP <80 MM HG: CPT | Mod: CPTII,S$GLB,, | Performed by: NURSE PRACTITIONER

## 2021-07-27 PROCEDURE — 3078F DIAST BP <80 MM HG: CPT | Mod: CPTII,S$GLB,, | Performed by: PHYSICIAN ASSISTANT

## 2021-07-27 PROCEDURE — 99999 PR PBB SHADOW E&M-EST. PATIENT-LVL IV: ICD-10-PCS | Mod: PBBFAC,,, | Performed by: PHYSICIAN ASSISTANT

## 2021-07-27 PROCEDURE — 1159F MED LIST DOCD IN RCRD: CPT | Mod: CPTII,S$GLB,, | Performed by: PHYSICIAN ASSISTANT

## 2021-07-27 PROCEDURE — 3051F PR MOST RECENT HEMOGLOBIN A1C LEVEL 7.0 - < 8.0%: ICD-10-PCS | Mod: CPTII,S$GLB,, | Performed by: NURSE PRACTITIONER

## 2021-07-27 PROCEDURE — 3072F LOW RISK FOR RETINOPATHY: CPT | Mod: CPTII,S$GLB,, | Performed by: PHYSICIAN ASSISTANT

## 2021-07-27 PROCEDURE — 3072F PR LOW RISK FOR RETINOPATHY: ICD-10-PCS | Mod: CPTII,S$GLB,, | Performed by: PHYSICIAN ASSISTANT

## 2021-07-27 PROCEDURE — 3051F HG A1C>EQUAL 7.0%<8.0%: CPT | Mod: CPTII,S$GLB,, | Performed by: PHYSICIAN ASSISTANT

## 2021-07-27 PROCEDURE — 3008F PR BODY MASS INDEX (BMI) DOCUMENTED: ICD-10-PCS | Mod: CPTII,S$GLB,, | Performed by: NURSE PRACTITIONER

## 2021-07-27 PROCEDURE — 1159F PR MEDICATION LIST DOCUMENTED IN MEDICAL RECORD: ICD-10-PCS | Mod: CPTII,S$GLB,, | Performed by: NURSE PRACTITIONER

## 2021-07-27 PROCEDURE — 3072F LOW RISK FOR RETINOPATHY: CPT | Mod: CPTII,S$GLB,, | Performed by: NURSE PRACTITIONER

## 2021-07-27 PROCEDURE — 3078F PR MOST RECENT DIASTOLIC BLOOD PRESSURE < 80 MM HG: ICD-10-PCS | Mod: CPTII,S$GLB,, | Performed by: PHYSICIAN ASSISTANT

## 2021-07-27 PROCEDURE — 95251 PR GLUCOSE MONITOR, 72 HOUR, PHYS INTERP: ICD-10-PCS | Mod: S$GLB,,, | Performed by: NURSE PRACTITIONER

## 2021-07-27 PROCEDURE — 3074F SYST BP LT 130 MM HG: CPT | Mod: CPTII,S$GLB,, | Performed by: NURSE PRACTITIONER

## 2021-07-27 PROCEDURE — 99999 PR PBB SHADOW E&M-EST. PATIENT-LVL V: ICD-10-PCS | Mod: PBBFAC,,, | Performed by: NURSE PRACTITIONER

## 2021-07-27 PROCEDURE — 99024 PR POST-OP FOLLOW-UP VISIT: ICD-10-PCS | Mod: S$GLB,,, | Performed by: PHYSICIAN ASSISTANT

## 2021-07-27 PROCEDURE — 95251 CONT GLUC MNTR ANALYSIS I&R: CPT | Mod: S$GLB,,, | Performed by: NURSE PRACTITIONER

## 2021-07-27 PROCEDURE — 1126F PR PAIN SEVERITY QUANTIFIED, NO PAIN PRESENT: ICD-10-PCS | Mod: CPTII,S$GLB,, | Performed by: NURSE PRACTITIONER

## 2021-07-27 PROCEDURE — 3008F BODY MASS INDEX DOCD: CPT | Mod: CPTII,S$GLB,, | Performed by: PHYSICIAN ASSISTANT

## 2021-07-27 PROCEDURE — 99999 PR PBB SHADOW E&M-EST. PATIENT-LVL IV: CPT | Mod: PBBFAC,,, | Performed by: PHYSICIAN ASSISTANT

## 2021-07-29 ENCOUNTER — HOSPITAL ENCOUNTER (OUTPATIENT)
Dept: RADIOLOGY | Facility: HOSPITAL | Age: 61
Discharge: HOME | End: 2021-07-29
Attending: PHYSICIAN ASSISTANT
Payer: COMMERCIAL

## 2021-07-29 DIAGNOSIS — Z98.1 S/P CERVICAL SPINAL FUSION: ICD-10-CM

## 2021-07-29 PROCEDURE — 72125 CT NECK SPINE W/O DYE: CPT | Mod: TC,PO

## 2021-07-29 PROCEDURE — 72125 CT NECK SPINE W/O DYE: CPT | Mod: 26,,, | Performed by: RADIOLOGY

## 2021-07-29 PROCEDURE — 72125 CT CERVICAL SPINE WITHOUT CONTRAST: ICD-10-PCS | Mod: 26,,, | Performed by: RADIOLOGY

## 2021-07-30 ENCOUNTER — TELEPHONE (OUTPATIENT)
Dept: NEUROSURGERY | Facility: CLINIC | Age: 61
End: 2021-07-30

## 2021-08-03 RX ORDER — TIZANIDINE 4 MG/1
4 TABLET ORAL EVERY 8 HOURS PRN
Qty: 60 TABLET | Refills: 2 | Status: SHIPPED | OUTPATIENT
Start: 2021-08-03 | End: 2021-09-19

## 2021-08-27 ENCOUNTER — TELEPHONE (OUTPATIENT)
Dept: ENDOCRINOLOGY | Facility: CLINIC | Age: 61
End: 2021-08-27

## 2021-08-27 ENCOUNTER — PATIENT MESSAGE (OUTPATIENT)
Dept: ENDOCRINOLOGY | Facility: CLINIC | Age: 61
End: 2021-08-27

## 2021-09-13 RX ORDER — GLIPIZIDE 5 MG/1
5 TABLET ORAL DAILY
COMMUNITY
Start: 2021-08-07 | End: 2021-09-13 | Stop reason: SDUPTHER

## 2021-09-14 RX ORDER — GLIPIZIDE 5 MG/1
5 TABLET ORAL DAILY
Qty: 30 TABLET | Refills: 0 | Status: SHIPPED | OUTPATIENT
Start: 2021-09-14 | End: 2021-12-08

## 2021-09-27 PROBLEM — G89.18 POST-OP PAIN: Status: RESOLVED | Noted: 2021-06-25 | Resolved: 2021-09-27

## 2021-10-14 ENCOUNTER — OFFICE VISIT (OUTPATIENT)
Dept: PAIN MEDICINE | Facility: CLINIC | Age: 61
End: 2021-10-14
Payer: COMMERCIAL

## 2021-10-14 VITALS
RESPIRATION RATE: 18 BRPM | WEIGHT: 191.25 LBS | HEART RATE: 70 BPM | SYSTOLIC BLOOD PRESSURE: 93 MMHG | DIASTOLIC BLOOD PRESSURE: 52 MMHG | OXYGEN SATURATION: 96 % | BODY MASS INDEX: 30.87 KG/M2 | TEMPERATURE: 97 F

## 2021-10-14 DIAGNOSIS — M54.16 LUMBAR RADICULOPATHY: Primary | ICD-10-CM

## 2021-10-14 DIAGNOSIS — Z01.818 PRE-OP TESTING: ICD-10-CM

## 2021-10-14 DIAGNOSIS — M51.36 DDD (DEGENERATIVE DISC DISEASE), LUMBAR: ICD-10-CM

## 2021-10-14 DIAGNOSIS — M50.30 DDD (DEGENERATIVE DISC DISEASE), CERVICAL: ICD-10-CM

## 2021-10-14 DIAGNOSIS — Z79.891 OPIOID CONTRACT EXISTS: ICD-10-CM

## 2021-10-14 PROCEDURE — 1160F RVW MEDS BY RX/DR IN RCRD: CPT | Mod: CPTII,S$GLB,, | Performed by: PHYSICIAN ASSISTANT

## 2021-10-14 PROCEDURE — 99214 PR OFFICE/OUTPT VISIT, EST, LEVL IV, 30-39 MIN: ICD-10-PCS | Mod: S$GLB,,, | Performed by: PHYSICIAN ASSISTANT

## 2021-10-14 PROCEDURE — 1160F PR REVIEW ALL MEDS BY PRESCRIBER/CLIN PHARMACIST DOCUMENTED: ICD-10-PCS | Mod: CPTII,S$GLB,, | Performed by: PHYSICIAN ASSISTANT

## 2021-10-14 PROCEDURE — 3078F DIAST BP <80 MM HG: CPT | Mod: CPTII,S$GLB,, | Performed by: PHYSICIAN ASSISTANT

## 2021-10-14 PROCEDURE — 99214 OFFICE O/P EST MOD 30 MIN: CPT | Mod: S$GLB,,, | Performed by: PHYSICIAN ASSISTANT

## 2021-10-14 PROCEDURE — 3051F PR MOST RECENT HEMOGLOBIN A1C LEVEL 7.0 - < 8.0%: ICD-10-PCS | Mod: CPTII,S$GLB,, | Performed by: PHYSICIAN ASSISTANT

## 2021-10-14 PROCEDURE — 1159F MED LIST DOCD IN RCRD: CPT | Mod: CPTII,S$GLB,, | Performed by: PHYSICIAN ASSISTANT

## 2021-10-14 PROCEDURE — 3051F HG A1C>EQUAL 7.0%<8.0%: CPT | Mod: CPTII,S$GLB,, | Performed by: PHYSICIAN ASSISTANT

## 2021-10-14 PROCEDURE — 1159F PR MEDICATION LIST DOCUMENTED IN MEDICAL RECORD: ICD-10-PCS | Mod: CPTII,S$GLB,, | Performed by: PHYSICIAN ASSISTANT

## 2021-10-14 PROCEDURE — 3074F SYST BP LT 130 MM HG: CPT | Mod: CPTII,S$GLB,, | Performed by: PHYSICIAN ASSISTANT

## 2021-10-14 PROCEDURE — 3066F PR DOCUMENTATION OF TREATMENT FOR NEPHROPATHY: ICD-10-PCS | Mod: CPTII,S$GLB,, | Performed by: PHYSICIAN ASSISTANT

## 2021-10-14 PROCEDURE — 4010F ACE/ARB THERAPY RXD/TAKEN: CPT | Mod: CPTII,S$GLB,, | Performed by: PHYSICIAN ASSISTANT

## 2021-10-14 PROCEDURE — 3074F PR MOST RECENT SYSTOLIC BLOOD PRESSURE < 130 MM HG: ICD-10-PCS | Mod: CPTII,S$GLB,, | Performed by: PHYSICIAN ASSISTANT

## 2021-10-14 PROCEDURE — 99999 PR PBB SHADOW E&M-EST. PATIENT-LVL V: CPT | Mod: PBBFAC,,, | Performed by: PHYSICIAN ASSISTANT

## 2021-10-14 PROCEDURE — 3008F PR BODY MASS INDEX (BMI) DOCUMENTED: ICD-10-PCS | Mod: CPTII,S$GLB,, | Performed by: PHYSICIAN ASSISTANT

## 2021-10-14 PROCEDURE — 99999 PR PBB SHADOW E&M-EST. PATIENT-LVL V: ICD-10-PCS | Mod: PBBFAC,,, | Performed by: PHYSICIAN ASSISTANT

## 2021-10-14 PROCEDURE — 4010F PR ACE/ARB THEARPY RXD/TAKEN: ICD-10-PCS | Mod: CPTII,S$GLB,, | Performed by: PHYSICIAN ASSISTANT

## 2021-10-14 PROCEDURE — 3078F PR MOST RECENT DIASTOLIC BLOOD PRESSURE < 80 MM HG: ICD-10-PCS | Mod: CPTII,S$GLB,, | Performed by: PHYSICIAN ASSISTANT

## 2021-10-14 PROCEDURE — 3008F BODY MASS INDEX DOCD: CPT | Mod: CPTII,S$GLB,, | Performed by: PHYSICIAN ASSISTANT

## 2021-10-14 PROCEDURE — 3072F PR LOW RISK FOR RETINOPATHY: ICD-10-PCS | Mod: CPTII,S$GLB,, | Performed by: PHYSICIAN ASSISTANT

## 2021-10-14 PROCEDURE — 3066F NEPHROPATHY DOC TX: CPT | Mod: CPTII,S$GLB,, | Performed by: PHYSICIAN ASSISTANT

## 2021-10-14 PROCEDURE — 3072F LOW RISK FOR RETINOPATHY: CPT | Mod: CPTII,S$GLB,, | Performed by: PHYSICIAN ASSISTANT

## 2021-10-14 PROCEDURE — 3061F NEG MICROALBUMINURIA REV: CPT | Mod: CPTII,S$GLB,, | Performed by: PHYSICIAN ASSISTANT

## 2021-10-14 PROCEDURE — 3061F PR NEG MICROALBUMINURIA RESULT DOCUMENTED/REVIEW: ICD-10-PCS | Mod: CPTII,S$GLB,, | Performed by: PHYSICIAN ASSISTANT

## 2021-10-14 RX ORDER — ALPRAZOLAM 0.5 MG/1
1 TABLET, ORALLY DISINTEGRATING ORAL ONCE AS NEEDED
Status: CANCELLED | OUTPATIENT
Start: 2021-10-14 | End: 2033-03-12

## 2021-10-15 RX ORDER — HYDROCODONE BITARTRATE AND ACETAMINOPHEN 10; 325 MG/1; MG/1
1 TABLET ORAL 3 TIMES DAILY PRN
Qty: 90 TABLET | Refills: 0 | Status: SHIPPED | OUTPATIENT
Start: 2021-10-18 | End: 2021-11-17

## 2021-10-15 RX ORDER — HYDROCODONE BITARTRATE AND ACETAMINOPHEN 10; 325 MG/1; MG/1
1 TABLET ORAL 3 TIMES DAILY PRN
Qty: 90 TABLET | Refills: 0 | Status: SHIPPED | OUTPATIENT
Start: 2021-11-17 | End: 2021-12-16 | Stop reason: SDUPTHER
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
## 2021-10-26 ENCOUNTER — LAB VISIT (OUTPATIENT)
Dept: LAB | Facility: HOSPITAL | Age: 61
End: 2021-10-26
Attending: NURSE PRACTITIONER
Payer: COMMERCIAL

## 2021-10-26 DIAGNOSIS — Z79.4 TYPE 2 DIABETES MELLITUS WITHOUT COMPLICATION, WITH LONG-TERM CURRENT USE OF INSULIN: ICD-10-CM

## 2021-10-26 DIAGNOSIS — E11.9 TYPE 2 DIABETES MELLITUS WITHOUT COMPLICATION, WITH LONG-TERM CURRENT USE OF INSULIN: ICD-10-CM

## 2021-10-26 LAB
ESTIMATED AVG GLUCOSE: 169 MG/DL (ref 68–131)
HBA1C MFR BLD: 7.5 % (ref 4–5.6)

## 2021-10-26 PROCEDURE — 83036 HEMOGLOBIN GLYCOSYLATED A1C: CPT | Performed by: NURSE PRACTITIONER

## 2021-10-26 PROCEDURE — 36415 COLL VENOUS BLD VENIPUNCTURE: CPT | Mod: PO | Performed by: NURSE PRACTITIONER

## 2021-11-01 ENCOUNTER — HOSPITAL ENCOUNTER (OUTPATIENT)
Facility: HOSPITAL | Age: 61
Discharge: HOME | End: 2021-11-01
Attending: ANESTHESIOLOGY | Admitting: ANESTHESIOLOGY
Payer: COMMERCIAL

## 2021-11-01 ENCOUNTER — HOSPITAL ENCOUNTER (OUTPATIENT)
Dept: RADIOLOGY | Facility: HOSPITAL | Age: 61
Discharge: HOME | End: 2021-11-01
Attending: ANESTHESIOLOGY
Payer: COMMERCIAL

## 2021-11-01 VITALS
WEIGHT: 189 LBS | DIASTOLIC BLOOD PRESSURE: 83 MMHG | TEMPERATURE: 98 F | BODY MASS INDEX: 30.37 KG/M2 | SYSTOLIC BLOOD PRESSURE: 154 MMHG | OXYGEN SATURATION: 98 % | HEIGHT: 66 IN | HEART RATE: 68 BPM | RESPIRATION RATE: 10 BRPM

## 2021-11-01 DIAGNOSIS — M51.36 DDD (DEGENERATIVE DISC DISEASE), LUMBAR: ICD-10-CM

## 2021-11-01 DIAGNOSIS — M54.16 LUMBAR RADICULOPATHY: ICD-10-CM

## 2021-11-01 LAB — GLUCOSE SERPL-MCNC: 92 MG/DL (ref 70–110)

## 2021-11-01 PROCEDURE — 62323 NJX INTERLAMINAR LMBR/SAC: CPT | Mod: ,,, | Performed by: ANESTHESIOLOGY

## 2021-11-01 PROCEDURE — 63600175 PHARM REV CODE 636 W HCPCS: Mod: PO | Performed by: ANESTHESIOLOGY

## 2021-11-01 PROCEDURE — 62323 NJX INTERLAMINAR LMBR/SAC: CPT | Mod: PO | Performed by: ANESTHESIOLOGY

## 2021-11-01 PROCEDURE — 76000 FLUOROSCOPY <1 HR PHYS/QHP: CPT | Mod: TC,PO

## 2021-11-01 PROCEDURE — 25000003 PHARM REV CODE 250: Mod: PO | Performed by: ANESTHESIOLOGY

## 2021-11-01 PROCEDURE — 25500020 PHARM REV CODE 255: Mod: PO | Performed by: ANESTHESIOLOGY

## 2021-11-01 PROCEDURE — 62323 PR INJ LUMBAR/SACRAL, W/IMAGING GUIDANCE: ICD-10-PCS | Mod: ,,, | Performed by: ANESTHESIOLOGY

## 2021-11-01 PROCEDURE — A4216 STERILE WATER/SALINE, 10 ML: HCPCS | Mod: PO | Performed by: ANESTHESIOLOGY

## 2021-11-01 RX ORDER — METHYLPREDNISOLONE ACETATE 80 MG/ML
INJECTION, SUSPENSION INTRA-ARTICULAR; INTRALESIONAL; INTRAMUSCULAR; SOFT TISSUE
Status: DISCONTINUED | OUTPATIENT
Start: 2021-11-01 | End: 2021-11-01 | Stop reason: HOSPADM

## 2021-11-01 RX ORDER — ALPRAZOLAM 0.5 MG/1
1 TABLET, ORALLY DISINTEGRATING ORAL ONCE AS NEEDED
Status: COMPLETED | OUTPATIENT
Start: 2021-11-01 | End: 2021-11-01

## 2021-11-01 RX ORDER — LIDOCAINE HYDROCHLORIDE 10 MG/ML
INJECTION, SOLUTION EPIDURAL; INFILTRATION; INTRACAUDAL; PERINEURAL
Status: DISCONTINUED | OUTPATIENT
Start: 2021-11-01 | End: 2021-11-01 | Stop reason: HOSPADM

## 2021-11-01 RX ORDER — SODIUM CHLORIDE 9 MG/ML
INJECTION, SOLUTION INTRAMUSCULAR; INTRAVENOUS; SUBCUTANEOUS
Status: DISCONTINUED | OUTPATIENT
Start: 2021-11-01 | End: 2021-11-01 | Stop reason: HOSPADM

## 2021-11-01 RX ADMIN — ALPRAZOLAM 0.5 MG: 0.5 TABLET, ORALLY DISINTEGRATING ORAL at 02:11

## 2021-11-01 NOTE — DISCHARGE INSTRUCTIONS
PAIN MANAGEMENT    Home care instructions   Apply ice pack to the injection site for 20 minute prior for the first 24 hours for soreness/discomfort at injection site   DO NOT USE HEAT FOR 24 HOURS   Keep site clean and dry for 24 hours, remove bandaid when desired   Do not drive until tomorrow  Take care when walking after a lumbar injection     STEROIDS OR RADIOFREQUENCY    May take 10-14 days for full effects  Avoid strenuous exercises for 2 days    Resume Aspirin, Plavix, or Coumadin the day after the procedure unless other wise instructed  Resume home medication as prescribed today      CALL PHYSICIAN FOR:   Severe increase in your usual pain or appearance of new pain   Prolonged or increasing weakness or numbness in the legs or arms   Fever greater then 100 degrees F..   Drainage from the incision site, redness, active bleeding or increased swelling at the injection site   Headache that increases when your head is upright and decreases when you lie flat    FOR EMERGENCIES:   Go directly to Emergency Department for Shortness of breath, chest pain, or problems breathing

## 2021-11-01 NOTE — PLAN OF CARE
Vital signs stable. Discharge instructions reviewed with patient. Questions answered. Verbalized understanding.

## 2021-11-01 NOTE — OP NOTE
PROCEDURE DATE: 11/1/2021    PROCEDURE:  Caudal epidural steroid injection under fluoroscopy.    Diagnosis: Lumbar radiculopathy    Post Op diagnosis: Same    PHYSICIAN: Erik Galan M.D.    MEDICATIONS INJECTED:  80 mg of methylprednisone and 4 ml of sterile, preservative-free NaCl.    LOCAL ANESTHETIC GIVEN:  Lidocaine 1%, 4 ml total    SEDATION MEDICATIONS: none    ESTIMATED BLOOD LOSS:  none    COMPLICATIONS:  none    TECHNIQUE:   After the patient was placed in prone position, the patient was prepped and draped in the usual sterile fashion using ChloraPrep and sterile towels.  Appropriate anatomic landmarks were determined by identifying the sacral hiatus in the lateral fluoroscopic view.  Local anesthetic was given via a 25g 1.5 inch needle by raising a wheal and infiltrating down to the periosteum.  A 3.5 inch 22 gauge needle was introduced thru the sacral hiatus.  Omnipaque was injected to confirm placement in the appropriate area and that there was no vascular uptake.  The medication was then injected slowly.  The patient tolerated the procedure well.    The patient was monitored after the procedure.  Patient was given post procedure and discharge instructions to follow at home.  The patient was discharged in a stable condition

## 2021-11-01 NOTE — DISCHARGE SUMMARY
[facility]  Discharge Note  Short Stay    Procedure(s) (LRB):  Injection-steroid-epidural-caudal (N/A)    OUTCOME: Patient tolerated treatment/procedure well without complication and is now ready for discharge.    DISPOSITION: Home or Self Care    FINAL DIAGNOSIS:  Lumbar radiculopathy    FOLLOWUP: In clinic    DISCHARGE INSTRUCTIONS:    Discharge Procedure Orders   Diet Adult Regular     No dressing needed     Notify your health care provider if you experience any of the following:  temperature >100.4     Activity as tolerated

## 2021-11-03 ENCOUNTER — OFFICE VISIT (OUTPATIENT)
Dept: NEUROLOGY | Facility: CLINIC | Age: 61
End: 2021-11-03
Payer: COMMERCIAL

## 2021-11-03 ENCOUNTER — LAB VISIT (OUTPATIENT)
Dept: LAB | Facility: HOSPITAL | Age: 61
End: 2021-11-03
Attending: NURSE PRACTITIONER
Payer: COMMERCIAL

## 2021-11-03 VITALS
WEIGHT: 189.38 LBS | HEART RATE: 75 BPM | SYSTOLIC BLOOD PRESSURE: 137 MMHG | HEIGHT: 66 IN | DIASTOLIC BLOOD PRESSURE: 73 MMHG | BODY MASS INDEX: 30.44 KG/M2

## 2021-11-03 DIAGNOSIS — G62.9 NEUROPATHY: Primary | ICD-10-CM

## 2021-11-03 DIAGNOSIS — G62.9 NEUROPATHY: ICD-10-CM

## 2021-11-03 LAB
BASOPHILS # BLD AUTO: 0.07 K/UL (ref 0–0.2)
BASOPHILS NFR BLD: 0.8 % (ref 0–1.9)
DIFFERENTIAL METHOD: ABNORMAL
EOSINOPHIL # BLD AUTO: 0.2 K/UL (ref 0–0.5)
EOSINOPHIL NFR BLD: 1.9 % (ref 0–8)
ERYTHROCYTE [DISTWIDTH] IN BLOOD BY AUTOMATED COUNT: 12.2 % (ref 11.5–14.5)
HCT VFR BLD AUTO: 40.5 % (ref 37–48.5)
HGB BLD-MCNC: 12.9 G/DL (ref 12–16)
IMM GRANULOCYTES # BLD AUTO: 0.01 K/UL (ref 0–0.04)
IMM GRANULOCYTES NFR BLD AUTO: 0.1 % (ref 0–0.5)
LYMPHOCYTES # BLD AUTO: 2.7 K/UL (ref 1–4.8)
LYMPHOCYTES NFR BLD: 31.6 % (ref 18–48)
MCH RBC QN AUTO: 28.9 PG (ref 27–31)
MCHC RBC AUTO-ENTMCNC: 31.9 G/DL (ref 32–36)
MCV RBC AUTO: 91 FL (ref 82–98)
MONOCYTES # BLD AUTO: 0.7 K/UL (ref 0.3–1)
MONOCYTES NFR BLD: 8.1 % (ref 4–15)
NEUTROPHILS # BLD AUTO: 4.8 K/UL (ref 1.8–7.7)
NEUTROPHILS NFR BLD: 57.5 % (ref 38–73)
NRBC BLD-RTO: 0 /100 WBC
PATH REV BLD -IMP: NORMAL
PLATELET # BLD AUTO: 274 K/UL (ref 150–450)
PMV BLD AUTO: 10.8 FL (ref 9.2–12.9)
RBC # BLD AUTO: 4.47 M/UL (ref 4–5.4)
RHEUMATOID FACT SERPL-ACNC: <13 IU/ML (ref 0–15)
VIT B12 SERPL-MCNC: 580 PG/ML (ref 210–950)
WBC # BLD AUTO: 8.41 K/UL (ref 3.9–12.7)

## 2021-11-03 PROCEDURE — 99999 PR PBB SHADOW E&M-EST. PATIENT-LVL V: ICD-10-PCS | Mod: PBBFAC,,, | Performed by: NURSE PRACTITIONER

## 2021-11-03 PROCEDURE — 86235 NUCLEAR ANTIGEN ANTIBODY: CPT | Mod: 59 | Performed by: NURSE PRACTITIONER

## 2021-11-03 PROCEDURE — 3072F LOW RISK FOR RETINOPATHY: CPT | Mod: CPTII,S$GLB,, | Performed by: NURSE PRACTITIONER

## 2021-11-03 PROCEDURE — 86334 IMMUNOFIX E-PHORESIS SERUM: CPT | Mod: 26,,, | Performed by: PATHOLOGY

## 2021-11-03 PROCEDURE — 82607 VITAMIN B-12: CPT | Performed by: NURSE PRACTITIONER

## 2021-11-03 PROCEDURE — 84207 ASSAY OF VITAMIN B-6: CPT | Performed by: NURSE PRACTITIONER

## 2021-11-03 PROCEDURE — 86038 ANTINUCLEAR ANTIBODIES: CPT | Performed by: NURSE PRACTITIONER

## 2021-11-03 PROCEDURE — 3075F PR MOST RECENT SYSTOLIC BLOOD PRESS GE 130-139MM HG: ICD-10-PCS | Mod: CPTII,S$GLB,, | Performed by: NURSE PRACTITIONER

## 2021-11-03 PROCEDURE — 3008F BODY MASS INDEX DOCD: CPT | Mod: CPTII,S$GLB,, | Performed by: NURSE PRACTITIONER

## 2021-11-03 PROCEDURE — 1160F RVW MEDS BY RX/DR IN RCRD: CPT | Mod: CPTII,S$GLB,, | Performed by: NURSE PRACTITIONER

## 2021-11-03 PROCEDURE — 86235 NUCLEAR ANTIGEN ANTIBODY: CPT | Performed by: NURSE PRACTITIONER

## 2021-11-03 PROCEDURE — 86334 IMMUNOFIX E-PHORESIS SERUM: CPT | Performed by: NURSE PRACTITIONER

## 2021-11-03 PROCEDURE — 99999 PR PBB SHADOW E&M-EST. PATIENT-LVL V: CPT | Mod: PBBFAC,,, | Performed by: NURSE PRACTITIONER

## 2021-11-03 PROCEDURE — 4010F PR ACE/ARB THEARPY RXD/TAKEN: ICD-10-PCS | Mod: CPTII,S$GLB,, | Performed by: NURSE PRACTITIONER

## 2021-11-03 PROCEDURE — 3051F HG A1C>EQUAL 7.0%<8.0%: CPT | Mod: CPTII,S$GLB,, | Performed by: NURSE PRACTITIONER

## 2021-11-03 PROCEDURE — 1159F MED LIST DOCD IN RCRD: CPT | Mod: CPTII,S$GLB,, | Performed by: NURSE PRACTITIONER

## 2021-11-03 PROCEDURE — 3078F DIAST BP <80 MM HG: CPT | Mod: CPTII,S$GLB,, | Performed by: NURSE PRACTITIONER

## 2021-11-03 PROCEDURE — 3051F PR MOST RECENT HEMOGLOBIN A1C LEVEL 7.0 - < 8.0%: ICD-10-PCS | Mod: CPTII,S$GLB,, | Performed by: NURSE PRACTITIONER

## 2021-11-03 PROCEDURE — 3061F PR NEG MICROALBUMINURIA RESULT DOCUMENTED/REVIEW: ICD-10-PCS | Mod: CPTII,S$GLB,, | Performed by: NURSE PRACTITIONER

## 2021-11-03 PROCEDURE — 85060 PATHOLOGIST REVIEW: ICD-10-PCS | Mod: ,,, | Performed by: PATHOLOGY

## 2021-11-03 PROCEDURE — 1160F PR REVIEW ALL MEDS BY PRESCRIBER/CLIN PHARMACIST DOCUMENTED: ICD-10-PCS | Mod: CPTII,S$GLB,, | Performed by: NURSE PRACTITIONER

## 2021-11-03 PROCEDURE — 3008F PR BODY MASS INDEX (BMI) DOCUMENTED: ICD-10-PCS | Mod: CPTII,S$GLB,, | Performed by: NURSE PRACTITIONER

## 2021-11-03 PROCEDURE — 3072F PR LOW RISK FOR RETINOPATHY: ICD-10-PCS | Mod: CPTII,S$GLB,, | Performed by: NURSE PRACTITIONER

## 2021-11-03 PROCEDURE — 84425 ASSAY OF VITAMIN B-1: CPT | Performed by: NURSE PRACTITIONER

## 2021-11-03 PROCEDURE — 84165 PROTEIN E-PHORESIS SERUM: CPT | Mod: 26,,, | Performed by: PATHOLOGY

## 2021-11-03 PROCEDURE — 99204 OFFICE O/P NEW MOD 45 MIN: CPT | Mod: S$GLB,,, | Performed by: NURSE PRACTITIONER

## 2021-11-03 PROCEDURE — 36415 COLL VENOUS BLD VENIPUNCTURE: CPT | Mod: PO | Performed by: NURSE PRACTITIONER

## 2021-11-03 PROCEDURE — 1159F PR MEDICATION LIST DOCUMENTED IN MEDICAL RECORD: ICD-10-PCS | Mod: CPTII,S$GLB,, | Performed by: NURSE PRACTITIONER

## 2021-11-03 PROCEDURE — 85025 COMPLETE CBC W/AUTO DIFF WBC: CPT | Performed by: NURSE PRACTITIONER

## 2021-11-03 PROCEDURE — 84165 PATHOLOGIST INTERPRETATION SPE: ICD-10-PCS | Mod: 26,,, | Performed by: PATHOLOGY

## 2021-11-03 PROCEDURE — 99204 PR OFFICE/OUTPT VISIT, NEW, LEVL IV, 45-59 MIN: ICD-10-PCS | Mod: S$GLB,,, | Performed by: NURSE PRACTITIONER

## 2021-11-03 PROCEDURE — 4010F ACE/ARB THERAPY RXD/TAKEN: CPT | Mod: CPTII,S$GLB,, | Performed by: NURSE PRACTITIONER

## 2021-11-03 PROCEDURE — 84165 PROTEIN E-PHORESIS SERUM: CPT | Performed by: NURSE PRACTITIONER

## 2021-11-03 PROCEDURE — 86431 RHEUMATOID FACTOR QUANT: CPT | Performed by: NURSE PRACTITIONER

## 2021-11-03 PROCEDURE — 3066F PR DOCUMENTATION OF TREATMENT FOR NEPHROPATHY: ICD-10-PCS | Mod: CPTII,S$GLB,, | Performed by: NURSE PRACTITIONER

## 2021-11-03 PROCEDURE — 3066F NEPHROPATHY DOC TX: CPT | Mod: CPTII,S$GLB,, | Performed by: NURSE PRACTITIONER

## 2021-11-03 PROCEDURE — 3075F SYST BP GE 130 - 139MM HG: CPT | Mod: CPTII,S$GLB,, | Performed by: NURSE PRACTITIONER

## 2021-11-03 PROCEDURE — 3078F PR MOST RECENT DIASTOLIC BLOOD PRESSURE < 80 MM HG: ICD-10-PCS | Mod: CPTII,S$GLB,, | Performed by: NURSE PRACTITIONER

## 2021-11-03 PROCEDURE — 86039 ANTINUCLEAR ANTIBODIES (ANA): CPT | Performed by: NURSE PRACTITIONER

## 2021-11-03 PROCEDURE — 3061F NEG MICROALBUMINURIA REV: CPT | Mod: CPTII,S$GLB,, | Performed by: NURSE PRACTITIONER

## 2021-11-03 PROCEDURE — 86334 PATHOLOGIST INTERPRETATION IFE: ICD-10-PCS | Mod: 26,,, | Performed by: PATHOLOGY

## 2021-11-03 PROCEDURE — 85060 BLOOD SMEAR INTERPRETATION: CPT | Mod: ,,, | Performed by: PATHOLOGY

## 2021-11-03 RX ORDER — GABAPENTIN 800 MG/1
800 TABLET ORAL 2 TIMES DAILY
Qty: 60 TABLET | Refills: 11 | Status: SHIPPED | OUTPATIENT
Start: 2021-11-03 | End: 2022-12-13 | Stop reason: SDUPTHER

## 2021-11-04 LAB
ALBUMIN SERPL ELPH-MCNC: 4.26 G/DL (ref 3.35–5.55)
ALPHA1 GLOB SERPL ELPH-MCNC: 0.4 G/DL (ref 0.17–0.41)
ALPHA2 GLOB SERPL ELPH-MCNC: 0.77 G/DL (ref 0.43–0.99)
ANA PATTERN 1: NORMAL
ANA SER QL IF: POSITIVE
ANA TITR SER IF: NORMAL {TITER}
ANTI-SSA ANTIBODY: 0.06 RATIO (ref 0–0.99)
ANTI-SSA INTERPRETATION: NEGATIVE
ANTI-SSB ANTIBODY: 0.07 RATIO (ref 0–0.99)
ANTI-SSB INTERPRETATION: NEGATIVE
B-GLOBULIN SERPL ELPH-MCNC: 1.09 G/DL (ref 0.5–1.1)
GAMMA GLOB SERPL ELPH-MCNC: 1.09 G/DL (ref 0.67–1.58)
INTERPRETATION SERPL IFE-IMP: NORMAL
PATH REV BLD -IMP: NORMAL
PATHOLOGIST INTERPRETATION IFE: NORMAL
PROT SERPL-MCNC: 7.6 G/DL (ref 6–8.4)

## 2021-11-05 LAB — PATHOLOGIST INTERPRETATION SPE: NORMAL

## 2021-11-09 LAB
PYRIDOXAL SERPL-MCNC: 3 UG/L (ref 5–50)
VIT B1 BLD-MCNC: 59 UG/L (ref 38–122)

## 2021-11-12 LAB
ANTI SM ANTIBODY: 0.07 RATIO (ref 0–0.99)
ANTI SM/RNP ANTIBODY: 0.07 RATIO (ref 0–0.99)
ANTI-SM INTERPRETATION: NEGATIVE
ANTI-SM/RNP INTERPRETATION: NEGATIVE
ANTI-SSA ANTIBODY: 0.06 RATIO (ref 0–0.99)
ANTI-SSA INTERPRETATION: NEGATIVE
ANTI-SSB ANTIBODY: 0.07 RATIO (ref 0–0.99)
ANTI-SSB INTERPRETATION: NEGATIVE
DSDNA AB SER-ACNC: NORMAL [IU]/ML

## 2021-11-15 ENCOUNTER — TELEPHONE (OUTPATIENT)
Dept: NEUROLOGY | Facility: CLINIC | Age: 61
End: 2021-11-15
Payer: COMMERCIAL

## 2021-12-08 ENCOUNTER — OFFICE VISIT (OUTPATIENT)
Dept: ENDOCRINOLOGY | Facility: CLINIC | Age: 61
End: 2021-12-08
Payer: COMMERCIAL

## 2021-12-08 VITALS
HEART RATE: 82 BPM | BODY MASS INDEX: 30.01 KG/M2 | WEIGHT: 186.75 LBS | DIASTOLIC BLOOD PRESSURE: 78 MMHG | SYSTOLIC BLOOD PRESSURE: 158 MMHG | HEIGHT: 66 IN

## 2021-12-08 DIAGNOSIS — I10 ESSENTIAL HYPERTENSION: ICD-10-CM

## 2021-12-08 DIAGNOSIS — J32.1 FRONTAL SINUSITIS, UNSPECIFIED CHRONICITY: ICD-10-CM

## 2021-12-08 DIAGNOSIS — E78.49 OTHER HYPERLIPIDEMIA: ICD-10-CM

## 2021-12-08 DIAGNOSIS — Z79.4 TYPE 2 DIABETES MELLITUS WITHOUT COMPLICATION, WITH LONG-TERM CURRENT USE OF INSULIN: Primary | ICD-10-CM

## 2021-12-08 DIAGNOSIS — E11.9 TYPE 2 DIABETES MELLITUS WITHOUT COMPLICATION, WITH LONG-TERM CURRENT USE OF INSULIN: Primary | ICD-10-CM

## 2021-12-08 DIAGNOSIS — L91.8 SKIN TAG: ICD-10-CM

## 2021-12-08 DIAGNOSIS — R79.89 LOW TSH LEVEL: ICD-10-CM

## 2021-12-08 PROCEDURE — 99999 PR PBB SHADOW E&M-EST. PATIENT-LVL V: CPT | Mod: PBBFAC,,, | Performed by: NURSE PRACTITIONER

## 2021-12-08 PROCEDURE — 95251 PR GLUCOSE MONITOR, 72 HOUR, PHYS INTERP: ICD-10-PCS | Mod: S$GLB,,, | Performed by: NURSE PRACTITIONER

## 2021-12-08 PROCEDURE — 3072F LOW RISK FOR RETINOPATHY: CPT | Mod: CPTII,S$GLB,, | Performed by: NURSE PRACTITIONER

## 2021-12-08 PROCEDURE — 99214 OFFICE O/P EST MOD 30 MIN: CPT | Mod: S$GLB,,, | Performed by: NURSE PRACTITIONER

## 2021-12-08 PROCEDURE — 3061F PR NEG MICROALBUMINURIA RESULT DOCUMENTED/REVIEW: ICD-10-PCS | Mod: CPTII,S$GLB,, | Performed by: NURSE PRACTITIONER

## 2021-12-08 PROCEDURE — 3066F NEPHROPATHY DOC TX: CPT | Mod: CPTII,S$GLB,, | Performed by: NURSE PRACTITIONER

## 2021-12-08 PROCEDURE — 3066F PR DOCUMENTATION OF TREATMENT FOR NEPHROPATHY: ICD-10-PCS | Mod: CPTII,S$GLB,, | Performed by: NURSE PRACTITIONER

## 2021-12-08 PROCEDURE — 4010F PR ACE/ARB THEARPY RXD/TAKEN: ICD-10-PCS | Mod: CPTII,S$GLB,, | Performed by: NURSE PRACTITIONER

## 2021-12-08 PROCEDURE — 99214 PR OFFICE/OUTPT VISIT, EST, LEVL IV, 30-39 MIN: ICD-10-PCS | Mod: S$GLB,,, | Performed by: NURSE PRACTITIONER

## 2021-12-08 PROCEDURE — 3072F PR LOW RISK FOR RETINOPATHY: ICD-10-PCS | Mod: CPTII,S$GLB,, | Performed by: NURSE PRACTITIONER

## 2021-12-08 PROCEDURE — 3061F NEG MICROALBUMINURIA REV: CPT | Mod: CPTII,S$GLB,, | Performed by: NURSE PRACTITIONER

## 2021-12-08 PROCEDURE — 4010F ACE/ARB THERAPY RXD/TAKEN: CPT | Mod: CPTII,S$GLB,, | Performed by: NURSE PRACTITIONER

## 2021-12-08 PROCEDURE — 99999 PR PBB SHADOW E&M-EST. PATIENT-LVL V: ICD-10-PCS | Mod: PBBFAC,,, | Performed by: NURSE PRACTITIONER

## 2021-12-08 PROCEDURE — 95251 CONT GLUC MNTR ANALYSIS I&R: CPT | Mod: S$GLB,,, | Performed by: NURSE PRACTITIONER

## 2021-12-08 RX ORDER — AMOXICILLIN AND CLAVULANATE POTASSIUM 500; 125 MG/1; MG/1
1 TABLET, FILM COATED ORAL 3 TIMES DAILY
Qty: 21 TABLET | Refills: 0 | Status: SHIPPED | OUTPATIENT
Start: 2021-12-08 | End: 2022-04-04 | Stop reason: CLARIF

## 2021-12-14 ENCOUNTER — OFFICE VISIT (OUTPATIENT)
Dept: OPTOMETRY | Facility: CLINIC | Age: 61
End: 2021-12-14
Payer: COMMERCIAL

## 2021-12-14 DIAGNOSIS — H52.03 HYPEROPIA WITH ASTIGMATISM AND PRESBYOPIA, BILATERAL: ICD-10-CM

## 2021-12-14 DIAGNOSIS — H52.203 HYPEROPIA WITH ASTIGMATISM AND PRESBYOPIA, BILATERAL: ICD-10-CM

## 2021-12-14 DIAGNOSIS — Z13.5 GLAUCOMA SCREENING: ICD-10-CM

## 2021-12-14 DIAGNOSIS — H52.4 HYPEROPIA WITH ASTIGMATISM AND PRESBYOPIA, BILATERAL: ICD-10-CM

## 2021-12-14 DIAGNOSIS — E11.36 CATARACT ASSOCIATED WITH TYPE 2 DIABETES MELLITUS: ICD-10-CM

## 2021-12-14 DIAGNOSIS — E11.9 DIABETES MELLITUS TYPE 2 WITHOUT RETINOPATHY: Primary | ICD-10-CM

## 2021-12-14 DIAGNOSIS — H43.393 VITREOUS FLOATERS, BILATERAL: ICD-10-CM

## 2021-12-14 PROCEDURE — 92014 COMPRE OPH EXAM EST PT 1/>: CPT | Mod: S$GLB,,, | Performed by: OPTOMETRIST

## 2021-12-14 PROCEDURE — 99999 PR PBB SHADOW E&M-EST. PATIENT-LVL IV: ICD-10-PCS | Mod: PBBFAC,,, | Performed by: OPTOMETRIST

## 2021-12-14 PROCEDURE — 2023F DILAT RTA XM W/O RTNOPTHY: CPT | Mod: CPTII,S$GLB,, | Performed by: OPTOMETRIST

## 2021-12-14 PROCEDURE — 4010F PR ACE/ARB THEARPY RXD/TAKEN: ICD-10-PCS | Mod: CPTII,S$GLB,, | Performed by: OPTOMETRIST

## 2021-12-14 PROCEDURE — 92015 DETERMINE REFRACTIVE STATE: CPT | Mod: S$GLB,,, | Performed by: OPTOMETRIST

## 2021-12-14 PROCEDURE — 3061F PR NEG MICROALBUMINURIA RESULT DOCUMENTED/REVIEW: ICD-10-PCS | Mod: CPTII,S$GLB,, | Performed by: OPTOMETRIST

## 2021-12-14 PROCEDURE — 99999 PR PBB SHADOW E&M-EST. PATIENT-LVL IV: CPT | Mod: PBBFAC,,, | Performed by: OPTOMETRIST

## 2021-12-14 PROCEDURE — 4010F ACE/ARB THERAPY RXD/TAKEN: CPT | Mod: CPTII,S$GLB,, | Performed by: OPTOMETRIST

## 2021-12-14 PROCEDURE — 3061F NEG MICROALBUMINURIA REV: CPT | Mod: CPTII,S$GLB,, | Performed by: OPTOMETRIST

## 2021-12-14 PROCEDURE — 92014 PR EYE EXAM, EST PATIENT,COMPREHESV: ICD-10-PCS | Mod: S$GLB,,, | Performed by: OPTOMETRIST

## 2021-12-14 PROCEDURE — 3066F PR DOCUMENTATION OF TREATMENT FOR NEPHROPATHY: ICD-10-PCS | Mod: CPTII,S$GLB,, | Performed by: OPTOMETRIST

## 2021-12-14 PROCEDURE — 3066F NEPHROPATHY DOC TX: CPT | Mod: CPTII,S$GLB,, | Performed by: OPTOMETRIST

## 2021-12-14 PROCEDURE — 2023F PR DILATED RETINAL EXAM W/O EVID OF RETINOPATHY: ICD-10-PCS | Mod: CPTII,S$GLB,, | Performed by: OPTOMETRIST

## 2021-12-14 PROCEDURE — 92015 PR REFRACTION: ICD-10-PCS | Mod: S$GLB,,, | Performed by: OPTOMETRIST

## 2022-01-13 RX ORDER — HYDROCODONE BITARTRATE AND ACETAMINOPHEN 10; 325 MG/1; MG/1
1 TABLET ORAL 3 TIMES DAILY PRN
Qty: 90 TABLET | Refills: 0 | Status: SHIPPED | OUTPATIENT
Start: 2022-01-16 | End: 2022-02-14 | Stop reason: SDUPTHER

## 2022-01-13 RX ORDER — TIZANIDINE 4 MG/1
4 TABLET ORAL EVERY 8 HOURS PRN
Qty: 90 TABLET | Refills: 2 | Status: SHIPPED | OUTPATIENT
Start: 2022-01-13 | End: 2022-04-25

## 2022-03-03 ENCOUNTER — LAB VISIT (OUTPATIENT)
Dept: LAB | Facility: HOSPITAL | Age: 62
End: 2022-03-03
Attending: NURSE PRACTITIONER
Payer: COMMERCIAL

## 2022-03-03 DIAGNOSIS — Z79.4 TYPE 2 DIABETES MELLITUS WITHOUT COMPLICATION, WITH LONG-TERM CURRENT USE OF INSULIN: ICD-10-CM

## 2022-03-03 DIAGNOSIS — E11.9 TYPE 2 DIABETES MELLITUS WITHOUT COMPLICATION, WITH LONG-TERM CURRENT USE OF INSULIN: ICD-10-CM

## 2022-03-03 DIAGNOSIS — R79.89 LOW TSH LEVEL: ICD-10-CM

## 2022-03-03 LAB
ESTIMATED AVG GLUCOSE: 171 MG/DL (ref 68–131)
HBA1C MFR BLD: 7.6 % (ref 4–5.6)
TSH SERPL DL<=0.005 MIU/L-ACNC: 1.21 UIU/ML (ref 0.4–4)

## 2022-03-03 PROCEDURE — 84443 ASSAY THYROID STIM HORMONE: CPT | Performed by: NURSE PRACTITIONER

## 2022-03-03 PROCEDURE — 36415 COLL VENOUS BLD VENIPUNCTURE: CPT | Mod: PO | Performed by: NURSE PRACTITIONER

## 2022-03-03 PROCEDURE — 83036 HEMOGLOBIN GLYCOSYLATED A1C: CPT | Performed by: NURSE PRACTITIONER

## 2022-03-07 DIAGNOSIS — Z79.4 TYPE 2 DIABETES MELLITUS WITHOUT COMPLICATION, WITH LONG-TERM CURRENT USE OF INSULIN: ICD-10-CM

## 2022-03-07 DIAGNOSIS — E11.9 TYPE 2 DIABETES MELLITUS WITHOUT COMPLICATION, WITH LONG-TERM CURRENT USE OF INSULIN: ICD-10-CM

## 2022-03-08 RX ORDER — INSULIN ASPART 100 [IU]/ML
INJECTION, SOLUTION INTRAVENOUS; SUBCUTANEOUS
Qty: 45 ML | Refills: 3 | Status: SHIPPED | OUTPATIENT
Start: 2022-03-08 | End: 2023-03-27 | Stop reason: SDUPTHER

## 2022-03-08 RX ORDER — INSULIN GLARGINE 300 U/ML
INJECTION, SOLUTION SUBCUTANEOUS
Qty: 12 PEN | Refills: 3 | Status: SHIPPED | OUTPATIENT
Start: 2022-03-08 | End: 2022-11-11

## 2022-03-08 NOTE — TELEPHONE ENCOUNTER
----- Message from Tiffany Marion sent at 3/8/2022  3:58 PM CST -----  Regarding: pharmacy call  Contact: [facility]  Type:  Pharmacy Calling to Clarify an RX    Name of Caller:    Pharmacy Name:    [id number] [place] [facility] [address] [address]  Phone: [phone] Fax: [phone]    Prescription Name:  novalog  What do they need to clarify?:  max daily dose  Best Call Back Number:  [phone]  Additional Information:  please call

## 2022-03-08 NOTE — TELEPHONE ENCOUNTER
Pharmacist advised pt's max daily dose for Novolog would be 60 units daily with the correction scale.

## 2022-03-09 ENCOUNTER — OFFICE VISIT (OUTPATIENT)
Dept: ENDOCRINOLOGY | Facility: CLINIC | Age: 62
End: 2022-03-09
Payer: COMMERCIAL

## 2022-03-09 VITALS
WEIGHT: 185.44 LBS | BODY MASS INDEX: 29.8 KG/M2 | HEART RATE: 67 BPM | HEIGHT: 66 IN | SYSTOLIC BLOOD PRESSURE: 124 MMHG | DIASTOLIC BLOOD PRESSURE: 72 MMHG

## 2022-03-09 DIAGNOSIS — F32.A DEPRESSION, UNSPECIFIED DEPRESSION TYPE: ICD-10-CM

## 2022-03-09 DIAGNOSIS — Z79.4 TYPE 2 DIABETES MELLITUS WITHOUT COMPLICATION, WITH LONG-TERM CURRENT USE OF INSULIN: Primary | ICD-10-CM

## 2022-03-09 DIAGNOSIS — I10 ESSENTIAL HYPERTENSION: ICD-10-CM

## 2022-03-09 DIAGNOSIS — E11.9 TYPE 2 DIABETES MELLITUS WITHOUT COMPLICATION, WITH LONG-TERM CURRENT USE OF INSULIN: Primary | ICD-10-CM

## 2022-03-09 PROCEDURE — 99214 OFFICE O/P EST MOD 30 MIN: CPT | Mod: S$GLB,,, | Performed by: NURSE PRACTITIONER

## 2022-03-09 PROCEDURE — 1159F MED LIST DOCD IN RCRD: CPT | Mod: CPTII,S$GLB,, | Performed by: NURSE PRACTITIONER

## 2022-03-09 PROCEDURE — 3074F PR MOST RECENT SYSTOLIC BLOOD PRESSURE < 130 MM HG: ICD-10-PCS | Mod: CPTII,S$GLB,, | Performed by: NURSE PRACTITIONER

## 2022-03-09 PROCEDURE — 1160F PR REVIEW ALL MEDS BY PRESCRIBER/CLIN PHARMACIST DOCUMENTED: ICD-10-PCS | Mod: CPTII,S$GLB,, | Performed by: NURSE PRACTITIONER

## 2022-03-09 PROCEDURE — 1160F RVW MEDS BY RX/DR IN RCRD: CPT | Mod: CPTII,S$GLB,, | Performed by: NURSE PRACTITIONER

## 2022-03-09 PROCEDURE — 4010F PR ACE/ARB THEARPY RXD/TAKEN: ICD-10-PCS | Mod: CPTII,S$GLB,, | Performed by: NURSE PRACTITIONER

## 2022-03-09 PROCEDURE — 3078F DIAST BP <80 MM HG: CPT | Mod: CPTII,S$GLB,, | Performed by: NURSE PRACTITIONER

## 2022-03-09 PROCEDURE — 3072F LOW RISK FOR RETINOPATHY: CPT | Mod: CPTII,S$GLB,, | Performed by: NURSE PRACTITIONER

## 2022-03-09 PROCEDURE — 3008F BODY MASS INDEX DOCD: CPT | Mod: CPTII,S$GLB,, | Performed by: NURSE PRACTITIONER

## 2022-03-09 PROCEDURE — 99999 PR PBB SHADOW E&M-EST. PATIENT-LVL V: CPT | Mod: PBBFAC,,, | Performed by: NURSE PRACTITIONER

## 2022-03-09 PROCEDURE — 3078F PR MOST RECENT DIASTOLIC BLOOD PRESSURE < 80 MM HG: ICD-10-PCS | Mod: CPTII,S$GLB,, | Performed by: NURSE PRACTITIONER

## 2022-03-09 PROCEDURE — 3072F PR LOW RISK FOR RETINOPATHY: ICD-10-PCS | Mod: CPTII,S$GLB,, | Performed by: NURSE PRACTITIONER

## 2022-03-09 PROCEDURE — 3051F PR MOST RECENT HEMOGLOBIN A1C LEVEL 7.0 - < 8.0%: ICD-10-PCS | Mod: CPTII,S$GLB,, | Performed by: NURSE PRACTITIONER

## 2022-03-09 PROCEDURE — 1159F PR MEDICATION LIST DOCUMENTED IN MEDICAL RECORD: ICD-10-PCS | Mod: CPTII,S$GLB,, | Performed by: NURSE PRACTITIONER

## 2022-03-09 PROCEDURE — 99999 PR PBB SHADOW E&M-EST. PATIENT-LVL V: ICD-10-PCS | Mod: PBBFAC,,, | Performed by: NURSE PRACTITIONER

## 2022-03-09 PROCEDURE — 99214 PR OFFICE/OUTPT VISIT, EST, LEVL IV, 30-39 MIN: ICD-10-PCS | Mod: S$GLB,,, | Performed by: NURSE PRACTITIONER

## 2022-03-09 PROCEDURE — 3074F SYST BP LT 130 MM HG: CPT | Mod: CPTII,S$GLB,, | Performed by: NURSE PRACTITIONER

## 2022-03-09 PROCEDURE — 4010F ACE/ARB THERAPY RXD/TAKEN: CPT | Mod: CPTII,S$GLB,, | Performed by: NURSE PRACTITIONER

## 2022-03-09 PROCEDURE — 3008F PR BODY MASS INDEX (BMI) DOCUMENTED: ICD-10-PCS | Mod: CPTII,S$GLB,, | Performed by: NURSE PRACTITIONER

## 2022-03-09 PROCEDURE — 3051F HG A1C>EQUAL 7.0%<8.0%: CPT | Mod: CPTII,S$GLB,, | Performed by: NURSE PRACTITIONER

## 2022-03-09 RX ORDER — SERTRALINE HYDROCHLORIDE 50 MG/1
50 TABLET, FILM COATED ORAL DAILY
Qty: 30 TABLET | Refills: 3 | Status: SHIPPED | OUTPATIENT
Start: 2022-03-09 | End: 2022-12-13

## 2022-03-09 NOTE — PROGRESS NOTES
+  Subjective:       Patient ID: Aaron Garcia is a 61 y.o. female.    Chief Complaint: Diabetes     HPI: Pt is a 60 y.o. AAF  with a diagnosis of Type 2 diabetes mellitus diagnosed approximately 2001, as well as chronic conditions pending review including HTN, HLP. States A1C I n 6% range until appros 2017.  Other pertinent medical and social information noted includes, but not limited to: chronic pain r/t cervical radiculopathy affecting UA.      Interim Events: No acute events:  Using Freestyle Libre2 . Forgot sensor in car. No c/o hypoglycemia. C/o poor sleep. Lays in bed sometimes til 4 am.  On Elavil, states gabapentin use to help, and also zanaflex.  Does have 2 daughters and their children move in so stress with that.  Concerned with DM complications.Reassured.      FBS  100 bert    LUnch---180-220 bert    Supper--180-220    BT --150-75.        Diabetes Flow Sheet:  Diabetes Medications: Toujeo 60 qhs, Novolog 10 plus ss  with meals      If insulin vial or pen preference:   Prior failed/or not tolerated medication therapies:metformin, diarrhea. tradjenta 5 mg, glipizide 5 mg bid    Diabetes Complications:   Aspirin:    Statin: crestor 10   ACE/ARB:benazepril 40   Last Urine Microalbumin:    Last Eye exam:   Last Diabetic Education: 18 mo   Glucometer:Truemetrix         Review of Systems   Constitutional: Negative for activity change, fatigue and fever.   HENT: Negative for hearing loss and trouble swallowing.         Green/yellow thick phlegm x 2 weeks.    Eyes: Negative for photophobia and visual disturbance.        Last Eye Exam: Fall 2021    Respiratory: Negative for cough and shortness of breath.    Cardiovascular: Negative for chest pain and palpitations.   Gastrointestinal: Positive for constipation. Negative for abdominal pain and diarrhea.   Genitourinary: Negative for difficulty urinating, frequency and urgency.   Musculoskeletal: Positive for back pain (pending lumbar fusion). Negative for  "arthralgias and myalgias.   Integumentary:  Negative for rash and wound.   Neurological: Negative for weakness and numbness.   Psychiatric/Behavioral: Negative for sleep disturbance. The patient is not nervous/anxious.          Objective:      Physical Exam  Constitutional:       Appearance: Normal appearance. She is obese.   HENT:      Head: Normocephalic and atraumatic.      Nose:      Right Turbinates: Enlarged.      Left Turbinates: Enlarged.      Right Sinus: Frontal sinus tenderness present.      Left Sinus: Frontal sinus tenderness present.   Eyes:      Extraocular Movements: Extraocular movements intact.      Conjunctiva/sclera: Conjunctivae normal.      Pupils: Pupils are equal, round, and reactive to light.   Neck:      Vascular: No carotid bruit.   Cardiovascular:      Rate and Rhythm: Normal rate and regular rhythm.      Pulses: Normal pulses.      Heart sounds: Normal heart sounds.   Pulmonary:      Effort: Pulmonary effort is normal.      Breath sounds: Normal breath sounds.   Musculoskeletal:         General: Normal range of motion.      Cervical back: Normal range of motion and neck supple.      Right lower leg: No edema.      Left lower leg: No edema.      Comments: Deferred     Feet:no open wounds or calluses. Good pedal pulses. +2 bilaterally, vibratory sensation intact bilaerally    Lymphadenopathy:      Cervical: No cervical adenopathy.   Skin:     General: Skin is warm and dry.      Comments: Approx dime size scabbed peduncle on back of L ear.    Neurological:      General: No focal deficit present.      Mental Status: She is alert and oriented to person, place, and time.   Psychiatric:         Mood and Affect: Mood normal.         Behavior: Behavior normal.         Thought Content: Thought content normal.         Judgment: Judgment normal.           /72 (BP Location: Right arm, Patient Position: Sitting, BP Method: X-Large (Manual))   Pulse 67   Ht 5' 6" (1.676 m)   Wt 84.1 kg (185 lb " 6.5 oz)   LMP 03/13/2017   BMI 29.93 kg/m²     Hemoglobin A1C   Date Value Ref Range Status   03/03/2022 7.6 (H) 4.0 - 5.6 % Final     Comment:     ADA Screening Guidelines:  5.7-6.4%  Consistent with prediabetes  >or=6.5%  Consistent with diabetes    High levels of fetal hemoglobin interfere with the HbA1C  assay. Heterozygous hemoglobin variants (HbS, HgC, etc)do  not significantly interfere with this assay.   However, presence of multiple variants may affect accuracy.     10/26/2021 7.5 (H) 4.0 - 5.6 % Final     Comment:     ADA Screening Guidelines:  5.7-6.4%  Consistent with prediabetes  >or=6.5%  Consistent with diabetes    High levels of fetal hemoglobin interfere with the HbA1C  assay. Heterozygous hemoglobin variants (HbS, HgC, etc)do  not significantly interfere with this assay.   However, presence of multiple variants may affect accuracy.     07/20/2021 7.3 (H) 4.0 - 5.6 % Final     Comment:     ADA Screening Guidelines:  5.7-6.4%  Consistent with prediabetes  >or=6.5%  Consistent with diabetes    High levels of fetal hemoglobin interfere with the HbA1C  assay. Heterozygous hemoglobin variants (HbS, HgC, etc)do  not significantly interfere with this assay.   However, presence of multiple variants may affect accuracy.         Chemistry        Component Value Date/Time     07/20/2021 1133    K 4.0 07/20/2021 1133     07/20/2021 1133    CO2 27 07/20/2021 1133    BUN 12 07/20/2021 1133    CREATININE 0.7 07/20/2021 1133     07/20/2021 1133        Component Value Date/Time    CALCIUM 9.9 07/20/2021 1133    ALKPHOS 73 07/20/2021 1133    AST 20 07/20/2021 1133    ALT 14 07/20/2021 1133    BILITOT 0.9 07/20/2021 1133    ESTGFRAFRICA >60.0 07/20/2021 1133    EGFRNONAA >60.0 07/20/2021 1133          Lab Results   Component Value Date    CHOL 115 (L) 07/20/2021     Lab Results   Component Value Date    HDL 37 (L) 07/20/2021     Lab Results   Component Value Date    LDLCALC 60.0 (L) 07/20/2021      Lab Results   Component Value Date    TRIG 90 07/20/2021     Lab Results   Component Value Date    CHOLHDL 32.2 07/20/2021     Lab Results   Component Value Date    MICALBCREAT Unable to calculate 07/20/2021     Lab Results   Component Value Date    TSH 1.207 03/03/2022     Vit D, 25-Hydroxy   Date Value Ref Range Status   03/03/2021 25 (L) 30 - 96 ng/mL Final     Comment:     Vitamin D deficiency.........<10 ng/mL                              Vitamin D insufficiency......10-29 ng/mL       Vitamin D sufficiency........> or equal to 30 ng/mL  Vitamin D toxicity............>100 ng/mL           Assessment:       1. Type 2 diabetes mellitus without complication, with long-term current use of insulin     2. Depression, unspecified depression type  sertraline (ZOLOFT) 50 MG tablet   3. Essential hypertension             Plan:     --on benazepril 10  --on rosuvastatin 10     Cont 60 units of Toujeo at bedtime  INcrease Novolog from 10-10-10    to 12-12-10 plus ss.     Start sertraline 50 mg   .ORDERS 03/09/2022\     2 mo with no labs.

## 2022-03-14 ENCOUNTER — OFFICE VISIT (OUTPATIENT)
Dept: PAIN MEDICINE | Facility: CLINIC | Age: 62
End: 2022-03-14
Payer: COMMERCIAL

## 2022-03-14 VITALS
HEIGHT: 67 IN | HEART RATE: 88 BPM | WEIGHT: 187.19 LBS | DIASTOLIC BLOOD PRESSURE: 76 MMHG | BODY MASS INDEX: 29.38 KG/M2 | SYSTOLIC BLOOD PRESSURE: 149 MMHG

## 2022-03-14 DIAGNOSIS — M79.18 MYOFASCIAL PAIN: ICD-10-CM

## 2022-03-14 DIAGNOSIS — Z79.891 OPIOID CONTRACT EXISTS: ICD-10-CM

## 2022-03-14 DIAGNOSIS — M50.30 DDD (DEGENERATIVE DISC DISEASE), CERVICAL: ICD-10-CM

## 2022-03-14 DIAGNOSIS — M51.36 DDD (DEGENERATIVE DISC DISEASE), LUMBAR: ICD-10-CM

## 2022-03-14 DIAGNOSIS — M54.16 LUMBAR RADICULOPATHY: Primary | ICD-10-CM

## 2022-03-14 PROCEDURE — 99999 PR PBB SHADOW E&M-EST. PATIENT-LVL V: ICD-10-PCS | Mod: PBBFAC,,, | Performed by: PHYSICIAN ASSISTANT

## 2022-03-14 PROCEDURE — 4010F PR ACE/ARB THEARPY RXD/TAKEN: ICD-10-PCS | Mod: CPTII,S$GLB,, | Performed by: PHYSICIAN ASSISTANT

## 2022-03-14 PROCEDURE — 99214 OFFICE O/P EST MOD 30 MIN: CPT | Mod: S$GLB,,, | Performed by: PHYSICIAN ASSISTANT

## 2022-03-14 PROCEDURE — 99214 PR OFFICE/OUTPT VISIT, EST, LEVL IV, 30-39 MIN: ICD-10-PCS | Mod: S$GLB,,, | Performed by: PHYSICIAN ASSISTANT

## 2022-03-14 PROCEDURE — 1159F MED LIST DOCD IN RCRD: CPT | Mod: CPTII,S$GLB,, | Performed by: PHYSICIAN ASSISTANT

## 2022-03-14 PROCEDURE — 1159F PR MEDICATION LIST DOCUMENTED IN MEDICAL RECORD: ICD-10-PCS | Mod: CPTII,S$GLB,, | Performed by: PHYSICIAN ASSISTANT

## 2022-03-14 PROCEDURE — 3078F PR MOST RECENT DIASTOLIC BLOOD PRESSURE < 80 MM HG: ICD-10-PCS | Mod: CPTII,S$GLB,, | Performed by: PHYSICIAN ASSISTANT

## 2022-03-14 PROCEDURE — 3008F BODY MASS INDEX DOCD: CPT | Mod: CPTII,S$GLB,, | Performed by: PHYSICIAN ASSISTANT

## 2022-03-14 PROCEDURE — 3008F PR BODY MASS INDEX (BMI) DOCUMENTED: ICD-10-PCS | Mod: CPTII,S$GLB,, | Performed by: PHYSICIAN ASSISTANT

## 2022-03-14 PROCEDURE — 99999 PR PBB SHADOW E&M-EST. PATIENT-LVL V: CPT | Mod: PBBFAC,,, | Performed by: PHYSICIAN ASSISTANT

## 2022-03-14 PROCEDURE — 3072F LOW RISK FOR RETINOPATHY: CPT | Mod: CPTII,S$GLB,, | Performed by: PHYSICIAN ASSISTANT

## 2022-03-14 PROCEDURE — 3072F PR LOW RISK FOR RETINOPATHY: ICD-10-PCS | Mod: CPTII,S$GLB,, | Performed by: PHYSICIAN ASSISTANT

## 2022-03-14 PROCEDURE — 3051F PR MOST RECENT HEMOGLOBIN A1C LEVEL 7.0 - < 8.0%: ICD-10-PCS | Mod: CPTII,S$GLB,, | Performed by: PHYSICIAN ASSISTANT

## 2022-03-14 PROCEDURE — 3077F SYST BP >= 140 MM HG: CPT | Mod: CPTII,S$GLB,, | Performed by: PHYSICIAN ASSISTANT

## 2022-03-14 PROCEDURE — 1160F PR REVIEW ALL MEDS BY PRESCRIBER/CLIN PHARMACIST DOCUMENTED: ICD-10-PCS | Mod: CPTII,S$GLB,, | Performed by: PHYSICIAN ASSISTANT

## 2022-03-14 PROCEDURE — 3051F HG A1C>EQUAL 7.0%<8.0%: CPT | Mod: CPTII,S$GLB,, | Performed by: PHYSICIAN ASSISTANT

## 2022-03-14 PROCEDURE — 1160F RVW MEDS BY RX/DR IN RCRD: CPT | Mod: CPTII,S$GLB,, | Performed by: PHYSICIAN ASSISTANT

## 2022-03-14 PROCEDURE — 3077F PR MOST RECENT SYSTOLIC BLOOD PRESSURE >= 140 MM HG: ICD-10-PCS | Mod: CPTII,S$GLB,, | Performed by: PHYSICIAN ASSISTANT

## 2022-03-14 PROCEDURE — 4010F ACE/ARB THERAPY RXD/TAKEN: CPT | Mod: CPTII,S$GLB,, | Performed by: PHYSICIAN ASSISTANT

## 2022-03-14 PROCEDURE — 3078F DIAST BP <80 MM HG: CPT | Mod: CPTII,S$GLB,, | Performed by: PHYSICIAN ASSISTANT

## 2022-03-14 RX ORDER — HYDROCODONE BITARTRATE AND ACETAMINOPHEN 10; 325 MG/1; MG/1
1 TABLET ORAL 3 TIMES DAILY PRN
Qty: 90 TABLET | Refills: 0 | Status: SHIPPED | OUTPATIENT
Start: 2022-04-16 | End: 2022-05-03 | Stop reason: SDUPTHER

## 2022-03-14 RX ORDER — HYDROCODONE BITARTRATE AND ACETAMINOPHEN 10; 325 MG/1; MG/1
1 TABLET ORAL 3 TIMES DAILY PRN
Qty: 90 TABLET | Refills: 0 | Status: SHIPPED | OUTPATIENT
Start: 2022-03-17 | End: 2022-04-16

## 2022-03-14 RX ORDER — HYDROCODONE BITARTRATE AND ACETAMINOPHEN 10; 325 MG/1; MG/1
1 TABLET ORAL 3 TIMES DAILY PRN
Qty: 90 TABLET | Refills: 0 | Status: SHIPPED | OUTPATIENT
Start: 2022-05-16 | End: 2022-06-15

## 2022-03-14 RX ORDER — ALPRAZOLAM 0.5 MG/1
1 TABLET, ORALLY DISINTEGRATING ORAL ONCE AS NEEDED
Status: CANCELLED | OUTPATIENT
Start: 2022-03-14 | End: 2033-08-10

## 2022-03-15 NOTE — H&P (VIEW-ONLY)
This note was completed with dictation software and grammatical errors may exist.    CC:  Neck and back pain    HPI: The patient is a 61-year-old woman with a history of diabetes, hypertension who presents in referral from [facility] for neck pain radiating into the arms and low back pain radiating into the left leg.  She is status post caudal epidural steroid injection on 11/01/2021 with 70% relief lasting over 1 month.  She reports falling in her bathroom and her pain worsened.  She describes pain in the left buttock radiating to the left posterior thigh and calf.  She describes this as a spasm, worse with prolonged standing and with housework.  She also continues to have some left neck pain radiating to the left arm down to her elbow but this is not as severe as her low back and left leg pain.  She describes weakness in her left leg.  She denies numbness or incontinence.    Pain intervention history: She had undergone 3 epidurals for low back pain with only up to 3 weeks of relief each time.  She is status post C7-T1 cervical interlaminar epidural steroid injection on 1/29/16 with 50% relief.   She is status post C7-T1 cervical interlaminar epidural steroid injection on 3/1/16 with mild additional relief.  She is status post C7-T1 cervical interlaminar epidural steroid injection on 5/13/16 with 70-75% relief.  She is status post right C3, 4, 5 and 6 medial branch radiofrequency ablation on 7/15/16 with 30-60% relief.  She is status post L5/S1 interlaminar epidural steroid injection on 5/16/17 with 50% relief.  She is status post L5/S1 interlaminar epidural steroid injection on 12/29/17 with excellent relief lasting 2 weeks, now reporting minimal relief of her low back and buttock pain but ongoing 100% relief of her right leg pain.  She is status post C7-T1 cervical interlaminar epidural steroid injection on 3/14/18 with 75% relief but this was not long lasting.  She is status post right L4/5  "and L5/S1 transforaminal epidural steroid injection on 01/11/2021 with Dr Luna with 0% relief.   She is status post caudal epidural steroid injection on 11/01/2021 with 70% relief lasting over 1 month.     Spine surgeries: Right L4/5 and L5/S1 laminectomy on 06/24/2021 Dr Hester.     Antineuropathics:  Gabapentin 100 mg 3 times daily  NSAIDs:  Physical therapy:  [facility] in [place]  Antidepressants:  Amitriptyline 25 mg nightly  Muscle relaxers:  Tizanidine 4 mg 3 times daily as needed  Opioids:  Hydrocodone-acetaminophen 10/325 mg 3 times daily as needed  Antiplatelets/Anticoagulants:    ROS: She reports headaches, hoarse voice, joint stiffness, joint swelling, back pain, difficulty sleeping, anxiety and loss of balance.  Balance of review of systems is negative.    Medical, surgical, family and social history reviewed elsewhere in record.    Medications/Allergies: See med card    Vitals:    03/14/22 1514   BP: (!) 149/76   Pulse: 88   Weight: 84.9 kg (187 lb 2.7 oz)   Height: 5' 7.2" (1.707 m)   PainSc:   3   PainLoc: Back         Physical exam:  Gen: A and O x3, pleasant, well-groomed  Skin: No rashes or obvious lesions  HEENT: PERRLA, no obvious deformities on ears or in canals.Trachea midline.  CVS: Regular rate and rhythm, normal palpable pulses.  Resp: Clear to auscultation bilaterally, no wheezes or rales.  Abdomen: Soft, NT/ND.  Musculoskeletal: No antalgic gait.     Neuro:  Upper extremities: 4/5 strength bilaterally  Lower extremities:  4/5 strength bilaterally   Reflexes: Brachioradialis 2+, Bicep 2+, Tricep 2+.  Patellar and Achilles reflexes 2+ bilaterally.  Sensory: Intact and symmetrical to light touch and pinprick in C2-T1 dermatomes bilaterally.  Osuna's negative.  Romberg positive for imbalance, abnormal tandem gait test.    Cervical Spine:  Cervical spine: Range of motion is mildly reduced with flexion extension and lateral rotation without increased pain.  Spurling's maneuver causes " neck pain to either side.    Myofascial exam: No tenderness to palpation to the cervical paraspinous muscles.    Lumbar spine:  Range of motion is moderately reduced with flexion, extension and oblique extension with increased low back pain during each maneuver.  Khanh's test is negative bilaterally.  Straight leg raise causes left leg pain.  Internal and external rotation of hip is negative bilaterally.  Myofascial exam:  Mild tenderness to palpation to the lumbar paraspinous muscles.    Imagin/24/15 C-spine MRI  1. C3-4 left posterior paracentral disk extrusion causing left paracentral spinal cord compression and severe left foraminal stenosis.  2. C4-5 based disk extrusion with spinal cord impingement and moderate bilateral foraminal stenosis.  3. C5-6 posterior central disk extrusion with spinal cord compression and severe bilateral foraminal stenosis.  4. C6-7 mild disk bulge with severe bilateral foraminal stenosis.  5. Solitary mildly enlarged left submandibular lymph node of uncertain significance.    4/10/17 MRI lumbar spine  T12-L1: No central canal or neuroforaminal stenosis. No disc protrusion or extrusion.  L1-L2: There is ligamentum flavum thickening and facet arthropathy.  No central canal or neuroforaminal stenosis. No disc protrusion or extrusion.  L2-L3: There is ligamentum flavum thickening and facet arthropathy.  No central canal or neuroforaminal stenosis. No disc protrusion or extrusion.  L3-L4: There is ligamentum flavum thickening and facet arthropathy present. No central canal or neuroforaminal stenosis. No disc protrusion or extrusion.  L4-L5: There is a broad disc bulge which extends into both neural foramina.  There is ligamentum flavum thickening and facet arthropathy.  There is right lateral recess stenosis.  There is abutment of the descending right L5 nerve in the right lateral recess.  Please correlate clinically for symptoms referable to the right L5 nerve.  There is mild  central canal stenosis.  There is mild left neuroforaminal stenosis.  No right neuroforaminal stenosis.  L5-S1: There is a broad central/right paracentral disc protrusion which encroaches upon the descending right S1 nerve in the right lateral recess.  Please correlate clinically for symptoms referable to the right S1 nerve.  There is mild-moderate right neuroforaminal stenosis.  No left neuroforaminal stenosis.  No central canal stenosis.  There is ligamentum flavum thickening and facet arthropathy.  There is a broad left extraforaminal disc protrusion at L5-S1 which abuts the exited left L5 nerve in the left extraforaminal soft tissues (series 6 image 17), nonspecific.  Please correlate clinically for symptoms referable to the left L5 nerve.    05/28/2019 MRI cervical spine  C2-3: No disc herniation, spinal canal narrowing, or neuroforaminal narrowing.  C3-4: There is moderate broad-based posterior disc osteophyte complex formation with left paracentral predominance.  This along with facet arthropathy contributes to mild spinal canal narrowing and moderate left neuroforaminal narrowing.  C4-5: There is moderate broad-based posterior disc osteophyte complex formation with left paracentral prominence.  This along with facet arthropathy contributes to mild bilateral neuroforaminal narrowing and mild spinal canal narrowing.  C5-6: Moderate broad-based posterior disc osteophyte complex formation and facet arthropathy result in moderate spinal canal narrowing and moderate right and mild left neuroforaminal narrowing.  C6-7: Moderate broad-based posterior disc osteophyte complex formation and facet arthropathy result in mild spinal canal narrowing along with moderate right and severe left neuroforaminal narrowing.  C7-T1: Minimal broad-based posterior disc osteophyte complex formation and bilateral facet arthropathy result in mild bilateral neuroforaminal narrowing.      05/28/2019 MRI lumbar spine  T12-L1: No disc  herniation, spinal canal narrowing, or neuroforaminal narrowing.  L1-2: No disc herniation, spinal canal narrowing, or neuroforaminal narrowing.  L2-3: No disc herniation, spinal canal narrowing, or neuroforaminal narrowing.  L3-4: No disc herniation, spinal canal narrowing, or neuroforaminal narrowing.  L4-5: There is moderate generalized disc bulging and bilateral facet arthropathy, which result in mild spinal canal narrowing and mild bilateral neuroforaminal narrowing.  L5-S1: There is a small right paracentral disc protrusion.  This effaces the right lateral recess and may impinge upon the descending right S1 nerve root.  Bilateral facet arthropathy is present and there is resultant overall mild spinal canal narrowing.  Moderate right and mild left neuroforaminal narrowing are also present.    06/24/2021 MRI lumbar spine  NOMENCLATURE: Five lumbar type vertebral bodies.     CORD/CAUDA EQUINA: Conus has normal size and signal and ends at a normal level of L1-L2.  Small amount of susceptibility either in the subdural or intrathecal space ventrally and dorsally at the L3-4 level.     ALIGNMENT: Trace retrolisthesis of L4 on L5.     BONES: Interval L4 laminectomy and right L5 hemilaminectomy.  Medial right L4-5 and L5-S1 facetectomies.  Susceptibility artifact is again seen in the left L5 pedicle.  No aggressive bone marrow signal.     PARASPINAL AREA: Fluid and edema along the surgical tract and in the right dorsal paraspinal muscles at the surgical levels.     LUMBAR DISC LEVELS:     T12-L1: No disc herniation or significant posterior osteophytic ridging.  Mild left facet hypertrophy.  No significant spinal canal or foraminal stenosis.     L1-L2: Minimal disc bulge.  Mild bilateral facet hypertrophy.  Ligamentum flavum thickening.  Minimal narrowing of the bilateral lateral recesses.  No significant spinal canal or foraminal stenosis.     L2-L3: Mild disc bulge.  Mild bilateral facet hypertrophy.  Ligamentum  flavum thickening.  Mild narrowing of the bilateral lateral recesses.  No significant spinal canal or foraminal stenosis.     L3-L4: Mild disc bulge.  Mild-moderate bilateral facet hypertrophy.  Ligamentum flavum thickening.  Susceptibility at the midline anterior and posterior aspect of the thecal sac.  Mild narrowing of the bilateral lateral recesses.  Moderate spinal canal stenosis, increased from prior study.  Mild bilateral foraminal stenosis.     L4-L5: Laminectomy.  Right facetectomy.  Mild disc bulge and disc height loss.  Mild narrowing of the lateral recesses without significant overall spinal canal stenosis.  Mild bilateral foraminal stenosis.     L5-S1: Interval right hemilaminectomy and right medial facetectomy.  Mild disc bulge.  Small central protrusion.  Mild narrowing of the right lateral recess which is significantly decreased from preoperative study.  No significant spinal canal stenosis.  Moderate right and mild-moderate left foraminal stenosis, unchanged.  The left L5 nerve root may be contacted by disc as it exits the foramen    Assessment:  The patient is a 61-year-old woman with a history of diabetes, hypertension who presents in referral from [facility] for neck pain radiating into the arms and low back pain radiating into the left leg.     1. Lumbar radiculopathy  Case Request Operating Room: Injection-steroid-epidural-caudal   2. DDD (degenerative disc disease), lumbar  Ambulatory referral/consult to Physical/Occupational Therapy   3. DDD (degenerative disc disease), cervical  Ambulatory referral/consult to Physical/Occupational Therapy   4. Myofascial pain  Ambulatory referral/consult to Physical/Occupational Therapy   5. Opioid contract exists         Plan:  1. We discussed that she initially did very well following the caudal epidural steroid injection in till her fall in the bathroom.  I am going to get her set up to repeat this.  2. I completed orders for physical  therapy at [facility] in [place].  3. Dr. Galan provided prescriptions for hydrocodone-acetaminophen 10/325 mg up to 3 times daily as needed for pain.  I have reviewed the [place] Board of Pharmacy website and there are no abberancies.    4. Follow-up in 4 weeks postprocedure or sooner as needed.

## 2022-03-15 NOTE — PROGRESS NOTES
This note was completed with dictation software and grammatical errors may exist.    CC:  Neck and back pain    HPI: The patient is a 61-year-old woman with a history of diabetes, hypertension who presents in referral from [facility] for neck pain radiating into the arms and low back pain radiating into the left leg.  She is status post caudal epidural steroid injection on 11/01/2021 with 70% relief lasting over 1 month.  She reports falling in her bathroom and her pain worsened.  She describes pain in the left buttock radiating to the left posterior thigh and calf.  She describes this as a spasm, worse with prolonged standing and with housework.  She also continues to have some left neck pain radiating to the left arm down to her elbow but this is not as severe as her low back and left leg pain.  She describes weakness in her left leg.  She denies numbness or incontinence.    Pain intervention history: She had undergone 3 epidurals for low back pain with only up to 3 weeks of relief each time.  She is status post C7-T1 cervical interlaminar epidural steroid injection on 1/29/16 with 50% relief.   She is status post C7-T1 cervical interlaminar epidural steroid injection on 3/1/16 with mild additional relief.  She is status post C7-T1 cervical interlaminar epidural steroid injection on 5/13/16 with 70-75% relief.  She is status post right C3, 4, 5 and 6 medial branch radiofrequency ablation on 7/15/16 with 30-60% relief.  She is status post L5/S1 interlaminar epidural steroid injection on 5/16/17 with 50% relief.  She is status post L5/S1 interlaminar epidural steroid injection on 12/29/17 with excellent relief lasting 2 weeks, now reporting minimal relief of her low back and buttock pain but ongoing 100% relief of her right leg pain.  She is status post C7-T1 cervical interlaminar epidural steroid injection on 3/14/18 with 75% relief but this was not long lasting.  She is status post right L4/5  "and L5/S1 transforaminal epidural steroid injection on 01/11/2021 with Dr Luna with 0% relief.   She is status post caudal epidural steroid injection on 11/01/2021 with 70% relief lasting over 1 month.     Spine surgeries: Right L4/5 and L5/S1 laminectomy on 06/24/2021 Dr Hestre.     Antineuropathics:  Gabapentin 100 mg 3 times daily  NSAIDs:  Physical therapy:  [facility] in [place]  Antidepressants:  Amitriptyline 25 mg nightly  Muscle relaxers:  Tizanidine 4 mg 3 times daily as needed  Opioids:  Hydrocodone-acetaminophen 10/325 mg 3 times daily as needed  Antiplatelets/Anticoagulants:    ROS: She reports headaches, hoarse voice, joint stiffness, joint swelling, back pain, difficulty sleeping, anxiety and loss of balance.  Balance of review of systems is negative.    Medical, surgical, family and social history reviewed elsewhere in record.    Medications/Allergies: See med card    Vitals:    03/14/22 1514   BP: (!) 149/76   Pulse: 88   Weight: 84.9 kg (187 lb 2.7 oz)   Height: 5' 7.2" (1.707 m)   PainSc:   3   PainLoc: Back         Physical exam:  Gen: A and O x3, pleasant, well-groomed  Skin: No rashes or obvious lesions  HEENT: PERRLA, no obvious deformities on ears or in canals.Trachea midline.  CVS: Regular rate and rhythm, normal palpable pulses.  Resp: Clear to auscultation bilaterally, no wheezes or rales.  Abdomen: Soft, NT/ND.  Musculoskeletal: No antalgic gait.     Neuro:  Upper extremities: 4/5 strength bilaterally  Lower extremities:  4/5 strength bilaterally   Reflexes: Brachioradialis 2+, Bicep 2+, Tricep 2+.  Patellar and Achilles reflexes 2+ bilaterally.  Sensory: Intact and symmetrical to light touch and pinprick in C2-T1 dermatomes bilaterally.  Osuna's negative.  Romberg positive for imbalance, abnormal tandem gait test.    Cervical Spine:  Cervical spine: Range of motion is mildly reduced with flexion extension and lateral rotation without increased pain.  Spurling's maneuver causes " neck pain to either side.    Myofascial exam: No tenderness to palpation to the cervical paraspinous muscles.    Lumbar spine:  Range of motion is moderately reduced with flexion, extension and oblique extension with increased low back pain during each maneuver.  Khanh's test is negative bilaterally.  Straight leg raise causes left leg pain.  Internal and external rotation of hip is negative bilaterally.  Myofascial exam:  Mild tenderness to palpation to the lumbar paraspinous muscles.    Imagin/24/15 C-spine MRI  1. C3-4 left posterior paracentral disk extrusion causing left paracentral spinal cord compression and severe left foraminal stenosis.  2. C4-5 based disk extrusion with spinal cord impingement and moderate bilateral foraminal stenosis.  3. C5-6 posterior central disk extrusion with spinal cord compression and severe bilateral foraminal stenosis.  4. C6-7 mild disk bulge with severe bilateral foraminal stenosis.  5. Solitary mildly enlarged left submandibular lymph node of uncertain significance.    4/10/17 MRI lumbar spine  T12-L1: No central canal or neuroforaminal stenosis. No disc protrusion or extrusion.  L1-L2: There is ligamentum flavum thickening and facet arthropathy.  No central canal or neuroforaminal stenosis. No disc protrusion or extrusion.  L2-L3: There is ligamentum flavum thickening and facet arthropathy.  No central canal or neuroforaminal stenosis. No disc protrusion or extrusion.  L3-L4: There is ligamentum flavum thickening and facet arthropathy present. No central canal or neuroforaminal stenosis. No disc protrusion or extrusion.  L4-L5: There is a broad disc bulge which extends into both neural foramina.  There is ligamentum flavum thickening and facet arthropathy.  There is right lateral recess stenosis.  There is abutment of the descending right L5 nerve in the right lateral recess.  Please correlate clinically for symptoms referable to the right L5 nerve.  There is mild  central canal stenosis.  There is mild left neuroforaminal stenosis.  No right neuroforaminal stenosis.  L5-S1: There is a broad central/right paracentral disc protrusion which encroaches upon the descending right S1 nerve in the right lateral recess.  Please correlate clinically for symptoms referable to the right S1 nerve.  There is mild-moderate right neuroforaminal stenosis.  No left neuroforaminal stenosis.  No central canal stenosis.  There is ligamentum flavum thickening and facet arthropathy.  There is a broad left extraforaminal disc protrusion at L5-S1 which abuts the exited left L5 nerve in the left extraforaminal soft tissues (series 6 image 17), nonspecific.  Please correlate clinically for symptoms referable to the left L5 nerve.    05/28/2019 MRI cervical spine  C2-3: No disc herniation, spinal canal narrowing, or neuroforaminal narrowing.  C3-4: There is moderate broad-based posterior disc osteophyte complex formation with left paracentral predominance.  This along with facet arthropathy contributes to mild spinal canal narrowing and moderate left neuroforaminal narrowing.  C4-5: There is moderate broad-based posterior disc osteophyte complex formation with left paracentral prominence.  This along with facet arthropathy contributes to mild bilateral neuroforaminal narrowing and mild spinal canal narrowing.  C5-6: Moderate broad-based posterior disc osteophyte complex formation and facet arthropathy result in moderate spinal canal narrowing and moderate right and mild left neuroforaminal narrowing.  C6-7: Moderate broad-based posterior disc osteophyte complex formation and facet arthropathy result in mild spinal canal narrowing along with moderate right and severe left neuroforaminal narrowing.  C7-T1: Minimal broad-based posterior disc osteophyte complex formation and bilateral facet arthropathy result in mild bilateral neuroforaminal narrowing.      05/28/2019 MRI lumbar spine  T12-L1: No disc  herniation, spinal canal narrowing, or neuroforaminal narrowing.  L1-2: No disc herniation, spinal canal narrowing, or neuroforaminal narrowing.  L2-3: No disc herniation, spinal canal narrowing, or neuroforaminal narrowing.  L3-4: No disc herniation, spinal canal narrowing, or neuroforaminal narrowing.  L4-5: There is moderate generalized disc bulging and bilateral facet arthropathy, which result in mild spinal canal narrowing and mild bilateral neuroforaminal narrowing.  L5-S1: There is a small right paracentral disc protrusion.  This effaces the right lateral recess and may impinge upon the descending right S1 nerve root.  Bilateral facet arthropathy is present and there is resultant overall mild spinal canal narrowing.  Moderate right and mild left neuroforaminal narrowing are also present.    06/24/2021 MRI lumbar spine  NOMENCLATURE: Five lumbar type vertebral bodies.     CORD/CAUDA EQUINA: Conus has normal size and signal and ends at a normal level of L1-L2.  Small amount of susceptibility either in the subdural or intrathecal space ventrally and dorsally at the L3-4 level.     ALIGNMENT: Trace retrolisthesis of L4 on L5.     BONES: Interval L4 laminectomy and right L5 hemilaminectomy.  Medial right L4-5 and L5-S1 facetectomies.  Susceptibility artifact is again seen in the left L5 pedicle.  No aggressive bone marrow signal.     PARASPINAL AREA: Fluid and edema along the surgical tract and in the right dorsal paraspinal muscles at the surgical levels.     LUMBAR DISC LEVELS:     T12-L1: No disc herniation or significant posterior osteophytic ridging.  Mild left facet hypertrophy.  No significant spinal canal or foraminal stenosis.     L1-L2: Minimal disc bulge.  Mild bilateral facet hypertrophy.  Ligamentum flavum thickening.  Minimal narrowing of the bilateral lateral recesses.  No significant spinal canal or foraminal stenosis.     L2-L3: Mild disc bulge.  Mild bilateral facet hypertrophy.  Ligamentum  flavum thickening.  Mild narrowing of the bilateral lateral recesses.  No significant spinal canal or foraminal stenosis.     L3-L4: Mild disc bulge.  Mild-moderate bilateral facet hypertrophy.  Ligamentum flavum thickening.  Susceptibility at the midline anterior and posterior aspect of the thecal sac.  Mild narrowing of the bilateral lateral recesses.  Moderate spinal canal stenosis, increased from prior study.  Mild bilateral foraminal stenosis.     L4-L5: Laminectomy.  Right facetectomy.  Mild disc bulge and disc height loss.  Mild narrowing of the lateral recesses without significant overall spinal canal stenosis.  Mild bilateral foraminal stenosis.     L5-S1: Interval right hemilaminectomy and right medial facetectomy.  Mild disc bulge.  Small central protrusion.  Mild narrowing of the right lateral recess which is significantly decreased from preoperative study.  No significant spinal canal stenosis.  Moderate right and mild-moderate left foraminal stenosis, unchanged.  The left L5 nerve root may be contacted by disc as it exits the foramen    Assessment:  The patient is a 61-year-old woman with a history of diabetes, hypertension who presents in referral from [facility] for neck pain radiating into the arms and low back pain radiating into the left leg.     1. Lumbar radiculopathy  Case Request Operating Room: Injection-steroid-epidural-caudal   2. DDD (degenerative disc disease), lumbar  Ambulatory referral/consult to Physical/Occupational Therapy   3. DDD (degenerative disc disease), cervical  Ambulatory referral/consult to Physical/Occupational Therapy   4. Myofascial pain  Ambulatory referral/consult to Physical/Occupational Therapy   5. Opioid contract exists         Plan:  1. We discussed that she initially did very well following the caudal epidural steroid injection in till her fall in the bathroom.  I am going to get her set up to repeat this.  2. I completed orders for physical  therapy at [facility] in [place].  3. Dr. Galan provided prescriptions for hydrocodone-acetaminophen 10/325 mg up to 3 times daily as needed for pain.  I have reviewed the [place] Board of Pharmacy website and there are no abberancies.    4. Follow-up in 4 weeks postprocedure or sooner as needed.

## 2022-03-22 DIAGNOSIS — Z01.818 PRE-OP TESTING: ICD-10-CM

## 2022-03-23 ENCOUNTER — CLINICAL SUPPORT (OUTPATIENT)
Dept: FAMILY MEDICINE | Facility: CLINIC | Age: 62
End: 2022-03-23
Payer: COMMERCIAL

## 2022-03-23 DIAGNOSIS — Z01.818 PRE-OP TESTING: ICD-10-CM

## 2022-03-23 PROCEDURE — U0003 INFECTIOUS AGENT DETECTION BY NUCLEIC ACID (DNA OR RNA); SEVERE ACUTE RESPIRATORY SYNDROME CORONAVIRUS 2 (SARS-COV-2) (CORONAVIRUS DISEASE [COVID-19]), AMPLIFIED PROBE TECHNIQUE, MAKING USE OF HIGH THROUGHPUT TECHNOLOGIES AS DESCRIBED BY CMS-2020-01-R: HCPCS | Performed by: ANESTHESIOLOGY

## 2022-03-23 PROCEDURE — U0005 INFEC AGEN DETEC AMPLI PROBE: HCPCS | Performed by: ANESTHESIOLOGY

## 2022-03-24 LAB
SARS-COV-2 RNA RESP QL NAA+PROBE: NOT DETECTED
SARS-COV-2- CYCLE NUMBER: NORMAL

## 2022-03-25 ENCOUNTER — TELEPHONE (OUTPATIENT)
Dept: SURGERY | Facility: HOSPITAL | Age: 62
End: 2022-03-25
Payer: COMMERCIAL

## 2022-03-25 ENCOUNTER — HOSPITAL ENCOUNTER (OUTPATIENT)
Dept: RADIOLOGY | Facility: HOSPITAL | Age: 62
Discharge: HOME | End: 2022-03-25
Attending: ANESTHESIOLOGY
Payer: COMMERCIAL

## 2022-03-25 DIAGNOSIS — Z11.9 SCREENING EXAMINATION FOR INFECTIOUS DISEASE: Primary | ICD-10-CM

## 2022-03-25 DIAGNOSIS — M54.50 LOWER BACK PAIN: ICD-10-CM

## 2022-03-25 NOTE — TELEPHONE ENCOUNTER
Pt scheduled for procedure with Dr. Galan this AM. Pt called [facility] stating that she is ill and will need to cancel her procedure today. Dr. Galan aware. Please call patient to advise and reschedule as necessary. Thank you!

## 2022-04-05 ENCOUNTER — HOSPITAL ENCOUNTER (OUTPATIENT)
Dept: RADIOLOGY | Facility: HOSPITAL | Age: 62
Discharge: HOME | End: 2022-04-05
Attending: ANESTHESIOLOGY
Payer: COMMERCIAL

## 2022-04-05 ENCOUNTER — HOSPITAL ENCOUNTER (OUTPATIENT)
Facility: HOSPITAL | Age: 62
Discharge: HOME | End: 2022-04-05
Attending: ANESTHESIOLOGY | Admitting: ANESTHESIOLOGY
Payer: COMMERCIAL

## 2022-04-05 VITALS
DIASTOLIC BLOOD PRESSURE: 67 MMHG | OXYGEN SATURATION: 100 % | SYSTOLIC BLOOD PRESSURE: 138 MMHG | TEMPERATURE: 97 F | RESPIRATION RATE: 18 BRPM | HEART RATE: 71 BPM

## 2022-04-05 DIAGNOSIS — M54.16 LUMBAR RADICULOPATHY: ICD-10-CM

## 2022-04-05 DIAGNOSIS — M54.50 LOWER BACK PAIN: ICD-10-CM

## 2022-04-05 LAB — GLUCOSE SERPL-MCNC: 98 MG/DL (ref 70–110)

## 2022-04-05 PROCEDURE — 63600175 PHARM REV CODE 636 W HCPCS: Mod: PO | Performed by: ANESTHESIOLOGY

## 2022-04-05 PROCEDURE — 62323 NJX INTERLAMINAR LMBR/SAC: CPT | Mod: ,,, | Performed by: ANESTHESIOLOGY

## 2022-04-05 PROCEDURE — 62323 PR INJ LUMBAR/SACRAL, W/IMAGING GUIDANCE: ICD-10-PCS | Mod: ,,, | Performed by: ANESTHESIOLOGY

## 2022-04-05 PROCEDURE — 76000 FLUOROSCOPY <1 HR PHYS/QHP: CPT | Mod: TC,PO

## 2022-04-05 PROCEDURE — 82962 GLUCOSE BLOOD TEST: CPT | Mod: PO | Performed by: ANESTHESIOLOGY

## 2022-04-05 PROCEDURE — A4216 STERILE WATER/SALINE, 10 ML: HCPCS | Mod: PO | Performed by: ANESTHESIOLOGY

## 2022-04-05 PROCEDURE — 25000003 PHARM REV CODE 250: Mod: PO | Performed by: ANESTHESIOLOGY

## 2022-04-05 PROCEDURE — 62323 NJX INTERLAMINAR LMBR/SAC: CPT | Mod: PO | Performed by: ANESTHESIOLOGY

## 2022-04-05 PROCEDURE — 25500020 PHARM REV CODE 255: Mod: PO | Performed by: ANESTHESIOLOGY

## 2022-04-05 RX ORDER — ALPRAZOLAM 0.5 MG/1
1 TABLET, ORALLY DISINTEGRATING ORAL ONCE AS NEEDED
Status: COMPLETED | OUTPATIENT
Start: 2022-04-05 | End: 2022-04-05

## 2022-04-05 RX ORDER — LIDOCAINE HYDROCHLORIDE 10 MG/ML
INJECTION, SOLUTION EPIDURAL; INFILTRATION; INTRACAUDAL; PERINEURAL
Status: DISCONTINUED | OUTPATIENT
Start: 2022-04-05 | End: 2022-04-05 | Stop reason: HOSPADM

## 2022-04-05 RX ORDER — SODIUM CHLORIDE 9 MG/ML
INJECTION, SOLUTION INTRAMUSCULAR; INTRAVENOUS; SUBCUTANEOUS
Status: DISCONTINUED | OUTPATIENT
Start: 2022-04-05 | End: 2022-04-05 | Stop reason: HOSPADM

## 2022-04-05 RX ORDER — METHYLPREDNISOLONE ACETATE 80 MG/ML
INJECTION, SUSPENSION INTRA-ARTICULAR; INTRALESIONAL; INTRAMUSCULAR; SOFT TISSUE
Status: DISCONTINUED | OUTPATIENT
Start: 2022-04-05 | End: 2022-04-05 | Stop reason: HOSPADM

## 2022-04-05 RX ADMIN — ALPRAZOLAM 1 MG: 0.5 TABLET, ORALLY DISINTEGRATING ORAL at 01:04

## 2022-04-05 NOTE — DISCHARGE SUMMARY
[facility] - Surgery  Discharge Note  Short Stay    Procedure(s) (LRB):  Injection-steroid-epidural-caudal (N/A)    OUTCOME: Patient tolerated treatment/procedure well without complication and is now ready for discharge.    DISPOSITION: Home or Self Care    FINAL DIAGNOSIS:  Lumbar radiculopathy    FOLLOWUP: In clinic    DISCHARGE INSTRUCTIONS:    Discharge Procedure Orders   Diet Adult Regular     No dressing needed     Notify your health care provider if you experience any of the following:  temperature >100.4     Activity as tolerated

## 2022-04-05 NOTE — OP NOTE
PROCEDURE DATE: 4/5/2022    PROCEDURE:  Caudal epidural steroid injection under fluoroscopy.    Diagnosis: Lumbar radiculopathy    Post Op diagnosis: Same    PHYSICIAN: Erik Galan M.D.    MEDICATIONS INJECTED:  80 mg of methylprednisone and 4 ml of sterile, preservative-free NaCl.    LOCAL ANESTHETIC GIVEN:  Lidocaine 1%, 4 ml total    SEDATION MEDICATIONS: none    ESTIMATED BLOOD LOSS:  none    COMPLICATIONS:  none    TECHNIQUE:   After the patient was placed in prone position, the patient was prepped and draped in the usual sterile fashion using ChloraPrep and sterile towels.  Appropriate anatomic landmarks were determined by identifying the sacral hiatus in the lateral fluoroscopic view.  Local anesthetic was given via a 25g 1.5 inch needle by raising a wheal and infiltrating down to the periosteum.  A 3.5 inch 22 gauge needle was introduced thru the sacral hiatus.  Omnipaque was injected to confirm placement in the appropriate area and that there was no vascular uptake.  The medication was then injected slowly.  The patient tolerated the procedure well.    The patient was monitored after the procedure.  Patient was given post procedure and discharge instructions to follow at home.  The patient was discharged in a stable condition

## 2022-04-25 RX ORDER — TIZANIDINE 4 MG/1
TABLET ORAL
Qty: 90 TABLET | Refills: 2 | Status: SHIPPED | OUTPATIENT
Start: 2022-04-25 | End: 2022-07-15 | Stop reason: SDUPTHER

## 2022-05-03 ENCOUNTER — OFFICE VISIT (OUTPATIENT)
Dept: PAIN MEDICINE | Facility: CLINIC | Age: 62
End: 2022-05-03
Payer: COMMERCIAL

## 2022-05-03 ENCOUNTER — OFFICE VISIT (OUTPATIENT)
Dept: RHEUMATOLOGY | Facility: CLINIC | Age: 62
End: 2022-05-03
Payer: COMMERCIAL

## 2022-05-03 VITALS
WEIGHT: 183.06 LBS | HEIGHT: 68 IN | DIASTOLIC BLOOD PRESSURE: 85 MMHG | SYSTOLIC BLOOD PRESSURE: 179 MMHG | HEART RATE: 68 BPM | BODY MASS INDEX: 27.74 KG/M2

## 2022-05-03 VITALS
HEIGHT: 67 IN | SYSTOLIC BLOOD PRESSURE: 150 MMHG | DIASTOLIC BLOOD PRESSURE: 77 MMHG | WEIGHT: 183.19 LBS | HEART RATE: 73 BPM | BODY MASS INDEX: 28.75 KG/M2

## 2022-05-03 DIAGNOSIS — M77.9 TENDINITIS: ICD-10-CM

## 2022-05-03 DIAGNOSIS — Z79.891 OPIOID CONTRACT EXISTS: ICD-10-CM

## 2022-05-03 DIAGNOSIS — K59.03 CONSTIPATION DUE TO PAIN MEDICATION: ICD-10-CM

## 2022-05-03 DIAGNOSIS — M54.16 LUMBAR RADICULOPATHY: Primary | ICD-10-CM

## 2022-05-03 DIAGNOSIS — R76.8 ANA POSITIVE: ICD-10-CM

## 2022-05-03 DIAGNOSIS — M35.9 UNDIFFERENTIATED CONNECTIVE TISSUE DISEASE: ICD-10-CM

## 2022-05-03 DIAGNOSIS — M50.30 DDD (DEGENERATIVE DISC DISEASE), CERVICAL: ICD-10-CM

## 2022-05-03 DIAGNOSIS — E53.1 VITAMIN B6 DEFICIENCY: Primary | ICD-10-CM

## 2022-05-03 DIAGNOSIS — M51.36 DDD (DEGENERATIVE DISC DISEASE), LUMBAR: ICD-10-CM

## 2022-05-03 DIAGNOSIS — M19.90 CHRONIC INFLAMMATORY ARTHRITIS: ICD-10-CM

## 2022-05-03 PROCEDURE — 3077F SYST BP >= 140 MM HG: CPT | Mod: CPTII,S$GLB,, | Performed by: PHYSICIAN ASSISTANT

## 2022-05-03 PROCEDURE — 99214 PR OFFICE/OUTPT VISIT, EST, LEVL IV, 30-39 MIN: ICD-10-PCS | Mod: S$GLB,,, | Performed by: PHYSICIAN ASSISTANT

## 2022-05-03 PROCEDURE — 3008F PR BODY MASS INDEX (BMI) DOCUMENTED: ICD-10-PCS | Mod: CPTII,S$GLB,, | Performed by: PHYSICIAN ASSISTANT

## 2022-05-03 PROCEDURE — 1160F PR REVIEW ALL MEDS BY PRESCRIBER/CLIN PHARMACIST DOCUMENTED: ICD-10-PCS | Mod: CPTII,S$GLB,, | Performed by: PHYSICIAN ASSISTANT

## 2022-05-03 PROCEDURE — 1159F MED LIST DOCD IN RCRD: CPT | Mod: CPTII,S$GLB,, | Performed by: PHYSICIAN ASSISTANT

## 2022-05-03 PROCEDURE — 99999 PR PBB SHADOW E&M-EST. PATIENT-LVL V: CPT | Mod: PBBFAC,,, | Performed by: INTERNAL MEDICINE

## 2022-05-03 PROCEDURE — 99999 PR PBB SHADOW E&M-EST. PATIENT-LVL V: ICD-10-PCS | Mod: PBBFAC,,, | Performed by: INTERNAL MEDICINE

## 2022-05-03 PROCEDURE — 4010F PR ACE/ARB THEARPY RXD/TAKEN: ICD-10-PCS | Mod: CPTII,S$GLB,, | Performed by: INTERNAL MEDICINE

## 2022-05-03 PROCEDURE — 3008F BODY MASS INDEX DOCD: CPT | Mod: CPTII,S$GLB,, | Performed by: INTERNAL MEDICINE

## 2022-05-03 PROCEDURE — 3079F PR MOST RECENT DIASTOLIC BLOOD PRESSURE 80-89 MM HG: ICD-10-PCS | Mod: CPTII,S$GLB,, | Performed by: PHYSICIAN ASSISTANT

## 2022-05-03 PROCEDURE — 3079F DIAST BP 80-89 MM HG: CPT | Mod: CPTII,S$GLB,, | Performed by: PHYSICIAN ASSISTANT

## 2022-05-03 PROCEDURE — 4010F ACE/ARB THERAPY RXD/TAKEN: CPT | Mod: CPTII,S$GLB,, | Performed by: INTERNAL MEDICINE

## 2022-05-03 PROCEDURE — 99999 PR PBB SHADOW E&M-EST. PATIENT-LVL III: CPT | Mod: PBBFAC,,, | Performed by: PHYSICIAN ASSISTANT

## 2022-05-03 PROCEDURE — 3072F PR LOW RISK FOR RETINOPATHY: ICD-10-PCS | Mod: CPTII,S$GLB,, | Performed by: PHYSICIAN ASSISTANT

## 2022-05-03 PROCEDURE — 3077F PR MOST RECENT SYSTOLIC BLOOD PRESSURE >= 140 MM HG: ICD-10-PCS | Mod: CPTII,S$GLB,, | Performed by: PHYSICIAN ASSISTANT

## 2022-05-03 PROCEDURE — 96372 THER/PROPH/DIAG INJ SC/IM: CPT | Mod: S$GLB,,, | Performed by: INTERNAL MEDICINE

## 2022-05-03 PROCEDURE — 1160F RVW MEDS BY RX/DR IN RCRD: CPT | Mod: CPTII,S$GLB,, | Performed by: PHYSICIAN ASSISTANT

## 2022-05-03 PROCEDURE — 4010F ACE/ARB THERAPY RXD/TAKEN: CPT | Mod: CPTII,S$GLB,, | Performed by: PHYSICIAN ASSISTANT

## 2022-05-03 PROCEDURE — 99999 PR PBB SHADOW E&M-EST. PATIENT-LVL III: ICD-10-PCS | Mod: PBBFAC,,, | Performed by: PHYSICIAN ASSISTANT

## 2022-05-03 PROCEDURE — 3051F HG A1C>EQUAL 7.0%<8.0%: CPT | Mod: CPTII,S$GLB,, | Performed by: INTERNAL MEDICINE

## 2022-05-03 PROCEDURE — 96372 PR INJECTION,THERAP/PROPH/DIAG2ST, IM OR SUBCUT: ICD-10-PCS | Mod: S$GLB,,, | Performed by: INTERNAL MEDICINE

## 2022-05-03 PROCEDURE — 4010F PR ACE/ARB THEARPY RXD/TAKEN: ICD-10-PCS | Mod: CPTII,S$GLB,, | Performed by: PHYSICIAN ASSISTANT

## 2022-05-03 PROCEDURE — 3008F BODY MASS INDEX DOCD: CPT | Mod: CPTII,S$GLB,, | Performed by: PHYSICIAN ASSISTANT

## 2022-05-03 PROCEDURE — 3051F PR MOST RECENT HEMOGLOBIN A1C LEVEL 7.0 - < 8.0%: ICD-10-PCS | Mod: CPTII,S$GLB,, | Performed by: PHYSICIAN ASSISTANT

## 2022-05-03 PROCEDURE — 3008F PR BODY MASS INDEX (BMI) DOCUMENTED: ICD-10-PCS | Mod: CPTII,S$GLB,, | Performed by: INTERNAL MEDICINE

## 2022-05-03 PROCEDURE — 1159F PR MEDICATION LIST DOCUMENTED IN MEDICAL RECORD: ICD-10-PCS | Mod: CPTII,S$GLB,, | Performed by: PHYSICIAN ASSISTANT

## 2022-05-03 PROCEDURE — 3051F PR MOST RECENT HEMOGLOBIN A1C LEVEL 7.0 - < 8.0%: ICD-10-PCS | Mod: CPTII,S$GLB,, | Performed by: INTERNAL MEDICINE

## 2022-05-03 PROCEDURE — 99215 PR OFFICE/OUTPT VISIT, EST, LEVL V, 40-54 MIN: ICD-10-PCS | Mod: 25,S$GLB,, | Performed by: INTERNAL MEDICINE

## 2022-05-03 PROCEDURE — 3051F HG A1C>EQUAL 7.0%<8.0%: CPT | Mod: CPTII,S$GLB,, | Performed by: PHYSICIAN ASSISTANT

## 2022-05-03 PROCEDURE — 99214 OFFICE O/P EST MOD 30 MIN: CPT | Mod: S$GLB,,, | Performed by: PHYSICIAN ASSISTANT

## 2022-05-03 PROCEDURE — 3072F LOW RISK FOR RETINOPATHY: CPT | Mod: CPTII,S$GLB,, | Performed by: PHYSICIAN ASSISTANT

## 2022-05-03 PROCEDURE — 99215 OFFICE O/P EST HI 40 MIN: CPT | Mod: 25,S$GLB,, | Performed by: INTERNAL MEDICINE

## 2022-05-03 PROCEDURE — 3072F LOW RISK FOR RETINOPATHY: CPT | Mod: CPTII,S$GLB,, | Performed by: INTERNAL MEDICINE

## 2022-05-03 PROCEDURE — 3072F PR LOW RISK FOR RETINOPATHY: ICD-10-PCS | Mod: CPTII,S$GLB,, | Performed by: INTERNAL MEDICINE

## 2022-05-03 RX ORDER — HYDROCODONE BITARTRATE AND ACETAMINOPHEN 10; 325 MG/1; MG/1
1 TABLET ORAL 3 TIMES DAILY PRN
Qty: 90 TABLET | Refills: 0 | Status: SHIPPED | OUTPATIENT
Start: 2022-06-15 | End: 2022-07-15

## 2022-05-03 RX ORDER — KETOROLAC TROMETHAMINE 30 MG/ML
60 INJECTION, SOLUTION INTRAMUSCULAR; INTRAVENOUS
Status: COMPLETED | OUTPATIENT
Start: 2022-05-03 | End: 2022-05-03

## 2022-05-03 RX ORDER — CYANOCOBALAMIN 1000 UG/ML
1000 INJECTION, SOLUTION INTRAMUSCULAR; SUBCUTANEOUS
Status: COMPLETED | OUTPATIENT
Start: 2022-05-03 | End: 2022-05-03

## 2022-05-03 RX ORDER — HYDROCODONE BITARTRATE AND ACETAMINOPHEN 10; 325 MG/1; MG/1
1 TABLET ORAL 3 TIMES DAILY PRN
Qty: 90 TABLET | Refills: 0 | Status: SHIPPED | OUTPATIENT
Start: 2022-07-15 | End: 2022-08-12 | Stop reason: SDUPTHER

## 2022-05-03 RX ADMIN — KETOROLAC TROMETHAMINE 60 MG: 30 INJECTION, SOLUTION INTRAMUSCULAR; INTRAVENOUS at 02:05

## 2022-05-03 RX ADMIN — CYANOCOBALAMIN 1000 MCG: 1000 INJECTION, SOLUTION INTRAMUSCULAR; SUBCUTANEOUS at 02:05

## 2022-05-03 ASSESSMENT — ROUTINE ASSESSMENT OF PATIENT INDEX DATA (RAPID3)
PSYCHOLOGICAL DISTRESS SCORE: 2.2
TOTAL RAPID3 SCORE: 3.11
PAIN SCORE: 5
PATIENT GLOBAL ASSESSMENT SCORE: 3
FATIGUE SCORE: 2.2
MDHAQ FUNCTION SCORE: 0.4

## 2022-05-03 NOTE — PROGRESS NOTES
Subjective:       Patient ID: Aaron Garcia is a 61 y.o. female.    Chief Complaint: Disease Management    HPI: pt is a 61 with a pos oksana and negative panel, she had a TV fall on her neck and last year she  Had cervical spine fusion sucessful with minimal neuropathy. She started having spasm lower ext.  She has ulcer from acid reflux, no hair loss,  No malar rask dry mouth, she has peeling of her lips since covid.  She started with gastric pain with passing out with  Normal BM.   Patient complains of arthralgias and myalgias for which has been present for a few years. Pain is located in multiple joints, both shoulder(s), both elbow(s), both wrist(s), both MCP(s): 1st, 2nd, 3rd, 4th and 5th, both PIP(s): 1st, 2nd, 3rd, 4th and 5th, both DIP(s): 1st and 2nd, both hip(s), both knee(s) and both MTP(s): 1st, 2nd, 3rd, 4th and 5th, is described as aching, pulsating, shooting and throbbing, and is constant, moderate .  Associated symptoms include: crepitation, decreased range of motion, edema, effusion, tenderness and warmth.   Tried plaquenil but has eye issues.  2 miscarriage one set of twins    Review of Systems   Constitutional: Positive for activity change, chills and fatigue. Negative for appetite change, diaphoresis, fever and unexpected weight change.   HENT: Negative for congestion, dental problem, ear discharge, ear pain, facial swelling, mouth sores, nosebleeds, postnasal drip, rhinorrhea, sinus pressure, sneezing, sore throat, tinnitus, trouble swallowing and voice change.    Eyes: Negative for photophobia, pain, discharge, redness and itching.   Respiratory: Negative for apnea, cough, chest tightness, shortness of breath and wheezing.    Cardiovascular: Positive for leg swelling. Negative for chest pain and palpitations.   Gastrointestinal: Positive for abdominal pain. Negative for abdominal distention, constipation, diarrhea, nausea and vomiting.   Endocrine: Negative for cold intolerance, heat  "intolerance, polydipsia and polyuria.   Genitourinary: Negative for decreased urine volume, difficulty urinating, dysuria, flank pain, frequency, hematuria and urgency.   Musculoskeletal: Positive for arthralgias, back pain, gait problem, joint swelling, myalgias, neck pain and neck stiffness.   Skin: Positive for rash. Negative for pallor and wound.   Allergic/Immunologic: Negative for immunocompromised state.   Neurological: Positive for weakness. Negative for dizziness, tremors, numbness and headaches.   Hematological: Negative for adenopathy. Does not bruise/bleed easily.   Psychiatric/Behavioral: Negative for sleep disturbance. The patient is not nervous/anxious.          Objective:   BP (!) 150/77   Pulse 73   Ht 5' 7.21" (1.707 m)   Wt 83.1 kg (183 lb 3.2 oz)   LMP 03/13/2017   BMI 28.52 kg/m²      Physical Exam   Constitutional: She is oriented to person, place, and time.   HENT:   Head: Normocephalic and atraumatic.   Mouth/Throat: Oropharynx is clear and moist.   Eyes: Pupils are equal, round, and reactive to light.   Neck: No thyromegaly present.   Cardiovascular: Normal rate, regular rhythm and normal heart sounds. Exam reveals no gallop and no friction rub.   No murmur heard.  Pulmonary/Chest: Breath sounds normal. She has no wheezes. She has no rales. She exhibits no tenderness.   Abdominal: There is no abdominal tenderness. There is no rebound and no guarding.   Musculoskeletal:         General: Swelling, tenderness and deformity present.      Right shoulder: Tenderness present.      Left shoulder: Tenderness present.      Right elbow: Normal.      Left elbow: Normal.      Cervical back: Neck supple.      Right knee: Swelling and effusion present. Tenderness present.      Left knee: Effusion present.   Lymphadenopathy:     She has no cervical adenopathy.   Neurological: She is alert and oriented to person, place, and time. She has normal sensation. Gait normal.   Reflex Scores:       Patellar " reflexes are 3+ on the right side and 3+ on the left side.  Skin: No rash noted. No erythema. No pallor.   Psychiatric: Mood and affect normal.   Vitals reviewed.      Right Side Rheumatological Exam     Examination finds the elbow, 1st MCP, 2nd MCP, 3rd MCP, 4th MCP and 5th MCP normal.    The patient is tender to palpation of the shoulder and knee    She has swelling of the knee    The patient has an enlarged wrist, 1st PIP, 2nd PIP, 3rd PIP, 4th PIP and 5th PIP    Shoulder Exam   Tenderness Location: acromion    Range of Motion   Active abduction: abnormal   Adduction: abnormal  Sensation: normal    Knee Exam   Tenderness Location: medial joint line  Patellofemoral Crepitus: positive  Effusion: positive  Sensation: normal    Hip Exam   Tenderness Location: no tenderness  Sensation: normal    Elbow/Wrist Exam   Tenderness Location: no tenderness  Sensation: normal    Left Side Rheumatological Exam     Examination finds the elbow, 1st MCP, 2nd MCP, 3rd MCP, 4th MCP and 5th MCP normal.    The patient is tender to palpation of the shoulder.    The patient has an enlarged wrist, 1st PIP, 2nd PIP, 3rd PIP, 4th PIP and 5th PIP.    Shoulder Exam   Tenderness Location: acromion    Range of Motion   Active abduction: abnormal   Sensation: normal    Knee Exam   Tenderness Location: lateral joint line and medial joint line    Patellofemoral Crepitus: positive  Effusion: positive  Sensation: normal    Hip Exam   Tenderness Location: no tenderness  Sensation: normal    Elbow/Wrist Exam   Sensation: normal      Back/Neck Exam   General Inspection   Gait: normal       Tenderness Right paramedian tenderness of the Lower C-Spine and Lower L-Spine.Left paramedian tenderness of the Upper C-Spine and Lower L-Spine.           Results for orders placed or performed during the hospital encounter of 04/05/22   POCT Glucose, Hand-Held Device   Result Value Ref Range    POC Glucose 98 70 - 110 MG/DL     Collected Updated Procedure     11/03/2021 1146 11/09/2021 1028 VITAMIN B6 [[id number]]   (Abnormal)   Blood    Component Value Units   Vitamin B6 3 Low   ug/L          11/03/2021 1146 11/04/2021 1508 ANTI-SSB ANTIBODY [[id number]]    Blood    Component Value Units   Anti-SSB Antibody 0.07 Ratio   Anti-SSB Interpretation Negative           11/03/2021 1146 11/04/2021 1508 ANTI -SSA ANTIBODY [[id number]]    Blood    Component Value Units   Anti-SSA Antibody 0.06 Ratio   Anti-SSA Interpretation Negative           11/03/2021 1146 11/04/2021 1032 IMMUNOFIXATION ELECTROPHORESIS, SERUM [[id number]]   Blood    Component Value   Immunofix Interp. SEE COMMENT           11/03/2021 1146 11/04/2021 1720 PROTEIN ELECTROPHORESIS, SERUM [[id number]]   Blood    Component Value Units   Protein, Serum 7.6  g/dL   Albumin 4.26 g/dL   Alpha-1 0.40 g/dL   Alpha-2 0.77 g/dL   Beta 1.09 g/dL   Gamma 1.09 g/dL          11/03/2021 1146 11/04/2021 1232 ZAIRA [[id number]]   (Abnormal)   Blood    Component Value   ZAIRA Screen Positive Abnormal            11/03/2021 1146 11/03/2021 1936 RHEUMATOID FACTOR [[id number]]   Blood    Component Value Units   Rheumatoid Factor <13.0 IU/mL          11/03/2021 1146 11/09/2021 0540 VITAMIN B1 [[id number]]   Blood    Component Value Units   Thiamine 59  ug/L          11/03/2021 1146 11/04/2021 1232 ZAIRA Titer 1 [[id number]] Component Value   ZAIRA Titer 1 1:80          11/03/2021 1146 11/12/2021 1623 ZAIRA Profile [[id number]] Component Value Units   Anti Sm Antibody 0.07 Ratio   Anti-Sm Interpretation Negative    Anti-SSA Antibody 0.06 Ratio   Anti-SSA Interpretation Negative    Anti-SSB Antibody 0.07 Ratio   Anti-SSB Interpretation Negative    ds DNA Ab Negative 1:10     Anti Sm/RNP Antibody 0.07 Ratio   Anti-Sm/RNP Interpretation Negative           11/03/2021 1146 11/04/2021 1232 ZAIRA Pattern 1 [[id number]] Component Value   ZAIRA PATTERN 1 Speckled             Assessment:       1. Vitamin B6 deficiency    2. Undifferentiated connective tissue disease     3. Tendinitis    4. ZAIRA positive    5. Chronic inflammatory arthritis    6. Constipation due to pain medication            Plan:         Aaron was seen today for disease management.    Diagnoses and all orders for this visit:    Vitamin B6 deficiency  -     folic acid-vit B6-vit B12 2.5-25-2 mg (FOLBIC OR EQUIV) 2.5-25-2 mg Tab; Take 1 tablet by mouth once daily.  -     ZAIRA Screen w/Reflex; Future  -     Anti Sm/RNP Antibody; Future  -     Anti-DNA Ab, Double-Stranded; Future  -     Anti-Histone Antibody; Future  -     Anti-Scleroderma Antibody; Future  -     Anti-Smith Antibody; Future  -     Anti-Thyroglobulin Antibody; Future  -     Sjogrens syndrome-A extractable nuclear antibody; Future  -     Sjogrens syndrome-B extractable nuclear antibody; Future  -     Sedimentation rate; Future  -     C-Reactive Protein; Future  -     Rheumatoid Factor; Future  -     TSH; Future  -     Thyroid Peroxidase Antibody; Future  -     T4, Free; Future  -     Comprehensive Metabolic Panel; Future  -     CBC Auto Differential; Future  -     Vitamin B12; Future  -     VITAMIN B1; Future  -     VITAMIN B2; Future  -     Vitamin B6; Future  -     COVID-19 (SARS CoV-2) IgG Antibody Quant; Future  -     ketorolac injection 60 mg  -     cyanocobalamin injection 1,000 mcg  -     CK; Future  -     Aldolase; Future  -     Magnesium; Future    Undifferentiated connective tissue disease  -     ZAIRA Screen w/Reflex; Future  -     Anti Sm/RNP Antibody; Future  -     Anti-DNA Ab, Double-Stranded; Future  -     Anti-Histone Antibody; Future  -     Anti-Scleroderma Antibody; Future  -     Anti-Smith Antibody; Future  -     Anti-Thyroglobulin Antibody; Future  -     Sjogrens syndrome-A extractable nuclear antibody; Future  -     Sjogrens syndrome-B extractable nuclear antibody; Future  -     Sedimentation rate; Future  -     C-Reactive Protein; Future  -     Rheumatoid Factor; Future  -     TSH; Future  -     Thyroid Peroxidase Antibody;  Future  -     T4, Free; Future  -     Comprehensive Metabolic Panel; Future  -     CBC Auto Differential; Future  -     Vitamin B12; Future  -     VITAMIN B1; Future  -     VITAMIN B2; Future  -     Vitamin B6; Future  -     COVID-19 (SARS CoV-2) IgG Antibody Quant; Future  -     ketorolac injection 60 mg  -     cyanocobalamin injection 1,000 mcg  -     CK; Future  -     Aldolase; Future  -     Magnesium; Future    Tendinitis  -     ZAIRA Screen w/Reflex; Future  -     Anti Sm/RNP Antibody; Future  -     Anti-DNA Ab, Double-Stranded; Future  -     Anti-Histone Antibody; Future  -     Anti-Scleroderma Antibody; Future  -     Anti-Smith Antibody; Future  -     Anti-Thyroglobulin Antibody; Future  -     Sjogrens syndrome-A extractable nuclear antibody; Future  -     Sjogrens syndrome-B extractable nuclear antibody; Future  -     Sedimentation rate; Future  -     C-Reactive Protein; Future  -     Rheumatoid Factor; Future  -     TSH; Future  -     Thyroid Peroxidase Antibody; Future  -     T4, Free; Future  -     Comprehensive Metabolic Panel; Future  -     CBC Auto Differential; Future  -     Vitamin B12; Future  -     VITAMIN B1; Future  -     VITAMIN B2; Future  -     Vitamin B6; Future  -     COVID-19 (SARS CoV-2) IgG Antibody Quant; Future  -     ketorolac injection 60 mg  -     cyanocobalamin injection 1,000 mcg  -     CK; Future  -     Aldolase; Future  -     Magnesium; Future    ZAIRA positive  -     ZAIRA Screen w/Reflex; Future  -     Anti Sm/RNP Antibody; Future  -     Anti-DNA Ab, Double-Stranded; Future  -     Anti-Histone Antibody; Future  -     Anti-Scleroderma Antibody; Future  -     Anti-Smith Antibody; Future  -     Anti-Thyroglobulin Antibody; Future  -     Sjogrens syndrome-A extractable nuclear antibody; Future  -     Sjogrens syndrome-B extractable nuclear antibody; Future  -     Sedimentation rate; Future  -     C-Reactive Protein; Future  -     Rheumatoid Factor; Future  -     TSH; Future  -      Thyroid Peroxidase Antibody; Future  -     T4, Free; Future  -     Comprehensive Metabolic Panel; Future  -     CBC Auto Differential; Future  -     Vitamin B12; Future  -     VITAMIN B1; Future  -     VITAMIN B2; Future  -     Vitamin B6; Future  -     COVID-19 (SARS CoV-2) IgG Antibody Quant; Future  -     ketorolac injection 60 mg  -     cyanocobalamin injection 1,000 mcg  -     CK; Future  -     Aldolase; Future  -     Magnesium; Future    Chronic inflammatory arthritis  -     ZAIRA Screen w/Reflex; Future  -     Anti Sm/RNP Antibody; Future  -     Anti-DNA Ab, Double-Stranded; Future  -     Anti-Histone Antibody; Future  -     Anti-Scleroderma Antibody; Future  -     Anti-Smith Antibody; Future  -     Anti-Thyroglobulin Antibody; Future  -     Sjogrens syndrome-A extractable nuclear antibody; Future  -     Sjogrens syndrome-B extractable nuclear antibody; Future  -     Sedimentation rate; Future  -     C-Reactive Protein; Future  -     Rheumatoid Factor; Future  -     TSH; Future  -     Thyroid Peroxidase Antibody; Future  -     T4, Free; Future  -     Comprehensive Metabolic Panel; Future  -     CBC Auto Differential; Future  -     Vitamin B12; Future  -     VITAMIN B1; Future  -     VITAMIN B2; Future  -     Vitamin B6; Future  -     COVID-19 (SARS CoV-2) IgG Antibody Quant; Future  -     ketorolac injection 60 mg  -     cyanocobalamin injection 1,000 mcg  -     CK; Future  -     Aldolase; Future  -     Magnesium; Future    Constipation due to pain medication  -     linaCLOtide (LINZESS) 72 mcg Cap capsule; Take 1 capsule (72 mcg total) by mouth before breakfast.  -     ketorolac injection 60 mg  -     cyanocobalamin injection 1,000 mcg  -     CK; Future  -     Aldolase; Future  -     Magnesium; Future      1. Pt has a dx uctd and tried plaquenil she  Is concerned about jher eyes with that med  2. Family hx sle/ mctd

## 2022-05-03 NOTE — PROGRESS NOTES
Patient received B12 1cc (1000mcg) IM into Right Upper Outer Quad Gluteus and Ketorolac 2cc (60mg) IM into Right Upper Outer Quad Gluteus without adverse reaction. Patient tolerated well. No complaints voiced at this time.

## 2022-05-03 NOTE — PROGRESS NOTES
This note was completed with dictation software and grammatical errors may exist.    CC:  Neck and back pain    HPI: The patient is a 61-year-old woman with a history of diabetes, hypertension who presents in referral from [facility] for neck pain radiating into the arms and low back pain radiating into the left leg.  She is status post caudal epidural steroid injection on 04/05/2022 with 60-65% relief.  She continues to have back pain and left buttock pain but denies having frequent pain into her left posterior thigh and calf like she did prior to the injection.  She does report occasional spasms in her left posterior thigh.  She also complains of intermittent burning, tingling and numbness on the bottoms of her feet.  She denies any changes to her neck pain with radiation into her left arm down to her elbow.  She continues to take pain medication with relief.  She reports weakness in her arms and her legs.  She denies numbness, bladder or bowel incontinence.    Pain intervention history: She had undergone 3 epidurals for low back pain with only up to 3 weeks of relief each time.  She is status post C7-T1 cervical interlaminar epidural steroid injection on 1/29/16 with 50% relief.   She is status post C7-T1 cervical interlaminar epidural steroid injection on 3/1/16 with mild additional relief.  She is status post C7-T1 cervical interlaminar epidural steroid injection on 5/13/16 with 70-75% relief.  She is status post right C3, 4, 5 and 6 medial branch radiofrequency ablation on 7/15/16 with 30-60% relief.  She is status post L5/S1 interlaminar epidural steroid injection on 5/16/17 with 50% relief.  She is status post L5/S1 interlaminar epidural steroid injection on 12/29/17 with excellent relief lasting 2 weeks, now reporting minimal relief of her low back and buttock pain but ongoing 100% relief of her right leg pain.  She is status post C7-T1 cervical interlaminar epidural steroid injection  "on 3/14/18 with 75% relief but this was not long lasting.  She is status post right L4/5 and L5/S1 transforaminal epidural steroid injection on 01/11/2021 with Dr Luna with 0% relief.   She is status post caudal epidural steroid injection on 11/01/2021 with 70% relief lasting over 1 month.   She is status post caudal epidural steroid injection on 04/05/2022 with 60-65% relief.     Spine surgeries: Right L4/5 and L5/S1 laminectomy on 06/24/2021 Dr Hester.     Antineuropathics:  Gabapentin 100 mg 3 times daily  NSAIDs:  Physical therapy:  [facility] in [place]  Antidepressants:  Amitriptyline 25 mg nightly  Muscle relaxers:  Tizanidine 4 mg 3 times daily as needed  Opioids:  Hydrocodone-acetaminophen 10/325 mg 3 times daily as needed  Antiplatelets/Anticoagulants:    ROS: She reports headaches, hoarse voice, joint stiffness, joint swelling, back pain, difficulty sleeping, anxiety and loss of balance.  Balance of review of systems is negative.    Medical, surgical, family and social history reviewed elsewhere in record.    Medications/Allergies: See med card    Vitals:    05/03/22 1441   BP: (!) 179/85   Pulse: 68   Weight: 83 kg (183 lb 1.5 oz)   Height: 5' 8.4" (1.737 m)   PainSc:   3   PainLoc: Leg         Physical exam:  Gen: A and O x3, pleasant, well-groomed  Skin: No rashes or obvious lesions  HEENT: PERRLA, no obvious deformities on ears or in canals.Trachea midline.  CVS: Regular rate and rhythm, normal palpable pulses.  Resp: Clear to auscultation bilaterally, no wheezes or rales.  Abdomen: Soft, NT/ND.  Musculoskeletal: No antalgic gait.     Neuro:  Upper extremities: 4/5 strength bilaterally  Lower extremities:  4/5 strength bilaterally   Reflexes: Brachioradialis 2+, Bicep 2+, Tricep 2+.  Patellar and Achilles reflexes 2+ bilaterally.  Sensory: Intact and symmetrical to light touch and pinprick in C2-T1 dermatomes bilaterally.  Osuna's negative.  Romberg positive for imbalance, abnormal tandem " gait test.    Cervical Spine:  Cervical spine: Range of motion is mildly reduced with flexion extension and lateral rotation without increased pain.  Spurling's maneuver causes neck pain to either side.    Myofascial exam: No tenderness to palpation to the cervical paraspinous muscles.    Lumbar spine:  Range of motion is moderately reduced with flexion, extension and oblique extension with increased low back pain during each maneuver.  Khanh's test is negative bilaterally.  Straight leg raise causes left leg pain.  Internal and external rotation of hip is negative bilaterally.  Myofascial exam:  Mild tenderness to palpation to the lumbar paraspinous muscles.    Imagin/24/15 C-spine MRI  1. C3-4 left posterior paracentral disk extrusion causing left paracentral spinal cord compression and severe left foraminal stenosis.  2. C4-5 based disk extrusion with spinal cord impingement and moderate bilateral foraminal stenosis.  3. C5-6 posterior central disk extrusion with spinal cord compression and severe bilateral foraminal stenosis.  4. C6-7 mild disk bulge with severe bilateral foraminal stenosis.  5. Solitary mildly enlarged left submandibular lymph node of uncertain significance.    4/10/17 MRI lumbar spine  T12-L1: No central canal or neuroforaminal stenosis. No disc protrusion or extrusion.  L1-L2: There is ligamentum flavum thickening and facet arthropathy.  No central canal or neuroforaminal stenosis. No disc protrusion or extrusion.  L2-L3: There is ligamentum flavum thickening and facet arthropathy.  No central canal or neuroforaminal stenosis. No disc protrusion or extrusion.  L3-L4: There is ligamentum flavum thickening and facet arthropathy present. No central canal or neuroforaminal stenosis. No disc protrusion or extrusion.  L4-L5: There is a broad disc bulge which extends into both neural foramina.  There is ligamentum flavum thickening and facet arthropathy.  There is right lateral recess  stenosis.  There is abutment of the descending right L5 nerve in the right lateral recess.  Please correlate clinically for symptoms referable to the right L5 nerve.  There is mild central canal stenosis.  There is mild left neuroforaminal stenosis.  No right neuroforaminal stenosis.  L5-S1: There is a broad central/right paracentral disc protrusion which encroaches upon the descending right S1 nerve in the right lateral recess.  Please correlate clinically for symptoms referable to the right S1 nerve.  There is mild-moderate right neuroforaminal stenosis.  No left neuroforaminal stenosis.  No central canal stenosis.  There is ligamentum flavum thickening and facet arthropathy.  There is a broad left extraforaminal disc protrusion at L5-S1 which abuts the exited left L5 nerve in the left extraforaminal soft tissues (series 6 image 17), nonspecific.  Please correlate clinically for symptoms referable to the left L5 nerve.    05/28/2019 MRI cervical spine  C2-3: No disc herniation, spinal canal narrowing, or neuroforaminal narrowing.  C3-4: There is moderate broad-based posterior disc osteophyte complex formation with left paracentral predominance.  This along with facet arthropathy contributes to mild spinal canal narrowing and moderate left neuroforaminal narrowing.  C4-5: There is moderate broad-based posterior disc osteophyte complex formation with left paracentral prominence.  This along with facet arthropathy contributes to mild bilateral neuroforaminal narrowing and mild spinal canal narrowing.  C5-6: Moderate broad-based posterior disc osteophyte complex formation and facet arthropathy result in moderate spinal canal narrowing and moderate right and mild left neuroforaminal narrowing.  C6-7: Moderate broad-based posterior disc osteophyte complex formation and facet arthropathy result in mild spinal canal narrowing along with moderate right and severe left neuroforaminal narrowing.  C7-T1: Minimal broad-based  posterior disc osteophyte complex formation and bilateral facet arthropathy result in mild bilateral neuroforaminal narrowing.      05/28/2019 MRI lumbar spine  T12-L1: No disc herniation, spinal canal narrowing, or neuroforaminal narrowing.  L1-2: No disc herniation, spinal canal narrowing, or neuroforaminal narrowing.  L2-3: No disc herniation, spinal canal narrowing, or neuroforaminal narrowing.  L3-4: No disc herniation, spinal canal narrowing, or neuroforaminal narrowing.  L4-5: There is moderate generalized disc bulging and bilateral facet arthropathy, which result in mild spinal canal narrowing and mild bilateral neuroforaminal narrowing.  L5-S1: There is a small right paracentral disc protrusion.  This effaces the right lateral recess and may impinge upon the descending right S1 nerve root.  Bilateral facet arthropathy is present and there is resultant overall mild spinal canal narrowing.  Moderate right and mild left neuroforaminal narrowing are also present.    06/24/2021 MRI lumbar spine  NOMENCLATURE: Five lumbar type vertebral bodies.     CORD/CAUDA EQUINA: Conus has normal size and signal and ends at a normal level of L1-L2.  Small amount of susceptibility either in the subdural or intrathecal space ventrally and dorsally at the L3-4 level.     ALIGNMENT: Trace retrolisthesis of L4 on L5.     BONES: Interval L4 laminectomy and right L5 hemilaminectomy.  Medial right L4-5 and L5-S1 facetectomies.  Susceptibility artifact is again seen in the left L5 pedicle.  No aggressive bone marrow signal.     PARASPINAL AREA: Fluid and edema along the surgical tract and in the right dorsal paraspinal muscles at the surgical levels.     LUMBAR DISC LEVELS:     T12-L1: No disc herniation or significant posterior osteophytic ridging.  Mild left facet hypertrophy.  No significant spinal canal or foraminal stenosis.     L1-L2: Minimal disc bulge.  Mild bilateral facet hypertrophy.  Ligamentum flavum thickening.  Minimal  narrowing of the bilateral lateral recesses.  No significant spinal canal or foraminal stenosis.     L2-L3: Mild disc bulge.  Mild bilateral facet hypertrophy.  Ligamentum flavum thickening.  Mild narrowing of the bilateral lateral recesses.  No significant spinal canal or foraminal stenosis.     L3-L4: Mild disc bulge.  Mild-moderate bilateral facet hypertrophy.  Ligamentum flavum thickening.  Susceptibility at the midline anterior and posterior aspect of the thecal sac.  Mild narrowing of the bilateral lateral recesses.  Moderate spinal canal stenosis, increased from prior study.  Mild bilateral foraminal stenosis.     L4-L5: Laminectomy.  Right facetectomy.  Mild disc bulge and disc height loss.  Mild narrowing of the lateral recesses without significant overall spinal canal stenosis.  Mild bilateral foraminal stenosis.     L5-S1: Interval right hemilaminectomy and right medial facetectomy.  Mild disc bulge.  Small central protrusion.  Mild narrowing of the right lateral recess which is significantly decreased from preoperative study.  No significant spinal canal stenosis.  Moderate right and mild-moderate left foraminal stenosis, unchanged.  The left L5 nerve root may be contacted by disc as it exits the foramen    Assessment:  The patient is a 61-year-old woman with a history of diabetes, hypertension who presents in referral from [facility] for neck pain radiating into the arms and low back pain radiating into the left leg.     1. Lumbar radiculopathy     2. DDD (degenerative disc disease), lumbar     3. DDD (degenerative disc disease), cervical     4. Opioid contract exists         Plan:  1. The patient did well following the caudal epidural steroid injection.  We can repeat this in the future if necessary.  2. I completed orders at last visit for professional physical therapy and a meet but the patient states that she did not receive a phone call.  She will contact the clinic to see if  they received orders.  3. Dr. Galan provided prescriptions for hydrocodone-acetaminophen 10/325 mg up to 3 times daily as needed for pain.  I have reviewed the [place] [employer] website and there are no abberancies.    4. Follow-up in 3 months or sooner as needed.

## 2022-05-04 ENCOUNTER — LAB VISIT (OUTPATIENT)
Dept: LAB | Facility: HOSPITAL | Age: 62
End: 2022-05-04
Attending: INTERNAL MEDICINE
Payer: COMMERCIAL

## 2022-05-04 DIAGNOSIS — M19.90 CHRONIC INFLAMMATORY ARTHRITIS: ICD-10-CM

## 2022-05-04 DIAGNOSIS — R76.8 ANA POSITIVE: ICD-10-CM

## 2022-05-04 DIAGNOSIS — K59.03 CONSTIPATION DUE TO PAIN MEDICATION: ICD-10-CM

## 2022-05-04 DIAGNOSIS — M77.9 TENDINITIS: ICD-10-CM

## 2022-05-04 DIAGNOSIS — M35.9 UNDIFFERENTIATED CONNECTIVE TISSUE DISEASE: ICD-10-CM

## 2022-05-04 DIAGNOSIS — E53.1 VITAMIN B6 DEFICIENCY: ICD-10-CM

## 2022-05-04 LAB
25(OH)D3+25(OH)D2 SERPL-MCNC: 21 NG/ML (ref 30–96)
ALBUMIN SERPL BCP-MCNC: 4.1 G/DL (ref 3.5–5.2)
ALP SERPL-CCNC: 75 U/L (ref 55–135)
ALT SERPL W/O P-5'-P-CCNC: 15 U/L (ref 10–44)
ANION GAP SERPL CALC-SCNC: 12 MMOL/L (ref 8–16)
AST SERPL-CCNC: 18 U/L (ref 10–40)
BASOPHILS # BLD AUTO: 0.05 K/UL (ref 0–0.2)
BASOPHILS NFR BLD: 0.7 % (ref 0–1.9)
BILIRUB SERPL-MCNC: 0.7 MG/DL (ref 0.1–1)
BUN SERPL-MCNC: 13 MG/DL (ref 6–20)
CALCIUM SERPL-MCNC: 9.4 MG/DL (ref 8.7–10.5)
CHLORIDE SERPL-SCNC: 105 MMOL/L (ref 95–110)
CK SERPL-CCNC: 100 U/L (ref 20–180)
CO2 SERPL-SCNC: 23 MMOL/L (ref 23–29)
CREAT SERPL-MCNC: 0.8 MG/DL (ref 0.5–1.4)
CRP SERPL-MCNC: 2.1 MG/L (ref 0–8.2)
DIFFERENTIAL METHOD: ABNORMAL
EOSINOPHIL # BLD AUTO: 0.2 K/UL (ref 0–0.5)
EOSINOPHIL NFR BLD: 3.4 % (ref 0–8)
ERYTHROCYTE [DISTWIDTH] IN BLOOD BY AUTOMATED COUNT: 13.6 % (ref 11.5–14.5)
ERYTHROCYTE [SEDIMENTATION RATE] IN BLOOD BY WESTERGREN METHOD: 24 MM/HR (ref 0–20)
EST. GFR  (AFRICAN AMERICAN): >60 ML/MIN/1.73 M^2
EST. GFR  (NON AFRICAN AMERICAN): >60 ML/MIN/1.73 M^2
GLUCOSE SERPL-MCNC: 155 MG/DL (ref 70–110)
HCT VFR BLD AUTO: 38.7 % (ref 37–48.5)
HGB BLD-MCNC: 12.3 G/DL (ref 12–16)
IMM GRANULOCYTES # BLD AUTO: 0.01 K/UL (ref 0–0.04)
IMM GRANULOCYTES NFR BLD AUTO: 0.1 % (ref 0–0.5)
LYMPHOCYTES # BLD AUTO: 2 K/UL (ref 1–4.8)
LYMPHOCYTES NFR BLD: 29.1 % (ref 18–48)
MAGNESIUM SERPL-MCNC: 2.1 MG/DL (ref 1.6–2.6)
MCH RBC QN AUTO: 29.4 PG (ref 27–31)
MCHC RBC AUTO-ENTMCNC: 31.8 G/DL (ref 32–36)
MCV RBC AUTO: 92 FL (ref 82–98)
MONOCYTES # BLD AUTO: 0.7 K/UL (ref 0.3–1)
MONOCYTES NFR BLD: 10.2 % (ref 4–15)
NEUTROPHILS # BLD AUTO: 3.8 K/UL (ref 1.8–7.7)
NEUTROPHILS NFR BLD: 56.5 % (ref 38–73)
NRBC BLD-RTO: 0 /100 WBC
PLATELET # BLD AUTO: 325 K/UL (ref 150–450)
PMV BLD AUTO: 10.7 FL (ref 9.2–12.9)
POTASSIUM SERPL-SCNC: 4.4 MMOL/L (ref 3.5–5.1)
PROT SERPL-MCNC: 7.1 G/DL (ref 6–8.4)
RBC # BLD AUTO: 4.19 M/UL (ref 4–5.4)
SARS-COV-2 IGG SERPL IA-ACNC: 8652.5 AU/ML
SARS-COV-2 IGG SERPL QL IA: POSITIVE
SODIUM SERPL-SCNC: 140 MMOL/L (ref 136–145)
T4 FREE SERPL-MCNC: 0.84 NG/DL (ref 0.71–1.51)
TSH SERPL DL<=0.005 MIU/L-ACNC: 1.06 UIU/ML (ref 0.4–4)
VIT B12 SERPL-MCNC: >2000 PG/ML (ref 210–950)
WBC # BLD AUTO: 6.78 K/UL (ref 3.9–12.7)

## 2022-05-04 PROCEDURE — 86235 NUCLEAR ANTIGEN ANTIBODY: CPT | Mod: 59 | Performed by: INTERNAL MEDICINE

## 2022-05-04 PROCEDURE — 83516 IMMUNOASSAY NONANTIBODY: CPT | Performed by: INTERNAL MEDICINE

## 2022-05-04 PROCEDURE — 80053 COMPREHEN METABOLIC PANEL: CPT | Performed by: INTERNAL MEDICINE

## 2022-05-04 PROCEDURE — 86376 MICROSOMAL ANTIBODY EACH: CPT | Performed by: INTERNAL MEDICINE

## 2022-05-04 PROCEDURE — 85651 RBC SED RATE NONAUTOMATED: CPT | Mod: PO | Performed by: INTERNAL MEDICINE

## 2022-05-04 PROCEDURE — 86140 C-REACTIVE PROTEIN: CPT | Performed by: INTERNAL MEDICINE

## 2022-05-04 PROCEDURE — 82085 ASSAY OF ALDOLASE: CPT | Performed by: INTERNAL MEDICINE

## 2022-05-04 PROCEDURE — 82607 VITAMIN B-12: CPT | Performed by: INTERNAL MEDICINE

## 2022-05-04 PROCEDURE — 86769 SARS-COV-2 COVID-19 ANTIBODY: CPT | Performed by: INTERNAL MEDICINE

## 2022-05-04 PROCEDURE — 85025 COMPLETE CBC W/AUTO DIFF WBC: CPT | Performed by: INTERNAL MEDICINE

## 2022-05-04 PROCEDURE — 86225 DNA ANTIBODY NATIVE: CPT | Performed by: INTERNAL MEDICINE

## 2022-05-04 PROCEDURE — 84252 ASSAY OF VITAMIN B-2: CPT | Performed by: INTERNAL MEDICINE

## 2022-05-04 PROCEDURE — 83735 ASSAY OF MAGNESIUM: CPT | Performed by: INTERNAL MEDICINE

## 2022-05-04 PROCEDURE — 84425 ASSAY OF VITAMIN B-1: CPT | Performed by: INTERNAL MEDICINE

## 2022-05-04 PROCEDURE — 84439 ASSAY OF FREE THYROXINE: CPT | Performed by: INTERNAL MEDICINE

## 2022-05-04 PROCEDURE — 82550 ASSAY OF CK (CPK): CPT | Performed by: INTERNAL MEDICINE

## 2022-05-04 PROCEDURE — 86800 THYROGLOBULIN ANTIBODY: CPT | Performed by: INTERNAL MEDICINE

## 2022-05-04 PROCEDURE — 86431 RHEUMATOID FACTOR QUANT: CPT | Performed by: INTERNAL MEDICINE

## 2022-05-04 PROCEDURE — 86235 NUCLEAR ANTIGEN ANTIBODY: CPT | Performed by: INTERNAL MEDICINE

## 2022-05-04 PROCEDURE — 86038 ANTINUCLEAR ANTIBODIES: CPT | Performed by: INTERNAL MEDICINE

## 2022-05-04 PROCEDURE — 36415 COLL VENOUS BLD VENIPUNCTURE: CPT | Mod: PO | Performed by: INTERNAL MEDICINE

## 2022-05-04 PROCEDURE — 84207 ASSAY OF VITAMIN B-6: CPT | Performed by: INTERNAL MEDICINE

## 2022-05-04 PROCEDURE — 82306 VITAMIN D 25 HYDROXY: CPT | Performed by: INTERNAL MEDICINE

## 2022-05-04 PROCEDURE — 81374 HLA I TYPING 1 ANTIGEN LR: CPT | Performed by: INTERNAL MEDICINE

## 2022-05-04 PROCEDURE — 84443 ASSAY THYROID STIM HORMONE: CPT | Performed by: INTERNAL MEDICINE

## 2022-05-05 LAB
ANA SER QL IF: NORMAL
DSDNA AB SER-ACNC: NORMAL [IU]/ML
RHEUMATOID FACT SERPL-ACNC: <13 IU/ML (ref 0–15)
THYROGLOB AB SERPL IA-ACNC: <4 IU/ML (ref 0–3.9)
THYROPEROXIDASE IGG SERPL-ACNC: <6 IU/ML

## 2022-05-06 LAB
ALDOLASE SERPL-CCNC: 3.6 U/L (ref 1.2–7.6)
ANTI SM ANTIBODY: 0.08 RATIO (ref 0–0.99)
ANTI SM/RNP ANTIBODY: 0.09 RATIO (ref 0–0.99)
ANTI-SM INTERPRETATION: NEGATIVE
ANTI-SM/RNP INTERPRETATION: NEGATIVE
ANTI-SSA ANTIBODY: 0.05 RATIO (ref 0–0.99)
ANTI-SSA INTERPRETATION: NEGATIVE
ANTI-SSB ANTIBODY: 0.06 RATIO (ref 0–0.99)
ANTI-SSB INTERPRETATION: NEGATIVE

## 2022-05-07 LAB — HISTONE IGG SER IA-ACNC: 0.3 UNITS (ref 0–0.9)

## 2022-05-09 LAB — ENA SCL70 AB SER-ACNC: 4 UNITS

## 2022-05-10 LAB
PYRIDOXAL SERPL-MCNC: 64 UG/L (ref 5–50)
VIT B1 BLD-MCNC: 67 UG/L (ref 38–122)
VIT B2 SERPL-MCNC: 7 MCG/L (ref 1–19)

## 2022-05-12 LAB
HLA B27 INTERPRETATION: NORMAL
HLA-B27 RELATED AG QL: NEGATIVE
HLA-B27 RELATED AG QL: NORMAL

## 2022-05-14 DIAGNOSIS — Z79.4 TYPE 2 DIABETES MELLITUS WITHOUT COMPLICATION, WITH LONG-TERM CURRENT USE OF INSULIN: ICD-10-CM

## 2022-05-14 DIAGNOSIS — E11.9 TYPE 2 DIABETES MELLITUS WITHOUT COMPLICATION, WITH LONG-TERM CURRENT USE OF INSULIN: ICD-10-CM

## 2022-05-17 RX ORDER — FLASH GLUCOSE SENSOR
KIT MISCELLANEOUS
Qty: 2 KIT | Refills: 11 | Status: SHIPPED | OUTPATIENT
Start: 2022-05-17 | End: 2022-08-22

## 2022-06-30 ENCOUNTER — TELEPHONE (OUTPATIENT)
Dept: PAIN MEDICINE | Facility: CLINIC | Age: 62
End: 2022-06-30

## 2022-06-30 NOTE — TELEPHONE ENCOUNTER
----- Message from Sebastien May sent at 6/30/2022  3:15 PM CDT -----  Contact: pt at [phone]  Type: Needs Medical Advice  Who Called:  pt  Best Call Back Number: [phone]  Additional Information: pt is calling the office to let them know she ruptured one of her disc in her back on the left side. Please anibal back and advise

## 2022-07-05 ENCOUNTER — TELEPHONE (OUTPATIENT)
Dept: PAIN MEDICINE | Facility: CLINIC | Age: 62
End: 2022-07-05
Payer: COMMERCIAL

## 2022-08-19 ENCOUNTER — OFFICE VISIT (OUTPATIENT)
Dept: PAIN MEDICINE | Facility: CLINIC | Age: 62
End: 2022-08-19
Payer: COMMERCIAL

## 2022-08-19 VITALS
HEART RATE: 74 BPM | WEIGHT: 179.56 LBS | OXYGEN SATURATION: 98 % | SYSTOLIC BLOOD PRESSURE: 165 MMHG | DIASTOLIC BLOOD PRESSURE: 77 MMHG | HEIGHT: 68 IN | BODY MASS INDEX: 27.21 KG/M2

## 2022-08-19 DIAGNOSIS — Z79.891 OPIOID CONTRACT EXISTS: ICD-10-CM

## 2022-08-19 DIAGNOSIS — M54.12 CERVICAL RADICULOPATHY: ICD-10-CM

## 2022-08-19 DIAGNOSIS — M54.16 LUMBAR RADICULOPATHY: Primary | ICD-10-CM

## 2022-08-19 DIAGNOSIS — M50.30 DDD (DEGENERATIVE DISC DISEASE), CERVICAL: ICD-10-CM

## 2022-08-19 DIAGNOSIS — M51.36 DDD (DEGENERATIVE DISC DISEASE), LUMBAR: ICD-10-CM

## 2022-08-19 PROCEDURE — 1160F PR REVIEW ALL MEDS BY PRESCRIBER/CLIN PHARMACIST DOCUMENTED: ICD-10-PCS | Mod: CPTII,S$GLB,, | Performed by: PHYSICIAN ASSISTANT

## 2022-08-19 PROCEDURE — 3051F HG A1C>EQUAL 7.0%<8.0%: CPT | Mod: CPTII,S$GLB,, | Performed by: PHYSICIAN ASSISTANT

## 2022-08-19 PROCEDURE — 3078F PR MOST RECENT DIASTOLIC BLOOD PRESSURE < 80 MM HG: ICD-10-PCS | Mod: CPTII,S$GLB,, | Performed by: PHYSICIAN ASSISTANT

## 2022-08-19 PROCEDURE — 99214 PR OFFICE/OUTPT VISIT, EST, LEVL IV, 30-39 MIN: ICD-10-PCS | Mod: S$GLB,,, | Performed by: PHYSICIAN ASSISTANT

## 2022-08-19 PROCEDURE — 1159F MED LIST DOCD IN RCRD: CPT | Mod: CPTII,S$GLB,, | Performed by: PHYSICIAN ASSISTANT

## 2022-08-19 PROCEDURE — 80326 AMPHETAMINES 5 OR MORE: CPT | Performed by: PHYSICIAN ASSISTANT

## 2022-08-19 PROCEDURE — 1160F RVW MEDS BY RX/DR IN RCRD: CPT | Mod: CPTII,S$GLB,, | Performed by: PHYSICIAN ASSISTANT

## 2022-08-19 PROCEDURE — 1159F PR MEDICATION LIST DOCUMENTED IN MEDICAL RECORD: ICD-10-PCS | Mod: CPTII,S$GLB,, | Performed by: PHYSICIAN ASSISTANT

## 2022-08-19 PROCEDURE — 3051F PR MOST RECENT HEMOGLOBIN A1C LEVEL 7.0 - < 8.0%: ICD-10-PCS | Mod: CPTII,S$GLB,, | Performed by: PHYSICIAN ASSISTANT

## 2022-08-19 PROCEDURE — 3072F LOW RISK FOR RETINOPATHY: CPT | Mod: CPTII,S$GLB,, | Performed by: PHYSICIAN ASSISTANT

## 2022-08-19 PROCEDURE — 99999 PR PBB SHADOW E&M-EST. PATIENT-LVL V: ICD-10-PCS | Mod: PBBFAC,,, | Performed by: PHYSICIAN ASSISTANT

## 2022-08-19 PROCEDURE — 3077F PR MOST RECENT SYSTOLIC BLOOD PRESSURE >= 140 MM HG: ICD-10-PCS | Mod: CPTII,S$GLB,, | Performed by: PHYSICIAN ASSISTANT

## 2022-08-19 PROCEDURE — 3008F PR BODY MASS INDEX (BMI) DOCUMENTED: ICD-10-PCS | Mod: CPTII,S$GLB,, | Performed by: PHYSICIAN ASSISTANT

## 2022-08-19 PROCEDURE — 99999 PR PBB SHADOW E&M-EST. PATIENT-LVL V: CPT | Mod: PBBFAC,,, | Performed by: PHYSICIAN ASSISTANT

## 2022-08-19 PROCEDURE — 4010F ACE/ARB THERAPY RXD/TAKEN: CPT | Mod: CPTII,S$GLB,, | Performed by: PHYSICIAN ASSISTANT

## 2022-08-19 PROCEDURE — 99214 OFFICE O/P EST MOD 30 MIN: CPT | Mod: S$GLB,,, | Performed by: PHYSICIAN ASSISTANT

## 2022-08-19 PROCEDURE — 3072F PR LOW RISK FOR RETINOPATHY: ICD-10-PCS | Mod: CPTII,S$GLB,, | Performed by: PHYSICIAN ASSISTANT

## 2022-08-19 PROCEDURE — 3078F DIAST BP <80 MM HG: CPT | Mod: CPTII,S$GLB,, | Performed by: PHYSICIAN ASSISTANT

## 2022-08-19 PROCEDURE — 4010F PR ACE/ARB THEARPY RXD/TAKEN: ICD-10-PCS | Mod: CPTII,S$GLB,, | Performed by: PHYSICIAN ASSISTANT

## 2022-08-19 PROCEDURE — 3077F SYST BP >= 140 MM HG: CPT | Mod: CPTII,S$GLB,, | Performed by: PHYSICIAN ASSISTANT

## 2022-08-19 PROCEDURE — 3008F BODY MASS INDEX DOCD: CPT | Mod: CPTII,S$GLB,, | Performed by: PHYSICIAN ASSISTANT

## 2022-08-22 NOTE — PROGRESS NOTES
This note was completed with dictation software and grammatical errors may exist.    CC:  Neck and back pain    HPI: The patient is a 61-year-old woman with a history of diabetes, hypertension who presents in referral from [facility] for neck pain radiating into the arms and low back pain radiating into the left leg.  She returns in follow-up today with worsening back pain and neck pain.  She describes left-sided neck pain without significant radiation into her arm at this time.  However, she is reporting worsening left low back pain radiating to left buttock, posterior thigh and upper calf.  It is worse with prolonged standing and with bending.  She reports weakness in her legs.  She has been doing her home exercise program but was unable to go to physical therapy 1 I completed orders at last visit.  She denies having incontinence.  She reports numbness in her feet.    Pain intervention history: She had undergone 3 epidurals for low back pain with only up to 3 weeks of relief each time.  She is status post C7-T1 cervical interlaminar epidural steroid injection on 1/29/16 with 50% relief.   She is status post C7-T1 cervical interlaminar epidural steroid injection on 3/1/16 with mild additional relief.  She is status post C7-T1 cervical interlaminar epidural steroid injection on 5/13/16 with 70-75% relief.  She is status post right C3, 4, 5 and 6 medial branch radiofrequency ablation on 7/15/16 with 30-60% relief.  She is status post L5/S1 interlaminar epidural steroid injection on 5/16/17 with 50% relief.  She is status post L5/S1 interlaminar epidural steroid injection on 12/29/17 with excellent relief lasting 2 weeks, now reporting minimal relief of her low back and buttock pain but ongoing 100% relief of her right leg pain.  She is status post C7-T1 cervical interlaminar epidural steroid injection on 3/14/18 with 75% relief but this was not long lasting.  She is status post right L4/5 and  "L5/S1 transforaminal epidural steroid injection on 01/11/2021 with Dr Luna with 0% relief.   She is status post caudal epidural steroid injection on 11/01/2021 with 70% relief lasting over 1 month.   She is status post caudal epidural steroid injection on 04/05/2022 with 60-65% relief.     Spine surgeries: Right L4/5 and L5/S1 laminectomy on 06/24/2021 Dr Hester.     Antineuropathics:  Gabapentin 100 mg 3 times daily  NSAIDs:  Physical therapy:  [facility] in [place]  Antidepressants:  Amitriptyline 25 mg nightly  Muscle relaxers:  Tizanidine 4 mg 3 times daily as needed  Opioids:  Hydrocodone-acetaminophen 10/325 mg 3 times daily as needed  Antiplatelets/Anticoagulants:    ROS: She reports headaches, hoarse voice, joint stiffness, joint swelling, back pain, difficulty sleeping, anxiety and loss of balance.  Balance of review of systems is negative.    Medical, surgical, family and social history reviewed elsewhere in record.    Medications/Allergies: See med card    Vitals:    08/19/22 1551   BP: (!) 165/77   Pulse: 74   SpO2: 98%   Weight: 81.4 kg (179 lb 9 oz)   Height: 5' 8.4" (1.737 m)   PainSc:   4   PainLoc: Back         Physical exam:  Gen: A and O x3, pleasant, well-groomed  Skin: No rashes or obvious lesions  HEENT: PERRLA, no obvious deformities on ears or in canals.Trachea midline.  CVS: Regular rate and rhythm, normal palpable pulses.  Resp: Clear to auscultation bilaterally, no wheezes or rales.  Abdomen: Soft, NT/ND.  Musculoskeletal: No antalgic gait.     Neuro:  Upper extremities: 4/5 strength bilaterally  Lower extremities:  4/5 strength bilaterally   Reflexes: Brachioradialis 2+, Bicep 2+, Tricep 2+.  Patellar and Achilles reflexes 2+ bilaterally.  Sensory: Intact and symmetrical to light touch and pinprick in C2-T1 dermatomes bilaterally.  Osuna's negative.  Romberg positive for imbalance, abnormal tandem gait test.    Cervical Spine:  Cervical spine: Range of motion is mildly reduced " with flexion extension and lateral rotation without increased pain.  Spurling's maneuver causes neck pain to either side.    Myofascial exam: No tenderness to palpation to the cervical paraspinous muscles.    Lumbar spine:  Range of motion is moderately reduced with flexion, extension and oblique extension with increased low back pain during each maneuver.  Khanh's test is negative bilaterally.  Straight leg raise causes left leg pain.  Internal and external rotation of hip is negative bilaterally.  Myofascial exam:  Mild tenderness to palpation to the lumbar paraspinous muscles.    Imagin/24/15 C-spine MRI  1. C3-4 left posterior paracentral disk extrusion causing left paracentral spinal cord compression and severe left foraminal stenosis.  2. C4-5 based disk extrusion with spinal cord impingement and moderate bilateral foraminal stenosis.  3. C5-6 posterior central disk extrusion with spinal cord compression and severe bilateral foraminal stenosis.  4. C6-7 mild disk bulge with severe bilateral foraminal stenosis.  5. Solitary mildly enlarged left submandibular lymph node of uncertain significance.    4/10/17 MRI lumbar spine  T12-L1: No central canal or neuroforaminal stenosis. No disc protrusion or extrusion.  L1-L2: There is ligamentum flavum thickening and facet arthropathy.  No central canal or neuroforaminal stenosis. No disc protrusion or extrusion.  L2-L3: There is ligamentum flavum thickening and facet arthropathy.  No central canal or neuroforaminal stenosis. No disc protrusion or extrusion.  L3-L4: There is ligamentum flavum thickening and facet arthropathy present. No central canal or neuroforaminal stenosis. No disc protrusion or extrusion.  L4-L5: There is a broad disc bulge which extends into both neural foramina.  There is ligamentum flavum thickening and facet arthropathy.  There is right lateral recess stenosis.  There is abutment of the descending right L5 nerve in the right lateral  recess.  Please correlate clinically for symptoms referable to the right L5 nerve.  There is mild central canal stenosis.  There is mild left neuroforaminal stenosis.  No right neuroforaminal stenosis.  L5-S1: There is a broad central/right paracentral disc protrusion which encroaches upon the descending right S1 nerve in the right lateral recess.  Please correlate clinically for symptoms referable to the right S1 nerve.  There is mild-moderate right neuroforaminal stenosis.  No left neuroforaminal stenosis.  No central canal stenosis.  There is ligamentum flavum thickening and facet arthropathy.  There is a broad left extraforaminal disc protrusion at L5-S1 which abuts the exited left L5 nerve in the left extraforaminal soft tissues (series 6 image 17), nonspecific.  Please correlate clinically for symptoms referable to the left L5 nerve.    05/28/2019 MRI cervical spine  C2-3: No disc herniation, spinal canal narrowing, or neuroforaminal narrowing.  C3-4: There is moderate broad-based posterior disc osteophyte complex formation with left paracentral predominance.  This along with facet arthropathy contributes to mild spinal canal narrowing and moderate left neuroforaminal narrowing.  C4-5: There is moderate broad-based posterior disc osteophyte complex formation with left paracentral prominence.  This along with facet arthropathy contributes to mild bilateral neuroforaminal narrowing and mild spinal canal narrowing.  C5-6: Moderate broad-based posterior disc osteophyte complex formation and facet arthropathy result in moderate spinal canal narrowing and moderate right and mild left neuroforaminal narrowing.  C6-7: Moderate broad-based posterior disc osteophyte complex formation and facet arthropathy result in mild spinal canal narrowing along with moderate right and severe left neuroforaminal narrowing.  C7-T1: Minimal broad-based posterior disc osteophyte complex formation and bilateral facet arthropathy result  in mild bilateral neuroforaminal narrowing.      05/28/2019 MRI lumbar spine  T12-L1: No disc herniation, spinal canal narrowing, or neuroforaminal narrowing.  L1-2: No disc herniation, spinal canal narrowing, or neuroforaminal narrowing.  L2-3: No disc herniation, spinal canal narrowing, or neuroforaminal narrowing.  L3-4: No disc herniation, spinal canal narrowing, or neuroforaminal narrowing.  L4-5: There is moderate generalized disc bulging and bilateral facet arthropathy, which result in mild spinal canal narrowing and mild bilateral neuroforaminal narrowing.  L5-S1: There is a small right paracentral disc protrusion.  This effaces the right lateral recess and may impinge upon the descending right S1 nerve root.  Bilateral facet arthropathy is present and there is resultant overall mild spinal canal narrowing.  Moderate right and mild left neuroforaminal narrowing are also present.    06/24/2021 MRI lumbar spine  NOMENCLATURE: Five lumbar type vertebral bodies.     CORD/CAUDA EQUINA: Conus has normal size and signal and ends at a normal level of L1-L2.  Small amount of susceptibility either in the subdural or intrathecal space ventrally and dorsally at the L3-4 level.     ALIGNMENT: Trace retrolisthesis of L4 on L5.     BONES: Interval L4 laminectomy and right L5 hemilaminectomy.  Medial right L4-5 and L5-S1 facetectomies.  Susceptibility artifact is again seen in the left L5 pedicle.  No aggressive bone marrow signal.     PARASPINAL AREA: Fluid and edema along the surgical tract and in the right dorsal paraspinal muscles at the surgical levels.     LUMBAR DISC LEVELS:     T12-L1: No disc herniation or significant posterior osteophytic ridging.  Mild left facet hypertrophy.  No significant spinal canal or foraminal stenosis.     L1-L2: Minimal disc bulge.  Mild bilateral facet hypertrophy.  Ligamentum flavum thickening.  Minimal narrowing of the bilateral lateral recesses.  No significant spinal canal or  foraminal stenosis.     L2-L3: Mild disc bulge.  Mild bilateral facet hypertrophy.  Ligamentum flavum thickening.  Mild narrowing of the bilateral lateral recesses.  No significant spinal canal or foraminal stenosis.     L3-L4: Mild disc bulge.  Mild-moderate bilateral facet hypertrophy.  Ligamentum flavum thickening.  Susceptibility at the midline anterior and posterior aspect of the thecal sac.  Mild narrowing of the bilateral lateral recesses.  Moderate spinal canal stenosis, increased from prior study.  Mild bilateral foraminal stenosis.     L4-L5: Laminectomy.  Right facetectomy.  Mild disc bulge and disc height loss.  Mild narrowing of the lateral recesses without significant overall spinal canal stenosis.  Mild bilateral foraminal stenosis.     L5-S1: Interval right hemilaminectomy and right medial facetectomy.  Mild disc bulge.  Small central protrusion.  Mild narrowing of the right lateral recess which is significantly decreased from preoperative study.  No significant spinal canal stenosis.  Moderate right and mild-moderate left foraminal stenosis, unchanged.  The left L5 nerve root may be contacted by disc as it exits the foramen    Assessment:  The patient is a 61-year-old woman with a history of diabetes, hypertension who presents in referral from [facility] for neck pain radiating into the arms and low back pain radiating into the left leg.     1. Lumbar radiculopathy     2. Cervical radiculopathy     3. DDD (degenerative disc disease), lumbar     4. DDD (degenerative disc disease), cervical     5. Opioid contract exists  Pain Clinic Drug Screen       Plan:  1. We can repeat a caudal epidural steroid injection in the future if necessary.    2. I completed orders for physical therapy at [facility] again.    3. Dr. Galan provided prescriptions for hydrocodone-acetaminophen 10/325 mg up to 3 times daily as needed for pain.  I have reviewed the [place] Board of Pharmacy website  and there are no abberancies.  A urine drug screen was collected today.  4. Follow-up in 3 months or sooner as needed.

## 2022-08-24 LAB
6MAM UR QL: NOT DETECTED
7AMINOCLONAZEPAM UR QL: NOT DETECTED
A-OH ALPRAZ UR QL: NOT DETECTED
ALPHA-OH-MIDAZOLAM: NOT DETECTED
ALPRAZ UR QL: NOT DETECTED
AMPHET UR QL SCN: NOT DETECTED
ANNOTATION COMMENT IMP: NORMAL
ANNOTATION COMMENT IMP: NORMAL
BARBITURATES UR QL: NOT DETECTED
BUPRENORPHINE UR QL: NOT DETECTED
BZE UR QL: NOT DETECTED
CARBOXYTHC UR QL: NOT DETECTED
CARISOPRODOL UR QL: NOT DETECTED
CLONAZEPAM UR QL: NOT DETECTED
CODEINE UR QL: NOT DETECTED
CREAT UR-MCNC: 49.5 MG/DL (ref 20–400)
DIAZEPAM UR QL: NOT DETECTED
ETHYL GLUCURONIDE UR QL: NOT DETECTED
FENTANYL UR QL: NOT DETECTED
GABAPENTIN: NOT DETECTED
HYDROCODONE UR QL: PRESENT
HYDROMORPHONE UR QL: PRESENT
LORAZEPAM UR QL: NOT DETECTED
MDA UR QL: NOT DETECTED
MDEA UR QL: NOT DETECTED
MDMA UR QL: NOT DETECTED
ME-PHENIDATE UR QL: NOT DETECTED
METHADONE UR QL: NOT DETECTED
METHAMPHET UR QL: NOT DETECTED
MIDAZOLAM UR QL SCN: NOT DETECTED
MORPHINE UR QL: NOT DETECTED
NALOXONE: NOT DETECTED
NORBUPRENORPHINE UR QL CFM: NOT DETECTED
NORDIAZEPAM UR QL: NOT DETECTED
NORFENTANYL UR QL: NOT DETECTED
NORHYDROCODONE UR QL CFM: PRESENT
NORMEPERIDINE UR QL CFM: NOT DETECTED
NOROXYCODONE UR QL CFM: NOT DETECTED
NOROXYMORPHONE UR QL SCN: NOT DETECTED
OXAZEPAM UR QL: NOT DETECTED
OXYCODONE UR QL: NOT DETECTED
OXYMORPHONE UR QL: NOT DETECTED
PATHOLOGY STUDY: NORMAL
PCP UR QL: NOT DETECTED
PHENTERMINE UR QL: NOT DETECTED
PREGABALIN: NOT DETECTED
SERVICE CMNT-IMP: NORMAL
TAPENTADOL UR QL SCN: NOT DETECTED
TAPENTADOL UR QL SCN: NOT DETECTED
TEMAZEPAM UR QL: NOT DETECTED
TRAMADOL UR QL: NOT DETECTED
ZOLPIDEM METABOLITE: NOT DETECTED
ZOLPIDEM UR QL: NOT DETECTED

## 2022-09-06 ENCOUNTER — TELEPHONE (OUTPATIENT)
Dept: NEUROLOGY | Facility: CLINIC | Age: 62
End: 2022-09-06
Payer: COMMERCIAL

## 2022-09-06 NOTE — TELEPHONE ENCOUNTER
Spoke with patient regarding appointment tomorrow. Informed patient that Ashley Sahu NP had ordered an EMG/ NCS at her last visit and never was done. I informed patient that EMG/NCS needs to be done before coming in to see Ashley Sahu NP. Patient v/u and was informed that EMG tech will call her to schedule and that tomorrow's appointment will be cancelled.

## 2022-09-07 DIAGNOSIS — G62.9 NEUROPATHY: Primary | ICD-10-CM

## 2022-10-14 DIAGNOSIS — M51.36 DDD (DEGENERATIVE DISC DISEASE), LUMBAR: ICD-10-CM

## 2022-10-14 RX ORDER — HYDROCODONE BITARTRATE AND ACETAMINOPHEN 10; 325 MG/1; MG/1
1 TABLET ORAL 3 TIMES DAILY PRN
Qty: 90 TABLET | Refills: 0 | Status: CANCELLED | OUTPATIENT
Start: 2022-10-14 | End: 2022-11-13

## 2022-11-11 DIAGNOSIS — M51.36 DDD (DEGENERATIVE DISC DISEASE), LUMBAR: ICD-10-CM

## 2022-11-11 RX ORDER — HYDROCODONE BITARTRATE AND ACETAMINOPHEN 10; 325 MG/1; MG/1
1 TABLET ORAL 3 TIMES DAILY PRN
Qty: 90 TABLET | Refills: 0 | Status: SHIPPED | OUTPATIENT
Start: 2022-11-13 | End: 2022-11-18 | Stop reason: SDUPTHER

## 2022-11-18 DIAGNOSIS — M51.36 DDD (DEGENERATIVE DISC DISEASE), LUMBAR: ICD-10-CM

## 2022-11-18 RX ORDER — HYDROCODONE BITARTRATE AND ACETAMINOPHEN 10; 325 MG/1; MG/1
1 TABLET ORAL 3 TIMES DAILY PRN
Qty: 90 TABLET | Refills: 0 | Status: SHIPPED | OUTPATIENT
Start: 2023-01-12 | End: 2022-12-13 | Stop reason: SDUPTHER

## 2022-11-18 RX ORDER — HYDROCODONE BITARTRATE AND ACETAMINOPHEN 10; 325 MG/1; MG/1
1 TABLET ORAL 3 TIMES DAILY PRN
Qty: 90 TABLET | Refills: 0 | Status: SHIPPED | OUTPATIENT
Start: 2023-02-11 | End: 2022-12-13 | Stop reason: SDUPTHER

## 2022-11-18 RX ORDER — HYDROCODONE BITARTRATE AND ACETAMINOPHEN 10; 325 MG/1; MG/1
1 TABLET ORAL 3 TIMES DAILY PRN
Qty: 90 TABLET | Refills: 0 | Status: SHIPPED | OUTPATIENT
Start: 2022-12-13 | End: 2022-12-13 | Stop reason: SDUPTHER

## 2022-12-09 ENCOUNTER — PROCEDURE VISIT (OUTPATIENT)
Dept: NEUROLOGY | Facility: CLINIC | Age: 62
End: 2022-12-09
Payer: COMMERCIAL

## 2022-12-09 DIAGNOSIS — M54.16 LUMBAR RADICULOPATHY: ICD-10-CM

## 2022-12-09 DIAGNOSIS — G56.01 RIGHT CARPAL TUNNEL SYNDROME: Primary | ICD-10-CM

## 2022-12-09 PROCEDURE — 95910 PR NERVE CONDUCTION STUDY; 7-8 STUDIES: ICD-10-PCS | Mod: S$GLB,,, | Performed by: PSYCHIATRY & NEUROLOGY

## 2022-12-09 PROCEDURE — 95910 NRV CNDJ TEST 7-8 STUDIES: CPT | Mod: S$GLB,,, | Performed by: PSYCHIATRY & NEUROLOGY

## 2022-12-09 PROCEDURE — 95886 PR EMG COMPLETE, W/ NERVE CONDUCTION STUDIES, 5+ MUSCLES: ICD-10-PCS | Mod: S$GLB,,, | Performed by: PSYCHIATRY & NEUROLOGY

## 2022-12-09 PROCEDURE — 95886 MUSC TEST DONE W/N TEST COMP: CPT | Mod: S$GLB,,, | Performed by: PSYCHIATRY & NEUROLOGY

## 2022-12-13 ENCOUNTER — LAB VISIT (OUTPATIENT)
Dept: LAB | Facility: HOSPITAL | Age: 62
End: 2022-12-13
Payer: COMMERCIAL

## 2022-12-13 ENCOUNTER — OFFICE VISIT (OUTPATIENT)
Dept: NEUROLOGY | Facility: CLINIC | Age: 62
End: 2022-12-13
Payer: COMMERCIAL

## 2022-12-13 ENCOUNTER — OFFICE VISIT (OUTPATIENT)
Dept: PAIN MEDICINE | Facility: CLINIC | Age: 62
End: 2022-12-13
Payer: COMMERCIAL

## 2022-12-13 VITALS
DIASTOLIC BLOOD PRESSURE: 72 MMHG | HEIGHT: 68 IN | OXYGEN SATURATION: 98 % | WEIGHT: 180.88 LBS | DIASTOLIC BLOOD PRESSURE: 69 MMHG | BODY MASS INDEX: 27.41 KG/M2 | WEIGHT: 180 LBS | HEIGHT: 68 IN | SYSTOLIC BLOOD PRESSURE: 132 MMHG | HEART RATE: 71 BPM | BODY MASS INDEX: 27.28 KG/M2 | HEART RATE: 74 BPM | SYSTOLIC BLOOD PRESSURE: 147 MMHG

## 2022-12-13 DIAGNOSIS — G62.9 NEUROPATHY: ICD-10-CM

## 2022-12-13 DIAGNOSIS — Z79.891 OPIOID CONTRACT EXISTS: ICD-10-CM

## 2022-12-13 DIAGNOSIS — M50.30 DDD (DEGENERATIVE DISC DISEASE), CERVICAL: ICD-10-CM

## 2022-12-13 DIAGNOSIS — F32.A DEPRESSION, UNSPECIFIED DEPRESSION TYPE: ICD-10-CM

## 2022-12-13 DIAGNOSIS — E55.9 VITAMIN D DEFICIENCY: ICD-10-CM

## 2022-12-13 DIAGNOSIS — M51.36 DDD (DEGENERATIVE DISC DISEASE), LUMBAR: ICD-10-CM

## 2022-12-13 DIAGNOSIS — M54.12 CERVICAL RADICULOPATHY: ICD-10-CM

## 2022-12-13 DIAGNOSIS — G56.01 CARPAL TUNNEL SYNDROME OF RIGHT WRIST: ICD-10-CM

## 2022-12-13 DIAGNOSIS — M54.16 LUMBAR RADICULOPATHY: Primary | ICD-10-CM

## 2022-12-13 DIAGNOSIS — M79.18 MYOFASCIAL PAIN: ICD-10-CM

## 2022-12-13 PROCEDURE — 99999 PR PBB SHADOW E&M-EST. PATIENT-LVL V: CPT | Mod: PBBFAC,,, | Performed by: NURSE PRACTITIONER

## 2022-12-13 PROCEDURE — 1160F RVW MEDS BY RX/DR IN RCRD: CPT | Mod: CPTII,S$GLB,, | Performed by: PHYSICIAN ASSISTANT

## 2022-12-13 PROCEDURE — 3051F PR MOST RECENT HEMOGLOBIN A1C LEVEL 7.0 - < 8.0%: ICD-10-PCS | Mod: CPTII,S$GLB,, | Performed by: PHYSICIAN ASSISTANT

## 2022-12-13 PROCEDURE — 3072F LOW RISK FOR RETINOPATHY: CPT | Mod: CPTII,S$GLB,, | Performed by: NURSE PRACTITIONER

## 2022-12-13 PROCEDURE — 4010F PR ACE/ARB THEARPY RXD/TAKEN: ICD-10-PCS | Mod: CPTII,S$GLB,, | Performed by: NURSE PRACTITIONER

## 2022-12-13 PROCEDURE — 99214 OFFICE O/P EST MOD 30 MIN: CPT | Mod: S$GLB,,, | Performed by: PHYSICIAN ASSISTANT

## 2022-12-13 PROCEDURE — 1159F PR MEDICATION LIST DOCUMENTED IN MEDICAL RECORD: ICD-10-PCS | Mod: CPTII,S$GLB,, | Performed by: PHYSICIAN ASSISTANT

## 2022-12-13 PROCEDURE — 84207 ASSAY OF VITAMIN B-6: CPT | Performed by: NURSE PRACTITIONER

## 2022-12-13 PROCEDURE — 3078F DIAST BP <80 MM HG: CPT | Mod: CPTII,S$GLB,, | Performed by: PHYSICIAN ASSISTANT

## 2022-12-13 PROCEDURE — 3008F BODY MASS INDEX DOCD: CPT | Mod: CPTII,S$GLB,, | Performed by: PHYSICIAN ASSISTANT

## 2022-12-13 PROCEDURE — 3051F PR MOST RECENT HEMOGLOBIN A1C LEVEL 7.0 - < 8.0%: ICD-10-PCS | Mod: CPTII,S$GLB,, | Performed by: NURSE PRACTITIONER

## 2022-12-13 PROCEDURE — 3072F LOW RISK FOR RETINOPATHY: CPT | Mod: CPTII,S$GLB,, | Performed by: PHYSICIAN ASSISTANT

## 2022-12-13 PROCEDURE — 1160F PR REVIEW ALL MEDS BY PRESCRIBER/CLIN PHARMACIST DOCUMENTED: ICD-10-PCS | Mod: CPTII,S$GLB,, | Performed by: PHYSICIAN ASSISTANT

## 2022-12-13 PROCEDURE — 3008F PR BODY MASS INDEX (BMI) DOCUMENTED: ICD-10-PCS | Mod: CPTII,S$GLB,, | Performed by: PHYSICIAN ASSISTANT

## 2022-12-13 PROCEDURE — 1160F PR REVIEW ALL MEDS BY PRESCRIBER/CLIN PHARMACIST DOCUMENTED: ICD-10-PCS | Mod: CPTII,S$GLB,, | Performed by: NURSE PRACTITIONER

## 2022-12-13 PROCEDURE — 3075F PR MOST RECENT SYSTOLIC BLOOD PRESS GE 130-139MM HG: ICD-10-PCS | Mod: CPTII,S$GLB,, | Performed by: NURSE PRACTITIONER

## 2022-12-13 PROCEDURE — 99999 PR PBB SHADOW E&M-EST. PATIENT-LVL III: CPT | Mod: PBBFAC,,, | Performed by: PHYSICIAN ASSISTANT

## 2022-12-13 PROCEDURE — 3078F PR MOST RECENT DIASTOLIC BLOOD PRESSURE < 80 MM HG: ICD-10-PCS | Mod: CPTII,S$GLB,, | Performed by: NURSE PRACTITIONER

## 2022-12-13 PROCEDURE — 3051F HG A1C>EQUAL 7.0%<8.0%: CPT | Mod: CPTII,S$GLB,, | Performed by: PHYSICIAN ASSISTANT

## 2022-12-13 PROCEDURE — 3008F BODY MASS INDEX DOCD: CPT | Mod: CPTII,S$GLB,, | Performed by: NURSE PRACTITIONER

## 2022-12-13 PROCEDURE — 1159F MED LIST DOCD IN RCRD: CPT | Mod: CPTII,S$GLB,, | Performed by: PHYSICIAN ASSISTANT

## 2022-12-13 PROCEDURE — 3051F HG A1C>EQUAL 7.0%<8.0%: CPT | Mod: CPTII,S$GLB,, | Performed by: NURSE PRACTITIONER

## 2022-12-13 PROCEDURE — 3078F PR MOST RECENT DIASTOLIC BLOOD PRESSURE < 80 MM HG: ICD-10-PCS | Mod: CPTII,S$GLB,, | Performed by: PHYSICIAN ASSISTANT

## 2022-12-13 PROCEDURE — 99215 PR OFFICE/OUTPT VISIT, EST, LEVL V, 40-54 MIN: ICD-10-PCS | Mod: S$GLB,,, | Performed by: NURSE PRACTITIONER

## 2022-12-13 PROCEDURE — 3078F DIAST BP <80 MM HG: CPT | Mod: CPTII,S$GLB,, | Performed by: NURSE PRACTITIONER

## 2022-12-13 PROCEDURE — 99999 PR PBB SHADOW E&M-EST. PATIENT-LVL V: ICD-10-PCS | Mod: PBBFAC,,, | Performed by: NURSE PRACTITIONER

## 2022-12-13 PROCEDURE — 4010F ACE/ARB THERAPY RXD/TAKEN: CPT | Mod: CPTII,S$GLB,, | Performed by: PHYSICIAN ASSISTANT

## 2022-12-13 PROCEDURE — 1159F MED LIST DOCD IN RCRD: CPT | Mod: CPTII,S$GLB,, | Performed by: NURSE PRACTITIONER

## 2022-12-13 PROCEDURE — 99215 OFFICE O/P EST HI 40 MIN: CPT | Mod: S$GLB,,, | Performed by: NURSE PRACTITIONER

## 2022-12-13 PROCEDURE — 4010F ACE/ARB THERAPY RXD/TAKEN: CPT | Mod: CPTII,S$GLB,, | Performed by: NURSE PRACTITIONER

## 2022-12-13 PROCEDURE — 1160F RVW MEDS BY RX/DR IN RCRD: CPT | Mod: CPTII,S$GLB,, | Performed by: NURSE PRACTITIONER

## 2022-12-13 PROCEDURE — 3072F PR LOW RISK FOR RETINOPATHY: ICD-10-PCS | Mod: CPTII,S$GLB,, | Performed by: NURSE PRACTITIONER

## 2022-12-13 PROCEDURE — 3072F PR LOW RISK FOR RETINOPATHY: ICD-10-PCS | Mod: CPTII,S$GLB,, | Performed by: PHYSICIAN ASSISTANT

## 2022-12-13 PROCEDURE — 3008F PR BODY MASS INDEX (BMI) DOCUMENTED: ICD-10-PCS | Mod: CPTII,S$GLB,, | Performed by: NURSE PRACTITIONER

## 2022-12-13 PROCEDURE — 1159F PR MEDICATION LIST DOCUMENTED IN MEDICAL RECORD: ICD-10-PCS | Mod: CPTII,S$GLB,, | Performed by: NURSE PRACTITIONER

## 2022-12-13 PROCEDURE — 3075F SYST BP GE 130 - 139MM HG: CPT | Mod: CPTII,S$GLB,, | Performed by: NURSE PRACTITIONER

## 2022-12-13 PROCEDURE — 4010F PR ACE/ARB THEARPY RXD/TAKEN: ICD-10-PCS | Mod: CPTII,S$GLB,, | Performed by: PHYSICIAN ASSISTANT

## 2022-12-13 PROCEDURE — 99214 PR OFFICE/OUTPT VISIT, EST, LEVL IV, 30-39 MIN: ICD-10-PCS | Mod: S$GLB,,, | Performed by: PHYSICIAN ASSISTANT

## 2022-12-13 PROCEDURE — 99999 PR PBB SHADOW E&M-EST. PATIENT-LVL III: ICD-10-PCS | Mod: PBBFAC,,, | Performed by: PHYSICIAN ASSISTANT

## 2022-12-13 PROCEDURE — 3077F SYST BP >= 140 MM HG: CPT | Mod: CPTII,S$GLB,, | Performed by: PHYSICIAN ASSISTANT

## 2022-12-13 PROCEDURE — 36415 COLL VENOUS BLD VENIPUNCTURE: CPT | Mod: PO | Performed by: NURSE PRACTITIONER

## 2022-12-13 PROCEDURE — 3077F PR MOST RECENT SYSTOLIC BLOOD PRESSURE >= 140 MM HG: ICD-10-PCS | Mod: CPTII,S$GLB,, | Performed by: PHYSICIAN ASSISTANT

## 2022-12-13 RX ORDER — HYDROCODONE BITARTRATE AND ACETAMINOPHEN 10; 325 MG/1; MG/1
1 TABLET ORAL 3 TIMES DAILY PRN
Qty: 90 TABLET | Refills: 0 | Status: SHIPPED | OUTPATIENT
Start: 2023-02-11 | End: 2023-03-09 | Stop reason: SDUPTHER

## 2022-12-13 RX ORDER — HYDROCODONE BITARTRATE AND ACETAMINOPHEN 10; 325 MG/1; MG/1
1 TABLET ORAL 3 TIMES DAILY PRN
Qty: 90 TABLET | Refills: 0 | Status: SHIPPED | OUTPATIENT
Start: 2023-01-12 | End: 2023-02-11

## 2022-12-13 RX ORDER — TIZANIDINE 4 MG/1
4 TABLET ORAL EVERY 8 HOURS PRN
Qty: 90 TABLET | Refills: 2 | Status: SHIPPED | OUTPATIENT
Start: 2022-12-13 | End: 2023-04-17 | Stop reason: SDUPTHER

## 2022-12-13 RX ORDER — GABAPENTIN 800 MG/1
800 TABLET ORAL 2 TIMES DAILY
Qty: 180 TABLET | Refills: 3 | Status: SHIPPED | OUTPATIENT
Start: 2022-12-13 | End: 2024-02-14

## 2022-12-13 RX ORDER — HYDROCODONE BITARTRATE AND ACETAMINOPHEN 10; 325 MG/1; MG/1
1 TABLET ORAL 3 TIMES DAILY PRN
Qty: 90 TABLET | Refills: 0 | Status: SHIPPED | OUTPATIENT
Start: 2022-12-13 | End: 2023-01-12

## 2022-12-13 RX ORDER — MECLIZINE HYDROCHLORIDE 25 MG/1
25 TABLET ORAL
COMMUNITY
Start: 2022-12-08

## 2022-12-13 NOTE — PROCEDURES
[facility]  [address] [facility]  [place] [address]  [phone]      Full Name: Jose Walker Gender: Female  Patient ID: [id number] YOB: 1960      Visit Date: 12/9/2022 9:57 AM  Age: 62 Years  Examining Physician: Sarah Reyes D.O., ABPN, AOBNP, ABEM   Referring Physician: Ashley Sahu NP   Technologist: NANCY Johnson   Height: 5 feet 6 inch  History: Diabetic patient complaining of generalized muscle pain and swelling.    Hx of Cervical and lumbar surgery.  Evalaute the right upper and left lower extremities.      Sensory NCS      Nerve / Sites Rec. Site Onset Lat Peak Lat NP Amp Segments Distance Velocity Temp.     ms ms µV  cm m/s °C   R Median - Digit II (Antidromic)      Wrist Dig II 4.69 6.02 6.1 Wrist - Dig II 13 28 37      Ref.   ?3.80 ?10.0 Ref.  ?50    R Ulnar - Digit V (Antidromic)      Wrist Dig V 2.21 2.96 33.6 Wrist - Dig V 11 50 37      Ref.   ?3.20 ?10.0 Ref.  ?50    L Sural - Ankle (Calf)      Calf Ankle 3.52 4.38 7.9 Calf - Ankle 14 40 37      Ref.   ?4.60 ?3.0 Ref.  ?40    L Superficial peroneal - Ankle      Lat leg Ankle 2.25 3.17 4.9 Lat leg - Ankle 12 53 37      Ref.   ?4.60 ?3.0 Ref.          Motor NCS      Nerve / Sites Muscle Latency Ref. Amplitude Ref. Amp % Duration Segments Distance Lat Diff Ref. Velocity Ref. Temp.     ms ms mV mV % ms  cm ms ms m/s m/s °C   R Median - APB      Wrist APB 5.31 ?4.00 2.7 ?5.0 100 7.19 Wrist - APB      37      Elbow APB 11.67  1.7  63.4 6.88 Elbow - Wrist 25 6.35  39 ?50 37   R Ulnar - ADM      Wrist ADM 2.75 ?3.10 10.8 ?7.0 100 6.60 Wrist - ADM      37      B.Elbow ADM 6.54  10.0  92.9 6.77 B.Elbow - Wrist 20 3.79  53 ?50 37      A.Elbow ADM 8.46  9.7  90.6 7.38 A.Elbow - B.Elbow 10 1.92  52  37   L Peroneal - EDB      Ankle EDB 4.19 ?6.00 4.0 ?2.5 100 4.98 Ankle - EDB      37      Fib head EDB 11.29  3.8  97.2 5.29 Fib head - Ankle 30 7.10  42 ?40 37      Pop fossa  EDB 13.71  3.5  88.4 4.85 Pop fossa - Fib head 10 2.42  41  37   L Tibial - AH      Ankle AH 5.77 ?6.00 4.6 ?4.0 100 3.94 Ankle - AH      37      Pop fossa AH 14.60  3.4  73.4 3.83 Pop fossa - Ankle 37 8.83  42 ?40 37       F  Wave      Nerve Fmin Ref.    ms ms   R Median - APB 37.86 ?31.00   R Ulnar - ADM 31.56 ?32.00   L Peroneal - EDB 50.36 ?56.00   L Tibial - AH 54.43 ?56.00       EMG Summary Table     Spontaneous Recruitment Activation Duration Amplitude Polyphasia Comment   Muscle Ins Act Fib Fasc Pattern - - - - -   R. First dorsal interosseous Normal 0 0 Normal Normal Normal Normal Normal Normal   R. Abductor pollicis Inc Rare 0 Sl Dec Normal N/+1 N/+1 N/+1 Normal   R. Pronator teres Normal 0 0 Normal Normal Normal Normal Normal Normal   R. Biceps brachii Normal 0 0 Normal Normal Normal Normal Normal Normal   R. Triceps brachii Normal 0 0 Normal Normal Normal Normal Normal Normal   R. Deltoid Normal 0 0 Normal Normal Normal Normal Normal Normal   R. Cervical paraspinals Normal 0 0      Normal   L. Tibialis anterior Inc +1 0 Sl Dec Normal N/+1 Normal N/+1 Normal   L. Gastrocnemius (Medial head) Normal 0 0 Normal Normal Normal Normal Normal Normal   L. Extensor hallucis longus Inc +1 0 Sl Dec Normal +1 N/+1 +2 Normal   L. Vastus medialis Normal 0 0 Normal Normal Normal Normal Normal Normal   L. Tensor fasciae latae Normal 0 0 Sl Dec Normal N/+1 N/+1 +1 Normal   L. Lumbar paraspinals Normal 0 0      Normal         [facility] [id number] 12/9/2022 9:57 AM     4 of 4    Summary:  Nerve conduction studies were performed on the right upper and left lower extremities.  Right median sensory response was prolonged with a reduced amplitude.  Right ulnar, left superficial peroneal and left sural sensory responses were normal in amplitude and latency.  Left median motor response was prolonged with a reduced amplitude and slowing of the velocity.  Right ulnar, left peroneal and left tibial motor responses were normal  in amplitude, latency and velocity.  Right median minimal F-wave latency was prolonged.  Left ulnar and right peroneal and tibial minimal F-wave latencies were normal.  Needle EMG was performed in the right upper extremity and left lower extremity as well as the paraspinal muscles.  Active denervation was present in the left tibialis anterior and extensor hallucis longus muscles.  Slight active denervation was noted in the right abductor pollicis brevis muscle.  Motor units were large, long and poly phasic with reduced recruitment in the right abductor pollicis brevis, left extensor hallucis longus and left tensor fascia nii muscles.  Motor units were enlarged and poly phasic with reduced recruitment in left tibialis anterior.  All other motor unit morphology and recruitment patterns were normal.    Impression: This is an abnormal EMG of the right upper and left lower extremities.  The findings are as follows:  Subacute on chronic, mild right median mononeuropathy across the wrist (carpal tunnel syndrome) with slight active denervation.  Chronic, mild, left L5 radiculopathy with slight active denervation.  There is no evidence of any other focal neuropathy, peripheral neuropathy, plexopathy or radiculopathy on this study.      Thank you for referring to the [facility] in [place]. Please feel free to contact the clinic if you have any further questions regarding this study or report.       _____________________________  Sarah Reyes D.O., ABPN, AOBNP, LILLY Walker [id number] 12/9/2022 9:57 AM     4 of 4

## 2022-12-13 NOTE — ASSESSMENT & PLAN NOTE
Noted on recent EMG/NCS  Pt reports symptoms are not very bothersome  recommend she wear right wrist splint nightly

## 2022-12-13 NOTE — PATIENT INSTRUCTIONS
"Wrist Splint for Carpal Tunnel Syndrome    A wrist splint is a brace that looks like a fingerless glove. It stabilizes your wrist in a straight and sometimes slightly bent-back position. Wearing a wrist splint minimizes pressure on the median nerve and allows you a period of "relative rest" from movements that make carpal tunnel syndrome worse.    Think about these guidelines when you use a wrist splint:    When worn to stabilize the wrist during sleep, a wrist splint can reduce pain and pressure on the median nerve.  A wrist splint isn't a substitute for good workplace design and proper body mechanics. Some experts advise against wearing a wrist splint while working, because doing so can strain the hand's tendons.  A wrist splint is a helpful short-term treatment for carpal tunnel syndrome. But long-term use can cause your muscles to weaken. During and after a period of splint use, exercise and slowly increase your hand and wrist movements. This will help prevent your wrist muscles from weakening.  Splints are available in drugstores without a prescription. They are also available by prescription from orthopedic and medical supply stores. Physical and occupational therapists can make custom-fit splints from lightweight materials that may be more comfortable to wear.      Continue with outpatient PT  "

## 2022-12-13 NOTE — ASSESSMENT & PLAN NOTE
Patient is a 61 y/o female that presents for f/u   Reports of ongoing tingling and intermittent burning pain to bilateral feet.   - overall improved   Pt was told she had new onset of diabetic neuropathy ~ 10 years ago.   Pt also with extensive spine history with associated pain   - she is following back and spine and pain management.   Recent A1c noted to be elevated (7.6%)   - recommend strict blood glucose control   EMG/NCS reported with mild, chronic left L5 radiculopathy; no neuropathy reported  Previous serologies noted with low B6 levels and was supplemented. last B6 level was elevated. She is no longer supplementing  Pt on Gabapentin 800 mg BID and reports aid from this dose.   - discussed dosing options at length along with importance of taking this medication as prescribed.    - Consider adding low midday dose for tolerance if needed.  Continue to f/u with other specificities regarding back pain  Constinue outpt PT

## 2022-12-13 NOTE — PROGRESS NOTES
This note was completed with dictation software and grammatical errors may exist.    CC:  Neck and back pain    HPI: The patient is a 62-year-old woman with a history of diabetes, hypertension who presents in referral from [facility] for neck pain radiating into the arms and low back pain radiating into the left leg.  She returns in follow-up today with back pain and neck pain.  She reports pain in her neck and in the muscles in her arms.  She also complains of low back pain radiating down the left greater than right legs.  She was doing physical therapy which was helping some but then she developed worsening muscle spasms in her legs.  She continues to take pain medicine with mild relief.  She had a nerve conduction study last week and is following up with Neurology today to discuss the results which are not available yet.  She reports weakness in her left arm and numbness in the bottom of her right foot.  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  She also reports depression because of her pain.    Pain intervention history: She had undergone 3 epidurals for low back pain with only up to 3 weeks of relief each time.  She is status post C7-T1 cervical interlaminar epidural steroid injection on 1/29/16 with 50% relief.   She is status post C7-T1 cervical interlaminar epidural steroid injection on 3/1/16 with mild additional relief.  She is status post C7-T1 cervical interlaminar epidural steroid injection on 5/13/16 with 70-75% relief.  She is status post right C3, 4, 5 and 6 medial branch radiofrequency ablation on 7/15/16 with 30-60% relief.  She is status post L5/S1 interlaminar epidural steroid injection on 5/16/17 with 50% relief.  She is status post L5/S1 interlaminar epidural steroid injection on 12/29/17 with excellent relief lasting 2 weeks, now reporting minimal relief of her low back and buttock pain but ongoing  "100% relief of her right leg pain.  She is status post C7-T1 cervical interlaminar epidural steroid injection on 3/14/18 with 75% relief but this was not long lasting.  She is status post right L4/5 and L5/S1 transforaminal epidural steroid injection on 01/11/2021 with Dr Luna with 0% relief.   She is status post caudal epidural steroid injection on 11/01/2021 with 70% relief lasting over 1 month.   She is status post caudal epidural steroid injection on 04/05/2022 with 60-65% relief.     Spine surgeries: Right L4/5 and L5/S1 laminectomy on 06/24/2021 Dr Hester.     Antineuropathics:  Gabapentin 100 mg 3 times daily  NSAIDs:  Physical therapy:  [facility] in [place]  Antidepressants:  Amitriptyline 25 mg nightly  Muscle relaxers:  Tizanidine 4 mg 3 times daily as needed  Opioids:  Hydrocodone-acetaminophen 10/325 mg 3 times daily as needed  Antiplatelets/Anticoagulants:    ROS: She reports headaches, hoarse voice, joint stiffness, joint swelling, back pain, difficulty sleeping, anxiety and loss of balance.  Balance of review of systems is negative.    Medical, surgical, family and social history reviewed elsewhere in record.    Medications/Allergies: See med card    Vitals:    12/13/22 0912   BP: (!) 147/69   Pulse: 74   SpO2: 98%   Weight: 81.7 kg (180 lb 0.1 oz)   Height: 5' 8.4" (1.737 m)   PainSc:   4   PainLoc: Leg         Physical exam:  Gen: A and O x3, pleasant, well-groomed  Skin: No rashes or obvious lesions  HEENT: PERRLA, no obvious deformities on ears or in canals.Trachea midline.  CVS: Regular rate and rhythm, normal palpable pulses.  Resp: Clear to auscultation bilaterally, no wheezes or rales.  Abdomen: Soft, NT/ND.  Musculoskeletal: No antalgic gait.     Neuro:  Upper extremities: 4/5 strength bilaterally  Lower extremities:  4/5 strength bilaterally   Reflexes: Brachioradialis 2+, Bicep 2+, Tricep 2+.  Patellar and Achilles reflexes 2+ bilaterally.  Sensory: Intact and symmetrical to light " touch and pinprick in C2-T1 dermatomes bilaterally.  Osuna's negative.  Romberg positive for imbalance, abnormal tandem gait test.    Cervical Spine:  Cervical spine: Range of motion is mildly reduced with flexion extension and lateral rotation without increased pain.  Spurling's maneuver causes neck pain to either side.    Myofascial exam: No tenderness to palpation to the cervical paraspinous muscles.    Lumbar spine:  Range of motion is moderately reduced with flexion, extension and oblique extension with increased low back pain during each maneuver.  Khanh's test is negative bilaterally.  Straight leg raise causes left leg pain.  Internal and external rotation of hip is negative bilaterally.  Myofascial exam:  Mild tenderness to palpation to the lumbar paraspinous muscles.    Imagin/24/15 C-spine MRI  1. C3-4 left posterior paracentral disk extrusion causing left paracentral spinal cord compression and severe left foraminal stenosis.  2. C4-5 based disk extrusion with spinal cord impingement and moderate bilateral foraminal stenosis.  3. C5-6 posterior central disk extrusion with spinal cord compression and severe bilateral foraminal stenosis.  4. C6-7 mild disk bulge with severe bilateral foraminal stenosis.  5. Solitary mildly enlarged left submandibular lymph node of uncertain significance.    4/10/17 MRI lumbar spine  T12-L1: No central canal or neuroforaminal stenosis. No disc protrusion or extrusion.  L1-L2: There is ligamentum flavum thickening and facet arthropathy.  No central canal or neuroforaminal stenosis. No disc protrusion or extrusion.  L2-L3: There is ligamentum flavum thickening and facet arthropathy.  No central canal or neuroforaminal stenosis. No disc protrusion or extrusion.  L3-L4: There is ligamentum flavum thickening and facet arthropathy present. No central canal or neuroforaminal stenosis. No disc protrusion or extrusion.  L4-L5: There is a broad disc bulge which extends into  both neural foramina.  There is ligamentum flavum thickening and facet arthropathy.  There is right lateral recess stenosis.  There is abutment of the descending right L5 nerve in the right lateral recess.  Please correlate clinically for symptoms referable to the right L5 nerve.  There is mild central canal stenosis.  There is mild left neuroforaminal stenosis.  No right neuroforaminal stenosis.  L5-S1: There is a broad central/right paracentral disc protrusion which encroaches upon the descending right S1 nerve in the right lateral recess.  Please correlate clinically for symptoms referable to the right S1 nerve.  There is mild-moderate right neuroforaminal stenosis.  No left neuroforaminal stenosis.  No central canal stenosis.  There is ligamentum flavum thickening and facet arthropathy.  There is a broad left extraforaminal disc protrusion at L5-S1 which abuts the exited left L5 nerve in the left extraforaminal soft tissues (series 6 image 17), nonspecific.  Please correlate clinically for symptoms referable to the left L5 nerve.    05/28/2019 MRI cervical spine  C2-3: No disc herniation, spinal canal narrowing, or neuroforaminal narrowing.  C3-4: There is moderate broad-based posterior disc osteophyte complex formation with left paracentral predominance.  This along with facet arthropathy contributes to mild spinal canal narrowing and moderate left neuroforaminal narrowing.  C4-5: There is moderate broad-based posterior disc osteophyte complex formation with left paracentral prominence.  This along with facet arthropathy contributes to mild bilateral neuroforaminal narrowing and mild spinal canal narrowing.  C5-6: Moderate broad-based posterior disc osteophyte complex formation and facet arthropathy result in moderate spinal canal narrowing and moderate right and mild left neuroforaminal narrowing.  C6-7: Moderate broad-based posterior disc osteophyte complex formation and facet arthropathy result in mild  spinal canal narrowing along with moderate right and severe left neuroforaminal narrowing.  C7-T1: Minimal broad-based posterior disc osteophyte complex formation and bilateral facet arthropathy result in mild bilateral neuroforaminal narrowing.      05/28/2019 MRI lumbar spine  T12-L1: No disc herniation, spinal canal narrowing, or neuroforaminal narrowing.  L1-2: No disc herniation, spinal canal narrowing, or neuroforaminal narrowing.  L2-3: No disc herniation, spinal canal narrowing, or neuroforaminal narrowing.  L3-4: No disc herniation, spinal canal narrowing, or neuroforaminal narrowing.  L4-5: There is moderate generalized disc bulging and bilateral facet arthropathy, which result in mild spinal canal narrowing and mild bilateral neuroforaminal narrowing.  L5-S1: There is a small right paracentral disc protrusion.  This effaces the right lateral recess and may impinge upon the descending right S1 nerve root.  Bilateral facet arthropathy is present and there is resultant overall mild spinal canal narrowing.  Moderate right and mild left neuroforaminal narrowing are also present.    06/24/2021 MRI lumbar spine  NOMENCLATURE: Five lumbar type vertebral bodies.     CORD/CAUDA EQUINA: Conus has normal size and signal and ends at a normal level of L1-L2.  Small amount of susceptibility either in the subdural or intrathecal space ventrally and dorsally at the L3-4 level.     ALIGNMENT: Trace retrolisthesis of L4 on L5.     BONES: Interval L4 laminectomy and right L5 hemilaminectomy.  Medial right L4-5 and L5-S1 facetectomies.  Susceptibility artifact is again seen in the left L5 pedicle.  No aggressive bone marrow signal.     PARASPINAL AREA: Fluid and edema along the surgical tract and in the right dorsal paraspinal muscles at the surgical levels.     LUMBAR DISC LEVELS:     T12-L1: No disc herniation or significant posterior osteophytic ridging.  Mild left facet hypertrophy.  No significant spinal canal or  foraminal stenosis.     L1-L2: Minimal disc bulge.  Mild bilateral facet hypertrophy.  Ligamentum flavum thickening.  Minimal narrowing of the bilateral lateral recesses.  No significant spinal canal or foraminal stenosis.     L2-L3: Mild disc bulge.  Mild bilateral facet hypertrophy.  Ligamentum flavum thickening.  Mild narrowing of the bilateral lateral recesses.  No significant spinal canal or foraminal stenosis.     L3-L4: Mild disc bulge.  Mild-moderate bilateral facet hypertrophy.  Ligamentum flavum thickening.  Susceptibility at the midline anterior and posterior aspect of the thecal sac.  Mild narrowing of the bilateral lateral recesses.  Moderate spinal canal stenosis, increased from prior study.  Mild bilateral foraminal stenosis.     L4-L5: Laminectomy.  Right facetectomy.  Mild disc bulge and disc height loss.  Mild narrowing of the lateral recesses without significant overall spinal canal stenosis.  Mild bilateral foraminal stenosis.     L5-S1: Interval right hemilaminectomy and right medial facetectomy.  Mild disc bulge.  Small central protrusion.  Mild narrowing of the right lateral recess which is significantly decreased from preoperative study.  No significant spinal canal stenosis.  Moderate right and mild-moderate left foraminal stenosis, unchanged.  The left L5 nerve root may be contacted by disc as it exits the foramen    Assessment:  The patient is a 62-year-old woman with a history of diabetes, hypertension who presents in referral from [facility] for neck pain radiating into the arms and low back pain radiating into the left leg.     1. Lumbar radiculopathy        2. DDD (degenerative disc disease), lumbar        3. Cervical radiculopathy        4. DDD (degenerative disc disease), cervical        5. Myofascial pain        6. Depression, unspecified depression type  Ambulatory referral/consult to Psychiatry      7. Opioid contract exists            Plan:  1. Dr. Galan  provided prescriptions for hydrocodone-acetaminophen 10/325 mg up to 3 times daily as needed for pain.  I have reviewed the [place] Board of Pharmacy website and there are no abberancies.    2. I placed a referral to Psychiatry for depression.  3. We discussed repeating a caudal epidural steroid injection verses a transforaminal injection based on her nerve conduction study results.  However, her blood glucose has not been well controlled lately and we will hold off on giving her any steroids at this time.  If we repeat an injection in the future and it does not help I would update her lumbar spine MRI and have her follow up with Neurosurgery for further instructions.  Lastly we can consider spinal cord stimulation.  4. Follow-up in 3 months or sooner as needed.

## 2022-12-13 NOTE — PROGRESS NOTES
NEUROLOGY  Outpatient Follow Up    [facility]  [address], [place] [address]  [phone] (office) / [phone] (fax)    Patient Name:  Aaron Garcia  :  1960  MR #:  [id number]  Acct #:  [id number]    Date of Neurology Visit: 2022  Name of Provider: TAL Owen    Other Physicians:  Sydni Bach NP (Primary Care Physician); Self, Aaareferral (Referring)      Chief Complaint: Peripheral Neuropathy      History of Present Illness (HPI):  Aaron Garcia is a 60 y.o. female with a h/o diabetes, hypertension, lumbar radiculopathy and back pain.   Patient has an extensive back history. She did have right laminectomy surgery in  and states post surgery her left leg became suddenly weak. She was admitted back into the hospital and more testing was completed which was inconclusive. She did not have outpatient therapy for the left leg weakness. She contracted COVID in July which she states held her back from doing therapy.   She was told about 10 years ago when her back issues began that she had the beginnings of neuropathy. She is a diabetic.  She reports having muscle spasms to the right leg prior to the laminectomy and now takes muscle relaxers for this.      She reports tingling to both feet constantly. It is mild intensity and worse mainly at night. There is intermittent burning to both feet. She has been on Gabapentin for over 5 years. Her dose was increased over 6 mths ago to TID but admits to skipping the midday dose at times due to drowsiness. She reports that the Gabapentin does help.      Her last fall was in September, stating she fell while in the shower. She reports her left leg gave out.           Interval Hx 2022:   Patient is here today for test results. She was last seen in 2021. She recenlty had her EMG/NCS completed.   She reports being maintaining on Gabapentin 800 mg BID and this is doing well for her.  "  She states that her left leg will swell often as she is on her feet a lot. The burning is not as bad as it once was.     She reports having tendonitis to her right hand which has improved as of recent. She denies pain to the right wrist. She states that at times she will have to shake her hand for relief but this has improved.     She continues to follow pain management.      Treatment to date:    right L4/5 and L5/S1 laminectomy on 06/24/2021  Cervical HERACLIO  Lumbar HERACLIO                Past Medical, Surgical, Family & Social History:   Reviewed and updated.    Home Medications:     Current Outpatient Medications:     ALBUTEROL SULFATE (PROAIR HFA INHL), Inhale 1 puff into the lungs every 6 (six) hours as needed. PRN, Disp: , Rfl:     amitriptyline (ELAVIL) 25 MG tablet, Take 25 mg by mouth nightly as needed for Insomnia., Disp: , Rfl:     amlodipine (NORVASC) 10 MG tablet, Take 10 mg by mouth every evening. , Disp: , Rfl:     azelastine (ASTELIN) 137 mcg (0.1 %) nasal spray, 1 spray by Nasal route daily as needed. , Disp: , Rfl:     BD ULTRA-FINE GISELA PEN NEEDLE 32 gauge x 5/32" Ndle, , Disp: , Rfl:     benazepril (LOTENSIN) 40 MG tablet, Take 40 mg by mouth every evening. , Disp: , Rfl:     desloratadine (CLARINEX) 5 mg tablet, Take 5 mg by mouth every evening. , Disp: , Rfl:     flash glucose sensor (FREESTYLE DASHA 2 SENSOR) Kit, APPLY 1 SENSOR TO THE SKIN EVERY 14 DAYS AS DIRECTED., Disp: 2 kit, Rfl: 12    fluticasone propionate (FLONASE) 50 mcg/actuation nasal spray, 1 spray by Each Nostril route daily as needed. PRN, Disp: , Rfl:     folic acid-vit B6-vit B12 2.5-25-2 mg (FOLBIC OR EQUIV) 2.5-25-2 mg Tab, Take 1 tablet by mouth once daily., Disp: 90 tablet, Rfl: 3    HYDROcodone-acetaminophen (NORCO)  mg per tablet, Take 1 tablet by mouth 3 (three) times daily as needed for Pain., Disp: 90 tablet, Rfl: 0    [START ON 1/12/2023] HYDROcodone-acetaminophen (NORCO)  mg per tablet, Take 1 tablet by " mouth 3 (three) times daily as needed for Pain., Disp: 90 tablet, Rfl: 0    [START ON 2/11/2023] HYDROcodone-acetaminophen (NORCO)  mg per tablet, Take 1 tablet by mouth 3 (three) times daily as needed for Pain., Disp: 90 tablet, Rfl: 0    insulin aspart U-100 (NOVOLOG FLEXPEN U-100 INSULIN) 100 unit/mL (3 mL) InPn pen, INJECT 10 UNITS UNDER THE SKIN WITH MEALS PLUS SLIDING SCALE AS DIRECTED, Disp: 45 mL, Rfl: 3    insulin glargine, TOUJEO, (TOUJEO SOLOSTAR U-300 INSULIN) 300 unit/mL (1.5 mL) InPn pen, INJECT 60 UNITS SUBCUTANEOUSLY IN THE EVENING AS DIRECTED., Disp: 12 pen, Rfl: 3    linaCLOtide (LINZESS) 72 mcg Cap capsule, Take 1 capsule (72 mcg total) by mouth before breakfast., Disp: 30 capsule, Rfl: 11    meclizine (ANTIVERT) 25 mg tablet, Take 25 mg by mouth., Disp: , Rfl:     montelukast (SINGULAIR) 10 mg tablet, Take 10 mg by mouth every evening. , Disp: , Rfl:     nebivolol (BYSTOLIC) 10 MG Tab, Take 10 mg by mouth every evening. , Disp: , Rfl:     ON CALL LANCET 30 gauge Misc, , Disp: , Rfl:     pantoprazole (PROTONIX) 40 MG tablet, Take 40 mg by mouth once daily., Disp: , Rfl:     rosuvastatin (CRESTOR) 10 MG tablet, Take 10 mg by mouth every evening. , Disp: , Rfl:     tiZANidine (ZANAFLEX) 4 MG tablet, Take 1 tablet (4 mg total) by mouth every 8 (eight) hours as needed., Disp: 90 tablet, Rfl: 2    TOUJEO SOLOSTAR U-300 INSULIN 300 unit/mL (1.5 mL) InPn pen, INJECT 60 UNITS SUBCUTANEOUSLY IN THE EVENING AS DIRECTED., Disp: 6 pen, Rfl: 11    TRUE METRIX GLUCOSE TEST STRIP Strp, , Disp: , Rfl:     gabapentin (NEURONTIN) 800 MG tablet, Take 1 tablet (800 mg total) by mouth 2 (two) times daily., Disp: 180 tablet, Rfl: 3  No current facility-administered medications for this visit.    Facility-Administered Medications Ordered in Other Visits:     chlorhexidine 0.12 % solution 15 mL, 15 mL, Mouth/Throat, Once, Denise Real MD    mupirocin 2 % ointment, , Nasal, Once, Denise Real,  "MD    Physical Examination:  /72 (BP Location: Left arm, Patient Position: Sitting, BP Method: Large (Automatic))   Pulse 71   Ht 5' 8.4" (1.737 m)   Wt 82.1 kg (180 lb 14.2 oz)   LMP 03/13/2017   BMI 27.18 kg/m²     GENERAL:  General appearance: Well, non-toxic appearing.  No apparent distress.  Neck: supple.  .     MENTAL STATUS:  Alertness, attention span & concentration: normal.  Language: normal.  Orientation to self, place & time:  normal.  Memory, recent & remote: normal.  Fund of knowledge: normal.        SPEECH:  Clear and fluent.  Follows complex commands.     CRANIAL NERVES:  Cranial Nerves II-XII were examined.  II - Visual fields: normal.  III, IV, VI: PERRL, EOMI, No ptosis, No nystagmus.  V - Facial sensation: normal.  VII - Face symmetry & mobility: not assessed d/t COVID precautions  VIII - Hearing: normal.  IX, X - Palate: not assessed d/t COVID precautions  XI - Shoulder shrug: normal.  XII - Tongue protrusion: not assessed d/t COVID precautions     GROSS MOTOR:  Gait & station: antalgic; good step height  Tone: normal.  Abnormal movements: none.  Finger-nose: normal.  Rapid alternating movements: normal.  Pronator drift: normal        MUSCLE STRENGTH:      Fascics Atrophy RIGHT      LEFT Atrophy Fascics       5 Biceps 5           5 Triceps 5           5 Forearm.Pr. 5                           4 Iliopsoas flex    4           4 Hip Abduct 4           4 Hip Adduct 4           5 Quads 5           4+ Hams 4+           5 Dorsiflex 5           5 Plantar Flex 5          REFLEXES:     RIGHT Reflex    LEFT   2+ Biceps 2+   2+ Brachiorad. 2+           2+ Patellar 2+           Down PLANTAR Down      SENSORY:  Light touch: Normal throughout.  Sharp touch: Normal throughout except mildly decreased to left lateral foot; reports of tingling throughout bilateral feet  Vibration: Normal throughout.   Temperature: Normal throughout.  Joint Position: Normal throughout.         Diagnostic Data " Reviewed:  Component      Latest Ref Rng & Units 11/3/2021 11/3/2021          11:46 AM 11:46 AM   WBC      3.90 - 12.70 K/uL  8.41   RBC      4.00 - 5.40 M/uL  4.47   HEMOGLOBIN      12.0 - 16.0 g/dL  12.9   HEMATOCRIT      37.0 - 48.5 %  40.5   MCV      82 - 98 fL  91   MCH      27.0 - 31.0 pg  28.9   MCHC      32.0 - 36.0 g/dL  31.9 (L)   RDW      11.5 - 14.5 %  12.2   Platelets      150 - 450 K/uL  274   MPV      9.2 - 12.9 fL  10.8   Immature Granulocytes      0.0 - 0.5 %  0.1   Gran # (ANC)      1.8 - 7.7 K/uL  4.8   Immature Grans (Abs)      0.00 - 0.04 K/uL  0.01   Lymph #      1.0 - 4.8 K/uL  2.7   Mono #      0.3 - 1.0 K/uL  0.7   Eos #      0.0 - 0.5 K/uL  0.2   Baso #      0.00 - 0.20 K/uL  0.07   nRBC      0 /100 WBC  0   Gran %      38.0 - 73.0 %  57.5   Lymph %      18.0 - 48.0 %  31.6   Mono %      4.0 - 15.0 %  8.1   Eosinophil %      0.0 - 8.0 %  1.9   Basophil %      0.0 - 1.9 %  0.8   Differential Method        Automated   Anti Sm Antibody      0.00 - 0.99 Ratio 0.07    Anti-Sm Interpretation      Negative Negative    Anti-SSA Antibody      0.00 - 0.99 Ratio 0.06 0.06   Anti-SSA Interpretation      Negative Negative Negative   Anti-SSB Antibody      0.00 - 0.99 Ratio 0.07 0.07   Anti-SSB Interpretation      Negative Negative Negative   ds DNA Ab      Negative 1:10 Negative 1:10    Anti Sm/RNP Antibody      0.00 - 0.99 Ratio 0.07    Anti-Sm/RNP Interpretation      Negative Negative    Protein, Serum      6.0 - 8.4 g/dL  7.6   Albumin grams/dl      3.35 - 5.55 g/dL  4.26   Alpha-1 grams/dl      0.17 - 0.41 g/dL  0.40   Alpha-2      0.43 - 0.99 g/dL  0.77   Beta      0.50 - 1.10 g/dL  1.09   Gamma      0.67 - 1.58 g/dL  1.09   Vitamin B-12      210 - 950 pg/mL  580   Vitamin B6      5 - 50 ug/L  3 (L)   Thiamine      38 - 122 ug/L  59   Rheumatoid Factor      0.0 - 15.0 IU/mL  <13.0   ZAIRA Screen      Negative <1:80  Positive (A)   Immunofix Interp.        SEE COMMENT   Pathologist Review         Review completed   Pathologist Review Peripheral Smear        REVIEWED   ZAIRA PATTERN 1        Speckled   ZAIRA Titer 1        1:80   Pathologist Interpretation HELENE        REVIEWED   Pathologist Interpretation SPE        REVIEWED     MRI L-spine 6/2021:     LUMBAR DISC LEVELS:     T12-L1: No disc herniation or significant posterior osteophytic ridging.  Mild left facet hypertrophy.  No significant spinal canal or foraminal stenosis.     L1-L2: Minimal disc bulge.  Mild bilateral facet hypertrophy.  Ligamentum flavum thickening.  Minimal narrowing of the bilateral lateral recesses.  No significant spinal canal or foraminal stenosis.     L2-L3: Mild disc bulge.  Mild bilateral facet hypertrophy.  Ligamentum flavum thickening.  Mild narrowing of the bilateral lateral recesses.  No significant spinal canal or foraminal stenosis.     L3-L4: Mild disc bulge.  Mild-moderate bilateral facet hypertrophy.  Ligamentum flavum thickening.  Susceptibility at the midline anterior and posterior aspect of the thecal sac.  Mild narrowing of the bilateral lateral recesses.  Moderate spinal canal stenosis, increased from prior study.  Mild bilateral foraminal stenosis.     L4-L5: Laminectomy.  Right facetectomy.  Mild disc bulge and disc height loss.  Mild narrowing of the lateral recesses without significant overall spinal canal stenosis.  Mild bilateral foraminal stenosis.     L5-S1: Interval right hemilaminectomy and right medial facetectomy.  Mild disc bulge.  Small central protrusion.  Mild narrowing of the right lateral recess which is significantly decreased from preoperative study.  No significant spinal canal stenosis.  Moderate right and mild-moderate left foraminal stenosis, unchanged.  The left L5 nerve root may be contacted by disc as it exits the foramen     Impression:  1. Postsurgical changes of L4 laminectomy and L5 hemilaminectomy with right medial facetectomies.  Small amount of subdural or intrathecal susceptibility,  favored to represent blood products, most pronounced at the L3-4 level where there is moderate spinal canal stenosis which is increased from prior MRI.  2.  Additional multilevel spondylosis is similar to prior study with decreased spinal canal stenosis at the surgical levels.  3. Multilevel foraminal stenosis is similar to previous study, greatest on the right at L5-S1 where it is moderate.  The left L5 nerve root may be contacted by disc as it exits the foramen.        EMG/NCS 12/9/2022:     Impression: This is an abnormal EMG of the right upper and left lower extremities.  The findings are as follows:  Subacute on chronic, mild right median mononeuropathy across the wrist (carpal tunnel syndrome) with slight active denervation.  Chronic, mild, left L5 radiculopathy with slight active denervation.  There is no evidence of any other focal neuropathy, peripheral neuropathy, plexopathy or radiculopathy on this study.           Assessment and Plan:      Problem List Items Addressed This Visit          Neuro    Lumbar radiculopathy - Primary    Current Assessment & Plan     Patient is a 63 y/o female that presents for f/u   Reports of ongoing tingling and intermittent burning pain to bilateral feet.   - overall improved   Pt was told she had new onset of diabetic neuropathy ~ 10 years ago.   Pt also with extensive spine history with associated pain   - she is following back and spine and pain management.   Recent A1c noted to be elevated (7.6%)   - recommend strict blood glucose control   EMG/NCS reported with mild, chronic left L5 radiculopathy; no neuropathy reported  Previous serologies noted with low B6 levels and was supplemented. last B6 level was elevated. She is no longer supplementing  Pt on Gabapentin 800 mg BID and reports aid from this dose.   - discussed dosing options at length along with importance of taking this medication as prescribed.    - Consider adding low midday dose for tolerance if  needed.  Continue to f/u with other specificities regarding back pain  Constinue outpt PT         Carpal tunnel syndrome of right wrist    Current Assessment & Plan     Noted on recent EMG/NCS  Pt reports symptoms are not very bothersome  recommend she wear right wrist splint nightly          Neuropathy       Endocrine    Vitamin D deficiency    Current Assessment & Plan     Most recent level noted to be low   Advised pt to communicate this with her PCP                            Important to note, also  has a past medical history of Arthritis, Asthma, Cataract, Diabetes mellitus, type 2 (2001), Gastric ulcer, Hyperlipidemia, Hypertension, Mitral valve prolapse, RYLAN (obstructive sleep apnea), Snores, and Thyroid disease.          The patient will return to clinic in 12 months.    All questions were answered and patient is comfortable with the plan.         Thank you very much for the opportunity to assist in this patient's care.    If you have any questions or concerns, please do not hesitate to contact me at any time.      Sincerely,     TAL Owen  [facility] [place]         I spent a total of 43 minutes on the day of the visit.This includes face to face time and non-face to face time preparing to see the patient (eg, review of tests), Obtaining and/or reviewing separately obtained history, Documenting clinical information in the electronic or other health record, Independently interpreting resultsand communicating results to the patient/family/caregiver, or Care coordination.

## 2022-12-15 ENCOUNTER — TELEPHONE (OUTPATIENT)
Dept: OPTOMETRY | Facility: CLINIC | Age: 62
End: 2022-12-15
Payer: COMMERCIAL

## 2022-12-15 NOTE — TELEPHONE ENCOUNTER
Unable to LVM to assist pt. In rescheduling appt.     ----- Message from Heidi Kelly sent at 12/15/2022 12:43 PM CST -----  Type:  Sooner Appointment Request    Caller is requesting a sooner appointment.   Name of Caller:  pt   When is the first available appointment?  1/4    Best Call Back Number:  [phone] (home)    Additional Information:  sts she thought she had a ride to her appt today and ride is no longer free .she wants to be seen this year since she met her insurance requirements, please advise thank you

## 2022-12-15 NOTE — TELEPHONE ENCOUNTER
Spoke with pt. And r/s appt. She requested any provider with an appt before the end of the year. Dr. Sweet was the only one at this time with the next available appt.      ----- Message from Viksah De La Cruz sent at 12/15/2022  1:20 PM CST -----  Type:  Patient Returning Call    Who Called:  Pt  Who Left Message for Patient:  Brooke  Does the patient know what this is regarding?:    Best Call Back Number:  [phone]  Additional Information:  Please advise -- Thank you

## 2022-12-19 LAB — PYRIDOXAL SERPL-MCNC: 7 UG/L (ref 5–50)

## 2022-12-20 ENCOUNTER — TELEPHONE (OUTPATIENT)
Dept: NEUROLOGY | Facility: CLINIC | Age: 62
End: 2022-12-20
Payer: COMMERCIAL

## 2022-12-20 NOTE — TELEPHONE ENCOUNTER
----- Message from LUIS MANUEL Washington sent at 12/20/2022  4:24 PM CST -----  Please inform the pt that her B6 level is within range. If she should start to take supplements again I suggest she take them every other day instead of daily.

## 2023-03-06 ENCOUNTER — OFFICE VISIT (OUTPATIENT)
Dept: RHEUMATOLOGY | Facility: CLINIC | Age: 63
End: 2023-03-06
Payer: COMMERCIAL

## 2023-03-06 VITALS
HEART RATE: 72 BPM | HEIGHT: 68 IN | SYSTOLIC BLOOD PRESSURE: 136 MMHG | WEIGHT: 180 LBS | DIASTOLIC BLOOD PRESSURE: 75 MMHG | BODY MASS INDEX: 27.28 KG/M2

## 2023-03-06 DIAGNOSIS — R76.8 ANA POSITIVE: Primary | ICD-10-CM

## 2023-03-06 DIAGNOSIS — M54.16 LUMBAR RADICULOPATHY: ICD-10-CM

## 2023-03-06 DIAGNOSIS — M19.90 CHRONIC INFLAMMATORY ARTHRITIS: ICD-10-CM

## 2023-03-06 DIAGNOSIS — M35.9 UNDIFFERENTIATED CONNECTIVE TISSUE DISEASE: ICD-10-CM

## 2023-03-06 DIAGNOSIS — M54.12 CERVICAL RADICULOPATHY: ICD-10-CM

## 2023-03-06 DIAGNOSIS — M50.30 DDD (DEGENERATIVE DISC DISEASE), CERVICAL: ICD-10-CM

## 2023-03-06 PROCEDURE — 3078F DIAST BP <80 MM HG: CPT | Mod: CPTII,S$GLB,, | Performed by: INTERNAL MEDICINE

## 2023-03-06 PROCEDURE — 99999 PR PBB SHADOW E&M-EST. PATIENT-LVL III: CPT | Mod: PBBFAC,,, | Performed by: INTERNAL MEDICINE

## 2023-03-06 PROCEDURE — 99214 OFFICE O/P EST MOD 30 MIN: CPT | Mod: S$GLB,,, | Performed by: INTERNAL MEDICINE

## 2023-03-06 PROCEDURE — 3008F PR BODY MASS INDEX (BMI) DOCUMENTED: ICD-10-PCS | Mod: CPTII,S$GLB,, | Performed by: INTERNAL MEDICINE

## 2023-03-06 PROCEDURE — 99999 PR PBB SHADOW E&M-EST. PATIENT-LVL III: ICD-10-PCS | Mod: PBBFAC,,, | Performed by: INTERNAL MEDICINE

## 2023-03-06 PROCEDURE — 3008F BODY MASS INDEX DOCD: CPT | Mod: CPTII,S$GLB,, | Performed by: INTERNAL MEDICINE

## 2023-03-06 PROCEDURE — 3075F PR MOST RECENT SYSTOLIC BLOOD PRESS GE 130-139MM HG: ICD-10-PCS | Mod: CPTII,S$GLB,, | Performed by: INTERNAL MEDICINE

## 2023-03-06 PROCEDURE — 3078F PR MOST RECENT DIASTOLIC BLOOD PRESSURE < 80 MM HG: ICD-10-PCS | Mod: CPTII,S$GLB,, | Performed by: INTERNAL MEDICINE

## 2023-03-06 PROCEDURE — 99214 PR OFFICE/OUTPT VISIT, EST, LEVL IV, 30-39 MIN: ICD-10-PCS | Mod: S$GLB,,, | Performed by: INTERNAL MEDICINE

## 2023-03-06 PROCEDURE — 3075F SYST BP GE 130 - 139MM HG: CPT | Mod: CPTII,S$GLB,, | Performed by: INTERNAL MEDICINE

## 2023-03-06 RX ORDER — PIROXICAM 20 MG/1
20 CAPSULE ORAL DAILY
Qty: 30 CAPSULE | Refills: 3 | Status: SHIPPED | OUTPATIENT
Start: 2023-03-06 | End: 2023-09-25 | Stop reason: SDUPTHER

## 2023-03-06 ASSESSMENT — ROUTINE ASSESSMENT OF PATIENT INDEX DATA (RAPID3)
FATIGUE SCORE: 2.2
PAIN SCORE: 5
MDHAQ FUNCTION SCORE: 1.4
PATIENT GLOBAL ASSESSMENT SCORE: 3
TOTAL RAPID3 SCORE: 4.22
PSYCHOLOGICAL DISTRESS SCORE: 1

## 2023-03-06 NOTE — PROGRESS NOTES
Subjective:       Patient ID: Aaron Garcia is a 62 y.o. female.    Chief Complaint: Disease Management    Follow up: 61 with a pos oksana and negative panel, cervical spine fusion sucessful with minimal neuropathy. She started having spasm lower ext.  MRI shows 2021 L5 contact  likely cause of symptoms. Pt had  a nerve conduction that stated L5 neuropathy and a dexa that shows osteopenia,  No malar rask dry mouth, she has peeling of her lips since covid.  She started with gastric pain with passing out with  Normal BM.   Patient complains of arthralgias and myalgias for which has been present for a few years. Pain is located in multiple joints, both shoulder(s), both elbow(s), both wrist(s), both MCP(s): 1st, 2nd, 3rd, 4th and 5th, both PIP(s): 1st, 2nd, 3rd, 4th and 5th, both DIP(s): 1st and 2nd, both hip(s), both knee(s) and both MTP(s): 1st, 2nd, 3rd, 4th and 5th, is described as aching, pulsating, shooting and throbbing, and is constant, moderate .  Associated symptoms include: crepitation, decreased range of motion, edema, effusion, tenderness and warmth.       Review of Systems   Constitutional:  Positive for activity change and chills. Negative for appetite change, diaphoresis and unexpected weight change.   HENT:  Negative for congestion, dental problem, ear discharge, ear pain, facial swelling, mouth sores, nosebleeds, postnasal drip, rhinorrhea, sinus pressure, sneezing, sore throat, tinnitus and voice change.    Eyes:  Negative for photophobia, pain, discharge, redness and itching.   Respiratory:  Negative for apnea, cough, chest tightness, shortness of breath and wheezing.    Cardiovascular:  Positive for leg swelling. Negative for chest pain and palpitations.   Gastrointestinal:  Positive for abdominal pain. Negative for abdominal distention, constipation, diarrhea, nausea and vomiting.   Endocrine: Negative for cold intolerance, heat intolerance, polydipsia and polyuria.   Genitourinary:  Negative  "for decreased urine volume, difficulty urinating, flank pain, frequency, hematuria and urgency.   Musculoskeletal:  Positive for arthralgias, back pain, gait problem, neck pain and neck stiffness.   Skin:  Positive for rash. Negative for pallor and wound.   Allergic/Immunologic: Negative for immunocompromised state.   Neurological:  Positive for weakness. Negative for dizziness, tremors and numbness.   Hematological:  Negative for adenopathy. Does not bruise/bleed easily.   Psychiatric/Behavioral:  Negative for sleep disturbance. The patient is not nervous/anxious.        Objective:   /75   Pulse 72   Ht 5' 8" (1.727 m)   Wt 81.6 kg (180 lb)   LMP 03/13/2017   BMI 27.37 kg/m²      Physical Exam   Constitutional: She is oriented to person, place, and time.   HENT:   Head: Normocephalic and atraumatic.   Mouth/Throat: Oropharynx is clear and moist.   Eyes: Pupils are equal, round, and reactive to light.   Neck: No thyromegaly present.   Cardiovascular: Normal rate, regular rhythm and normal heart sounds. Exam reveals no gallop and no friction rub.   No murmur heard.  Pulmonary/Chest: Breath sounds normal. She has no wheezes. She has no rales. She exhibits no tenderness.   Abdominal: There is no abdominal tenderness. There is no rebound and no guarding.   Musculoskeletal:         General: Swelling, tenderness and deformity present.      Right shoulder: Tenderness present.      Left shoulder: Tenderness present.      Right elbow: Normal.      Left elbow: Normal.      Cervical back: Neck supple.      Right knee: Swelling and effusion present. Tenderness present.      Left knee: Effusion present.   Lymphadenopathy:     She has no cervical adenopathy.   Neurological: She is alert and oriented to person, place, and time. She has normal sensation. Gait normal.   Reflex Scores:       Patellar reflexes are 3+ on the right side and 3+ on the left side.  Skin: No rash noted. No erythema. No pallor.   Psychiatric: Mood " and affect normal.   Vitals reviewed.      Right Side Rheumatological Exam     Examination finds the elbow, 1st MCP, 2nd MCP, 3rd MCP, 4th MCP and 5th MCP normal.    The patient is tender to palpation of the shoulder and knee    She has swelling of the knee    The patient has an enlarged wrist, 1st PIP, 2nd PIP, 3rd PIP, 4th PIP and 5th PIP    Shoulder Exam   Tenderness Location: acromion    Range of Motion   Active abduction:  abnormal   Adduction: abnormal  Sensation: normal    Knee Exam   Tenderness Location: medial joint line  Patellofemoral Crepitus: positive  Effusion: positive  Sensation: normal    Hip Exam   Tenderness Location: no tenderness  Sensation: normal    Elbow/Wrist Exam   Tenderness Location: no tenderness  Sensation: normal    Left Side Rheumatological Exam     Examination finds the elbow, 1st MCP, 2nd MCP, 3rd MCP, 4th MCP and 5th MCP normal.    The patient is tender to palpation of the shoulder.    The patient has an enlarged wrist, 1st PIP, 2nd PIP, 3rd PIP, 4th PIP and 5th PIP.    Shoulder Exam   Tenderness Location: acromion    Range of Motion   Active abduction:  abnormal   Sensation: normal    Knee Exam   Tenderness Location: lateral joint line and medial joint line    Patellofemoral Crepitus: positive  Effusion: positive  Sensation: normal    Hip Exam   Tenderness Location: no tenderness  Sensation: normal    Elbow/Wrist Exam   Sensation: normal      Back/Neck Exam   General Inspection   Gait: normal       Tenderness Right paramedian tenderness of the Lower C-Spine and Lower L-Spine.Left paramedian tenderness of the Upper C-Spine and Lower L-Spine.     Results for orders placed or performed in visit on 12/13/22   VITAMIN B6   Result Value Ref Range    Vitamin B6 7 5 - 50 ug/L     Collected 5/4/2022 13:49     0 Result Notes      Component 10 mo ago   HLA B27 Interpretation SAPE: 207   TAQ: [id number]    B27 Testing Date 05/06/2022 10:32 AM    HLA B27 Result Negative    Comment: These tests are  not cleared or approved by the U.S. FDA, but such   approval is not required since this laboratory is certified by CLIA   (#[id number]) and the American Society for Histocompatibility and   Immunogenetics (10-5-FP-02-01) to perform high complexity testing.     [facility]   Laboratory is under the direction of LY Ballard MD, MONTEZ.   Details of test procedures may be obtained by calling the Laboratory   at  [phone].   Test performed using immunofluorescent detection - SSO. This test was   developed by [employer], a division of [employer] and   its performance characteristics determined by the [facility].         View Full Report           Result Care Coordination      Patient Communication     Add Comments   Seen Back to Top            Contains abnormal data HEMOGLOBIN A1C  Order: [id number]   suggestion  Result Information displayed in this report will not trend and may not trigger automated decision support.      Ref Range & Units 2 mo ago   HGB A1C% <5.7 % of total Hgb 7.4 High     Comment: For someone without known diabetes, a hemoglobin A1c   value of 6.5% or greater indicates that they may have   diabetes and this should be confirmed with a follow-up   test.     For someone with known diabetes, a value <7% indicates   that their diabetes is well controlled and a value   greater than or equal to 7% indicates suboptimal   control. A1c targets should be individualized based on   duration of diabetes, age, comorbid conditions, and   other considerations.     Currently, no consensus exists regarding use of   hemoglobin A1c for diagnosis of diabetes for children.        Specimen Collected: 12/21/22 14:33 Last Resulted: 12/22/22 07:39   Received From: [facility]  Result Received: 03/06/23 11:13    View Encounter      Received Information   Contains abnormal data LIPID  PANEL  Order: [id number]   suggestion  Result Information displayed in this report will not trend and may not trigger automated decision support.      Ref Range & Units 2 mo ago   Cholesterol <200 mg/dL 122    HDL > OR = 50 mg/dL 46 Low     Triglycerides <150 mg/dL 118    LDL, Calculated mg/dL (calc) 56    Comment: Reference range: <100     Desirable range <100 mg/dL for primary prevention;     <70 mg/dL for patients with CHD or diabetic patients   with > or = 2 CHD risk factors.     LDL-C is now calculated using the Fernando   calculation, which is a validated novel method providing   better accuracy than the Friedewald equation in the   estimation of LDL-C.   Christopher SS et al. GANGA. 2013;310(19): 4712-1359   (http://education.[employer]/faq/AZM326)   CHOL/HDL Ratio <5.0 (calc) 2.7    NON HDL Cholesterol <130 mg/dL (calc) 76    Comment: For patients with diabetes plus 1 major ASCVD risk   factor, treating to a non-HDL-C goal of <100 mg/dL   (LDL-C of <70 mg/dL) is considered a therapeutic   option.   Specimen Collected: 12/21/22 14:33 Last Resulted: 12/22/22 07:39   Received From: [facility]  Result Received: 03/06/23 11:13    View Encounter      Received Information  Microalbumin/Creatinine Ratio, Random Urine  Order: [id number]   suggestion  Information displayed in this report may not trend or trigger automated decision support.      Ref Range & Units 2 mo ago   Creatinine, Urine 20 - 275 mg/dL 215    Microalbumin, Urine See Note: mg/dL 1.0    Comment: Reference Range:     Reference Range   Not established   Microalbumin/Creatinine Ratio, Urine <30 mcg/mg creat 5    Comment:    The ADA defines abnormalities in albumin   excretion as follows:     Albuminuria Category        Result (mcg/mg creatinine)     Normal to Mildly increased   <30   Moderately increased            Severely increased           > OR = 300     The ADA recommends that at least two of three   specimens collected  within a 3-6 month period be   abnormal before considering a patient to be   within a diagnostic category.   Specimen Collected: 12/21/22 14:30 Last Resulted: 12/23/22 01:38   Received From: [facility]  Result Received: 03/06/23 11:13    View Encounter      Received Information  VITAMIN B6  Order: [id number]  Collected 12/13/2022 10:40     1 Result Note    1 Follow-up Encounter       Component Ref Range & Units 2 mo ago   Vitamin B6 5 - 50 ug/L 7    Comment: This test was developed and the performance   characteristics determined by [facility].   It has not been cleared or approved by the FDA.   The laboratory is regulated under CLIA as qualified to   perform high-complexity testing. This test is used for   patient testing purposes. It should not be regarded   as investigational or for research.     Test performed at [facility],   [address] , [place]  [address]     [phone]   Jazmin Montanez MD, PhD - Medical Director         View Full Report         Impression    Osteopenia     Electronically signed by Giles Nicole MD on 1/10/2023 3:18 PM  Narrative    REASON FOR EXAM: [Z78.0]-Asymptomatic menopausal state     TECHNICAL FACTORS: Readings are obtained over the lumbar spine and bilateral hips using a GE Lunar Prodigy scanner.     COMPARISON: None     LUMBAR SPINE FINDINGS: Bone mineral density from L1, L2 and L4 is 1.197 g/cm2. The T score is 0.1 . The Z score is 0.7 .     RIGHT HIP FINDINGS: Bone mineral density over the femoral neck is 0.875 g/cm2. The T score is -1.2. The Z score is -0.8. Bone mineral density over the total hip is 0.940 g/cm2. The T score is -0.5. The Z score is -0.5.     LEFT HIP FINDINGS: Bone mineral density over the femoral neck is 0.963 g/cm2. The T score is -0.5. The Z score is -0.2. Bone mineral density over the total hip is 1.037 g/cm2. The T score is 0.2 . The Z score is 0.2 .     The 10-year probability of fracture utilizing  FRAX is 3.3% for a major osteoporotic fracture and 0.2% for a hip fracture.  Procedure Note    Giles Nicole MD - 01/10/2023   Formatting of this note might be different from the original.   REASON FOR EXAM: [Z78.0]-Asymptomatic menopausal state     TECHNICAL FACTORS: Readings are obtained over the lumbar spine and bilateral hips using a GE Lunar Prodigy scanner.     COMPARISON: None     LUMBAR SPINE FINDINGS: Bone mineral density from L1, L2 and L4 is 1.197 g/cm2. The T score is 0.1 . The Z score is 0.7 .     RIGHT HIP FINDINGS: Bone mineral density over the femoral neck is 0.875 g/cm2. The T score is -1.2. The Z score is -0.8. Bone mineral density over the total hip is 0.940 g/cm2. The T score is -0.5. The Z score is -0.5.     LEFT HIP FINDINGS: Bone mineral density over the femoral neck is 0.963 g/cm2. The T score is -0.5. The Z score is -0.2. Bone mineral density over the total hip is 1.037 g/cm2. The T score is 0.2 . The Z score is 0.2 .     The 10-year probability of fracture utilizing FRAX is 3.3% for a major osteoporotic fracture and 0.2% for a hip fracture.     IMPRESSION:   Osteopenia     Electronically signed by Giles Nicole MD on 1/10/2023 3:18 PM  Exam End: 01/10/23 13:46    Specimen Collected: 01/10/23 15:17 Last Resulted: 01/10/23 15:18   Received From: [facility]  Result Received: 03/06/23 11:13      MRI Lumbar Spine Without Contrast  Order: [id number]  Status: Final result     Visible to patient: Yes (seen)     Next appt: 03/14/2023 at 10:40 AM in Pain Medicine ([facility])     0 Result Notes  Details    Reading Physician Reading Date Result Priority   Baldo Starkey MD  [phone] 6/24/2021 STAT     Narrative & Impression  EXAMINATION:  MRI LUMBAR SPINE WITHOUT CONTRAST     CLINICAL HISTORY:  LE weakness s/p laminectomy.  Status post minimally invasive right L5-S1 and L4-5 hemilaminectomy and medial facetectomies.  L5-S1 micro discectomy at 14:00  today (06/24/2021). Pt started with difficulty walking and inability to put weight onto left leg, left leg not able to support her.     TECHNIQUE:  Multiplanar, multisequence MR images were acquired from the thoracolumbar junction to the sacrum without the administration of contrast.     COMPARISON:  MRI lumbar spine without contrast, 04/20/2021.     FINDINGS:  NOMENCLATURE: Five lumbar type vertebral bodies.     CORD/CAUDA EQUINA: Conus has normal size and signal and ends at a normal level of L1-L2.  Small amount of susceptibility either in the subdural or intrathecal space ventrally and dorsally at the L3-4 level.     ALIGNMENT: Trace retrolisthesis of L4 on L5.     BONES: Interval L4 laminectomy and right L5 hemilaminectomy.  Medial right L4-5 and L5-S1 facetectomies.  Susceptibility artifact is again seen in the left L5 pedicle.  No aggressive bone marrow signal.     PARASPINAL AREA: Fluid and edema along the surgical tract and in the right dorsal paraspinal muscles at the surgical levels.     LUMBAR DISC LEVELS:     T12-L1: No disc herniation or significant posterior osteophytic ridging.  Mild left facet hypertrophy.  No significant spinal canal or foraminal stenosis.     L1-L2: Minimal disc bulge.  Mild bilateral facet hypertrophy.  Ligamentum flavum thickening.  Minimal narrowing of the bilateral lateral recesses.  No significant spinal canal or foraminal stenosis.     L2-L3: Mild disc bulge.  Mild bilateral facet hypertrophy.  Ligamentum flavum thickening.  Mild narrowing of the bilateral lateral recesses.  No significant spinal canal or foraminal stenosis.     L3-L4: Mild disc bulge.  Mild-moderate bilateral facet hypertrophy.  Ligamentum flavum thickening.  Susceptibility at the midline anterior and posterior aspect of the thecal sac.  Mild narrowing of the bilateral lateral recesses.  Moderate spinal canal stenosis, increased from prior study.  Mild bilateral foraminal stenosis.     L4-L5: Laminectomy.   Right facetectomy.  Mild disc bulge and disc height loss.  Mild narrowing of the lateral recesses without significant overall spinal canal stenosis.  Mild bilateral foraminal stenosis.     L5-S1: Interval right hemilaminectomy and right medial facetectomy.  Mild disc bulge.  Small central protrusion.  Mild narrowing of the right lateral recess which is significantly decreased from preoperative study.  No significant spinal canal stenosis.  Moderate right and mild-moderate left foraminal stenosis, unchanged.  The left L5 nerve root may be contacted by disc as it exits the foramen     Impression:     1. Postsurgical changes of L4 laminectomy and L5 hemilaminectomy with right medial facetectomies.  Small amount of subdural or intrathecal susceptibility, favored to represent blood products, most pronounced at the L3-4 level where there is moderate spinal canal stenosis which is increased from prior MRI.  2.  Additional multilevel spondylosis is similar to prior study with decreased spinal canal stenosis at the surgical levels.  3. Multilevel foraminal stenosis is similar to previous study, greatest on the right at L5-S1 where it is moderate.  The left L5 nerve root may be contacted by disc as it exits the foramen.        Electronically signed by: Baldo Starkey MD  Date:                                            06/24/2021  Time:                                           16:03          Jose Walker [id number] 12/9/2022 9:57 AM      4 of 4     Summary:  Nerve conduction studies were performed on the right upper and left lower extremities.  Right median sensory response was prolonged with a reduced amplitude.  Right ulnar, left superficial peroneal and left sural sensory responses were normal in amplitude and latency.  Left median motor response was prolonged with a reduced amplitude and slowing of the velocity.  Right ulnar, left peroneal and left tibial motor responses were normal in amplitude, latency and  velocity.  Right median minimal F-wave latency was prolonged.  Left ulnar and right peroneal and tibial minimal F-wave latencies were normal.  Needle EMG was performed in the right upper extremity and left lower extremity as well as the paraspinal muscles.  Active denervation was present in the left tibialis anterior and extensor hallucis longus muscles.  Slight active denervation was noted in the right abductor pollicis brevis muscle.  Motor units were large, long and poly phasic with reduced recruitment in the right abductor pollicis brevis, left extensor hallucis longus and left tensor fascia nii muscles.  Motor units were enlarged and poly phasic with reduced recruitment in left tibialis anterior.  All other motor unit morphology and recruitment patterns were normal.     Impression: This is an abnormal EMG of the right upper and left lower extremities.  The findings are as follows:  Subacute on chronic, mild right median mononeuropathy across the wrist (carpal tunnel syndrome) with slight active denervation.  Chronic, mild, left L5 radiculopathy with slight active denervation.  There is no evidence of any other focal neuropathy, peripheral neuropathy, plexopathy or radiculopathy on this study.        Thank you for referring to the [facility] in [place]. Please feel free to contact the clinic if you have any further questions regarding this study or report.          Assessment:       1. ZAIRA positive    2. Chronic inflammatory arthritis    3. DDD (degenerative disc disease), cervical    4. Cervical radiculopathy    5. Lumbar radiculopathy    6. Undifferentiated connective tissue disease              Plan:         Aaron was seen today for disease management.    Diagnoses and all orders for this visit:    ZAIRA positive  -     Sedimentation rate; Future  -     Comprehensive Metabolic Panel; Future  -     CBC Auto Differential; Future  -     ZARIA Screen w/Reflex; Future  -      C-REACTIVE PROTEIN; Future    Chronic inflammatory arthritis  -     Sedimentation rate; Future  -     Comprehensive Metabolic Panel; Future  -     CBC Auto Differential; Future  -     ZAIRA Screen w/Reflex; Future  -     C-REACTIVE PROTEIN; Future    DDD (degenerative disc disease), cervical  -     Sedimentation rate; Future  -     Comprehensive Metabolic Panel; Future  -     CBC Auto Differential; Future  -     ZAIRA Screen w/Reflex; Future  -     C-REACTIVE PROTEIN; Future  -     piroxicam (FELDENE) 20 MG capsule; Take 1 capsule (20 mg total) by mouth once daily.    Cervical radiculopathy  -     Sedimentation rate; Future  -     Comprehensive Metabolic Panel; Future  -     CBC Auto Differential; Future  -     ZAIRA Screen w/Reflex; Future  -     C-REACTIVE PROTEIN; Future  -     piroxicam (FELDENE) 20 MG capsule; Take 1 capsule (20 mg total) by mouth once daily.    Lumbar radiculopathy  -     Sedimentation rate; Future  -     Comprehensive Metabolic Panel; Future  -     CBC Auto Differential; Future  -     ZAIRA Screen w/Reflex; Future  -     C-REACTIVE PROTEIN; Future  -     piroxicam (FELDENE) 20 MG capsule; Take 1 capsule (20 mg total) by mouth once daily.    Undifferentiated connective tissue disease  -     Sedimentation rate; Future  -     Comprehensive Metabolic Panel; Future  -     CBC Auto Differential; Future  -     ZAIRA Screen w/Reflex; Future  -     C-REACTIVE PROTEIN; Future        1. Add feldene  2. Avoid steroids  3. Consider radiofrequency ablation  4, gabapentin is sedating presently.  5. F/u 6 month      More than 50% of the  30 minute encounter was spent face to face counseling the patient regarding current status and future plan of care as well as side effects  of the medications. All questions were answered to patient's satisfaction also includes  non-face to face time preparing to see the patient (eg, review of tests), Obtaining and/or reviewing separately obtained history, Documenting clinical  information in the electronic or other health record, Independently interpreting results

## 2023-03-16 ENCOUNTER — OFFICE VISIT (OUTPATIENT)
Dept: ENDOCRINOLOGY | Facility: CLINIC | Age: 63
End: 2023-03-16
Payer: COMMERCIAL

## 2023-03-16 VITALS
WEIGHT: 185.44 LBS | OXYGEN SATURATION: 99 % | HEIGHT: 66 IN | DIASTOLIC BLOOD PRESSURE: 70 MMHG | HEART RATE: 62 BPM | BODY MASS INDEX: 29.8 KG/M2 | SYSTOLIC BLOOD PRESSURE: 132 MMHG

## 2023-03-16 DIAGNOSIS — J32.1 FRONTAL SINUSITIS, UNSPECIFIED CHRONICITY: ICD-10-CM

## 2023-03-16 DIAGNOSIS — Z79.4 TYPE 2 DIABETES MELLITUS WITHOUT COMPLICATION, WITH LONG-TERM CURRENT USE OF INSULIN: Primary | ICD-10-CM

## 2023-03-16 DIAGNOSIS — R51.9 SINUS HEADACHE: ICD-10-CM

## 2023-03-16 DIAGNOSIS — J32.1 FRONTAL SINUSITIS, UNSPECIFIED CHRONICITY: Primary | ICD-10-CM

## 2023-03-16 DIAGNOSIS — R07.9 CHEST PAIN, UNSPECIFIED TYPE: ICD-10-CM

## 2023-03-16 DIAGNOSIS — E78.49 OTHER HYPERLIPIDEMIA: ICD-10-CM

## 2023-03-16 DIAGNOSIS — M25.511 PAIN IN JOINT OF RIGHT SHOULDER: ICD-10-CM

## 2023-03-16 DIAGNOSIS — I10 ESSENTIAL HYPERTENSION: ICD-10-CM

## 2023-03-16 DIAGNOSIS — E11.9 TYPE 2 DIABETES MELLITUS WITHOUT COMPLICATION, WITH LONG-TERM CURRENT USE OF INSULIN: Primary | ICD-10-CM

## 2023-03-16 PROCEDURE — 1159F MED LIST DOCD IN RCRD: CPT | Mod: CPTII,S$GLB,, | Performed by: NURSE PRACTITIONER

## 2023-03-16 PROCEDURE — 3008F PR BODY MASS INDEX (BMI) DOCUMENTED: ICD-10-PCS | Mod: CPTII,S$GLB,, | Performed by: NURSE PRACTITIONER

## 2023-03-16 PROCEDURE — 3008F BODY MASS INDEX DOCD: CPT | Mod: CPTII,S$GLB,, | Performed by: NURSE PRACTITIONER

## 2023-03-16 PROCEDURE — 3075F PR MOST RECENT SYSTOLIC BLOOD PRESS GE 130-139MM HG: ICD-10-PCS | Mod: CPTII,S$GLB,, | Performed by: NURSE PRACTITIONER

## 2023-03-16 PROCEDURE — 99999 PR PBB SHADOW E&M-EST. PATIENT-LVL V: CPT | Mod: PBBFAC,,, | Performed by: NURSE PRACTITIONER

## 2023-03-16 PROCEDURE — 99999 PR PBB SHADOW E&M-EST. PATIENT-LVL V: ICD-10-PCS | Mod: PBBFAC,,, | Performed by: NURSE PRACTITIONER

## 2023-03-16 PROCEDURE — 99214 PR OFFICE/OUTPT VISIT, EST, LEVL IV, 30-39 MIN: ICD-10-PCS | Mod: S$GLB,,, | Performed by: NURSE PRACTITIONER

## 2023-03-16 PROCEDURE — 3078F PR MOST RECENT DIASTOLIC BLOOD PRESSURE < 80 MM HG: ICD-10-PCS | Mod: CPTII,S$GLB,, | Performed by: NURSE PRACTITIONER

## 2023-03-16 PROCEDURE — 3075F SYST BP GE 130 - 139MM HG: CPT | Mod: CPTII,S$GLB,, | Performed by: NURSE PRACTITIONER

## 2023-03-16 PROCEDURE — 3078F DIAST BP <80 MM HG: CPT | Mod: CPTII,S$GLB,, | Performed by: NURSE PRACTITIONER

## 2023-03-16 PROCEDURE — 1159F PR MEDICATION LIST DOCUMENTED IN MEDICAL RECORD: ICD-10-PCS | Mod: CPTII,S$GLB,, | Performed by: NURSE PRACTITIONER

## 2023-03-16 PROCEDURE — 99214 OFFICE O/P EST MOD 30 MIN: CPT | Mod: S$GLB,,, | Performed by: NURSE PRACTITIONER

## 2023-03-16 RX ORDER — PSEUDOEPHEDRINE HYDROCHLORIDE 60 MG/1
60 TABLET ORAL EVERY 6 HOURS PRN
Qty: 20 TABLET | Refills: 0 | Status: SHIPPED | OUTPATIENT
Start: 2023-03-16 | End: 2023-03-26

## 2023-03-16 NOTE — PROGRESS NOTES
+  Subjective:       Patient ID: Aaron Garcia is a 62 y.o. female.    Chief Complaint: Diabetes      HPI: Pt is a 60 y.o. AAF  with a diagnosis of Type 2 diabetes mellitus diagnosed approximately 2001, as well as chronic conditions pending review including HTN, HLP. States A1C I n 6% range until appros 2017.  Other pertinent medical and social information noted includes, but not limited to: chronic pain r/t cervical radiculopathy affecting UA.      Interim Events: March 16, 2023 Fell about a month ago playing soccer with grandson---hurt back--so she has been dealing with that since--also with R rotator cuff--apparently ER told her she does or might have rotator cuff tear.  States glucoses have been running high because she has chronic sinus issues.  Altaf downloaded.     Current DM meds:   Toujeo 60 qhs, Novolog 12-12-10 plus ss        Failed DM meds:  metformin, diarrhea. tradjenta 5 mg, glipizide 5 mg bid    Statin: rosuvasatin 10 mg        Not tolerated statin : na   ACE/ARB:benazepril 40 mg         Not tolerated ACE/ARB: na   Known Diabetic complications:     March 9, 2022:  No acute events:  Using Freestyle Libre2 . Forgot sensor in car. No c/o hypoglycemia. C/o poor sleep. Lays in bed sometimes til 4 am.  On Elavil, states gabapentin use to help, and also zanaflex.  Does have 2 daughters and their children move in so stress with that.  Concerned with DM complications.Reassured.            Review of Systems   Constitutional:  Negative for activity change, fatigue and fever.   HENT:  Positive for ear discharge (fullness), postnasal drip and sinus pressure/congestion. Negative for hearing loss and trouble swallowing. Mouth dryness: couple times a week,  radiates left arm and neck.  pain scale of 5 and lasts 15 minutes.        Green/yellow thick phlegm x 2 weeks.    Eyes:  Negative for photophobia and visual disturbance.        Last Eye Exam: Fall 2021    Respiratory:  Negative for cough and shortness of  breath.    Cardiovascular:  Positive for chest pain. Negative for palpitations.   Gastrointestinal:  Positive for constipation. Negative for abdominal pain and diarrhea.   Genitourinary:  Negative for difficulty urinating, frequency and urgency.   Musculoskeletal:  Positive for back pain (pending lumbar fusion). Negative for arthralgias and myalgias.   Integumentary:  Negative for rash and wound.   Neurological:  Negative for weakness and numbness.   Psychiatric/Behavioral:  Negative for sleep disturbance. The patient is not nervous/anxious.        Objective:      Physical Exam  Constitutional:       Appearance: Normal appearance. She is obese.   HENT:      Head: Normocephalic and atraumatic.      Comments: R maxillary sinus diminished transillumination when compard to left. R eye somewhat teary. Scant edmea noted.   Frontal and maxillary sinus tender to touch.      Nose:      Right Turbinates: Enlarged.      Left Turbinates: Enlarged.      Right Sinus: Frontal sinus tenderness present.      Left Sinus: Frontal sinus tenderness present.      Mouth/Throat:      Mouth: Mucous membranes are moist.      Pharynx: Oropharynx is clear.   Eyes:      Extraocular Movements: Extraocular movements intact.      Conjunctiva/sclera: Conjunctivae normal.      Pupils: Pupils are equal, round, and reactive to light.   Neck:      Vascular: No carotid bruit.   Cardiovascular:      Rate and Rhythm: Normal rate and regular rhythm.      Pulses: Normal pulses.      Heart sounds: Normal heart sounds.   Pulmonary:      Effort: Pulmonary effort is normal.      Breath sounds: Normal breath sounds.   Musculoskeletal:         General: Normal range of motion.      Cervical back: Normal range of motion and neck supple.      Right lower leg: No edema.      Left lower leg: No edema.      Comments: Feet:no open wounds or calluses. Good pedal pulses. +2 bilaterally,   Compression hose on--no vibratory sensation   Lymphadenopathy:      Cervical: No  "cervical adenopathy.   Skin:     General: Skin is warm and dry.   Neurological:      General: No focal deficit present.      Mental Status: She is alert and oriented to person, place, and time.   Psychiatric:         Mood and Affect: Mood normal.         Behavior: Behavior normal.         Thought Content: Thought content normal.         Judgment: Judgment normal.         Ht 5' 6" (1.676 m)   Wt 84.1 kg (185 lb 6.5 oz)   LMP 03/13/2017   BMI 29.93 kg/m²     Hemoglobin A1C   Date Value Ref Range Status   03/03/2022 7.6 (H) 4.0 - 5.6 % Final     Comment:     ADA Screening Guidelines:  5.7-6.4%  Consistent with prediabetes  >or=6.5%  Consistent with diabetes    High levels of fetal hemoglobin interfere with the HbA1C  assay. Heterozygous hemoglobin variants (HbS, HgC, etc)do  not significantly interfere with this assay.   However, presence of multiple variants may affect accuracy.     10/26/2021 7.5 (H) 4.0 - 5.6 % Final     Comment:     ADA Screening Guidelines:  5.7-6.4%  Consistent with prediabetes  >or=6.5%  Consistent with diabetes    High levels of fetal hemoglobin interfere with the HbA1C  assay. Heterozygous hemoglobin variants (HbS, HgC, etc)do  not significantly interfere with this assay.   However, presence of multiple variants may affect accuracy.     07/20/2021 7.3 (H) 4.0 - 5.6 % Final     Comment:     ADA Screening Guidelines:  5.7-6.4%  Consistent with prediabetes  >or=6.5%  Consistent with diabetes    High levels of fetal hemoglobin interfere with the HbA1C  assay. Heterozygous hemoglobin variants (HbS, HgC, etc)do  not significantly interfere with this assay.   However, presence of multiple variants may affect accuracy.         Chemistry        Component Value Date/Time     05/04/2022 1349    K 4.4 05/04/2022 1349     05/04/2022 1349    CO2 23 05/04/2022 1349    BUN 13 05/04/2022 1349    CREATININE 0.8 05/04/2022 1349     (H) 05/04/2022 1349        Component Value Date/Time    " CALCIUM 9.4 05/04/2022 1349    ALKPHOS 75 05/04/2022 1349    AST 18 05/04/2022 1349    ALT 15 05/04/2022 1349    BILITOT 0.7 05/04/2022 1349    ESTGFRAFRICA >60.0 05/04/2022 1349    EGFRNONAA >60.0 05/04/2022 1349          Lab Results   Component Value Date    CHOL 115 (L) 07/20/2021     Lab Results   Component Value Date    HDL 37 (L) 07/20/2021     Lab Results   Component Value Date    LDLCALC 60.0 (L) 07/20/2021     Lab Results   Component Value Date    TRIG 90 07/20/2021     Lab Results   Component Value Date    CHOLHDL 32.2 07/20/2021     Lab Results   Component Value Date    MICALBCREAT Unable to calculate 07/20/2021     Lab Results   Component Value Date    TSH 1.060 05/04/2022     Vit D, 25-Hydroxy   Date Value Ref Range Status   05/04/2022 21 (L) 30 - 96 ng/mL Final     Comment:     Vitamin D deficiency.........<10 ng/mL                              Vitamin D insufficiency......10-29 ng/mL       Vitamin D sufficiency........> or equal to 30 ng/mL  Vitamin D toxicity............>100 ng/mL           Assessment:       1. Type 2 diabetes mellitus without complication, with long-term current use of insulin        2. Essential hypertension        3. Other hyperlipidemia        4. Pain in joint of right shoulder  Ambulatory referral/consult to Orthopedics      5. Sinus headache        6. Chest pain, unspecified type  Ambulatory referral/consult to Cardiology      7. Frontal sinusitis, unspecified chronicity                  Plan:     --on benazepril 10  --on rosuvastatin 10     Cont 60 units of Toujeo at bedtime  Novolog 15 with meals plus ss      Zyrtec and claritin,  sudaphed.   .ORDERS 03/16/2023\     Cons cardiology   Cons orthopedics     2 mo --no labs.

## 2023-03-16 NOTE — PATIENT INSTRUCTIONS
COnt Toujeo 60 units at bedtime    Novolog 15 with meals plus  ss.      Novolog/or Humalog/ USE ONLY    Dose is based on level of glucose before meals only (meaning no food or drink for 4 hours). This dose may be added to a standard meal dose if ordered    150-200=+2  201-250=+4  251-300=+6  301-350=+8  351-400=+10      Pseudoephedrine 60 gm q4  Claritin or zyrtec daily.

## 2023-03-23 DIAGNOSIS — M25.511 RIGHT SHOULDER PAIN, UNSPECIFIED CHRONICITY: Primary | ICD-10-CM

## 2023-03-27 ENCOUNTER — HOSPITAL ENCOUNTER (OUTPATIENT)
Dept: RADIOLOGY | Facility: HOSPITAL | Age: 63
Discharge: HOME | End: 2023-03-27
Attending: NURSE PRACTITIONER
Payer: COMMERCIAL

## 2023-03-27 DIAGNOSIS — M25.511 BILATERAL SHOULDER PAIN, UNSPECIFIED CHRONICITY: Primary | ICD-10-CM

## 2023-03-27 DIAGNOSIS — M25.511 BILATERAL SHOULDER PAIN, UNSPECIFIED CHRONICITY: ICD-10-CM

## 2023-03-27 DIAGNOSIS — M25.512 BILATERAL SHOULDER PAIN, UNSPECIFIED CHRONICITY: Primary | ICD-10-CM

## 2023-03-27 DIAGNOSIS — M25.512 BILATERAL SHOULDER PAIN, UNSPECIFIED CHRONICITY: ICD-10-CM

## 2023-03-27 PROCEDURE — 73030 XR SHOULDER TRAUMA 3 VIEW BILATERAL: ICD-10-PCS | Mod: 26,,, | Performed by: RADIOLOGY

## 2023-03-27 PROCEDURE — 73030 X-RAY EXAM OF SHOULDER: CPT | Mod: 26,,, | Performed by: RADIOLOGY

## 2023-03-27 PROCEDURE — 73030 X-RAY EXAM OF SHOULDER: CPT | Mod: TC,50,PO

## 2023-04-11 DIAGNOSIS — M51.36 DDD (DEGENERATIVE DISC DISEASE), LUMBAR: ICD-10-CM

## 2023-04-12 DIAGNOSIS — M51.36 DDD (DEGENERATIVE DISC DISEASE), LUMBAR: ICD-10-CM

## 2023-04-12 RX ORDER — HYDROCODONE BITARTRATE AND ACETAMINOPHEN 10; 325 MG/1; MG/1
1 TABLET ORAL 3 TIMES DAILY PRN
Qty: 90 TABLET | Refills: 0 | Status: SHIPPED | OUTPATIENT
Start: 2023-04-12 | End: 2023-04-17 | Stop reason: SDUPTHER

## 2023-04-12 RX ORDER — HYDROCODONE BITARTRATE AND ACETAMINOPHEN 10; 325 MG/1; MG/1
1 TABLET ORAL 3 TIMES DAILY PRN
Qty: 90 TABLET | Refills: 0 | Status: CANCELLED | OUTPATIENT
Start: 2023-04-12 | End: 2023-05-12

## 2023-04-17 ENCOUNTER — OFFICE VISIT (OUTPATIENT)
Dept: OPTOMETRY | Facility: CLINIC | Age: 63
End: 2023-04-17
Payer: COMMERCIAL

## 2023-04-17 ENCOUNTER — OFFICE VISIT (OUTPATIENT)
Dept: PAIN MEDICINE | Facility: CLINIC | Age: 63
End: 2023-04-17
Payer: COMMERCIAL

## 2023-04-17 VITALS — SYSTOLIC BLOOD PRESSURE: 123 MMHG | HEART RATE: 67 BPM | DIASTOLIC BLOOD PRESSURE: 66 MMHG

## 2023-04-17 DIAGNOSIS — M54.16 LUMBAR RADICULOPATHY: Primary | ICD-10-CM

## 2023-04-17 DIAGNOSIS — H52.203 HYPEROPIA WITH ASTIGMATISM AND PRESBYOPIA, BILATERAL: ICD-10-CM

## 2023-04-17 DIAGNOSIS — M50.30 DDD (DEGENERATIVE DISC DISEASE), CERVICAL: ICD-10-CM

## 2023-04-17 DIAGNOSIS — E11.9 DIABETES MELLITUS TYPE 2 WITHOUT RETINOPATHY: Primary | ICD-10-CM

## 2023-04-17 DIAGNOSIS — M51.36 DDD (DEGENERATIVE DISC DISEASE), LUMBAR: ICD-10-CM

## 2023-04-17 DIAGNOSIS — H43.393 VITREOUS FLOATERS, BILATERAL: ICD-10-CM

## 2023-04-17 DIAGNOSIS — Z13.5 GLAUCOMA SCREENING: ICD-10-CM

## 2023-04-17 DIAGNOSIS — M54.12 CERVICAL RADICULOPATHY: ICD-10-CM

## 2023-04-17 DIAGNOSIS — H52.03 HYPEROPIA WITH ASTIGMATISM AND PRESBYOPIA, BILATERAL: ICD-10-CM

## 2023-04-17 DIAGNOSIS — H52.4 HYPEROPIA WITH ASTIGMATISM AND PRESBYOPIA, BILATERAL: ICD-10-CM

## 2023-04-17 DIAGNOSIS — Z79.891 OPIOID CONTRACT EXISTS: ICD-10-CM

## 2023-04-17 DIAGNOSIS — E11.36 CATARACT ASSOCIATED WITH TYPE 2 DIABETES MELLITUS: ICD-10-CM

## 2023-04-17 PROCEDURE — 3074F SYST BP LT 130 MM HG: CPT | Mod: CPTII,S$GLB,, | Performed by: PHYSICIAN ASSISTANT

## 2023-04-17 PROCEDURE — 99999 PR PBB SHADOW E&M-EST. PATIENT-LVL IV: ICD-10-PCS | Mod: PBBFAC,,, | Performed by: OPTOMETRIST

## 2023-04-17 PROCEDURE — 99214 OFFICE O/P EST MOD 30 MIN: CPT | Mod: S$GLB,,, | Performed by: PHYSICIAN ASSISTANT

## 2023-04-17 PROCEDURE — 99999 PR PBB SHADOW E&M-EST. PATIENT-LVL III: CPT | Mod: PBBFAC,,, | Performed by: PHYSICIAN ASSISTANT

## 2023-04-17 PROCEDURE — 4010F PR ACE/ARB THEARPY RXD/TAKEN: ICD-10-PCS | Mod: CPTII,S$GLB,, | Performed by: PHYSICIAN ASSISTANT

## 2023-04-17 PROCEDURE — 2023F DILAT RTA XM W/O RTNOPTHY: CPT | Mod: CPTII,S$GLB,, | Performed by: OPTOMETRIST

## 2023-04-17 PROCEDURE — 1159F PR MEDICATION LIST DOCUMENTED IN MEDICAL RECORD: ICD-10-PCS | Mod: CPTII,S$GLB,, | Performed by: OPTOMETRIST

## 2023-04-17 PROCEDURE — 1160F RVW MEDS BY RX/DR IN RCRD: CPT | Mod: CPTII,S$GLB,, | Performed by: PHYSICIAN ASSISTANT

## 2023-04-17 PROCEDURE — 4010F PR ACE/ARB THEARPY RXD/TAKEN: ICD-10-PCS | Mod: CPTII,S$GLB,, | Performed by: OPTOMETRIST

## 2023-04-17 PROCEDURE — 1159F MED LIST DOCD IN RCRD: CPT | Mod: CPTII,S$GLB,, | Performed by: PHYSICIAN ASSISTANT

## 2023-04-17 PROCEDURE — 99999 PR PBB SHADOW E&M-EST. PATIENT-LVL III: ICD-10-PCS | Mod: PBBFAC,,, | Performed by: PHYSICIAN ASSISTANT

## 2023-04-17 PROCEDURE — 3078F DIAST BP <80 MM HG: CPT | Mod: CPTII,S$GLB,, | Performed by: PHYSICIAN ASSISTANT

## 2023-04-17 PROCEDURE — 92014 PR EYE EXAM, EST PATIENT,COMPREHESV: ICD-10-PCS | Mod: S$GLB,,, | Performed by: OPTOMETRIST

## 2023-04-17 PROCEDURE — 2023F PR DILATED RETINAL EXAM W/O EVID OF RETINOPATHY: ICD-10-PCS | Mod: CPTII,S$GLB,, | Performed by: OPTOMETRIST

## 2023-04-17 PROCEDURE — 1160F PR REVIEW ALL MEDS BY PRESCRIBER/CLIN PHARMACIST DOCUMENTED: ICD-10-PCS | Mod: CPTII,S$GLB,, | Performed by: PHYSICIAN ASSISTANT

## 2023-04-17 PROCEDURE — 3074F PR MOST RECENT SYSTOLIC BLOOD PRESSURE < 130 MM HG: ICD-10-PCS | Mod: CPTII,S$GLB,, | Performed by: PHYSICIAN ASSISTANT

## 2023-04-17 PROCEDURE — 1159F MED LIST DOCD IN RCRD: CPT | Mod: CPTII,S$GLB,, | Performed by: OPTOMETRIST

## 2023-04-17 PROCEDURE — 92014 COMPRE OPH EXAM EST PT 1/>: CPT | Mod: S$GLB,,, | Performed by: OPTOMETRIST

## 2023-04-17 PROCEDURE — 3078F PR MOST RECENT DIASTOLIC BLOOD PRESSURE < 80 MM HG: ICD-10-PCS | Mod: CPTII,S$GLB,, | Performed by: PHYSICIAN ASSISTANT

## 2023-04-17 PROCEDURE — 4010F ACE/ARB THERAPY RXD/TAKEN: CPT | Mod: CPTII,S$GLB,, | Performed by: OPTOMETRIST

## 2023-04-17 PROCEDURE — 1159F PR MEDICATION LIST DOCUMENTED IN MEDICAL RECORD: ICD-10-PCS | Mod: CPTII,S$GLB,, | Performed by: PHYSICIAN ASSISTANT

## 2023-04-17 PROCEDURE — 92015 DETERMINE REFRACTIVE STATE: CPT | Mod: S$GLB,,, | Performed by: OPTOMETRIST

## 2023-04-17 PROCEDURE — 99999 PR PBB SHADOW E&M-EST. PATIENT-LVL IV: CPT | Mod: PBBFAC,,, | Performed by: OPTOMETRIST

## 2023-04-17 PROCEDURE — 99214 PR OFFICE/OUTPT VISIT, EST, LEVL IV, 30-39 MIN: ICD-10-PCS | Mod: S$GLB,,, | Performed by: PHYSICIAN ASSISTANT

## 2023-04-17 PROCEDURE — 4010F ACE/ARB THERAPY RXD/TAKEN: CPT | Mod: CPTII,S$GLB,, | Performed by: PHYSICIAN ASSISTANT

## 2023-04-17 PROCEDURE — 92015 PR REFRACTION: ICD-10-PCS | Mod: S$GLB,,, | Performed by: OPTOMETRIST

## 2023-04-17 RX ORDER — HYDROCODONE BITARTRATE AND ACETAMINOPHEN 10; 325 MG/1; MG/1
1 TABLET ORAL 3 TIMES DAILY PRN
Qty: 90 TABLET | Refills: 0 | Status: SHIPPED | OUTPATIENT
Start: 2023-06-11 | End: 2023-07-11

## 2023-04-17 RX ORDER — TIZANIDINE 4 MG/1
4 TABLET ORAL EVERY 8 HOURS PRN
Qty: 90 TABLET | Refills: 2 | Status: SHIPPED | OUTPATIENT
Start: 2023-04-17 | End: 2023-07-19 | Stop reason: SDUPTHER

## 2023-04-17 RX ORDER — HYDROCODONE BITARTRATE AND ACETAMINOPHEN 10; 325 MG/1; MG/1
1 TABLET ORAL 3 TIMES DAILY PRN
Qty: 90 TABLET | Refills: 0 | Status: SHIPPED | OUTPATIENT
Start: 2023-05-12 | End: 2023-06-11

## 2023-04-17 RX ORDER — HYDROCODONE BITARTRATE AND ACETAMINOPHEN 10; 325 MG/1; MG/1
1 TABLET ORAL 3 TIMES DAILY PRN
Qty: 90 TABLET | Refills: 0 | Status: SHIPPED | OUTPATIENT
Start: 2023-07-11 | End: 2023-08-08 | Stop reason: SDUPTHER

## 2023-04-17 NOTE — PATIENT INSTRUCTIONS
"DRY EYES -- BURNING OR CARLOS MANUEL SYMPTOMS:  Use Over The Counter artificial tears as needed for dry eye symptoms.   Some common brands include:  Systane, Optive, Refresh, and Thera-Tears.  These drops can be used as frequently as desired, but may be most helpful use during long periods of concentrated work.  For example, reading / working at the computer. Start with 3-4x per day.     Nighttime Ophthalmic gel or ointments are available: Refresh PM, Genteal, and Lacrilube.    Avoid drops that "get redness out" (Visine, Murine, Clear Eyes), as these may contain medication that could further irritate the eyes, especially with chronic use.    ALLERGY EYES -- ITCHING SYMPTOMS:  Over the counter medications include--Pataday, Zaditor, and Alaway.  Use as directed 1-2 drops daily for symptoms of itching / watering eyes.  These drops will not help for dry eye or exposure symptoms.    REDNESS RELIEF:  Lumify---is a good redness reliever that will not cause irritation if used chronically.        FLASHES / FLOATERS / POSTERIOR VITREOUS DETACHMENT    Call the clinic if you have any further changes in symptoms.  Including:  Increased numbers of floaters or flashing lights, dimness or darkness that moves through or stays constant in your vision, or any pain in the eye (s).    You may sometimes see small specks or clouds moving in your field of vision.  They are called FLOATERS.  You can often see them when looking at a plain background, like a blank wall or blue narda.  Floaters are actually tiny clumps of gel or cells inside the VITREOUS, the clear jelly-like fluid that fills the inside of your eye.    While these objects look like they are in front of your eye, they are actually floating inside.  What you see are the shadows they cast on the RETINA, the nerve layer at the back of the eye that senses light and allows you to see.      POSTERIOR VITREOUS DETACHMENT    The appearance of new floaters may be alarming.  If you suddenly " develop new floaters, you should contact your eye care professional  right away.    The retina can tear if the shrinking vitreous pulls away from the wall of the eye.  This sometimes causes a small amount of bleeding in the eye that may appear as new floaters.    A torn retina is always a serious problem, since it can lead to a retinal detachment.  You should see your eye care professional as soon as possible if:    even one new floater appears suddenly;  you see sudden flashes of light;  you notice other symptoms, like the loss of side vision, or a curtain closes down in your vision        POSTERIOR VITREOUS DETACHMENT is more common for people who:    are nearsighted;  have had cataract surgery;  have had YAG laser surgery of the eye;  have had inflammation inside the eye;  are over age 60.      While some floaters may remain visible, many of them will fade over time and become less noticeable.  Even if you've had some floaters for years, you should have your eyes checked as soon as possible if you notice new ones.    FLASHING LIGHTS    When the vitreous gel rubs or pulls on the retina, you may see what look like flashing lights or lightning streaks.  These flashes can appear off and on for several weeks or months.      Some people experience flashes of light that appear as jagged lines or heat waves in both eyes, lasting 10-20 minutes.  These flashes are caused by a spasm of blood vessels in the brain, which is called a migraine.    If a headache follows these flashes, it's called a migraine headache.  If   no headache occurs, these flashes are called Ophthalmic or Ocular Migraine.           DIABETES AND THE EYE / DIABETIC RETINOPATHY    Diabetic retinopathy is a condition occurring in persons with diabetes, which causes progressive damage to the retina, the light sensitive lining at the back of the eye. It is a serious sight-threatening complication of diabetes.    Diabetic retinopathy is the result of  damage to the tiny blood vessels that nourish the retina. They leak blood and other fluids that cause swelling of retinal tissue and clouding of vision. The condition usually affects both eyes. The longer a person has diabetes, the more likely they will develop diabetic retinopathy. If left untreated, diabetic retinopathy can cause blindness.  There are two basic types of diabetic retinopathy:    Background or nonproliferative diabetic retinopathy (NPDR)  Nonproliferative diabetic retinopathy (NPDR) is the earliest stage of diabetic retinopathy. With this condition, damaged blood vessels in the retina begin to leak extra fluid and small amounts of blood into the eye. Sometimes, deposits of cholesterol or other fats from the blood may leak into the retina. Many people with diabetes have mild NPDR, which usually does not affect their vision. However, if their vision is affected, it is the result of macular edema and macular ischemia.    If vision is affected due to macular changes, a consult with a Retina Specialist may be advised.  This is an ophthalmologist that treats retina conditions, including diabetic retinopathy.     Proliferative diabetic retinopathy (PDR)  Proliferative diabetic retinopathy (PDR) mainly occurs when many of the blood vessels in the retina close, preventing enough blood flow. In an attempt to supply blood to the area where the original vessels closed, the retina responds by growing new blood vessels. This is called neovascularization. However, these new blood vessels are abnormal and do not supply the retina with proper blood flow. The new vessels are also often accompanied by scar tissue that may cause the retina to wrinkle or detach. PDR may cause more severe vision loss than NPDR because it can affect both central and peripheral vision.     A patient diagnosed with proliferative diabetic eye disease will be referred to a retinal specialist for consultation.    Often there are no visual  symptoms in the early stages of diabetic retinopathy. That is why our eye care professionals recommend that everyone with diabetes have a comprehensive dilated eye examination once a year. Early detection and treatment can limit the potential for significant vision loss from diabetic retinopathy.

## 2023-04-17 NOTE — PROGRESS NOTES
HPI    Routine dm eye exam-zhu-12/21    Pt denies any blurred vision. Needing updated glasses rx. No flashes or   floaters. BSL controlled/ fluctuates. Complains of OD itching,     Hemoglobin A1C       Date                     Value               Ref Range             Status                03/03/2022               7.6 (H)             4.0 - 5.6 %           Final                 07/20/2021               7.3 (H)             4.0 - 5.6 %           Final                Last edited by Alee Del Castillo on 4/17/2023  1:54 PM.            Assessment /Plan     For exam results, see Encounter Report.    Diabetes mellitus type 2 without retinopathy    Vitreous floaters, bilateral    Cataract associated with type 2 diabetes mellitus    Glaucoma screening    Hyperopia with astigmatism and presbyopia, bilateral      No stu/ no csme, gave Diabetic Retinopathy info, advise tight control glucose, BP---Advise annual dilated fundus exam  RD precautions given and reviewed. Patient knows to call/ message if any further changes in symptoms occur.  Early NS changes, not vis sig for consult, gave copy, fill prn   Not suspect   Updated specs rx, gave copy, fill prn     Discussed and educated patient on current findings /plan.  RTC 1 year, prn if any changes / issues

## 2023-04-20 NOTE — PROGRESS NOTES
This note was completed with dictation software and grammatical errors may exist.    CC:  Neck and back pain    HPI: The patient is a 62-year-old woman with a history of diabetes, hypertension who presents in referral from [facility] for neck pain radiating into the arms and low back pain radiating into the left leg.  She returns in follow-up today with neck pain and low back pain.  She does report some worsening pain in her low back radiating down her left buttock, posterior thigh and calf.  She states that this has been worse with standing and walking.  It has been bothering more since she fell in February playing soccer with her grandson.  She states she has been working on her blood glucose which has been fluctuating significantly so that she can have an epidural steroid injection.  She states that although her hemoglobin A1c is not bad, she is still having very high and very low readings.  She is also reporting bilateral shoulder pain and would like to see Orthopedics.    Pain intervention history: She had undergone 3 epidurals for low back pain with only up to 3 weeks of relief each time.  She is status post C7-T1 cervical interlaminar epidural steroid injection on 1/29/16 with 50% relief.   She is status post C7-T1 cervical interlaminar epidural steroid injection on 3/1/16 with mild additional relief.  She is status post C7-T1 cervical interlaminar epidural steroid injection on 5/13/16 with 70-75% relief.  She is status post right C3, 4, 5 and 6 medial branch radiofrequency ablation on 7/15/16 with 30-60% relief.  She is status post L5/S1 interlaminar epidural steroid injection on 5/16/17 with 50% relief.  She is status post L5/S1 interlaminar epidural steroid injection on 12/29/17 with excellent relief lasting 2 weeks, now reporting minimal relief of her low back and buttock pain but ongoing 100% relief of her right leg pain.  She is status post C7-T1 cervical interlaminar epidural  steroid injection on 3/14/18 with 75% relief but this was not long lasting.  She is status post right L4/5 and L5/S1 transforaminal epidural steroid injection on 01/11/2021 with Dr Luna with 0% relief.   She is status post caudal epidural steroid injection on 11/01/2021 with 70% relief lasting over 1 month.   She is status post caudal epidural steroid injection on 04/05/2022 with 60-65% relief.     Spine surgeries: Right L4/5 and L5/S1 laminectomy on 06/24/2021 Dr Hester.     Antineuropathics:  Gabapentin 100 mg 3 times daily  NSAIDs:  Physical therapy:  [facility] in [place]  Antidepressants:  Amitriptyline 25 mg nightly  Muscle relaxers:  Tizanidine 4 mg 3 times daily as needed  Opioids:  Hydrocodone-acetaminophen 10/325 mg 3 times daily as needed  Antiplatelets/Anticoagulants:    ROS: She reports headaches, hoarse voice, joint stiffness, joint swelling, back pain, difficulty sleeping, anxiety and loss of balance.  Balance of review of systems is negative.    Medical, surgical, family and social history reviewed elsewhere in record.    Medications/Allergies: See med card    Vitals:    04/17/23 1317   BP: 123/66   Pulse: 67   PainSc:   4   PainLoc: Back         Physical exam:  Gen: A and O x3, pleasant, well-groomed  Skin: No rashes or obvious lesions  HEENT: PERRLA, no obvious deformities on ears or in canals.Trachea midline.  CVS: Regular rate and rhythm, normal palpable pulses.  Resp: Clear to auscultation bilaterally, no wheezes or rales.  Abdomen: Soft, NT/ND.  Musculoskeletal: No antalgic gait.     Neuro:  Upper extremities: 4/5 strength bilaterally  Lower extremities:  4/5 strength bilaterally   Reflexes: Brachioradialis 2+, Bicep 2+, Tricep 2+.  Patellar and Achilles reflexes 2+ bilaterally.  Sensory: Intact and symmetrical to light touch and pinprick in C2-T1 dermatomes bilaterally.  Osuna's negative.  Romberg positive for imbalance, abnormal tandem gait test.    Cervical Spine:  Cervical spine:  Range of motion is mildly reduced with flexion extension and lateral rotation without increased pain.  Spurling's maneuver causes neck pain to either side.    Myofascial exam: No tenderness to palpation to the cervical paraspinous muscles.    Lumbar spine:  Range of motion is moderately reduced with flexion, extension and oblique extension with increased low back pain during each maneuver.  Khanh's test is negative bilaterally.  Straight leg raise causes left leg pain.  Internal and external rotation of hip is negative bilaterally.  Myofascial exam:  Mild tenderness to palpation to the lumbar paraspinous muscles.    Imagin/24/15 C-spine MRI  1. C3-4 left posterior paracentral disk extrusion causing left paracentral spinal cord compression and severe left foraminal stenosis.  2. C4-5 based disk extrusion with spinal cord impingement and moderate bilateral foraminal stenosis.  3. C5-6 posterior central disk extrusion with spinal cord compression and severe bilateral foraminal stenosis.  4. C6-7 mild disk bulge with severe bilateral foraminal stenosis.  5. Solitary mildly enlarged left submandibular lymph node of uncertain significance.    4/10/17 MRI lumbar spine  T12-L1: No central canal or neuroforaminal stenosis. No disc protrusion or extrusion.  L1-L2: There is ligamentum flavum thickening and facet arthropathy.  No central canal or neuroforaminal stenosis. No disc protrusion or extrusion.  L2-L3: There is ligamentum flavum thickening and facet arthropathy.  No central canal or neuroforaminal stenosis. No disc protrusion or extrusion.  L3-L4: There is ligamentum flavum thickening and facet arthropathy present. No central canal or neuroforaminal stenosis. No disc protrusion or extrusion.  L4-L5: There is a broad disc bulge which extends into both neural foramina.  There is ligamentum flavum thickening and facet arthropathy.  There is right lateral recess stenosis.  There is abutment of the descending right  L5 nerve in the right lateral recess.  Please correlate clinically for symptoms referable to the right L5 nerve.  There is mild central canal stenosis.  There is mild left neuroforaminal stenosis.  No right neuroforaminal stenosis.  L5-S1: There is a broad central/right paracentral disc protrusion which encroaches upon the descending right S1 nerve in the right lateral recess.  Please correlate clinically for symptoms referable to the right S1 nerve.  There is mild-moderate right neuroforaminal stenosis.  No left neuroforaminal stenosis.  No central canal stenosis.  There is ligamentum flavum thickening and facet arthropathy.  There is a broad left extraforaminal disc protrusion at L5-S1 which abuts the exited left L5 nerve in the left extraforaminal soft tissues (series 6 image 17), nonspecific.  Please correlate clinically for symptoms referable to the left L5 nerve.    05/28/2019 MRI cervical spine  C2-3: No disc herniation, spinal canal narrowing, or neuroforaminal narrowing.  C3-4: There is moderate broad-based posterior disc osteophyte complex formation with left paracentral predominance.  This along with facet arthropathy contributes to mild spinal canal narrowing and moderate left neuroforaminal narrowing.  C4-5: There is moderate broad-based posterior disc osteophyte complex formation with left paracentral prominence.  This along with facet arthropathy contributes to mild bilateral neuroforaminal narrowing and mild spinal canal narrowing.  C5-6: Moderate broad-based posterior disc osteophyte complex formation and facet arthropathy result in moderate spinal canal narrowing and moderate right and mild left neuroforaminal narrowing.  C6-7: Moderate broad-based posterior disc osteophyte complex formation and facet arthropathy result in mild spinal canal narrowing along with moderate right and severe left neuroforaminal narrowing.  C7-T1: Minimal broad-based posterior disc osteophyte complex formation and  bilateral facet arthropathy result in mild bilateral neuroforaminal narrowing.      05/28/2019 MRI lumbar spine  T12-L1: No disc herniation, spinal canal narrowing, or neuroforaminal narrowing.  L1-2: No disc herniation, spinal canal narrowing, or neuroforaminal narrowing.  L2-3: No disc herniation, spinal canal narrowing, or neuroforaminal narrowing.  L3-4: No disc herniation, spinal canal narrowing, or neuroforaminal narrowing.  L4-5: There is moderate generalized disc bulging and bilateral facet arthropathy, which result in mild spinal canal narrowing and mild bilateral neuroforaminal narrowing.  L5-S1: There is a small right paracentral disc protrusion.  This effaces the right lateral recess and may impinge upon the descending right S1 nerve root.  Bilateral facet arthropathy is present and there is resultant overall mild spinal canal narrowing.  Moderate right and mild left neuroforaminal narrowing are also present.    06/24/2021 MRI lumbar spine  NOMENCLATURE: Five lumbar type vertebral bodies.     CORD/CAUDA EQUINA: Conus has normal size and signal and ends at a normal level of L1-L2.  Small amount of susceptibility either in the subdural or intrathecal space ventrally and dorsally at the L3-4 level.     ALIGNMENT: Trace retrolisthesis of L4 on L5.     BONES: Interval L4 laminectomy and right L5 hemilaminectomy.  Medial right L4-5 and L5-S1 facetectomies.  Susceptibility artifact is again seen in the left L5 pedicle.  No aggressive bone marrow signal.     PARASPINAL AREA: Fluid and edema along the surgical tract and in the right dorsal paraspinal muscles at the surgical levels.     LUMBAR DISC LEVELS:     T12-L1: No disc herniation or significant posterior osteophytic ridging.  Mild left facet hypertrophy.  No significant spinal canal or foraminal stenosis.     L1-L2: Minimal disc bulge.  Mild bilateral facet hypertrophy.  Ligamentum flavum thickening.  Minimal narrowing of the bilateral lateral recesses.  No  significant spinal canal or foraminal stenosis.     L2-L3: Mild disc bulge.  Mild bilateral facet hypertrophy.  Ligamentum flavum thickening.  Mild narrowing of the bilateral lateral recesses.  No significant spinal canal or foraminal stenosis.     L3-L4: Mild disc bulge.  Mild-moderate bilateral facet hypertrophy.  Ligamentum flavum thickening.  Susceptibility at the midline anterior and posterior aspect of the thecal sac.  Mild narrowing of the bilateral lateral recesses.  Moderate spinal canal stenosis, increased from prior study.  Mild bilateral foraminal stenosis.     L4-L5: Laminectomy.  Right facetectomy.  Mild disc bulge and disc height loss.  Mild narrowing of the lateral recesses without significant overall spinal canal stenosis.  Mild bilateral foraminal stenosis.     L5-S1: Interval right hemilaminectomy and right medial facetectomy.  Mild disc bulge.  Small central protrusion.  Mild narrowing of the right lateral recess which is significantly decreased from preoperative study.  No significant spinal canal stenosis.  Moderate right and mild-moderate left foraminal stenosis, unchanged.  The left L5 nerve root may be contacted by disc as it exits the foramen    Assessment:  The patient is a 62-year-old woman with a history of diabetes, hypertension who presents in referral from [facility] for neck pain radiating into the arms and low back pain radiating into the left leg.     1. Lumbar radiculopathy        2. DDD (degenerative disc disease), lumbar        3. Cervical radiculopathy        4. DDD (degenerative disc disease), cervical        5. Opioid contract exists            Plan:  1. Dr. Galan provided prescriptions for hydrocodone-acetaminophen 10/325 mg up to 3 times daily as needed for pain.  I have reviewed the [place] Board of Pharmacy website and there are no abberancies.  I refilled tizanidine.  2. She will follow-up with orthopedics for her shoulder pain.  3. She can  contact us in the future to schedule a caudal epidural steroid injection.  4. Follow-up in 3 months or sooner as needed.

## 2023-05-09 ENCOUNTER — OFFICE VISIT (OUTPATIENT)
Dept: ORTHOPEDICS | Facility: CLINIC | Age: 63
End: 2023-05-09
Payer: COMMERCIAL

## 2023-05-09 DIAGNOSIS — M25.812 IMPINGEMENT OF LEFT SHOULDER: Primary | ICD-10-CM

## 2023-05-09 PROCEDURE — 20610 DRAIN/INJ JOINT/BURSA W/O US: CPT | Mod: LT,S$GLB,, | Performed by: NURSE PRACTITIONER

## 2023-05-09 PROCEDURE — 4010F PR ACE/ARB THEARPY RXD/TAKEN: ICD-10-PCS | Mod: CPTII,S$GLB,, | Performed by: NURSE PRACTITIONER

## 2023-05-09 PROCEDURE — 20610 LARGE JOINT ASPIRATION/INJECTION: L SUBACROMIAL BURSA: ICD-10-PCS | Mod: LT,S$GLB,, | Performed by: NURSE PRACTITIONER

## 2023-05-09 PROCEDURE — 1159F PR MEDICATION LIST DOCUMENTED IN MEDICAL RECORD: ICD-10-PCS | Mod: CPTII,S$GLB,, | Performed by: NURSE PRACTITIONER

## 2023-05-09 PROCEDURE — 1160F PR REVIEW ALL MEDS BY PRESCRIBER/CLIN PHARMACIST DOCUMENTED: ICD-10-PCS | Mod: CPTII,S$GLB,, | Performed by: NURSE PRACTITIONER

## 2023-05-09 PROCEDURE — 99203 OFFICE O/P NEW LOW 30 MIN: CPT | Mod: 25,S$GLB,, | Performed by: NURSE PRACTITIONER

## 2023-05-09 PROCEDURE — 99999 PR PBB SHADOW E&M-EST. PATIENT-LVL III: ICD-10-PCS | Mod: PBBFAC,,, | Performed by: NURSE PRACTITIONER

## 2023-05-09 PROCEDURE — 1160F RVW MEDS BY RX/DR IN RCRD: CPT | Mod: CPTII,S$GLB,, | Performed by: NURSE PRACTITIONER

## 2023-05-09 PROCEDURE — 99203 PR OFFICE/OUTPT VISIT, NEW, LEVL III, 30-44 MIN: ICD-10-PCS | Mod: 25,S$GLB,, | Performed by: NURSE PRACTITIONER

## 2023-05-09 PROCEDURE — 4010F ACE/ARB THERAPY RXD/TAKEN: CPT | Mod: CPTII,S$GLB,, | Performed by: NURSE PRACTITIONER

## 2023-05-09 PROCEDURE — 1159F MED LIST DOCD IN RCRD: CPT | Mod: CPTII,S$GLB,, | Performed by: NURSE PRACTITIONER

## 2023-05-09 PROCEDURE — 99999 PR PBB SHADOW E&M-EST. PATIENT-LVL III: CPT | Mod: PBBFAC,,, | Performed by: NURSE PRACTITIONER

## 2023-05-09 RX ADMIN — TRIAMCINOLONE ACETONIDE 40 MG: 40 INJECTION, SUSPENSION INTRA-ARTICULAR; INTRAMUSCULAR at 11:05

## 2023-05-11 RX ORDER — TRIAMCINOLONE ACETONIDE 40 MG/ML
40 INJECTION, SUSPENSION INTRA-ARTICULAR; INTRAMUSCULAR
Status: DISCONTINUED | OUTPATIENT
Start: 2023-05-09 | End: 2023-05-11 | Stop reason: HOSPADM

## 2023-05-12 NOTE — PROCEDURES
Large Joint Aspiration/Injection: L subacromial bursa    Date/Time: 5/9/2023 11:00 AM  Performed by: LUIS MANUEL Caldwell  Authorized by: LUIS MANUEL Caldwell     Consent Done?:  Yes (Verbal)  Indications:  Pain  Timeout: prior to procedure the correct patient, procedure, and site was verified    Prep: patient was prepped and draped in usual sterile fashion      Local anesthesia used?: Yes    Local anesthetic:  Lidocaine 1% without epinephrine  Anesthetic total (ml):  5      Details:  Needle Size:  21 G  Ultrasonic Guidance for needle placement?: No    Approach:  Posterior  Location:  Shoulder  Site:  L subacromial bursa  Medications:  40 mg triamcinolone acetonide 40 mg/mL  Patient tolerance:  Patient tolerated the procedure well with no immediate complications

## 2023-08-08 DIAGNOSIS — M51.36 DDD (DEGENERATIVE DISC DISEASE), LUMBAR: ICD-10-CM

## 2023-08-08 RX ORDER — HYDROCODONE BITARTRATE AND ACETAMINOPHEN 10; 325 MG/1; MG/1
1 TABLET ORAL 3 TIMES DAILY PRN
Qty: 90 TABLET | Refills: 0 | Status: SHIPPED | OUTPATIENT
Start: 2023-08-08 | End: 2023-08-11 | Stop reason: SDUPTHER

## 2023-08-17 ENCOUNTER — NURSE TRIAGE (OUTPATIENT)
Dept: ADMINISTRATIVE | Facility: CLINIC | Age: 63
End: 2023-08-17
Payer: COMMERCIAL
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
## 2023-08-21 RX ORDER — LINACLOTIDE 72 UG/1
72 CAPSULE, GELATIN COATED ORAL
Qty: 90 CAPSULE | Refills: 2 | Status: SHIPPED | OUTPATIENT
Start: 2023-08-21 | End: 2023-08-30 | Stop reason: SDUPTHER

## 2023-09-21 ENCOUNTER — LAB VISIT (OUTPATIENT)
Dept: LAB | Facility: HOSPITAL | Age: 63
End: 2023-09-21
Attending: INTERNAL MEDICINE
Payer: COMMERCIAL

## 2023-09-21 DIAGNOSIS — R76.8 ANA POSITIVE: ICD-10-CM

## 2023-09-21 DIAGNOSIS — M35.9 UNDIFFERENTIATED CONNECTIVE TISSUE DISEASE: ICD-10-CM

## 2023-09-21 DIAGNOSIS — M50.30 DDD (DEGENERATIVE DISC DISEASE), CERVICAL: ICD-10-CM

## 2023-09-21 DIAGNOSIS — M54.12 CERVICAL RADICULOPATHY: ICD-10-CM

## 2023-09-21 DIAGNOSIS — M54.16 LUMBAR RADICULOPATHY: ICD-10-CM

## 2023-09-21 DIAGNOSIS — M19.90 CHRONIC INFLAMMATORY ARTHRITIS: ICD-10-CM

## 2023-09-21 LAB
ALBUMIN SERPL BCP-MCNC: 4 G/DL (ref 3.5–5.2)
ALP SERPL-CCNC: 62 U/L (ref 55–135)
ALT SERPL W/O P-5'-P-CCNC: 14 U/L (ref 10–44)
ANION GAP SERPL CALC-SCNC: 6 MMOL/L (ref 8–16)
AST SERPL-CCNC: 19 U/L (ref 10–40)
BILIRUB SERPL-MCNC: 0.7 MG/DL (ref 0.1–1)
BUN SERPL-MCNC: 10 MG/DL (ref 8–23)
CALCIUM SERPL-MCNC: 9.6 MG/DL (ref 8.7–10.5)
CHLORIDE SERPL-SCNC: 110 MMOL/L (ref 95–110)
CO2 SERPL-SCNC: 27 MMOL/L (ref 23–29)
CREAT SERPL-MCNC: 0.7 MG/DL (ref 0.5–1.4)
CRP SERPL-MCNC: 0.4 MG/L (ref 0–8.2)
ERYTHROCYTE [SEDIMENTATION RATE] IN BLOOD BY WESTERGREN METHOD: 61 MM/HR (ref 0–20)
EST. GFR  (NO RACE VARIABLE): >60 ML/MIN/1.73 M^2
GLUCOSE SERPL-MCNC: 112 MG/DL (ref 70–110)
POTASSIUM SERPL-SCNC: 4.1 MMOL/L (ref 3.5–5.1)
PROT SERPL-MCNC: 7.4 G/DL (ref 6–8.4)
SODIUM SERPL-SCNC: 143 MMOL/L (ref 136–145)

## 2023-09-21 PROCEDURE — 85025 COMPLETE CBC W/AUTO DIFF WBC: CPT | Performed by: INTERNAL MEDICINE

## 2023-09-21 PROCEDURE — 80053 COMPREHEN METABOLIC PANEL: CPT | Performed by: INTERNAL MEDICINE

## 2023-09-21 PROCEDURE — 85651 RBC SED RATE NONAUTOMATED: CPT | Mod: PO | Performed by: INTERNAL MEDICINE

## 2023-09-21 PROCEDURE — 86235 NUCLEAR ANTIGEN ANTIBODY: CPT | Mod: 59 | Performed by: INTERNAL MEDICINE

## 2023-09-21 PROCEDURE — 36415 COLL VENOUS BLD VENIPUNCTURE: CPT | Mod: PO | Performed by: INTERNAL MEDICINE

## 2023-09-21 PROCEDURE — 86038 ANTINUCLEAR ANTIBODIES: CPT | Performed by: INTERNAL MEDICINE

## 2023-09-21 PROCEDURE — 86140 C-REACTIVE PROTEIN: CPT | Performed by: INTERNAL MEDICINE

## 2023-09-21 PROCEDURE — 86225 DNA ANTIBODY NATIVE: CPT | Performed by: INTERNAL MEDICINE

## 2023-09-21 PROCEDURE — 86039 ANTINUCLEAR ANTIBODIES (ANA): CPT | Performed by: INTERNAL MEDICINE

## 2023-09-22 LAB
ANA PATTERN 1: NORMAL
ANA SER QL IF: POSITIVE
ANA TITR SER IF: NORMAL {TITER}
BASOPHILS # BLD AUTO: 0.04 K/UL (ref 0–0.2)
BASOPHILS NFR BLD: 0.7 % (ref 0–1.9)
DIFFERENTIAL METHOD: ABNORMAL
EOSINOPHIL # BLD AUTO: 0.2 K/UL (ref 0–0.5)
EOSINOPHIL NFR BLD: 2.7 % (ref 0–8)
ERYTHROCYTE [DISTWIDTH] IN BLOOD BY AUTOMATED COUNT: 12.9 % (ref 11.5–14.5)
HCT VFR BLD AUTO: 39.4 % (ref 37–48.5)
HGB BLD-MCNC: 12.1 G/DL (ref 12–16)
IMM GRANULOCYTES # BLD AUTO: 0.01 K/UL (ref 0–0.04)
IMM GRANULOCYTES NFR BLD AUTO: 0.2 % (ref 0–0.5)
LYMPHOCYTES # BLD AUTO: 2 K/UL (ref 1–4.8)
LYMPHOCYTES NFR BLD: 36.2 % (ref 18–48)
MCH RBC QN AUTO: 30 PG (ref 27–31)
MCHC RBC AUTO-ENTMCNC: 30.7 G/DL (ref 32–36)
MCV RBC AUTO: 98 FL (ref 82–98)
MONOCYTES # BLD AUTO: 0.6 K/UL (ref 0.3–1)
MONOCYTES NFR BLD: 11 % (ref 4–15)
NEUTROPHILS # BLD AUTO: 2.8 K/UL (ref 1.8–7.7)
NEUTROPHILS NFR BLD: 49.2 % (ref 38–73)
NRBC BLD-RTO: 0 /100 WBC
PLATELET # BLD AUTO: 268 K/UL (ref 150–450)
PMV BLD AUTO: 10.3 FL (ref 9.2–12.9)
RBC # BLD AUTO: 4.03 M/UL (ref 4–5.4)
WBC # BLD AUTO: 5.64 K/UL (ref 3.9–12.7)

## 2023-09-25 ENCOUNTER — OFFICE VISIT (OUTPATIENT)
Dept: RHEUMATOLOGY | Facility: CLINIC | Age: 63
End: 2023-09-25
Payer: COMMERCIAL

## 2023-09-25 VITALS
HEART RATE: 74 BPM | BODY MASS INDEX: 27.97 KG/M2 | HEIGHT: 66 IN | DIASTOLIC BLOOD PRESSURE: 67 MMHG | WEIGHT: 174 LBS | SYSTOLIC BLOOD PRESSURE: 120 MMHG

## 2023-09-25 DIAGNOSIS — G99.2 STENOSIS OF CERVICAL SPINE WITH MYELOPATHY: ICD-10-CM

## 2023-09-25 DIAGNOSIS — M19.90 CHRONIC INFLAMMATORY ARTHRITIS: ICD-10-CM

## 2023-09-25 DIAGNOSIS — L40.50 PSA (PSORIATIC ARTHRITIS): ICD-10-CM

## 2023-09-25 DIAGNOSIS — E11.9 TYPE 2 DIABETES MELLITUS WITHOUT COMPLICATION, WITH LONG-TERM CURRENT USE OF INSULIN: ICD-10-CM

## 2023-09-25 DIAGNOSIS — M54.12 CERVICAL RADICULOPATHY: ICD-10-CM

## 2023-09-25 DIAGNOSIS — M48.02 STENOSIS OF CERVICAL SPINE WITH MYELOPATHY: ICD-10-CM

## 2023-09-25 DIAGNOSIS — M50.30 DDD (DEGENERATIVE DISC DISEASE), CERVICAL: Primary | ICD-10-CM

## 2023-09-25 DIAGNOSIS — M54.16 LUMBAR RADICULOPATHY: ICD-10-CM

## 2023-09-25 DIAGNOSIS — G56.01 CARPAL TUNNEL SYNDROME OF RIGHT WRIST: ICD-10-CM

## 2023-09-25 DIAGNOSIS — G43.909 MIGRAINE WITHOUT STATUS MIGRAINOSUS, NOT INTRACTABLE, UNSPECIFIED MIGRAINE TYPE: ICD-10-CM

## 2023-09-25 DIAGNOSIS — Z79.4 TYPE 2 DIABETES MELLITUS WITHOUT COMPLICATION, WITH LONG-TERM CURRENT USE OF INSULIN: ICD-10-CM

## 2023-09-25 LAB
ANTI SM ANTIBODY: 0.07 RATIO (ref 0–0.99)
ANTI SM/RNP ANTIBODY: 0.08 RATIO (ref 0–0.99)
ANTI-SM INTERPRETATION: NEGATIVE
ANTI-SM/RNP INTERPRETATION: NEGATIVE
ANTI-SSA ANTIBODY: 0.07 RATIO (ref 0–0.99)
ANTI-SSA INTERPRETATION: NEGATIVE
ANTI-SSB ANTIBODY: 0.05 RATIO (ref 0–0.99)
ANTI-SSB INTERPRETATION: NEGATIVE
DSDNA AB SER-ACNC: NORMAL [IU]/ML

## 2023-09-25 PROCEDURE — 99214 PR OFFICE/OUTPT VISIT, EST, LEVL IV, 30-39 MIN: ICD-10-PCS | Mod: S$GLB,,, | Performed by: INTERNAL MEDICINE

## 2023-09-25 PROCEDURE — 1160F RVW MEDS BY RX/DR IN RCRD: CPT | Mod: CPTII,S$GLB,, | Performed by: INTERNAL MEDICINE

## 2023-09-25 PROCEDURE — 3074F PR MOST RECENT SYSTOLIC BLOOD PRESSURE < 130 MM HG: ICD-10-PCS | Mod: CPTII,S$GLB,, | Performed by: INTERNAL MEDICINE

## 2023-09-25 PROCEDURE — 3078F DIAST BP <80 MM HG: CPT | Mod: CPTII,S$GLB,, | Performed by: INTERNAL MEDICINE

## 2023-09-25 PROCEDURE — 1159F PR MEDICATION LIST DOCUMENTED IN MEDICAL RECORD: ICD-10-PCS | Mod: CPTII,S$GLB,, | Performed by: INTERNAL MEDICINE

## 2023-09-25 PROCEDURE — 3078F PR MOST RECENT DIASTOLIC BLOOD PRESSURE < 80 MM HG: ICD-10-PCS | Mod: CPTII,S$GLB,, | Performed by: INTERNAL MEDICINE

## 2023-09-25 PROCEDURE — 3008F PR BODY MASS INDEX (BMI) DOCUMENTED: ICD-10-PCS | Mod: CPTII,S$GLB,, | Performed by: INTERNAL MEDICINE

## 2023-09-25 PROCEDURE — 3008F BODY MASS INDEX DOCD: CPT | Mod: CPTII,S$GLB,, | Performed by: INTERNAL MEDICINE

## 2023-09-25 PROCEDURE — 99999 PR PBB SHADOW E&M-EST. PATIENT-LVL III: CPT | Mod: PBBFAC,,, | Performed by: INTERNAL MEDICINE

## 2023-09-25 PROCEDURE — 4010F PR ACE/ARB THEARPY RXD/TAKEN: ICD-10-PCS | Mod: CPTII,S$GLB,, | Performed by: INTERNAL MEDICINE

## 2023-09-25 PROCEDURE — 99214 OFFICE O/P EST MOD 30 MIN: CPT | Mod: S$GLB,,, | Performed by: INTERNAL MEDICINE

## 2023-09-25 PROCEDURE — 3074F SYST BP LT 130 MM HG: CPT | Mod: CPTII,S$GLB,, | Performed by: INTERNAL MEDICINE

## 2023-09-25 PROCEDURE — 1160F PR REVIEW ALL MEDS BY PRESCRIBER/CLIN PHARMACIST DOCUMENTED: ICD-10-PCS | Mod: CPTII,S$GLB,, | Performed by: INTERNAL MEDICINE

## 2023-09-25 PROCEDURE — 1159F MED LIST DOCD IN RCRD: CPT | Mod: CPTII,S$GLB,, | Performed by: INTERNAL MEDICINE

## 2023-09-25 PROCEDURE — 99999 PR PBB SHADOW E&M-EST. PATIENT-LVL III: ICD-10-PCS | Mod: PBBFAC,,, | Performed by: INTERNAL MEDICINE

## 2023-09-25 PROCEDURE — 4010F ACE/ARB THERAPY RXD/TAKEN: CPT | Mod: CPTII,S$GLB,, | Performed by: INTERNAL MEDICINE

## 2023-09-25 RX ORDER — PIROXICAM 20 MG/1
20 CAPSULE ORAL DAILY
Qty: 30 CAPSULE | Refills: 3 | Status: SHIPPED | OUTPATIENT
Start: 2023-09-25 | End: 2024-01-19

## 2023-09-25 RX ORDER — RIZATRIPTAN BENZOATE 10 MG/1
10 TABLET, ORALLY DISINTEGRATING ORAL
Qty: 12 TABLET | Refills: 3 | Status: SHIPPED | OUTPATIENT
Start: 2023-09-25 | End: 2023-09-25 | Stop reason: SDUPTHER

## 2023-09-25 RX ORDER — TOPIRAMATE 25 MG/1
25 TABLET ORAL 2 TIMES DAILY
Qty: 60 TABLET | Refills: 11 | Status: SHIPPED | OUTPATIENT
Start: 2023-09-25 | End: 2024-09-24

## 2023-09-25 RX ORDER — TOPIRAMATE 25 MG/1
25 TABLET ORAL 2 TIMES DAILY
Qty: 60 TABLET | Refills: 11 | Status: SHIPPED | OUTPATIENT
Start: 2023-09-25 | End: 2023-09-25 | Stop reason: SDUPTHER

## 2023-09-25 RX ORDER — RIZATRIPTAN BENZOATE 10 MG/1
10 TABLET, ORALLY DISINTEGRATING ORAL
Qty: 12 TABLET | Refills: 3 | Status: SHIPPED | OUTPATIENT
Start: 2023-09-25 | End: 2024-01-02

## 2023-09-25 RX ORDER — BUTALBITAL, ACETAMINOPHEN AND CAFFEINE 50; 325; 40 MG/1; MG/1; MG/1
1 TABLET ORAL EVERY 4 HOURS PRN
Qty: 60 TABLET | Refills: 3 | Status: SHIPPED | OUTPATIENT
Start: 2023-09-25 | End: 2023-09-25 | Stop reason: SDUPTHER

## 2023-09-25 RX ORDER — BUTALBITAL, ACETAMINOPHEN AND CAFFEINE 50; 325; 40 MG/1; MG/1; MG/1
1 TABLET ORAL EVERY 4 HOURS PRN
Qty: 60 TABLET | Refills: 3 | Status: SHIPPED | OUTPATIENT
Start: 2023-09-25 | End: 2023-10-25
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
## 2023-09-26 ASSESSMENT — ROUTINE ASSESSMENT OF PATIENT INDEX DATA (RAPID3)
MDHAQ FUNCTION SCORE: 0.9
PSYCHOLOGICAL DISTRESS SCORE: 2.2
FATIGUE SCORE: 1.1
TOTAL RAPID3 SCORE: 4.83
PAIN SCORE: 5.5
PATIENT GLOBAL ASSESSMENT SCORE: 6

## 2023-09-27 ENCOUNTER — TELEPHONE (OUTPATIENT)
Dept: PAIN MEDICINE | Facility: CLINIC | Age: 63
End: 2023-09-27
Payer: COMMERCIAL
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
## 2023-09-28 PROBLEM — L40.50 PSA (PSORIATIC ARTHRITIS): Status: ACTIVE | Noted: 2023-09-28

## 2023-10-05 ENCOUNTER — TELEPHONE (OUTPATIENT)
Dept: PAIN MEDICINE | Facility: CLINIC | Age: 63
End: 2023-10-05
Payer: COMMERCIAL

## 2023-10-10 RX ORDER — TIZANIDINE 4 MG/1
4 TABLET ORAL EVERY 8 HOURS PRN
Qty: 90 TABLET | Refills: 2 | Status: SHIPPED | OUTPATIENT
Start: 2023-10-10 | End: 2024-01-21

## 2023-10-10 NOTE — TELEPHONE ENCOUNTER
Last office visit - 4/17/2023  Next office visit - 10/30/2023  Medication - tiZANidine (ZANAFLEX) 4 MG tablet  Last refill was about 9/12/2023

## 2023-10-30 ENCOUNTER — OFFICE VISIT (OUTPATIENT)
Dept: PAIN MEDICINE | Facility: CLINIC | Age: 63
End: 2023-10-30
Payer: COMMERCIAL

## 2023-10-30 VITALS
BODY MASS INDEX: 27.42 KG/M2 | WEIGHT: 170.63 LBS | DIASTOLIC BLOOD PRESSURE: 84 MMHG | HEART RATE: 65 BPM | HEIGHT: 66 IN | SYSTOLIC BLOOD PRESSURE: 165 MMHG

## 2023-10-30 DIAGNOSIS — M51.36 DDD (DEGENERATIVE DISC DISEASE), LUMBAR: ICD-10-CM

## 2023-10-30 DIAGNOSIS — Z79.891 OPIOID CONTRACT EXISTS: ICD-10-CM

## 2023-10-30 DIAGNOSIS — F32.89 OTHER DEPRESSION: ICD-10-CM

## 2023-10-30 DIAGNOSIS — M54.16 LUMBAR RADICULOPATHY: Primary | ICD-10-CM

## 2023-10-30 DIAGNOSIS — M54.12 CERVICAL RADICULOPATHY: ICD-10-CM

## 2023-10-30 DIAGNOSIS — M50.30 DDD (DEGENERATIVE DISC DISEASE), CERVICAL: ICD-10-CM

## 2023-10-30 PROCEDURE — 99999 PR PBB SHADOW E&M-EST. PATIENT-LVL V: CPT | Mod: PBBFAC,,, | Performed by: PHYSICIAN ASSISTANT

## 2023-10-30 PROCEDURE — 1160F RVW MEDS BY RX/DR IN RCRD: CPT | Mod: CPTII,S$GLB,, | Performed by: PHYSICIAN ASSISTANT

## 2023-10-30 PROCEDURE — 99214 OFFICE O/P EST MOD 30 MIN: CPT | Mod: S$GLB,,, | Performed by: PHYSICIAN ASSISTANT

## 2023-10-30 PROCEDURE — 3079F DIAST BP 80-89 MM HG: CPT | Mod: CPTII,S$GLB,, | Performed by: PHYSICIAN ASSISTANT

## 2023-10-30 PROCEDURE — 1160F PR REVIEW ALL MEDS BY PRESCRIBER/CLIN PHARMACIST DOCUMENTED: ICD-10-PCS | Mod: CPTII,S$GLB,, | Performed by: PHYSICIAN ASSISTANT

## 2023-10-30 PROCEDURE — 4010F PR ACE/ARB THEARPY RXD/TAKEN: ICD-10-PCS | Mod: CPTII,S$GLB,, | Performed by: PHYSICIAN ASSISTANT

## 2023-10-30 PROCEDURE — 4010F ACE/ARB THERAPY RXD/TAKEN: CPT | Mod: CPTII,S$GLB,, | Performed by: PHYSICIAN ASSISTANT

## 2023-10-30 PROCEDURE — 3008F BODY MASS INDEX DOCD: CPT | Mod: CPTII,S$GLB,, | Performed by: PHYSICIAN ASSISTANT

## 2023-10-30 PROCEDURE — 1159F MED LIST DOCD IN RCRD: CPT | Mod: CPTII,S$GLB,, | Performed by: PHYSICIAN ASSISTANT

## 2023-10-30 PROCEDURE — 99999 PR PBB SHADOW E&M-EST. PATIENT-LVL V: ICD-10-PCS | Mod: PBBFAC,,, | Performed by: PHYSICIAN ASSISTANT

## 2023-10-30 PROCEDURE — 3008F PR BODY MASS INDEX (BMI) DOCUMENTED: ICD-10-PCS | Mod: CPTII,S$GLB,, | Performed by: PHYSICIAN ASSISTANT

## 2023-10-30 PROCEDURE — 3077F SYST BP >= 140 MM HG: CPT | Mod: CPTII,S$GLB,, | Performed by: PHYSICIAN ASSISTANT

## 2023-10-30 PROCEDURE — 99214 PR OFFICE/OUTPT VISIT, EST, LEVL IV, 30-39 MIN: ICD-10-PCS | Mod: S$GLB,,, | Performed by: PHYSICIAN ASSISTANT

## 2023-10-30 PROCEDURE — 3077F PR MOST RECENT SYSTOLIC BLOOD PRESSURE >= 140 MM HG: ICD-10-PCS | Mod: CPTII,S$GLB,, | Performed by: PHYSICIAN ASSISTANT

## 2023-10-30 PROCEDURE — 1159F PR MEDICATION LIST DOCUMENTED IN MEDICAL RECORD: ICD-10-PCS | Mod: CPTII,S$GLB,, | Performed by: PHYSICIAN ASSISTANT

## 2023-10-30 PROCEDURE — 3079F PR MOST RECENT DIASTOLIC BLOOD PRESSURE 80-89 MM HG: ICD-10-PCS | Mod: CPTII,S$GLB,, | Performed by: PHYSICIAN ASSISTANT

## 2023-10-30 RX ORDER — HYDROCODONE BITARTRATE AND ACETAMINOPHEN 10; 325 MG/1; MG/1
1 TABLET ORAL 3 TIMES DAILY PRN
Qty: 90 TABLET | Refills: 0 | Status: SHIPPED | OUTPATIENT
Start: 2023-11-20 | End: 2023-12-20

## 2023-10-30 RX ORDER — HYDROCODONE BITARTRATE AND ACETAMINOPHEN 10; 325 MG/1; MG/1
1 TABLET ORAL 3 TIMES DAILY PRN
Qty: 90 TABLET | Refills: 0 | Status: SHIPPED | OUTPATIENT
Start: 2023-12-20 | End: 2023-12-28 | Stop reason: SDUPTHER

## 2023-10-30 RX ORDER — HYDROCODONE BITARTRATE AND ACETAMINOPHEN 10; 325 MG/1; MG/1
1 TABLET ORAL 3 TIMES DAILY PRN
Qty: 90 TABLET | Refills: 0 | Status: SHIPPED | OUTPATIENT
Start: 2024-01-19 | End: 2024-01-08

## 2023-10-30 NOTE — TELEPHONE ENCOUNTER
Patient seen in clinic.  Please send prescriptions to her pharmacy. I have reviewed the [place] Board of Pharmacy website and there are no abberancies.

## 2023-10-31 ENCOUNTER — PATIENT MESSAGE (OUTPATIENT)
Dept: PSYCHIATRY | Facility: CLINIC | Age: 63
End: 2023-10-31
Payer: COMMERCIAL

## 2023-11-03 ENCOUNTER — LAB VISIT (OUTPATIENT)
Dept: LAB | Facility: HOSPITAL | Age: 63
End: 2023-11-03
Attending: INTERNAL MEDICINE
Payer: COMMERCIAL

## 2023-11-03 ENCOUNTER — TELEPHONE (OUTPATIENT)
Dept: PSYCHIATRY | Facility: CLINIC | Age: 63
End: 2023-11-03
Payer: COMMERCIAL

## 2023-11-03 DIAGNOSIS — M19.90 CHRONIC INFLAMMATORY ARTHRITIS: ICD-10-CM

## 2023-11-03 DIAGNOSIS — G56.01 CARPAL TUNNEL SYNDROME OF RIGHT WRIST: ICD-10-CM

## 2023-11-03 DIAGNOSIS — M54.16 LUMBAR RADICULOPATHY: ICD-10-CM

## 2023-11-03 DIAGNOSIS — Z79.4 TYPE 2 DIABETES MELLITUS WITHOUT COMPLICATION, WITH LONG-TERM CURRENT USE OF INSULIN: ICD-10-CM

## 2023-11-03 DIAGNOSIS — M54.12 CERVICAL RADICULOPATHY: ICD-10-CM

## 2023-11-03 DIAGNOSIS — E11.9 TYPE 2 DIABETES MELLITUS WITHOUT COMPLICATION, WITH LONG-TERM CURRENT USE OF INSULIN: ICD-10-CM

## 2023-11-03 DIAGNOSIS — M48.02 STENOSIS OF CERVICAL SPINE WITH MYELOPATHY: ICD-10-CM

## 2023-11-03 DIAGNOSIS — G99.2 STENOSIS OF CERVICAL SPINE WITH MYELOPATHY: ICD-10-CM

## 2023-11-03 DIAGNOSIS — M50.30 DDD (DEGENERATIVE DISC DISEASE), CERVICAL: ICD-10-CM

## 2023-11-03 DIAGNOSIS — L40.50 PSA (PSORIATIC ARTHRITIS): ICD-10-CM

## 2023-11-03 PROCEDURE — 36415 COLL VENOUS BLD VENIPUNCTURE: CPT | Mod: PO | Performed by: INTERNAL MEDICINE

## 2023-11-03 PROCEDURE — 85025 COMPLETE CBC W/AUTO DIFF WBC: CPT | Performed by: INTERNAL MEDICINE

## 2023-11-03 PROCEDURE — 85651 RBC SED RATE NONAUTOMATED: CPT | Mod: PO | Performed by: INTERNAL MEDICINE

## 2023-11-03 PROCEDURE — 86140 C-REACTIVE PROTEIN: CPT | Performed by: INTERNAL MEDICINE

## 2023-11-03 PROCEDURE — 80053 COMPREHEN METABOLIC PANEL: CPT | Performed by: INTERNAL MEDICINE

## 2023-11-04 LAB
ALBUMIN SERPL BCP-MCNC: 3.8 G/DL (ref 3.5–5.2)
ALP SERPL-CCNC: 70 U/L (ref 55–135)
ALT SERPL W/O P-5'-P-CCNC: 11 U/L (ref 10–44)
ANION GAP SERPL CALC-SCNC: 8 MMOL/L (ref 8–16)
AST SERPL-CCNC: 15 U/L (ref 10–40)
BASOPHILS # BLD AUTO: 0.06 K/UL (ref 0–0.2)
BASOPHILS NFR BLD: 0.9 % (ref 0–1.9)
BILIRUB SERPL-MCNC: 0.3 MG/DL (ref 0.1–1)
BUN SERPL-MCNC: 14 MG/DL (ref 8–23)
CALCIUM SERPL-MCNC: 9.3 MG/DL (ref 8.7–10.5)
CHLORIDE SERPL-SCNC: 112 MMOL/L (ref 95–110)
CO2 SERPL-SCNC: 24 MMOL/L (ref 23–29)
CREAT SERPL-MCNC: 0.8 MG/DL (ref 0.5–1.4)
CRP SERPL-MCNC: 0.4 MG/L (ref 0–8.2)
DIFFERENTIAL METHOD: ABNORMAL
EOSINOPHIL # BLD AUTO: 0.3 K/UL (ref 0–0.5)
EOSINOPHIL NFR BLD: 5 % (ref 0–8)
ERYTHROCYTE [DISTWIDTH] IN BLOOD BY AUTOMATED COUNT: 13.1 % (ref 11.5–14.5)
ERYTHROCYTE [SEDIMENTATION RATE] IN BLOOD BY PHOTOMETRIC METHOD: 7 MM/HR (ref 0–36)
EST. GFR  (NO RACE VARIABLE): >60 ML/MIN/1.73 M^2
GLUCOSE SERPL-MCNC: 96 MG/DL (ref 70–110)
HCT VFR BLD AUTO: 37.3 % (ref 37–48.5)
HGB BLD-MCNC: 11.6 G/DL (ref 12–16)
IMM GRANULOCYTES # BLD AUTO: 0.01 K/UL (ref 0–0.04)
IMM GRANULOCYTES NFR BLD AUTO: 0.2 % (ref 0–0.5)
LYMPHOCYTES # BLD AUTO: 3 K/UL (ref 1–4.8)
LYMPHOCYTES NFR BLD: 47.3 % (ref 18–48)
MCH RBC QN AUTO: 30.4 PG (ref 27–31)
MCHC RBC AUTO-ENTMCNC: 31.1 G/DL (ref 32–36)
MCV RBC AUTO: 98 FL (ref 82–98)
MONOCYTES # BLD AUTO: 0.6 K/UL (ref 0.3–1)
MONOCYTES NFR BLD: 10.1 % (ref 4–15)
NEUTROPHILS # BLD AUTO: 2.3 K/UL (ref 1.8–7.7)
NEUTROPHILS NFR BLD: 36.5 % (ref 38–73)
NRBC BLD-RTO: 0 /100 WBC
PLATELET # BLD AUTO: 273 K/UL (ref 150–450)
PMV BLD AUTO: 10.8 FL (ref 9.2–12.9)
POTASSIUM SERPL-SCNC: 3.9 MMOL/L (ref 3.5–5.1)
PROT SERPL-MCNC: 7.2 G/DL (ref 6–8.4)
RBC # BLD AUTO: 3.82 M/UL (ref 4–5.4)
SODIUM SERPL-SCNC: 144 MMOL/L (ref 136–145)
WBC # BLD AUTO: 6.36 K/UL (ref 3.9–12.7)
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
## 2023-12-05 ENCOUNTER — PATIENT MESSAGE (OUTPATIENT)
Dept: PSYCHIATRY | Facility: CLINIC | Age: 63
End: 2023-12-05
Payer: COMMERCIAL

## 2023-12-07 RX ORDER — INSULIN GLARGINE 300 U/ML
INJECTION, SOLUTION SUBCUTANEOUS
Qty: 4.5 ML | Refills: 2 | Status: SHIPPED | OUTPATIENT
Start: 2023-12-07

## 2023-12-20 ENCOUNTER — TELEPHONE (OUTPATIENT)
Dept: PAIN MEDICINE | Facility: CLINIC | Age: 63
End: 2023-12-20
Payer: COMMERCIAL

## 2023-12-20 ENCOUNTER — TELEPHONE (OUTPATIENT)
Dept: ENDOCRINOLOGY | Facility: CLINIC | Age: 63
End: 2023-12-20
Payer: COMMERCIAL

## 2023-12-20 NOTE — TELEPHONE ENCOUNTER
----- Message from Cheyenne Garcia, Patient Care Assistant sent at 12/20/2023  3:36 PM CST -----  Contact: self  Pt is calling to get a refill on her sensor and  some concerns.  Please call back to advise [phone]  Thanks

## 2023-12-20 NOTE — TELEPHONE ENCOUNTER
When you get message asking to change pharmacy, can you please go into the med card and change the pharmacy? Thanks

## 2023-12-20 NOTE — TELEPHONE ENCOUNTER
----- Message from Jesus Manuel Maharaj sent at 12/20/2023 11:10 AM CST -----  Contact: self  Type:  RX Refill Request    Who Called:  Patient  Refill or New Rx:  Refill  RX Name and Strength:  HYDROcodone-acetaminophen (NORCO)  mg per tablet  How is the patient currently taking it? (ex. 1XDay):  as directed  Is this a 30 day or 90 day RX:  as directed    Preferred Pharmacy with phone number:    [id number] - [place], [place] - [address] AT [facility] OF [address] [address]  [address] [facility] [place] [address]  Phone: [phone] Fax: [phone]    Local or Mail Order:  Local  Ordering Provider:  Adama Sr Call Back Number:  [phone]    Additional Information:  States she need refill sent to new pharm is out of he medication.Please call back

## 2023-12-21 ENCOUNTER — TELEPHONE (OUTPATIENT)
Dept: ENDOCRINOLOGY | Facility: CLINIC | Age: 63
End: 2023-12-21
Payer: COMMERCIAL

## 2023-12-21 NOTE — TELEPHONE ENCOUNTER
----- Message from Caesar Uribe, Patient Care Assistant sent at 12/21/2023 11:12 AM CST -----  Contact: Pt  Type: Needs Medical Advice    Who Called: Pt  Best Call Back Number: [phone]  Inquiry/Question: Pt is calling to get a sooner appt due to she is having a headache and was not able to make the appt today. Please advise Thank you~

## 2023-12-28 ENCOUNTER — TELEPHONE (OUTPATIENT)
Dept: PAIN MEDICINE | Facility: CLINIC | Age: 63
End: 2023-12-28
Payer: COMMERCIAL

## 2023-12-28 DIAGNOSIS — M51.36 DDD (DEGENERATIVE DISC DISEASE), LUMBAR: ICD-10-CM

## 2023-12-28 RX ORDER — HYDROCODONE BITARTRATE AND ACETAMINOPHEN 10; 325 MG/1; MG/1
1 TABLET ORAL 3 TIMES DAILY PRN
Qty: 90 TABLET | Refills: 0 | Status: SHIPPED | OUTPATIENT
Start: 2023-12-28 | End: 2024-01-08

## 2023-12-28 NOTE — TELEPHONE ENCOUNTER
----- Message from Mony Olvera sent at 12/27/2023 10:06 AM CST -----  Contact: pt  Type: Needs Medical Advice  Who Called:  pt  Best Call Back Number: [phone]    Additional Information: Pt is calling the office needs HYDROcodone-acetaminophen (NORCO)  mg per tablet transferred over to   [id number], [place] - [address] [facility] AT [facility] [address] [address]  [address] [facility] [address]  Phone: [phone] Fax: [phone]    [facility].Please call back and advise.

## 2023-12-29 ENCOUNTER — TELEPHONE (OUTPATIENT)
Dept: PAIN MEDICINE | Facility: CLINIC | Age: 63
End: 2023-12-29
Payer: COMMERCIAL

## 2023-12-29 NOTE — TELEPHONE ENCOUNTER
----- Message from Janie Ibanez sent at 12/29/2023  3:08 PM CST -----  Type:  Patient Returning Call    Who Called:pt      Who Left Message for Patient:kayla    Does the patient know what this is regarding?:yes    Would the patient rather a call back or a response via [facility]? Call back    Best Call Back Number:[phone]      Additional Information: medication still isn't finished. Wants to speak with someone asap     Please call Back to advise. Thanks!

## 2023-12-29 NOTE — TELEPHONE ENCOUNTER
----- Message from Viktoria Juan sent at 2023  9:07 AM CST -----  Contact: Patient  Type:  Pharmacy Calling to Clarify an RX    Name of Caller:  Patient    Pharmacy Name:      [id number] - [place], [place] - [address] AT [facility] [address] [address]  [address]  [facility] [address]  Phone: [phone] Fax: [phone]    Prescription Name:  HYDROcodone-acetaminophen (NORCO)  mg per tablet    What do they need to clarify?:  Prior Authorization    Best Call Back Number:  [phone] - Patient    Additional Information:   States she would like to speak with someone - states pharmacist advised her that the prior authorization for this medication  on  - states if an extension is issued today, she can probably get her medication today instead of within 5 days - please call - thank you

## 2024-01-02 ENCOUNTER — OFFICE VISIT (OUTPATIENT)
Dept: ENDOCRINOLOGY | Facility: CLINIC | Age: 64
End: 2024-01-02
Payer: COMMERCIAL

## 2024-01-02 VITALS
HEIGHT: 66 IN | HEART RATE: 72 BPM | DIASTOLIC BLOOD PRESSURE: 70 MMHG | BODY MASS INDEX: 27.37 KG/M2 | OXYGEN SATURATION: 97 % | WEIGHT: 170.31 LBS | SYSTOLIC BLOOD PRESSURE: 138 MMHG

## 2024-01-02 DIAGNOSIS — I10 ESSENTIAL HYPERTENSION: ICD-10-CM

## 2024-01-02 DIAGNOSIS — Z79.4 TYPE 2 DIABETES MELLITUS WITHOUT COMPLICATION, WITH LONG-TERM CURRENT USE OF INSULIN: Primary | ICD-10-CM

## 2024-01-02 DIAGNOSIS — E11.9 TYPE 2 DIABETES MELLITUS WITHOUT COMPLICATION, WITH LONG-TERM CURRENT USE OF INSULIN: Primary | ICD-10-CM

## 2024-01-02 DIAGNOSIS — E78.49 OTHER HYPERLIPIDEMIA: ICD-10-CM

## 2024-01-02 PROCEDURE — 3072F LOW RISK FOR RETINOPATHY: CPT | Mod: CPTII,S$GLB,, | Performed by: NURSE PRACTITIONER

## 2024-01-02 PROCEDURE — 3078F DIAST BP <80 MM HG: CPT | Mod: CPTII,S$GLB,, | Performed by: NURSE PRACTITIONER

## 2024-01-02 PROCEDURE — 3075F SYST BP GE 130 - 139MM HG: CPT | Mod: CPTII,S$GLB,, | Performed by: NURSE PRACTITIONER

## 2024-01-02 PROCEDURE — 1159F MED LIST DOCD IN RCRD: CPT | Mod: CPTII,S$GLB,, | Performed by: NURSE PRACTITIONER

## 2024-01-02 PROCEDURE — 3008F BODY MASS INDEX DOCD: CPT | Mod: CPTII,S$GLB,, | Performed by: NURSE PRACTITIONER

## 2024-01-02 PROCEDURE — 1160F RVW MEDS BY RX/DR IN RCRD: CPT | Mod: CPTII,S$GLB,, | Performed by: NURSE PRACTITIONER

## 2024-01-02 PROCEDURE — 99214 OFFICE O/P EST MOD 30 MIN: CPT | Mod: S$GLB,,, | Performed by: NURSE PRACTITIONER

## 2024-01-02 PROCEDURE — 99999 PR PBB SHADOW E&M-EST. PATIENT-LVL V: CPT | Mod: PBBFAC,,, | Performed by: NURSE PRACTITIONER

## 2024-01-02 RX ORDER — TIRZEPATIDE 2.5 MG/.5ML
2.5 INJECTION, SOLUTION SUBCUTANEOUS
Qty: 4 PEN | Refills: 0 | Status: SHIPPED | OUTPATIENT
Start: 2024-01-02 | End: 2024-02-02

## 2024-01-02 RX ORDER — BUTALBITAL, ACETAMINOPHEN AND CAFFEINE 50; 325; 40 MG/1; MG/1; MG/1
1 TABLET ORAL EVERY 4 HOURS PRN
COMMUNITY
Start: 2023-11-01

## 2024-01-02 RX ORDER — BENZONATATE 200 MG/1
CAPSULE ORAL
COMMUNITY

## 2024-01-02 RX ORDER — IBUPROFEN 800 MG/1
TABLET ORAL
COMMUNITY
Start: 2023-12-21

## 2024-01-02 NOTE — PROGRESS NOTES
CC: Ms. Aaron Garcia arrives today for management of Type 2 DM and review of chronic medical conditions, as listed in the visit diagnosis section of this encounter.     HPI: Ms. Aaron Garcia was diagnosed with Type 2 DM in 2001. She was diagnosed based on lab work. Initial treatment consisted of oral meds. + FH of DM in father, sister. Denies hospitalizations due to DM.      Last seen in endocrine by IVORY Rasheed NP, in March.     She is new to me today and is being seen in Ms. Rasheed's absence.    BG monitoring per Freestyle Altaf 2.    Hypoglycemia: Yes, occurs first thing in the morning. Has started reducing Toujeo dose. Feels this occurs less often when using 40 units.   Hypoglycemic Symptoms: jitteriness and sweating  Hypoglycemia Treatment: juice or soda    Missing Insulin/PO medication doses: Yes - sometimes hold Novolog if BG normal before the meal. Also reduces Toujeo if bedtime BG < 120.   Timing prandial insulin 5-15 minutes before meals: yes    Dietary Habits:  Eats 2 meals/day. Rare snacking. Avoids sugary beverages.    Last DM education appointment:      CURRENT DIABETIC MEDS:  Toujeo 40-50 units QHS, Novolog 5 units with meals + correction scale  (prescribed 10 units with meals)  Vial or pen: pen  Glucometer type:     Previous DM treatments:  Metformin - diarrhea  Tradjenta  glipizide 5 mg bid         Last Eye Exam: 4/2023, no DR  Last Podiatry Exam:     REVIEW OF SYSTEMS  Constitutional: no c/o fatigue, weakness, or weight loss.   Cardiac: no palpitations or chest pain.  Respiratory: no cough or dyspnea.  GI: no c/o abdominal pain or nausea. Denies h/o pancreatitis.   Skin: no lesions or rashes.  Neuro: + numbness, tingling in R foot. Does have back issues.   Endocrine: denies polyphagia, polydipsia, polyuria. Denies personal/family h/o MTC, MEN2.      Personally reviewed Past Medical, Surgical, Social History.    Vital Signs  /70 (BP Location: Left arm, Patient Position:  "Sitting, BP Method: Large (Manual))   Pulse 72   Ht 5' 6" (1.676 m)   Wt 77.3 kg (170 lb 4.9 oz)   LMP 03/13/2017   SpO2 97%   BMI 27.49 kg/m²     Personally reviewed the below labs:    9/25/2023:    HGB A1C% <5.7 % of total Hgb 7.2 High        Hemoglobin A1C   Date Value Ref Range Status   03/03/2022 7.6 (H) 4.0 - 5.6 % Final     Comment:     ADA Screening Guidelines:  5.7-6.4%  Consistent with prediabetes  >or=6.5%  Consistent with diabetes    High levels of fetal hemoglobin interfere with the HbA1C  assay. Heterozygous hemoglobin variants (HbS, HgC, etc)do  not significantly interfere with this assay.   However, presence of multiple variants may affect accuracy.     10/26/2021 7.5 (H) 4.0 - 5.6 % Final     Comment:     ADA Screening Guidelines:  5.7-6.4%  Consistent with prediabetes  >or=6.5%  Consistent with diabetes    High levels of fetal hemoglobin interfere with the HbA1C  assay. Heterozygous hemoglobin variants (HbS, HgC, etc)do  not significantly interfere with this assay.   However, presence of multiple variants may affect accuracy.     07/20/2021 7.3 (H) 4.0 - 5.6 % Final     Comment:     ADA Screening Guidelines:  5.7-6.4%  Consistent with prediabetes  >or=6.5%  Consistent with diabetes    High levels of fetal hemoglobin interfere with the HbA1C  assay. Heterozygous hemoglobin variants (HbS, HgC, etc)do  not significantly interfere with this assay.   However, presence of multiple variants may affect accuracy.         Chemistry        Component Value Date/Time     11/03/2023 1556    K 3.9 11/03/2023 1556     (H) 11/03/2023 1556    CO2 24 11/03/2023 1556    BUN 14 11/03/2023 1556    CREATININE 0.8 11/03/2023 1556    GLU 96 11/03/2023 1556        Component Value Date/Time    CALCIUM 9.3 11/03/2023 1556    ALKPHOS 70 11/03/2023 1556    AST 15 11/03/2023 1556    ALT 11 11/03/2023 1556    BILITOT 0.3 11/03/2023 1556    ESTGFRAFRICA >60.0 05/04/2022 1349    EGFRNONAA >60.0 05/04/2022 1349    "       Lab Results   Component Value Date    CHOL 115 (L) 07/20/2021    CHOL 120 09/09/2020    CHOL 130 11/14/2019     Lab Results   Component Value Date    HDL 37 (L) 07/20/2021    HDL 41 09/09/2020    HDL 41 11/14/2019     Lab Results   Component Value Date    LDLCALC 60.0 (L) 07/20/2021    LDLCALC 62.4 (L) 09/09/2020    LDLCALC 70.8 11/14/2019     Lab Results   Component Value Date    TRIG 90 07/20/2021    TRIG 83 09/09/2020    TRIG 91 11/14/2019     Lab Results   Component Value Date    CHOLHDL 32.2 07/20/2021    CHOLHDL 34.2 09/09/2020    CHOLHDL 31.5 11/14/2019       Lab Results   Component Value Date    MICALBCREAT Unable to calculate 07/20/2021     Lab Results   Component Value Date    TSH 1.060 05/04/2022       CrCl cannot be calculated (Patient's most recent lab result is older than the maximum 7 days allowed.).    Vit D, 25-Hydroxy   Date Value Ref Range Status   05/04/2022 21 (L) 30 - 96 ng/mL Final     Comment:     Vitamin D deficiency.........<10 ng/mL                              Vitamin D insufficiency......10-29 ng/mL       Vitamin D sufficiency........> or equal to 30 ng/mL  Vitamin D toxicity............>100 ng/mL           PHYSICAL EXAMINATION  Constitutional: Appears well, no distress  Respiratory: CTA, even and unlabored.  Cardiovascular: RRR, no murmurs, no carotid bruits. No edema.    GI: active bowel sounds, no hernia noted.  Skin: warm and dry; no visible wounds  Neuro: oriented to person, place, time  Feet: appropriate footwear.    simplifyMD DASHA 2 DOWNLOAD: Staff cannot upload CGM in clinic today.     Assessment/Plan  1. Type 2 diabetes mellitus without complication, with long-term current use of insulin  -- A1c above target. Would benefit from GLP-1RA/GIP in an effort to reduce insulin requirements. She doesn't have any contraindications.   -- Start Mounjaro 2.5 mg every 7 days. After 4 weeks, will increase dose to 5 mg every 7 days. Download coupon from website. Discussed medication's  mechanism of action, side effects, and contraindications. Advised pt to notify me for extreme nausea, abdominal pain, etc.  -- reduce Toujeo to 40 units. May need to wean further once on Mounjaro.   -- Hold Novolog base dose once on Mounjaro. May use sliding scale before meals as needed.   -- continue Altaf 2. Notify me if hypoglycemia continues.    -- Discussed diagnosis of DM, A1c goals, progression of disease, long term complications and tx options  -- Reviewed hypoglycemia management: treat with 4 oz of juice, 4 oz regular soda, or 4 glucose tablets. Monitor and repeat treatment every 15 minutes until BG is >70 Then have a snack, which includes 15 grams of complex carbohydrates and protein.   Advised patient to check BG before activities, such as driving or exercise.    -- takes statin, ace-i   2. Essential hypertension  -- controlled  -- continue current meds   3. Other hyperlipidemia  -- controlled  -- continue statin         FOLLOW UP  Follow up in about 3 months (around 4/2/2024).   Patient instructed to bring BG logs to each follow up   Patient encouraged to call for any BG/medication issues, concerns, or questions.      Orders Placed This Encounter   Procedures    Hemoglobin A1C    Lipid Panel    Comprehensive Metabolic Panel    Microalbumin/Creatinine Ratio, Urine

## 2024-01-02 NOTE — PATIENT INSTRUCTIONS
Decrease Toujeo to 40 units.     Start Mounjaro 2.5 mg once weekly. Notify me after 4th dose so I can send in the 5 mg pens.    Stop Novolog base dose once on Mounjaro. May use sliding scale before meals as needed.

## 2024-01-08 ENCOUNTER — OFFICE VISIT (OUTPATIENT)
Dept: PAIN MEDICINE | Facility: CLINIC | Age: 64
End: 2024-01-08
Payer: COMMERCIAL

## 2024-01-08 VITALS
HEIGHT: 66 IN | WEIGHT: 173.19 LBS | SYSTOLIC BLOOD PRESSURE: 142 MMHG | HEART RATE: 63 BPM | BODY MASS INDEX: 27.83 KG/M2 | DIASTOLIC BLOOD PRESSURE: 73 MMHG

## 2024-01-08 DIAGNOSIS — M50.30 DDD (DEGENERATIVE DISC DISEASE), CERVICAL: ICD-10-CM

## 2024-01-08 DIAGNOSIS — M54.16 LUMBAR RADICULOPATHY: ICD-10-CM

## 2024-01-08 DIAGNOSIS — M51.36 DDD (DEGENERATIVE DISC DISEASE), LUMBAR: Primary | ICD-10-CM

## 2024-01-08 DIAGNOSIS — M51.36 DDD (DEGENERATIVE DISC DISEASE), LUMBAR: ICD-10-CM

## 2024-01-08 DIAGNOSIS — Z79.891 OPIOID CONTRACT EXISTS: ICD-10-CM

## 2024-01-08 DIAGNOSIS — M54.12 CERVICAL RADICULOPATHY: ICD-10-CM

## 2024-01-08 PROCEDURE — 1160F RVW MEDS BY RX/DR IN RCRD: CPT | Mod: CPTII,S$GLB,, | Performed by: PHYSICIAN ASSISTANT

## 2024-01-08 PROCEDURE — 3077F SYST BP >= 140 MM HG: CPT | Mod: CPTII,S$GLB,, | Performed by: PHYSICIAN ASSISTANT

## 2024-01-08 PROCEDURE — 3008F BODY MASS INDEX DOCD: CPT | Mod: CPTII,S$GLB,, | Performed by: PHYSICIAN ASSISTANT

## 2024-01-08 PROCEDURE — 99999 PR PBB SHADOW E&M-EST. PATIENT-LVL V: CPT | Mod: PBBFAC,,, | Performed by: PHYSICIAN ASSISTANT

## 2024-01-08 PROCEDURE — 3072F LOW RISK FOR RETINOPATHY: CPT | Mod: CPTII,S$GLB,, | Performed by: PHYSICIAN ASSISTANT

## 2024-01-08 PROCEDURE — 1159F MED LIST DOCD IN RCRD: CPT | Mod: CPTII,S$GLB,, | Performed by: PHYSICIAN ASSISTANT

## 2024-01-08 PROCEDURE — 99214 OFFICE O/P EST MOD 30 MIN: CPT | Mod: S$GLB,,, | Performed by: PHYSICIAN ASSISTANT

## 2024-01-08 PROCEDURE — 3078F DIAST BP <80 MM HG: CPT | Mod: CPTII,S$GLB,, | Performed by: PHYSICIAN ASSISTANT

## 2024-01-08 RX ORDER — HYDROCODONE BITARTRATE AND ACETAMINOPHEN 10; 325 MG/1; MG/1
1 TABLET ORAL 3 TIMES DAILY PRN
Qty: 90 TABLET | Refills: 0 | Status: SHIPPED | OUTPATIENT
Start: 2024-02-27 | End: 2024-03-28

## 2024-01-08 RX ORDER — HYDROCODONE BITARTRATE AND ACETAMINOPHEN 10; 325 MG/1; MG/1
1 TABLET ORAL 3 TIMES DAILY PRN
Qty: 90 TABLET | Refills: 0 | Status: SHIPPED | OUTPATIENT
Start: 2024-03-28 | End: 2024-04-27

## 2024-01-08 RX ORDER — HYDROCODONE BITARTRATE AND ACETAMINOPHEN 10; 325 MG/1; MG/1
1 TABLET ORAL 3 TIMES DAILY PRN
Qty: 90 TABLET | Refills: 0 | Status: SHIPPED | OUTPATIENT
Start: 2024-01-28 | End: 2024-02-27

## 2024-01-08 NOTE — PROGRESS NOTES
This note was completed with dictation software and grammatical errors may exist.    CC:  Neck and back pain    HPI: The patient is a 63-year-old woman with a history of diabetes, hypertension who presents in referral from [facility] for neck pain radiating into the arms and low back pain radiating into the left leg.  She returns in follow-up today with low back pain greater than neck pain.  She continues to have right foot numbness.  Her main complaint today is left knee pain.  She has seen orthopedics here in the past.  She continues to take pain medicine with relief.  She continues to have some depression and is on a waiting list to see if she can get in with our Psychiatry Department sooner.    Pain intervention history: She had undergone 3 epidurals for low back pain with only up to 3 weeks of relief each time.  She is status post C7-T1 cervical interlaminar epidural steroid injection on 1/29/16 with 50% relief.   She is status post C7-T1 cervical interlaminar epidural steroid injection on 3/1/16 with mild additional relief.  She is status post C7-T1 cervical interlaminar epidural steroid injection on 5/13/16 with 70-75% relief.  She is status post right C3, 4, 5 and 6 medial branch radiofrequency ablation on 7/15/16 with 30-60% relief.  She is status post L5/S1 interlaminar epidural steroid injection on 5/16/17 with 50% relief.  She is status post L5/S1 interlaminar epidural steroid injection on 12/29/17 with excellent relief lasting 2 weeks, now reporting minimal relief of her low back and buttock pain but ongoing 100% relief of her right leg pain.  She is status post C7-T1 cervical interlaminar epidural steroid injection on 3/14/18 with 75% relief but this was not long lasting.  She is status post right L4/5 and L5/S1 transforaminal epidural steroid injection on 01/11/2021 with Dr Luna with 0% relief.   She is status post caudal epidural steroid injection on 11/01/2021 with 70% relief  "lasting over 1 month.   She is status post caudal epidural steroid injection on 04/05/2022 with 60-65% relief.     Spine surgeries: Right L4/5 and L5/S1 laminectomy on 06/24/2021 Dr Hester.     Antineuropathics:  Gabapentin 100 mg 3 times daily  NSAIDs:  Physical therapy:  [facility] in [place]  Antidepressants:  Amitriptyline 25 mg nightly  Muscle relaxers:  Tizanidine 4 mg 3 times daily as needed  Opioids:  Hydrocodone-acetaminophen 10/325 mg 3 times daily as needed  Antiplatelets/Anticoagulants:    ROS: She reports headaches, hoarse voice, joint stiffness, joint swelling, back pain, difficulty sleeping, anxiety and loss of balance.  Balance of review of systems is negative.    Medical, surgical, family and social history reviewed elsewhere in record.    Medications/Allergies: See med card    Vitals:    01/08/24 1114   BP: (!) 142/73   Pulse: 63   Weight: 78.5 kg (173 lb 2.7 oz)   Height: 5' 6" (1.676 m)   PainSc:   5   PainLoc: Back         Physical exam:  Gen: A and O x3, pleasant, well-groomed  Skin: No rashes or obvious lesions  HEENT: PERRLA, no obvious deformities on ears or in canals.Trachea midline.  CVS: Regular rate and rhythm, normal palpable pulses.  Resp: Clear to auscultation bilaterally, no wheezes or rales.  Abdomen: Soft, NT/ND.  Musculoskeletal: No antalgic gait.     Neuro:  Upper extremities: 4/5 strength bilaterally  Lower extremities:  4/5 strength bilaterally   Reflexes: Brachioradialis 2+, Bicep 2+, Tricep 2+.  Patellar and Achilles reflexes 2+ bilaterally.  Sensory: Intact and symmetrical to light touch and pinprick in C2-T1 dermatomes bilaterally.  Osuna's negative.  Romberg positive for imbalance, abnormal tandem gait test.    Cervical Spine:  Cervical spine: Range of motion is mildly reduced with flexion extension and lateral rotation without increased pain.  Spurling's maneuver causes neck pain to either side.    Myofascial exam: No tenderness to palpation to the cervical " paraspinous muscles.    Lumbar spine:  Range of motion is moderately reduced with flexion, extension and oblique extension with increased low back pain during each maneuver.  Khanh's test is negative bilaterally.  Straight leg raise causes left leg pain.  Internal and external rotation of hip is negative bilaterally.  Myofascial exam:  Mild tenderness to palpation to the lumbar paraspinous muscles.    Imagin/24/15 C-spine MRI  1. C3-4 left posterior paracentral disk extrusion causing left paracentral spinal cord compression and severe left foraminal stenosis.  2. C4-5 based disk extrusion with spinal cord impingement and moderate bilateral foraminal stenosis.  3. C5-6 posterior central disk extrusion with spinal cord compression and severe bilateral foraminal stenosis.  4. C6-7 mild disk bulge with severe bilateral foraminal stenosis.  5. Solitary mildly enlarged left submandibular lymph node of uncertain significance.    4/10/17 MRI lumbar spine  T12-L1: No central canal or neuroforaminal stenosis. No disc protrusion or extrusion.  L1-L2: There is ligamentum flavum thickening and facet arthropathy.  No central canal or neuroforaminal stenosis. No disc protrusion or extrusion.  L2-L3: There is ligamentum flavum thickening and facet arthropathy.  No central canal or neuroforaminal stenosis. No disc protrusion or extrusion.  L3-L4: There is ligamentum flavum thickening and facet arthropathy present. No central canal or neuroforaminal stenosis. No disc protrusion or extrusion.  L4-L5: There is a broad disc bulge which extends into both neural foramina.  There is ligamentum flavum thickening and facet arthropathy.  There is right lateral recess stenosis.  There is abutment of the descending right L5 nerve in the right lateral recess.  Please correlate clinically for symptoms referable to the right L5 nerve.  There is mild central canal stenosis.  There is mild left neuroforaminal stenosis.  No right  neuroforaminal stenosis.  L5-S1: There is a broad central/right paracentral disc protrusion which encroaches upon the descending right S1 nerve in the right lateral recess.  Please correlate clinically for symptoms referable to the right S1 nerve.  There is mild-moderate right neuroforaminal stenosis.  No left neuroforaminal stenosis.  No central canal stenosis.  There is ligamentum flavum thickening and facet arthropathy.  There is a broad left extraforaminal disc protrusion at L5-S1 which abuts the exited left L5 nerve in the left extraforaminal soft tissues (series 6 image 17), nonspecific.  Please correlate clinically for symptoms referable to the left L5 nerve.    05/28/2019 MRI cervical spine  C2-3: No disc herniation, spinal canal narrowing, or neuroforaminal narrowing.  C3-4: There is moderate broad-based posterior disc osteophyte complex formation with left paracentral predominance.  This along with facet arthropathy contributes to mild spinal canal narrowing and moderate left neuroforaminal narrowing.  C4-5: There is moderate broad-based posterior disc osteophyte complex formation with left paracentral prominence.  This along with facet arthropathy contributes to mild bilateral neuroforaminal narrowing and mild spinal canal narrowing.  C5-6: Moderate broad-based posterior disc osteophyte complex formation and facet arthropathy result in moderate spinal canal narrowing and moderate right and mild left neuroforaminal narrowing.  C6-7: Moderate broad-based posterior disc osteophyte complex formation and facet arthropathy result in mild spinal canal narrowing along with moderate right and severe left neuroforaminal narrowing.  C7-T1: Minimal broad-based posterior disc osteophyte complex formation and bilateral facet arthropathy result in mild bilateral neuroforaminal narrowing.      05/28/2019 MRI lumbar spine  T12-L1: No disc herniation, spinal canal narrowing, or neuroforaminal narrowing.  L1-2: No disc  herniation, spinal canal narrowing, or neuroforaminal narrowing.  L2-3: No disc herniation, spinal canal narrowing, or neuroforaminal narrowing.  L3-4: No disc herniation, spinal canal narrowing, or neuroforaminal narrowing.  L4-5: There is moderate generalized disc bulging and bilateral facet arthropathy, which result in mild spinal canal narrowing and mild bilateral neuroforaminal narrowing.  L5-S1: There is a small right paracentral disc protrusion.  This effaces the right lateral recess and may impinge upon the descending right S1 nerve root.  Bilateral facet arthropathy is present and there is resultant overall mild spinal canal narrowing.  Moderate right and mild left neuroforaminal narrowing are also present.    06/24/2021 MRI lumbar spine  NOMENCLATURE: Five lumbar type vertebral bodies.     CORD/CAUDA EQUINA: Conus has normal size and signal and ends at a normal level of L1-L2.  Small amount of susceptibility either in the subdural or intrathecal space ventrally and dorsally at the L3-4 level.     ALIGNMENT: Trace retrolisthesis of L4 on L5.     BONES: Interval L4 laminectomy and right L5 hemilaminectomy.  Medial right L4-5 and L5-S1 facetectomies.  Susceptibility artifact is again seen in the left L5 pedicle.  No aggressive bone marrow signal.     PARASPINAL AREA: Fluid and edema along the surgical tract and in the right dorsal paraspinal muscles at the surgical levels.     LUMBAR DISC LEVELS:     T12-L1: No disc herniation or significant posterior osteophytic ridging.  Mild left facet hypertrophy.  No significant spinal canal or foraminal stenosis.     L1-L2: Minimal disc bulge.  Mild bilateral facet hypertrophy.  Ligamentum flavum thickening.  Minimal narrowing of the bilateral lateral recesses.  No significant spinal canal or foraminal stenosis.     L2-L3: Mild disc bulge.  Mild bilateral facet hypertrophy.  Ligamentum flavum thickening.  Mild narrowing of the bilateral lateral recesses.  No significant  spinal canal or foraminal stenosis.     L3-L4: Mild disc bulge.  Mild-moderate bilateral facet hypertrophy.  Ligamentum flavum thickening.  Susceptibility at the midline anterior and posterior aspect of the thecal sac.  Mild narrowing of the bilateral lateral recesses.  Moderate spinal canal stenosis, increased from prior study.  Mild bilateral foraminal stenosis.     L4-L5: Laminectomy.  Right facetectomy.  Mild disc bulge and disc height loss.  Mild narrowing of the lateral recesses without significant overall spinal canal stenosis.  Mild bilateral foraminal stenosis.     L5-S1: Interval right hemilaminectomy and right medial facetectomy.  Mild disc bulge.  Small central protrusion.  Mild narrowing of the right lateral recess which is significantly decreased from preoperative study.  No significant spinal canal stenosis.  Moderate right and mild-moderate left foraminal stenosis, unchanged.  The left L5 nerve root may be contacted by disc as it exits the foramen    Assessment:  The patient is a 63-year-old woman with a history of diabetes, hypertension who presents in referral from [facility] for neck pain radiating into the arms and low back pain radiating into the left leg.     1. DDD (degenerative disc disease), lumbar        2. Lumbar radiculopathy        3. Cervical radiculopathy        4. DDD (degenerative disc disease), cervical        5. Opioid contract exists            Plan:  1. Dr. Galan provided prescriptions for hydrocodone-acetaminophen 10/325 mg up to 3 times daily as needed for pain.  I have reviewed the [place] Board of Pharmacy website and there are no abberancies.   2. I will have her follow-up with orthopedics for her left knee.  3. We discussed the importance of continuing her home exercise program.  If her pain worsens she can contact us to repeat a caudal epidural steroid injection.  4. Follow-up in 3 months or sooner as needed.

## 2024-01-18 DIAGNOSIS — M50.30 DDD (DEGENERATIVE DISC DISEASE), CERVICAL: ICD-10-CM

## 2024-01-18 DIAGNOSIS — M54.16 LUMBAR RADICULOPATHY: ICD-10-CM

## 2024-01-18 DIAGNOSIS — M54.12 CERVICAL RADICULOPATHY: ICD-10-CM

## 2024-01-19 RX ORDER — PIROXICAM 20 MG/1
20 CAPSULE ORAL
Qty: 30 CAPSULE | Refills: 3 | Status: SHIPPED | OUTPATIENT
Start: 2024-01-19

## 2024-01-21 RX ORDER — TIZANIDINE 4 MG/1
4 TABLET ORAL EVERY 8 HOURS PRN
Qty: 90 TABLET | Refills: 2 | Status: SHIPPED | OUTPATIENT
Start: 2024-01-21 | End: 2024-03-20

## 2024-02-01 DIAGNOSIS — Z79.4 TYPE 2 DIABETES MELLITUS WITHOUT COMPLICATION, WITH LONG-TERM CURRENT USE OF INSULIN: ICD-10-CM

## 2024-02-01 DIAGNOSIS — E11.9 TYPE 2 DIABETES MELLITUS WITHOUT COMPLICATION, WITH LONG-TERM CURRENT USE OF INSULIN: ICD-10-CM

## 2024-02-01 RX ORDER — TIRZEPATIDE 2.5 MG/.5ML
2.5 INJECTION, SOLUTION SUBCUTANEOUS
Qty: 4 PEN | Refills: 0 | Status: CANCELLED | OUTPATIENT
Start: 2024-02-01

## 2024-02-02 ENCOUNTER — TELEPHONE (OUTPATIENT)
Dept: NEUROSURGERY | Facility: CLINIC | Age: 64
End: 2024-02-02
Payer: COMMERCIAL

## 2024-02-02 RX ORDER — TIRZEPATIDE 5 MG/.5ML
5 INJECTION, SOLUTION SUBCUTANEOUS
Qty: 4 PEN | Refills: 6 | Status: ON HOLD | OUTPATIENT
Start: 2024-02-02 | End: 2024-06-12 | Stop reason: HOSPADM

## 2024-02-07 ENCOUNTER — TELEPHONE (OUTPATIENT)
Dept: ENDOCRINOLOGY | Facility: CLINIC | Age: 64
End: 2024-02-07
Payer: COMMERCIAL

## 2024-02-07 NOTE — TELEPHONE ENCOUNTER
----- Message from Siobhan Warren sent at 2/7/2024  1:39 PM CST -----  Regarding: pt called  Name of Who is Calling: KAIA RUBY [[id number]]      What is the request in detail: pt is request a nurse to contact her . Pt needs a prior auth  is needed on the [employer]. Please advise       Can the clinic reply by [facility]: No       What Number to Call Back if not in [facility]: Telephone Information:       [phone]

## 2024-02-12 DIAGNOSIS — G62.9 NEUROPATHY: ICD-10-CM

## 2024-02-14 RX ORDER — GABAPENTIN 800 MG/1
800 TABLET ORAL 2 TIMES DAILY
Qty: 60 TABLET | Refills: 0 | Status: SHIPPED | OUTPATIENT
Start: 2024-02-14 | End: 2024-06-06 | Stop reason: SDUPTHER

## 2024-02-21 ENCOUNTER — TELEPHONE (OUTPATIENT)
Dept: ENDOCRINOLOGY | Facility: CLINIC | Age: 64
End: 2024-02-21
Payer: COMMERCIAL

## 2024-03-14 ENCOUNTER — LAB VISIT (OUTPATIENT)
Dept: LAB | Facility: HOSPITAL | Age: 64
End: 2024-03-14
Attending: NURSE PRACTITIONER
Payer: COMMERCIAL

## 2024-03-14 DIAGNOSIS — E11.9 TYPE 2 DIABETES MELLITUS WITHOUT COMPLICATION, WITH LONG-TERM CURRENT USE OF INSULIN: ICD-10-CM

## 2024-03-14 DIAGNOSIS — Z79.4 TYPE 2 DIABETES MELLITUS WITHOUT COMPLICATION, WITH LONG-TERM CURRENT USE OF INSULIN: ICD-10-CM

## 2024-03-14 LAB
ALBUMIN/CREAT UR: NORMAL UG/MG (ref 0–30)
CREAT UR-MCNC: 111 MG/DL (ref 15–325)
MICROALBUMIN UR DL<=1MG/L-MCNC: <5 UG/ML

## 2024-03-14 PROCEDURE — 82043 UR ALBUMIN QUANTITATIVE: CPT | Performed by: NURSE PRACTITIONER

## 2024-03-20 RX ORDER — TIZANIDINE 4 MG/1
4 TABLET ORAL EVERY 8 HOURS PRN
Qty: 90 TABLET | Refills: 2 | Status: SHIPPED | OUTPATIENT
Start: 2024-03-20 | End: 2024-04-23 | Stop reason: SDUPTHER

## 2024-04-02 ENCOUNTER — TELEPHONE (OUTPATIENT)
Dept: PAIN MEDICINE | Facility: CLINIC | Age: 64
End: 2024-04-02
Payer: COMMERCIAL

## 2024-04-08 ENCOUNTER — TELEPHONE (OUTPATIENT)
Dept: NEUROLOGY | Facility: CLINIC | Age: 64
End: 2024-04-08
Payer: COMMERCIAL

## 2024-04-08 NOTE — TELEPHONE ENCOUNTER
Spoke to the pt, she is coming to see [facility] for neuropathy and a refill of gabapentin.  Informed the pt that [facility] does not do steroid injections to shoulders and that the last time she saw [facility] was 12/22.

## 2024-04-08 NOTE — TELEPHONE ENCOUNTER
Left  message for the pt to call in regards to an appt scheduled with [facility] on 4/12/24 at 1300.  Appt notes say rotator cuff.

## 2024-04-09 ENCOUNTER — OFFICE VISIT (OUTPATIENT)
Dept: RHEUMATOLOGY | Facility: CLINIC | Age: 64
End: 2024-04-09
Payer: COMMERCIAL

## 2024-04-09 VITALS
WEIGHT: 167.56 LBS | HEART RATE: 71 BPM | BODY MASS INDEX: 26.93 KG/M2 | DIASTOLIC BLOOD PRESSURE: 76 MMHG | HEIGHT: 66 IN | SYSTOLIC BLOOD PRESSURE: 132 MMHG

## 2024-04-09 DIAGNOSIS — G43.909 MIGRAINE WITHOUT STATUS MIGRAINOSUS, NOT INTRACTABLE, UNSPECIFIED MIGRAINE TYPE: ICD-10-CM

## 2024-04-09 DIAGNOSIS — Z79.4 TYPE 2 DIABETES MELLITUS WITHOUT COMPLICATION, WITH LONG-TERM CURRENT USE OF INSULIN: ICD-10-CM

## 2024-04-09 DIAGNOSIS — L70.0 ACNE VULGARIS: Primary | ICD-10-CM

## 2024-04-09 DIAGNOSIS — E11.9 TYPE 2 DIABETES MELLITUS WITHOUT COMPLICATION, WITH LONG-TERM CURRENT USE OF INSULIN: ICD-10-CM

## 2024-04-09 DIAGNOSIS — R11.2 NAUSEA AND VOMITING, UNSPECIFIED VOMITING TYPE: ICD-10-CM

## 2024-04-09 DIAGNOSIS — L40.50 PSA (PSORIATIC ARTHRITIS): ICD-10-CM

## 2024-04-09 DIAGNOSIS — A18.4: ICD-10-CM

## 2024-04-09 PROCEDURE — 3066F NEPHROPATHY DOC TX: CPT | Mod: CPTII,S$GLB,, | Performed by: INTERNAL MEDICINE

## 2024-04-09 PROCEDURE — 1159F MED LIST DOCD IN RCRD: CPT | Mod: CPTII,S$GLB,, | Performed by: INTERNAL MEDICINE

## 2024-04-09 PROCEDURE — 3008F BODY MASS INDEX DOCD: CPT | Mod: CPTII,S$GLB,, | Performed by: INTERNAL MEDICINE

## 2024-04-09 PROCEDURE — 3044F HG A1C LEVEL LT 7.0%: CPT | Mod: CPTII,S$GLB,, | Performed by: INTERNAL MEDICINE

## 2024-04-09 PROCEDURE — 3072F LOW RISK FOR RETINOPATHY: CPT | Mod: CPTII,S$GLB,, | Performed by: INTERNAL MEDICINE

## 2024-04-09 PROCEDURE — 3075F SYST BP GE 130 - 139MM HG: CPT | Mod: CPTII,S$GLB,, | Performed by: INTERNAL MEDICINE

## 2024-04-09 PROCEDURE — 1160F RVW MEDS BY RX/DR IN RCRD: CPT | Mod: CPTII,S$GLB,, | Performed by: INTERNAL MEDICINE

## 2024-04-09 PROCEDURE — 3078F DIAST BP <80 MM HG: CPT | Mod: CPTII,S$GLB,, | Performed by: INTERNAL MEDICINE

## 2024-04-09 PROCEDURE — 99215 OFFICE O/P EST HI 40 MIN: CPT | Mod: S$GLB,,, | Performed by: INTERNAL MEDICINE

## 2024-04-09 PROCEDURE — 3061F NEG MICROALBUMINURIA REV: CPT | Mod: CPTII,S$GLB,, | Performed by: INTERNAL MEDICINE

## 2024-04-09 PROCEDURE — 99999 PR PBB SHADOW E&M-EST. PATIENT-LVL IV: CPT | Mod: PBBFAC,,, | Performed by: INTERNAL MEDICINE

## 2024-04-09 PROCEDURE — 4010F ACE/ARB THERAPY RXD/TAKEN: CPT | Mod: CPTII,S$GLB,, | Performed by: INTERNAL MEDICINE

## 2024-04-09 RX ORDER — BUTALBITAL, ACETAMINOPHEN AND CAFFEINE 50; 325; 40 MG/1; MG/1; MG/1
1 TABLET ORAL EVERY 4 HOURS PRN
Qty: 45 TABLET | Refills: 3 | Status: SHIPPED | OUTPATIENT
Start: 2024-04-09 | End: 2024-10-06

## 2024-04-09 RX ORDER — TRETINOIN 0.25 MG/G
CREAM TOPICAL NIGHTLY
Qty: 45 G | Status: SHIPPED | OUTPATIENT
Start: 2024-04-09 | End: 2024-04-09 | Stop reason: SDUPTHER

## 2024-04-09 RX ORDER — PROMETHAZINE HYDROCHLORIDE 25 MG/1
25 TABLET ORAL EVERY 4 HOURS
Qty: 10 TABLET | Refills: 5 | Status: SHIPPED | OUTPATIENT
Start: 2024-04-09 | End: 2024-10-06

## 2024-04-09 RX ORDER — CLOTRIMAZOLE AND BETAMETHASONE DIPROPIONATE 10; .64 MG/G; MG/G
CREAM TOPICAL 2 TIMES DAILY
Qty: 15 G | Refills: 2 | Status: SHIPPED | OUTPATIENT
Start: 2024-04-09

## 2024-04-09 RX ORDER — TRETINOIN 0.25 MG/G
CREAM TOPICAL NIGHTLY
Qty: 45 G | Refills: 3 | Status: SHIPPED | OUTPATIENT
Start: 2024-04-09 | End: 2024-10-06

## 2024-04-09 RX ORDER — INSULIN GLARGINE 300 [IU]/ML
60 INJECTION, SOLUTION SUBCUTANEOUS NIGHTLY
Qty: 6 ML | Refills: 11 | Status: ON HOLD
Start: 2024-04-09 | End: 2024-06-12 | Stop reason: HOSPADM

## 2024-04-09 ASSESSMENT — ROUTINE ASSESSMENT OF PATIENT INDEX DATA (RAPID3)
MDHAQ FUNCTION SCORE: 1.1
PAIN SCORE: 6
FATIGUE SCORE: 2.2
PSYCHOLOGICAL DISTRESS SCORE: 2.2
TOTAL RAPID3 SCORE: 4.89
PATIENT GLOBAL ASSESSMENT SCORE: 5
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
## 2024-04-23 ENCOUNTER — OFFICE VISIT (OUTPATIENT)
Dept: PAIN MEDICINE | Facility: CLINIC | Age: 64
End: 2024-04-23
Payer: COMMERCIAL

## 2024-04-23 VITALS
WEIGHT: 163.81 LBS | DIASTOLIC BLOOD PRESSURE: 75 MMHG | SYSTOLIC BLOOD PRESSURE: 119 MMHG | HEART RATE: 68 BPM | BODY MASS INDEX: 26.33 KG/M2 | HEIGHT: 66 IN

## 2024-04-23 DIAGNOSIS — M50.30 DDD (DEGENERATIVE DISC DISEASE), CERVICAL: ICD-10-CM

## 2024-04-23 DIAGNOSIS — M54.12 CERVICAL RADICULOPATHY: ICD-10-CM

## 2024-04-23 DIAGNOSIS — M54.16 LUMBAR RADICULOPATHY, CHRONIC: Primary | ICD-10-CM

## 2024-04-23 DIAGNOSIS — M51.36 DDD (DEGENERATIVE DISC DISEASE), LUMBAR: ICD-10-CM

## 2024-04-23 DIAGNOSIS — Z79.891 OPIOID CONTRACT EXISTS: ICD-10-CM

## 2024-04-23 PROCEDURE — 99999 PR PBB SHADOW E&M-EST. PATIENT-LVL III: CPT | Mod: PBBFAC,,, | Performed by: PHYSICIAN ASSISTANT

## 2024-04-23 PROCEDURE — 4010F ACE/ARB THERAPY RXD/TAKEN: CPT | Mod: CPTII,S$GLB,, | Performed by: PHYSICIAN ASSISTANT

## 2024-04-23 PROCEDURE — 1159F MED LIST DOCD IN RCRD: CPT | Mod: CPTII,S$GLB,, | Performed by: PHYSICIAN ASSISTANT

## 2024-04-23 PROCEDURE — 3044F HG A1C LEVEL LT 7.0%: CPT | Mod: CPTII,S$GLB,, | Performed by: PHYSICIAN ASSISTANT

## 2024-04-23 PROCEDURE — 3061F NEG MICROALBUMINURIA REV: CPT | Mod: CPTII,S$GLB,, | Performed by: PHYSICIAN ASSISTANT

## 2024-04-23 PROCEDURE — 99214 OFFICE O/P EST MOD 30 MIN: CPT | Mod: S$GLB,,, | Performed by: PHYSICIAN ASSISTANT

## 2024-04-23 PROCEDURE — 3066F NEPHROPATHY DOC TX: CPT | Mod: CPTII,S$GLB,, | Performed by: PHYSICIAN ASSISTANT

## 2024-04-23 PROCEDURE — 3008F BODY MASS INDEX DOCD: CPT | Mod: CPTII,S$GLB,, | Performed by: PHYSICIAN ASSISTANT

## 2024-04-23 PROCEDURE — 3072F LOW RISK FOR RETINOPATHY: CPT | Mod: CPTII,S$GLB,, | Performed by: PHYSICIAN ASSISTANT

## 2024-04-23 PROCEDURE — 1160F RVW MEDS BY RX/DR IN RCRD: CPT | Mod: CPTII,S$GLB,, | Performed by: PHYSICIAN ASSISTANT

## 2024-04-23 PROCEDURE — 3078F DIAST BP <80 MM HG: CPT | Mod: CPTII,S$GLB,, | Performed by: PHYSICIAN ASSISTANT

## 2024-04-23 PROCEDURE — 3074F SYST BP LT 130 MM HG: CPT | Mod: CPTII,S$GLB,, | Performed by: PHYSICIAN ASSISTANT

## 2024-04-23 RX ORDER — HYDROCODONE BITARTRATE AND ACETAMINOPHEN 10; 325 MG/1; MG/1
1 TABLET ORAL 3 TIMES DAILY PRN
Qty: 90 TABLET | Refills: 0 | Status: SHIPPED | OUTPATIENT
Start: 2024-04-27 | End: 2024-05-27 | Stop reason: SDUPTHER

## 2024-04-23 RX ORDER — HYDROCODONE BITARTRATE AND ACETAMINOPHEN 10; 325 MG/1; MG/1
1 TABLET ORAL 3 TIMES DAILY PRN
Qty: 90 TABLET | Refills: 0 | Status: SHIPPED | OUTPATIENT
Start: 2024-05-27 | End: 2024-06-26

## 2024-04-23 RX ORDER — ALPRAZOLAM 1 MG/1
1 TABLET ORAL
Qty: 1 TABLET | Refills: 0 | Status: SHIPPED | OUTPATIENT
Start: 2024-04-23 | End: 2024-06-05 | Stop reason: ALTCHOICE

## 2024-04-23 RX ORDER — TIZANIDINE 4 MG/1
4 TABLET ORAL EVERY 8 HOURS PRN
Qty: 90 TABLET | Refills: 2 | Status: SHIPPED | OUTPATIENT
Start: 2024-04-23

## 2024-04-23 RX ORDER — HYDROCODONE BITARTRATE AND ACETAMINOPHEN 10; 325 MG/1; MG/1
1 TABLET ORAL 3 TIMES DAILY PRN
Qty: 90 TABLET | Refills: 0 | Status: SHIPPED | OUTPATIENT
Start: 2024-06-26 | End: 2024-07-26

## 2024-04-23 NOTE — TELEPHONE ENCOUNTER
Pt seen in clinic, Please send Rx. I have reviewed the [place] Board [employer] website and there are no abberancies.
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
## 2024-05-07 ENCOUNTER — OFFICE VISIT (OUTPATIENT)
Dept: OPTOMETRY | Facility: CLINIC | Age: 64
End: 2024-05-07
Payer: COMMERCIAL

## 2024-05-07 DIAGNOSIS — Z13.5 GLAUCOMA SCREENING: ICD-10-CM

## 2024-05-07 DIAGNOSIS — H52.203 HYPEROPIA WITH ASTIGMATISM AND PRESBYOPIA, BILATERAL: ICD-10-CM

## 2024-05-07 DIAGNOSIS — E11.9 DIABETES MELLITUS TYPE 2 WITHOUT RETINOPATHY: Primary | ICD-10-CM

## 2024-05-07 DIAGNOSIS — H52.4 HYPEROPIA WITH ASTIGMATISM AND PRESBYOPIA, BILATERAL: ICD-10-CM

## 2024-05-07 DIAGNOSIS — H04.123 DRY EYES, BILATERAL: ICD-10-CM

## 2024-05-07 DIAGNOSIS — H43.393 VITREOUS FLOATERS, BILATERAL: ICD-10-CM

## 2024-05-07 DIAGNOSIS — H52.03 HYPEROPIA WITH ASTIGMATISM AND PRESBYOPIA, BILATERAL: ICD-10-CM

## 2024-05-07 DIAGNOSIS — E11.36 CATARACT ASSOCIATED WITH TYPE 2 DIABETES MELLITUS: ICD-10-CM

## 2024-05-07 PROCEDURE — 3066F NEPHROPATHY DOC TX: CPT | Mod: CPTII,S$GLB,, | Performed by: OPTOMETRIST

## 2024-05-07 PROCEDURE — 3044F HG A1C LEVEL LT 7.0%: CPT | Mod: CPTII,S$GLB,, | Performed by: OPTOMETRIST

## 2024-05-07 PROCEDURE — 99999 PR PBB SHADOW E&M-EST. PATIENT-LVL IV: CPT | Mod: PBBFAC,,, | Performed by: OPTOMETRIST

## 2024-05-07 PROCEDURE — 4010F ACE/ARB THERAPY RXD/TAKEN: CPT | Mod: CPTII,S$GLB,, | Performed by: OPTOMETRIST

## 2024-05-07 PROCEDURE — 2023F DILAT RTA XM W/O RTNOPTHY: CPT | Mod: CPTII,S$GLB,, | Performed by: OPTOMETRIST

## 2024-05-07 PROCEDURE — 92015 DETERMINE REFRACTIVE STATE: CPT | Mod: S$GLB,,, | Performed by: OPTOMETRIST

## 2024-05-07 PROCEDURE — 3061F NEG MICROALBUMINURIA REV: CPT | Mod: CPTII,S$GLB,, | Performed by: OPTOMETRIST

## 2024-05-07 PROCEDURE — 1159F MED LIST DOCD IN RCRD: CPT | Mod: CPTII,S$GLB,, | Performed by: OPTOMETRIST

## 2024-05-07 PROCEDURE — 92014 COMPRE OPH EXAM EST PT 1/>: CPT | Mod: S$GLB,,, | Performed by: OPTOMETRIST
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
## 2024-05-07 NOTE — PATIENT INSTRUCTIONS
"DRY EYES -- BURNING OR CARLOS MANUEL SYMPTOMS:  Use Over The Counter artificial tears as needed for dry eye symptoms.   Some common brands include:  Systane, Optive, Refresh, and Thera-Tears.  These drops can be used as frequently as desired, but may be most helpful use during long periods of concentrated work.  For example, reading / working at the computer. Start with 3-4x per day.     Nighttime Ophthalmic gel or ointments are available: Refresh PM, Genteal, and Lacrilube.    Avoid drops that "get redness out" (Visine, Murine, Clear Eyes), as these may contain medication that could further irritate the eyes, especially with chronic use.    ALLERGY EYES -- ITCHING SYMPTOMS:  Over the counter medications include--Pataday, Zaditor, and Alaway.  Use as directed 1-2 drops daily for symptoms of itching / watering eyes.  These drops will not help for dry eye or exposure symptoms.    REDNESS RELIEF:  Lumify---is a good redness reliever that will not cause irritation if used chronically.        DIABETES AND THE EYE / DIABETIC RETINOPATHY    Diabetic retinopathy is a condition occurring in persons with diabetes, which causes progressive damage to the retina, the light sensitive lining at the back of the eye. It is a serious sight-threatening complication of diabetes.    Diabetic retinopathy is the result of damage to the tiny blood vessels that nourish the retina. They leak blood and other fluids that cause swelling of retinal tissue and clouding of vision. The condition usually affects both eyes. The longer a person has diabetes, the more likely they will develop diabetic retinopathy. If left untreated, diabetic retinopathy can cause blindness.  There are two basic types of diabetic retinopathy:    Background or nonproliferative diabetic retinopathy (NPDR)  Nonproliferative diabetic retinopathy (NPDR) is the earliest stage of diabetic retinopathy. With this condition, damaged blood vessels in the retina begin to leak extra fluid " and small amounts of blood into the eye. Sometimes, deposits of cholesterol or other fats from the blood may leak into the retina. Many people with diabetes have mild NPDR, which usually does not affect their vision. However, if their vision is affected, it is the result of macular edema and macular ischemia.    If vision is affected due to macular changes, a consult with a Retina Specialist may be advised.  This is an ophthalmologist that treats retina conditions, including diabetic retinopathy.     Proliferative diabetic retinopathy (PDR)  Proliferative diabetic retinopathy (PDR) mainly occurs when many of the blood vessels in the retina close, preventing enough blood flow. In an attempt to supply blood to the area where the original vessels closed, the retina responds by growing new blood vessels. This is called neovascularization. However, these new blood vessels are abnormal and do not supply the retina with proper blood flow. The new vessels are also often accompanied by scar tissue that may cause the retina to wrinkle or detach. PDR may cause more severe vision loss than NPDR because it can affect both central and peripheral vision.     A patient diagnosed with proliferative diabetic eye disease will be referred to a retinal specialist for consultation.    Often there are no visual symptoms in the early stages of diabetic retinopathy. That is why our eye care professionals recommend that everyone with diabetes have a comprehensive dilated eye examination once a year. Early detection and treatment can limit the potential for significant vision loss from diabetic retinopathy.

## 2024-05-13 DIAGNOSIS — M54.16 LUMBAR RADICULOPATHY: Primary | ICD-10-CM

## 2024-05-22 ENCOUNTER — HOSPITAL ENCOUNTER (OUTPATIENT)
Dept: RADIOLOGY | Facility: HOSPITAL | Age: 64
Discharge: HOME | End: 2024-05-22
Attending: PHYSICIAN ASSISTANT
Payer: COMMERCIAL

## 2024-05-22 DIAGNOSIS — M54.16 LUMBAR RADICULOPATHY, CHRONIC: ICD-10-CM

## 2024-05-22 DIAGNOSIS — M50.30 DDD (DEGENERATIVE DISC DISEASE), CERVICAL: ICD-10-CM

## 2024-05-22 PROCEDURE — 72158 MRI LUMBAR SPINE W/O & W/DYE: CPT | Mod: 26,,, | Performed by: RADIOLOGY

## 2024-05-22 PROCEDURE — A9585 GADOBUTROL INJECTION: HCPCS | Mod: PO | Performed by: PHYSICIAN ASSISTANT

## 2024-05-22 PROCEDURE — 25500020 PHARM REV CODE 255: Mod: PO | Performed by: PHYSICIAN ASSISTANT

## 2024-05-22 PROCEDURE — 72158 MRI LUMBAR SPINE W/O & W/DYE: CPT | Mod: TC,PO

## 2024-05-22 PROCEDURE — 72141 MRI NECK SPINE W/O DYE: CPT | Mod: TC,PO

## 2024-05-22 PROCEDURE — 72141 MRI NECK SPINE W/O DYE: CPT | Mod: 26,,, | Performed by: RADIOLOGY

## 2024-05-22 RX ORDER — GADOBUTROL 604.72 MG/ML
7 INJECTION INTRAVENOUS
Status: COMPLETED | OUTPATIENT
Start: 2024-05-22 | End: 2024-05-22

## 2024-05-22 RX ADMIN — GADOBUTROL 7 ML: 604.72 INJECTION INTRAVENOUS at 10:05

## 2024-05-23 ENCOUNTER — TELEPHONE (OUTPATIENT)
Dept: PAIN MEDICINE | Facility: CLINIC | Age: 64
End: 2024-05-23

## 2024-05-23 ENCOUNTER — OFFICE VISIT (OUTPATIENT)
Dept: PAIN MEDICINE | Facility: CLINIC | Age: 64
End: 2024-05-23
Payer: COMMERCIAL

## 2024-05-23 VITALS
HEIGHT: 66 IN | DIASTOLIC BLOOD PRESSURE: 62 MMHG | SYSTOLIC BLOOD PRESSURE: 118 MMHG | BODY MASS INDEX: 25.05 KG/M2 | WEIGHT: 155.88 LBS | HEART RATE: 74 BPM

## 2024-05-23 DIAGNOSIS — M50.30 DDD (DEGENERATIVE DISC DISEASE), CERVICAL: ICD-10-CM

## 2024-05-23 DIAGNOSIS — M54.16 LUMBAR RADICULOPATHY: ICD-10-CM

## 2024-05-23 DIAGNOSIS — Z79.891 OPIOID CONTRACT EXISTS: ICD-10-CM

## 2024-05-23 DIAGNOSIS — M48.061 SPINAL STENOSIS OF LUMBAR REGION WITHOUT NEUROGENIC CLAUDICATION: ICD-10-CM

## 2024-05-23 DIAGNOSIS — M79.18 MYOFASCIAL PAIN: ICD-10-CM

## 2024-05-23 DIAGNOSIS — M47.812 CERVICAL SPONDYLOSIS: Primary | ICD-10-CM

## 2024-05-23 PROCEDURE — 3008F BODY MASS INDEX DOCD: CPT | Mod: CPTII,S$GLB,, | Performed by: PHYSICIAN ASSISTANT

## 2024-05-23 PROCEDURE — 3066F NEPHROPATHY DOC TX: CPT | Mod: CPTII,S$GLB,, | Performed by: PHYSICIAN ASSISTANT

## 2024-05-23 PROCEDURE — 99214 OFFICE O/P EST MOD 30 MIN: CPT | Mod: S$GLB,,, | Performed by: PHYSICIAN ASSISTANT

## 2024-05-23 PROCEDURE — 4010F ACE/ARB THERAPY RXD/TAKEN: CPT | Mod: CPTII,S$GLB,, | Performed by: PHYSICIAN ASSISTANT

## 2024-05-23 PROCEDURE — 3074F SYST BP LT 130 MM HG: CPT | Mod: CPTII,S$GLB,, | Performed by: PHYSICIAN ASSISTANT

## 2024-05-23 PROCEDURE — 99999 PR PBB SHADOW E&M-EST. PATIENT-LVL III: CPT | Mod: PBBFAC,,, | Performed by: PHYSICIAN ASSISTANT

## 2024-05-23 PROCEDURE — 3044F HG A1C LEVEL LT 7.0%: CPT | Mod: CPTII,S$GLB,, | Performed by: PHYSICIAN ASSISTANT

## 2024-05-23 PROCEDURE — 1159F MED LIST DOCD IN RCRD: CPT | Mod: CPTII,S$GLB,, | Performed by: PHYSICIAN ASSISTANT

## 2024-05-23 PROCEDURE — 1160F RVW MEDS BY RX/DR IN RCRD: CPT | Mod: CPTII,S$GLB,, | Performed by: PHYSICIAN ASSISTANT

## 2024-05-23 PROCEDURE — 3078F DIAST BP <80 MM HG: CPT | Mod: CPTII,S$GLB,, | Performed by: PHYSICIAN ASSISTANT

## 2024-05-23 PROCEDURE — 3061F NEG MICROALBUMINURIA REV: CPT | Mod: CPTII,S$GLB,, | Performed by: PHYSICIAN ASSISTANT

## 2024-05-23 NOTE — H&P (VIEW-ONLY)
This note was completed with dictation software and grammatical errors may exist.    CC:  Neck and back pain    HPI: The patient is a 63-year-old woman with a history of diabetes, hypertension who presents in referral from [facility] for neck pain radiating into the arms and low back pain radiating into the left leg.  She returns in follow-up today to review her cervical and lumbar spine MRI results.  Her worst pain is currently the right neck radiating to the right occipital region and into the right trapezius muscle, scapular region.  She denies any further radiation into the arm at this time.  She also continues to have bilateral low back pain that radiates into her anterior thighs.  She wakes up with muscle spasms every morning.  She continues to have intermittent numbness in her right foot and intermittent weakness in both legs.    Pain intervention history: She had undergone 3 epidurals for low back pain with only up to 3 weeks of relief each time.  She is status post C7-T1 cervical interlaminar epidural steroid injection on 1/29/16 with 50% relief.   She is status post C7-T1 cervical interlaminar epidural steroid injection on 3/1/16 with mild additional relief.  She is status post C7-T1 cervical interlaminar epidural steroid injection on 5/13/16 with 70-75% relief.  She is status post right C3, 4, 5 and 6 medial branch radiofrequency ablation on 7/15/16 with 30-60% relief.  She is status post L5/S1 interlaminar epidural steroid injection on 5/16/17 with 50% relief.  She is status post L5/S1 interlaminar epidural steroid injection on 12/29/17 with excellent relief lasting 2 weeks, now reporting minimal relief of her low back and buttock pain but ongoing 100% relief of her right leg pain.  She is status post C7-T1 cervical interlaminar epidural steroid injection on 3/14/18 with 75% relief but this was not long lasting.  She is status post right L4/5 and L5/S1 transforaminal epidural steroid  "injection on 01/11/2021 with Dr Luna with 0% relief.   She is status post caudal epidural steroid injection on 11/01/2021 with 70% relief lasting over 1 month.   She is status post caudal epidural steroid injection on 04/05/2022 with 60-65% relief.     Spine surgeries: Right L4/5 and L5/S1 laminectomy on 06/24/2021 Dr Hester.     Antineuropathics:  Gabapentin 100 mg 3 times daily  NSAIDs:  Physical therapy:  [facility] in [place]  Antidepressants:  Amitriptyline 25 mg nightly  Muscle relaxers:  Tizanidine 4 mg 3 times daily as needed  Opioids:  Hydrocodone-acetaminophen 10/325 mg 3 times daily as needed  Antiplatelets/Anticoagulants:    ROS: She reports headaches, hoarse voice, joint stiffness, joint swelling, back pain, difficulty sleeping, anxiety and loss of balance.  Balance of review of systems is negative.    Medical, surgical, family and social history reviewed elsewhere in record.    Medications/Allergies: See med card    Vitals:    05/23/24 1132   BP: 118/62   Pulse: 74   Weight: 70.7 kg (155 lb 13.8 oz)   Height: 5' 6" (1.676 m)   PainSc:   4   PainLoc: Neck         Physical exam:  Gen: A and O x3, pleasant, well-groomed  Skin: No rashes or obvious lesions  HEENT: PERRLA, no obvious deformities on ears or in canals.Trachea midline.  CVS: Regular rate and rhythm, normal palpable pulses.  Resp: Clear to auscultation bilaterally, no wheezes or rales.  Abdomen: Soft, NT/ND.  Musculoskeletal: No antalgic gait.     Neuro:  Upper extremities: 4/5 strength bilaterally  Lower extremities:  4/5 strength bilaterally   Reflexes: Brachioradialis 2+, Bicep 2+, Tricep 2+.  Patellar and Achilles reflexes 2+ bilaterally.  Sensory: Intact and symmetrical to light touch and pinprick in C2-T1 dermatomes bilaterally.  Osuna's negative.  Romberg positive for imbalance, abnormal tandem gait test.    Cervical Spine:  Cervical spine: Range of motion is mildly reduced with flexion extension and lateral rotation without " increased pain.  Spurling's maneuver causes neck pain to either side.    Myofascial exam:  Moderate tenderness to palpation to the cervical paraspinous muscles.    Lumbar spine:  Range of motion is moderately reduced with flexion, extension and oblique extension with increased low back pain during each maneuver.  Khanh's test is negative bilaterally.  Straight leg raise causes left leg pain.  Internal and external rotation of hip is negative bilaterally.  Myofascial exam:  Mild tenderness to palpation to the lumbar paraspinous muscles.    Imagin/24/15 C-spine MRI  1. C3-4 left posterior paracentral disk extrusion causing left paracentral spinal cord compression and severe left foraminal stenosis.  2. C4-5 based disk extrusion with spinal cord impingement and moderate bilateral foraminal stenosis.  3. C5-6 posterior central disk extrusion with spinal cord compression and severe bilateral foraminal stenosis.  4. C6-7 mild disk bulge with severe bilateral foraminal stenosis.  5. Solitary mildly enlarged left submandibular lymph node of uncertain significance.    4/10/17 MRI lumbar spine  T12-L1: No central canal or neuroforaminal stenosis. No disc protrusion or extrusion.  L1-L2: There is ligamentum flavum thickening and facet arthropathy.  No central canal or neuroforaminal stenosis. No disc protrusion or extrusion.  L2-L3: There is ligamentum flavum thickening and facet arthropathy.  No central canal or neuroforaminal stenosis. No disc protrusion or extrusion.  L3-L4: There is ligamentum flavum thickening and facet arthropathy present. No central canal or neuroforaminal stenosis. No disc protrusion or extrusion.  L4-L5: There is a broad disc bulge which extends into both neural foramina.  There is ligamentum flavum thickening and facet arthropathy.  There is right lateral recess stenosis.  There is abutment of the descending right L5 nerve in the right lateral recess.  Please correlate clinically for symptoms  referable to the right L5 nerve.  There is mild central canal stenosis.  There is mild left neuroforaminal stenosis.  No right neuroforaminal stenosis.  L5-S1: There is a broad central/right paracentral disc protrusion which encroaches upon the descending right S1 nerve in the right lateral recess.  Please correlate clinically for symptoms referable to the right S1 nerve.  There is mild-moderate right neuroforaminal stenosis.  No left neuroforaminal stenosis.  No central canal stenosis.  There is ligamentum flavum thickening and facet arthropathy.  There is a broad left extraforaminal disc protrusion at L5-S1 which abuts the exited left L5 nerve in the left extraforaminal soft tissues (series 6 image 17), nonspecific.  Please correlate clinically for symptoms referable to the left L5 nerve.    05/28/2019 MRI cervical spine  C2-3: No disc herniation, spinal canal narrowing, or neuroforaminal narrowing.  C3-4: There is moderate broad-based posterior disc osteophyte complex formation with left paracentral predominance.  This along with facet arthropathy contributes to mild spinal canal narrowing and moderate left neuroforaminal narrowing.  C4-5: There is moderate broad-based posterior disc osteophyte complex formation with left paracentral prominence.  This along with facet arthropathy contributes to mild bilateral neuroforaminal narrowing and mild spinal canal narrowing.  C5-6: Moderate broad-based posterior disc osteophyte complex formation and facet arthropathy result in moderate spinal canal narrowing and moderate right and mild left neuroforaminal narrowing.  C6-7: Moderate broad-based posterior disc osteophyte complex formation and facet arthropathy result in mild spinal canal narrowing along with moderate right and severe left neuroforaminal narrowing.  C7-T1: Minimal broad-based posterior disc osteophyte complex formation and bilateral facet arthropathy result in mild bilateral neuroforaminal  narrowing.      05/28/2019 MRI lumbar spine  T12-L1: No disc herniation, spinal canal narrowing, or neuroforaminal narrowing.  L1-2: No disc herniation, spinal canal narrowing, or neuroforaminal narrowing.  L2-3: No disc herniation, spinal canal narrowing, or neuroforaminal narrowing.  L3-4: No disc herniation, spinal canal narrowing, or neuroforaminal narrowing.  L4-5: There is moderate generalized disc bulging and bilateral facet arthropathy, which result in mild spinal canal narrowing and mild bilateral neuroforaminal narrowing.  L5-S1: There is a small right paracentral disc protrusion.  This effaces the right lateral recess and may impinge upon the descending right S1 nerve root.  Bilateral facet arthropathy is present and there is resultant overall mild spinal canal narrowing.  Moderate right and mild left neuroforaminal narrowing are also present.    06/24/2021 MRI lumbar spine  NOMENCLATURE: Five lumbar type vertebral bodies.     CORD/CAUDA EQUINA: Conus has normal size and signal and ends at a normal level of L1-L2.  Small amount of susceptibility either in the subdural or intrathecal space ventrally and dorsally at the L3-4 level.     ALIGNMENT: Trace retrolisthesis of L4 on L5.     BONES: Interval L4 laminectomy and right L5 hemilaminectomy.  Medial right L4-5 and L5-S1 facetectomies.  Susceptibility artifact is again seen in the left L5 pedicle.  No aggressive bone marrow signal.     PARASPINAL AREA: Fluid and edema along the surgical tract and in the right dorsal paraspinal muscles at the surgical levels.     LUMBAR DISC LEVELS:     T12-L1: No disc herniation or significant posterior osteophytic ridging.  Mild left facet hypertrophy.  No significant spinal canal or foraminal stenosis.     L1-L2: Minimal disc bulge.  Mild bilateral facet hypertrophy.  Ligamentum flavum thickening.  Minimal narrowing of the bilateral lateral recesses.  No significant spinal canal or foraminal stenosis.     L2-L3: Mild  disc bulge.  Mild bilateral facet hypertrophy.  Ligamentum flavum thickening.  Mild narrowing of the bilateral lateral recesses.  No significant spinal canal or foraminal stenosis.     L3-L4: Mild disc bulge.  Mild-moderate bilateral facet hypertrophy.  Ligamentum flavum thickening.  Susceptibility at the midline anterior and posterior aspect of the thecal sac.  Mild narrowing of the bilateral lateral recesses.  Moderate spinal canal stenosis, increased from prior study.  Mild bilateral foraminal stenosis.     L4-L5: Laminectomy.  Right facetectomy.  Mild disc bulge and disc height loss.  Mild narrowing of the lateral recesses without significant overall spinal canal stenosis.  Mild bilateral foraminal stenosis.     L5-S1: Interval right hemilaminectomy and right medial facetectomy.  Mild disc bulge.  Small central protrusion.  Mild narrowing of the right lateral recess which is significantly decreased from preoperative study.  No significant spinal canal stenosis.  Moderate right and mild-moderate left foraminal stenosis, unchanged.  The left L5 nerve root may be contacted by disc as it exits the foramen    04/23/2024 MRI cervical spine  Morphology: There is extensive artifact from the patient's C3-C7 ACDF changes and C6 corpectomy limiting evaluation.  Marrow signal is overall grossly within normal limits.  No obvious fracture.     Alignment: Straightening of the expected normal cervical lordosis without evidence of any significant spondylolisthesis..     Cord: Limited evaluation of the cervical cord demonstrates no focal signal abnormality or cord compression..     Craniocervical Junction: Cerebellar tonsils are normally positioned. The visualized portions of the posterior fossa are unremarkable. The regional osseous anatomy is normal.        Disc levels:        C2-C3: Mild-to-moderate facet arthrosis with uncovertebral spurring producing mild to moderate right and mild left foraminal narrowing.  No significant  narrowing of the spinal canal..     C3-C4: ACDF changes.  Shallow osseous ridging lateralizing to the left mildly narrows the spinal canal.  Uncovertebral spurring and mild facet arthrosis produces moderate to severe left and no more than mild right foraminal narrowing..     C4-C5: ACDF changes with shallow osseous ridging minimally narrowing the spinal canal.  Mild to moderate facet arthrosis contributes to no more than mild bilateral foraminal narrowing..     C5-C6: ACDF changes.  The spinal canal is patent.  Uncovertebral spurring and mild facet arthrosis produces mild to moderate bilateral foraminal narrowing..     C6-C7: ACDF changes.  The spinal canal is patent.  Limited evaluation of the foramina with uncovertebral spurring suggestive of mild to moderate left and mild right foraminal narrowing..     C7-T1: The spinal canal is patent.  Mild ligamentum flavum thickening.  Facet arthrosis and uncovertebral spurring produces moderate right and mild left foraminal narrowing..     Soft tissues: Heterogeneous appearance of the thyroid and multinodular left thyroid gland redemonstrated.  Visualized paraspinal soft tissues are within normal limits.      04/23/2024 MRI lumbar spine  Morphology: Vertebral body heights are preserved.  Incidental L5 vertebral body hemangioma and small chronic appearing Schmorl's node along the anterior/inferior endplate of L4 with mild associated fatty marrow replacement.  Marrow signal is otherwise within normal limits.  No fractures or suspicious lesions.     Alignment: Grade 1 retrolisthesis of L4 on L5 and L5 on S1.     Cord: Normal in contour, caliber, and signal intensity.  Conus position is within normal limits.  No abnormal enhancement.     Disc levels:     T12-L1: Within normal limits.     L1-L2: Shallow annular bulging and mild bilateral facet arthrosis flattening the ventral thecal sac without substantial spinal canal or foraminal narrowing.     L2-L3: Mild bilateral facet  arthrosis the spinal canal and foramina are patent.     L3-L4: Mild degenerative disc height loss and desiccation similar to the prior examination with shallow disc bulging and moderate bilateral facet arthrosis combining to produce mild to moderate narrowing of the spinal canal and subarticular recesses and mild bilateral foraminal narrowing.     L4-L5: Bilateral laminectomy as well as medial facetectomy changes.  Similar moderate to severe disc height loss and desiccation as well as shallow uniform disc bulging and mild-to-moderate facet arthrosis.  Mild encroachment both subarticular recesses with similar decompression of the spinal canal.  Similar mild to moderate right and mild left foraminal narrowing.     L5-S1: Right hemilaminectomy as well as medial facetectomy changes.  Mild bilateral facet arthrosis.  Evidence of previous discectomy changes.  No significant change from the prior examination.  Shallow residual disc bulging with mild encroachment of both subarticular recesses as well as moderate to severe right and mild-to-moderate left foraminal narrowing.  Greatest potential for impingement in the distribution of the right L5 nerve.     Other: Slightly progressed fatty atrophy of the paraspinal musculature dorsally at the lower lumbar levels somewhat asymmetric to the right.  No paraspinal fluid collections or other significant findings.      Assessment:  The patient is a 63-year-old woman with a history of diabetes, hypertension who presents in referral from [facility] for neck pain radiating into the arms and low back pain radiating into the left leg.     1. Cervical spondylosis        2. DDD (degenerative disc disease), cervical        3. Spinal stenosis of lumbar region without neurogenic claudication        4. Lumbar radiculopathy        5. Myofascial pain        6. Opioid contract exists            Plan:  1. I reviewed her lumbar spine MRI was with her and we discussed that her  neck pain is likely facet mediated due to arthritic changes above and below her fusion.  Her low back pain is likely due to canal and foraminal stenosis at L3/4.  We discussed options and she would like to start with treatment for her neck pain.  I will schedule her for right 3rd occipital nerve and C7-T1 diagnostic medial branch nerve blocks.  If successful we will repeat the blocks prior to proceeding with radiofrequency ablation.  If she does not have relief with this we can consider a cervical epidural steroid injection.  2. In the future we discussed scheduling bilateral L3/4 transforaminal epidural steroid injections for her low back and anterior thigh pain.  3. She will continue to take hydrocodone and currently does not need refills.    4. Follow-up in 4 weeks postprocedure or sooner as needed.

## 2024-05-23 NOTE — TELEPHONE ENCOUNTER
Physician - Dr Galan    Type of Procedure/Injection - Cervical Medial Branch Block  TON and C7/T1           Laterality - Right      Anxiolysis- RNIV      Need to hold medication - Yes      N/A    Tirzepatide (Mounjaro) 7 days      Clearance needed - No      Follow up - phone call next day

## 2024-05-23 NOTE — PROGRESS NOTES
This note was completed with dictation software and grammatical errors may exist.    CC:  Neck and back pain    HPI: The patient is a 63-year-old woman with a history of diabetes, hypertension who presents in referral from [facility] for neck pain radiating into the arms and low back pain radiating into the left leg.  She returns in follow-up today to review her cervical and lumbar spine MRI results.  Her worst pain is currently the right neck radiating to the right occipital region and into the right trapezius muscle, scapular region.  She denies any further radiation into the arm at this time.  She also continues to have bilateral low back pain that radiates into her anterior thighs.  She wakes up with muscle spasms every morning.  She continues to have intermittent numbness in her right foot and intermittent weakness in both legs.    Pain intervention history: She had undergone 3 epidurals for low back pain with only up to 3 weeks of relief each time.  She is status post C7-T1 cervical interlaminar epidural steroid injection on 1/29/16 with 50% relief.   She is status post C7-T1 cervical interlaminar epidural steroid injection on 3/1/16 with mild additional relief.  She is status post C7-T1 cervical interlaminar epidural steroid injection on 5/13/16 with 70-75% relief.  She is status post right C3, 4, 5 and 6 medial branch radiofrequency ablation on 7/15/16 with 30-60% relief.  She is status post L5/S1 interlaminar epidural steroid injection on 5/16/17 with 50% relief.  She is status post L5/S1 interlaminar epidural steroid injection on 12/29/17 with excellent relief lasting 2 weeks, now reporting minimal relief of her low back and buttock pain but ongoing 100% relief of her right leg pain.  She is status post C7-T1 cervical interlaminar epidural steroid injection on 3/14/18 with 75% relief but this was not long lasting.  She is status post right L4/5 and L5/S1 transforaminal epidural steroid  "injection on 01/11/2021 with Dr Luna with 0% relief.   She is status post caudal epidural steroid injection on 11/01/2021 with 70% relief lasting over 1 month.   She is status post caudal epidural steroid injection on 04/05/2022 with 60-65% relief.     Spine surgeries: Right L4/5 and L5/S1 laminectomy on 06/24/2021 Dr Hester.     Antineuropathics:  Gabapentin 100 mg 3 times daily  NSAIDs:  Physical therapy:  [facility] in [place]  Antidepressants:  Amitriptyline 25 mg nightly  Muscle relaxers:  Tizanidine 4 mg 3 times daily as needed  Opioids:  Hydrocodone-acetaminophen 10/325 mg 3 times daily as needed  Antiplatelets/Anticoagulants:    ROS: She reports headaches, hoarse voice, joint stiffness, joint swelling, back pain, difficulty sleeping, anxiety and loss of balance.  Balance of review of systems is negative.    Medical, surgical, family and social history reviewed elsewhere in record.    Medications/Allergies: See med card    Vitals:    05/23/24 1132   BP: 118/62   Pulse: 74   Weight: 70.7 kg (155 lb 13.8 oz)   Height: 5' 6" (1.676 m)   PainSc:   4   PainLoc: Neck         Physical exam:  Gen: A and O x3, pleasant, well-groomed  Skin: No rashes or obvious lesions  HEENT: PERRLA, no obvious deformities on ears or in canals.Trachea midline.  CVS: Regular rate and rhythm, normal palpable pulses.  Resp: Clear to auscultation bilaterally, no wheezes or rales.  Abdomen: Soft, NT/ND.  Musculoskeletal: No antalgic gait.     Neuro:  Upper extremities: 4/5 strength bilaterally  Lower extremities:  4/5 strength bilaterally   Reflexes: Brachioradialis 2+, Bicep 2+, Tricep 2+.  Patellar and Achilles reflexes 2+ bilaterally.  Sensory: Intact and symmetrical to light touch and pinprick in C2-T1 dermatomes bilaterally.  Osuna's negative.  Romberg positive for imbalance, abnormal tandem gait test.    Cervical Spine:  Cervical spine: Range of motion is mildly reduced with flexion extension and lateral rotation without " increased pain.  Spurling's maneuver causes neck pain to either side.    Myofascial exam:  Moderate tenderness to palpation to the cervical paraspinous muscles.    Lumbar spine:  Range of motion is moderately reduced with flexion, extension and oblique extension with increased low back pain during each maneuver.  Khanh's test is negative bilaterally.  Straight leg raise causes left leg pain.  Internal and external rotation of hip is negative bilaterally.  Myofascial exam:  Mild tenderness to palpation to the lumbar paraspinous muscles.    Imagin/24/15 C-spine MRI  1. C3-4 left posterior paracentral disk extrusion causing left paracentral spinal cord compression and severe left foraminal stenosis.  2. C4-5 based disk extrusion with spinal cord impingement and moderate bilateral foraminal stenosis.  3. C5-6 posterior central disk extrusion with spinal cord compression and severe bilateral foraminal stenosis.  4. C6-7 mild disk bulge with severe bilateral foraminal stenosis.  5. Solitary mildly enlarged left submandibular lymph node of uncertain significance.    4/10/17 MRI lumbar spine  T12-L1: No central canal or neuroforaminal stenosis. No disc protrusion or extrusion.  L1-L2: There is ligamentum flavum thickening and facet arthropathy.  No central canal or neuroforaminal stenosis. No disc protrusion or extrusion.  L2-L3: There is ligamentum flavum thickening and facet arthropathy.  No central canal or neuroforaminal stenosis. No disc protrusion or extrusion.  L3-L4: There is ligamentum flavum thickening and facet arthropathy present. No central canal or neuroforaminal stenosis. No disc protrusion or extrusion.  L4-L5: There is a broad disc bulge which extends into both neural foramina.  There is ligamentum flavum thickening and facet arthropathy.  There is right lateral recess stenosis.  There is abutment of the descending right L5 nerve in the right lateral recess.  Please correlate clinically for symptoms  referable to the right L5 nerve.  There is mild central canal stenosis.  There is mild left neuroforaminal stenosis.  No right neuroforaminal stenosis.  L5-S1: There is a broad central/right paracentral disc protrusion which encroaches upon the descending right S1 nerve in the right lateral recess.  Please correlate clinically for symptoms referable to the right S1 nerve.  There is mild-moderate right neuroforaminal stenosis.  No left neuroforaminal stenosis.  No central canal stenosis.  There is ligamentum flavum thickening and facet arthropathy.  There is a broad left extraforaminal disc protrusion at L5-S1 which abuts the exited left L5 nerve in the left extraforaminal soft tissues (series 6 image 17), nonspecific.  Please correlate clinically for symptoms referable to the left L5 nerve.    05/28/2019 MRI cervical spine  C2-3: No disc herniation, spinal canal narrowing, or neuroforaminal narrowing.  C3-4: There is moderate broad-based posterior disc osteophyte complex formation with left paracentral predominance.  This along with facet arthropathy contributes to mild spinal canal narrowing and moderate left neuroforaminal narrowing.  C4-5: There is moderate broad-based posterior disc osteophyte complex formation with left paracentral prominence.  This along with facet arthropathy contributes to mild bilateral neuroforaminal narrowing and mild spinal canal narrowing.  C5-6: Moderate broad-based posterior disc osteophyte complex formation and facet arthropathy result in moderate spinal canal narrowing and moderate right and mild left neuroforaminal narrowing.  C6-7: Moderate broad-based posterior disc osteophyte complex formation and facet arthropathy result in mild spinal canal narrowing along with moderate right and severe left neuroforaminal narrowing.  C7-T1: Minimal broad-based posterior disc osteophyte complex formation and bilateral facet arthropathy result in mild bilateral neuroforaminal  narrowing.      05/28/2019 MRI lumbar spine  T12-L1: No disc herniation, spinal canal narrowing, or neuroforaminal narrowing.  L1-2: No disc herniation, spinal canal narrowing, or neuroforaminal narrowing.  L2-3: No disc herniation, spinal canal narrowing, or neuroforaminal narrowing.  L3-4: No disc herniation, spinal canal narrowing, or neuroforaminal narrowing.  L4-5: There is moderate generalized disc bulging and bilateral facet arthropathy, which result in mild spinal canal narrowing and mild bilateral neuroforaminal narrowing.  L5-S1: There is a small right paracentral disc protrusion.  This effaces the right lateral recess and may impinge upon the descending right S1 nerve root.  Bilateral facet arthropathy is present and there is resultant overall mild spinal canal narrowing.  Moderate right and mild left neuroforaminal narrowing are also present.    06/24/2021 MRI lumbar spine  NOMENCLATURE: Five lumbar type vertebral bodies.     CORD/CAUDA EQUINA: Conus has normal size and signal and ends at a normal level of L1-L2.  Small amount of susceptibility either in the subdural or intrathecal space ventrally and dorsally at the L3-4 level.     ALIGNMENT: Trace retrolisthesis of L4 on L5.     BONES: Interval L4 laminectomy and right L5 hemilaminectomy.  Medial right L4-5 and L5-S1 facetectomies.  Susceptibility artifact is again seen in the left L5 pedicle.  No aggressive bone marrow signal.     PARASPINAL AREA: Fluid and edema along the surgical tract and in the right dorsal paraspinal muscles at the surgical levels.     LUMBAR DISC LEVELS:     T12-L1: No disc herniation or significant posterior osteophytic ridging.  Mild left facet hypertrophy.  No significant spinal canal or foraminal stenosis.     L1-L2: Minimal disc bulge.  Mild bilateral facet hypertrophy.  Ligamentum flavum thickening.  Minimal narrowing of the bilateral lateral recesses.  No significant spinal canal or foraminal stenosis.     L2-L3: Mild  disc bulge.  Mild bilateral facet hypertrophy.  Ligamentum flavum thickening.  Mild narrowing of the bilateral lateral recesses.  No significant spinal canal or foraminal stenosis.     L3-L4: Mild disc bulge.  Mild-moderate bilateral facet hypertrophy.  Ligamentum flavum thickening.  Susceptibility at the midline anterior and posterior aspect of the thecal sac.  Mild narrowing of the bilateral lateral recesses.  Moderate spinal canal stenosis, increased from prior study.  Mild bilateral foraminal stenosis.     L4-L5: Laminectomy.  Right facetectomy.  Mild disc bulge and disc height loss.  Mild narrowing of the lateral recesses without significant overall spinal canal stenosis.  Mild bilateral foraminal stenosis.     L5-S1: Interval right hemilaminectomy and right medial facetectomy.  Mild disc bulge.  Small central protrusion.  Mild narrowing of the right lateral recess which is significantly decreased from preoperative study.  No significant spinal canal stenosis.  Moderate right and mild-moderate left foraminal stenosis, unchanged.  The left L5 nerve root may be contacted by disc as it exits the foramen    04/23/2024 MRI cervical spine  Morphology: There is extensive artifact from the patient's C3-C7 ACDF changes and C6 corpectomy limiting evaluation.  Marrow signal is overall grossly within normal limits.  No obvious fracture.     Alignment: Straightening of the expected normal cervical lordosis without evidence of any significant spondylolisthesis..     Cord: Limited evaluation of the cervical cord demonstrates no focal signal abnormality or cord compression..     Craniocervical Junction: Cerebellar tonsils are normally positioned. The visualized portions of the posterior fossa are unremarkable. The regional osseous anatomy is normal.        Disc levels:        C2-C3: Mild-to-moderate facet arthrosis with uncovertebral spurring producing mild to moderate right and mild left foraminal narrowing.  No significant  narrowing of the spinal canal..     C3-C4: ACDF changes.  Shallow osseous ridging lateralizing to the left mildly narrows the spinal canal.  Uncovertebral spurring and mild facet arthrosis produces moderate to severe left and no more than mild right foraminal narrowing..     C4-C5: ACDF changes with shallow osseous ridging minimally narrowing the spinal canal.  Mild to moderate facet arthrosis contributes to no more than mild bilateral foraminal narrowing..     C5-C6: ACDF changes.  The spinal canal is patent.  Uncovertebral spurring and mild facet arthrosis produces mild to moderate bilateral foraminal narrowing..     C6-C7: ACDF changes.  The spinal canal is patent.  Limited evaluation of the foramina with uncovertebral spurring suggestive of mild to moderate left and mild right foraminal narrowing..     C7-T1: The spinal canal is patent.  Mild ligamentum flavum thickening.  Facet arthrosis and uncovertebral spurring produces moderate right and mild left foraminal narrowing..     Soft tissues: Heterogeneous appearance of the thyroid and multinodular left thyroid gland redemonstrated.  Visualized paraspinal soft tissues are within normal limits.      04/23/2024 MRI lumbar spine  Morphology: Vertebral body heights are preserved.  Incidental L5 vertebral body hemangioma and small chronic appearing Schmorl's node along the anterior/inferior endplate of L4 with mild associated fatty marrow replacement.  Marrow signal is otherwise within normal limits.  No fractures or suspicious lesions.     Alignment: Grade 1 retrolisthesis of L4 on L5 and L5 on S1.     Cord: Normal in contour, caliber, and signal intensity.  Conus position is within normal limits.  No abnormal enhancement.     Disc levels:     T12-L1: Within normal limits.     L1-L2: Shallow annular bulging and mild bilateral facet arthrosis flattening the ventral thecal sac without substantial spinal canal or foraminal narrowing.     L2-L3: Mild bilateral facet  arthrosis the spinal canal and foramina are patent.     L3-L4: Mild degenerative disc height loss and desiccation similar to the prior examination with shallow disc bulging and moderate bilateral facet arthrosis combining to produce mild to moderate narrowing of the spinal canal and subarticular recesses and mild bilateral foraminal narrowing.     L4-L5: Bilateral laminectomy as well as medial facetectomy changes.  Similar moderate to severe disc height loss and desiccation as well as shallow uniform disc bulging and mild-to-moderate facet arthrosis.  Mild encroachment both subarticular recesses with similar decompression of the spinal canal.  Similar mild to moderate right and mild left foraminal narrowing.     L5-S1: Right hemilaminectomy as well as medial facetectomy changes.  Mild bilateral facet arthrosis.  Evidence of previous discectomy changes.  No significant change from the prior examination.  Shallow residual disc bulging with mild encroachment of both subarticular recesses as well as moderate to severe right and mild-to-moderate left foraminal narrowing.  Greatest potential for impingement in the distribution of the right L5 nerve.     Other: Slightly progressed fatty atrophy of the paraspinal musculature dorsally at the lower lumbar levels somewhat asymmetric to the right.  No paraspinal fluid collections or other significant findings.      Assessment:  The patient is a 63-year-old woman with a history of diabetes, hypertension who presents in referral from [facility] for neck pain radiating into the arms and low back pain radiating into the left leg.     1. Cervical spondylosis        2. DDD (degenerative disc disease), cervical        3. Spinal stenosis of lumbar region without neurogenic claudication        4. Lumbar radiculopathy        5. Myofascial pain        6. Opioid contract exists            Plan:  1. I reviewed her lumbar spine MRI was with her and we discussed that her  neck pain is likely facet mediated due to arthritic changes above and below her fusion.  Her low back pain is likely due to canal and foraminal stenosis at L3/4.  We discussed options and she would like to start with treatment for her neck pain.  I will schedule her for right 3rd occipital nerve and C7-T1 diagnostic medial branch nerve blocks.  If successful we will repeat the blocks prior to proceeding with radiofrequency ablation.  If she does not have relief with this we can consider a cervical epidural steroid injection.  2. In the future we discussed scheduling bilateral L3/4 transforaminal epidural steroid injections for her low back and anterior thigh pain.  3. She will continue to take hydrocodone and currently does not need refills.    4. Follow-up in 4 weeks postprocedure or sooner as needed.

## 2024-05-27 DIAGNOSIS — M51.36 DDD (DEGENERATIVE DISC DISEASE), LUMBAR: ICD-10-CM

## 2024-05-27 RX ORDER — HYDROCODONE BITARTRATE AND ACETAMINOPHEN 10; 325 MG/1; MG/1
1 TABLET ORAL 3 TIMES DAILY PRN
Qty: 90 TABLET | Refills: 0 | Status: SHIPPED | OUTPATIENT
Start: 2024-05-27 | End: 2024-06-26

## 2024-05-30 DIAGNOSIS — M47.812 CERVICAL SPONDYLOSIS: Primary | ICD-10-CM

## 2024-05-30 RX ORDER — SODIUM CHLORIDE, SODIUM LACTATE, POTASSIUM CHLORIDE, CALCIUM CHLORIDE 600; 310; 30; 20 MG/100ML; MG/100ML; MG/100ML; MG/100ML
INJECTION, SOLUTION INTRAVENOUS CONTINUOUS
Status: CANCELLED | OUTPATIENT
Start: 2024-05-30

## 2024-05-30 NOTE — TELEPHONE ENCOUNTER
Spoke with patient and scheduled. Advised to hold Mounjaro x 7 days and pre op information given.Patient verbalized understanding

## 2024-06-04 DIAGNOSIS — Z79.4 TYPE 2 DIABETES MELLITUS WITHOUT COMPLICATION, WITH LONG-TERM CURRENT USE OF INSULIN: ICD-10-CM

## 2024-06-04 DIAGNOSIS — E11.9 TYPE 2 DIABETES MELLITUS WITHOUT COMPLICATION, WITH LONG-TERM CURRENT USE OF INSULIN: ICD-10-CM

## 2024-06-04 RX ORDER — TIRZEPATIDE 15 MG/.5ML
15 INJECTION, SOLUTION SUBCUTANEOUS
Qty: 4 PEN | Refills: 11 | Status: CANCELLED | OUTPATIENT
Start: 2024-06-04 | End: 2025-06-04

## 2024-06-05 DIAGNOSIS — E11.9 TYPE 2 DIABETES MELLITUS WITHOUT COMPLICATION, WITH LONG-TERM CURRENT USE OF INSULIN: ICD-10-CM

## 2024-06-05 DIAGNOSIS — Z79.4 TYPE 2 DIABETES MELLITUS WITHOUT COMPLICATION, WITH LONG-TERM CURRENT USE OF INSULIN: ICD-10-CM

## 2024-06-06 ENCOUNTER — OFFICE VISIT (OUTPATIENT)
Dept: NEUROLOGY | Facility: CLINIC | Age: 64
End: 2024-06-06
Payer: COMMERCIAL

## 2024-06-06 VITALS
HEART RATE: 90 BPM | SYSTOLIC BLOOD PRESSURE: 104 MMHG | BODY MASS INDEX: 24.41 KG/M2 | HEIGHT: 66 IN | DIASTOLIC BLOOD PRESSURE: 61 MMHG | RESPIRATION RATE: 17 BRPM | WEIGHT: 151.88 LBS

## 2024-06-06 DIAGNOSIS — M54.16 LUMBAR RADICULOPATHY: ICD-10-CM

## 2024-06-06 DIAGNOSIS — G56.01 CARPAL TUNNEL SYNDROME OF RIGHT WRIST: Primary | ICD-10-CM

## 2024-06-06 DIAGNOSIS — G62.9 NEUROPATHY: ICD-10-CM

## 2024-06-06 PROCEDURE — 3074F SYST BP LT 130 MM HG: CPT | Mod: CPTII,S$GLB,, | Performed by: NURSE PRACTITIONER

## 2024-06-06 PROCEDURE — 1159F MED LIST DOCD IN RCRD: CPT | Mod: CPTII,S$GLB,, | Performed by: NURSE PRACTITIONER

## 2024-06-06 PROCEDURE — 99999 PR PBB SHADOW E&M-EST. PATIENT-LVL III: CPT | Mod: PBBFAC,,, | Performed by: NURSE PRACTITIONER

## 2024-06-06 PROCEDURE — 1160F RVW MEDS BY RX/DR IN RCRD: CPT | Mod: CPTII,S$GLB,, | Performed by: NURSE PRACTITIONER

## 2024-06-06 PROCEDURE — 3061F NEG MICROALBUMINURIA REV: CPT | Mod: CPTII,S$GLB,, | Performed by: NURSE PRACTITIONER

## 2024-06-06 PROCEDURE — 3066F NEPHROPATHY DOC TX: CPT | Mod: CPTII,S$GLB,, | Performed by: NURSE PRACTITIONER

## 2024-06-06 PROCEDURE — 4010F ACE/ARB THERAPY RXD/TAKEN: CPT | Mod: CPTII,S$GLB,, | Performed by: NURSE PRACTITIONER

## 2024-06-06 PROCEDURE — 3078F DIAST BP <80 MM HG: CPT | Mod: CPTII,S$GLB,, | Performed by: NURSE PRACTITIONER

## 2024-06-06 PROCEDURE — 3008F BODY MASS INDEX DOCD: CPT | Mod: CPTII,S$GLB,, | Performed by: NURSE PRACTITIONER

## 2024-06-06 PROCEDURE — 99214 OFFICE O/P EST MOD 30 MIN: CPT | Mod: S$GLB,,, | Performed by: NURSE PRACTITIONER

## 2024-06-06 PROCEDURE — 3044F HG A1C LEVEL LT 7.0%: CPT | Mod: CPTII,S$GLB,, | Performed by: NURSE PRACTITIONER

## 2024-06-06 RX ORDER — GABAPENTIN 800 MG/1
800 TABLET ORAL 2 TIMES DAILY
Qty: 180 TABLET | Refills: 3 | Status: SHIPPED | OUTPATIENT
Start: 2024-06-06

## 2024-06-06 NOTE — ASSESSMENT & PLAN NOTE
Patient is a 63 y/o female that presents for f/u   Reports of ongoing tingling and numbness to bilateral feet (R>L)   - previously stable on Gabapentin but ran out of medication  Pt was told she had new onset of diabetic neuropathy ~ 10 years ago.   Pt also with extensive spine history with associated pain   - she is following back/spine and pain management.   Recent A1c noted to be elevated (6.3%)   - recommend strict blood glucose control   EMG/NCS from 2022 reported with mild, chronic left L5 radiculopathy; no neuropathy reported  Previous serologies noted with low B6 levels and was supplemented. last B6 level was elevated. She is no longer supplementing  Updated lumbar imaging noted with right L5 foraminal nerve at L5-S1.  Pt on Gabapentin 800 mg BID and reported aid from this dose but ran out   - re-start Yolande   - Consider adding low midday dose for tolerance if needed.  Continue following PM  Continue to f/u with other specificities regarding back pain
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
## 2024-06-10 RX ORDER — TIRZEPATIDE 15 MG/.5ML
15 INJECTION, SOLUTION SUBCUTANEOUS
Qty: 4 PEN | Refills: 11 | Status: SHIPPED | OUTPATIENT
Start: 2024-06-10 | End: 2025-06-10

## 2024-06-12 ENCOUNTER — HOSPITAL ENCOUNTER (OUTPATIENT)
Dept: RADIOLOGY | Facility: HOSPITAL | Age: 64
Discharge: HOME | End: 2024-06-12
Attending: ANESTHESIOLOGY | Admitting: ANESTHESIOLOGY
Payer: COMMERCIAL

## 2024-06-12 ENCOUNTER — HOSPITAL ENCOUNTER (OUTPATIENT)
Facility: HOSPITAL | Age: 64
Discharge: HOME | End: 2024-06-12
Attending: ANESTHESIOLOGY | Admitting: ANESTHESIOLOGY
Payer: COMMERCIAL

## 2024-06-12 DIAGNOSIS — M47.812 CERVICAL SPONDYLOSIS: ICD-10-CM

## 2024-06-12 DIAGNOSIS — M54.2 NECK PAIN: ICD-10-CM

## 2024-06-12 LAB — GLUCOSE SERPL-MCNC: 130 MG/DL (ref 70–110)

## 2024-06-12 PROCEDURE — 64490 INJ PARAVERT F JNT C/T 1 LEV: CPT | Mod: PO,RT | Performed by: ANESTHESIOLOGY

## 2024-06-12 PROCEDURE — 25000003 PHARM REV CODE 250: Mod: PO | Performed by: ANESTHESIOLOGY

## 2024-06-12 PROCEDURE — 63600175 PHARM REV CODE 636 W HCPCS: Mod: PO | Performed by: ANESTHESIOLOGY

## 2024-06-12 PROCEDURE — 76000 FLUOROSCOPY <1 HR PHYS/QHP: CPT | Mod: TC,PO

## 2024-06-12 PROCEDURE — 64491 INJ PARAVERT F JNT C/T 2 LEV: CPT | Mod: KX,RT,, | Performed by: ANESTHESIOLOGY

## 2024-06-12 PROCEDURE — 64490 INJ PARAVERT F JNT C/T 1 LEV: CPT | Mod: KX,RT,, | Performed by: ANESTHESIOLOGY

## 2024-06-12 PROCEDURE — 64491 INJ PARAVERT F JNT C/T 2 LEV: CPT | Mod: PO,RT | Performed by: ANESTHESIOLOGY

## 2024-06-12 RX ORDER — LIDOCAINE HYDROCHLORIDE 10 MG/ML
INJECTION, SOLUTION EPIDURAL; INFILTRATION; INTRACAUDAL; PERINEURAL
Status: DISCONTINUED | OUTPATIENT
Start: 2024-06-12 | End: 2024-06-12 | Stop reason: HOSPADM

## 2024-06-12 RX ORDER — BUPIVACAINE HYDROCHLORIDE 2.5 MG/ML
INJECTION, SOLUTION EPIDURAL; INFILTRATION; INTRACAUDAL
Status: DISCONTINUED | OUTPATIENT
Start: 2024-06-12 | End: 2024-06-12 | Stop reason: HOSPADM

## 2024-06-12 RX ORDER — SODIUM CHLORIDE, SODIUM LACTATE, POTASSIUM CHLORIDE, CALCIUM CHLORIDE 600; 310; 30; 20 MG/100ML; MG/100ML; MG/100ML; MG/100ML
INJECTION, SOLUTION INTRAVENOUS CONTINUOUS
Status: DISCONTINUED | OUTPATIENT
Start: 2024-06-12 | End: 2024-06-12 | Stop reason: HOSPADM

## 2024-06-12 RX ORDER — MIDAZOLAM HYDROCHLORIDE 1 MG/ML
INJECTION INTRAMUSCULAR; INTRAVENOUS
Status: DISCONTINUED | OUTPATIENT
Start: 2024-06-12 | End: 2024-06-12 | Stop reason: HOSPADM

## 2024-06-12 RX ADMIN — SODIUM CHLORIDE, POTASSIUM CHLORIDE, SODIUM LACTATE AND CALCIUM CHLORIDE: 600; 310; 30; 20 INJECTION, SOLUTION INTRAVENOUS at 03:06

## 2024-06-12 NOTE — OP NOTE
PROCEDURE DATE: 6/12/2024    PROCEDURE:  Diagnostic Cervical medial branch block of the right third occipital nerve, C7 and T1 medial branch nerves on the right-side utilizing fluoroscopy    DIAGNOSIS:  Cervical spondylosis    PHYSICIAN: Erik Galan MD    MEDICATIONS INJECTED:  0.25% bupivicaine, 0.5ml at each level.    LOCAL ANESTHETIC USED:   1% lidocaine, 1ml at each level.    SEDATION MEDICATIONS: 2mg versed    ESTIMATED BLOOD LOSS:  none    COMPLICATIONS:  none    TECHNIQUE : A time-out was taken to identify patient and procedure side prior to starting the procedure.  The patient was positioned prone with the site of interest side up. The patient was prepped and draped in the usual sterile fashion using ChloraPrep and sterile towels.  The level was determined under fluoroscopic guidance using a slightly posteriorly oblique view.   Local anesthetic was infiltrated superficially at the skin level.  Then, a 25 gauge 3.5 inch needle was inserted to the anatomic location of the midsection of the lateral masses of C7, T1 or in the case of third occipital nerve, to the joint of C2/C3. A cross table view was then taken to ensure that needles did not cross into neural foramina.  The above noted medication was then injected. The patient tolerated the procedure well.     The patient was monitored after the procedure. The patient will be contacted tomorrow to determine the extent of relief. The patient was given post procedure and discharge instructions to follow at home. The patient was discharged in a stable condition

## 2024-06-12 NOTE — DISCHARGE SUMMARY
[facility] - Surgery  Discharge Note  Short Stay    Procedure(s) (LRB):  Block-nerve-medial branch-cervical  TON, C7/T1 (Right)      OUTCOME: Patient tolerated treatment/procedure well without complication and is now ready for discharge.    DISPOSITION: Home or Self Care    FINAL DIAGNOSIS:  Cervical spondylosis    FOLLOWUP: In clinic    DISCHARGE INSTRUCTIONS:    Discharge Procedure Orders   Diet Adult Regular     No dressing needed     Notify your health care provider if you experience any of the following:  temperature >100.4     Activity as tolerated

## 2024-06-13 ENCOUNTER — OFFICE VISIT (OUTPATIENT)
Dept: ENDOCRINOLOGY | Facility: CLINIC | Age: 64
End: 2024-06-13
Payer: COMMERCIAL

## 2024-06-13 ENCOUNTER — TELEPHONE (OUTPATIENT)
Dept: PAIN MEDICINE | Facility: CLINIC | Age: 64
End: 2024-06-13
Payer: COMMERCIAL

## 2024-06-13 VITALS
DIASTOLIC BLOOD PRESSURE: 77 MMHG | WEIGHT: 155 LBS | OXYGEN SATURATION: 99 % | BODY MASS INDEX: 24.91 KG/M2 | HEIGHT: 66 IN | RESPIRATION RATE: 16 BRPM | HEART RATE: 85 BPM | TEMPERATURE: 97 F | SYSTOLIC BLOOD PRESSURE: 123 MMHG

## 2024-06-13 VITALS
BODY MASS INDEX: 24.73 KG/M2 | OXYGEN SATURATION: 95 % | SYSTOLIC BLOOD PRESSURE: 115 MMHG | HEIGHT: 66 IN | DIASTOLIC BLOOD PRESSURE: 65 MMHG | WEIGHT: 153.88 LBS | HEART RATE: 87 BPM

## 2024-06-13 DIAGNOSIS — E11.9 TYPE 2 DIABETES MELLITUS WITHOUT COMPLICATION, WITH LONG-TERM CURRENT USE OF INSULIN: Primary | ICD-10-CM

## 2024-06-13 DIAGNOSIS — Z79.4 TYPE 2 DIABETES MELLITUS WITHOUT COMPLICATION, WITH LONG-TERM CURRENT USE OF INSULIN: Primary | ICD-10-CM

## 2024-06-13 DIAGNOSIS — I10 ESSENTIAL HYPERTENSION: ICD-10-CM

## 2024-06-13 DIAGNOSIS — E78.49 OTHER HYPERLIPIDEMIA: ICD-10-CM

## 2024-06-13 PROCEDURE — 99999 PR PBB SHADOW E&M-EST. PATIENT-LVL IV: CPT | Mod: PBBFAC,,, | Performed by: NURSE PRACTITIONER

## 2024-06-13 PROCEDURE — 3044F HG A1C LEVEL LT 7.0%: CPT | Mod: CPTII,S$GLB,, | Performed by: NURSE PRACTITIONER

## 2024-06-13 PROCEDURE — 3074F SYST BP LT 130 MM HG: CPT | Mod: CPTII,S$GLB,, | Performed by: NURSE PRACTITIONER

## 2024-06-13 PROCEDURE — 3061F NEG MICROALBUMINURIA REV: CPT | Mod: CPTII,S$GLB,, | Performed by: NURSE PRACTITIONER

## 2024-06-13 PROCEDURE — 99214 OFFICE O/P EST MOD 30 MIN: CPT | Mod: 25,S$GLB,, | Performed by: NURSE PRACTITIONER

## 2024-06-13 PROCEDURE — 3066F NEPHROPATHY DOC TX: CPT | Mod: CPTII,S$GLB,, | Performed by: NURSE PRACTITIONER

## 2024-06-13 PROCEDURE — 4010F ACE/ARB THERAPY RXD/TAKEN: CPT | Mod: CPTII,S$GLB,, | Performed by: NURSE PRACTITIONER

## 2024-06-13 PROCEDURE — 3008F BODY MASS INDEX DOCD: CPT | Mod: CPTII,S$GLB,, | Performed by: NURSE PRACTITIONER

## 2024-06-13 PROCEDURE — 3078F DIAST BP <80 MM HG: CPT | Mod: CPTII,S$GLB,, | Performed by: NURSE PRACTITIONER

## 2024-06-13 PROCEDURE — 1159F MED LIST DOCD IN RCRD: CPT | Mod: CPTII,S$GLB,, | Performed by: NURSE PRACTITIONER

## 2024-06-13 NOTE — PROGRESS NOTES
+  Subjective:       Patient ID: Aaron Garcia is a 63 y.o. female.    Chief Complaint: Diabetes      HPI: Pt is a 60 y.o. AAF  with a diagnosis of Type 2 diabetes mellitus diagnosed approximately 2001, as well as chronic conditions pending review including HTN, HLP. States A1C I n 6% range until appros 2017.  Other pertinent medical and social information noted includes, but not limited to: chronic pain r/t cervical radiculopathy affecting UA.      Interim Events: June 13, 2024:  Seen in interim by IVORY Moreno while I was out.  Mounjaro added. Pt currently taking 15 mg, but missed last week r/t Cervical nerve block procedure.  As a result she has lost 20 lbs and off of both Toujeo and Novolog, which is quite impressive.  She has been having a time with neck and back pain, and cervical nerve block yesterday--which so far she is having relief -so likely pending ablation.  She is requesting a lower dose of mounjaro r/t poor apptite and cont wt loss.     Current DM meds:   mounjaro 15 mg.        Failed DM meds:  metformin, diarrhea. tradjenta 5 mg, glipizide 5 mg bid  (prior 60 Toujeo--12 -15 novolog)   Statin: rosuvasatin 10 mg        Not tolerated statin : na   ACE/ARB:benazepril 40 mg         Not tolerated ACE/ARB: na   Known Diabetic complications:     CGMS Interpretation:       Sensor/Pump Interpretation or Prominent Theme: Glucoses generally hovering in low 100 range, with a couple of mild prandial elevations notes.     March 16, 2023 Fell about a month ago playing soccer with grandson---hurt back--so she has been dealing with that since--also with R rotator cuff--apparently ER told her she does or might have rotator cuff tear.  States glucoses have been running high because she has chronic sinus issues.  Altaf downloaded.     March 9, 2023   No acute events:  Using Freestyle Libre2 . Forgot sensor in car. No c/o hypoglycemia. C/o poor sleep. Lays in bed sometimes til 4 am.  On Elavil, states gabapentin use  to help, and also zanaflex.  Does have 2 daughters and their children move in so stress with that.  Concerned with DM complications.Reassured.        Review of Systems   Constitutional:  Negative for activity change, fatigue and fever.   HENT:  Negative for ear discharge, hearing loss and trouble swallowing. Mouth dryness: couple times a week,  radiates left arm and neck.  pain scale of 5 and lasts 15 minutes.        Green/yellow thick phlegm x 2 weeks.    Eyes:  Negative for photophobia and visual disturbance.        Last Eye Exam: Fall 2021    Respiratory:  Negative for cough and shortness of breath.    Cardiovascular:  Negative for palpitations.   Gastrointestinal:  Positive for constipation. Negative for abdominal pain and diarrhea.   Genitourinary:  Negative for difficulty urinating, frequency and urgency.   Musculoskeletal:  Positive for back pain (pending lumbar fusion). Negative for arthralgias and myalgias.   Integumentary:  Negative for rash and wound.   Neurological:  Negative for weakness and numbness.   Psychiatric/Behavioral:  Negative for sleep disturbance. The patient is not nervous/anxious.          Objective:      Physical Exam  Constitutional:       Appearance: Normal appearance. She is normal weight.   HENT:      Head: Normocephalic and atraumatic.      Mouth/Throat:      Mouth: Mucous membranes are moist.      Pharynx: Oropharynx is clear.   Eyes:      Extraocular Movements: Extraocular movements intact.      Conjunctiva/sclera: Conjunctivae normal.      Pupils: Pupils are equal, round, and reactive to light.   Neck:      Vascular: No carotid bruit.   Cardiovascular:      Rate and Rhythm: Normal rate and regular rhythm.      Pulses: Normal pulses.      Heart sounds: Normal heart sounds.   Pulmonary:      Effort: Pulmonary effort is normal.      Breath sounds: Normal breath sounds.   Musculoskeletal:         General: Normal range of motion.      Cervical back: Normal range of motion and neck  "supple.      Right lower leg: No edema.      Left lower leg: No edema.      Comments: Deferrred   Feet:no open wounds or calluses. Good pedal pulses. +2 bilaterally,   Compression hose on--no vibratory sensation   Lymphadenopathy:      Cervical: No cervical adenopathy.   Skin:     General: Skin is warm and dry.   Neurological:      General: No focal deficit present.      Mental Status: She is alert and oriented to person, place, and time.   Psychiatric:         Mood and Affect: Mood normal.         Behavior: Behavior normal.         Thought Content: Thought content normal.         Judgment: Judgment normal.           /65 (BP Location: Right arm, Patient Position: Sitting, BP Method: Large (Manual))   Pulse 87   Ht 5' 6" (1.676 m)   Wt 69.8 kg (153 lb 14.1 oz)   LMP 03/13/2017   SpO2 95%   BMI 24.84 kg/m²     Hemoglobin A1C   Date Value Ref Range Status   03/14/2024 6.3 (H) 4.0 - 5.6 % Final     Comment:     ADA Screening Guidelines:  5.7-6.4%  Consistent with prediabetes  >or=6.5%  Consistent with diabetes    High levels of fetal hemoglobin interfere with the HbA1C  assay. Heterozygous hemoglobin variants (HbS, HgC, etc)do  not significantly interfere with this assay.   However, presence of multiple variants may affect accuracy.     03/03/2022 7.6 (H) 4.0 - 5.6 % Final     Comment:     ADA Screening Guidelines:  5.7-6.4%  Consistent with prediabetes  >or=6.5%  Consistent with diabetes    High levels of fetal hemoglobin interfere with the HbA1C  assay. Heterozygous hemoglobin variants (HbS, HgC, etc)do  not significantly interfere with this assay.   However, presence of multiple variants may affect accuracy.     10/26/2021 7.5 (H) 4.0 - 5.6 % Final     Comment:     ADA Screening Guidelines:  5.7-6.4%  Consistent with prediabetes  >or=6.5%  Consistent with diabetes    High levels of fetal hemoglobin interfere with the HbA1C  assay. Heterozygous hemoglobin variants (HbS, HgC, etc)do  not significantly " interfere with this assay.   However, presence of multiple variants may affect accuracy.         Chemistry        Component Value Date/Time     03/14/2024 1124    K 4.1 03/14/2024 1124     03/14/2024 1124    CO2 25 03/14/2024 1124    BUN 11 03/14/2024 1124    CREATININE 0.8 05/22/2024 0827    GLU 96 03/14/2024 1124        Component Value Date/Time    CALCIUM 9.6 03/14/2024 1124    ALKPHOS 67 03/14/2024 1124    AST 14 03/14/2024 1124    ALT 8 (L) 03/14/2024 1124    BILITOT 0.8 03/14/2024 1124    ESTGFRAFRICA >60.0 05/04/2022 1349    EGFRNONAA >60.0 05/04/2022 1349          Lab Results   Component Value Date    CHOL 217 (H) 03/14/2024     Lab Results   Component Value Date    HDL 39 (L) 03/14/2024     Lab Results   Component Value Date    LDLCALC 154.0 03/14/2024     Lab Results   Component Value Date    TRIG 120 03/14/2024     Lab Results   Component Value Date    CHOLHDL 18.0 (L) 03/14/2024     Lab Results   Component Value Date    MICALBCREAT Unable to calculate 03/14/2024     Lab Results   Component Value Date    TSH 1.060 05/04/2022     Vit D, 25-Hydroxy   Date Value Ref Range Status   05/04/2022 21 (L) 30 - 96 ng/mL Final     Comment:     Vitamin D deficiency.........<10 ng/mL                              Vitamin D insufficiency......10-29 ng/mL       Vitamin D sufficiency........> or equal to 30 ng/mL  Vitamin D toxicity............>100 ng/mL           Assessment:       1. Type 2 diabetes mellitus without complication, with long-term current use of insulin  tirzepatide 7.5 mg/0.5 mL PnIj    Comprehensive Metabolic Panel    Hemoglobin A1C    Lipid Panel    Microalbumin/Creatinine Ratio, Urine    TSH      2. Essential hypertension        3. Other hyperlipidemia              Plan:     --on benazepril 10  --on rosuvastatin 10     Decrease mounjaro from 15 to 7.5 mg.      May need to add low dose basal back with decrease mounjaro,  will see.    .  ORDERS 06/13/2024\   3 mo with fasting lipids, A1c,  TSH,  CMP, urine m/c

## 2024-06-13 NOTE — TELEPHONE ENCOUNTER
Please answer the following questions to help us determine the next steps in your care:     1. What percentage of pain relief did you receive following the block? (0% no relief, 20% barely gone, 50% senior living gone, 80% almost gone, 100% totally gone)  80%   2. How many hours did pain relief last following the block?     20 + HOURS  3. During this time please describe in detail the activities you were able to do?   SITTING UP STRAIGHT, TURNING HER HEAD, HOUSEHOLD CHORES.  4. Rate your pain 0-10 (10 being the worst pain) prior to the block procedure?   7  5. Rate your pain 0-10 (10 being the worst pain) following the block procedure (before the medication wore off)?  2

## 2024-06-17 DIAGNOSIS — M47.812 SPONDYLOSIS OF CERVICAL SPINE: ICD-10-CM

## 2024-06-17 DIAGNOSIS — M47.812 CERVICAL SPONDYLOSIS: Primary | ICD-10-CM

## 2024-06-17 RX ORDER — SODIUM CHLORIDE, SODIUM LACTATE, POTASSIUM CHLORIDE, CALCIUM CHLORIDE 600; 310; 30; 20 MG/100ML; MG/100ML; MG/100ML; MG/100ML
INJECTION, SOLUTION INTRAVENOUS CONTINUOUS
OUTPATIENT
Start: 2024-06-17

## 2024-06-26 RX ORDER — INSULIN GLARGINE 300 U/ML
INJECTION, SOLUTION SUBCUTANEOUS
Qty: 4.5 ML | Refills: 6 | Status: SHIPPED | OUTPATIENT
Start: 2024-06-26

## 2024-06-28 ENCOUNTER — HOSPITAL ENCOUNTER (OUTPATIENT)
Facility: HOSPITAL | Age: 64
Discharge: HOME | End: 2024-06-28
Attending: ANESTHESIOLOGY | Admitting: ANESTHESIOLOGY
Payer: COMMERCIAL

## 2024-06-28 ENCOUNTER — HOSPITAL ENCOUNTER (OUTPATIENT)
Dept: RADIOLOGY | Facility: HOSPITAL | Age: 64
Discharge: HOME | End: 2024-06-28
Attending: ANESTHESIOLOGY | Admitting: ANESTHESIOLOGY
Payer: COMMERCIAL

## 2024-06-28 VITALS
BODY MASS INDEX: 24.59 KG/M2 | TEMPERATURE: 97 F | SYSTOLIC BLOOD PRESSURE: 124 MMHG | HEIGHT: 66 IN | WEIGHT: 153 LBS | HEART RATE: 88 BPM | OXYGEN SATURATION: 100 % | RESPIRATION RATE: 17 BRPM | DIASTOLIC BLOOD PRESSURE: 66 MMHG

## 2024-06-28 DIAGNOSIS — M47.812 SPONDYLOSIS OF CERVICAL SPINE: ICD-10-CM

## 2024-06-28 DIAGNOSIS — M54.2 NECK PAIN: ICD-10-CM

## 2024-06-28 DIAGNOSIS — M47.812 CERVICAL SPONDYLOSIS: ICD-10-CM

## 2024-06-28 LAB — GLUCOSE SERPL-MCNC: 113 MG/DL (ref 70–110)

## 2024-06-28 PROCEDURE — 25000003 PHARM REV CODE 250: Mod: PO | Performed by: ANESTHESIOLOGY

## 2024-06-28 PROCEDURE — 82962 GLUCOSE BLOOD TEST: CPT | Mod: PO | Performed by: ANESTHESIOLOGY

## 2024-06-28 PROCEDURE — 76000 FLUOROSCOPY <1 HR PHYS/QHP: CPT | Mod: TC,PO

## 2024-06-28 PROCEDURE — 64491 INJ PARAVERT F JNT C/T 2 LEV: CPT | Mod: PO,RT | Performed by: ANESTHESIOLOGY

## 2024-06-28 PROCEDURE — 64490 INJ PARAVERT F JNT C/T 1 LEV: CPT | Mod: KX,RT,, | Performed by: ANESTHESIOLOGY

## 2024-06-28 PROCEDURE — 63600175 PHARM REV CODE 636 W HCPCS: Mod: PO | Performed by: ANESTHESIOLOGY

## 2024-06-28 PROCEDURE — 64490 INJ PARAVERT F JNT C/T 1 LEV: CPT | Mod: PO,RT | Performed by: ANESTHESIOLOGY

## 2024-06-28 PROCEDURE — 64491 INJ PARAVERT F JNT C/T 2 LEV: CPT | Mod: KX,RT,, | Performed by: ANESTHESIOLOGY

## 2024-06-28 RX ORDER — BUPIVACAINE HYDROCHLORIDE 2.5 MG/ML
INJECTION, SOLUTION EPIDURAL; INFILTRATION; INTRACAUDAL
Status: DISCONTINUED | OUTPATIENT
Start: 2024-06-28 | End: 2024-06-28 | Stop reason: HOSPADM

## 2024-06-28 RX ORDER — LIDOCAINE HYDROCHLORIDE 10 MG/ML
INJECTION, SOLUTION EPIDURAL; INFILTRATION; INTRACAUDAL; PERINEURAL
Status: DISCONTINUED | OUTPATIENT
Start: 2024-06-28 | End: 2024-06-28 | Stop reason: HOSPADM

## 2024-06-28 RX ORDER — MIDAZOLAM HYDROCHLORIDE 1 MG/ML
INJECTION INTRAMUSCULAR; INTRAVENOUS
Status: DISCONTINUED | OUTPATIENT
Start: 2024-06-28 | End: 2024-06-28 | Stop reason: HOSPADM

## 2024-06-28 RX ORDER — SODIUM CHLORIDE, SODIUM LACTATE, POTASSIUM CHLORIDE, CALCIUM CHLORIDE 600; 310; 30; 20 MG/100ML; MG/100ML; MG/100ML; MG/100ML
INJECTION, SOLUTION INTRAVENOUS CONTINUOUS
Status: DISCONTINUED | OUTPATIENT
Start: 2024-06-28 | End: 2024-06-28 | Stop reason: HOSPADM

## 2024-06-28 RX ADMIN — SODIUM CHLORIDE, POTASSIUM CHLORIDE, SODIUM LACTATE AND CALCIUM CHLORIDE: 600; 310; 30; 20 INJECTION, SOLUTION INTRAVENOUS at 10:06

## 2024-06-28 NOTE — OP NOTE
PROCEDURE DATE: 6/28/2024    PROCEDURE:  Diagnostic Cervical medial branch block of the right third occipital nerve, C7,T1 medial branch nerves on the right-side utilizing fluoroscopy    DIAGNOSIS:  Cervical spondylosis    PHYSICIAN: Erik Galan MD    MEDICATIONS INJECTED:  0.25% bupivicaine, 0.5ml at each level.    LOCAL ANESTHETIC USED:   1% lidocaine, 1ml at each level.    SEDATION MEDICATIONS: 4mg versed    ESTIMATED BLOOD LOSS:  none    COMPLICATIONS:  none    TECHNIQUE : A time-out was taken to identify patient and procedure side prior to starting the procedure.  The patient was positioned prone with the site of interest side up. The patient was prepped and draped in the usual sterile fashion using ChloraPrep and sterile towels.  The level was determined under fluoroscopic guidance using a slightly posteriorly oblique view.   Local anesthetic was infiltrated superficially at the skin level.  Then, a 25 gauge 3.5 inch needle was inserted to the anatomic location of the midsection of the lateral masses of C7, or in the case of third occipital nerve, to the joint of C2/C3 and the transverse process of T1. A cross table view was then taken to ensure that needles did not cross into neural foramina.  The above noted medication was then injected. The patient tolerated the procedure well.     The patient was monitored after the procedure. The patient will be contacted tomorrow to determine the extent of relief. The patient was given post procedure and discharge instructions to follow at home. The patient was discharged in a stable condition

## 2024-06-28 NOTE — DISCHARGE SUMMARY
[facility] - Surgery  Discharge Note  Short Stay    Procedure(s) (LRB):  Block-nerve-medial branch-cervical-TON-C7-T1 (Right)      OUTCOME: Patient tolerated treatment/procedure well without complication and is now ready for discharge.    DISPOSITION: Home or Self Care    FINAL DIAGNOSIS:  Cervical spondylosis    FOLLOWUP: In clinic    DISCHARGE INSTRUCTIONS:    Discharge Procedure Orders   Diet Adult Regular     No dressing needed     Notify your health care provider if you experience any of the following:  temperature >100.4     Activity as tolerated

## 2024-06-28 NOTE — H&P
CC: Neck pain    HPI: The patient is a 64yo woman with a history of cervical spondylosis here for right third occipital nerve, C7, T1 medial branch block. There are no major changes in history and physical from 24.    Past Medical History:   Diagnosis Date    Arthritis     Asthma     Cataract     OU    Diabetes mellitus, type 2     Gastric ulcer     Hyperlipidemia     Hypertension     Mitral valve prolapse     RYLAN (obstructive sleep apnea)     does not wear CPAP-can't tolerate-claustraphobic    Snores      unable to lay flat, sleeps elevated    Thyroid disease     Goiter       Past Surgical History:   Procedure Laterality Date    ANTERIOR CERVICAL DISCECTOMY W/ FUSION N/A 2021    Procedure: DISCECTOMY, SPINE, CERVICAL, ANTERIOR APPROACH, WITH FUSION , C3-4, C4-5, C5-6, C6 CORPECTOMYc-3-c7 anterior instrumented fusion;  Surgeon: Denise Real MD;  Location: [facility] OR;  Service: Neurosurgery;  Laterality: N/A;    CARPAL TUNNEL RELEASE Left     CARPAL TUNNEL RELEASE Right 2018    Procedure: RELEASE, CARPAL TUNNEL;  Surgeon: Anurag Mathis MD;  Location: [facility] OR;  Service: Orthopedics;  Laterality: Right;    CAUDAL EPIDURAL STEROID INJECTION N/A 2021    Procedure: Injection-steroid-epidural-caudal;  Surgeon: Erik Galan MD;  Location: [facility] OR;  Service: Pain Management;  Laterality: N/A;    CAUDAL EPIDURAL STEROID INJECTION N/A 2022    Procedure: Injection-steroid-epidural-caudal;  Surgeon: Erik Galan MD;  Location: [facility] OR;  Service: Pain Management;  Laterality: N/A;     SECTION      CHOLECYSTECTOMY      DILATION AND CURETTAGE OF UTERUS      Epidural steroid Injection      Pain management, cervical    ESOPHAGEAL DILATION      FACETECTOMY OF VERTEBRA N/A 2021    Procedure: [id number] Medial;  Surgeon: Denise Real MD;  Location: [facility] OR;  Service: Neurosurgery;  Laterality: N/A;    INJECTION OF ANESTHETIC AGENT AROUND MEDIAL  BRANCH NERVES INNERVATING CERVICAL FACET JOINT Right 6/12/2024    Procedure: Block-nerve-medial branch-cervical  TON, C7/T1;  Surgeon: Erik Galan MD;  Location: [facility] OR;  Service: Pain Management;  Laterality: Right;    LAMINECTOMY USING MINIMALLY INVASIVE TECHNIQUE N/A 6/24/2021    Procedure: LAMINECTOMY, SPINE, MINIMALLY INVASIVE RIGHT L5-S1 AND L4-L5 HEMILAMINECTOMY;  Surgeon: Denise Real MD;  Location: [facility] OR;  Service: Neurosurgery;  Laterality: N/A;    MICRODISCECTOMY OF SPINE N/A 6/24/2021    Procedure: MICRODISCECTOMY, SPINE L5-S1;  Surgeon: Denise Real MD;  Location: [facility] OR;  Service: Neurosurgery;  Laterality: N/A;    TRANSFORAMINAL EPIDURAL INJECTION OF STEROID Right 1/11/2021    Procedure: Injection,steroid,epidural,transforaminal approach L4/5 and L5/S1;  Surgeon: Mariano Luna MD;  Location: [facility] OR;  Service: Pain Management;  Laterality: Right;    TRIGGER FINGER RELEASE Left 7/16/2020    Procedure: RELEASE, TRIGGER FINGER//Left thumb, middle and ring finger trigger release;  Surgeon: Jose Mata MD;  Location: [facility] OR;  Service: Orthopedics;  Laterality: Left;    ulcer on tongue removed      WOUND DEBRIDEMENT Left     leg at ankle level       Family History   Problem Relation Name Age of Onset    Diabetes Father      Diabetes Sister      Heart disease Brother      Glaucoma Paternal Aunt      Glaucoma Daughter          suspect    Macular degeneration Neg Hx         Social History     Socioeconomic History    Marital status:    Tobacco Use    Smoking status: Never    Smokeless tobacco: Never   Substance and Sexual Activity    Alcohol use: No     Alcohol/week: 0.0 standard drinks of alcohol    Drug use: Yes     Types: Hydrocodone    Sexual activity: Yes     Partners: Male   Social History Narrative        5 children    Prior secretarial work      Social Determinants of Health     Financial Resource Strain: Unknown (8/23/2021)    Received from [place]  [facility]    Overall Financial Resource Strain (CARDIA)     Difficulty of Paying Living Expenses: Patient declined   Food Insecurity: Unknown (8/23/2021)    Received from [facility]    Hunger Vital Sign     Worried About Running Out of Food in the Last Year: Patient declined     Ran Out of Food in the Last Year: Patient declined   Transportation Needs: Unknown (8/23/2021)    Received from [facility]    PRAPARE - Transportation     Lack of Transportation (Medical): Patient declined     Lack of Transportation (Non-Medical): Patient declined   Physical Activity: Unknown (8/23/2021)    Received from [facility]    Exercise Vital Sign     Days of Exercise per Week: Patient declined     Minutes of Exercise per Session: Patient declined   Stress: Unknown (8/23/2021)    Received from [facility]    [employer] - Occupational Stress Questionnaire     Feeling of Stress : Patient declined       Current Facility-Administered Medications   Medication Dose Route Frequency Provider Last Rate Last Admin    lactated ringers infusion   Intravenous Continuous Erik Galan MD 25 mL/hr at 06/28/24 1051 New Bag at 06/28/24 1051     Facility-Administered Medications Ordered in Other Encounters   Medication Dose Route Frequency Provider Last Rate Last Admin    chlorhexidine 0.12 % solution 15 mL  15 mL Mouth/Throat Once Denise Real MD        mupirocin 2 % ointment   Nasal Once Denise Real MD           Review of patient's allergies indicates:   Allergen Reactions    Metformin Diarrhea     With extended release preparation as well    Peanuts [peanut (legumes)] Itching     Itches    Penicillins Itching     Swelling (extremities)^  Swelling (extremities)^      Gaston Itching     throat  "      Vitals:    06/24/24 1411 06/28/24 1035 06/28/24 1039   BP:   (!) 145/76   Pulse:   71   Resp:   16   Temp:  97.2 °F (36.2 °C)    TempSrc:  Skin    SpO2:   99%   Weight: 69.4 kg (153 lb) 69.4 kg (153 lb)    Height: 5' 6" (1.676 m) 5' 6" (1.676 m)        ASA 2, Mallampati 2    REVIEW OF SYSTEMS:     GENERAL: No weight loss, malaise or fevers.  HEENT:  No recent changes in vision or hearing  NECK: Negative for lumps, no difficulty with swallowing.  RESPIRATORY: Negative for cough, wheezing or shortness of breath, patient denies any recent URI.  CARDIOVASCULAR: Negative for chest pain, leg swelling or palpitations.  GI: Negative for abdominal discomfort, blood in stools or black stools or change in bowel habits.  MUSCULOSKELETAL: See HPI.  SKIN: Negative for lesions, rash, and itching.  PSYCH: No suicidal or homicidal ideations, no current mood disturbances.  HEMATOLOGY/LYMPHOLOGY: Negative for prolonged bleeding, bruising easily or swollen nodes. Patient is not currently taking any anti-coagulants  ENDO: No history of diabetes or thyroid dysfunction  NEURO: No history of syncope, paralysis, seizures or tremors.All other reviewed and negative other than HPI.    Physical exam:  Gen: A and O x3, pleasant, well-groomed  Skin: No rashes or obvious lesions  HEENT: PERRLA, no obvious deformities on ears or in canals. No thyroid masses, trachea midline, no palpable lymph nodes in neck, axilla.  CVS: Regular rate and rhythm, normal S1 and S2, no murmurs.  Resp: Clear to auscultation bilaterally.  Abdomen: Soft, NT/ND, normal bowel sounds present.  Musculoskeletal/Neuro: Moving all extremities    Assessment:  Cervical spondylosis  -     Place in Outpatient; Standing  -     Vital signs; Standing  -     Place 18-22 gauage peripheral IV ; Standing  -     Verify informed consent; Standing  -     Notify physician ; Standing  -     Notify physician ; Standing  -     Notify physician (specify); Standing  -     Diet NPO; " Standing  -     lactated ringers infusion    Spondylosis of cervical spine    Other orders  -     IP VTE LOW RISK PATIENT; Standing  -     POCT Glucose, Hand-Held Device; Standing

## 2024-07-08 DIAGNOSIS — R11.2 NAUSEA AND VOMITING, UNSPECIFIED VOMITING TYPE: ICD-10-CM

## 2024-07-09 RX ORDER — PROMETHAZINE HYDROCHLORIDE 25 MG/1
25 TABLET ORAL EVERY 6 HOURS PRN
Qty: 20 TABLET | Refills: 1 | Status: SHIPPED | OUTPATIENT
Start: 2024-07-09 | End: 2025-01-05

## 2024-07-10 RX ORDER — TIZANIDINE 4 MG/1
4 TABLET ORAL EVERY 8 HOURS PRN
Qty: 90 TABLET | Refills: 2 | Status: SHIPPED | OUTPATIENT
Start: 2024-07-10

## 2024-07-11 DIAGNOSIS — G43.909 MIGRAINE WITHOUT STATUS MIGRAINOSUS, NOT INTRACTABLE, UNSPECIFIED MIGRAINE TYPE: ICD-10-CM

## 2024-07-12 RX ORDER — TOPIRAMATE 25 MG/1
25 TABLET ORAL 2 TIMES DAILY
Qty: 60 TABLET | Refills: 5 | Status: SHIPPED | OUTPATIENT
Start: 2024-07-12

## 2024-07-23 DIAGNOSIS — M51.36 DDD (DEGENERATIVE DISC DISEASE), LUMBAR: ICD-10-CM

## 2024-07-24 RX ORDER — HYDROCODONE BITARTRATE AND ACETAMINOPHEN 10; 325 MG/1; MG/1
1 TABLET ORAL 3 TIMES DAILY PRN
Qty: 90 TABLET | Refills: 0 | Status: SHIPPED | OUTPATIENT
Start: 2024-07-24 | End: 2024-08-23

## 2024-07-31 DIAGNOSIS — R11.2 NAUSEA AND VOMITING, UNSPECIFIED VOMITING TYPE: ICD-10-CM

## 2024-08-06 RX ORDER — PROMETHAZINE HYDROCHLORIDE 25 MG/1
25 TABLET ORAL EVERY 6 HOURS PRN
Qty: 20 TABLET | Refills: 1 | Status: SHIPPED | OUTPATIENT
Start: 2024-08-06 | End: 2025-02-02

## 2024-08-08 ENCOUNTER — TELEPHONE (OUTPATIENT)
Dept: ENDOCRINOLOGY | Facility: CLINIC | Age: 64
End: 2024-08-08
Payer: COMMERCIAL

## 2024-08-09 ENCOUNTER — OFFICE VISIT (OUTPATIENT)
Dept: RHEUMATOLOGY | Facility: CLINIC | Age: 64
End: 2024-08-09
Payer: COMMERCIAL

## 2024-08-09 VITALS
HEART RATE: 80 BPM | HEIGHT: 66 IN | BODY MASS INDEX: 25.55 KG/M2 | WEIGHT: 159 LBS | DIASTOLIC BLOOD PRESSURE: 81 MMHG | SYSTOLIC BLOOD PRESSURE: 131 MMHG

## 2024-08-09 DIAGNOSIS — K21.9 GASTROESOPHAGEAL REFLUX DISEASE, UNSPECIFIED WHETHER ESOPHAGITIS PRESENT: ICD-10-CM

## 2024-08-09 DIAGNOSIS — L40.50 PSA (PSORIATIC ARTHRITIS): Primary | ICD-10-CM

## 2024-08-09 DIAGNOSIS — M50.30 DDD (DEGENERATIVE DISC DISEASE), CERVICAL: ICD-10-CM

## 2024-08-09 PROCEDURE — 1159F MED LIST DOCD IN RCRD: CPT | Mod: CPTII,S$GLB,, | Performed by: PHYSICIAN ASSISTANT

## 2024-08-09 PROCEDURE — 99213 OFFICE O/P EST LOW 20 MIN: CPT | Mod: S$GLB,,, | Performed by: PHYSICIAN ASSISTANT

## 2024-08-09 PROCEDURE — 3008F BODY MASS INDEX DOCD: CPT | Mod: CPTII,S$GLB,, | Performed by: PHYSICIAN ASSISTANT

## 2024-08-09 PROCEDURE — 3061F NEG MICROALBUMINURIA REV: CPT | Mod: CPTII,S$GLB,, | Performed by: PHYSICIAN ASSISTANT

## 2024-08-09 PROCEDURE — 99999 PR PBB SHADOW E&M-EST. PATIENT-LVL V: CPT | Mod: PBBFAC,,, | Performed by: PHYSICIAN ASSISTANT

## 2024-08-09 PROCEDURE — 3066F NEPHROPATHY DOC TX: CPT | Mod: CPTII,S$GLB,, | Performed by: PHYSICIAN ASSISTANT

## 2024-08-09 PROCEDURE — 4010F ACE/ARB THERAPY RXD/TAKEN: CPT | Mod: CPTII,S$GLB,, | Performed by: PHYSICIAN ASSISTANT

## 2024-08-09 PROCEDURE — 3079F DIAST BP 80-89 MM HG: CPT | Mod: CPTII,S$GLB,, | Performed by: PHYSICIAN ASSISTANT

## 2024-08-09 PROCEDURE — 3075F SYST BP GE 130 - 139MM HG: CPT | Mod: CPTII,S$GLB,, | Performed by: PHYSICIAN ASSISTANT

## 2024-08-09 PROCEDURE — 3044F HG A1C LEVEL LT 7.0%: CPT | Mod: CPTII,S$GLB,, | Performed by: PHYSICIAN ASSISTANT

## 2024-08-09 PROCEDURE — 1160F RVW MEDS BY RX/DR IN RCRD: CPT | Mod: CPTII,S$GLB,, | Performed by: PHYSICIAN ASSISTANT

## 2024-08-09 RX ORDER — PANTOPRAZOLE SODIUM 40 MG/1
40 TABLET, DELAYED RELEASE ORAL DAILY
Qty: 60 TABLET | Refills: 3 | Status: SHIPPED | OUTPATIENT
Start: 2024-08-09 | End: 2025-08-09

## 2024-08-09 NOTE — PROGRESS NOTES
Subjective:       Patient ID: Aaron Garcia is a 63 y.o. female.    Chief Complaint: Disease Management    Mrs. Garcia is a 63 year old female who presents to clinic for follow up on psoriatic arthritis and osteoarthritis. She is a new patient to me. She underwent MBB June 12 and June 28 with slight improvement. She continues to have muscle spasms in her arms and legs. She is followed by pain management on norco prn. She is taking piroxicam 20 mg daily.She complains of increased GERD sx and she ran out of reflux medication.     She has been under significant stress with family situations over the last year and a half.           Review of Systems   Constitutional:  Negative for activity change, appetite change, chills, fatigue and fever.   Eyes:  Negative for visual disturbance.   Respiratory:  Negative for cough and shortness of breath.    Cardiovascular:  Negative for chest pain, palpitations and leg swelling.   Gastrointestinal:  Negative for abdominal pain, constipation, diarrhea, nausea and vomiting.   Musculoskeletal:  Positive for arthralgias, back pain and myalgias.   Neurological:  Negative for dizziness, weakness, light-headedness and headaches.         Objective:     Vitals:    08/09/24 1537   BP: 131/81   Pulse: 80       Past Medical History:   Diagnosis Date    Arthritis     Asthma     Cataract     OU    Diabetes mellitus, type 2 2001    Gastric ulcer     Hyperlipidemia     Hypertension     Mitral valve prolapse     RYLAN (obstructive sleep apnea)     does not wear CPAP-can't tolerate-claustraphobic    Snores      unable to lay flat, sleeps elevated    Thyroid disease     Goiter     Past Surgical History:   Procedure Laterality Date    ANTERIOR CERVICAL DISCECTOMY W/ FUSION N/A 1/21/2021    Procedure: DISCECTOMY, SPINE, CERVICAL, ANTERIOR APPROACH, WITH FUSION , C3-4, C4-5, C5-6, C6 CORPECTOMYc-3-c7 anterior instrumented fusion;  Surgeon: Denise Real MD;  Location: [facility];  Service:  Neurosurgery;  Laterality: N/A;    CARPAL TUNNEL RELEASE Left     CARPAL TUNNEL RELEASE Right 2018    Procedure: RELEASE, CARPAL TUNNEL;  Surgeon: Anurag Mathis MD;  Location: [facility] OR;  Service: Orthopedics;  Laterality: Right;    CAUDAL EPIDURAL STEROID INJECTION N/A 2021    Procedure: Injection-steroid-epidural-caudal;  Surgeon: Erik Galan MD;  Location: [facility] OR;  Service: Pain Management;  Laterality: N/A;    CAUDAL EPIDURAL STEROID INJECTION N/A 2022    Procedure: Injection-steroid-epidural-caudal;  Surgeon: Erik Galan MD;  Location: [facility] OR;  Service: Pain Management;  Laterality: N/A;     SECTION      CHOLECYSTECTOMY      DILATION AND CURETTAGE OF UTERUS      Epidural steroid Injection      Pain management, cervical    ESOPHAGEAL DILATION      FACETECTOMY OF VERTEBRA N/A 2021    Procedure: [id number]-S1-0 Medial;  Surgeon: Denise Real MD;  Location: [facility] OR;  Service: Neurosurgery;  Laterality: N/A;    INJECTION OF ANESTHETIC AGENT AROUND MEDIAL BRANCH NERVES INNERVATING CERVICAL FACET JOINT Right 2024    Procedure: Block-nerve-medial branch-cervical  TON, C7/T1;  Surgeon: Erik Galan MD;  Location: [facility] OR;  Service: Pain Management;  Laterality: Right;    INJECTION OF ANESTHETIC AGENT AROUND MEDIAL BRANCH NERVES INNERVATING CERVICAL FACET JOINT Right 2024    Procedure: Block-nerve-medial branch-cervical-TON-C7-T1;  Surgeon: Erik Galan MD;  Location: [facility] OR;  Service: Pain Management;  Laterality: Right;    LAMINECTOMY USING MINIMALLY INVASIVE TECHNIQUE N/A 2021    Procedure: LAMINECTOMY, SPINE, MINIMALLY INVASIVE RIGHT L5-S1 AND L4-L5 HEMILAMINECTOMY;  Surgeon: Denise Real MD;  Location: [facility] OR;  Service: Neurosurgery;  Laterality: N/A;    MICRODISCECTOMY OF SPINE N/A 2021    Procedure: MICRODISCECTOMY, SPINE L5-S1;  Surgeon: Denise Real MD;  Location: [facility] OR;  Service:  Neurosurgery;  Laterality: N/A;    TRANSFORAMINAL EPIDURAL INJECTION OF STEROID Right 1/11/2021    Procedure: Injection,steroid,epidural,transforaminal approach L4/5 and L5/S1;  Surgeon: Mariano Luna MD;  Location: [facility] OR;  Service: Pain Management;  Laterality: Right;    TRIGGER FINGER RELEASE Left 7/16/2020    Procedure: RELEASE, TRIGGER FINGER//Left thumb, middle and ring finger trigger release;  Surgeon: Jose Mata MD;  Location: [facility] OR;  Service: Orthopedics;  Laterality: Left;    ulcer on tongue removed      WOUND DEBRIDEMENT Left     leg at ankle level          Physical Exam   Constitutional: She is oriented to person, place, and time.   Eyes: Right conjunctiva is not injected. Left conjunctiva is not injected.   Cardiovascular: Normal rate.   Pulmonary/Chest: Effort normal.   Musculoskeletal:      Right shoulder: Normal.      Left shoulder: Normal.      Right elbow: Normal.      Left elbow: Normal.      Right wrist: Normal.      Left wrist: Normal.      Right knee: Normal.      Left knee: Normal.   Neurological: She is alert and oriented to person, place, and time. Gait normal.   Skin: No rash noted.   Psychiatric: Mood and affect normal.       Right Side Rheumatological Exam     Examination finds the shoulder, elbow, wrist, knee, 1st PIP, 1st MCP, 2nd PIP, 2nd MCP, 3rd PIP, 3rd MCP, 4th PIP, 4th MCP, 5th PIP and 5th MCP normal.    Left Side Rheumatological Exam     Examination finds the shoulder, elbow, wrist, knee, 1st PIP, 1st MCP, 2nd PIP, 2nd MCP, 3rd PIP, 3rd MCP, 4th PIP, 4th MCP, 5th PIP and 5th MCP normal.         Component      Latest Ref Rng 3/14/2024   Sodium      136 - 145 mmol/L 141    Potassium      3.5 - 5.1 mmol/L 4.1    Chloride      95 - 110 mmol/L 108    CO2      23 - 29 mmol/L 25    Glucose      70 - 110 mg/dL 96    BUN      8 - 23 mg/dL 11    Creatinine      0.5 - 1.4 mg/dL 0.9    Calcium      8.7 - 10.5 mg/dL 9.6    PROTEIN TOTAL      6.0 - 8.4 g/dL 6.8    Albumin       3.5 - 5.2 g/dL 3.9    BILIRUBIN TOTAL      0.1 - 1.0 mg/dL 0.8    ALP      55 - 135 U/L 67    AST      10 - 40 U/L 14    ALT      10 - 44 U/L 8 (L)    eGFR      >60 mL/min/1.73 m^2 >60.0    Anion Gap      8 - 16 mmol/L 8    Cholesterol Total      120 - 199 mg/dL 217 (H)    Triglycerides      30 - 150 mg/dL 120    HDL      40 - 75 mg/dL 39 (L)    LDL Cholesterol      63.0 - 159.0 mg/dL 154.0    HDL/Cholesterol Ratio      20.0 - 50.0 % 18.0 (L)    Total Cholesterol/HDL Ratio      2.0 - 5.0  5.6 (H)    Non-HDL Cholesterol      mg/dL 178    Urine Microalbumin      ug/mL <5.0    Urine Creatinine      15.0 - 325.0 mg/dL 111.0    MICROALB/CREAT RATIO      0.0 - 30.0 ug/mg Unable to calculate    Hemoglobin A1C External      4.0 - 5.6 % 6.3 (H)    Estimated Avg Glucose      68 - 131 mg/dL 134 (H)         Assessment:       1. PSA (psoriatic arthritis)    2. DDD (degenerative disc disease), cervical    3. Gastroesophageal reflux disease, unspecified whether esophagitis present            Plan:       PSA (psoriatic arthritis)    DDD (degenerative disc disease), cervical    Gastroesophageal reflux disease, unspecified whether esophagitis present  -     pantoprazole (PROTONIX) 40 MG tablet; Take 1 tablet (40 mg total) by mouth once daily.  Dispense: 60 tablet; Refill: 3        Plan:  Add protonix 40 mg daily  Cont piroxicam 20 mg daily  Check labs  Cont to f/u with pain management for degenerative spine arthritis    Follow up:  3-4 mo Dr. Allen

## 2024-09-09 ENCOUNTER — OFFICE VISIT (OUTPATIENT)
Dept: OPTOMETRY | Facility: CLINIC | Age: 64
End: 2024-09-09
Payer: COMMERCIAL

## 2024-09-09 DIAGNOSIS — H20.9 TRAUMATIC IRITIS: Primary | ICD-10-CM

## 2024-09-09 PROCEDURE — 2023F DILAT RTA XM W/O RTNOPTHY: CPT | Mod: CPTII,S$GLB,, | Performed by: OPTOMETRIST

## 2024-09-09 PROCEDURE — 1160F RVW MEDS BY RX/DR IN RCRD: CPT | Mod: CPTII,S$GLB,, | Performed by: OPTOMETRIST

## 2024-09-09 PROCEDURE — 3066F NEPHROPATHY DOC TX: CPT | Mod: CPTII,S$GLB,, | Performed by: OPTOMETRIST

## 2024-09-09 PROCEDURE — 4010F ACE/ARB THERAPY RXD/TAKEN: CPT | Mod: CPTII,S$GLB,, | Performed by: OPTOMETRIST

## 2024-09-09 PROCEDURE — 3044F HG A1C LEVEL LT 7.0%: CPT | Mod: CPTII,S$GLB,, | Performed by: OPTOMETRIST

## 2024-09-09 PROCEDURE — 92014 COMPRE OPH EXAM EST PT 1/>: CPT | Mod: S$GLB,,, | Performed by: OPTOMETRIST

## 2024-09-09 PROCEDURE — 99999 PR PBB SHADOW E&M-EST. PATIENT-LVL III: CPT | Mod: PBBFAC,,, | Performed by: OPTOMETRIST

## 2024-09-09 PROCEDURE — 3061F NEG MICROALBUMINURIA REV: CPT | Mod: CPTII,S$GLB,, | Performed by: OPTOMETRIST

## 2024-09-09 PROCEDURE — 1159F MED LIST DOCD IN RCRD: CPT | Mod: CPTII,S$GLB,, | Performed by: OPTOMETRIST

## 2024-09-09 RX ORDER — PREDNISOLONE ACETATE 10 MG/ML
1 SUSPENSION/ DROPS OPHTHALMIC 3 TIMES DAILY
Qty: 5 ML | Refills: 0 | Status: SHIPPED | OUTPATIENT
Start: 2024-09-09 | End: 2024-09-16
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
## 2024-09-12 DIAGNOSIS — R11.2 NAUSEA AND VOMITING, UNSPECIFIED VOMITING TYPE: ICD-10-CM

## 2024-09-13 RX ORDER — PROMETHAZINE HYDROCHLORIDE 25 MG/1
25 TABLET ORAL EVERY 6 HOURS PRN
Qty: 20 TABLET | Refills: 1 | Status: SHIPPED | OUTPATIENT
Start: 2024-09-13 | End: 2025-03-12

## 2024-09-17 ENCOUNTER — LAB VISIT (OUTPATIENT)
Dept: LAB | Facility: HOSPITAL | Age: 64
End: 2024-09-17
Attending: INTERNAL MEDICINE
Payer: COMMERCIAL

## 2024-09-17 DIAGNOSIS — R11.2 NAUSEA AND VOMITING, UNSPECIFIED VOMITING TYPE: ICD-10-CM

## 2024-09-17 DIAGNOSIS — Z79.4 TYPE 2 DIABETES MELLITUS WITHOUT COMPLICATION, WITH LONG-TERM CURRENT USE OF INSULIN: ICD-10-CM

## 2024-09-17 DIAGNOSIS — L40.50 PSA (PSORIATIC ARTHRITIS): ICD-10-CM

## 2024-09-17 DIAGNOSIS — E11.9 TYPE 2 DIABETES MELLITUS WITHOUT COMPLICATION, WITH LONG-TERM CURRENT USE OF INSULIN: ICD-10-CM

## 2024-09-17 DIAGNOSIS — L70.0 ACNE VULGARIS: ICD-10-CM

## 2024-09-17 DIAGNOSIS — A18.4: ICD-10-CM

## 2024-09-17 DIAGNOSIS — G43.909 MIGRAINE WITHOUT STATUS MIGRAINOSUS, NOT INTRACTABLE, UNSPECIFIED MIGRAINE TYPE: ICD-10-CM

## 2024-09-17 LAB
ALBUMIN SERPL BCP-MCNC: 3.9 G/DL (ref 3.5–5.2)
ALP SERPL-CCNC: 62 U/L (ref 55–135)
ALT SERPL W/O P-5'-P-CCNC: 12 U/L (ref 10–44)
ANION GAP SERPL CALC-SCNC: 10 MMOL/L (ref 8–16)
AST SERPL-CCNC: 18 U/L (ref 10–40)
BASOPHILS # BLD AUTO: 0.03 K/UL (ref 0–0.2)
BASOPHILS NFR BLD: 0.6 % (ref 0–1.9)
BILIRUB SERPL-MCNC: 0.5 MG/DL (ref 0.1–1)
BUN SERPL-MCNC: 11 MG/DL (ref 8–23)
CALCIUM SERPL-MCNC: 9.5 MG/DL (ref 8.7–10.5)
CHLORIDE SERPL-SCNC: 108 MMOL/L (ref 95–110)
CHOLEST SERPL-MCNC: 113 MG/DL (ref 120–199)
CHOLEST/HDLC SERPL: 2.3 {RATIO} (ref 2–5)
CO2 SERPL-SCNC: 26 MMOL/L (ref 23–29)
CREAT SERPL-MCNC: 0.7 MG/DL (ref 0.5–1.4)
DIFFERENTIAL METHOD BLD: ABNORMAL
EOSINOPHIL # BLD AUTO: 0.3 K/UL (ref 0–0.5)
EOSINOPHIL NFR BLD: 4.6 % (ref 0–8)
ERYTHROCYTE [DISTWIDTH] IN BLOOD BY AUTOMATED COUNT: 12.5 % (ref 11.5–14.5)
ERYTHROCYTE [SEDIMENTATION RATE] IN BLOOD BY WESTERGREN METHOD: 15 MM/HR (ref 0–20)
EST. GFR  (NO RACE VARIABLE): >60 ML/MIN/1.73 M^2
ESTIMATED AVG GLUCOSE: 140 MG/DL (ref 68–131)
GLUCOSE SERPL-MCNC: 101 MG/DL (ref 70–110)
HBA1C MFR BLD: 6.5 % (ref 4–5.6)
HCT VFR BLD AUTO: 35.8 % (ref 37–48.5)
HDLC SERPL-MCNC: 49 MG/DL (ref 40–75)
HDLC SERPL: 43.4 % (ref 20–50)
HGB BLD-MCNC: 10.8 G/DL (ref 12–16)
IMM GRANULOCYTES # BLD AUTO: 0.01 K/UL (ref 0–0.04)
IMM GRANULOCYTES NFR BLD AUTO: 0.2 % (ref 0–0.5)
LDLC SERPL CALC-MCNC: 53.2 MG/DL (ref 63–159)
LYMPHOCYTES # BLD AUTO: 2.4 K/UL (ref 1–4.8)
LYMPHOCYTES NFR BLD: 44.9 % (ref 18–48)
MCH RBC QN AUTO: 29.3 PG (ref 27–31)
MCHC RBC AUTO-ENTMCNC: 30.2 G/DL (ref 32–36)
MCV RBC AUTO: 97 FL (ref 82–98)
MONOCYTES # BLD AUTO: 0.7 K/UL (ref 0.3–1)
MONOCYTES NFR BLD: 12.1 % (ref 4–15)
NEUTROPHILS # BLD AUTO: 2.1 K/UL (ref 1.8–7.7)
NEUTROPHILS NFR BLD: 37.6 % (ref 38–73)
NONHDLC SERPL-MCNC: 64 MG/DL
NRBC BLD-RTO: 0 /100 WBC
PLATELET # BLD AUTO: 289 K/UL (ref 150–450)
PMV BLD AUTO: 10.3 FL (ref 9.2–12.9)
POTASSIUM SERPL-SCNC: 3.9 MMOL/L (ref 3.5–5.1)
PROT SERPL-MCNC: 7.2 G/DL (ref 6–8.4)
RBC # BLD AUTO: 3.68 M/UL (ref 4–5.4)
SODIUM SERPL-SCNC: 144 MMOL/L (ref 136–145)
TRIGL SERPL-MCNC: 54 MG/DL (ref 30–150)
TSH SERPL DL<=0.005 MIU/L-ACNC: 1.37 UIU/ML (ref 0.4–4)
WBC # BLD AUTO: 5.44 K/UL (ref 3.9–12.7)

## 2024-09-17 PROCEDURE — 80061 LIPID PANEL: CPT | Performed by: NURSE PRACTITIONER

## 2024-09-17 PROCEDURE — 85025 COMPLETE CBC W/AUTO DIFF WBC: CPT | Performed by: INTERNAL MEDICINE

## 2024-09-17 PROCEDURE — 86235 NUCLEAR ANTIGEN ANTIBODY: CPT | Mod: 59 | Performed by: INTERNAL MEDICINE

## 2024-09-17 PROCEDURE — 83036 HEMOGLOBIN GLYCOSYLATED A1C: CPT | Performed by: NURSE PRACTITIONER

## 2024-09-17 PROCEDURE — 86225 DNA ANTIBODY NATIVE: CPT | Performed by: INTERNAL MEDICINE

## 2024-09-17 PROCEDURE — 86235 NUCLEAR ANTIGEN ANTIBODY: CPT | Performed by: INTERNAL MEDICINE

## 2024-09-17 PROCEDURE — 86038 ANTINUCLEAR ANTIBODIES: CPT | Performed by: INTERNAL MEDICINE

## 2024-09-17 PROCEDURE — 83516 IMMUNOASSAY NONANTIBODY: CPT | Performed by: INTERNAL MEDICINE

## 2024-09-17 PROCEDURE — 84443 ASSAY THYROID STIM HORMONE: CPT | Performed by: NURSE PRACTITIONER

## 2024-09-17 PROCEDURE — 85651 RBC SED RATE NONAUTOMATED: CPT | Mod: PO | Performed by: INTERNAL MEDICINE

## 2024-09-17 PROCEDURE — 36415 COLL VENOUS BLD VENIPUNCTURE: CPT | Mod: PO | Performed by: NURSE PRACTITIONER

## 2024-09-17 PROCEDURE — 80053 COMPREHEN METABOLIC PANEL: CPT | Performed by: NURSE PRACTITIONER

## 2024-09-18 ENCOUNTER — OFFICE VISIT (OUTPATIENT)
Dept: ENDOCRINOLOGY | Facility: CLINIC | Age: 64
End: 2024-09-18
Payer: COMMERCIAL

## 2024-09-18 VITALS
SYSTOLIC BLOOD PRESSURE: 124 MMHG | BODY MASS INDEX: 25.22 KG/M2 | HEART RATE: 91 BPM | HEIGHT: 66 IN | WEIGHT: 156.94 LBS | DIASTOLIC BLOOD PRESSURE: 70 MMHG

## 2024-09-18 DIAGNOSIS — E11.9 TYPE 2 DIABETES MELLITUS WITHOUT COMPLICATION, WITH LONG-TERM CURRENT USE OF INSULIN: Primary | ICD-10-CM

## 2024-09-18 DIAGNOSIS — Z79.4 TYPE 2 DIABETES MELLITUS WITHOUT COMPLICATION, WITH LONG-TERM CURRENT USE OF INSULIN: Primary | ICD-10-CM

## 2024-09-18 DIAGNOSIS — E78.49 OTHER HYPERLIPIDEMIA: ICD-10-CM

## 2024-09-18 DIAGNOSIS — I10 ESSENTIAL HYPERTENSION: ICD-10-CM

## 2024-09-18 LAB
ANA SER QL IF: NORMAL
ANTI SM ANTIBODY: 0.08 RATIO (ref 0–0.99)
ANTI SM/RNP ANTIBODY: 0.07 RATIO (ref 0–0.99)
ANTI-SM INTERPRETATION: NEGATIVE
ANTI-SM/RNP INTERPRETATION: NEGATIVE
ANTI-SSA ANTIBODY: 0.06 RATIO (ref 0–0.99)
ANTI-SSA INTERPRETATION: NEGATIVE
ANTI-SSB ANTIBODY: 0.06 RATIO (ref 0–0.99)
ANTI-SSB INTERPRETATION: NEGATIVE
DSDNA AB SER-ACNC: NORMAL [IU]/ML

## 2024-09-18 PROCEDURE — 99999 PR PBB SHADOW E&M-EST. PATIENT-LVL V: CPT | Mod: PBBFAC,,, | Performed by: NURSE PRACTITIONER

## 2024-09-18 PROCEDURE — 4010F ACE/ARB THERAPY RXD/TAKEN: CPT | Mod: CPTII,S$GLB,, | Performed by: NURSE PRACTITIONER

## 2024-09-18 PROCEDURE — 3066F NEPHROPATHY DOC TX: CPT | Mod: CPTII,S$GLB,, | Performed by: NURSE PRACTITIONER

## 2024-09-18 PROCEDURE — 99214 OFFICE O/P EST MOD 30 MIN: CPT | Mod: S$GLB,,, | Performed by: NURSE PRACTITIONER

## 2024-09-18 PROCEDURE — 95251 CONT GLUC MNTR ANALYSIS I&R: CPT | Mod: S$GLB,,, | Performed by: NURSE PRACTITIONER

## 2024-09-18 PROCEDURE — 3008F BODY MASS INDEX DOCD: CPT | Mod: CPTII,S$GLB,, | Performed by: NURSE PRACTITIONER

## 2024-09-18 PROCEDURE — 3044F HG A1C LEVEL LT 7.0%: CPT | Mod: CPTII,S$GLB,, | Performed by: NURSE PRACTITIONER

## 2024-09-18 PROCEDURE — 3078F DIAST BP <80 MM HG: CPT | Mod: CPTII,S$GLB,, | Performed by: NURSE PRACTITIONER

## 2024-09-18 PROCEDURE — 1159F MED LIST DOCD IN RCRD: CPT | Mod: CPTII,S$GLB,, | Performed by: NURSE PRACTITIONER

## 2024-09-18 PROCEDURE — 3074F SYST BP LT 130 MM HG: CPT | Mod: CPTII,S$GLB,, | Performed by: NURSE PRACTITIONER

## 2024-09-18 PROCEDURE — 3061F NEG MICROALBUMINURIA REV: CPT | Mod: CPTII,S$GLB,, | Performed by: NURSE PRACTITIONER

## 2024-09-18 RX ORDER — INSULIN ASPART 100 [IU]/ML
INJECTION, SOLUTION INTRAVENOUS; SUBCUTANEOUS
Qty: 15 ML | Refills: 0 | Status: SHIPPED | OUTPATIENT
Start: 2024-09-18

## 2024-09-18 NOTE — PROGRESS NOTES
+  Subjective:       Patient ID: Aaron Garcia is a 63 y.o. female.    Chief Complaint: Diabetes      HPI: Pt is a 60 y.o. AAF  with a diagnosis of Type 2 diabetes mellitus diagnosed approximately 2001, as well as chronic conditions pending review including HTN, HLP. States A1C I n 6% range until appros 2017.  Other pertinent medical and social information noted includes, but not limited to: chronic pain r/t cervical radiculopathy affecting UA.      Interim Events: Sept 18, 20224:  had 2 cervical nerve blocks last week. C/o nocturnal hypoglycemia. Only on 7.5 mg Mounjaro weekly.  States sometimes glucose dose get elevated and she may take 20 of Toujeo.  Advised that is causing nocturnal hypoglycemia--but she states the low glucoses were happening even when not taking Toujeo.  Pt c/o fatigue and cold intolerance.  She reviewed her labs and concerned about some mild anemia. She is following with Dr. Allen for psoriatric arthritis.       Current DM meds:   mounjaro 7.5 mg        Failed DM meds:  metformin, diarrhea. tradjenta 5 mg, glipizide 5 mg bid  (prior 60 Toujeo--12 -15 novolog)   Statin: rosuvasatin 10 mg        Not tolerated statin : na   ACE/ARB:benazepril 40 mg         Not tolerated ACE/ARB: na   Known Diabetic complications:     CGMS Interpretation:       Sensor/Pump Interpretation or Prominent Theme: Glucoses generally hovering in low 100 range, with a couple of mild prandial elevations notes. Note often in middle of night sensor is showing high 60's.          June 13, 2024:  Seen in interim by IVORY Moreno while I was out.  Mounjaro added. Pt currently taking 15 mg, but missed last week r/t Cervical nerve block procedure.  As a result she has lost 20 lbs and off of both Toujeo and Novolog, which is quite impressive.  She has been having a time with neck and back pain, and cervical nerve block yesterday--which so far she is having relief -so likely pending ablation.  She is requesting a lower dose of  mounjaro r/t poor apptite and cont wt loss.     March 16, 2023 Fell about a month ago playing soccer with grandson---hurt back--so she has been dealing with that since--also with R rotator cuff--apparently ER told her she does or might have rotator cuff tear.  States glucoses have been running high because she has chronic sinus issues.  Altaf downloaded.     March 9, 2023   No acute events:  Using Freestyle Libre2 . Forgot sensor in car. No c/o hypoglycemia. C/o poor sleep. Lays in bed sometimes til 4 am.  On Elavil, states gabapentin use to help, and also zanaflex.  Does have 2 daughters and their children move in so stress with that.  Concerned with DM complications.Reassured.        Review of Systems   Constitutional:  Negative for activity change, fatigue and fever.   HENT:  Negative for ear discharge, hearing loss and trouble swallowing. Mouth dryness: couple times a week,  radiates left arm and neck.  pain scale of 5 and lasts 15 minutes.   Eyes:  Negative for photophobia and visual disturbance.        Last Eye Exam: Fall 2021    Respiratory:  Negative for cough and shortness of breath.    Cardiovascular:  Negative for palpitations.   Gastrointestinal:  Positive for constipation. Negative for abdominal pain and diarrhea.   Genitourinary:  Negative for difficulty urinating, frequency and urgency.   Musculoskeletal:  Positive for back pain (pending lumbar fusion) and neck pain. Negative for arthralgias and myalgias.   Integumentary:  Negative for rash and wound.   Neurological:  Negative for weakness and numbness.   Psychiatric/Behavioral:  Negative for sleep disturbance. The patient is not nervous/anxious.          Objective:      Physical Exam  Constitutional:       Appearance: Normal appearance. She is normal weight.   HENT:      Head: Normocephalic and atraumatic.      Nose: Nose normal.      Mouth/Throat:      Mouth: Mucous membranes are moist.      Pharynx: Oropharynx is clear.   Eyes:      Extraocular  "Movements: Extraocular movements intact.      Conjunctiva/sclera: Conjunctivae normal.      Pupils: Pupils are equal, round, and reactive to light.   Neck:      Vascular: No carotid bruit.   Cardiovascular:      Rate and Rhythm: Normal rate and regular rhythm.      Pulses: Normal pulses.      Heart sounds: Normal heart sounds.   Pulmonary:      Effort: Pulmonary effort is normal.      Breath sounds: Normal breath sounds.   Musculoskeletal:         General: Normal range of motion.      Cervical back: Normal range of motion and neck supple.      Right lower leg: No edema.      Left lower leg: No edema.      Comments: Deferrred   Feet:no open wounds or calluses. Good pedal pulses. +2 bilaterally,   Compression hose on--no vibratory sensation   Lymphadenopathy:      Cervical: No cervical adenopathy.   Skin:     General: Skin is warm and dry.   Neurological:      General: No focal deficit present.      Mental Status: She is alert and oriented to person, place, and time.   Psychiatric:         Mood and Affect: Mood normal.         Behavior: Behavior normal.         Thought Content: Thought content normal.         Judgment: Judgment normal.           /70 (BP Location: Right arm, Patient Position: Sitting, BP Method: Large (Manual))   Pulse 91   Ht 5' 6" (1.676 m)   Wt 71.2 kg (156 lb 15.5 oz)   LMP 03/13/2017   BMI 25.34 kg/m²     Hemoglobin A1C   Date Value Ref Range Status   09/17/2024 6.5 (H) 4.0 - 5.6 % Final     Comment:     ADA Screening Guidelines:  5.7-6.4%  Consistent with prediabetes  >or=6.5%  Consistent with diabetes    High levels of fetal hemoglobin interfere with the HbA1C  assay. Heterozygous hemoglobin variants (HbS, HgC, etc)do  not significantly interfere with this assay.   However, presence of multiple variants may affect accuracy.     03/14/2024 6.3 (H) 4.0 - 5.6 % Final     Comment:     ADA Screening Guidelines:  5.7-6.4%  Consistent with prediabetes  >or=6.5%  Consistent with " diabetes    High levels of fetal hemoglobin interfere with the HbA1C  assay. Heterozygous hemoglobin variants (HbS, HgC, etc)do  not significantly interfere with this assay.   However, presence of multiple variants may affect accuracy.     03/03/2022 7.6 (H) 4.0 - 5.6 % Final     Comment:     ADA Screening Guidelines:  5.7-6.4%  Consistent with prediabetes  >or=6.5%  Consistent with diabetes    High levels of fetal hemoglobin interfere with the HbA1C  assay. Heterozygous hemoglobin variants (HbS, HgC, etc)do  not significantly interfere with this assay.   However, presence of multiple variants may affect accuracy.         Chemistry        Component Value Date/Time     09/17/2024 0902    K 3.9 09/17/2024 0902     09/17/2024 0902    CO2 26 09/17/2024 0902    BUN 11 09/17/2024 0902    CREATININE 0.7 09/17/2024 0902     09/17/2024 0902        Component Value Date/Time    CALCIUM 9.5 09/17/2024 0902    ALKPHOS 62 09/17/2024 0902    AST 18 09/17/2024 0902    ALT 12 09/17/2024 0902    BILITOT 0.5 09/17/2024 0902    ESTGFRAFRICA >60.0 05/04/2022 1349    EGFRNONAA >60.0 05/04/2022 1349          Lab Results   Component Value Date    CHOL 113 (L) 09/17/2024     Lab Results   Component Value Date    HDL 49 09/17/2024     Lab Results   Component Value Date    LDLCALC 53.2 (L) 09/17/2024     Lab Results   Component Value Date    TRIG 54 09/17/2024     Lab Results   Component Value Date    CHOLHDL 43.4 09/17/2024     Lab Results   Component Value Date    MICALBCREAT Unable to calculate 03/14/2024     Lab Results   Component Value Date    TSH 1.374 09/17/2024     Vit D, 25-Hydroxy   Date Value Ref Range Status   05/04/2022 21 (L) 30 - 96 ng/mL Final     Comment:     Vitamin D deficiency.........<10 ng/mL                              Vitamin D insufficiency......10-29 ng/mL       Vitamin D sufficiency........> or equal to 30 ng/mL  Vitamin D toxicity............>100 ng/mL           Assessment:       1. Type 2  diabetes mellitus without complication, with long-term current use of insulin  insulin aspart U-100 (NOVOLOG FLEXPEN U-100 INSULIN) 100 unit/mL (3 mL) InPn pen    Hemoglobin A1C      2. Essential hypertension        3. Other hyperlipidemia            Plan:     --on benazepril 10  --on rosuvastatin 10   Cont mounjaro from 7.5 mg.      Pt counseled that Toujeo is not for Sliding scale use.  I am suspicous this is resulting in low glucoses at night.   Pt advised if any more hypoglycemia let me know and we can decrease Mounjaro (which should not be the culprit).     Add Novolog ss 1:50   ORDERS 09/18/2024\  4  mo with A1c piror

## 2024-09-18 NOTE — PATIENT INSTRUCTIONS
Do not use Toujeo as a sliding scale      You may use rapid acting insulin such as Humalog or Novolog.     Novolog/or Humalog/Fiasp/Apridra /Ademlog/lispro or aspart :  USE ONLY    Dose is based on level of glucose before meals only (meaning no food or drink for 4 hours). This dose may be added to a standard meal dose if ordered    150-200=+1  201-250=+2  251-300=+3  301-350=+4  351-400=+5

## 2024-09-19 RX ORDER — TIZANIDINE 4 MG/1
4 TABLET ORAL EVERY 8 HOURS PRN
Qty: 90 TABLET | Refills: 2 | Status: SHIPPED | OUTPATIENT
Start: 2024-09-19

## 2024-09-20 LAB
ENA SCL70 AB SER-ACNC: <0.6 U/ML
HISTONE IGG SER IA-ACNC: 0.2 UNITS (ref 0–0.9)

## 2024-10-01 ENCOUNTER — OFFICE VISIT (OUTPATIENT)
Dept: PAIN MEDICINE | Facility: CLINIC | Age: 64
End: 2024-10-01
Payer: COMMERCIAL

## 2024-10-01 ENCOUNTER — TELEPHONE (OUTPATIENT)
Dept: PAIN MEDICINE | Facility: CLINIC | Age: 64
End: 2024-10-01

## 2024-10-01 VITALS
SYSTOLIC BLOOD PRESSURE: 125 MMHG | WEIGHT: 157.94 LBS | DIASTOLIC BLOOD PRESSURE: 68 MMHG | BODY MASS INDEX: 25.38 KG/M2 | HEART RATE: 82 BPM | HEIGHT: 66 IN

## 2024-10-01 DIAGNOSIS — M51.369 DDD (DEGENERATIVE DISC DISEASE), LUMBAR: ICD-10-CM

## 2024-10-01 DIAGNOSIS — M48.061 SPINAL STENOSIS OF LUMBAR REGION WITHOUT NEUROGENIC CLAUDICATION: ICD-10-CM

## 2024-10-01 DIAGNOSIS — M50.30 DDD (DEGENERATIVE DISC DISEASE), CERVICAL: ICD-10-CM

## 2024-10-01 DIAGNOSIS — M47.812 CERVICAL SPONDYLOSIS: Primary | ICD-10-CM

## 2024-10-01 DIAGNOSIS — M79.18 MYOFASCIAL PAIN: ICD-10-CM

## 2024-10-01 DIAGNOSIS — Z79.891 OPIOID CONTRACT EXISTS: ICD-10-CM

## 2024-10-01 DIAGNOSIS — M54.16 LUMBAR RADICULOPATHY: ICD-10-CM

## 2024-10-01 PROCEDURE — 3066F NEPHROPATHY DOC TX: CPT | Mod: CPTII,S$GLB,, | Performed by: PHYSICIAN ASSISTANT

## 2024-10-01 PROCEDURE — 3074F SYST BP LT 130 MM HG: CPT | Mod: CPTII,S$GLB,, | Performed by: PHYSICIAN ASSISTANT

## 2024-10-01 PROCEDURE — 3008F BODY MASS INDEX DOCD: CPT | Mod: CPTII,S$GLB,, | Performed by: PHYSICIAN ASSISTANT

## 2024-10-01 PROCEDURE — 4010F ACE/ARB THERAPY RXD/TAKEN: CPT | Mod: CPTII,S$GLB,, | Performed by: PHYSICIAN ASSISTANT

## 2024-10-01 PROCEDURE — 99214 OFFICE O/P EST MOD 30 MIN: CPT | Mod: S$GLB,,, | Performed by: PHYSICIAN ASSISTANT

## 2024-10-01 PROCEDURE — 1159F MED LIST DOCD IN RCRD: CPT | Mod: CPTII,S$GLB,, | Performed by: PHYSICIAN ASSISTANT

## 2024-10-01 PROCEDURE — 3044F HG A1C LEVEL LT 7.0%: CPT | Mod: CPTII,S$GLB,, | Performed by: PHYSICIAN ASSISTANT

## 2024-10-01 PROCEDURE — 99999 PR PBB SHADOW E&M-EST. PATIENT-LVL V: CPT | Mod: PBBFAC,,, | Performed by: PHYSICIAN ASSISTANT

## 2024-10-01 PROCEDURE — 3078F DIAST BP <80 MM HG: CPT | Mod: CPTII,S$GLB,, | Performed by: PHYSICIAN ASSISTANT

## 2024-10-01 PROCEDURE — 1160F RVW MEDS BY RX/DR IN RCRD: CPT | Mod: CPTII,S$GLB,, | Performed by: PHYSICIAN ASSISTANT

## 2024-10-01 PROCEDURE — 3061F NEG MICROALBUMINURIA REV: CPT | Mod: CPTII,S$GLB,, | Performed by: PHYSICIAN ASSISTANT

## 2024-10-01 RX ORDER — HYDROCODONE BITARTRATE AND ACETAMINOPHEN 10; 325 MG/1; MG/1
1 TABLET ORAL 3 TIMES DAILY PRN
Qty: 90 TABLET | Refills: 0 | Status: SHIPPED | OUTPATIENT
Start: 2024-12-21 | End: 2025-01-20

## 2024-10-01 RX ORDER — HYDROCODONE BITARTRATE AND ACETAMINOPHEN 10; 325 MG/1; MG/1
1 TABLET ORAL 3 TIMES DAILY PRN
Qty: 90 TABLET | Refills: 0 | Status: SHIPPED | OUTPATIENT
Start: 2024-11-21 | End: 2024-12-21

## 2024-10-01 RX ORDER — HYDROCODONE BITARTRATE AND ACETAMINOPHEN 10; 325 MG/1; MG/1
1 TABLET ORAL 3 TIMES DAILY PRN
Qty: 90 TABLET | Refills: 0 | Status: SHIPPED | OUTPATIENT
Start: 2024-10-22 | End: 2024-11-21

## 2024-10-01 NOTE — TELEPHONE ENCOUNTER
Physician - Dr Galan    Type of Procedure/Injection - Cervical Radiofrequency Ablation  TON and C7/T1           Laterality - Right      Anxiolysis- RNIV      Need to hold medication - Yes      N/A    Tirzepatide (Mounjaro) 7 days      Clearance needed - No      Follow up - 4 week

## 2024-10-02 DIAGNOSIS — M47.812 CERVICAL SPONDYLOSIS: Primary | ICD-10-CM

## 2024-10-02 RX ORDER — SODIUM CHLORIDE, SODIUM LACTATE, POTASSIUM CHLORIDE, CALCIUM CHLORIDE 600; 310; 30; 20 MG/100ML; MG/100ML; MG/100ML; MG/100ML
INJECTION, SOLUTION INTRAVENOUS CONTINUOUS
OUTPATIENT
Start: 2024-10-02

## 2024-10-02 NOTE — TELEPHONE ENCOUNTER
Spoke with patient to schedule cervical RFA with Dr. Galan. Advised to hold Mounjaro x 7 days prior. Pre op information given and follow up appointment scheduled.

## 2024-10-02 NOTE — TELEPHONE ENCOUNTER
Attempted to reach patient to schedule. No answer. Left voicemail to return call to the office. Phone number provided.

## 2024-10-04 NOTE — PROGRESS NOTES
This note was completed with dictation software and grammatical errors may exist.    CC:  Neck and back pain    HPI: The patient is a 63-year-old woman with a history of diabetes, hypertension who presents in referral from [facility] for neck pain radiating into the arms and low back pain radiating into the left leg.  She returns in follow-up today with neck pain and low back pain.  She describes severe right neck pain that is worse with turning her head to the right side.  She is status post 2 right 3rd occipital nerve, C7-T1 diagnostic medial branch nerve blocks giving her 80% relief lasting 8 hours on 06/12/24 and 628/24.  She would like to proceed with radiofrequency ablation.  She also complains of low back pain with radiation into her anterior thighs.  She continues to have muscle spasms in her legs.  She continues to take pain medication with some relief.  She reports intermittent numbness in her right foot and intermittent weakness in her legs.    Pain intervention history: She had undergone 3 epidurals for low back pain with only up to 3 weeks of relief each time.  She is status post C7-T1 cervical interlaminar epidural steroid injection on 1/29/16 with 50% relief.   She is status post C7-T1 cervical interlaminar epidural steroid injection on 3/1/16 with mild additional relief.  She is status post C7-T1 cervical interlaminar epidural steroid injection on 5/13/16 with 70-75% relief.  She is status post right C3, 4, 5 and 6 medial branch radiofrequency ablation on 7/15/16 with 30-60% relief.  She is status post L5/S1 interlaminar epidural steroid injection on 5/16/17 with 50% relief.  She is status post L5/S1 interlaminar epidural steroid injection on 12/29/17 with excellent relief lasting 2 weeks, now reporting minimal relief of her low back and buttock pain but ongoing 100% relief of her right leg pain.  She is status post C7-T1 cervical interlaminar epidural steroid injection on  "3/14/18 with 75% relief but this was not long lasting.  She is status post right L4/5 and L5/S1 transforaminal epidural steroid injection on 01/11/2021 with Dr Luna with 0% relief.   She is status post caudal epidural steroid injection on 11/01/2021 with 70% relief lasting over 1 month.   She is status post caudal epidural steroid injection on 04/05/2022 with 60-65% relief.     Spine surgeries: Right L4/5 and L5/S1 laminectomy on 06/24/2021 Dr Hester.     Antineuropathics:  Gabapentin 100 mg 3 times daily  NSAIDs:  Physical therapy:  [facility] in [place]  Antidepressants:  Amitriptyline 25 mg nightly  Muscle relaxers:  Tizanidine 4 mg 3 times daily as needed  Opioids:  Hydrocodone-acetaminophen 10/325 mg 3 times daily as needed  Antiplatelets/Anticoagulants:    ROS: She reports headaches, hoarse voice, joint stiffness, joint swelling, back pain, difficulty sleeping, anxiety and loss of balance.  Balance of review of systems is negative.    Medical, surgical, family and social history reviewed elsewhere in record.    Medications/Allergies: See med card    Vitals:    10/01/24 1122   BP: 125/68   Pulse: 82   Weight: 71.6 kg (157 lb 15.4 oz)   Height: 5' 6" (1.676 m)   PainSc:   5   PainLoc: Back         Physical exam:  Gen: A and O x3, pleasant, well-groomed  Skin: No rashes or obvious lesions  HEENT: PERRLA, no obvious deformities on ears or in canals.Trachea midline.  CVS: Regular rate and rhythm, normal palpable pulses.  Resp: Clear to auscultation bilaterally, no wheezes or rales.  Abdomen: Soft, NT/ND.  Musculoskeletal: No antalgic gait.     Neuro:  Upper extremities: 4/5 strength bilaterally  Lower extremities:  4/5 strength bilaterally   Reflexes: Brachioradialis 2+, Bicep 2+, Tricep 2+.  Patellar and Achilles reflexes 2+ bilaterally.  Sensory: Intact and symmetrical to light touch and pinprick in C2-T1 dermatomes bilaterally.  Osuna's negative.  Romberg positive for imbalance, abnormal tandem gait " test.    Cervical Spine:  Cervical spine: Range of motion is mildly reduced with flexion extension and lateral rotation without increased pain.  Spurling's maneuver causes neck pain to either side.    Myofascial exam:  Moderate tenderness to palpation to the cervical paraspinous muscles.    Lumbar spine:  Range of motion is moderately reduced with flexion, extension and oblique extension with increased low back pain during each maneuver.  Khanh's test is negative bilaterally.  Straight leg raise causes left leg pain.  Internal and external rotation of hip is negative bilaterally.  Myofascial exam:  Mild tenderness to palpation to the lumbar paraspinous muscles.    Imagin/24/15 C-spine MRI  1. C3-4 left posterior paracentral disk extrusion causing left paracentral spinal cord compression and severe left foraminal stenosis.  2. C4-5 based disk extrusion with spinal cord impingement and moderate bilateral foraminal stenosis.  3. C5-6 posterior central disk extrusion with spinal cord compression and severe bilateral foraminal stenosis.  4. C6-7 mild disk bulge with severe bilateral foraminal stenosis.  5. Solitary mildly enlarged left submandibular lymph node of uncertain significance.    4/10/17 MRI lumbar spine  T12-L1: No central canal or neuroforaminal stenosis. No disc protrusion or extrusion.  L1-L2: There is ligamentum flavum thickening and facet arthropathy.  No central canal or neuroforaminal stenosis. No disc protrusion or extrusion.  L2-L3: There is ligamentum flavum thickening and facet arthropathy.  No central canal or neuroforaminal stenosis. No disc protrusion or extrusion.  L3-L4: There is ligamentum flavum thickening and facet arthropathy present. No central canal or neuroforaminal stenosis. No disc protrusion or extrusion.  L4-L5: There is a broad disc bulge which extends into both neural foramina.  There is ligamentum flavum thickening and facet arthropathy.  There is right lateral recess  stenosis.  There is abutment of the descending right L5 nerve in the right lateral recess.  Please correlate clinically for symptoms referable to the right L5 nerve.  There is mild central canal stenosis.  There is mild left neuroforaminal stenosis.  No right neuroforaminal stenosis.  L5-S1: There is a broad central/right paracentral disc protrusion which encroaches upon the descending right S1 nerve in the right lateral recess.  Please correlate clinically for symptoms referable to the right S1 nerve.  There is mild-moderate right neuroforaminal stenosis.  No left neuroforaminal stenosis.  No central canal stenosis.  There is ligamentum flavum thickening and facet arthropathy.  There is a broad left extraforaminal disc protrusion at L5-S1 which abuts the exited left L5 nerve in the left extraforaminal soft tissues (series 6 image 17), nonspecific.  Please correlate clinically for symptoms referable to the left L5 nerve.    05/28/2019 MRI cervical spine  C2-3: No disc herniation, spinal canal narrowing, or neuroforaminal narrowing.  C3-4: There is moderate broad-based posterior disc osteophyte complex formation with left paracentral predominance.  This along with facet arthropathy contributes to mild spinal canal narrowing and moderate left neuroforaminal narrowing.  C4-5: There is moderate broad-based posterior disc osteophyte complex formation with left paracentral prominence.  This along with facet arthropathy contributes to mild bilateral neuroforaminal narrowing and mild spinal canal narrowing.  C5-6: Moderate broad-based posterior disc osteophyte complex formation and facet arthropathy result in moderate spinal canal narrowing and moderate right and mild left neuroforaminal narrowing.  C6-7: Moderate broad-based posterior disc osteophyte complex formation and facet arthropathy result in mild spinal canal narrowing along with moderate right and severe left neuroforaminal narrowing.  C7-T1: Minimal broad-based  posterior disc osteophyte complex formation and bilateral facet arthropathy result in mild bilateral neuroforaminal narrowing.      05/28/2019 MRI lumbar spine  T12-L1: No disc herniation, spinal canal narrowing, or neuroforaminal narrowing.  L1-2: No disc herniation, spinal canal narrowing, or neuroforaminal narrowing.  L2-3: No disc herniation, spinal canal narrowing, or neuroforaminal narrowing.  L3-4: No disc herniation, spinal canal narrowing, or neuroforaminal narrowing.  L4-5: There is moderate generalized disc bulging and bilateral facet arthropathy, which result in mild spinal canal narrowing and mild bilateral neuroforaminal narrowing.  L5-S1: There is a small right paracentral disc protrusion.  This effaces the right lateral recess and may impinge upon the descending right S1 nerve root.  Bilateral facet arthropathy is present and there is resultant overall mild spinal canal narrowing.  Moderate right and mild left neuroforaminal narrowing are also present.    06/24/2021 MRI lumbar spine  NOMENCLATURE: Five lumbar type vertebral bodies.     CORD/CAUDA EQUINA: Conus has normal size and signal and ends at a normal level of L1-L2.  Small amount of susceptibility either in the subdural or intrathecal space ventrally and dorsally at the L3-4 level.     ALIGNMENT: Trace retrolisthesis of L4 on L5.     BONES: Interval L4 laminectomy and right L5 hemilaminectomy.  Medial right L4-5 and L5-S1 facetectomies.  Susceptibility artifact is again seen in the left L5 pedicle.  No aggressive bone marrow signal.     PARASPINAL AREA: Fluid and edema along the surgical tract and in the right dorsal paraspinal muscles at the surgical levels.     LUMBAR DISC LEVELS:     T12-L1: No disc herniation or significant posterior osteophytic ridging.  Mild left facet hypertrophy.  No significant spinal canal or foraminal stenosis.     L1-L2: Minimal disc bulge.  Mild bilateral facet hypertrophy.  Ligamentum flavum thickening.  Minimal  narrowing of the bilateral lateral recesses.  No significant spinal canal or foraminal stenosis.     L2-L3: Mild disc bulge.  Mild bilateral facet hypertrophy.  Ligamentum flavum thickening.  Mild narrowing of the bilateral lateral recesses.  No significant spinal canal or foraminal stenosis.     L3-L4: Mild disc bulge.  Mild-moderate bilateral facet hypertrophy.  Ligamentum flavum thickening.  Susceptibility at the midline anterior and posterior aspect of the thecal sac.  Mild narrowing of the bilateral lateral recesses.  Moderate spinal canal stenosis, increased from prior study.  Mild bilateral foraminal stenosis.     L4-L5: Laminectomy.  Right facetectomy.  Mild disc bulge and disc height loss.  Mild narrowing of the lateral recesses without significant overall spinal canal stenosis.  Mild bilateral foraminal stenosis.     L5-S1: Interval right hemilaminectomy and right medial facetectomy.  Mild disc bulge.  Small central protrusion.  Mild narrowing of the right lateral recess which is significantly decreased from preoperative study.  No significant spinal canal stenosis.  Moderate right and mild-moderate left foraminal stenosis, unchanged.  The left L5 nerve root may be contacted by disc as it exits the foramen    04/23/2024 MRI cervical spine  Morphology: There is extensive artifact from the patient's C3-C7 ACDF changes and C6 corpectomy limiting evaluation.  Marrow signal is overall grossly within normal limits.  No obvious fracture.     Alignment: Straightening of the expected normal cervical lordosis without evidence of any significant spondylolisthesis..     Cord: Limited evaluation of the cervical cord demonstrates no focal signal abnormality or cord compression..     Craniocervical Junction: Cerebellar tonsils are normally positioned. The visualized portions of the posterior fossa are unremarkable. The regional osseous anatomy is normal.        Disc levels:        C2-C3: Mild-to-moderate facet arthrosis  with uncovertebral spurring producing mild to moderate right and mild left foraminal narrowing.  No significant narrowing of the spinal canal..     C3-C4: ACDF changes.  Shallow osseous ridging lateralizing to the left mildly narrows the spinal canal.  Uncovertebral spurring and mild facet arthrosis produces moderate to severe left and no more than mild right foraminal narrowing..     C4-C5: ACDF changes with shallow osseous ridging minimally narrowing the spinal canal.  Mild to moderate facet arthrosis contributes to no more than mild bilateral foraminal narrowing..     C5-C6: ACDF changes.  The spinal canal is patent.  Uncovertebral spurring and mild facet arthrosis produces mild to moderate bilateral foraminal narrowing..     C6-C7: ACDF changes.  The spinal canal is patent.  Limited evaluation of the foramina with uncovertebral spurring suggestive of mild to moderate left and mild right foraminal narrowing..     C7-T1: The spinal canal is patent.  Mild ligamentum flavum thickening.  Facet arthrosis and uncovertebral spurring produces moderate right and mild left foraminal narrowing..     Soft tissues: Heterogeneous appearance of the thyroid and multinodular left thyroid gland redemonstrated.  Visualized paraspinal soft tissues are within normal limits.      04/23/2024 MRI lumbar spine  Morphology: Vertebral body heights are preserved.  Incidental L5 vertebral body hemangioma and small chronic appearing Schmorl's node along the anterior/inferior endplate of L4 with mild associated fatty marrow replacement.  Marrow signal is otherwise within normal limits.  No fractures or suspicious lesions.     Alignment: Grade 1 retrolisthesis of L4 on L5 and L5 on S1.     Cord: Normal in contour, caliber, and signal intensity.  Conus position is within normal limits.  No abnormal enhancement.     Disc levels:     T12-L1: Within normal limits.     L1-L2: Shallow annular bulging and mild bilateral facet arthrosis flattening the  ventral thecal sac without substantial spinal canal or foraminal narrowing.     L2-L3: Mild bilateral facet arthrosis the spinal canal and foramina are patent.     L3-L4: Mild degenerative disc height loss and desiccation similar to the prior examination with shallow disc bulging and moderate bilateral facet arthrosis combining to produce mild to moderate narrowing of the spinal canal and subarticular recesses and mild bilateral foraminal narrowing.     L4-L5: Bilateral laminectomy as well as medial facetectomy changes.  Similar moderate to severe disc height loss and desiccation as well as shallow uniform disc bulging and mild-to-moderate facet arthrosis.  Mild encroachment both subarticular recesses with similar decompression of the spinal canal.  Similar mild to moderate right and mild left foraminal narrowing.     L5-S1: Right hemilaminectomy as well as medial facetectomy changes.  Mild bilateral facet arthrosis.  Evidence of previous discectomy changes.  No significant change from the prior examination.  Shallow residual disc bulging with mild encroachment of both subarticular recesses as well as moderate to severe right and mild-to-moderate left foraminal narrowing.  Greatest potential for impingement in the distribution of the right L5 nerve.     Other: Slightly progressed fatty atrophy of the paraspinal musculature dorsally at the lower lumbar levels somewhat asymmetric to the right.  No paraspinal fluid collections or other significant findings.      Assessment:  The patient is a 63-year-old woman with a history of diabetes, hypertension who presents in referral from [facility] for neck pain radiating into the arms and low back pain radiating into the left leg.     1. Cervical spondylosis        2. DDD (degenerative disc disease), cervical        3. Spinal stenosis of lumbar region without neurogenic claudication        4. Lumbar radiculopathy        5. Myofascial pain        6. Opioid  contract exists            Plan:  1. Since she has had 2 successful diagnostic medial branch nerve blocks, I will schedule her for right 3rd occipital nerve, C7-T1 medial branch radiofrequency ablation.  2. We discussed scheduling bilateral L3/4 transforaminal epidural steroid injections in the future.  3. Dr. Galan provided prescriptions for hydrocodone-acetaminophen 10/325 mg 3 times daily as needed for pain.  I have reviewed the [place] Board [employer] website and there are no abberancies.    4. Follow-up in 4 weeks postprocedure or sooner as needed.

## 2024-10-14 DIAGNOSIS — M79.605 BILATERAL LEG PAIN: Primary | ICD-10-CM

## 2024-10-14 DIAGNOSIS — M79.604 BILATERAL LEG PAIN: Primary | ICD-10-CM

## 2024-10-15 ENCOUNTER — OFFICE VISIT (OUTPATIENT)
Dept: ORTHOPEDICS | Facility: CLINIC | Age: 64
End: 2024-10-15
Payer: COMMERCIAL

## 2024-10-15 ENCOUNTER — LAB VISIT (OUTPATIENT)
Dept: LAB | Facility: HOSPITAL | Age: 64
End: 2024-10-15
Attending: NURSE PRACTITIONER
Payer: COMMERCIAL

## 2024-10-15 ENCOUNTER — HOSPITAL ENCOUNTER (OUTPATIENT)
Dept: RADIOLOGY | Facility: HOSPITAL | Age: 64
Discharge: HOME | End: 2024-10-15
Attending: NURSE PRACTITIONER
Payer: COMMERCIAL

## 2024-10-15 VITALS — WEIGHT: 157.88 LBS | HEIGHT: 66 IN | BODY MASS INDEX: 25.37 KG/M2

## 2024-10-15 DIAGNOSIS — M79.604 BILATERAL LEG PAIN: Primary | ICD-10-CM

## 2024-10-15 DIAGNOSIS — E11.9 TYPE 2 DIABETES MELLITUS WITHOUT COMPLICATION, WITH LONG-TERM CURRENT USE OF INSULIN: ICD-10-CM

## 2024-10-15 DIAGNOSIS — L40.50 PSA (PSORIATIC ARTHRITIS): ICD-10-CM

## 2024-10-15 DIAGNOSIS — E55.9 VITAMIN D DEFICIENCY: ICD-10-CM

## 2024-10-15 DIAGNOSIS — Z79.4 TYPE 2 DIABETES MELLITUS WITHOUT COMPLICATION, WITH LONG-TERM CURRENT USE OF INSULIN: ICD-10-CM

## 2024-10-15 DIAGNOSIS — M79.605 BILATERAL LEG PAIN: Primary | ICD-10-CM

## 2024-10-15 DIAGNOSIS — G62.9 NEUROPATHY: ICD-10-CM

## 2024-10-15 DIAGNOSIS — M79.10 GENERALIZED MUSCLE ACHE: ICD-10-CM

## 2024-10-15 DIAGNOSIS — R11.2 NAUSEA AND VOMITING, UNSPECIFIED VOMITING TYPE: ICD-10-CM

## 2024-10-15 DIAGNOSIS — M79.605 BILATERAL LEG PAIN: ICD-10-CM

## 2024-10-15 DIAGNOSIS — M79.604 BILATERAL LEG PAIN: ICD-10-CM

## 2024-10-15 PROCEDURE — 4010F ACE/ARB THERAPY RXD/TAKEN: CPT | Mod: CPTII,S$GLB,, | Performed by: NURSE PRACTITIONER

## 2024-10-15 PROCEDURE — 1159F MED LIST DOCD IN RCRD: CPT | Mod: CPTII,S$GLB,, | Performed by: NURSE PRACTITIONER

## 2024-10-15 PROCEDURE — 1160F RVW MEDS BY RX/DR IN RCRD: CPT | Mod: CPTII,S$GLB,, | Performed by: NURSE PRACTITIONER

## 2024-10-15 PROCEDURE — 99999 PR PBB SHADOW E&M-EST. PATIENT-LVL III: CPT | Mod: PBBFAC,,, | Performed by: NURSE PRACTITIONER

## 2024-10-15 PROCEDURE — 99214 OFFICE O/P EST MOD 30 MIN: CPT | Mod: S$GLB,,, | Performed by: NURSE PRACTITIONER

## 2024-10-15 PROCEDURE — 84550 ASSAY OF BLOOD/URIC ACID: CPT | Performed by: NURSE PRACTITIONER

## 2024-10-15 PROCEDURE — 36415 COLL VENOUS BLD VENIPUNCTURE: CPT | Mod: PO | Performed by: NURSE PRACTITIONER

## 2024-10-15 PROCEDURE — 82306 VITAMIN D 25 HYDROXY: CPT | Performed by: NURSE PRACTITIONER

## 2024-10-15 PROCEDURE — 3044F HG A1C LEVEL LT 7.0%: CPT | Mod: CPTII,S$GLB,, | Performed by: NURSE PRACTITIONER

## 2024-10-15 PROCEDURE — 3061F NEG MICROALBUMINURIA REV: CPT | Mod: CPTII,S$GLB,, | Performed by: NURSE PRACTITIONER

## 2024-10-15 PROCEDURE — 3008F BODY MASS INDEX DOCD: CPT | Mod: CPTII,S$GLB,, | Performed by: NURSE PRACTITIONER

## 2024-10-15 PROCEDURE — 3066F NEPHROPATHY DOC TX: CPT | Mod: CPTII,S$GLB,, | Performed by: NURSE PRACTITIONER

## 2024-10-15 RX ORDER — INSULIN ASPART 100 [IU]/ML
INJECTION, SOLUTION INTRAVENOUS; SUBCUTANEOUS
Qty: 15 ML | Refills: 0 | Status: SHIPPED | OUTPATIENT
Start: 2024-10-15

## 2024-10-15 RX ORDER — INSULIN ASPART 100 [IU]/ML
INJECTION, SOLUTION INTRAVENOUS; SUBCUTANEOUS
Qty: 15 ML | Refills: 0 | Status: CANCELLED | OUTPATIENT
Start: 2024-10-15

## 2024-10-16 ENCOUNTER — TELEPHONE (OUTPATIENT)
Dept: ORTHOPEDICS | Facility: CLINIC | Age: 64
End: 2024-10-16
Payer: COMMERCIAL

## 2024-10-16 ENCOUNTER — PATIENT MESSAGE (OUTPATIENT)
Dept: ORTHOPEDICS | Facility: CLINIC | Age: 64
End: 2024-10-16
Payer: COMMERCIAL

## 2024-10-16 DIAGNOSIS — E55.9 VITAMIN D DEFICIENCY: Primary | ICD-10-CM

## 2024-10-16 LAB
25(OH)D3+25(OH)D2 SERPL-MCNC: 21 NG/ML (ref 30–96)
URATE SERPL-MCNC: 4.1 MG/DL (ref 2.4–5.7)

## 2024-10-16 RX ORDER — CHOLECALCIFEROL (VITAMIN D3) 25 MCG
1000 TABLET ORAL DAILY
Qty: 30 TABLET | Refills: 2 | Status: SHIPPED | OUTPATIENT
Start: 2024-10-16 | End: 2025-01-14

## 2024-10-16 NOTE — TELEPHONE ENCOUNTER
"Spoke with pt and advise that per manuela , " Please call and let this lady know that her vitamin d is low. Should be more than 30 and hers is 21. Will send her in prescription and I recommend she have this rechecked in 3 months. Her uric acid level was normal.     "

## 2024-10-16 NOTE — TELEPHONE ENCOUNTER
----- Message from LUIS MANUEL Gleason sent at 10/16/2024  9:53 AM CDT -----  Regarding: labs done yesterday  Please call and let this lady know that her vitamin d is low. Should be more than 30 and hers is 21. Will send her in prescription and I recommend she have this rechecked in 3 months. Her uric acid level was normal.  ----- Message -----  From: [facility]  Sent: 10/16/2024   1:31 AM CDT  To: LUIS MANUEL Caldwell

## 2024-10-21 RX ORDER — PROMETHAZINE HYDROCHLORIDE 25 MG/1
25 TABLET ORAL EVERY 6 HOURS PRN
Qty: 20 TABLET | Refills: 1 | Status: SHIPPED | OUTPATIENT
Start: 2024-10-21 | End: 2025-04-19

## 2024-10-29 NOTE — PROGRESS NOTES
2020    Chief Complaint:  Chief Complaint   Patient presents with    Right Hand - Pain    Left Hand - Pain       HPI:  Aaron Garcia is a 59 y.o. female, who presents to clinic today she has a history of bilateral hand pain.  She states that the left is worse than the right.  She does have a history of bilateral carpal tunnel releases.  She states that she still has occasional numbness and tingling in her fingers.  States that the most significant problem is the pain in her hands and catching that she experiences.  States that she has had multiple injections for trigger fingers in both hands.  States that her left thumb and middle finger been injected several times.  She is here today for treatment options and further workup.  She recently had a repeat nerve conduction study.    PMHX:  Past Medical History:   Diagnosis Date    Arthritis     Asthma     Cataract     OU    Diabetes mellitus, type 2     Gastric ulcer     Hypertension     Mitral valve prolapse     RYLAN (obstructive sleep apnea)     Snores     never had sleep study, unable to lay flat, sleeps elevated    Thyroid disease     Goiter       PSHX:  Past Surgical History:   Procedure Laterality Date    CARPAL TUNNEL RELEASE Left     CARPAL TUNNEL RELEASE Right 2018    Procedure: RELEASE, CARPAL TUNNEL;  Surgeon: Anurag Mathis MD;  Location: [facility] OR;  Service: Orthopedics;  Laterality: Right;     SECTION      CHOLECYSTECTOMY      DILATION AND CURETTAGE OF UTERUS      Epidural steroid Injection      Pain management, cervical    ESOPHAGEAL DILATION      ulcer on tongue removed      WOUND DEBRIDEMENT Left     leg at ankle level       FMHX:  Family History   Problem Relation Age of Onset    Diabetes Father     Diabetes Sister     Heart disease Brother     Glaucoma Paternal Aunt     Glaucoma Daughter         suspect       SOCHX:  Social History     Tobacco Use    Smoking status: Never Smoker     "Smokeless tobacco: Never Used   Substance Use Topics    Alcohol use: No     Alcohol/week: 0.0 standard drinks       ALLERGIES:  Hydrocodone, Metformin, Penicillins, and [place]    CURRENT MEDICATIONS:  Current Outpatient Medications on File Prior to Visit   Medication Sig Dispense Refill    ALBUTEROL SULFATE (PROAIR HFA INHL) Inhale into the lungs.      amitriptyline (ELAVIL) 25 MG tablet Take 25 mg by mouth nightly as needed for Insomnia.      amlodipine (NORVASC) 10 MG tablet 10 mg.       azelastine (ASTELIN) 137 mcg (0.1 %) nasal spray       BD ULTRA-FINE GISELA PEN NEEDLE 32 gauge x 5/32" Ndle       benazepril (LOTENSIN) 40 MG tablet Take 40 mg by mouth once daily.      desloratadine (CLARINEX) 5 mg tablet Take 5 mg by mouth.      diclofenac sodium (VOLTAREN) 1 % Gel Apply 2 g topically 3 (three) times daily as needed. To both hands 2 Tube 3    fluticasone propionate (FLONASE) 50 mcg/actuation nasal spray       gabapentin (NEURONTIN) 600 MG tablet Take 600 mg by mouth 2 (two) times daily.      glipiZIDE (GLUCOTROL) 5 MG tablet TAKE 1 TABLET (5 MG TOTAL) BY MOUTH 2 (TWO) TIMES DAILY WITH MEALS. 180 tablet 3    HYDROcodone-acetaminophen (NORCO)  mg per tablet Take 1 tablet by mouth 3 (three) times daily as needed. 90 tablet 0    insulin aspart U-100 (NOVOLOG FLEXPEN U-100 INSULIN) 100 unit/mL (3 mL) InPn pen 10 units with meals plus sliding scale.  150-200=+2, 201-250=+4; 251-300=+6; 301-350=+8, over 350, + 10 units 18 mL 11    insulin glargine, TOUJEO, (TOUJEO SOLOSTAR U-300 INSULIN) 300 unit/mL (1.5 mL) InPn pen Inject 60 Units into the skin every evening. 4 Syringe 11    linaGLIPtin (TRADJENTA) 5 mg Tab tablet Take 1 tablet (5 mg total) by mouth once daily. 30 tablet 5    montelukast (SINGULAIR) 10 mg tablet Take 10 mg by mouth.      nebivolol (BYSTOLIC) 10 MG Tab Take 10 mg by mouth.      ON CALL LANCET 30 gauge Misc       pantoprazole (PROTONIX) 40 MG tablet Take 40 mg by mouth once " "daily.      rosuvastatin (CRESTOR) 10 MG tablet Take 10 mg by mouth once daily.      SYMBICORT 160-4.5 mcg/actuation HFAA       tiZANidine 2 mg Cap Take 1 capsule (2 mg total) by mouth 3 (three) times daily as needed. 90 capsule 2    TRUE METRIX GLUCOSE TEST STRIP Strp       amoxicillin-clavulanate 500-125mg (AUGMENTIN) 500-125 mg Tab Take 1 tablet (500 mg total) by mouth 3 (three) times daily. 15 tablet 0     No current facility-administered medications on file prior to visit.        REVIEW OF SYSTEMS:  Review of Systems   Constitutional: Negative.    HENT: Negative.    Eyes: Negative.    Respiratory: Negative.    Cardiovascular: Negative.    Gastrointestinal: Negative.    Genitourinary: Negative.    Musculoskeletal: Positive for back pain, joint pain and neck pain. Negative for falls and myalgias.   Skin: Negative.    Neurological: Negative.    Endo/Heme/Allergies: Positive for environmental allergies. Negative for polydipsia. Does not bruise/bleed easily.   Psychiatric/Behavioral: Negative.        GENERAL PHYSICAL EXAM:   BP (!) 141/65   Pulse 80   Temp 99.6 °F (37.6 °C) (Temporal)   Resp 18   Ht 5' 6" (1.676 m)   Wt 99.6 kg (219 lb 9.3 oz)   BMI 35.44 kg/m²    GEN: well developed, well nourished, no acute distress   HENT: Normocephalic, atraumatic   EYES: No discharge, conjunctiva normal   NECK: Supple, non-tender   PULM: No wheezing, no respiratory distress   CV: RRR   ABD: Soft, non-tender    ORTHO EXAM:   Examination of bilateral hands and wrist reveals that there is no edema.  There are no major skin changes.  She does have well-healed incisions over the carpal tunnels.  Palpation produces moderate tenderness over the A1 pulley of the left thumb and left middle finger as well as mildly over the right ring finger.  Flexion and extension reveals that there is no active triggering of any of the fingers on the right.  She does have very mild active triggering of the left thumb and left middle fingers. "  She does report mild decreased sensation in the median distribution bilaterally with intact ulnar and radial sensation.  She has bilateral negative Tinel's and bilateral negative Durkan's.  She does have 5/5 thenar muscular strength bilaterally.  She does have 2+ radial pulses.    EMG/nerve conduction study:   Nerve conduction study is consistent with moderate left and severe right carpal tunnel syndrome.  When compared with her nerve conduction study from 2018 there is some decrease in amplitude over the left median nerve as well as minimally on the right.    ASSESSMENT:   Left thumb and middle finger triggering, possible bilateral carpal tunnel recurrence    PLAN:  1.  I have discussed treatment with the patient.  She states that currently she is not having problems with numbness and tingling.  It is only rare that she has any numbness and tingling to the hands other than what is her baseline.  She is complaining significantly about the pain overlying the A1 pulleys about the triggers.  She would like to have further treatment for those.  I did discuss treatment options however she has limited options due to multiple injections to those trigger fingers.  I did discuss the possibility of trigger finger release.  I discussed the risks and benefits of the procedure informed consent has been obtained    2.  Will proceed with left thumb and middle finger trigger release under general anesthesia    3.  I will send her for preoperative CBC, BMP, and EKG    4.  She will follow up with me 2 weeks postoperatively       20

## 2024-10-30 ENCOUNTER — PATIENT MESSAGE (OUTPATIENT)
Dept: PAIN MEDICINE | Facility: CLINIC | Age: 64
End: 2024-10-30
Payer: COMMERCIAL

## 2024-10-30 ENCOUNTER — TELEPHONE (OUTPATIENT)
Dept: SURGERY | Facility: HOSPITAL | Age: 64
End: 2024-10-30
Payer: COMMERCIAL

## 2024-10-31 ENCOUNTER — TELEPHONE (OUTPATIENT)
Dept: PAIN MEDICINE | Facility: CLINIC | Age: 64
End: 2024-10-31
Payer: COMMERCIAL

## 2024-11-01 ENCOUNTER — TELEPHONE (OUTPATIENT)
Dept: PAIN MEDICINE | Facility: CLINIC | Age: 64
End: 2024-11-01
Payer: COMMERCIAL

## 2024-11-01 DIAGNOSIS — M25.562 LEFT KNEE PAIN, UNSPECIFIED CHRONICITY: Primary | ICD-10-CM

## 2024-11-06 ENCOUNTER — TELEPHONE (OUTPATIENT)
Dept: PAIN MEDICINE | Facility: CLINIC | Age: 64
End: 2024-11-06
Payer: COMMERCIAL

## 2024-11-06 NOTE — TELEPHONE ENCOUNTER
----- Message from Saumya sent at 11/6/2024  1:31 PM CST -----  Contact: self  Type:  Patient Returning Call    Who Called:  pt  Who Left Message for Patient:  josias  Does the patient know what this is regarding?:  yes  Best Call Back Number:  [phone]   Additional Information:  please call pt would like to schedule procedure before the end of the year

## 2024-11-15 ENCOUNTER — TELEPHONE (OUTPATIENT)
Dept: PAIN MEDICINE | Facility: CLINIC | Age: 64
End: 2024-11-15
Payer: COMMERCIAL

## 2024-11-21 ENCOUNTER — TELEPHONE (OUTPATIENT)
Dept: PAIN MEDICINE | Facility: CLINIC | Age: 64
End: 2024-11-21
Payer: COMMERCIAL
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
## 2024-11-22 ENCOUNTER — TELEPHONE (OUTPATIENT)
Dept: PAIN MEDICINE | Facility: CLINIC | Age: 64
End: 2024-11-22
Payer: COMMERCIAL

## 2024-12-23 DIAGNOSIS — R11.2 NAUSEA AND VOMITING, UNSPECIFIED VOMITING TYPE: ICD-10-CM

## 2024-12-23 RX ORDER — PROMETHAZINE HYDROCHLORIDE 25 MG/1
25 TABLET ORAL EVERY 6 HOURS PRN
Qty: 20 TABLET | Refills: 1 | Status: CANCELLED | OUTPATIENT
Start: 2024-12-23 | End: 2025-06-21

## 2024-12-24 DIAGNOSIS — R11.2 NAUSEA AND VOMITING, UNSPECIFIED VOMITING TYPE: ICD-10-CM

## 2024-12-24 RX ORDER — PROMETHAZINE HYDROCHLORIDE 25 MG/1
25 TABLET ORAL EVERY 6 HOURS PRN
Qty: 20 TABLET | Refills: 1 | Status: CANCELLED | OUTPATIENT
Start: 2024-12-23 | End: 2025-06-21

## 2024-12-26 DIAGNOSIS — R11.2 NAUSEA AND VOMITING, UNSPECIFIED VOMITING TYPE: ICD-10-CM

## 2024-12-26 RX ORDER — PROMETHAZINE HYDROCHLORIDE 25 MG/1
25 TABLET ORAL EVERY 6 HOURS PRN
Qty: 20 TABLET | Refills: 1 | Status: SHIPPED | OUTPATIENT
Start: 2024-12-26 | End: 2025-06-24

## 2025-01-03 ENCOUNTER — HOSPITAL ENCOUNTER (OUTPATIENT)
Facility: HOSPITAL | Age: 65
Discharge: HOME | End: 2025-01-03
Attending: ANESTHESIOLOGY | Admitting: ANESTHESIOLOGY
Payer: COMMERCIAL

## 2025-01-03 ENCOUNTER — HOSPITAL ENCOUNTER (OUTPATIENT)
Dept: RADIOLOGY | Facility: HOSPITAL | Age: 65
Discharge: HOME | End: 2025-01-03
Attending: ANESTHESIOLOGY | Admitting: ANESTHESIOLOGY
Payer: COMMERCIAL

## 2025-01-03 VITALS
HEART RATE: 77 BPM | SYSTOLIC BLOOD PRESSURE: 177 MMHG | RESPIRATION RATE: 20 BRPM | DIASTOLIC BLOOD PRESSURE: 86 MMHG | WEIGHT: 157 LBS | TEMPERATURE: 98 F | BODY MASS INDEX: 25.23 KG/M2 | HEIGHT: 66 IN | OXYGEN SATURATION: 98 %

## 2025-01-03 DIAGNOSIS — M47.812 CERVICAL SPONDYLOSIS: ICD-10-CM

## 2025-01-03 DIAGNOSIS — M54.2 NECK PAIN: ICD-10-CM

## 2025-01-03 LAB — GLUCOSE SERPL-MCNC: 116 MG/DL (ref 70–110)

## 2025-01-03 PROCEDURE — 64634 DESTROY C/TH FACET JNT ADDL: CPT | Mod: PO,RT | Performed by: ANESTHESIOLOGY

## 2025-01-03 PROCEDURE — 64633 DESTROY CERV/THOR FACET JNT: CPT | Mod: RT,,, | Performed by: ANESTHESIOLOGY

## 2025-01-03 PROCEDURE — 64634 DESTROY C/TH FACET JNT ADDL: CPT | Mod: RT,,, | Performed by: ANESTHESIOLOGY

## 2025-01-03 PROCEDURE — 63600175 PHARM REV CODE 636 W HCPCS: Mod: PO | Performed by: ANESTHESIOLOGY

## 2025-01-03 PROCEDURE — 82962 GLUCOSE BLOOD TEST: CPT | Mod: PO | Performed by: ANESTHESIOLOGY

## 2025-01-03 PROCEDURE — 64633 DESTROY CERV/THOR FACET JNT: CPT | Mod: PO,RT | Performed by: ANESTHESIOLOGY

## 2025-01-03 RX ORDER — SODIUM CHLORIDE, SODIUM LACTATE, POTASSIUM CHLORIDE, CALCIUM CHLORIDE 600; 310; 30; 20 MG/100ML; MG/100ML; MG/100ML; MG/100ML
INJECTION, SOLUTION INTRAVENOUS CONTINUOUS
Status: DISCONTINUED | OUTPATIENT
Start: 2025-01-03 | End: 2025-01-03 | Stop reason: HOSPADM

## 2025-01-03 RX ORDER — FENTANYL CITRATE 50 UG/ML
INJECTION, SOLUTION INTRAMUSCULAR; INTRAVENOUS
Status: DISCONTINUED | OUTPATIENT
Start: 2025-01-03 | End: 2025-01-03 | Stop reason: HOSPADM

## 2025-01-03 RX ORDER — LIDOCAINE HYDROCHLORIDE 20 MG/ML
INJECTION, SOLUTION EPIDURAL; INFILTRATION; INTRACAUDAL; PERINEURAL
Status: DISCONTINUED | OUTPATIENT
Start: 2025-01-03 | End: 2025-01-03 | Stop reason: HOSPADM

## 2025-01-03 RX ORDER — METHYLPREDNISOLONE ACETATE 40 MG/ML
INJECTION, SUSPENSION INTRA-ARTICULAR; INTRALESIONAL; INTRAMUSCULAR; SOFT TISSUE
Status: DISCONTINUED | OUTPATIENT
Start: 2025-01-03 | End: 2025-01-03 | Stop reason: HOSPADM

## 2025-01-03 RX ORDER — MIDAZOLAM HYDROCHLORIDE 1 MG/ML
INJECTION INTRAMUSCULAR; INTRAVENOUS
Status: DISCONTINUED | OUTPATIENT
Start: 2025-01-03 | End: 2025-01-03 | Stop reason: HOSPADM

## 2025-01-03 RX ORDER — LIDOCAINE HYDROCHLORIDE 10 MG/ML
INJECTION, SOLUTION EPIDURAL; INFILTRATION; INTRACAUDAL; PERINEURAL
Status: DISCONTINUED | OUTPATIENT
Start: 2025-01-03 | End: 2025-01-03 | Stop reason: HOSPADM

## 2025-01-03 NOTE — OP NOTE
PROCEDURE DATE: 1/3/2025    PROCEDURE:  Radiofrequency ablation of the right TON, C7, T1 medial branch nerves on the right-side under fluoroscopy    DIAGNOSIS:  Cervical spondylosis    Post Op Diagnosis: Same    PHYSICIAN: Erik Galan MD    MEDICATIONS INJECTED:  From a mixture of 4ml of 2% lidocaine and 40mg of methylprednisone, 1ml of this solution was injected at each level.    LOCAL ANESTHETIC USED:   1ml of lidocaine 1% at each level    SEDATION MEDICATIONS: 2mg versed, 50mcg fentanyl    ESTIMATED BLOOD LOSS:  none    COMPLICATIONS:  none    TECHNIQUE:   A time-out taken to identify patient and procedure side prior to starting the procedure.  The patient was positioned prone with the site of interest side up. The patient was prepped and draped in the usual sterile fashion using ChloraPrep and sterile towels.  The levels were determined under fluoroscopic guidance using a slightly posteriorly oblique view.   Local anesthetic was infiltrated superficially at the skin.  Then a 100 mm 22g bent tip [place] RF needle was inserted to the anatomic location of the midsection of the lateral masses of the right C7 and T1 or in the case of third occipital nerve, to the joint of C2/C3. Impedance was less than 800 ohms at each level. Motor stimulation up to 2 volts confirmed there was no nerve root involvement at each level. Medication was then injected slowly.  Ablation was done per level utilizing K[employer] radiofrequency generator at 80°C for 90 seconds. The patient tolerated the procedure well.     The patient was monitored after the procedure.  Patient was given post procedure and discharge instructions to follow at home.  The patient was discharged in a stable condition

## 2025-01-03 NOTE — H&P
CC: Neck pain    HPI: The patient is a 63yo woman with a history of cervical spondylosis here for right TON, C7, T1 RFA. There are no major changes in history and physical from 10/1/24.    Past Medical History:   Diagnosis Date    Arthritis     Asthma     Cataract     OU    Diabetes mellitus, type 2     Gastric ulcer     Hyperlipidemia     Hypertension     Mitral valve prolapse     RYLAN (obstructive sleep apnea)     does not wear CPAP-can't tolerate-claustraphobic    Snores      unable to lay flat, sleeps elevated    Thyroid disease     Goiter       Past Surgical History:   Procedure Laterality Date    ANTERIOR CERVICAL DISCECTOMY W/ FUSION N/A 2021    Procedure: DISCECTOMY, SPINE, CERVICAL, ANTERIOR APPROACH, WITH FUSION , C3-4, C4-5, C5-6, C6 CORPECTOMYc-3-c7 anterior instrumented fusion;  Surgeon: Denise Real MD;  Location: [facility] OR;  Service: Neurosurgery;  Laterality: N/A;    CARPAL TUNNEL RELEASE Left     CARPAL TUNNEL RELEASE Right 2018    Procedure: RELEASE, CARPAL TUNNEL;  Surgeon: Anurag Mathis MD;  Location: [facility] OR;  Service: Orthopedics;  Laterality: Right;    CAUDAL EPIDURAL STEROID INJECTION N/A 2021    Procedure: Injection-steroid-epidural-caudal;  Surgeon: Erik Galan MD;  Location: [facility] OR;  Service: Pain Management;  Laterality: N/A;    CAUDAL EPIDURAL STEROID INJECTION N/A 2022    Procedure: Injection-steroid-epidural-caudal;  Surgeon: Erik Galan MD;  Location: [facility] OR;  Service: Pain Management;  Laterality: N/A;     SECTION      CHOLECYSTECTOMY      DILATION AND CURETTAGE OF UTERUS      Epidural steroid Injection      Pain management, cervical    ESOPHAGEAL DILATION      FACETECTOMY OF VERTEBRA N/A 2021    Procedure: EJIRBVGEDGA-A3-5 Medial;  Surgeon: Denise Real MD;  Location: [facility] OR;  Service: Neurosurgery;  Laterality: N/A;    INJECTION OF ANESTHETIC AGENT AROUND MEDIAL BRANCH NERVES INNERVATING CERVICAL  FACET JOINT Right 6/12/2024    Procedure: Block-nerve-medial branch-cervical  TON, C7/T1;  Surgeon: Erik Galan MD;  Location: [facility] OR;  Service: Pain Management;  Laterality: Right;    INJECTION OF ANESTHETIC AGENT AROUND MEDIAL BRANCH NERVES INNERVATING CERVICAL FACET JOINT Right 6/28/2024    Procedure: Block-nerve-medial branch-cervical-TON-C7-T1;  Surgeon: Erik Galan MD;  Location: [facility] OR;  Service: Pain Management;  Laterality: Right;    LAMINECTOMY USING MINIMALLY INVASIVE TECHNIQUE N/A 6/24/2021    Procedure: LAMINECTOMY, SPINE, MINIMALLY INVASIVE RIGHT L5-S1 AND L4-L5 HEMILAMINECTOMY;  Surgeon: Denise Real MD;  Location: [facility] OR;  Service: Neurosurgery;  Laterality: N/A;    MICRODISCECTOMY OF SPINE N/A 6/24/2021    Procedure: MICRODISCECTOMY, SPINE L5-S1;  Surgeon: Denise Real MD;  Location: [facility] OR;  Service: Neurosurgery;  Laterality: N/A;    TRANSFORAMINAL EPIDURAL INJECTION OF STEROID Right 1/11/2021    Procedure: Injection,steroid,epidural,transforaminal approach L4/5 and L5/S1;  Surgeon: Mariano Luna MD;  Location: [facility] OR;  Service: Pain Management;  Laterality: Right;    TRIGGER FINGER RELEASE Left 7/16/2020    Procedure: RELEASE, TRIGGER FINGER//Left thumb, middle and ring finger trigger release;  Surgeon: Jose Mata MD;  Location: [facility] OR;  Service: Orthopedics;  Laterality: Left;    ulcer on tongue removed      WOUND DEBRIDEMENT Left     leg at ankle level       Family History   Problem Relation Name Age of Onset    Diabetes Father      Diabetes Sister      Heart disease Brother      Glaucoma Paternal Aunt      Glaucoma Daughter          suspect    Macular degeneration Neg Hx         Social History     Socioeconomic History    Marital status:    Tobacco Use    Smoking status: Never    Smokeless tobacco: Never   Substance and Sexual Activity    Alcohol use: No     Alcohol/week: 0.0 standard drinks of alcohol    Drug use: Yes     Types:  Hydrocodone    Sexual activity: Yes     Partners: Male   Social History Narrative        5 children    Prior secretarial work      Social Drivers of Health     Financial Resource Strain: Unknown (8/23/2021)    Received from [facility]    Overall Financial Resource Strain (CARDIA)     Difficulty of Paying Living Expenses: Patient declined   Food Insecurity: Unknown (8/23/2021)    Received from [facility]    Hunger Vital Sign     Worried About Running Out of Food in the Last Year: Patient declined     Ran Out of Food in the Last Year: Patient declined   Transportation Needs: Unknown (8/23/2021)    Received from [facility]    PRAPARE - Transportation     Lack of Transportation (Medical): Patient declined     Lack of Transportation (Non-Medical): Patient declined   Physical Activity: Unknown (8/23/2021)    Received from [facility]    Exercise Vital Sign     Days of Exercise per Week: Patient declined     Minutes of Exercise per Session: Patient declined   Stress: Unknown (8/23/2021)    Received from [facility]    [employer] - Occupational Stress Questionnaire     Feeling of Stress : Patient declined       Current Facility-Administered Medications   Medication Dose Route Frequency Provider Last Rate Last Admin    lactated ringers infusion   Intravenous Continuous Erik Galan MD         Facility-Administered Medications Ordered in Other Encounters   Medication Dose Route Frequency Provider Last Rate Last Admin    chlorhexidine 0.12 % solution 15 mL  15 mL Mouth/Throat Once Denise Real MD        mupirocin 2 % ointment   Nasal Once Denise Real MD           Review of patient's allergies indicates:   Allergen Reactions    Metformin Diarrhea     With extended  "release preparation as well    Peanuts [peanut and related legumes] Itching     Itches    Penicillins Itching     Swelling (extremities)^  Swelling (extremities)^      [place]ble Itching     throat       Vitals:    12/27/24 1242 01/03/25 1025   BP:  (!) 188/77   Pulse:  67   Resp:  17   Temp:  97.6 °F (36.4 °C)   TempSrc:  Skin   SpO2:  97%   Weight: 71.2 kg (157 lb) 71.2 kg (157 lb)   Height: 5' 6" (1.676 m) 5' 6" (1.676 m)       ASA 2, Mallampati 2    REVIEW OF SYSTEMS:     GENERAL: No weight loss, malaise or fevers.  HEENT:  No recent changes in vision or hearing  NECK: Negative for lumps, no difficulty with swallowing.  RESPIRATORY: Negative for cough, wheezing or shortness of breath, patient denies any recent URI.  CARDIOVASCULAR: Negative for chest pain, leg swelling or palpitations.  GI: Negative for abdominal discomfort, blood in stools or black stools or change in bowel habits.  MUSCULOSKELETAL: See HPI.  SKIN: Negative for lesions, rash, and itching.  PSYCH: No suicidal or homicidal ideations, no current mood disturbances.  HEMATOLOGY/LYMPHOLOGY: Negative for prolonged bleeding, bruising easily or swollen nodes. Patient is not currently taking any anti-coagulants  ENDO: No history of diabetes or thyroid dysfunction  NEURO: No history of syncope, paralysis, seizures or tremors.All other reviewed and negative other than HPI.    Physical exam:  Gen: A and O x3, pleasant, well-groomed  Skin: No rashes or obvious lesions  HEENT: PERRLA, no obvious deformities on ears or in canals. No thyroid masses, trachea midline, no palpable lymph nodes in neck, axilla.  CVS: Regular rate and rhythm, normal S1 and S2, no murmurs.  Resp: Clear to auscultation bilaterally.  Abdomen: Soft, NT/ND, normal bowel sounds present.  Musculoskeletal/Neuro: Moving all extremities    Assessment:  Cervical spondylosis  -     Place in Outpatient; Standing  -     Vital signs; Standing  -     Place 18-22 gauage peripheral IV ; Standing  -     " Verify informed consent; Standing  -     Notify physician ; Standing  -     Notify physician ; Standing  -     Notify physician (specify); Standing  -     Diet NPO; Standing  -     lactated ringers infusion    Other orders  -     IP VTE LOW RISK PATIENT; Standing  -     POCT Glucose, Hand-Held Device; Standing

## 2025-01-03 NOTE — DISCHARGE SUMMARY
[facility] - Surgery  Discharge Note  Short Stay    Procedure(s) (LRB):  Radiofrequency Ablation    Cervical  TON and C7/T1 (Right)      OUTCOME: Patient tolerated treatment/procedure well without complication and is now ready for discharge.    DISPOSITION: Home or Self Care    FINAL DIAGNOSIS:  Cervical spondylosis    FOLLOWUP: In clinic    DISCHARGE INSTRUCTIONS:    Discharge Procedure Orders   Diet Adult Regular     No dressing needed     Notify your health care provider if you experience any of the following:  temperature >100.4     Activity as tolerated

## 2025-01-08 ENCOUNTER — LAB VISIT (OUTPATIENT)
Dept: LAB | Facility: HOSPITAL | Age: 65
End: 2025-01-08
Attending: NURSE PRACTITIONER
Payer: COMMERCIAL

## 2025-01-08 DIAGNOSIS — Z79.4 TYPE 2 DIABETES MELLITUS WITHOUT COMPLICATION, WITH LONG-TERM CURRENT USE OF INSULIN: ICD-10-CM

## 2025-01-08 DIAGNOSIS — E11.9 TYPE 2 DIABETES MELLITUS WITHOUT COMPLICATION, WITH LONG-TERM CURRENT USE OF INSULIN: ICD-10-CM

## 2025-01-08 LAB
ESTIMATED AVG GLUCOSE: 128 MG/DL (ref 68–131)
HBA1C MFR BLD: 6.1 % (ref 4–5.6)

## 2025-01-08 PROCEDURE — 83036 HEMOGLOBIN GLYCOSYLATED A1C: CPT | Performed by: NURSE PRACTITIONER

## 2025-01-08 PROCEDURE — 36415 COLL VENOUS BLD VENIPUNCTURE: CPT | Mod: PO | Performed by: NURSE PRACTITIONER

## 2025-01-09 RX ORDER — TIZANIDINE 4 MG/1
4 TABLET ORAL EVERY 8 HOURS PRN
Qty: 90 TABLET | Refills: 2 | Status: SHIPPED | OUTPATIENT
Start: 2025-01-09

## 2025-01-10 DIAGNOSIS — G43.909 MIGRAINE WITHOUT STATUS MIGRAINOSUS, NOT INTRACTABLE, UNSPECIFIED MIGRAINE TYPE: ICD-10-CM

## 2025-01-10 RX ORDER — TOPIRAMATE 25 MG/1
25 TABLET ORAL 2 TIMES DAILY
Qty: 60 TABLET | Refills: 2 | Status: SHIPPED | OUTPATIENT
Start: 2025-01-10

## 2025-01-10 NOTE — TELEPHONE ENCOUNTER
Pharmacy requesting refill on Topiramate 25mg  Pt's LOV 08/09/2024  Pt's NOV 07/03/2025  Medication pending

## 2025-01-13 ENCOUNTER — OFFICE VISIT (OUTPATIENT)
Dept: PAIN MEDICINE | Facility: CLINIC | Age: 65
End: 2025-01-13
Payer: COMMERCIAL

## 2025-01-13 VITALS
HEIGHT: 66 IN | DIASTOLIC BLOOD PRESSURE: 78 MMHG | HEART RATE: 72 BPM | SYSTOLIC BLOOD PRESSURE: 156 MMHG | WEIGHT: 160.38 LBS | BODY MASS INDEX: 25.78 KG/M2

## 2025-01-13 DIAGNOSIS — M48.061 SPINAL STENOSIS OF LUMBAR REGION WITHOUT NEUROGENIC CLAUDICATION: ICD-10-CM

## 2025-01-13 DIAGNOSIS — M51.369 DDD (DEGENERATIVE DISC DISEASE), LUMBAR: ICD-10-CM

## 2025-01-13 DIAGNOSIS — M54.16 LUMBAR RADICULOPATHY: Primary | ICD-10-CM

## 2025-01-13 DIAGNOSIS — M47.812 CERVICAL SPONDYLOSIS: ICD-10-CM

## 2025-01-13 DIAGNOSIS — M50.30 DDD (DEGENERATIVE DISC DISEASE), CERVICAL: ICD-10-CM

## 2025-01-13 DIAGNOSIS — Z79.891 OPIOID CONTRACT EXISTS: ICD-10-CM

## 2025-01-13 PROCEDURE — 3072F LOW RISK FOR RETINOPATHY: CPT | Mod: CPTII,S$GLB,, | Performed by: PHYSICIAN ASSISTANT

## 2025-01-13 PROCEDURE — 1159F MED LIST DOCD IN RCRD: CPT | Mod: CPTII,S$GLB,, | Performed by: PHYSICIAN ASSISTANT

## 2025-01-13 PROCEDURE — 3061F NEG MICROALBUMINURIA REV: CPT | Mod: CPTII,S$GLB,, | Performed by: PHYSICIAN ASSISTANT

## 2025-01-13 PROCEDURE — 4010F ACE/ARB THERAPY RXD/TAKEN: CPT | Mod: CPTII,S$GLB,, | Performed by: PHYSICIAN ASSISTANT

## 2025-01-13 PROCEDURE — 3008F BODY MASS INDEX DOCD: CPT | Mod: CPTII,S$GLB,, | Performed by: PHYSICIAN ASSISTANT

## 2025-01-13 PROCEDURE — 3078F DIAST BP <80 MM HG: CPT | Mod: CPTII,S$GLB,, | Performed by: PHYSICIAN ASSISTANT

## 2025-01-13 PROCEDURE — 99999 PR PBB SHADOW E&M-EST. PATIENT-LVL III: CPT | Mod: PBBFAC,,, | Performed by: PHYSICIAN ASSISTANT

## 2025-01-13 PROCEDURE — 99214 OFFICE O/P EST MOD 30 MIN: CPT | Mod: S$GLB,,, | Performed by: PHYSICIAN ASSISTANT

## 2025-01-13 PROCEDURE — 1160F RVW MEDS BY RX/DR IN RCRD: CPT | Mod: CPTII,S$GLB,, | Performed by: PHYSICIAN ASSISTANT

## 2025-01-13 PROCEDURE — 3044F HG A1C LEVEL LT 7.0%: CPT | Mod: CPTII,S$GLB,, | Performed by: PHYSICIAN ASSISTANT

## 2025-01-13 PROCEDURE — 3066F NEPHROPATHY DOC TX: CPT | Mod: CPTII,S$GLB,, | Performed by: PHYSICIAN ASSISTANT

## 2025-01-13 PROCEDURE — 3077F SYST BP >= 140 MM HG: CPT | Mod: CPTII,S$GLB,, | Performed by: PHYSICIAN ASSISTANT

## 2025-01-13 RX ORDER — HYDROCODONE BITARTRATE AND ACETAMINOPHEN 10; 325 MG/1; MG/1
1 TABLET ORAL 3 TIMES DAILY PRN
Qty: 90 TABLET | Refills: 0 | Status: SHIPPED | OUTPATIENT
Start: 2025-01-20 | End: 2025-02-19

## 2025-01-13 RX ORDER — HYDROCODONE BITARTRATE AND ACETAMINOPHEN 10; 325 MG/1; MG/1
1 TABLET ORAL 3 TIMES DAILY PRN
Qty: 90 TABLET | Refills: 0 | Status: SHIPPED | OUTPATIENT
Start: 2025-03-21 | End: 2025-04-20

## 2025-01-13 RX ORDER — HYDROCODONE BITARTRATE AND ACETAMINOPHEN 10; 325 MG/1; MG/1
1 TABLET ORAL 3 TIMES DAILY PRN
Qty: 90 TABLET | Refills: 0 | Status: SHIPPED | OUTPATIENT
Start: 2025-02-19 | End: 2025-03-21

## 2025-01-16 ENCOUNTER — OFFICE VISIT (OUTPATIENT)
Dept: ENDOCRINOLOGY | Facility: CLINIC | Age: 65
End: 2025-01-16
Payer: COMMERCIAL

## 2025-01-16 VITALS
BODY MASS INDEX: 25.61 KG/M2 | WEIGHT: 159.38 LBS | DIASTOLIC BLOOD PRESSURE: 92 MMHG | HEART RATE: 74 BPM | HEIGHT: 66 IN | OXYGEN SATURATION: 96 % | SYSTOLIC BLOOD PRESSURE: 170 MMHG

## 2025-01-16 DIAGNOSIS — Z79.4 TYPE 2 DIABETES MELLITUS WITHOUT COMPLICATION, WITH LONG-TERM CURRENT USE OF INSULIN: Primary | ICD-10-CM

## 2025-01-16 DIAGNOSIS — I10 PRIMARY HYPERTENSION: ICD-10-CM

## 2025-01-16 DIAGNOSIS — E78.49 OTHER HYPERLIPIDEMIA: ICD-10-CM

## 2025-01-16 DIAGNOSIS — E11.9 TYPE 2 DIABETES MELLITUS WITHOUT COMPLICATION, WITH LONG-TERM CURRENT USE OF INSULIN: Primary | ICD-10-CM

## 2025-01-16 DIAGNOSIS — I10 ESSENTIAL HYPERTENSION: ICD-10-CM

## 2025-01-16 DIAGNOSIS — M54.50 CHRONIC MIDLINE LOW BACK PAIN, UNSPECIFIED WHETHER SCIATICA PRESENT: ICD-10-CM

## 2025-01-16 DIAGNOSIS — G89.29 CHRONIC MIDLINE LOW BACK PAIN, UNSPECIFIED WHETHER SCIATICA PRESENT: ICD-10-CM

## 2025-01-16 PROCEDURE — 3066F NEPHROPATHY DOC TX: CPT | Mod: CPTII,S$GLB,, | Performed by: NURSE PRACTITIONER

## 2025-01-16 PROCEDURE — 4010F ACE/ARB THERAPY RXD/TAKEN: CPT | Mod: CPTII,S$GLB,, | Performed by: NURSE PRACTITIONER

## 2025-01-16 PROCEDURE — 3061F NEG MICROALBUMINURIA REV: CPT | Mod: CPTII,S$GLB,, | Performed by: NURSE PRACTITIONER

## 2025-01-16 PROCEDURE — 3072F LOW RISK FOR RETINOPATHY: CPT | Mod: CPTII,S$GLB,, | Performed by: NURSE PRACTITIONER

## 2025-01-16 PROCEDURE — 99214 OFFICE O/P EST MOD 30 MIN: CPT | Mod: S$GLB,,, | Performed by: NURSE PRACTITIONER

## 2025-01-16 PROCEDURE — 3080F DIAST BP >= 90 MM HG: CPT | Mod: CPTII,S$GLB,, | Performed by: NURSE PRACTITIONER

## 2025-01-16 PROCEDURE — 99999 PR PBB SHADOW E&M-EST. PATIENT-LVL IV: CPT | Mod: PBBFAC,,, | Performed by: NURSE PRACTITIONER

## 2025-01-16 PROCEDURE — 3008F BODY MASS INDEX DOCD: CPT | Mod: CPTII,S$GLB,, | Performed by: NURSE PRACTITIONER

## 2025-01-16 PROCEDURE — 1159F MED LIST DOCD IN RCRD: CPT | Mod: CPTII,S$GLB,, | Performed by: NURSE PRACTITIONER

## 2025-01-16 PROCEDURE — 3044F HG A1C LEVEL LT 7.0%: CPT | Mod: CPTII,S$GLB,, | Performed by: NURSE PRACTITIONER

## 2025-01-16 PROCEDURE — 3077F SYST BP >= 140 MM HG: CPT | Mod: CPTII,S$GLB,, | Performed by: NURSE PRACTITIONER

## 2025-01-16 RX ORDER — BLOOD-GLUCOSE SENSOR
1 EACH MISCELLANEOUS
Qty: 2 EACH | Refills: 12 | Status: SHIPPED | OUTPATIENT
Start: 2025-01-16 | End: 2026-01-16

## 2025-01-16 RX ORDER — BENAZEPRIL HYDROCHLORIDE 40 MG/1
40 TABLET ORAL DAILY
Qty: 90 TABLET | Refills: 3 | Status: SHIPPED | OUTPATIENT
Start: 2025-01-16 | End: 2026-01-16

## 2025-01-16 NOTE — PATIENT INSTRUCTIONS
Download Altaf 3 deepa.     Use your same ID and password, and don't set up a new account.      You don't have to scan with the altaf 3,

## 2025-01-16 NOTE — PROGRESS NOTES
+  Subjective:       Patient ID: Aaron Garcia is a 64 y.o. female.    Chief Complaint: Diabetes      HPI: Pt is a 60 y.o. AAF  with a diagnosis of Type 2 diabetes mellitus diagnosed approximately 2001, as well as chronic conditions pending review including HTN, HLP. States A1C I n 6% range until appros 2017.  Other pertinent medical and social information noted includes, but not limited to: chronic pain r/t cervical radiculopathy affecting UA.      Interim Events: Jan 16, 2025:  Pt with several ER visits last month r/t severe exacerbation of chronic back pain with radicular symptoms. She has had some steroid injections and pending f/u with pain management.Not using sensor as some issues with the new year and was quoted a price much higher than cash pay.  No c.o hypoglycemia or other issues.    BP currently elevated--has a new primary and was concerned BP too low ~120/60 so d/c bystolic and decreased benicar from 40 to 5 mg.  Pt currently now on 20 benicar, but BP still elevated and having Headaches.      Current DM meds:   mounjaro 7.5 mg , Novolog ss prn 1:25 at 150.        Failed DM meds:  metformin, diarrhea. tradjenta 5 mg, glipizide 5 mg bid  (prior 60 Toujeo--12 -15 novolog)   Statin: rosuvasatin 10 mg        Not tolerated statin : na   ACE/ARB:benazepril 40 mg         Not tolerated ACE/ARB: na   Known Diabetic complications:     CGMS Interpretation:       Sensor/Pump Interpretation or Prominent Theme:    Sept 18, 20224:  had 2 cervical nerve blocks last week. C/o nocturnal hypoglycemia. Only on 7.5 mg Mounjaro weekly.  States sometimes glucose dose get elevated and she may take 20 of Toujeo.  Advised that is causing nocturnal hypoglycemia--but she states the low glucoses were happening even when not taking Toujeo.  Pt c/o fatigue and cold intolerance.  She reviewed her labs and concerned about some mild anemia. She is following with Dr. Allen for psoriatric arthritis.       60's.        June 13, 2024:   Seen in interim by IVORY Moreno while I was out.  Mounjaro added. Pt currently taking 15 mg, but missed last week r/t Cervical nerve block procedure.  As a result she has lost 20 lbs and off of both Toujeo and Novolog, which is quite impressive.  She has been having a time with neck and back pain, and cervical nerve block yesterday--which so far she is having relief -so likely pending ablation.  She is requesting a lower dose of mounjaro r/t poor apptite and cont wt loss.     March 16, 2023 Fell about a month ago playing soccer with grandson---hurt back--so she has been dealing with that since--also with R rotator cuff--apparently ER told her she does or might have rotator cuff tear.  States glucoses have been running high because she has chronic sinus issues.  Altaf downloaded.     March 9, 2023   No acute events:  Using Freestyle Libre2 . Forgot sensor in car. No c/o hypoglycemia. C/o poor sleep. Lays in bed sometimes til 4 am.  On Elavil, states gabapentin use to help, and also zanaflex.  Does have 2 daughters and their children move in so stress with that.  Concerned with DM complications.Reassured.        Review of Systems   Constitutional:  Negative for activity change, fatigue and fever.   HENT:  Negative for ear discharge, hearing loss, mouth dryness and trouble swallowing.    Eyes:  Negative for photophobia and visual disturbance.        Last Eye Exam: Fall 2021    Respiratory:  Negative for cough and shortness of breath.    Cardiovascular:  Negative for palpitations.   Gastrointestinal:  Positive for constipation. Negative for abdominal pain and diarrhea.   Genitourinary:  Negative for difficulty urinating, frequency and urgency.   Musculoskeletal:  Positive for back pain (pending lumbar fusion) and neck pain. Negative for arthralgias and myalgias.   Integumentary:  Negative for rash and wound.   Neurological:  Negative for weakness and numbness.   Psychiatric/Behavioral:  Negative for sleep disturbance.  "The patient is not nervous/anxious.          Objective:      Physical Exam  Constitutional:       Appearance: Normal appearance. She is normal weight.   HENT:      Head: Normocephalic and atraumatic.      Nose: Nose normal.      Mouth/Throat:      Mouth: Mucous membranes are moist.      Pharynx: Oropharynx is clear.   Eyes:      Extraocular Movements: Extraocular movements intact.      Conjunctiva/sclera: Conjunctivae normal.      Pupils: Pupils are equal, round, and reactive to light.   Neck:      Vascular: No carotid bruit.   Cardiovascular:      Rate and Rhythm: Normal rate and regular rhythm.      Pulses: Normal pulses.      Heart sounds: Normal heart sounds.   Pulmonary:      Effort: Pulmonary effort is normal.      Breath sounds: Normal breath sounds.   Musculoskeletal:         General: Normal range of motion.      Cervical back: Normal range of motion and neck supple.      Right lower leg: No edema.      Left lower leg: No edema.      Comments: Deferrred   Feet:no open wounds or calluses. Good pedal pulses. +2 bilaterally,   Compression hose on--no vibratory sensation   Lymphadenopathy:      Cervical: No cervical adenopathy.   Skin:     General: Skin is warm and dry.   Neurological:      General: No focal deficit present.      Mental Status: She is alert and oriented to person, place, and time.   Psychiatric:         Mood and Affect: Mood normal.         Behavior: Behavior normal.         Thought Content: Thought content normal.         Judgment: Judgment normal.         BP (!) 170/92   Pulse 74   Ht 5' 6" (1.676 m)   Wt 72.3 kg (159 lb 6.3 oz)   LMP 03/13/2017   SpO2 96%   BMI 25.73 kg/m²     Hemoglobin A1C   Date Value Ref Range Status   01/08/2025 6.1 (H) 4.0 - 5.6 % Final     Comment:     ADA Screening Guidelines:  5.7-6.4%  Consistent with prediabetes  >or=6.5%  Consistent with diabetes    High levels of fetal hemoglobin interfere with the HbA1C  assay. Heterozygous hemoglobin variants (HbS, HgC, " etc)do  not significantly interfere with this assay.   However, presence of multiple variants may affect accuracy.     09/17/2024 6.5 (H) 4.0 - 5.6 % Final     Comment:     ADA Screening Guidelines:  5.7-6.4%  Consistent with prediabetes  >or=6.5%  Consistent with diabetes    High levels of fetal hemoglobin interfere with the HbA1C  assay. Heterozygous hemoglobin variants (HbS, HgC, etc)do  not significantly interfere with this assay.   However, presence of multiple variants may affect accuracy.     03/14/2024 6.3 (H) 4.0 - 5.6 % Final     Comment:     ADA Screening Guidelines:  5.7-6.4%  Consistent with prediabetes  >or=6.5%  Consistent with diabetes    High levels of fetal hemoglobin interfere with the HbA1C  assay. Heterozygous hemoglobin variants (HbS, HgC, etc)do  not significantly interfere with this assay.   However, presence of multiple variants may affect accuracy.         Chemistry        Component Value Date/Time     09/17/2024 0902    K 3.9 09/17/2024 0902     09/17/2024 0902    CO2 26 09/17/2024 0902    BUN 11 09/17/2024 0902    CREATININE 0.7 09/17/2024 0902     09/17/2024 0902        Component Value Date/Time    CALCIUM 9.5 09/17/2024 0902    ALKPHOS 62 09/17/2024 0902    AST 18 09/17/2024 0902    ALT 12 09/17/2024 0902    BILITOT 0.5 09/17/2024 0902    ESTGFRAFRICA >60.0 05/04/2022 1349    EGFRNONAA >60.0 05/04/2022 1349          Lab Results   Component Value Date    CHOL 113 (L) 09/17/2024     Lab Results   Component Value Date    HDL 49 09/17/2024     Lab Results   Component Value Date    LDLCALC 53.2 (L) 09/17/2024     Lab Results   Component Value Date    TRIG 54 09/17/2024     Lab Results   Component Value Date    CHOLHDL 43.4 09/17/2024     Lab Results   Component Value Date    MICALBCREAT 7.7 01/08/2025     Lab Results   Component Value Date    TSH 1.374 09/17/2024     Vit D, 25-Hydroxy   Date Value Ref Range Status   10/15/2024 21 (L) 30 - 96 ng/mL Final     Comment:      Vitamin D deficiency.........<10 ng/mL                              Vitamin D insufficiency......10-29 ng/mL       Vitamin D sufficiency........> or equal to 30 ng/mL  Vitamin D toxicity............>100 ng/mL             Assessment:       1. Type 2 diabetes mellitus without complication, with long-term current use of insulin  blood-glucose sensor (FREESTYLE DASHA 3 SENSOR) Haritha    Hemoglobin A1C      2. Primary hypertension  benazepriL (LOTENSIN) 40 MG tablet      3. Essential hypertension        4. Other hyperlipidemia        5. Chronic midline low back pain, unspecified whether sciatica present              Plan:     --on benazepril  20 mg --increase to 40 mg.    --will f/u primary for BP check next week.   --on rosuvastatin 10   Cont mounjaro from 7.5 mg.        Add Novolog ss 1:50     ORDERS 01/16/2025       4 months  w/ A1c noe

## 2025-01-24 DIAGNOSIS — M25.562 CHRONIC PAIN OF LEFT KNEE: Primary | ICD-10-CM

## 2025-01-24 DIAGNOSIS — G89.29 CHRONIC PAIN OF LEFT KNEE: Primary | ICD-10-CM

## 2025-01-26 NOTE — PROGRESS NOTES
This note was completed with dictation software and grammatical errors may exist.    CC:  Neck and back pain    HPI: The patient is a 64-year-old woman with a history of diabetes, hypertension who presents in referral from [facility] for neck pain radiating into the arms and low back pain radiating into the left leg.  She is status post right 3rd occipital nerve, C7 and T1 medial branch radiofrequency ablation on 01/03/2025 with 75% relief.  She does report some left-sided pain.  Her main complaint is right greater than left low back pain radiating to the right lateral buttock and posterolateral thigh down to her knee.  She did recently have an intramuscular steroid injection at the emergency department and this has improved.  She reports weakness in her legs.  Overall her symptoms are currently tolerable with her medication.    Pain intervention history: She had undergone 3 epidurals for low back pain with only up to 3 weeks of relief each time.  She is status post C7-T1 cervical interlaminar epidural steroid injection on 1/29/16 with 50% relief.   She is status post C7-T1 cervical interlaminar epidural steroid injection on 3/1/16 with mild additional relief.  She is status post C7-T1 cervical interlaminar epidural steroid injection on 5/13/16 with 70-75% relief.  She is status post right C3, 4, 5 and 6 medial branch radiofrequency ablation on 7/15/16 with 30-60% relief.  She is status post L5/S1 interlaminar epidural steroid injection on 5/16/17 with 50% relief.  She is status post L5/S1 interlaminar epidural steroid injection on 12/29/17 with excellent relief lasting 2 weeks, now reporting minimal relief of her low back and buttock pain but ongoing 100% relief of her right leg pain.  She is status post C7-T1 cervical interlaminar epidural steroid injection on 3/14/18 with 75% relief but this was not long lasting.  She is status post right L4/5 and L5/S1 transforaminal epidural steroid  "injection on 01/11/2021 with Dr Luna with 0% relief.   She is status post caudal epidural steroid injection on 11/01/2021 with 70% relief lasting over 1 month.   She is status post caudal epidural steroid injection on 04/05/2022 with 60-65% relief. She is status post right 3rd occipital nerve, C7 and T1 medial branch radiofrequency ablation on 01/03/2025 with 75% relief.     Spine surgeries: Right L4/5 and L5/S1 laminectomy on 06/24/2021 Dr Hester.     Antineuropathics:  Gabapentin 100 mg 3 times daily  NSAIDs:  Physical therapy:  [facility] in [place]  Antidepressants:  Amitriptyline 25 mg nightly  Muscle relaxers:  Tizanidine 4 mg 3 times daily as needed  Opioids:  Hydrocodone-acetaminophen 10/325 mg 3 times daily as needed  Antiplatelets/Anticoagulants:    ROS: She reports headaches, hoarse voice, joint stiffness, joint swelling, back pain, difficulty sleeping, anxiety and loss of balance.  Balance of review of systems is negative.    Medical, surgical, family and social history reviewed elsewhere in record.    Medications/Allergies: See med card    Vitals:    01/13/25 0858   BP: (!) 156/78   Pulse: 72   Weight: 72.7 kg (160 lb 6.2 oz)   Height: 5' 6" (1.676 m)   PainSc:   4   PainLoc: Neck         Physical exam:  Gen: A and O x3, pleasant, well-groomed  Skin: No rashes or obvious lesions  HEENT: PERRLA, no obvious deformities on ears or in canals.Trachea midline.  CVS: Regular rate and rhythm, normal palpable pulses.  Resp: Clear to auscultation bilaterally, no wheezes or rales.  Abdomen: Soft, NT/ND.  Musculoskeletal: No antalgic gait.     Neuro:  Upper extremities: 4/5 strength bilaterally  Lower extremities:  4/5 strength bilaterally   Reflexes: Brachioradialis 2+, Bicep 2+, Tricep 2+.  Patellar and Achilles reflexes 2+ bilaterally.  Sensory: Intact and symmetrical to light touch and pinprick in C2-T1 dermatomes bilaterally.  Osuna's negative.  Romberg positive for imbalance, abnormal tandem gait " test.    Cervical Spine:  Cervical spine: Range of motion is mildly reduced with flexion extension and lateral rotation without increased pain.  Spurling's maneuver causes neck pain to either side.    Myofascial exam:  Moderate tenderness to palpation to the cervical paraspinous muscles.    Lumbar spine:  Range of motion is moderately reduced with flexion, extension and oblique extension with increased low back pain during each maneuver.  Khanh's test is negative bilaterally.  Straight leg raise causes left leg pain.  Internal and external rotation of hip is negative bilaterally.  Myofascial exam:  Mild tenderness to palpation to the lumbar paraspinous muscles.    Imagin/24/15 C-spine MRI  1. C3-4 left posterior paracentral disk extrusion causing left paracentral spinal cord compression and severe left foraminal stenosis.  2. C4-5 based disk extrusion with spinal cord impingement and moderate bilateral foraminal stenosis.  3. C5-6 posterior central disk extrusion with spinal cord compression and severe bilateral foraminal stenosis.  4. C6-7 mild disk bulge with severe bilateral foraminal stenosis.  5. Solitary mildly enlarged left submandibular lymph node of uncertain significance.    4/10/17 MRI lumbar spine  T12-L1: No central canal or neuroforaminal stenosis. No disc protrusion or extrusion.  L1-L2: There is ligamentum flavum thickening and facet arthropathy.  No central canal or neuroforaminal stenosis. No disc protrusion or extrusion.  L2-L3: There is ligamentum flavum thickening and facet arthropathy.  No central canal or neuroforaminal stenosis. No disc protrusion or extrusion.  L3-L4: There is ligamentum flavum thickening and facet arthropathy present. No central canal or neuroforaminal stenosis. No disc protrusion or extrusion.  L4-L5: There is a broad disc bulge which extends into both neural foramina.  There is ligamentum flavum thickening and facet arthropathy.  There is right lateral recess  stenosis.  There is abutment of the descending right L5 nerve in the right lateral recess.  Please correlate clinically for symptoms referable to the right L5 nerve.  There is mild central canal stenosis.  There is mild left neuroforaminal stenosis.  No right neuroforaminal stenosis.  L5-S1: There is a broad central/right paracentral disc protrusion which encroaches upon the descending right S1 nerve in the right lateral recess.  Please correlate clinically for symptoms referable to the right S1 nerve.  There is mild-moderate right neuroforaminal stenosis.  No left neuroforaminal stenosis.  No central canal stenosis.  There is ligamentum flavum thickening and facet arthropathy.  There is a broad left extraforaminal disc protrusion at L5-S1 which abuts the exited left L5 nerve in the left extraforaminal soft tissues (series 6 image 17), nonspecific.  Please correlate clinically for symptoms referable to the left L5 nerve.    05/28/2019 MRI cervical spine  C2-3: No disc herniation, spinal canal narrowing, or neuroforaminal narrowing.  C3-4: There is moderate broad-based posterior disc osteophyte complex formation with left paracentral predominance.  This along with facet arthropathy contributes to mild spinal canal narrowing and moderate left neuroforaminal narrowing.  C4-5: There is moderate broad-based posterior disc osteophyte complex formation with left paracentral prominence.  This along with facet arthropathy contributes to mild bilateral neuroforaminal narrowing and mild spinal canal narrowing.  C5-6: Moderate broad-based posterior disc osteophyte complex formation and facet arthropathy result in moderate spinal canal narrowing and moderate right and mild left neuroforaminal narrowing.  C6-7: Moderate broad-based posterior disc osteophyte complex formation and facet arthropathy result in mild spinal canal narrowing along with moderate right and severe left neuroforaminal narrowing.  C7-T1: Minimal broad-based  posterior disc osteophyte complex formation and bilateral facet arthropathy result in mild bilateral neuroforaminal narrowing.      05/28/2019 MRI lumbar spine  T12-L1: No disc herniation, spinal canal narrowing, or neuroforaminal narrowing.  L1-2: No disc herniation, spinal canal narrowing, or neuroforaminal narrowing.  L2-3: No disc herniation, spinal canal narrowing, or neuroforaminal narrowing.  L3-4: No disc herniation, spinal canal narrowing, or neuroforaminal narrowing.  L4-5: There is moderate generalized disc bulging and bilateral facet arthropathy, which result in mild spinal canal narrowing and mild bilateral neuroforaminal narrowing.  L5-S1: There is a small right paracentral disc protrusion.  This effaces the right lateral recess and may impinge upon the descending right S1 nerve root.  Bilateral facet arthropathy is present and there is resultant overall mild spinal canal narrowing.  Moderate right and mild left neuroforaminal narrowing are also present.    06/24/2021 MRI lumbar spine  NOMENCLATURE: Five lumbar type vertebral bodies.     CORD/CAUDA EQUINA: Conus has normal size and signal and ends at a normal level of L1-L2.  Small amount of susceptibility either in the subdural or intrathecal space ventrally and dorsally at the L3-4 level.     ALIGNMENT: Trace retrolisthesis of L4 on L5.     BONES: Interval L4 laminectomy and right L5 hemilaminectomy.  Medial right L4-5 and L5-S1 facetectomies.  Susceptibility artifact is again seen in the left L5 pedicle.  No aggressive bone marrow signal.     PARASPINAL AREA: Fluid and edema along the surgical tract and in the right dorsal paraspinal muscles at the surgical levels.     LUMBAR DISC LEVELS:     T12-L1: No disc herniation or significant posterior osteophytic ridging.  Mild left facet hypertrophy.  No significant spinal canal or foraminal stenosis.     L1-L2: Minimal disc bulge.  Mild bilateral facet hypertrophy.  Ligamentum flavum thickening.  Minimal  narrowing of the bilateral lateral recesses.  No significant spinal canal or foraminal stenosis.     L2-L3: Mild disc bulge.  Mild bilateral facet hypertrophy.  Ligamentum flavum thickening.  Mild narrowing of the bilateral lateral recesses.  No significant spinal canal or foraminal stenosis.     L3-L4: Mild disc bulge.  Mild-moderate bilateral facet hypertrophy.  Ligamentum flavum thickening.  Susceptibility at the midline anterior and posterior aspect of the thecal sac.  Mild narrowing of the bilateral lateral recesses.  Moderate spinal canal stenosis, increased from prior study.  Mild bilateral foraminal stenosis.     L4-L5: Laminectomy.  Right facetectomy.  Mild disc bulge and disc height loss.  Mild narrowing of the lateral recesses without significant overall spinal canal stenosis.  Mild bilateral foraminal stenosis.     L5-S1: Interval right hemilaminectomy and right medial facetectomy.  Mild disc bulge.  Small central protrusion.  Mild narrowing of the right lateral recess which is significantly decreased from preoperative study.  No significant spinal canal stenosis.  Moderate right and mild-moderate left foraminal stenosis, unchanged.  The left L5 nerve root may be contacted by disc as it exits the foramen    04/23/2024 MRI cervical spine  Morphology: There is extensive artifact from the patient's C3-C7 ACDF changes and C6 corpectomy limiting evaluation.  Marrow signal is overall grossly within normal limits.  No obvious fracture.     Alignment: Straightening of the expected normal cervical lordosis without evidence of any significant spondylolisthesis..     Cord: Limited evaluation of the cervical cord demonstrates no focal signal abnormality or cord compression..     Craniocervical Junction: Cerebellar tonsils are normally positioned. The visualized portions of the posterior fossa are unremarkable. The regional osseous anatomy is normal.        Disc levels:        C2-C3: Mild-to-moderate facet arthrosis  with uncovertebral spurring producing mild to moderate right and mild left foraminal narrowing.  No significant narrowing of the spinal canal..     C3-C4: ACDF changes.  Shallow osseous ridging lateralizing to the left mildly narrows the spinal canal.  Uncovertebral spurring and mild facet arthrosis produces moderate to severe left and no more than mild right foraminal narrowing..     C4-C5: ACDF changes with shallow osseous ridging minimally narrowing the spinal canal.  Mild to moderate facet arthrosis contributes to no more than mild bilateral foraminal narrowing..     C5-C6: ACDF changes.  The spinal canal is patent.  Uncovertebral spurring and mild facet arthrosis produces mild to moderate bilateral foraminal narrowing..     C6-C7: ACDF changes.  The spinal canal is patent.  Limited evaluation of the foramina with uncovertebral spurring suggestive of mild to moderate left and mild right foraminal narrowing..     C7-T1: The spinal canal is patent.  Mild ligamentum flavum thickening.  Facet arthrosis and uncovertebral spurring produces moderate right and mild left foraminal narrowing..     Soft tissues: Heterogeneous appearance of the thyroid and multinodular left thyroid gland redemonstrated.  Visualized paraspinal soft tissues are within normal limits.      04/23/2024 MRI lumbar spine  Morphology: Vertebral body heights are preserved.  Incidental L5 vertebral body hemangioma and small chronic appearing Schmorl's node along the anterior/inferior endplate of L4 with mild associated fatty marrow replacement.  Marrow signal is otherwise within normal limits.  No fractures or suspicious lesions.     Alignment: Grade 1 retrolisthesis of L4 on L5 and L5 on S1.     Cord: Normal in contour, caliber, and signal intensity.  Conus position is within normal limits.  No abnormal enhancement.     Disc levels:     T12-L1: Within normal limits.     L1-L2: Shallow annular bulging and mild bilateral facet arthrosis flattening the  ventral thecal sac without substantial spinal canal or foraminal narrowing.     L2-L3: Mild bilateral facet arthrosis the spinal canal and foramina are patent.     L3-L4: Mild degenerative disc height loss and desiccation similar to the prior examination with shallow disc bulging and moderate bilateral facet arthrosis combining to produce mild to moderate narrowing of the spinal canal and subarticular recesses and mild bilateral foraminal narrowing.     L4-L5: Bilateral laminectomy as well as medial facetectomy changes.  Similar moderate to severe disc height loss and desiccation as well as shallow uniform disc bulging and mild-to-moderate facet arthrosis.  Mild encroachment both subarticular recesses with similar decompression of the spinal canal.  Similar mild to moderate right and mild left foraminal narrowing.     L5-S1: Right hemilaminectomy as well as medial facetectomy changes.  Mild bilateral facet arthrosis.  Evidence of previous discectomy changes.  No significant change from the prior examination.  Shallow residual disc bulging with mild encroachment of both subarticular recesses as well as moderate to severe right and mild-to-moderate left foraminal narrowing.  Greatest potential for impingement in the distribution of the right L5 nerve.     Other: Slightly progressed fatty atrophy of the paraspinal musculature dorsally at the lower lumbar levels somewhat asymmetric to the right.  No paraspinal fluid collections or other significant findings.      Assessment:  The patient is a 64-year-old woman with a history of diabetes, hypertension who presents in referral from [facility] for neck pain radiating into the arms and low back pain radiating into the left leg.     1. Lumbar radiculopathy        2. Spinal stenosis of lumbar region without neurogenic claudication        3. Cervical spondylosis        4. DDD (degenerative disc disease), cervical        5. Opioid contract exists             Plan:  1. She did well following the cervical medial branch radiofrequency ablation.  This can be repeated in the future if necessary.    2. If her low back and right leg pain worsens she can contact us to schedule bilateral L3/4 transforaminal epidural steroid injections.    3. Dr. Galan provided prescriptions for hydrocodone-acetaminophen 10/325 mg 3 times daily as needed for pain.  I have reviewed the [place] Board of Pharmacy website and there are no abberancies.    4. Follow-up in three months or sooner as needed.

## 2025-01-27 DIAGNOSIS — R11.2 NAUSEA AND VOMITING, UNSPECIFIED VOMITING TYPE: ICD-10-CM

## 2025-01-27 RX ORDER — PROMETHAZINE HYDROCHLORIDE 25 MG/1
25 TABLET ORAL EVERY 6 HOURS PRN
Qty: 20 TABLET | Refills: 1 | Status: CANCELLED | OUTPATIENT
Start: 2025-01-27 | End: 2025-07-26

## 2025-01-28 RX ORDER — PROMETHAZINE HYDROCHLORIDE 25 MG/1
25 TABLET ORAL EVERY 6 HOURS PRN
Qty: 20 TABLET | Refills: 1 | Status: SHIPPED | OUTPATIENT
Start: 2025-01-28 | End: 2025-07-27

## 2025-01-31 ENCOUNTER — OFFICE VISIT (OUTPATIENT)
Dept: ORTHOPEDICS | Facility: CLINIC | Age: 65
End: 2025-01-31
Payer: COMMERCIAL

## 2025-01-31 ENCOUNTER — HOSPITAL ENCOUNTER (OUTPATIENT)
Dept: RADIOLOGY | Facility: HOSPITAL | Age: 65
Discharge: HOME | End: 2025-01-31
Attending: STUDENT IN AN ORGANIZED HEALTH CARE EDUCATION/TRAINING PROGRAM
Payer: COMMERCIAL

## 2025-01-31 VITALS — BODY MASS INDEX: 25.61 KG/M2 | WEIGHT: 159.38 LBS | HEIGHT: 66 IN

## 2025-01-31 DIAGNOSIS — G89.29 CHRONIC PAIN OF LEFT KNEE: ICD-10-CM

## 2025-01-31 DIAGNOSIS — M17.12 PRIMARY OSTEOARTHRITIS OF LEFT KNEE: Primary | ICD-10-CM

## 2025-01-31 DIAGNOSIS — M25.562 LEFT KNEE PAIN, UNSPECIFIED CHRONICITY: ICD-10-CM

## 2025-01-31 DIAGNOSIS — M25.562 CHRONIC PAIN OF LEFT KNEE: ICD-10-CM

## 2025-01-31 PROCEDURE — 3008F BODY MASS INDEX DOCD: CPT | Mod: CPTII,S$GLB,, | Performed by: STUDENT IN AN ORGANIZED HEALTH CARE EDUCATION/TRAINING PROGRAM

## 2025-01-31 PROCEDURE — 99204 OFFICE O/P NEW MOD 45 MIN: CPT | Mod: S$GLB,,, | Performed by: STUDENT IN AN ORGANIZED HEALTH CARE EDUCATION/TRAINING PROGRAM

## 2025-01-31 PROCEDURE — 3066F NEPHROPATHY DOC TX: CPT | Mod: CPTII,S$GLB,, | Performed by: STUDENT IN AN ORGANIZED HEALTH CARE EDUCATION/TRAINING PROGRAM

## 2025-01-31 PROCEDURE — 3061F NEG MICROALBUMINURIA REV: CPT | Mod: CPTII,S$GLB,, | Performed by: STUDENT IN AN ORGANIZED HEALTH CARE EDUCATION/TRAINING PROGRAM

## 2025-01-31 PROCEDURE — 73562 X-RAY EXAM OF KNEE 3: CPT | Mod: 26,59,RT, | Performed by: STUDENT IN AN ORGANIZED HEALTH CARE EDUCATION/TRAINING PROGRAM

## 2025-01-31 PROCEDURE — 1160F RVW MEDS BY RX/DR IN RCRD: CPT | Mod: CPTII,S$GLB,, | Performed by: STUDENT IN AN ORGANIZED HEALTH CARE EDUCATION/TRAINING PROGRAM

## 2025-01-31 PROCEDURE — G2211 COMPLEX E/M VISIT ADD ON: HCPCS | Mod: S$GLB,,, | Performed by: STUDENT IN AN ORGANIZED HEALTH CARE EDUCATION/TRAINING PROGRAM

## 2025-01-31 PROCEDURE — 3044F HG A1C LEVEL LT 7.0%: CPT | Mod: CPTII,S$GLB,, | Performed by: STUDENT IN AN ORGANIZED HEALTH CARE EDUCATION/TRAINING PROGRAM

## 2025-01-31 PROCEDURE — 73564 X-RAY EXAM KNEE 4 OR MORE: CPT | Mod: 26,LT,, | Performed by: STUDENT IN AN ORGANIZED HEALTH CARE EDUCATION/TRAINING PROGRAM

## 2025-01-31 PROCEDURE — 4010F ACE/ARB THERAPY RXD/TAKEN: CPT | Mod: CPTII,S$GLB,, | Performed by: STUDENT IN AN ORGANIZED HEALTH CARE EDUCATION/TRAINING PROGRAM

## 2025-01-31 PROCEDURE — 1159F MED LIST DOCD IN RCRD: CPT | Mod: CPTII,S$GLB,, | Performed by: STUDENT IN AN ORGANIZED HEALTH CARE EDUCATION/TRAINING PROGRAM

## 2025-01-31 PROCEDURE — 3072F LOW RISK FOR RETINOPATHY: CPT | Mod: CPTII,S$GLB,, | Performed by: STUDENT IN AN ORGANIZED HEALTH CARE EDUCATION/TRAINING PROGRAM

## 2025-01-31 PROCEDURE — 73564 X-RAY EXAM KNEE 4 OR MORE: CPT | Mod: TC,PO,LT

## 2025-01-31 PROCEDURE — 99999 PR PBB SHADOW E&M-EST. PATIENT-LVL III: CPT | Mod: PBBFAC,,, | Performed by: STUDENT IN AN ORGANIZED HEALTH CARE EDUCATION/TRAINING PROGRAM

## 2025-01-31 NOTE — PATIENT INSTRUCTIONS
Assessment:  Aaron Garcia is a 64 y.o. female No chief complaint on file.      Encounter Diagnoses   Name Primary?    Left knee pain, unspecified chronicity Yes    Primary osteoarthritis of left knee         Plan:  Pre authorization for one series gel injection to the left knee - Synvisc ONE  Follow up for gel injections    General Arthritis info:    -shiny white stuff at end of a chicken bones is cartilage    -arthritis is wearing away of the cartilage that lines the end of your bones    -osteoarthritis is thought to be a wear and tear phenomenon    -symptoms are due to inflammation of joint causing stiffness, aching, and sometimes swelling    -occasionally sharp pain will occur causing a give way sensation    -Risk factors: genetic, weight, female > male, age    Treatment options:    -maintain healthy weight (every pound is 4 pounds of pressure on the knee)    -daily moderate exercise (walk, bike, swim 30 minutes per day) to keep joints moving    -daily strengthening exercises (through therapy or on own) to keep muscles supporting joint healthy and strong    -glucosamine 1500mg daily (look for USP label on bottle)    -tylenol as needed for pain (follow directions on the bottle)    -anti-inflammatory medication such as alleve may be helpful- take 1-2 tabs twice daily for 7 days. If it helps your pain, continue. If you do not feel any change, you may stop and then take it as needed.    (you may be given a once daily anti-inflammatory such as MOBIC. If given, avoid other anti-inflammatory medications such as advil, ibuprofen, motrin, naprosyn, alleve, etc)    -if swollen and painful, ice, decrease activity, and take anti-inflammatory daily for 5-7 days and if no relief call your doctor for further options    -consider cortisone injection (every 3-4 months at most)- anti- inflammatory steroid medication that can be injected directly into the joint to reduce inflammation    -consider hyaluronic acid injections  (eufflexxa, hyalgan, synvisc, supartz) (every 6 months at most)- protein injection that helps decrease pain and irritability in the joint. It is best used to help prolong intervals between cortisone injections to minimize steroid injections. These are currently approved for knee injections. Discuss with your doctor if other joint involved. Call to seek approval prior to the injections.    -long-term treatment may include a total joint replacement (keep diary of good days and bad days, then evaluate as to when you are ready)      Follow-up: for gel injections.    Thank you for choosing [facility] and Dr. Stone Stanley for your orthopedic & sports medicine care. It is our goal to provide you with exceptional care that will help keep you healthy, active, and get you back in the game.    Please do not hesitate to reach out to us via email, phone, or MyChart with any questions, concerns, or feedback.    If you felt that you received exemplary care today, please consider leaving us feedback on [employer] at:  [email or web address]    If you are experiencing pain/discomfort ,or have questions after 5pm and would like to be connected to the [facility] on-call team, please call this number and specify which Sports Medicine provider is treating you: [phone]

## 2025-01-31 NOTE — PROGRESS NOTES
"        Patient ID: Aaron Garcia  YOB: 1960  MRN: [id number]    Chief Complaint: Pain of the Left Knee      Referred By: self    History of Present Illness: Aaron Garcia is a  64 y.o. female who presents today with left knee pain. Patient complaining of knee pain for several years, after no known injury or trauma.  She reports popping pain with any pressure or activity. States hurts to sleep on at night and hurts to move the knee certain ways. Pain is aching, sharp, and dull . She has tried PT in the past . She takes a narcotic for back pain, previous back surgery and she does use gels for her knee. She also takes Zanaflex for leg spasms. She states that back in 2000 she was told  she needed TKA, for "bone on bone"      Occupation: retired      Past Medical History:   Past Medical History:   Diagnosis Date    Arthritis     Asthma     Cataract     OU    Diabetes mellitus, type 2 2001    Gastric ulcer     Hyperlipidemia     Hypertension     Mitral valve prolapse     RYLAN (obstructive sleep apnea)     does not wear CPAP-can't tolerate-claustraphobic    Snores      unable to lay flat, sleeps elevated    Thyroid disease     Goiter     Past Surgical History:   Procedure Laterality Date    ANTERIOR CERVICAL DISCECTOMY W/ FUSION N/A 1/21/2021    Procedure: DISCECTOMY, SPINE, CERVICAL, ANTERIOR APPROACH, WITH FUSION , C3-4, C4-5, C5-6, C6 CORPECTOMYc-3-c7 anterior instrumented fusion;  Surgeon: Denise Real MD;  Location: [facility] OR;  Service: Neurosurgery;  Laterality: N/A;    CARPAL TUNNEL RELEASE Left     CARPAL TUNNEL RELEASE Right 9/14/2018    Procedure: RELEASE, CARPAL TUNNEL;  Surgeon: Anurag Mathis MD;  Location: [facility] OR;  Service: Orthopedics;  Laterality: Right;    CAUDAL EPIDURAL STEROID INJECTION N/A 11/1/2021    Procedure: Injection-steroid-epidural-caudal;  Surgeon: Erik Galan MD;  Location: [facility] OR;  Service: Pain Management;  Laterality: N/A;    CAUDAL " EPIDURAL STEROID INJECTION N/A 2022    Procedure: Injection-steroid-epidural-caudal;  Surgeon: Erik Galan MD;  Location: [facility] OR;  Service: Pain Management;  Laterality: N/A;     SECTION      CHOLECYSTECTOMY      DILATION AND CURETTAGE OF UTERUS      Epidural steroid Injection      Pain management, cervical    ESOPHAGEAL DILATION      FACETECTOMY OF VERTEBRA N/A 2021    Procedure: [id number] Medial;  Surgeon: Denise Real MD;  [facility];  Service: Neurosurgery;  Laterality: N/A;    INJECTION OF ANESTHETIC AGENT AROUND MEDIAL BRANCH NERVES INNERVATING CERVICAL FACET JOINT Right 2024    Procedure: Block-nerve-medial branch-cervical  TON, C7/T1;  Surgeon: Erik Galan MD;  Location: [facility] OR;  Service: Pain Management;  Laterality: Right;    INJECTION OF ANESTHETIC AGENT AROUND MEDIAL BRANCH NERVES INNERVATING CERVICAL FACET JOINT Right 2024    Procedure: Block-nerve-medial branch-cervical-TON-C7-T1;  Surgeon: Erik Galan MD;  Location: [facility] OR;  Service: Pain Management;  Laterality: Right;    LAMINECTOMY USING MINIMALLY INVASIVE TECHNIQUE N/A 2021    Procedure: LAMINECTOMY, SPINE, MINIMALLY INVASIVE RIGHT L5-S1 AND L4-L5 HEMILAMINECTOMY;  Surgeon: Denise Real MD;  Location: [facility] OR;  Service: Neurosurgery;  Laterality: N/A;    MICRODISCECTOMY OF SPINE N/A 2021    Procedure: MICRODISCECTOMY, SPINE L5-S1;  Surgeon: Denise Real MD;  Location: [facility] OR;  Service: Neurosurgery;  Laterality: N/A;    RADIOFREQUENCY ABLATION Right 1/3/2025    Procedure: Radiofrequency Ablation    Cervical  TON and C7/T1;  Surgeon: Erik Galan MD;  Location: [facility] OR;  Service: Pain Management;  Laterality: Right;    TRANSFORAMINAL EPIDURAL INJECTION OF STEROID Right 2021    Procedure: Injection,steroid,epidural,transforaminal approach L4/5 and L5/S1;  Surgeon: Mariano Luna MD;  Location: [facility] OR;  Service: Pain  Management;  Laterality: Right;    TRIGGER FINGER RELEASE Left 7/16/2020    Procedure: RELEASE, TRIGGER FINGER//Left thumb, middle and ring finger trigger release;  Surgeon: Jose Mata MD;  Location: [facility] OR;  Service: Orthopedics;  Laterality: Left;    ulcer on tongue removed      WOUND DEBRIDEMENT Left     leg at ankle level     Family History   Problem Relation Name Age of Onset    Diabetes Father      Diabetes Sister      Heart disease Brother      Glaucoma Paternal Aunt      Glaucoma Daughter          suspect    Macular degeneration Neg Hx       Social History     Socioeconomic History    Marital status:    Tobacco Use    Smoking status: Never    Smokeless tobacco: Never   Substance and Sexual Activity    Alcohol use: No     Alcohol/week: 0.0 standard drinks of alcohol    Drug use: Yes     Types: Hydrocodone    Sexual activity: Yes     Partners: Male   Social History Narrative        5 children    Prior secretarial work      Social Drivers of Health     Financial Resource Strain: Unknown (8/23/2021)    Received from [facility]    Overall Financial Resource Strain (CARDIA)     Difficulty of Paying Living Expenses: Patient declined   Food Insecurity: Unknown (8/23/2021)    Received from [facility]    Hunger Vital Sign     Worried About Running Out of Food in the Last Year: Patient declined     Ran Out of Food in the Last Year: Patient declined   Transportation Needs: Unknown (8/23/2021)    Received from [facility]    PRAPARE - Transportation     Lack of Transportation (Medical): Patient declined     Lack of Transportation (Non-Medical): Patient declined   Physical Activity: Unknown (8/23/2021)    Received from [facility]    Exercise Vital Sign     Days of Exercise per Week: Patient declined     Minutes of Exercise per Session:  "Patient declined   Stress: Unknown (8/23/2021)    [facility]    [employer] [place] [facility] [employer] [facility] - Occupational Stress Questionnaire     Feeling of Stress : Patient declined     Medication List with Changes/Refills   Current Medications    ALBUTEROL SULFATE (PROAIR HFA INHL)    Inhale 1 puff into the lungs every 6 (six) hours as needed. PRN    AMITRIPTYLINE (ELAVIL) 25 MG TABLET    Take 25 mg by mouth nightly as needed for Insomnia.    BD ULTRA-FINE GISELA PEN NEEDLE 32 GAUGE X 5/32" NDLE        BENAZEPRIL (LOTENSIN) 40 MG TABLET    Take 1 tablet (40 mg total) by mouth once daily.    BLOOD-GLUCOSE SENSOR (FREESTYLE DASHA 3 SENSOR) YONI    Inject 1 Device into the skin every 15 (fourteen) days.    CLOTRIMAZOLE-BETAMETHASONE 1-0.05% (LOTRISONE) CREAM    Apply topically 2 (two) times daily.    FLASH GLUCOSE SENSOR (FREESTYLE DASHA 2 SENSOR) KIT    APPLY 1 SENSOR TO THE SKIN EVERY 14 DAYS AS DIRECTED.    GABAPENTIN (NEURONTIN) 800 MG TABLET    Take 1 tablet (800 mg total) by mouth 2 (two) times daily.    HYDROCODONE-ACETAMINOPHEN (NORCO)  MG PER TABLET    Take 1 tablet by mouth 3 (three) times daily as needed for Pain.    HYDROCODONE-ACETAMINOPHEN (NORCO)  MG PER TABLET    Take 1 tablet by mouth 3 (three) times daily as needed for Pain.    HYDROCODONE-ACETAMINOPHEN (NORCO)  MG PER TABLET    Take 1 tablet by mouth 3 (three) times daily as needed for Pain.    INSULIN ASPART U-100 (NOVOLOG FLEXPEN U-100 INSULIN) 100 UNIT/ML (3 ML) INPN PEN    Use on premeal readings; 150-200=+2, 201-250=+4; 251-300=+6; 301-350=+8, over 350, + 10 units, 50 units a day max.    INSULIN GLARGINE, TOUJEO, (TOUJEO SOLOSTAR U-300 INSULIN) 300 UNIT/ML (1.5 ML) INPN PEN    ADMINISTER 60 UNITS UNDER THE SKIN IN THE EVENING AS DIRECTED    LINACLOTIDE (LINZESS) 72 MCG CAP CAPSULE    Take 1 capsule (72 mcg total) by mouth before breakfast.    MECLIZINE (ANTIVERT) 25 MG TABLET "    Take 25 mg by mouth.    MONTELUKAST (SINGULAIR) 10 MG TABLET    Take 10 mg by mouth every evening.    NEBIVOLOL (BYSTOLIC) 10 MG TAB    Take 10 mg by mouth every evening.     PANTOPRAZOLE (PROTONIX) 40 MG TABLET    Take 1 tablet (40 mg total) by mouth once daily.    PIROXICAM (FELDENE) 20 MG CAPSULE    TAKE 1 CAPSULE(20 MG) BY MOUTH EVERY DAY    PROMETHAZINE (PHENERGAN) 25 MG TABLET    Take 1 tablet (25 mg total) by mouth every 6 (six) hours as needed for Nausea.    RIZATRIPTAN (MAXALT-MLT) 10 MG DISINTEGRATING TABLET    Take 1 tablet (10 mg total) by mouth as needed for Migraine. May repeat in 2 hours if needed    ROSUVASTATIN (CRESTOR) 10 MG TABLET    Take 10 mg by mouth every evening.     TIRZEPATIDE (MOUNJARO) 15 MG/0.5 ML PNIJ    Inject 15 mg into the skin every 7 days.    TIRZEPATIDE 7.5 MG/0.5 ML PNIJ    Inject 7.5 mg into the skin every 7 days.    TIZANIDINE (ZANAFLEX) 4 MG TABLET    TAKE 1 TABLET(4 MG) BY MOUTH EVERY 8 HOURS AS NEEDED    TOPIRAMATE (TOPAMAX) 25 MG TABLET    Take 1 tablet (25 mg total) by mouth 2 (two) times daily.     Review of patient's allergies indicates:   Allergen Reactions    Metformin Diarrhea     With extended release preparation as well    Peanuts [peanut and related legumes] Itching     Itches    Penicillins Itching     Swelling (extremities)^  Swelling (extremities)^      [place] Itching     throat       Physical Exam:   Body mass index is 25.73 kg/m².    GENERAL: Well appearing, in no acute distress.  HEAD: Normocephalic and atraumatic.  ENT: External ears and nose grossly normal.  EYES: EOMI bilaterally  PULMONARY: Respirations are grossly even and non-labored.  NEURO: Awake, alert, and oriented x 3.  SKIN: No obvious rashes appreciated.  PSYCH: Mood & affect are appropriate.    Detailed MSK exam:     Left knee exam:   -ROM: extension 0, flexion 120  -TTP: Medial joint line  -effusion: none  -Patellar apprehension negative  -Deni test negative  -stable to varus and valgus  stress tests  -Lachman test negative, anterior drawer test negative, posterior drawer test negative    Right knee exam:   -ROM: extension 0, flexion 120  -TTP: None  -effusion: none  -Patellar apprehension negative  -Deni test negative  -stable to varus and valgus stress tests  -Lachman test negative, anterior drawer test negative, posterior drawer test negative      Imaging:  X-ray Knee Ortho Left with Flexion  Narrative: EXAMINATION:  XR KNEE ORTHO LEFT WITH FLEXION    CLINICAL HISTORY:  . Pain in left knee    TECHNIQUE:  AP standing view of both knees, PA flexion standing views of both knees, and Merchant views of both knees were performed. A lateral view of the left knee was also performed.    COMPARISON:  11/15/2016.    FINDINGS:  Left: Moderate joint space narrowing and hypertrophy of the medial compartment-worsened since prior.  Patellar enthesophytes.  Trace joint fluid.    Right: Mild joint space narrowing the medial compartment-worsened since prior.    Osseous demineralization.  Impression: As above.    Electronically signed by: Nuno Woodruff  Date:    01/31/2025  Time:    11:41        Relevant imaging results were reviewed and interpreted by me and per my read shows moderate arthritic changes.  This was discussed with the patient and / or family today.     Assessment:  Aaron Garcia is a 64 y.o. female presenting with left knee pain.   History, physical and radiographs are consistent with a likely diagnosis of OA.   Plan: prior auth for gel injection. Will avoid steroid injection d/t significant hyperglycemia reaction in the past. Continue conservative management for pain.   Follow up for gel injections. All questions answered.      Primary osteoarthritis of left knee  -     Prior authorization Order    Left knee pain, unspecified chronicity  -     Prior authorization Order         MEDICAL NECESSITY FOR VISCOSUPPLEMENTATION: After thorough evaluation of the patient, I have determined that  visco-supplementation is medically necessary. The patient has painful degenerative changes of the knee with failure of conservative treatments including lifestyle modifications and rehabilitation exercises.  Oral analgesis/NSAIDs have not adequately controlled symptoms and there is radiographic evidence of Kellgren Michael grade 2 or greater osteoarthritic changes, or in lack of radiographic evidence, there is arthroscopic or other evidence of chondrosis.      Today's visit is associated with current or anticipated ongoing medical care related to this patient's diagnosis of osteoarthritis.  Currently there is no cure of osteoarthritis and the patient will require regular follow up to manage symptoms and progression.  The patient is to return to the office within a minimum of 3-6 months to review symptoms and function at that time.   CPT code       A copy of today's visit note has been sent to the referring provider.     Electronically signed:  Stone Stanley MD, MPH  01/31/2025  11:43 AM

## 2025-02-28 ENCOUNTER — TELEPHONE (OUTPATIENT)
Dept: ORTHOPEDICS | Facility: CLINIC | Age: 65
End: 2025-02-28
Payer: COMMERCIAL

## 2025-02-28 NOTE — TELEPHONE ENCOUNTER
Returned patient call.  R/s appt due to patient being ill.  Patient verbalized understanding of appt date, time and location.   ----- Message from [employer] sent at 2/28/2025  8:55 AM CST -----  Contact: levy  Type:  Reschedule Appointment RequestCaller will not accept being placed on the waitlist and is requesting a message be sent to doctor.Name of Caller:When is the first available appointment?Symptoms:injection Would the patient rather a call back or a response via [facility]? Call [facility] Call Back Number:[phone] Information:

## 2025-03-06 DIAGNOSIS — R11.2 NAUSEA AND VOMITING, UNSPECIFIED VOMITING TYPE: ICD-10-CM

## 2025-03-06 RX ORDER — PROMETHAZINE HYDROCHLORIDE 25 MG/1
25 TABLET ORAL EVERY 6 HOURS PRN
Qty: 20 TABLET | Refills: 1 | Status: CANCELLED | OUTPATIENT
Start: 2025-03-06 | End: 2025-09-02

## 2025-03-07 DIAGNOSIS — R11.2 NAUSEA AND VOMITING, UNSPECIFIED VOMITING TYPE: ICD-10-CM

## 2025-03-07 RX ORDER — PROMETHAZINE HYDROCHLORIDE 25 MG/1
25 TABLET ORAL EVERY 6 HOURS PRN
Qty: 20 TABLET | Refills: 1 | Status: SHIPPED | OUTPATIENT
Start: 2025-03-07 | End: 2025-09-03

## 2025-03-10 ENCOUNTER — OFFICE VISIT (OUTPATIENT)
Dept: ORTHOPEDICS | Facility: CLINIC | Age: 65
End: 2025-03-10
Payer: COMMERCIAL

## 2025-03-10 VITALS — BODY MASS INDEX: 25.61 KG/M2 | WEIGHT: 159.38 LBS | HEIGHT: 66 IN

## 2025-03-10 DIAGNOSIS — M17.12 PRIMARY OSTEOARTHRITIS OF LEFT KNEE: Primary | ICD-10-CM

## 2025-03-10 PROCEDURE — 99499 UNLISTED E&M SERVICE: CPT | Mod: S$GLB,,, | Performed by: STUDENT IN AN ORGANIZED HEALTH CARE EDUCATION/TRAINING PROGRAM

## 2025-03-10 PROCEDURE — 99999 PR PBB SHADOW E&M-EST. PATIENT-LVL III: CPT | Mod: PBBFAC,,, | Performed by: STUDENT IN AN ORGANIZED HEALTH CARE EDUCATION/TRAINING PROGRAM

## 2025-03-10 PROCEDURE — 20611 DRAIN/INJ JOINT/BURSA W/US: CPT | Mod: LT,S$GLB,, | Performed by: STUDENT IN AN ORGANIZED HEALTH CARE EDUCATION/TRAINING PROGRAM

## 2025-03-10 NOTE — PATIENT INSTRUCTIONS
Assessment:  Aaron Garcia is a 64 y.o. female   Chief Complaint   Patient presents with    Left Knee - Pain    Follow-up     SYNVISC ONE left knee       No diagnosis found.     Plan:  Ultrasound guided Synvisc One gel injection to left knee  Today you received a gel injection. Unlike steroid injections, these gel injections are a lot thicker and can feel different at first.   It is normal to feel some soreness in the first few days because the gel is expanding that joint space.   It is also normal to experience some swelling in the first few days.   If you are feeling discomfort or having swelling, use ice and tylenol to control symptoms.   It is better to keep the knee joint moving so that the gel can get throughout the joint space.   Sometimes it can take a few weeks for the gel injection to start showing noticeable effects. This is normal.   These gel injections can be repeated every 6 months as needed.   Follow up as needed    Follow-up: as needed.    Thank you for choosing [facility] and Dr. Stone Stanley for your orthopedic & sports medicine care. It is our goal to provide you with exceptional care that will help keep you healthy, active, and get you back in the game.    Please do not hesitate to reach out to us via email, phone, or MyChart with any questions, concerns, or feedback.    If you felt that you received exemplary care today, please consider leaving us feedback on [email or web address] at:  [email or web address]    If you are experiencing pain/discomfort ,or have questions after 5pm and would like to be connected to the [facility] on-call team, please call this number and specify which Sports Medicine provider is treating you: [phone]

## 2025-03-10 NOTE — PROCEDURES
Large Joint Aspiration/Injection: L knee    Date/Time: 3/10/2025 2:00 PM    Performed by: Stone Stanley MD  Authorized by: Stone Stanley MD    Consent Done?:  Yes (Verbal)  Indications:  Arthritis and pain  Site marked: the procedure site was marked    Timeout: prior to procedure the correct patient, procedure, and site was verified    Prep: patient was prepped and draped in usual sterile fashion    Local anesthetic:  Lidocaine 1% without epinephrine  Anesthetic total (ml):  2      Details:  Needle Size:  21 G  Ultrasonic Guidance for needle placement?: Yes    Images are saved and documented.  Approach:  Lateral (superior)  Location:  Knee  Site:  L knee  Medications:  48 mg hylan g-f 20 48 mg/6 mL  Patient tolerance:  Patient tolerated the procedure well with no immediate complications     Ultrasound guidance was used for needle localization. Images were saved and stored for documentation. The appropriate structures were visualized. Dynamic visualization of the needle was continuous throughout the procedures and maintained good position.

## 2025-03-10 NOTE — PROCEDURES
Sports Medicine US - Guidance for Needle Placement    Date/Time: 3/10/2025 2:00 PM    Performed by: Stone Stanley MD  Authorized by: Stone Stanley MD  Preparation: Patient was prepped and draped in the usual sterile fashion.  Local anesthesia used: no    Anesthesia:  Local anesthesia used: no    Sedation:  Patient sedated: no    Patient tolerance: patient tolerated the procedure well with no immediate complications  Comments: Ultrasound guidance was used for needle localization. Images were saved and stored for documentation. The appropriate structures were visualized. Dynamic visualization of the needle was continuous throughout the procedures and maintained good position.

## 2025-03-12 DIAGNOSIS — E11.9 TYPE 2 DIABETES MELLITUS WITHOUT COMPLICATION, WITH LONG-TERM CURRENT USE OF INSULIN: ICD-10-CM

## 2025-03-12 DIAGNOSIS — Z79.4 TYPE 2 DIABETES MELLITUS WITHOUT COMPLICATION, WITH LONG-TERM CURRENT USE OF INSULIN: ICD-10-CM

## 2025-03-12 RX ORDER — TIRZEPATIDE 7.5 MG/.5ML
7.5 INJECTION, SOLUTION SUBCUTANEOUS
Qty: 4 PEN | Refills: 6 | Status: SHIPPED | OUTPATIENT
Start: 2025-03-12

## 2025-03-24 ENCOUNTER — OFFICE VISIT (OUTPATIENT)
Dept: ORTHOPEDICS | Facility: CLINIC | Age: 65
End: 2025-03-24
Payer: COMMERCIAL

## 2025-03-24 VITALS — HEIGHT: 66 IN | WEIGHT: 159 LBS | BODY MASS INDEX: 25.55 KG/M2

## 2025-03-24 DIAGNOSIS — M71.22 SYNOVIAL CYST OF LEFT POPLITEAL SPACE: Primary | ICD-10-CM

## 2025-03-24 DIAGNOSIS — M17.12 PRIMARY OSTEOARTHRITIS OF LEFT KNEE: ICD-10-CM

## 2025-03-24 PROCEDURE — 1159F MED LIST DOCD IN RCRD: CPT | Mod: CPTII,S$GLB,, | Performed by: STUDENT IN AN ORGANIZED HEALTH CARE EDUCATION/TRAINING PROGRAM

## 2025-03-24 PROCEDURE — 20611 DRAIN/INJ JOINT/BURSA W/US: CPT | Mod: LT,S$GLB,, | Performed by: STUDENT IN AN ORGANIZED HEALTH CARE EDUCATION/TRAINING PROGRAM

## 2025-03-24 PROCEDURE — 4010F ACE/ARB THERAPY RXD/TAKEN: CPT | Mod: CPTII,S$GLB,, | Performed by: STUDENT IN AN ORGANIZED HEALTH CARE EDUCATION/TRAINING PROGRAM

## 2025-03-24 PROCEDURE — 99999 PR PBB SHADOW E&M-EST. PATIENT-LVL V: CPT | Mod: PBBFAC,,, | Performed by: STUDENT IN AN ORGANIZED HEALTH CARE EDUCATION/TRAINING PROGRAM

## 2025-03-24 PROCEDURE — 3008F BODY MASS INDEX DOCD: CPT | Mod: CPTII,S$GLB,, | Performed by: STUDENT IN AN ORGANIZED HEALTH CARE EDUCATION/TRAINING PROGRAM

## 2025-03-24 PROCEDURE — 3072F LOW RISK FOR RETINOPATHY: CPT | Mod: CPTII,S$GLB,, | Performed by: STUDENT IN AN ORGANIZED HEALTH CARE EDUCATION/TRAINING PROGRAM

## 2025-03-24 PROCEDURE — 1160F RVW MEDS BY RX/DR IN RCRD: CPT | Mod: CPTII,S$GLB,, | Performed by: STUDENT IN AN ORGANIZED HEALTH CARE EDUCATION/TRAINING PROGRAM

## 2025-03-24 PROCEDURE — 99214 OFFICE O/P EST MOD 30 MIN: CPT | Mod: 25,S$GLB,, | Performed by: STUDENT IN AN ORGANIZED HEALTH CARE EDUCATION/TRAINING PROGRAM

## 2025-03-24 PROCEDURE — 3061F NEG MICROALBUMINURIA REV: CPT | Mod: CPTII,S$GLB,, | Performed by: STUDENT IN AN ORGANIZED HEALTH CARE EDUCATION/TRAINING PROGRAM

## 2025-03-24 PROCEDURE — 3044F HG A1C LEVEL LT 7.0%: CPT | Mod: CPTII,S$GLB,, | Performed by: STUDENT IN AN ORGANIZED HEALTH CARE EDUCATION/TRAINING PROGRAM

## 2025-03-24 PROCEDURE — 3066F NEPHROPATHY DOC TX: CPT | Mod: CPTII,S$GLB,, | Performed by: STUDENT IN AN ORGANIZED HEALTH CARE EDUCATION/TRAINING PROGRAM

## 2025-03-24 RX ORDER — KETOROLAC TROMETHAMINE 30 MG/ML
30 INJECTION, SOLUTION INTRAMUSCULAR; INTRAVENOUS
Status: DISCONTINUED | OUTPATIENT
Start: 2025-03-24 | End: 2025-03-24 | Stop reason: HOSPADM

## 2025-03-24 RX ADMIN — KETOROLAC TROMETHAMINE 30 MG: 30 INJECTION, SOLUTION INTRAMUSCULAR; INTRAVENOUS at 02:03

## 2025-03-24 NOTE — PROGRESS NOTES
"      Patient ID: Aaron Garcia  YOB: 1960  MRN: [id number]    Chief Complaint: Pain of the Left Knee      History of Present Illness: Aaron Garcia is a 64 y.o. female who presents today with left posterior knee pain.  Last seen in clinic on 3/10/2025 and received Synvisc One gel injection to the left knee.  Reports that overall knee is feeling better since the gel but has pain in the popliteal fossa that is burning and shooting down the back of her leg to the top of her foot.  Went to ED on 3/13/2025 for posterior lef knee pain.  Received ultrasound and ruled out DVT but did identify a 4.3 cm Baker's Cyst.  Patient has started taking Piroxicam since ED visit and continuing to take Zanaflex, Gabapentin, and Norco for her back issues.  Unable to receive CSI in past due to a significant hyperglycemic event.    1/31/2025 Interval History of Present Illness: Aaron Garcia is a  64 y.o. female who presents today with left knee pain. Patient complaining of knee pain for several years, after no known injury or trauma.  She reports popping pain with any pressure or activity. States hurts to sleep on at night and hurts to move the knee certain ways. Pain is aching, sharp, and dull . She has tried PT in the past . She takes a narcotic for back pain, previous back surgery and she does use gels for her knee. She also takes Zanaflex for leg spasms. She states that back in 2000 she was told  she needed TKA, for "bone on bone"     The patient is active in none.  Occupation: Retired      Past Medical History:   Past Medical History:   Diagnosis Date    Arthritis     Asthma     Cataract     OU    Diabetes mellitus, type 2 2001    Gastric ulcer     Hyperlipidemia     Hypertension     Mitral valve prolapse     RYLAN (obstructive sleep apnea)     does not wear CPAP-can't tolerate-claustraphobic    Snores      unable to lay flat, sleeps elevated    Thyroid disease     Goiter     Past Surgical History: "   Procedure Laterality Date    ANTERIOR CERVICAL DISCECTOMY W/ FUSION N/A 2021    Procedure: DISCECTOMY, SPINE, CERVICAL, ANTERIOR APPROACH, WITH FUSION , C3-4, C4-5, C5-6, C6 CORPECTOMYc-3-c7 anterior instrumented fusion;  Surgeon: Denise Real MD;  Location: [facility] OR;  Service: Neurosurgery;  Laterality: N/A;    CARPAL TUNNEL RELEASE Left     CARPAL TUNNEL RELEASE Right 2018    Procedure: RELEASE, CARPAL TUNNEL;  Surgeon: Anurag Mathis MD;  Location: [facility] OR;  Service: Orthopedics;  Laterality: Right;    CAUDAL EPIDURAL STEROID INJECTION N/A 2021    Procedure: Injection-steroid-epidural-caudal;  Surgeon: Erik Galan MD;  Location: [facility] OR;  Service: Pain Management;  Laterality: N/A;    CAUDAL EPIDURAL STEROID INJECTION N/A 2022    Procedure: Injection-steroid-epidural-caudal;  Surgeon: Erik Galan MD;  Location: [facility] OR;  Service: Pain Management;  Laterality: N/A;     SECTION      CHOLECYSTECTOMY      DILATION AND CURETTAGE OF UTERUS      Epidural steroid Injection      Pain management, cervical    ESOPHAGEAL DILATION      FACETECTOMY OF VERTEBRA N/A 2021    Procedure: [id number] Medial;  Surgeon: Denise Real MD;  Location: [facility] OR;  Service: Neurosurgery;  Laterality: N/A;    INJECTION OF ANESTHETIC AGENT AROUND MEDIAL BRANCH NERVES INNERVATING CERVICAL FACET JOINT Right 2024    Procedure: Block-nerve-medial branch-cervical  TON, C7/T1;  Surgeon: Erik Galan MD;  Location: [facility] OR;  Service: Pain Management;  Laterality: Right;    INJECTION OF ANESTHETIC AGENT AROUND MEDIAL BRANCH NERVES INNERVATING CERVICAL FACET JOINT Right 2024    Procedure: Block-nerve-medial branch-cervical-TON-C7-T1;  Surgeon: Erik Galan MD;  Location: [facility] OR;  Service: Pain Management;  Laterality: Right;    LAMINECTOMY USING MINIMALLY INVASIVE TECHNIQUE N/A 2021    Procedure: LAMINECTOMY, SPINE, MINIMALLY INVASIVE  "RIGHT L5-S1 AND L4-L5 HEMILAMINECTOMY;  Surgeon: Denise Real MD;  Location: [facility] OR;  Service: Neurosurgery;  Laterality: N/A;    MICRODISCECTOMY OF SPINE N/A 6/24/2021    Procedure: MICRODISCECTOMY, SPINE L5-S1;  Surgeon: Denise Real MD;  Location: [facility] OR;  Service: Neurosurgery;  Laterality: N/A;    RADIOFREQUENCY ABLATION Right 1/3/2025    Procedure: Radiofrequency Ablation    Cervical  TON and C7/T1;  Surgeon: Erik Galan MD;  Location: [facility] OR;  Service: Pain Management;  Laterality: Right;    TRANSFORAMINAL EPIDURAL INJECTION OF STEROID Right 1/11/2021    Procedure: Injection,steroid,epidural,transforaminal approach L4/5 and L5/S1;  Surgeon: Mariano Luna MD;  Location: [facility] OR;  Service: Pain Management;  Laterality: Right;    TRIGGER FINGER RELEASE Left 7/16/2020    Procedure: RELEASE, TRIGGER FINGER//Left thumb, middle and ring finger trigger release;  Surgeon: Jose Mata MD;  Location: [facility] OR;  Service: Orthopedics;  Laterality: Left;    ulcer on tongue removed      WOUND DEBRIDEMENT Left     leg at ankle level     Family History   Problem Relation Name Age of Onset    Diabetes Father      Diabetes Sister      Heart disease Brother      Glaucoma Paternal Aunt      Glaucoma Daughter          suspect    Macular degeneration Neg Hx       Social History[1]  Medication List with Changes/Refills   Current Medications    ALBUTEROL SULFATE (PROAIR HFA INHL)    Inhale 1 puff into the lungs every 6 (six) hours as needed. PRN    AMITRIPTYLINE (ELAVIL) 25 MG TABLET    Take 25 mg by mouth nightly as needed for Insomnia.    BD ULTRA-FINE GISELA PEN NEEDLE 32 GAUGE X 5/32" NDLE        BENAZEPRIL (LOTENSIN) 40 MG TABLET    Take 1 tablet (40 mg total) by mouth once daily.    BLOOD-GLUCOSE SENSOR (FREESTYLE DASHA 3 SENSOR) YONI    Inject 1 Device into the skin every 15 (fourteen) days.    CLOTRIMAZOLE-BETAMETHASONE 1-0.05% (LOTRISONE) CREAM    Apply topically 2 (two) times daily.    " FLASH GLUCOSE SENSOR (FREESTYLE DASHA 2 SENSOR) KIT    APPLY 1 SENSOR TO THE SKIN EVERY 14 DAYS AS DIRECTED.    GABAPENTIN (NEURONTIN) 800 MG TABLET    Take 1 tablet (800 mg total) by mouth 2 (two) times daily.    HYDROCODONE-ACETAMINOPHEN (NORCO)  MG PER TABLET    Take 1 tablet by mouth 3 (three) times daily as needed for Pain.    INSULIN ASPART U-100 (NOVOLOG FLEXPEN U-100 INSULIN) 100 UNIT/ML (3 ML) INPN PEN    Use on premeal readings; 150-200=+2, 201-250=+4; 251-300=+6; 301-350=+8, over 350, + 10 units, 50 units a day max.    INSULIN GLARGINE, TOUJEO, (TOUJEO SOLOSTAR U-300 INSULIN) 300 UNIT/ML (1.5 ML) INPN PEN    ADMINISTER 60 UNITS UNDER THE SKIN IN THE EVENING AS DIRECTED    LINACLOTIDE (LINZESS) 72 MCG CAP CAPSULE    Take 1 capsule (72 mcg total) by mouth before breakfast.    MECLIZINE (ANTIVERT) 25 MG TABLET    Take 25 mg by mouth.    MONTELUKAST (SINGULAIR) 10 MG TABLET    Take 10 mg by mouth every evening.    NEBIVOLOL (BYSTOLIC) 10 MG TAB    Take 10 mg by mouth every evening.     PANTOPRAZOLE (PROTONIX) 40 MG TABLET    Take 1 tablet (40 mg total) by mouth once daily.    PIROXICAM (FELDENE) 20 MG CAPSULE    TAKE 1 CAPSULE(20 MG) BY MOUTH EVERY DAY    PROMETHAZINE (PHENERGAN) 25 MG TABLET    Take 1 tablet (25 mg total) by mouth every 6 (six) hours as needed for Nausea.    RIZATRIPTAN (MAXALT-MLT) 10 MG DISINTEGRATING TABLET    Take 1 tablet (10 mg total) by mouth as needed for Migraine. May repeat in 2 hours if needed    ROSUVASTATIN (CRESTOR) 10 MG TABLET    Take 10 mg by mouth every evening.     TIRZEPATIDE (MOUNJARO) 15 MG/0.5 ML PNIJ    Inject 15 mg into the skin every 7 days.    TIRZEPATIDE (MOUNJARO) 7.5 MG/0.5 ML PNIJ    Inject 7.5 mg into the skin every 7 days.    TIZANIDINE (ZANAFLEX) 4 MG TABLET    TAKE 1 TABLET(4 MG) BY MOUTH EVERY 8 HOURS AS NEEDED    TOPIRAMATE (TOPAMAX) 25 MG TABLET    Take 1 tablet (25 mg total) by mouth 2 (two) times daily.     Review of patient's allergies  indicates:   Allergen Reactions    Metformin Diarrhea     With extended release preparation as well    Peanuts [peanut] Itching     Itches    Penicillins Itching     Swelling (extremities)^  Swelling (extremities)^      [place] Itching     throat       Physical Exam:   Body mass index is 25.66 kg/m².    GENERAL: Well appearing, in no acute distress.  HEAD: Normocephalic and atraumatic.  ENT: External ears and nose grossly normal.  EYES: EOMI bilaterally  PULMONARY: Respirations are grossly even and non-labored.  NEURO: Awake, alert, and oriented x 3.  SKIN: No obvious rashes appreciated.  PSYCH: Mood & affect are appropriate.    Detailed MSK exam:     Left knee exam:   -ROM: extension 0, flexion 120  -TTP: Popliteal fossa  -effusion: trace  -Patellar apprehension negative  -Deni test negative  -stable to varus and valgus stress tests  -Lachman test negative, anterior drawer test negative, posterior drawer test negative        Imaging:  Sports Medicine US - Guidance for Needle Placement  Stone Stanley MD     3/10/2025  2:18 PM  Sports Medicine US - Guidance for Needle Placement    Date/Time: 3/10/2025 2:00 PM    Performed by: Stone Stanley MD  Authorized by: Stone Stanley MD  Preparation: Patient was prepped and   draped in the usual sterile fashion.  Local anesthesia used: no    Anesthesia:  Local anesthesia used: no    Sedation:  Patient sedated: no    Patient tolerance: patient tolerated the procedure well with no immediate   complications  Comments: Ultrasound guidance was used for needle localization. Images   were saved and stored for documentation. The appropriate structures were   visualized. Dynamic visualization of the needle was continuous throughout   the procedures and maintained good position.         Relevant imaging results were reviewed and interpreted by me and per my read shows moderate arthritic changes left knee.  This was discussed with the patient and / or family today.      Assessment:  Aaron Garcia is a 64 y.o. female following up for left knee pain. Patient would like symptomatic bakers cyst drained.   Plan: 8 cc aspirated from bakers cyst followed by toradol injection. Gel injection has worked well for her left knee overall but bakers cyst has been more symptomatic recently. Continue conservative management for pain.   Follow up as needed. All questions answered.     Synovial cyst of left popliteal space  -     Sports Medicine US - Guidance for Needle Placement  -     Large Joint Aspiration/Injection: L knee    Primary osteoarthritis of left knee         Ultrasound guidance was used for needle localization. Images were saved and stored for documentation. The appropriate structures were visualized. Dynamic visualization of the needle was continuous throughout the procedures and maintained good position.      Electronically signed:  Stone Stanley MD, MPH  03/24/2025  1:47 PM         [1]   Social History  Socioeconomic History    Marital status:    Tobacco Use    Smoking status: Never    Smokeless tobacco: Never   Substance and Sexual Activity    Alcohol use: No     Alcohol/week: 0.0 standard drinks of alcohol    Drug use: Yes     Types: Hydrocodone    Sexual activity: Yes     Partners: Male   Social History Narrative        5 children    Prior secretarial work      Social Drivers of Health     Financial Resource Strain: Unknown (8/23/2021)    Received from [facility]    Overall Financial Resource Strain (CARDIA)     Difficulty of Paying Living Expenses: Patient declined   Food Insecurity: Unknown (8/23/2021)    Received from [facility]    Hunger Vital Sign     Worried About Running Out of Food in the Last Year: Patient declined     Ran Out of Food in the Last Year: Patient declined   Transportation Needs: Unknown (8/23/2021)    Received from [facility]    PRAPARE - Transportation     Lack of Transportation  (Medical): Patient declined     Lack of Transportation (Non-Medical): Patient declined   Physical Activity: Unknown (8/23/2021)    Received from [facility]    Exercise Vital Sign     Days of Exercise per Week: Patient declined     Minutes of Exercise per Session: Patient declined   Stress: Unknown (8/23/2021)    Received from [facility]    [employer] - Occupational Stress Questionnaire     Feeling of Stress : Patient declined

## 2025-03-24 NOTE — PROCEDURES
Large Joint Aspiration/Injection: L knee    Date/Time: 3/24/2025 2:00 PM    Performed by: Stone Stanley MD  Authorized by: Stone Stanley MD    Consent Done?:  Yes (Verbal)  Indications:  Arthritis, pain and joint swelling  Site marked: the procedure site was marked    Timeout: prior to procedure the correct patient, procedure, and site was verified    Prep: patient was prepped and draped in usual sterile fashion    Local anesthetic:  Bupivacaine 0.5% without epinephrine and lidocaine 1% without epinephrine    Details:  Needle Size:  18 G  Ultrasonic Guidance for needle placement?: Yes    Images are saved and documented.  Approach:  Posterior  Location:  Knee  Site:  L knee  Medications:  30 mg ketorolac 60 mg/2 mL  Aspirate amount (mL):  8  Aspirate:  Yellow  Patient tolerance:  Patient tolerated the procedure well with no immediate complications     Ultrasound guidance was used for needle localization. Images were saved and stored for documentation. The appropriate structures were visualized. Dynamic visualization of the needle was continuous throughout the procedures and maintained good position.

## 2025-03-24 NOTE — PATIENT INSTRUCTIONS
Assessment:  Aaron Garcia is a 64 y.o. female   Chief Complaint   Patient presents with    Left Knee - Pain       No diagnosis found.     Plan:  Ultrasound guided aspiration of Baker's Cyst with Toradol injection  We discussed the proper protocols after the injection such as no submerging pools, baths tubs, or hot tubs for 24 hr.  Showering is okay today.  We discussed red flags such as fevers, chills, red, warm, tender joint at the area of injection to please seek medical care immediately.    Follow up as needed    Follow-up: as needed.    Thank you for choosing [facility] and Dr. Stone Stanley for your orthopedic & sports medicine care. It is our goal to provide you with exceptional care that will help keep you healthy, active, and get you back in the game.    Please do not hesitate to reach out to us via email, phone, or MyChart with any questions, concerns, or feedback.    If you felt that you received exemplary care today, please consider leaving us feedback on [employer] at:  [email or web address]    If you are experiencing pain/discomfort ,or have questions after 5pm and would like to be connected to the [facility] on-call team, please call this number and specify which Sports Medicine provider is treating you: [phone]

## 2025-03-24 NOTE — PROCEDURES
Sports Medicine US - Guidance for Needle Placement    Date/Time: 3/24/2025 2:00 PM    Performed by: Stone Stanley MD  Authorized by: Stone Stanley MD  Preparation: Patient was prepped and draped in the usual sterile fashion.  Local anesthesia used: no    Anesthesia:  Local anesthesia used: no    Sedation:  Patient sedated: no    Patient tolerance: patient tolerated the procedure well with no immediate complications  Comments: Ultrasound guidance was used for needle localization. Images were saved and stored for documentation. The appropriate structures were visualized. Dynamic visualization of the needle was continuous throughout the procedures and maintained good position.

## 2025-04-03 ENCOUNTER — TELEPHONE (OUTPATIENT)
Dept: ORTHOPEDICS | Facility: CLINIC | Age: 65
End: 2025-04-03
Payer: COMMERCIAL

## 2025-04-03 NOTE — TELEPHONE ENCOUNTER
LVM for patient regarding upcoming appt to gather more information and to discuss options available. Asked that patient contact office to discuss.

## 2025-04-08 DIAGNOSIS — R11.2 NAUSEA AND VOMITING, UNSPECIFIED VOMITING TYPE: ICD-10-CM

## 2025-04-08 RX ORDER — PROMETHAZINE HYDROCHLORIDE 25 MG/1
25 TABLET ORAL EVERY 6 HOURS PRN
Qty: 20 TABLET | Refills: 1 | Status: CANCELLED | OUTPATIENT
Start: 2025-04-08 | End: 2025-10-05

## 2025-04-08 RX ORDER — TIZANIDINE 4 MG/1
4 TABLET ORAL EVERY 8 HOURS PRN
Qty: 90 TABLET | Refills: 2 | Status: SHIPPED | OUTPATIENT
Start: 2025-04-08

## 2025-04-09 DIAGNOSIS — G43.909 MIGRAINE WITHOUT STATUS MIGRAINOSUS, NOT INTRACTABLE, UNSPECIFIED MIGRAINE TYPE: ICD-10-CM

## 2025-04-09 RX ORDER — PROMETHAZINE HYDROCHLORIDE 25 MG/1
25 TABLET ORAL EVERY 6 HOURS PRN
Qty: 20 TABLET | Refills: 1 | Status: SHIPPED | OUTPATIENT
Start: 2025-04-09 | End: 2025-10-06

## 2025-04-10 ENCOUNTER — OFFICE VISIT (OUTPATIENT)
Dept: ORTHOPEDICS | Facility: CLINIC | Age: 65
End: 2025-04-10
Payer: COMMERCIAL

## 2025-04-10 DIAGNOSIS — M71.22 SYNOVIAL CYST OF LEFT POPLITEAL SPACE: Primary | ICD-10-CM

## 2025-04-10 PROCEDURE — 1160F RVW MEDS BY RX/DR IN RCRD: CPT | Mod: CPTII,S$GLB,, | Performed by: NURSE PRACTITIONER

## 2025-04-10 PROCEDURE — 99999 PR PBB SHADOW E&M-EST. PATIENT-LVL IV: CPT | Mod: PBBFAC,,, | Performed by: NURSE PRACTITIONER

## 2025-04-10 PROCEDURE — 3066F NEPHROPATHY DOC TX: CPT | Mod: CPTII,S$GLB,, | Performed by: NURSE PRACTITIONER

## 2025-04-10 PROCEDURE — 3044F HG A1C LEVEL LT 7.0%: CPT | Mod: CPTII,S$GLB,, | Performed by: NURSE PRACTITIONER

## 2025-04-10 PROCEDURE — 3072F LOW RISK FOR RETINOPATHY: CPT | Mod: CPTII,S$GLB,, | Performed by: NURSE PRACTITIONER

## 2025-04-10 PROCEDURE — 99214 OFFICE O/P EST MOD 30 MIN: CPT | Mod: S$GLB,,, | Performed by: NURSE PRACTITIONER

## 2025-04-10 PROCEDURE — 1159F MED LIST DOCD IN RCRD: CPT | Mod: CPTII,S$GLB,, | Performed by: NURSE PRACTITIONER

## 2025-04-10 PROCEDURE — 3061F NEG MICROALBUMINURIA REV: CPT | Mod: CPTII,S$GLB,, | Performed by: NURSE PRACTITIONER

## 2025-04-10 PROCEDURE — 4010F ACE/ARB THERAPY RXD/TAKEN: CPT | Mod: CPTII,S$GLB,, | Performed by: NURSE PRACTITIONER

## 2025-04-10 RX ORDER — TOPIRAMATE 25 MG/1
25 TABLET ORAL 2 TIMES DAILY
Qty: 60 TABLET | Refills: 2 | Status: SHIPPED | OUTPATIENT
Start: 2025-04-10

## 2025-04-10 NOTE — PROGRESS NOTES
Chief Complaint   Patient presents with    Left Knee - Pain     History of Present Illness    Aaron presents with knee pain, primarily in the posterior aspect, extending down the calf and present on the superior aspect of the knee. She reports difficulty walking due to the pain. She attributes the pain to arthritis and mentions having cysts that fluctuate in size, occasionally rupturing.    A week ago, an orthopedic specialist drained fluid from her knee and administered a Toradol injection. Prior to this, she received a gel injection, which may have caused increased pressure in the posterior knee.    She has a history of lupus, as diagnosed by Dr. Allen. Her last recorded blood glucose level was 6.1.      ROS:  Musculoskeletal: +joint pain, +lumps/masses, +calf pain, +difficulty walking               Past Medical History:   Diagnosis Date    Arthritis     Asthma     Cataract     OU    Diabetes mellitus, type 2 2001    Gastric ulcer     Hyperlipidemia     Hypertension     Mitral valve prolapse     RYLAN (obstructive sleep apnea)     does not wear CPAP-can't tolerate-claustraphobic    Snores      unable to lay flat, sleeps elevated    Thyroid disease     Goiter     Past Surgical History:   Procedure Laterality Date    ANTERIOR CERVICAL DISCECTOMY W/ FUSION N/A 1/21/2021    Procedure: DISCECTOMY, SPINE, CERVICAL, ANTERIOR APPROACH, WITH FUSION , C3-4, C4-5, C5-6, C6 CORPECTOMYc-3-c7 anterior instrumented fusion;  Surgeon: Denise Real MD;  Location: [facility] OR;  Service: Neurosurgery;  Laterality: N/A;    CARPAL TUNNEL RELEASE Left     CARPAL TUNNEL RELEASE Right 9/14/2018    Procedure: RELEASE, CARPAL TUNNEL;  Surgeon: Anurag Mathis MD;  Location: [facility] OR;  Service: Orthopedics;  Laterality: Right;    CAUDAL EPIDURAL STEROID INJECTION N/A 11/1/2021    Procedure: Injection-steroid-epidural-caudal;  Surgeon: Erik Galan MD;  Location: [facility] OR;  Service: Pain Management;  Laterality: N/A;     CAUDAL EPIDURAL STEROID INJECTION N/A 2022    Procedure: Injection-steroid-epidural-caudal;  Surgeon: Erik Galan MD;  Location: [facility] OR;  Service: Pain Management;  Laterality: N/A;     SECTION      CHOLECYSTECTOMY      DILATION AND CURETTAGE OF UTERUS      Epidural steroid Injection      Pain management, cervical    ESOPHAGEAL DILATION      FACETECTOMY OF VERTEBRA N/A 2021    Procedure: [id number] Medial;  Surgeon: Denise Real MD;  [facility];  Service: Neurosurgery;  Laterality: N/A;    INJECTION OF ANESTHETIC AGENT AROUND MEDIAL BRANCH NERVES INNERVATING CERVICAL FACET JOINT Right 2024    Procedure: Block-nerve-medial branch-cervical  TON, C7/T1;  Surgeon: Erik Galan MD;  Location: [facility] OR;  Service: Pain Management;  Laterality: Right;    INJECTION OF ANESTHETIC AGENT AROUND MEDIAL BRANCH NERVES INNERVATING CERVICAL FACET JOINT Right 2024    Procedure: Block-nerve-medial branch-cervical-TON-C7-T1;  Surgeon: Erik Galan MD;  Location: [facility] OR;  Service: Pain Management;  Laterality: Right;    LAMINECTOMY USING MINIMALLY INVASIVE TECHNIQUE N/A 2021    Procedure: LAMINECTOMY, SPINE, MINIMALLY INVASIVE RIGHT L5-S1 AND L4-L5 HEMILAMINECTOMY;  Surgeon: Denise Real MD;  Location: [facility] OR;  Service: Neurosurgery;  Laterality: N/A;    MICRODISCECTOMY OF SPINE N/A 2021    Procedure: MICRODISCECTOMY, SPINE L5-S1;  Surgeon: Denise Real MD;  Location: [facility] OR;  Service: Neurosurgery;  Laterality: N/A;    RADIOFREQUENCY ABLATION Right 1/3/2025    Procedure: Radiofrequency Ablation    Cervical  TON and C7/T1;  Surgeon: Erik Galan MD;  Location: [facility] OR;  Service: Pain Management;  Laterality: Right;    TRANSFORAMINAL EPIDURAL INJECTION OF STEROID Right 2021    Procedure: Injection,steroid,epidural,transforaminal approach L4/5 and L5/S1;  Surgeon: Mariano Luna MD;  Location: [facility] OR;  Service: Pain  Management;  Laterality: Right;    TRIGGER FINGER RELEASE Left 7/16/2020    Procedure: RELEASE, TRIGGER FINGER//Left thumb, middle and ring finger trigger release;  Surgeon: Jose Mata MD;  Location: [facility] OR;  Service: Orthopedics;  Laterality: Left;    ulcer on tongue removed      WOUND DEBRIDEMENT Left     leg at ankle level     Medications Ordered Prior to Encounter[1]  Review of patient's allergies indicates:   Allergen Reactions    Metformin Diarrhea     With extended release preparation as well    Peanuts [peanut] Itching     Itches    Penicillins Itching     Swelling (extremities)^  Swelling (extremities)^      Gamerco Itching     throat     Family History   Problem Relation Name Age of Onset    Diabetes Father      Diabetes Sister      Heart disease Brother      Glaucoma Paternal Aunt      Glaucoma Daughter          suspect    Macular degeneration Neg Hx       Social History[2]    Review of Systems:  Constitutional:  Denies fever or chills   Eyes:  Denies change in visual acuity   HENT:  Denies nasal congestion or sore throat   Respiratory:  Denies cough or shortness of breath   Cardiovascular:  Denies chest pain or edema   GI:  Denies abdominal pain, nausea, vomiting, bloody stools or diarrhea   :  Denies dysuria   Integument:  Denies rash   Neurologic:  Denies headache, focal weakness or sensory changes   Endocrine:  Denies polyuria or polydipsia   Lymphatic:  Denies swollen glands   Psychiatric:  Denies depression or anxiety     Physical Exam:   Constitutional:  Well developed, well nourished, no acute distress, non-toxic appearance   Integument:  Well hydrated  Neurologic:  Alert & oriented x 3  Psychiatric:  Speech and behavior appropriate     Bilateral Knee Exam    left Knee Exam     Tenderness   The patient is experiencing tenderness in the posterior joint line.    Range of Motion   Extension: abnormal   Flexion: abnormal     Muscle Strength     The patient has normal knee strength.    Tests  "  Deni:  Medial - negative  Lachman:  Anterior - negative      Varus: negative  Valgus: negative  Patellar Apprehension: negative    Other   Erythema: absent  Sensation: normal  Pulse: present  Swelling: mild to moderate      Right Knee Exam   Right knee exam performed same as contralateral side and is normal.            X-rays were performed 9 days ago, personally reviewed by me and findings discussed with the patient.  3 views of the left knee show tricompartmental degenerative change most pronounced in the medial compartment with Kellgren 3 changes.            Synovial cyst of left popliteal space        - Discussed potential knee replacement with the patient.  - Knee replacement recovery typically takes 6-8 weeks.  - Aaron expressed interest in discussing this option further with Dr. Weaver.  - Recommend ice and compression for the Baker's cyst.  - Apply ice to the affected knee area.  - Compress the knee using Ace wrap as tightly as comfortable.  - Use ice under Ace wrap while at home, ensuring foot does not become numb.  - Referred to Dr. Weaver for evaluation of potential knee replacement.  - Follow up with Dr. Weaver in 2-3 weeks.                [1]   Current Outpatient Medications on File Prior to Visit   Medication Sig Dispense Refill    ALBUTEROL SULFATE (PROAIR HFA INHL) Inhale 1 puff into the lungs every 6 (six) hours as needed. PRN      amitriptyline (ELAVIL) 25 MG tablet Take 25 mg by mouth nightly as needed for Insomnia.      BD ULTRA-FINE GIESLA PEN NEEDLE 32 gauge x 5/32" Ndle       benazepriL (LOTENSIN) 40 MG tablet Take 1 tablet (40 mg total) by mouth once daily. 90 tablet 3    blood-glucose sensor (FREESTYLE DASHA 3 SENSOR) Haritha Inject 1 Device into the skin every 15 (fourteen) days. 2 each 12    clotrimazole-betamethasone 1-0.05% (LOTRISONE) cream Apply topically 2 (two) times daily. 15 g 2    flash glucose sensor (FREESTYLE DASHA 2 SENSOR) Kit APPLY 1 SENSOR TO THE SKIN EVERY 14 DAYS AS " DIRECTED. 2 kit 12    gabapentin (NEURONTIN) 800 MG tablet Take 1 tablet (800 mg total) by mouth 2 (two) times daily. 180 tablet 3    HYDROcodone-acetaminophen (NORCO)  mg per tablet Take 1 tablet by mouth 3 (three) times daily as needed for Pain. 90 tablet 0    insulin aspart U-100 (NOVOLOG FLEXPEN U-100 INSULIN) 100 unit/mL (3 mL) InPn pen Use on premeal readings; 150-200=+2, 201-250=+4; 251-300=+6; 301-350=+8, over 350, + 10 units, 50 units a day max. 15 mL 0    insulin glargine, TOUJEO, (TOUJEO SOLOSTAR U-300 INSULIN) 300 unit/mL (1.5 mL) InPn pen ADMINISTER 60 UNITS UNDER THE SKIN IN THE EVENING AS DIRECTED 4.5 mL 6    linaCLOtide (LINZESS) 72 mcg Cap capsule Take 1 capsule (72 mcg total) by mouth before breakfast. 90 capsule 2    meclizine (ANTIVERT) 25 mg tablet Take 25 mg by mouth.      montelukast (SINGULAIR) 10 mg tablet Take 10 mg by mouth every evening.      nebivolol (BYSTOLIC) 10 MG Tab Take 10 mg by mouth every evening.       pantoprazole (PROTONIX) 40 MG tablet Take 1 tablet (40 mg total) by mouth once daily. 60 tablet 3    piroxicam (FELDENE) 20 MG capsule TAKE 1 CAPSULE(20 MG) BY MOUTH EVERY DAY 30 capsule 3    promethazine (PHENERGAN) 25 MG tablet Take 1 tablet (25 mg total) by mouth every 6 (six) hours as needed for Nausea. 20 tablet 1    rosuvastatin (CRESTOR) 10 MG tablet Take 10 mg by mouth every evening.       tirzepatide (MOUNJARO) 15 mg/0.5 mL PnIj Inject 15 mg into the skin every 7 days. 4 Pen 11    tirzepatide (MOUNJARO) 7.5 mg/0.5 mL PnIj Inject 7.5 mg into the skin every 7 days. 4 Pen 6    tiZANidine (ZANAFLEX) 4 MG tablet Take 1 tablet (4 mg total) by mouth every 8 (eight) hours as needed. 90 tablet 2    topiramate (TOPAMAX) 25 MG tablet Take 1 tablet (25 mg total) by mouth 2 (two) times daily. 60 tablet 2    rizatriptan (MAXALT-MLT) 10 MG disintegrating tablet Take 1 tablet (10 mg total) by mouth as needed for Migraine. May repeat in 2 hours if needed (Patient not taking:  Reported on 1/16/2025) 12 tablet 3     Current Facility-Administered Medications on File Prior to Visit   Medication Dose Route Frequency Provider Last Rate Last Admin    chlorhexidine 0.12 % solution 15 mL  15 mL Mouth/Throat Once Denise Real MD        mupirocin 2 % ointment   Nasal Once Denise Real MD       [2]   Social History  Socioeconomic History    Marital status:    Tobacco Use    Smoking status: Never    Smokeless tobacco: Never   Substance and Sexual Activity    Alcohol use: No     Alcohol/week: 0.0 standard drinks of alcohol    Drug use: Yes     Types: Hydrocodone    Sexual activity: Yes     Partners: Male   Social History Narrative        5 children    Prior secretarial work      Social Drivers of Health     Financial Resource Strain: Unknown (8/23/2021)    Received from [facility]    Overall Financial Resource Strain (CARDIA)     Difficulty of Paying Living Expenses: Patient declined   Food Insecurity: Unknown (8/23/2021)    Received from [facility]    Hunger Vital Sign     Worried About Running Out of Food in the Last Year: Patient declined     Ran Out of Food in the Last Year: Patient declined   Transportation Needs: Unknown (8/23/2021)    Received from [facility]    PRAPARE - Transportation     Lack of Transportation (Medical): Patient declined     Lack of Transportation (Non-Medical): Patient declined   Physical Activity: Unknown (8/23/2021)    Received from [facility]    Exercise Vital Sign     Days of Exercise per Week: Patient declined     Minutes of Exercise per Session: Patient declined   Stress: Unknown (8/23/2021)    Received from [facility]    [employer] [place] [employer] - Occupational Stress Questionnaire     Feeling of Stress : Patient declined

## 2025-04-17 ENCOUNTER — OFFICE VISIT (OUTPATIENT)
Dept: PAIN MEDICINE | Facility: CLINIC | Age: 65
End: 2025-04-17
Payer: COMMERCIAL

## 2025-04-17 VITALS — DIASTOLIC BLOOD PRESSURE: 91 MMHG | SYSTOLIC BLOOD PRESSURE: 149 MMHG | HEART RATE: 74 BPM

## 2025-04-17 DIAGNOSIS — M47.812 CERVICAL SPONDYLOSIS: ICD-10-CM

## 2025-04-17 DIAGNOSIS — M51.369 DDD (DEGENERATIVE DISC DISEASE), LUMBAR: ICD-10-CM

## 2025-04-17 DIAGNOSIS — Z79.891 OPIOID CONTRACT EXISTS: ICD-10-CM

## 2025-04-17 DIAGNOSIS — M54.16 LUMBAR RADICULOPATHY: Primary | ICD-10-CM

## 2025-04-17 DIAGNOSIS — M48.061 SPINAL STENOSIS OF LUMBAR REGION WITHOUT NEUROGENIC CLAUDICATION: ICD-10-CM

## 2025-04-17 PROCEDURE — 1160F RVW MEDS BY RX/DR IN RCRD: CPT | Mod: CPTII,S$GLB,, | Performed by: PHYSICIAN ASSISTANT

## 2025-04-17 PROCEDURE — 1159F MED LIST DOCD IN RCRD: CPT | Mod: CPTII,S$GLB,, | Performed by: PHYSICIAN ASSISTANT

## 2025-04-17 PROCEDURE — 99999 PR PBB SHADOW E&M-EST. PATIENT-LVL IV: CPT | Mod: PBBFAC,,, | Performed by: PHYSICIAN ASSISTANT

## 2025-04-17 PROCEDURE — 3080F DIAST BP >= 90 MM HG: CPT | Mod: CPTII,S$GLB,, | Performed by: PHYSICIAN ASSISTANT

## 2025-04-17 PROCEDURE — 4010F ACE/ARB THERAPY RXD/TAKEN: CPT | Mod: CPTII,S$GLB,, | Performed by: PHYSICIAN ASSISTANT

## 2025-04-17 PROCEDURE — 99214 OFFICE O/P EST MOD 30 MIN: CPT | Mod: S$GLB,,, | Performed by: PHYSICIAN ASSISTANT

## 2025-04-17 PROCEDURE — 3066F NEPHROPATHY DOC TX: CPT | Mod: CPTII,S$GLB,, | Performed by: PHYSICIAN ASSISTANT

## 2025-04-17 PROCEDURE — 3077F SYST BP >= 140 MM HG: CPT | Mod: CPTII,S$GLB,, | Performed by: PHYSICIAN ASSISTANT

## 2025-04-17 PROCEDURE — 3061F NEG MICROALBUMINURIA REV: CPT | Mod: CPTII,S$GLB,, | Performed by: PHYSICIAN ASSISTANT

## 2025-04-17 PROCEDURE — 3044F HG A1C LEVEL LT 7.0%: CPT | Mod: CPTII,S$GLB,, | Performed by: PHYSICIAN ASSISTANT

## 2025-04-17 PROCEDURE — 3072F LOW RISK FOR RETINOPATHY: CPT | Mod: CPTII,S$GLB,, | Performed by: PHYSICIAN ASSISTANT

## 2025-04-17 RX ORDER — HYDROCODONE BITARTRATE AND ACETAMINOPHEN 10; 325 MG/1; MG/1
1 TABLET ORAL 3 TIMES DAILY PRN
Qty: 90 TABLET | Refills: 0 | Status: SHIPPED | OUTPATIENT
Start: 2025-05-20 | End: 2025-06-19

## 2025-04-17 RX ORDER — HYDROCODONE BITARTRATE AND ACETAMINOPHEN 10; 325 MG/1; MG/1
1 TABLET ORAL 3 TIMES DAILY PRN
Qty: 90 TABLET | Refills: 0 | Status: SHIPPED | OUTPATIENT
Start: 2025-06-19 | End: 2025-07-19

## 2025-04-17 RX ORDER — HYDROCODONE BITARTRATE AND ACETAMINOPHEN 10; 325 MG/1; MG/1
1 TABLET ORAL 3 TIMES DAILY PRN
Qty: 90 TABLET | Refills: 0 | Status: SHIPPED | OUTPATIENT
Start: 2025-04-20 | End: 2025-05-20

## 2025-04-17 NOTE — PROGRESS NOTES
This note was completed with dictation software and grammatical errors may exist.    CC:  Neck and back pain    HPI: The patient is a 64-year-old woman with a history of diabetes, hypertension who presents in referral from [facility] for neck pain radiating into the arms and low back pain radiating into the left leg.    History of Present Illness    Patient presents with left knee and back problems.    Left knee: She developed a Baker's cyst behind the knee about a week after receiving a gel injection. The fluid was initially drawn out, but the swelling recurred. This medical assistant recommended a knee replacement instead of another drainage procedure. She reports weakness in her legs, particularly due to the knee issue, stating that she is trying to keep putting weight on her head due to the knee problem.    Back: She reports intermittent pain, especially when bending over. She states that sometimes it is okay, but when she bends over, she experiences fatigue and pain. In the past year, she had a severe episode of back pain that required an emergency department visit and a steroid injection. The pain, which initially affected her left leg and then the right, has improved since then and is currently localized to the back.    She mentions having had an ablation on her neck, which has resulted in improvement. She also reports numbness and tingling in her right foot, which she attributes to early-stage neuropathy.    She has no current numbness in her arms. She denies any medical diagnoses.         Pain intervention history: She had undergone 3 epidurals for low back pain with only up to 3 weeks of relief each time.  She is status post C7-T1 cervical interlaminar epidural steroid injection on 1/29/16 with 50% relief.   She is status post C7-T1 cervical interlaminar epidural steroid injection on 3/1/16 with mild additional relief.  She is status post C7-T1 cervical interlaminar epidural steroid  injection on 5/13/16 with 70-75% relief.  She is status post right C3, 4, 5 and 6 medial branch radiofrequency ablation on 7/15/16 with 30-60% relief.  She is status post L5/S1 interlaminar epidural steroid injection on 5/16/17 with 50% relief.  She is status post L5/S1 interlaminar epidural steroid injection on 12/29/17 with excellent relief lasting 2 weeks, now reporting minimal relief of her low back and buttock pain but ongoing 100% relief of her right leg pain.  She is status post C7-T1 cervical interlaminar epidural steroid injection on 3/14/18 with 75% relief but this was not long lasting.  She is status post right L4/5 and L5/S1 transforaminal epidural steroid injection on 01/11/2021 with Dr Luna with 0% relief.   She is status post caudal epidural steroid injection on 11/01/2021 with 70% relief lasting over 1 month.   She is status post caudal epidural steroid injection on 04/05/2022 with 60-65% relief. She is status post right 3rd occipital nerve, C7 and T1 medial branch radiofrequency ablation on 01/03/2025 with 75% relief.     Spine surgeries: Right L4/5 and L5/S1 laminectomy on 06/24/2021 Dr Hester.     Antineuropathics:  Gabapentin 100 mg 3 times daily  NSAIDs:  Physical therapy:  [facility] in [place]  Antidepressants:  Amitriptyline 25 mg nightly  Muscle relaxers:  Tizanidine 4 mg 3 times daily as needed  Opioids:  Hydrocodone-acetaminophen 10/325 mg 3 times daily as needed  Antiplatelets/Anticoagulants:    ROS: She reports headaches, hoarse voice, joint stiffness, joint swelling, back pain, difficulty sleeping, anxiety and loss of balance.  Balance of review of systems is negative.    Medical, surgical, family and social history reviewed elsewhere in record.    Medications/Allergies: See med card    Vitals:    04/17/25 0941   BP: (!) 149/91   Pulse: 74   PainSc:   4   PainLoc: Back         Physical exam:  Gen: A and O x3, pleasant, well-groomed  Skin: No rashes or obvious lesions  HEENT:  PERRLA, no obvious deformities on ears or in canals.Trachea midline.  CVS: Regular rate and rhythm, normal palpable pulses.  Resp: Clear to auscultation bilaterally, no wheezes or rales.  Abdomen: Soft, NT/ND.  Musculoskeletal: No antalgic gait.     Neuro:  Upper extremities: 4/5 strength bilaterally  Lower extremities:  4/5 strength bilaterally   Reflexes: Brachioradialis 2+, Bicep 2+, Tricep 2+.  Patellar and Achilles reflexes 2+ bilaterally.  Sensory: Intact and symmetrical to light touch and pinprick in C2-T1 dermatomes bilaterally.  Osuna's negative.  Romberg positive for imbalance, abnormal tandem gait test.    Cervical Spine:  Cervical spine: Range of motion is mildly reduced with flexion extension and lateral rotation without increased pain.  Spurling's maneuver causes neck pain to either side.    Myofascial exam:  Moderate tenderness to palpation to the cervical paraspinous muscles.    Lumbar spine:  Range of motion is moderately reduced with flexion, extension and oblique extension with increased low back pain during each maneuver.  Khanh's test is negative bilaterally.  Straight leg raise causes left leg pain.  Internal and external rotation of hip is negative bilaterally.  Myofascial exam:  Mild tenderness to palpation to the lumbar paraspinous muscles.    Imagin/24/15 C-spine MRI  1. C3-4 left posterior paracentral disk extrusion causing left paracentral spinal cord compression and severe left foraminal stenosis.  2. C4-5 based disk extrusion with spinal cord impingement and moderate bilateral foraminal stenosis.  3. C5-6 posterior central disk extrusion with spinal cord compression and severe bilateral foraminal stenosis.  4. C6-7 mild disk bulge with severe bilateral foraminal stenosis.  5. Solitary mildly enlarged left submandibular lymph node of uncertain significance.    4/10/17 MRI lumbar spine  T12-L1: No central canal or neuroforaminal stenosis. No disc protrusion or extrusion.  L1-L2:  There is ligamentum flavum thickening and facet arthropathy.  No central canal or neuroforaminal stenosis. No disc protrusion or extrusion.  L2-L3: There is ligamentum flavum thickening and facet arthropathy.  No central canal or neuroforaminal stenosis. No disc protrusion or extrusion.  L3-L4: There is ligamentum flavum thickening and facet arthropathy present. No central canal or neuroforaminal stenosis. No disc protrusion or extrusion.  L4-L5: There is a broad disc bulge which extends into both neural foramina.  There is ligamentum flavum thickening and facet arthropathy.  There is right lateral recess stenosis.  There is abutment of the descending right L5 nerve in the right lateral recess.  Please correlate clinically for symptoms referable to the right L5 nerve.  There is mild central canal stenosis.  There is mild left neuroforaminal stenosis.  No right neuroforaminal stenosis.  L5-S1: There is a broad central/right paracentral disc protrusion which encroaches upon the descending right S1 nerve in the right lateral recess.  Please correlate clinically for symptoms referable to the right S1 nerve.  There is mild-moderate right neuroforaminal stenosis.  No left neuroforaminal stenosis.  No central canal stenosis.  There is ligamentum flavum thickening and facet arthropathy.  There is a broad left extraforaminal disc protrusion at L5-S1 which abuts the exited left L5 nerve in the left extraforaminal soft tissues (series 6 image 17), nonspecific.  Please correlate clinically for symptoms referable to the left L5 nerve.    05/28/2019 MRI cervical spine  C2-3: No disc herniation, spinal canal narrowing, or neuroforaminal narrowing.  C3-4: There is moderate broad-based posterior disc osteophyte complex formation with left paracentral predominance.  This along with facet arthropathy contributes to mild spinal canal narrowing and moderate left neuroforaminal narrowing.  C4-5: There is moderate broad-based posterior  disc osteophyte complex formation with left paracentral prominence.  This along with facet arthropathy contributes to mild bilateral neuroforaminal narrowing and mild spinal canal narrowing.  C5-6: Moderate broad-based posterior disc osteophyte complex formation and facet arthropathy result in moderate spinal canal narrowing and moderate right and mild left neuroforaminal narrowing.  C6-7: Moderate broad-based posterior disc osteophyte complex formation and facet arthropathy result in mild spinal canal narrowing along with moderate right and severe left neuroforaminal narrowing.  C7-T1: Minimal broad-based posterior disc osteophyte complex formation and bilateral facet arthropathy result in mild bilateral neuroforaminal narrowing.      05/28/2019 MRI lumbar spine  T12-L1: No disc herniation, spinal canal narrowing, or neuroforaminal narrowing.  L1-2: No disc herniation, spinal canal narrowing, or neuroforaminal narrowing.  L2-3: No disc herniation, spinal canal narrowing, or neuroforaminal narrowing.  L3-4: No disc herniation, spinal canal narrowing, or neuroforaminal narrowing.  L4-5: There is moderate generalized disc bulging and bilateral facet arthropathy, which result in mild spinal canal narrowing and mild bilateral neuroforaminal narrowing.  L5-S1: There is a small right paracentral disc protrusion.  This effaces the right lateral recess and may impinge upon the descending right S1 nerve root.  Bilateral facet arthropathy is present and there is resultant overall mild spinal canal narrowing.  Moderate right and mild left neuroforaminal narrowing are also present.    06/24/2021 MRI lumbar spine  NOMENCLATURE: Five lumbar type vertebral bodies.     CORD/CAUDA EQUINA: Conus has normal size and signal and ends at a normal level of L1-L2.  Small amount of susceptibility either in the subdural or intrathecal space ventrally and dorsally at the L3-4 level.     ALIGNMENT: Trace retrolisthesis of L4 on L5.     BONES:  Interval L4 laminectomy and right L5 hemilaminectomy.  Medial right L4-5 and L5-S1 facetectomies.  Susceptibility artifact is again seen in the left L5 pedicle.  No aggressive bone marrow signal.     PARASPINAL AREA: Fluid and edema along the surgical tract and in the right dorsal paraspinal muscles at the surgical levels.     LUMBAR DISC LEVELS:     T12-L1: No disc herniation or significant posterior osteophytic ridging.  Mild left facet hypertrophy.  No significant spinal canal or foraminal stenosis.     L1-L2: Minimal disc bulge.  Mild bilateral facet hypertrophy.  Ligamentum flavum thickening.  Minimal narrowing of the bilateral lateral recesses.  No significant spinal canal or foraminal stenosis.     L2-L3: Mild disc bulge.  Mild bilateral facet hypertrophy.  Ligamentum flavum thickening.  Mild narrowing of the bilateral lateral recesses.  No significant spinal canal or foraminal stenosis.     L3-L4: Mild disc bulge.  Mild-moderate bilateral facet hypertrophy.  Ligamentum flavum thickening.  Susceptibility at the midline anterior and posterior aspect of the thecal sac.  Mild narrowing of the bilateral lateral recesses.  Moderate spinal canal stenosis, increased from prior study.  Mild bilateral foraminal stenosis.     L4-L5: Laminectomy.  Right facetectomy.  Mild disc bulge and disc height loss.  Mild narrowing of the lateral recesses without significant overall spinal canal stenosis.  Mild bilateral foraminal stenosis.     L5-S1: Interval right hemilaminectomy and right medial facetectomy.  Mild disc bulge.  Small central protrusion.  Mild narrowing of the right lateral recess which is significantly decreased from preoperative study.  No significant spinal canal stenosis.  Moderate right and mild-moderate left foraminal stenosis, unchanged.  The left L5 nerve root may be contacted by disc as it exits the foramen    04/23/2024 MRI cervical spine  Morphology: There is extensive artifact from the patient's C3-C7  ACDF changes and C6 corpectomy limiting evaluation.  Marrow signal is overall grossly within normal limits.  No obvious fracture.     Alignment: Straightening of the expected normal cervical lordosis without evidence of any significant spondylolisthesis..     Cord: Limited evaluation of the cervical cord demonstrates no focal signal abnormality or cord compression..     Craniocervical Junction: Cerebellar tonsils are normally positioned. The visualized portions of the posterior fossa are unremarkable. The regional osseous anatomy is normal.        Disc levels:        C2-C3: Mild-to-moderate facet arthrosis with uncovertebral spurring producing mild to moderate right and mild left foraminal narrowing.  No significant narrowing of the spinal canal..     C3-C4: ACDF changes.  Shallow osseous ridging lateralizing to the left mildly narrows the spinal canal.  Uncovertebral spurring and mild facet arthrosis produces moderate to severe left and no more than mild right foraminal narrowing..     C4-C5: ACDF changes with shallow osseous ridging minimally narrowing the spinal canal.  Mild to moderate facet arthrosis contributes to no more than mild bilateral foraminal narrowing..     C5-C6: ACDF changes.  The spinal canal is patent.  Uncovertebral spurring and mild facet arthrosis produces mild to moderate bilateral foraminal narrowing..     C6-C7: ACDF changes.  The spinal canal is patent.  Limited evaluation of the foramina with uncovertebral spurring suggestive of mild to moderate left and mild right foraminal narrowing..     C7-T1: The spinal canal is patent.  Mild ligamentum flavum thickening.  Facet arthrosis and uncovertebral spurring produces moderate right and mild left foraminal narrowing..     Soft tissues: Heterogeneous appearance of the thyroid and multinodular left thyroid gland redemonstrated.  Visualized paraspinal soft tissues are within normal limits.      04/23/2024 MRI lumbar spine  Morphology: Vertebral  body heights are preserved.  Incidental L5 vertebral body hemangioma and small chronic appearing Schmorl's node along the anterior/inferior endplate of L4 with mild associated fatty marrow replacement.  Marrow signal is otherwise within normal limits.  No fractures or suspicious lesions.     Alignment: Grade 1 retrolisthesis of L4 on L5 and L5 on S1.     Cord: Normal in contour, caliber, and signal intensity.  Conus position is within normal limits.  No abnormal enhancement.     Disc levels:     T12-L1: Within normal limits.     L1-L2: Shallow annular bulging and mild bilateral facet arthrosis flattening the ventral thecal sac without substantial spinal canal or foraminal narrowing.     L2-L3: Mild bilateral facet arthrosis the spinal canal and foramina are patent.     L3-L4: Mild degenerative disc height loss and desiccation similar to the prior examination with shallow disc bulging and moderate bilateral facet arthrosis combining to produce mild to moderate narrowing of the spinal canal and subarticular recesses and mild bilateral foraminal narrowing.     L4-L5: Bilateral laminectomy as well as medial facetectomy changes.  Similar moderate to severe disc height loss and desiccation as well as shallow uniform disc bulging and mild-to-moderate facet arthrosis.  Mild encroachment both subarticular recesses with similar decompression of the spinal canal.  Similar mild to moderate right and mild left foraminal narrowing.     L5-S1: Right hemilaminectomy as well as medial facetectomy changes.  Mild bilateral facet arthrosis.  Evidence of previous discectomy changes.  No significant change from the prior examination.  Shallow residual disc bulging with mild encroachment of both subarticular recesses as well as moderate to severe right and mild-to-moderate left foraminal narrowing.  Greatest potential for impingement in the distribution of the right L5 nerve.     Other: Slightly progressed fatty atrophy of the paraspinal  musculature dorsally at the lower lumbar levels somewhat asymmetric to the right.  No paraspinal fluid collections or other significant findings.      Assessment:  The patient is a 64-year-old woman with a history of diabetes, hypertension who presents in referral from [facility] for neck pain radiating into the arms and low back pain radiating into the left leg.     1. Lumbar radiculopathy        2. Spinal stenosis of lumbar region without neurogenic claudication        3. Cervical spondylosis        4. Opioid contract exists            Plan:  1. She is going to follow-up with Dr. Weaver to discuss a left knee replacement.    2. If her low back and right leg pain worsens she can contact us to schedule bilateral L3/4 transforaminal epidural steroid injections.    3. Dr. Galan provided prescriptions for hydrocodone-acetaminophen 10/325 mg 3 times daily as needed for pain.  I have reviewed the [place] Board [employer] website and there are no abberancies.    4. Follow-up in three months or sooner as needed.      This note was generated with the assistance of ambient listening technology. Verbal consent was obtained by the patient and accompanying visitor(s) for the recording of patient appointment to facilitate this note. I attest to having reviewed and edited the generated note for accuracy, though some syntax or spelling errors may persist. Please contact the author of this note for any clarification.

## 2025-04-21 DIAGNOSIS — M17.12 PRIMARY OSTEOARTHRITIS OF LEFT KNEE: Primary | ICD-10-CM

## 2025-04-29 ENCOUNTER — HOSPITAL ENCOUNTER (OUTPATIENT)
Dept: RADIOLOGY | Facility: HOSPITAL | Age: 65
Discharge: HOME | End: 2025-04-29
Attending: ORTHOPAEDIC SURGERY
Payer: COMMERCIAL

## 2025-04-29 ENCOUNTER — OFFICE VISIT (OUTPATIENT)
Dept: ORTHOPEDICS | Facility: CLINIC | Age: 65
End: 2025-04-29
Payer: COMMERCIAL

## 2025-04-29 VITALS — WEIGHT: 158.94 LBS | BODY MASS INDEX: 25.54 KG/M2 | HEIGHT: 66 IN

## 2025-04-29 DIAGNOSIS — M17.12 PRIMARY OSTEOARTHRITIS OF LEFT KNEE: ICD-10-CM

## 2025-04-29 DIAGNOSIS — M17.12 PRIMARY OSTEOARTHRITIS OF LEFT KNEE: Primary | ICD-10-CM

## 2025-04-29 PROCEDURE — 99999 PR PBB SHADOW E&M-EST. PATIENT-LVL IV: CPT | Mod: PBBFAC,,, | Performed by: ORTHOPAEDIC SURGERY

## 2025-04-29 PROCEDURE — 3066F NEPHROPATHY DOC TX: CPT | Mod: CPTII,S$GLB,, | Performed by: ORTHOPAEDIC SURGERY

## 2025-04-29 PROCEDURE — 1159F MED LIST DOCD IN RCRD: CPT | Mod: CPTII,S$GLB,, | Performed by: ORTHOPAEDIC SURGERY

## 2025-04-29 PROCEDURE — 20610 DRAIN/INJ JOINT/BURSA W/O US: CPT | Mod: LT,S$GLB,, | Performed by: ORTHOPAEDIC SURGERY

## 2025-04-29 PROCEDURE — 73562 X-RAY EXAM OF KNEE 3: CPT | Mod: TC,PO,LT

## 2025-04-29 PROCEDURE — 3008F BODY MASS INDEX DOCD: CPT | Mod: CPTII,S$GLB,, | Performed by: ORTHOPAEDIC SURGERY

## 2025-04-29 PROCEDURE — 3072F LOW RISK FOR RETINOPATHY: CPT | Mod: CPTII,S$GLB,, | Performed by: ORTHOPAEDIC SURGERY

## 2025-04-29 PROCEDURE — 3044F HG A1C LEVEL LT 7.0%: CPT | Mod: CPTII,S$GLB,, | Performed by: ORTHOPAEDIC SURGERY

## 2025-04-29 PROCEDURE — 99214 OFFICE O/P EST MOD 30 MIN: CPT | Mod: 25,S$GLB,, | Performed by: ORTHOPAEDIC SURGERY

## 2025-04-29 PROCEDURE — 4010F ACE/ARB THERAPY RXD/TAKEN: CPT | Mod: CPTII,S$GLB,, | Performed by: ORTHOPAEDIC SURGERY

## 2025-04-29 PROCEDURE — 1160F RVW MEDS BY RX/DR IN RCRD: CPT | Mod: CPTII,S$GLB,, | Performed by: ORTHOPAEDIC SURGERY

## 2025-04-29 PROCEDURE — 73562 X-RAY EXAM OF KNEE 3: CPT | Mod: 26,LT,, | Performed by: RADIOLOGY

## 2025-04-29 PROCEDURE — 3061F NEG MICROALBUMINURIA REV: CPT | Mod: CPTII,S$GLB,, | Performed by: ORTHOPAEDIC SURGERY

## 2025-04-29 RX ADMIN — TRIAMCINOLONE ACETONIDE 40 MG: 40 INJECTION, SUSPENSION INTRA-ARTICULAR; INTRAMUSCULAR at 02:04

## 2025-04-30 ENCOUNTER — OFFICE VISIT (OUTPATIENT)
Dept: NEUROLOGY | Facility: CLINIC | Age: 65
End: 2025-04-30
Payer: COMMERCIAL

## 2025-04-30 VITALS
HEART RATE: 87 BPM | SYSTOLIC BLOOD PRESSURE: 166 MMHG | DIASTOLIC BLOOD PRESSURE: 100 MMHG | WEIGHT: 158.94 LBS | BODY MASS INDEX: 25.66 KG/M2

## 2025-04-30 DIAGNOSIS — G62.9 NEUROPATHY: ICD-10-CM

## 2025-04-30 DIAGNOSIS — I10 ESSENTIAL HYPERTENSION: Primary | ICD-10-CM

## 2025-04-30 DIAGNOSIS — Z86.69 H/O MIGRAINE: ICD-10-CM

## 2025-04-30 PROCEDURE — 3066F NEPHROPATHY DOC TX: CPT | Mod: CPTII,S$GLB,, | Performed by: NURSE PRACTITIONER

## 2025-04-30 PROCEDURE — 3072F LOW RISK FOR RETINOPATHY: CPT | Mod: CPTII,S$GLB,, | Performed by: NURSE PRACTITIONER

## 2025-04-30 PROCEDURE — 3077F SYST BP >= 140 MM HG: CPT | Mod: CPTII,S$GLB,, | Performed by: NURSE PRACTITIONER

## 2025-04-30 PROCEDURE — 3044F HG A1C LEVEL LT 7.0%: CPT | Mod: CPTII,S$GLB,, | Performed by: NURSE PRACTITIONER

## 2025-04-30 PROCEDURE — 99999 PR PBB SHADOW E&M-EST. PATIENT-LVL V: CPT | Mod: PBBFAC,,, | Performed by: NURSE PRACTITIONER

## 2025-04-30 PROCEDURE — G2211 COMPLEX E/M VISIT ADD ON: HCPCS | Mod: S$GLB,,, | Performed by: NURSE PRACTITIONER

## 2025-04-30 PROCEDURE — 1159F MED LIST DOCD IN RCRD: CPT | Mod: CPTII,S$GLB,, | Performed by: NURSE PRACTITIONER

## 2025-04-30 PROCEDURE — 4010F ACE/ARB THERAPY RXD/TAKEN: CPT | Mod: CPTII,S$GLB,, | Performed by: NURSE PRACTITIONER

## 2025-04-30 PROCEDURE — 3080F DIAST BP >= 90 MM HG: CPT | Mod: CPTII,S$GLB,, | Performed by: NURSE PRACTITIONER

## 2025-04-30 PROCEDURE — 3008F BODY MASS INDEX DOCD: CPT | Mod: CPTII,S$GLB,, | Performed by: NURSE PRACTITIONER

## 2025-04-30 PROCEDURE — 1160F RVW MEDS BY RX/DR IN RCRD: CPT | Mod: CPTII,S$GLB,, | Performed by: NURSE PRACTITIONER

## 2025-04-30 PROCEDURE — 3061F NEG MICROALBUMINURIA REV: CPT | Mod: CPTII,S$GLB,, | Performed by: NURSE PRACTITIONER

## 2025-04-30 PROCEDURE — 99214 OFFICE O/P EST MOD 30 MIN: CPT | Mod: S$GLB,,, | Performed by: NURSE PRACTITIONER

## 2025-04-30 RX ORDER — GABAPENTIN 800 MG/1
800 TABLET ORAL 3 TIMES DAILY
Qty: 270 TABLET | Refills: 3 | Status: SHIPPED | OUTPATIENT
Start: 2025-04-30

## 2025-04-30 NOTE — ASSESSMENT & PLAN NOTE
Migraines since age of 15  More frequent as of late  Encouraged use of magnesium  Referral placed to [facility]

## 2025-04-30 NOTE — PATIENT INSTRUCTIONS
1- For preventive management: a. Start magnesium daily (pick one of the following preparations)                                      -- start magnesium in ONE of the following preparations -               1. Magnesium oxide 400 -500 mg daily (the most common over the counter kind, may causes loose stools)              2. Magnesium citrate 400-500mg daily (harder to find, but more neutral on the bowels)              3. Magnesium glycinate 400mg daily (hardest to find, look online, but most bowel-neutral, best absorbed)
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
## 2025-04-30 NOTE — ASSESSMENT & PLAN NOTE
likely in setting in diabetes   Reports of ongoing tingling and intermittent burning pain to bilateral feet.  Pt told she had new onset of diabetic neuropathy ~ 10 years ago.   Pt also with extensive spine history with associated pain   - she is following back and spine and pain management.   Recent A1c is stable (6.1)  Pt has been taking Gabapentin 800 mg TID instead of BID for increased headaches and pain  EMG/NCS from 2022 reported with mild, chronic left L5 radiculopathy; no neuropathy reported  Previous serologies noted with low B6 levels and was supplemented. last B6 level was elevated. She is no longer supplementing  Updated lumbar imaging noted with right L5 foraminal nerve at L5-S1.  Continue to f/u with other specificities regarding back pain

## 2025-05-06 RX ORDER — TRIAMCINOLONE ACETONIDE 40 MG/ML
40 INJECTION, SUSPENSION INTRA-ARTICULAR; INTRAMUSCULAR
Status: DISCONTINUED | OUTPATIENT
Start: 2025-04-29 | End: 2025-05-06 | Stop reason: HOSPADM

## 2025-05-06 NOTE — PROCEDURES
Large Joint Aspiration/Injection: L knee    Date/Time: 4/29/2025 2:45 PM    Performed by: Fidel Weaver MD  Authorized by: Fidel Weaver MD    Consent Done?:  Yes (Verbal)  Indications:  Pain  Timeout: prior to procedure the correct patient, procedure, and site was verified    Prep: patient was prepped and draped in usual sterile fashion    Local anesthetic:  Lidocaine 1% without epinephrine  Anesthetic total (ml):  5      Details:  Needle Size:  21 G  Approach:  Anterolateral  Location:  Knee  Site:  L knee  Medications:  40 mg triamcinolone acetonide 40 mg/mL  Patient tolerance:  Patient tolerated the procedure well with no immediate complications

## 2025-05-06 NOTE — PROGRESS NOTES
Chief Complaint   Patient presents with    Left Knee - Pain         HPI:   This is a 64 y.o. who presents to clinic today complaining of left knee pain for 1 months after no known trauma. Pain is progressively worsening. No numbness or tingling. No associated signs or symptoms.    Past Medical History:   Diagnosis Date    Arthritis     Asthma     Cataract     OU    Diabetes mellitus, type 2     Gastric ulcer     Hyperlipidemia     Hypertension     Mitral valve prolapse     RYLAN (obstructive sleep apnea)     does not wear CPAP-can't tolerate-claustraphobic    Snores      unable to lay flat, sleeps elevated    Thyroid disease     Goiter     Past Surgical History:   Procedure Laterality Date    ANTERIOR CERVICAL DISCECTOMY W/ FUSION N/A 2021    Procedure: DISCECTOMY, SPINE, CERVICAL, ANTERIOR APPROACH, WITH FUSION , C3-4, C4-5, C5-6, C6 CORPECTOMYc-3-c7 anterior instrumented fusion;  Surgeon: Denise Real MD;  Location: [facility] OR;  Service: Neurosurgery;  Laterality: N/A;    CARPAL TUNNEL RELEASE Left     CARPAL TUNNEL RELEASE Right 2018    Procedure: RELEASE, CARPAL TUNNEL;  Surgeon: Anurag Mathis MD;  Location: [facility] OR;  Service: Orthopedics;  Laterality: Right;    CAUDAL EPIDURAL STEROID INJECTION N/A 2021    Procedure: Injection-steroid-epidural-caudal;  Surgeon: Erik Galan MD;  Location: [facility] OR;  Service: Pain Management;  Laterality: N/A;    CAUDAL EPIDURAL STEROID INJECTION N/A 2022    Procedure: Injection-steroid-epidural-caudal;  Surgeon: Erik Galan MD;  Location: [facility] OR;  Service: Pain Management;  Laterality: N/A;     SECTION      CHOLECYSTECTOMY      DILATION AND CURETTAGE OF UTERUS      Epidural steroid Injection      Pain management, cervical    ESOPHAGEAL DILATION      FACETECTOMY OF VERTEBRA N/A 2021    Procedure: [id number] Medial;  Surgeon: Denise Real MD;  Location: [facility] OR;  Service: Neurosurgery;  Laterality:  N/A;    INJECTION OF ANESTHETIC AGENT AROUND MEDIAL BRANCH NERVES INNERVATING CERVICAL FACET JOINT Right 6/12/2024    Procedure: Block-nerve-medial branch-cervical  TON, C7/T1;  Surgeon: Erik Galan MD;  Location: [facility] OR;  Service: Pain Management;  Laterality: Right;    INJECTION OF ANESTHETIC AGENT AROUND MEDIAL BRANCH NERVES INNERVATING CERVICAL FACET JOINT Right 6/28/2024    Procedure: Block-nerve-medial branch-cervical-TON-C7-T1;  Surgeon: Erik Galan MD;  Location: [facility] OR;  Service: Pain Management;  Laterality: Right;    LAMINECTOMY USING MINIMALLY INVASIVE TECHNIQUE N/A 6/24/2021    Procedure: LAMINECTOMY, SPINE, MINIMALLY INVASIVE RIGHT L5-S1 AND L4-L5 HEMILAMINECTOMY;  Surgeon: Denise Real MD;  Location: [facility] OR;  Service: Neurosurgery;  Laterality: N/A;    MICRODISCECTOMY OF SPINE N/A 6/24/2021    Procedure: MICRODISCECTOMY, SPINE L5-S1;  Surgeon: Denise Real MD;  [facility];  Service: Neurosurgery;  Laterality: N/A;    RADIOFREQUENCY ABLATION Right 1/3/2025    Procedure: Radiofrequency Ablation    Cervical  TON and C7/T1;  Surgeon: Erik Galan MD;  Location: [facility] OR;  Service: Pain Management;  Laterality: Right;    TRANSFORAMINAL EPIDURAL INJECTION OF STEROID Right 1/11/2021    Procedure: Injection,steroid,epidural,transforaminal approach L4/5 and L5/S1;  Surgeon: Mariano Luna MD;  Location: [facility] OR;  Service: Pain Management;  Laterality: Right;    TRIGGER FINGER RELEASE Left 7/16/2020    Procedure: RELEASE, TRIGGER FINGER//Left thumb, middle and ring finger trigger release;  Surgeon: Jose Mata MD;  Location: [facility] OR;  Service: Orthopedics;  Laterality: Left;    ulcer on tongue removed      WOUND DEBRIDEMENT Left     leg at ankle level     Medications Ordered Prior to Encounter[1]  Review of patient's allergies indicates:   Allergen Reactions    Metformin Diarrhea     With extended release preparation as well    Peanuts [peanut]  Itching     Itches    Penicillins Itching     Swelling (extremities)^  Swelling (extremities)^      [place] Itching     throat     Family History   Problem Relation Name Age of Onset    Diabetes Father      Diabetes Sister      Heart disease Brother      Glaucoma Paternal Aunt      Glaucoma Daughter          suspect    Macular degeneration Neg Hx       Social History[2]    Review of Systems:  Constitutional:  Denies fever or chills   Eyes:  Denies change in visual acuity   HENT:  Denies nasal congestion or sore throat   Respiratory:  Denies cough or shortness of breath   Cardiovascular:  Denies chest pain or edema   GI:  Denies abdominal pain, nausea, vomiting, bloody stools or diarrhea   :  Denies dysuria   Integument:  Denies rash   Neurologic:  Denies headache, focal weakness or sensory changes   Endocrine:  Denies polyuria or polydipsia   Lymphatic:  Denies swollen glands   Psychiatric:  Denies depression or anxiety     Physical Exam:   Constitutional:  Well developed, well nourished, no acute distress, non-toxic appearance   Integument:  Well hydrated, no rash   Lymphatic:  No lymphadenopathy noted   Neurologic:  Alert & oriented x 3, CN 2-12 normal, normal motor function, normal sensory function, no focal deficits noted   Psychiatric:  Speech and behavior appropriate   Eyes: EOMI  Gi: abdomen soft    Bilateral Knee Exam    left Knee Exam     Tenderness   The patient is experiencing tenderness in the medial joint line.    Range of Motion   Extension: abnormal   Flexion: abnormal     Muscle Strength     The patient has normal knee strength.    Tests   Deni:  Medial - positive   Lachman:  Anterior - negative      Varus: negative  Valgus: negative  Patellar Apprehension: negative    Other   Erythema: absent  Sensation: normal  Pulse: present  Swelling: mild      right Knee Exam   right knee exam performed same as contralateral side and is normal.            X-rays were performed, personally reviewed by me and  "findings discussed with the patient.  3 views of the left knee show tricompartmental degenerative change most pronounced in the medial compartment with Kellgren 3 changes    Primary osteoarthritis of left knee            Using an aseptic technique, I injected 5 cc of lidocaine 1% without and 1 cc of kenalog 40mg into the left Knee. The patient tolerated this well. I will have them return to clinic in 3 months.             [1]   Current Outpatient Medications on File Prior to Visit   Medication Sig Dispense Refill    ALBUTEROL SULFATE (PROAIR HFA INHL) Inhale 1 puff into the lungs every 6 (six) hours as needed. PRN      amitriptyline (ELAVIL) 25 MG tablet Take 25 mg by mouth nightly as needed for Insomnia.      BD ULTRA-FINE GISELA PEN NEEDLE 32 gauge x 5/32" Ndle       benazepriL (LOTENSIN) 40 MG tablet Take 1 tablet (40 mg total) by mouth once daily. 90 tablet 3    blood-glucose sensor (FREESTYLE DASHA 3 SENSOR) Haritha Inject 1 Device into the skin every 15 (fourteen) days. 2 each 12    clotrimazole-betamethasone 1-0.05% (LOTRISONE) cream Apply topically 2 (two) times daily. 15 g 2    flash glucose sensor (FREESTYLE DASHA 2 SENSOR) Kit APPLY 1 SENSOR TO THE SKIN EVERY 14 DAYS AS DIRECTED. 2 kit 12    HYDROcodone-acetaminophen (NORCO)  mg per tablet Take 1 tablet by mouth 3 (three) times daily as needed for Pain. 90 tablet 0    [START ON 5/20/2025] HYDROcodone-acetaminophen (NORCO)  mg per tablet Take 1 tablet by mouth 3 (three) times daily as needed for Pain. 90 tablet 0    [START ON 6/19/2025] HYDROcodone-acetaminophen (NORCO)  mg per tablet Take 1 tablet by mouth 3 (three) times daily as needed for Pain. 90 tablet 0    insulin aspart U-100 (NOVOLOG FLEXPEN U-100 INSULIN) 100 unit/mL (3 mL) InPn pen Use on premeal readings; 150-200=+2, 201-250=+4; 251-300=+6; 301-350=+8, over 350, + 10 units, 50 units a day max. 15 mL 0    insulin glargine, TOUJEO, (TOUJEO SOLOSTAR U-300 INSULIN) 300 unit/mL (1.5 mL) " InPn pen ADMINISTER 60 UNITS UNDER THE SKIN IN THE EVENING AS DIRECTED 4.5 mL 6    linaCLOtide (LINZESS) 72 mcg Cap capsule Take 1 capsule (72 mcg total) by mouth before breakfast. 90 capsule 2    meclizine (ANTIVERT) 25 mg tablet Take 25 mg by mouth.      montelukast (SINGULAIR) 10 mg tablet Take 10 mg by mouth every evening. (Patient not taking: Reported on 4/30/2025)      nebivolol (BYSTOLIC) 10 MG Tab Take 10 mg by mouth every evening.       pantoprazole (PROTONIX) 40 MG tablet Take 1 tablet (40 mg total) by mouth once daily. 60 tablet 3    piroxicam (FELDENE) 20 MG capsule TAKE 1 CAPSULE(20 MG) BY MOUTH EVERY DAY 30 capsule 3    promethazine (PHENERGAN) 25 MG tablet Take 1 tablet (25 mg total) by mouth every 6 (six) hours as needed for Nausea. 20 tablet 1    rosuvastatin (CRESTOR) 10 MG tablet Take 10 mg by mouth every evening.       tirzepatide (MOUNJARO) 15 mg/0.5 mL PnIj Inject 15 mg into the skin every 7 days. 4 Pen 11    tirzepatide (MOUNJARO) 7.5 mg/0.5 mL PnIj Inject 7.5 mg into the skin every 7 days. 4 Pen 6    tiZANidine (ZANAFLEX) 4 MG tablet Take 1 tablet (4 mg total) by mouth every 8 (eight) hours as needed. 90 tablet 2    rizatriptan (MAXALT-MLT) 10 MG disintegrating tablet Take 1 tablet (10 mg total) by mouth as needed for Migraine. May repeat in 2 hours if needed (Patient not taking: Reported on 4/30/2025) 12 tablet 3     Current Facility-Administered Medications on File Prior to Visit   Medication Dose Route Frequency Provider Last Rate Last Admin    chlorhexidine 0.12 % solution 15 mL  15 mL Mouth/Throat Once Denise Real MD        mupirocin 2 % ointment   Nasal Once Denise Real MD       [2]   Social History  Socioeconomic History    Marital status:    Tobacco Use    Smoking status: Never    Smokeless tobacco: Never   Substance and Sexual Activity    Alcohol use: No     Alcohol/week: 0.0 standard drinks of alcohol    Drug use: Yes     Types: Hydrocodone    Sexual  activity: Yes     Partners: Male   Social History Narrative        5 children    Prior secretarial work      Social Drivers of Health     Financial Resource Strain: Unknown (8/23/2021)    Received from [facility]    Overall Financial Resource Strain (CARDIA)     Difficulty of Paying Living Expenses: Patient declined   Food Insecurity: Unknown (8/23/2021)    Received from [facility]    Hunger Vital Sign     Worried About Running Out of Food in the Last Year: Patient declined     Ran Out of Food in the Last Year: Patient declined   Transportation Needs: Unknown (8/23/2021)    Received from [facility]    PRAPARE - Transportation     Lack of Transportation (Medical): Patient declined     Lack of Transportation (Non-Medical): Patient declined   Physical Activity: Unknown (8/23/2021)    Received from [facility]    Exercise Vital Sign     Days of Exercise per Week: Patient declined     Minutes of Exercise per Session: Patient declined   Stress: Unknown (8/23/2021)    Received from [facility]    [employer] - Occupational Stress Questionnaire     Feeling of Stress : Patient declined

## 2025-05-07 ENCOUNTER — OFFICE VISIT (OUTPATIENT)
Dept: NEUROLOGY | Facility: CLINIC | Age: 65
End: 2025-05-07
Payer: COMMERCIAL

## 2025-05-07 VITALS
RESPIRATION RATE: 18 BRPM | BODY MASS INDEX: 27.03 KG/M2 | HEIGHT: 66 IN | SYSTOLIC BLOOD PRESSURE: 127 MMHG | HEART RATE: 74 BPM | DIASTOLIC BLOOD PRESSURE: 74 MMHG | WEIGHT: 168.19 LBS

## 2025-05-07 DIAGNOSIS — G43.719 INTRACTABLE CHRONIC MIGRAINE WITHOUT AURA AND WITHOUT STATUS MIGRAINOSUS: Primary | ICD-10-CM

## 2025-05-07 PROBLEM — G43.709 CHRONIC MIGRAINE WITHOUT AURA WITHOUT STATUS MIGRAINOSUS, NOT INTRACTABLE: Status: ACTIVE | Noted: 2025-04-30

## 2025-05-07 PROBLEM — F51.01 PRIMARY INSOMNIA: Status: ACTIVE | Noted: 2020-02-07

## 2025-05-07 PROBLEM — Z79.4 TYPE 2 DIABETES MELLITUS WITH DIABETIC POLYNEUROPATHY, WITH LONG-TERM CURRENT USE OF INSULIN: Status: ACTIVE | Noted: 2025-05-07

## 2025-05-07 PROBLEM — K21.9 GASTROESOPHAGEAL REFLUX DISEASE: Status: ACTIVE | Noted: 2018-10-31

## 2025-05-07 PROBLEM — I44.0 AV BLOCK, 1ST DEGREE: Status: ACTIVE | Noted: 2020-07-15

## 2025-05-07 PROBLEM — Z03.89 CORONARY ARTERY DISEASE (CAD) EXCLUDED: Status: ACTIVE | Noted: 2023-10-11

## 2025-05-07 PROBLEM — G47.33 OSA (OBSTRUCTIVE SLEEP APNEA): Status: ACTIVE | Noted: 2020-07-15

## 2025-05-07 PROBLEM — E11.42 TYPE 2 DIABETES MELLITUS WITH DIABETIC POLYNEUROPATHY, WITH LONG-TERM CURRENT USE OF INSULIN: Status: ACTIVE | Noted: 2025-05-07

## 2025-05-07 PROCEDURE — 3061F NEG MICROALBUMINURIA REV: CPT | Mod: CPTII,S$GLB,, | Performed by: NURSE PRACTITIONER

## 2025-05-07 PROCEDURE — 3078F DIAST BP <80 MM HG: CPT | Mod: CPTII,S$GLB,, | Performed by: NURSE PRACTITIONER

## 2025-05-07 PROCEDURE — 99999 PR PBB SHADOW E&M-EST. PATIENT-LVL V: CPT | Mod: PBBFAC,,, | Performed by: NURSE PRACTITIONER

## 2025-05-07 PROCEDURE — 4010F ACE/ARB THERAPY RXD/TAKEN: CPT | Mod: CPTII,S$GLB,, | Performed by: NURSE PRACTITIONER

## 2025-05-07 PROCEDURE — 3074F SYST BP LT 130 MM HG: CPT | Mod: CPTII,S$GLB,, | Performed by: NURSE PRACTITIONER

## 2025-05-07 PROCEDURE — 3072F LOW RISK FOR RETINOPATHY: CPT | Mod: CPTII,S$GLB,, | Performed by: NURSE PRACTITIONER

## 2025-05-07 PROCEDURE — 99215 OFFICE O/P EST HI 40 MIN: CPT | Mod: S$GLB,,, | Performed by: NURSE PRACTITIONER

## 2025-05-07 PROCEDURE — 3008F BODY MASS INDEX DOCD: CPT | Mod: CPTII,S$GLB,, | Performed by: NURSE PRACTITIONER

## 2025-05-07 PROCEDURE — 1159F MED LIST DOCD IN RCRD: CPT | Mod: CPTII,S$GLB,, | Performed by: NURSE PRACTITIONER

## 2025-05-07 PROCEDURE — 3044F HG A1C LEVEL LT 7.0%: CPT | Mod: CPTII,S$GLB,, | Performed by: NURSE PRACTITIONER

## 2025-05-07 PROCEDURE — 99417 PROLNG OP E/M EACH 15 MIN: CPT | Mod: S$GLB,,, | Performed by: NURSE PRACTITIONER

## 2025-05-07 PROCEDURE — 3066F NEPHROPATHY DOC TX: CPT | Mod: CPTII,S$GLB,, | Performed by: NURSE PRACTITIONER

## 2025-05-07 RX ORDER — GALCANEZUMAB 120 MG/ML
120 INJECTION, SOLUTION SUBCUTANEOUS
Qty: 1 ML | Refills: 11 | Status: SHIPPED | OUTPATIENT
Start: 2025-05-07

## 2025-05-07 RX ORDER — AMITRIPTYLINE HYDROCHLORIDE 50 MG/1
50 TABLET, FILM COATED ORAL NIGHTLY
Qty: 30 TABLET | Refills: 11 | Status: SHIPPED | OUTPATIENT
Start: 2025-05-07

## 2025-05-07 RX ORDER — UBROGEPANT 100 MG/1
100 TABLET ORAL
Qty: 16 TABLET | Refills: 11 | Status: SHIPPED | OUTPATIENT
Start: 2025-05-07

## 2025-05-07 NOTE — PATIENT INSTRUCTIONS
Please call our clinic at [phone] or send a message on the [employer] portal if there are any changes to the plan described below, for example,if you are not contacted for the requested tests, referral(s) within one week, if you are unable to receive the medications prescribed, or if you feel you need to change the treatment course for any reason.     TESTING: none    REFERRALS: none     PREVENTION (use daily regardless of headache):  - Continue Nebivolol, Benazepril, Elavil and Gabapentin as prescribed by another provider.   - Increase Elavil to 50 mg nightly  - Start Emgality, once monthly injection. Take a loading dose of 240 mg the first time (2 syringes of 120 mg), and 120 mg (1 syringe) every 28 days thereafter  - Consider Botox in the future      AS-NEEDED TREATMENT (use total no more than 10 days per month unless otherwise stated):  - Start Ubrelvy 100 mg for headache attacks. May repeat once 2 hours later to a max of 2 per day. No more than 200 mg per day. With this medication do not drink grapefruit juice or eat grapefruit or some medications like ketoconazole, itraconazole, or antibiotics clarithromycin   - Ok to continue Tizanidine as needed     OTHER:   - Headache journal

## 2025-05-07 NOTE — PROGRESS NOTES
Date of service: 5/7/2025  Referring provider: Ashley Sahu    Subjective:      Chief complaint: Headache       Patient ID: Aaron Garcia is a 64 y.o. who presents today as a new patient for headache.    History of Present Illness    ORIGINAL HEADACHE HISTORY -   Age at onset and course over time: 15 years old. Today, she reports headaches 2-3 times per week, all of which escalate to become severe.     Location: frontal and parietal region   Quality:  [] Stabbing [x] Pressure [x] Tight [x] Throbbing/pounding [] Sharp    Duration: [] Seconds [] Minutes [] Hours [x] Days [] Constant   Frequency: [] Daily [x] Weekly [] Monthly   How many days per month is your head or neck 100% pain free: 0- neck, 10-12 head   Headaches awaken at night?:   1-2   Worst time of day: evening   Intensity of pain: at best 3/10, at worst 10/10   Associated with: [x] Photophobia [x]  Phonophobia [] Osmophobia [x] Loss of appetite [x] Nausea [] Vomiting   [x] Dizziness [x] Vertigo [] Ringing in the ears [] Blurry vision [] Double vision  [] Anxiety/Anger/Irritability [] Problems with concentration [] Problems with memory [] Problems with task completion   [] Problems with relaxation [] Neck tightness/ neck pain [] Nasal congestion [] Nasal or sinus pressure [] Aura   Alleviated by:  [] Sleep [x] Darkness [] Local pressure [] Massage [] Heat [x] Ice [] Menses [x] Medication  Exacerbated by:  [] Fatigue [x] Light [x] Noise [] Smells [] Coughing [] Sneezing  [x] Bending over [] Change in weather [] Ovulation [] Menses [] Alcohol [] Stress []  Food  Ipsilateral autonomic: [] nasal congestion [] lacrimation [] ptosis [] injection [] edema [] foreign body sensation [] ear fullness   ICP:  [] transient visual obscurations  [] tinnitus   [] positional headache  [] non-positional     Bowl Habits: [] Normal [x] Constipation [] Diarrhea   Caffeine intake: 0   Sleep habits: trouble falling and staying asleep, snoring, RYLAN   Water intake: 3-4  bottles per day    Eye Exam: up to date   Family history of migraine: none   Gyn status (if female) (birth control with estrogen, hysterectomy):   History of asthma, cancer, glaucoma, kidney stones, CVA and osteoporosis: asthma (inhaler infrequently), cataracts (monitoring), osteopenia     HIT 6: 72    Current acute treatment:  Zanaflex    Current prevention:  Nebivolol  Benazepril  Elavil   Gabapentin     Previously tried/failed acute treatment:  Cambia  Mobic  Fioricet  Maxalt - groggy, dizziness, ineffective   Imitrex injection   Xanax  HERACLIO     Previously tried/failed preventative treatment:  Topamax - groggy, dizziness   Trazodone   Gabapentin     Considerations:     Review of patient's allergies indicates:   Allergen Reactions    Metformin Diarrhea     With extended release preparation as well    Peanuts [peanut] Itching     Itches    Penicillins Itching     Swelling (extremities)^  Swelling (extremities)^      Sheldon Itching     throat     Current Medications[1]    Past Medical History  Past Medical History:   Diagnosis Date    Arthritis     Asthma     Cataract     OU    Diabetes mellitus, type 2 2001    Gastric ulcer     Hyperlipidemia     Hypertension     Mitral valve prolapse     RYLAN (obstructive sleep apnea)     does not wear CPAP-can't tolerate-claustraphobic    Snores      unable to lay flat, sleeps elevated    Thyroid disease     Goiter       Past Surgical History  Past Surgical History:   Procedure Laterality Date    ANTERIOR CERVICAL DISCECTOMY W/ FUSION N/A 1/21/2021    Procedure: DISCECTOMY, SPINE, CERVICAL, ANTERIOR APPROACH, WITH FUSION , C3-4, C4-5, C5-6, C6 CORPECTOMYc-3-c7 anterior instrumented fusion;  Surgeon: Denise Real MD;  Location: [facility] OR;  Service: Neurosurgery;  Laterality: N/A;    CARPAL TUNNEL RELEASE Left     CARPAL TUNNEL RELEASE Right 9/14/2018    Procedure: RELEASE, CARPAL TUNNEL;  Surgeon: Anurag Mathis MD;  Location: [facility] OR;  Service: Orthopedics;   Laterality: Right;    CAUDAL EPIDURAL STEROID INJECTION N/A 2021    Procedure: Injection-steroid-epidural-caudal;  Surgeon: Erik Galan MD;  Location: [facility] OR;  Service: Pain Management;  Laterality: N/A;    CAUDAL EPIDURAL STEROID INJECTION N/A 2022    Procedure: Injection-steroid-epidural-caudal;  Surgeon: Erik Galan MD;  Location: [facility] OR;  Service: Pain Management;  Laterality: N/A;     SECTION      CHOLECYSTECTOMY      DILATION AND CURETTAGE OF UTERUS      Epidural steroid Injection      Pain management, cervical    ESOPHAGEAL DILATION      FACETECTOMY OF VERTEBRA N/A 2021    Procedure: [id number] Medial;  Surgeon: Denise Real MD;  [facility];  Service: Neurosurgery;  Laterality: N/A;    INJECTION OF ANESTHETIC AGENT AROUND MEDIAL BRANCH NERVES INNERVATING CERVICAL FACET JOINT Right 2024    Procedure: Block-nerve-medial branch-cervical  TON, C7/T1;  Surgeon: Erik Galan MD;  Location: [facility] OR;  Service: Pain Management;  Laterality: Right;    INJECTION OF ANESTHETIC AGENT AROUND MEDIAL BRANCH NERVES INNERVATING CERVICAL FACET JOINT Right 2024    Procedure: Block-nerve-medial branch-cervical-TON-C7-T1;  Surgeon: Erik Galan MD;  Location: [facility] OR;  Service: Pain Management;  Laterality: Right;    LAMINECTOMY USING MINIMALLY INVASIVE TECHNIQUE N/A 2021    Procedure: LAMINECTOMY, SPINE, MINIMALLY INVASIVE RIGHT L5-S1 AND L4-L5 HEMILAMINECTOMY;  Surgeon: Denise Real MD;  Location: [facility] OR;  Service: Neurosurgery;  Laterality: N/A;    MICRODISCECTOMY OF SPINE N/A 2021    Procedure: MICRODISCECTOMY, SPINE L5-S1;  Surgeon: Denise Real MD;  Location: [facility] OR;  Service: Neurosurgery;  Laterality: N/A;    RADIOFREQUENCY ABLATION Right 1/3/2025    Procedure: Radiofrequency Ablation    Cervical  TON and C7/T1;  Surgeon: Erik Galan MD;  Location: [facility] OR;  Service: Pain Management;   Laterality: Right;    TRANSFORAMINAL EPIDURAL INJECTION OF STEROID Right 1/11/2021    Procedure: Injection,steroid,epidural,transforaminal approach L4/5 and L5/S1;  Surgeon: Mariano Luna MD;  Location: [facility] OR;  Service: Pain Management;  Laterality: Right;    TRIGGER FINGER RELEASE Left 7/16/2020    Procedure: RELEASE, TRIGGER FINGER//Left thumb, middle and ring finger trigger release;  Surgeon: Jose Mata MD;  Location: [facility] OR;  Service: Orthopedics;  Laterality: Left;    ulcer on tongue removed      WOUND DEBRIDEMENT Left     leg at ankle level       Family History  Family History   Problem Relation Name Age of Onset    Diabetes Father      Diabetes Sister      Heart disease Brother      Glaucoma Paternal Aunt      Glaucoma Daughter          suspect    Macular degeneration Neg Hx         Social History  Social History[2]     Review of Systems  14-point review of systems as follows:   No check mckenna indicates NEGATIVE response   Constitutional: [] weight loss [] change to appetite   Eyes: [] change in vision [] double vision   Ears, nose, mouth, throat: [] frequent nose bleeds [] ringing in the ears   Respiratory: [] cough [] wheezing   Cardiovascular: [] chest pain [] palpitations   Gastrointestinal: [] jaundice [] nausea/vomiting   Genitourinary: [] incontinence [] burning with urination   Hematologic/lymphatic: [] easy bruising/bleeding [] night sweats   Neurological: [] numbness [] weakness   Endocrine: [] fatigue [] heat/cold intolerance   Allergy/Immunologic: [] fevers [] chills   Musculoskeletal: [] muscle pain [] joint pain   Psychiatric: [] thoughts of harming self/others [] depression   Integumentary: [] rashes [] sores that do not heal     Objective:        Vitals:    05/07/25 1310   BP: 127/74   Pulse: 74   Resp: 18     Body mass index is 27.15 kg/m².    Constitutional: appears in no acute distress, well-developed, well-nourished     Eyes: normal conjunctiva, PERRLA    Ears, nose, mouth,  throat: external appearance of ears and nose normal, hearing intact     Cardiovascular: n/a     Respiratory: unlabored respirations    Gastrointestinal: no visible abdominal masses, no guarding, no visible hernia    Musculoskeletal: normal tone in all four extremities. No abnormal movements. No pronator drift. No orbit. Symmetric finger tapping. Normal station. Slow emely      Spine:   CERVICAL SPINE:  ROM: limited   MUSCLE SPASM: no   FACET LOADING: no   SPURLING: no  JET / YE tender: no     Psychiatric: normal judgment and insight. Oriented to person, place, and time.     Neurologic:   Cortical functions: recent and remote memory intact, normal attention span and concentration, speech fluent, adequate fund of knowledge   Cranial nerves: visual fields full, PERRLA, EOMI, symmetric facial strength, hearing intact, palate elevates symmetrically, shoulder shrug 5/5, tongue protrudes midline   Reflexes: 1+ in the upper and lower extremities, no Osuna  Sensation: intact to temperature throughout   Coordination: normal finger to nose, unobserved tandem gait     Data Review:     I have personally reviewed the referring provider's notes, labs, & imaging made available to me today.      RADIOLOGY STUDIES:  I have personally reviewed the pertinent images performed.       No results found. However, due to the size of the patient record, not all encounters were searched. Please check Results Review for a complete set of results.    Lab Results   Component Value Date     04/08/2025     09/17/2024    K 4 04/08/2025    K 3.9 09/17/2024    MG 2.1 05/04/2022     09/17/2024    CO2 26 04/08/2025    CO2 26 09/17/2024    BUN 8 04/08/2025    BUN 11 09/17/2024    CREATININE 0.68 04/08/2025    CREATININE 0.7 09/17/2024     09/17/2024    HGBA1C 6.1 (H) 01/08/2025    AST 27 04/08/2025    AST 18 09/17/2024    ALT 17 04/08/2025    ALT 12 09/17/2024    ALBUMIN 4.5 04/08/2025    ALBUMIN 3.9 09/17/2024    PROT 8 (H)  04/08/2025    PROT 7.2 09/17/2024    BILITOT 1 04/08/2025    BILITOT 0.5 09/17/2024    CHOL 113 (L) 09/17/2024    HDL 49 09/17/2024    LDLCALC 53.2 (L) 09/17/2024    TRIG 54 09/17/2024       Lab Results   Component Value Date    WBC 5.44 09/17/2024    HGB 10.8 (L) 09/17/2024    HCT 35.8 (L) 09/17/2024    MCV 97 09/17/2024     09/17/2024       Lab Results   Component Value Date    TSH 1.374 09/17/2024           Assessment & Plan:       Problem List Items Addressed This Visit       Chronic migraine without aura without status migrainosus, not intractable - Primary    Overview   Headaches are typically moderate to severe in intensity, worsen with activity, pounding in quality and associated with sensitivity to light and sound.     Gradual progression pattern, lack of red flag features on history, and normal neurological exam are reassuring for primary as opposed to secondary etiology of headaches thus imaging will not be pursued for this history and this exam at this time.    Continue Nebivolol, Benazepril, Elavil and Gabapentin as prescribed by another provider. Increase Elavil to 50 mg nightly. Start Emgality, once monthly injection. Take a loading dose of 240 mg the first time (2 syringes of 120 mg), and 120 mg (1 syringe) every 28 days thereafter. Start Ubrelvy 100 mg as needed. May repeat once 2 hours later to a max of 2 per day. No more than 200 mg per day. Ok to continue Tizanidine as needed. Headache journal.            Relevant Medications    galcanezumab-gnlm 120 mg/mL PnIj    galcanezumab-gnlm (EMGALITY PEN) 120 mg/mL PnIj    amitriptyline (ELAVIL) 50 MG tablet    ubrogepant (UBRELVY) 100 mg tablet           Please call our clinic at [phone] or send a message on the [facility] portal if there are any changes to the plan described below, for example,if you are not contacted for the requested tests, referral(s) within one week, if you are unable to receive the medications prescribed, or if you feel  "you need to change the treatment course for any reason.     TESTING: none    REFERRALS: none     PREVENTION (use daily regardless of headache):  - Continue Nebivolol, Benazepril, Elavil and Gabapentin as prescribed by another provider.   - Increase Elavil to 50 mg nightly  - Start Emgality, once monthly injection. Take a loading dose of 240 mg the first time (2 syringes of 120 mg), and 120 mg (1 syringe) every 28 days thereafter  - Consider Botox in the future      AS-NEEDED TREATMENT (use total no more than 10 days per month unless otherwise stated):  - Start Ubrelvy 100 mg for headache attacks. May repeat once 2 hours later to a max of 2 per day. No more than 200 mg per day. With this medication do not drink grapefruit juice or eat grapefruit or some medications like ketoconazole, itraconazole, or antibiotics clarithromycin   - Ok to continue Tizanidine as needed     OTHER:   - Headache journal     Follow up in about 3 months (around 8/7/2025).       BOUBACAR OrtegaC      I have spent 69 minutes of total time on the total encounter which includes face to face time and non-face to face time preparing to see the patient (eg, review of labs, previous encounters, care everywhere), obtaining and/or reviewing separately obtained history, documenting clinical information in the electronic or health record, independently interpreting results, and communicating results to the patient/family/caregiver, or care coordination.        [1]   Current Outpatient Medications   Medication Sig Dispense Refill    ALBUTEROL SULFATE (PROAIR HFA INHL) Inhale 1 puff into the lungs every 6 (six) hours as needed. PRN      amitriptyline (ELAVIL) 25 MG tablet Take 25 mg by mouth nightly as needed for Insomnia.      BD ULTRA-FINE GISELA PEN NEEDLE 32 gauge x 5/32" Ndle       benazepriL (LOTENSIN) 40 MG tablet Take 1 tablet (40 mg total) by mouth once daily. 90 tablet 3    blood-glucose sensor (FREESTYLE DASHA 3 SENSOR) Haritha Inject 1 Device " into the skin every 15 (fourteen) days. 2 each 12    clotrimazole-betamethasone 1-0.05% (LOTRISONE) cream Apply topically 2 (two) times daily. 15 g 2    flash glucose sensor (FREESTYLE DASHA 2 SENSOR) Kit APPLY 1 SENSOR TO THE SKIN EVERY 14 DAYS AS DIRECTED. 2 kit 12    gabapentin (NEURONTIN) 800 MG tablet Take 1 tablet (800 mg total) by mouth 3 (three) times daily. 270 tablet 3    HYDROcodone-acetaminophen (NORCO)  mg per tablet Take 1 tablet by mouth 3 (three) times daily as needed for Pain. 90 tablet 0    [START ON 5/20/2025] HYDROcodone-acetaminophen (NORCO)  mg per tablet Take 1 tablet by mouth 3 (three) times daily as needed for Pain. 90 tablet 0    [START ON 6/19/2025] HYDROcodone-acetaminophen (NORCO)  mg per tablet Take 1 tablet by mouth 3 (three) times daily as needed for Pain. 90 tablet 0    insulin aspart U-100 (NOVOLOG FLEXPEN U-100 INSULIN) 100 unit/mL (3 mL) InPn pen Use on premeal readings; 150-200=+2, 201-250=+4; 251-300=+6; 301-350=+8, over 350, + 10 units, 50 units a day max. 15 mL 0    insulin glargine, TOUJEO, (TOUJEO SOLOSTAR U-300 INSULIN) 300 unit/mL (1.5 mL) InPn pen ADMINISTER 60 UNITS UNDER THE SKIN IN THE EVENING AS DIRECTED 4.5 mL 6    linaCLOtide (LINZESS) 72 mcg Cap capsule Take 1 capsule (72 mcg total) by mouth before breakfast. 90 capsule 2    meclizine (ANTIVERT) 25 mg tablet Take 25 mg by mouth.      nebivolol (BYSTOLIC) 10 MG Tab Take 10 mg by mouth every evening.       pantoprazole (PROTONIX) 40 MG tablet Take 1 tablet (40 mg total) by mouth once daily. 60 tablet 3    piroxicam (FELDENE) 20 MG capsule TAKE 1 CAPSULE(20 MG) BY MOUTH EVERY DAY 30 capsule 3    rosuvastatin (CRESTOR) 10 MG tablet Take 10 mg by mouth every evening.       tirzepatide (MOUNJARO) 15 mg/0.5 mL PnIj Inject 15 mg into the skin every 7 days. 4 Pen 11    tirzepatide (MOUNJARO) 7.5 mg/0.5 mL PnIj Inject 7.5 mg into the skin every 7 days. 4 Pen 6    tiZANidine (ZANAFLEX) 4 MG tablet Take 1  tablet (4 mg total) by mouth every 8 (eight) hours as needed. 90 tablet 2    amitriptyline (ELAVIL) 50 MG tablet Take 1 tablet (50 mg total) by mouth every evening. 30 tablet 11    galcanezumab-gnlm (EMGALITY PEN) 120 mg/mL PnIj Inject 1 mL (120 mg total) into the skin every 28 days. 1 mL 11    galcanezumab-gnlm 120 mg/mL PnIj Inject 240 mg (2 injections) subcutaneous at separate sites, once (loading dose). Start maintenance dose 28 days later 2 mL 0    montelukast (SINGULAIR) 10 mg tablet Take 10 mg by mouth every evening. (Patient not taking: Reported on 5/7/2025)      promethazine (PHENERGAN) 25 MG tablet Take 1 tablet (25 mg total) by mouth every 6 (six) hours as needed for Nausea. (Patient not taking: Reported on 5/7/2025) 20 tablet 1    rizatriptan (MAXALT-MLT) 10 MG disintegrating tablet Take 1 tablet (10 mg total) by mouth as needed for Migraine. May repeat in 2 hours if needed (Patient not taking: Reported on 4/30/2025) 12 tablet 3    ubrogepant (UBRELVY) 100 mg tablet Take 1 tablet (100 mg total) by mouth as needed for Migraine. If symptoms persist or return, may repeat dose after 2 hours. Maximum: 200 mg per 24 hours 16 tablet 11     No current facility-administered medications for this visit.     Facility-Administered Medications Ordered in Other Visits   Medication Dose Route Frequency Provider Last Rate Last Admin    chlorhexidine 0.12 % solution 15 mL  15 mL Mouth/Throat Once Denise Real MD        mupirocin 2 % ointment   Nasal Once Denise Real MD       [2]   Social History  Socioeconomic History    Marital status:    Tobacco Use    Smoking status: Never    Smokeless tobacco: Never   Substance and Sexual Activity    Alcohol use: No     Alcohol/week: 0.0 standard drinks of alcohol    Drug use: Yes     Types: Hydrocodone    Sexual activity: Yes     Partners: Male   Social History Narrative        5 children    Prior secretarial work      Social Drivers of Health      Financial Resource Strain: Low Risk  (5/7/2025)    Overall Financial Resource Strain (CARDIA)     Difficulty of Paying Living Expenses: Not very hard   Food Insecurity: No Food Insecurity (5/7/2025)    Hunger Vital Sign     Worried About Running Out of Food in the Last Year: Never true     Ran Out of Food in the Last Year: Never true   Transportation Needs: Unmet Transportation Needs (5/7/2025)    PRAPARE - Transportation     Lack of Transportation (Medical): Yes     Lack of Transportation (Non-Medical): Yes   Physical Activity: Inactive (5/7/2025)    Exercise Vital Sign     Days of Exercise per Week: 0 days     Minutes of Exercise per Session: 10 min   Stress: Stress Concern Present (5/7/2025)    [employer] - Occupational Stress Questionnaire     Feeling of Stress : To some extent   Housing Stability: High Risk (5/7/2025)    Housing Stability Vital Sign     Number of Times Moved in the Last Year: 3     Homeless in the Last Year: Yes

## 2025-05-10 DIAGNOSIS — G43.909 MIGRAINE WITHOUT STATUS MIGRAINOSUS, NOT INTRACTABLE, UNSPECIFIED MIGRAINE TYPE: ICD-10-CM

## 2025-05-12 DIAGNOSIS — K21.9 GASTROESOPHAGEAL REFLUX DISEASE, UNSPECIFIED WHETHER ESOPHAGITIS PRESENT: ICD-10-CM

## 2025-05-12 NOTE — TELEPHONE ENCOUNTER
Pharmacy requesting refill on Pantoprazole 40mg  Pt's LOV 08/09/2024  Pt's NOV 07/03/2025  Medication pending

## 2025-05-13 RX ORDER — PANTOPRAZOLE SODIUM 40 MG/1
40 TABLET, DELAYED RELEASE ORAL DAILY
Qty: 60 TABLET | Refills: 3 | Status: SHIPPED | OUTPATIENT
Start: 2025-05-13 | End: 2026-05-13

## 2025-05-13 RX ORDER — TOPIRAMATE 25 MG/1
25 TABLET, FILM COATED ORAL 2 TIMES DAILY
Qty: 180 TABLET | Refills: 3 | Status: SHIPPED | OUTPATIENT
Start: 2025-05-13

## 2025-05-20 ENCOUNTER — TELEPHONE (OUTPATIENT)
Dept: ENDOCRINOLOGY | Facility: CLINIC | Age: 65
End: 2025-05-20
Payer: COMMERCIAL

## 2025-05-20 ENCOUNTER — TELEPHONE (OUTPATIENT)
Dept: PAIN MEDICINE | Facility: CLINIC | Age: 65
End: 2025-05-20
Payer: COMMERCIAL

## 2025-05-20 DIAGNOSIS — M51.369 DDD (DEGENERATIVE DISC DISEASE), LUMBAR: ICD-10-CM

## 2025-05-20 RX ORDER — HYDROCODONE BITARTRATE AND ACETAMINOPHEN 10; 325 MG/1; MG/1
1 TABLET ORAL 3 TIMES DAILY PRN
Qty: 90 TABLET | Refills: 0 | Status: CANCELLED | OUTPATIENT
Start: 2025-06-19 | End: 2025-07-19

## 2025-05-20 RX ORDER — HYDROCODONE BITARTRATE AND ACETAMINOPHEN 10; 325 MG/1; MG/1
1 TABLET ORAL 3 TIMES DAILY PRN
Qty: 90 TABLET | Refills: 0 | Status: CANCELLED | OUTPATIENT
Start: 2025-05-20 | End: 2025-06-19

## 2025-05-20 NOTE — TELEPHONE ENCOUNTER
Spoke to pt to confirm appt with Karissa Rasheed.  Pt had two appts scheduled.  Stated she wants to keep the one scheduled for 5/29. Appt for 5/21 canceled.

## 2025-05-20 NOTE — TELEPHONE ENCOUNTER
Sondra sent me a refill request for hydrocodone today 5/20 and also for a refill one month later. This received 3 months of prescriptions at a time, the patient should be calling the pharmacy instead. And Sondra should be able to see this in the med card and understand this.

## 2025-05-21 NOTE — TELEPHONE ENCOUNTER
Spoke with pharmacy and they said they received insurance approval. Spoke with patient and let her know

## 2025-05-21 NOTE — TELEPHONE ENCOUNTER
Copied from CRM #[id number]. Topic: Medications - Medication Authorization  >> May 20, 2025 11:12 AM Gayathri wrote:  Type:  Pharmacy Calling to Clarify an RX    Name of Caller: Onjude from [facility]     HYDROcodone-acetaminophen (NORCO)  mg per tablet 90 tablet 0 4/20/2025 5/20/2025   Sig - Route: Take 1 tablet by mouth 3 (three) times daily as needed for Pain. - Oral   Sent to pharmacy as: HYDROcodone-acetaminophen (NORCO)  mg per tablet   Earliest Fill Date: 4/20/2025   Notes to Pharmacy: Quantity prescribed more than 7 day supply? Yes, quantity medically necessary   E-Prescribing Status: Receipt confirmed by pharmacy (4/17/2025 11:43 AM CDT)       What do they need to clarify?: insurance needs rx auth before it can be filled   Best Call Back Number: [phone]    Additional Information: Pt is awaiting rx had insurance rep on the line.

## 2025-05-21 NOTE — TELEPHONE ENCOUNTER
Spoke with patient and she is requesting PA for her medication. Let her know her PA was not populating for us, but that I would call the pharmacy to initiate one when they open

## 2025-05-27 ENCOUNTER — LAB VISIT (OUTPATIENT)
Dept: LAB | Facility: HOSPITAL | Age: 65
End: 2025-05-27
Attending: NURSE PRACTITIONER
Payer: COMMERCIAL

## 2025-05-27 DIAGNOSIS — R11.2 NAUSEA AND VOMITING, UNSPECIFIED VOMITING TYPE: ICD-10-CM

## 2025-05-27 DIAGNOSIS — E11.9 TYPE 2 DIABETES MELLITUS WITHOUT COMPLICATION, WITH LONG-TERM CURRENT USE OF INSULIN: ICD-10-CM

## 2025-05-27 DIAGNOSIS — Z79.4 TYPE 2 DIABETES MELLITUS WITHOUT COMPLICATION, WITH LONG-TERM CURRENT USE OF INSULIN: ICD-10-CM

## 2025-05-27 LAB
EAG (OHS): 146 MG/DL (ref 68–131)
HBA1C MFR BLD: 6.7 % (ref 4–5.6)

## 2025-05-27 PROCEDURE — 36415 COLL VENOUS BLD VENIPUNCTURE: CPT | Mod: PO

## 2025-05-27 PROCEDURE — 83036 HEMOGLOBIN GLYCOSYLATED A1C: CPT

## 2025-05-27 RX ORDER — PROMETHAZINE HYDROCHLORIDE 25 MG/1
25 TABLET ORAL EVERY 6 HOURS PRN
Qty: 20 TABLET | Refills: 1 | Status: CANCELLED | OUTPATIENT
Start: 2025-05-27 | End: 2025-11-23

## 2025-05-28 ENCOUNTER — TELEPHONE (OUTPATIENT)
Dept: ENDOCRINOLOGY | Facility: CLINIC | Age: 65
End: 2025-05-28
Payer: COMMERCIAL

## 2025-05-28 DIAGNOSIS — R11.2 NAUSEA AND VOMITING, UNSPECIFIED VOMITING TYPE: ICD-10-CM

## 2025-05-29 ENCOUNTER — OFFICE VISIT (OUTPATIENT)
Dept: ENDOCRINOLOGY | Facility: CLINIC | Age: 65
End: 2025-05-29
Payer: COMMERCIAL

## 2025-05-29 VITALS
DIASTOLIC BLOOD PRESSURE: 66 MMHG | BODY MASS INDEX: 26.58 KG/M2 | WEIGHT: 165.38 LBS | HEART RATE: 68 BPM | HEIGHT: 66 IN | SYSTOLIC BLOOD PRESSURE: 132 MMHG

## 2025-05-29 DIAGNOSIS — E78.49 OTHER HYPERLIPIDEMIA: ICD-10-CM

## 2025-05-29 DIAGNOSIS — I10 PRIMARY HYPERTENSION: ICD-10-CM

## 2025-05-29 DIAGNOSIS — E11.9 TYPE 2 DIABETES MELLITUS WITHOUT COMPLICATION, WITH LONG-TERM CURRENT USE OF INSULIN: Primary | ICD-10-CM

## 2025-05-29 DIAGNOSIS — Z79.4 TYPE 2 DIABETES MELLITUS WITHOUT COMPLICATION, WITH LONG-TERM CURRENT USE OF INSULIN: Primary | ICD-10-CM

## 2025-05-29 PROCEDURE — 99999 PR PBB SHADOW E&M-EST. PATIENT-LVL III: CPT | Mod: PBBFAC,,, | Performed by: NURSE PRACTITIONER

## 2025-05-29 RX ORDER — PEN NEEDLE, DIABETIC 30 GX3/16"
NEEDLE, DISPOSABLE MISCELLANEOUS
Qty: 100 EACH | Status: SHIPPED | OUTPATIENT
Start: 2025-05-29

## 2025-05-29 NOTE — PROGRESS NOTES
+  Subjective:       Patient ID: Aaron Garcia is a 64 y.o. female.    Chief Complaint: Diabetes      HPI: Pt is a 64  y.o. AAF  with a diagnosis of Type 2 diabetes mellitus diagnosed approximately 2001, as well as chronic conditions pending review including HTN, HLP. States A1C I n 6% range until appros 2017.  Other pertinent medical and social information noted includes, but not limited to: chronic pain r/t cervical radiculopathy affecting UA.      Interim Events: May 29, 2025:  Biggest issue is left knee pain and just had steroid injection.  Also to ER for labile BP. Including hyper and hypotension. Also to ER for UTI couple of weeks ago.       Current DM meds:   mounjaro 7.5 mg , Novolog ss prn 1:25 at 150.        Failed DM meds:  metformin, diarrhea. tradjenta 5 mg, glipizide 5 mg bid  (prior 60 Toujeo--12 -15 novolog)   Statin: rosuvasatin 10 mg        Not tolerated statin : na   ACE/ARB:benazepril 40 mg         Not tolerated ACE/ARB: na   Known Diabetic complications:     CGMS Interpretation:       Sensor/Pump Interpretation or Prominent Theme: Glucoses in general hovering low to mid 100 range, with some lower glucoses in 60's noted--but no patterns, and some mild prandial glucose exursions.       Jan 16, 2025:  Pt with several ER visits last month r/t severe exacerbation of chronic back pain with radicular symptoms. She has had some steroid injections and pending f/u with pain management.Not using sensor as some issues with the new year and was quoted a price much higher than cash pay.  No c.o hypoglycemia or other issues.    BP currently elevated--has a new primary and was concerned BP too low ~120/60 so d/c bystolic and decreased benicar from 40 to 5 mg.  Pt currently now on 20 benicar, but BP still elevated and having Headaches.        Sept 18, 20224:  had 2 cervical nerve blocks last week. C/o nocturnal hypoglycemia. Only on 7.5 mg Mounjaro weekly.  States sometimes glucose dose get elevated and  she may take 20 of Toujeo.  Advised that is causing nocturnal hypoglycemia--but she states the low glucoses were happening even when not taking Toujeo.  Pt c/o fatigue and cold intolerance.  She reviewed her labs and concerned about some mild anemia. She is following with Dr. Allen for psoriatric arthritis.       60's.        June 13, 2024:  Seen in interim by IVORY Moreno while I was out.  Mounjaro added. Pt currently taking 15 mg, but missed last week r/t Cervical nerve block procedure.  As a result she has lost 20 lbs and off of both Toujeo and Novolog, which is quite impressive.  She has been having a time with neck and back pain, and cervical nerve block yesterday--which so far she is having relief -so likely pending ablation.  She is requesting a lower dose of mounjaro r/t poor apptite and cont wt loss.     March 16, 2023 Fell about a month ago playing soccer with grandson---hurt back--so she has been dealing with that since--also with R rotator cuff--apparently ER told her she does or might have rotator cuff tear.  States glucoses have been running high because she has chronic sinus issues.  Altaf downloaded.     March 9, 2023   No acute events:  Using Freestyle Libre2 . Forgot sensor in car. No c/o hypoglycemia. C/o poor sleep. Lays in bed sometimes til 4 am.  On Elavil, states gabapentin use to help, and also zanaflex.  Does have 2 daughters and their children move in so stress with that.  Concerned with DM complications.Reassured.        Review of Systems   Constitutional:  Negative for activity change, fatigue and fever.   HENT:  Negative for ear discharge, hearing loss, mouth dryness and trouble swallowing.    Eyes:  Negative for photophobia and visual disturbance.        Last Eye Exam: Fall 2021    Respiratory:  Negative for cough and shortness of breath.    Cardiovascular:  Negative for palpitations.   Gastrointestinal:  Positive for constipation. Negative for abdominal pain and diarrhea.  "  Genitourinary:  Negative for difficulty urinating, frequency and urgency.   Musculoskeletal:  Positive for back pain (pending lumbar fusion) and neck pain. Negative for arthralgias and myalgias.   Integumentary:  Negative for rash and wound.   Neurological:  Negative for weakness and numbness.   Psychiatric/Behavioral:  Negative for sleep disturbance. The patient is not nervous/anxious.          Objective:      Physical Exam  Constitutional:       Appearance: Normal appearance. She is normal weight.   HENT:      Head: Normocephalic and atraumatic.      Nose: Nose normal.      Mouth/Throat:      Mouth: Mucous membranes are moist.      Pharynx: Oropharynx is clear.   Eyes:      Extraocular Movements: Extraocular movements intact.      Conjunctiva/sclera: Conjunctivae normal.      Pupils: Pupils are equal, round, and reactive to light.   Neck:      Vascular: No carotid bruit.   Cardiovascular:      Rate and Rhythm: Normal rate and regular rhythm.      Pulses: Normal pulses.      Heart sounds: Normal heart sounds.   Pulmonary:      Effort: Pulmonary effort is normal.      Breath sounds: Normal breath sounds.   Musculoskeletal:         General: Normal range of motion.      Cervical back: Normal range of motion and neck supple.      Right lower leg: No edema.      Left lower leg: No edema.      Comments: Deferrred   Feet:no open wounds or calluses. Good pedal pulses. +2 bilaterally,   Compression hose on--no vibratory sensation   Lymphadenopathy:      Cervical: No cervical adenopathy.   Skin:     General: Skin is warm and dry.   Neurological:      General: No focal deficit present.      Mental Status: She is alert and oriented to person, place, and time.   Psychiatric:         Mood and Affect: Mood normal.         Behavior: Behavior normal.         Thought Content: Thought content normal.         Judgment: Judgment normal.         /66   Pulse 68   Ht 5' 6" (1.676 m)   Wt 75 kg (165 lb 5.5 oz)   LMP 03/13/2017  "  BMI 26.69 kg/m²     Hemoglobin A1C   Date Value Ref Range Status   01/08/2025 6.1 (H) 4.0 - 5.6 % Final     Comment:     ADA Screening Guidelines:  5.7-6.4%  Consistent with prediabetes  >or=6.5%  Consistent with diabetes    High levels of fetal hemoglobin interfere with the HbA1C  assay. Heterozygous hemoglobin variants (HbS, HgC, etc)do  not significantly interfere with this assay.   However, presence of multiple variants may affect accuracy.     09/17/2024 6.5 (H) 4.0 - 5.6 % Final     Comment:     ADA Screening Guidelines:  5.7-6.4%  Consistent with prediabetes  >or=6.5%  Consistent with diabetes    High levels of fetal hemoglobin interfere with the HbA1C  assay. Heterozygous hemoglobin variants (HbS, HgC, etc)do  not significantly interfere with this assay.   However, presence of multiple variants may affect accuracy.     03/14/2024 6.3 (H) 4.0 - 5.6 % Final     Comment:     ADA Screening Guidelines:  5.7-6.4%  Consistent with prediabetes  >or=6.5%  Consistent with diabetes    High levels of fetal hemoglobin interfere with the HbA1C  assay. Heterozygous hemoglobin variants (HbS, HgC, etc)do  not significantly interfere with this assay.   However, presence of multiple variants may affect accuracy.       Hemoglobin A1c   Date Value Ref Range Status   05/27/2025 6.7 (H) 4.0 - 5.6 % Final     Comment:     ADA Screening Guidelines:  5.7-6.4%  Consistent with prediabetes  >=6.5%  Consistent with diabetes    High levels of fetal hemoglobin interfere with the HbA1C  assay. Heterozygous hemoglobin variants (HbS, HgC, etc)do  not significantly interfere with this assay.   However, presence of multiple variants may affect accuracy.       Chemistry        Component Value Date/Time     05/21/2025 0010     09/17/2024 0902    K 3.5 (L) 05/21/2025 0010    K 3.9 09/17/2024 0902     09/17/2024 0902    CO2 25 05/21/2025 0010    CO2 26 09/17/2024 0902    BUN 11 05/21/2025 0010    BUN 11 09/17/2024 0902     "CREATININE 0.79 05/21/2025 0010    CREATININE 0.7 09/17/2024 0902     09/17/2024 0902        Component Value Date/Time    CALCIUM 9.4 05/21/2025 0010    CALCIUM 9.5 09/17/2024 0902    ALKPHOS 57 05/11/2025 1623    ALKPHOS 62 09/17/2024 0902    AST 15 05/11/2025 1623    AST 18 09/17/2024 0902    ALT 14 05/11/2025 1623    ALT 12 09/17/2024 0902    BILITOT 0.5 05/11/2025 1623    BILITOT 0.5 09/17/2024 0902    ESTGFRAFRICA >60.0 05/04/2022 1349    EGFRNONAA >60.0 05/04/2022 1349          Lab Results   Component Value Date    CHOL 113 (L) 09/17/2024     Lab Results   Component Value Date    HDL 49 09/17/2024     Lab Results   Component Value Date    LDLCALC 53.2 (L) 09/17/2024     Lab Results   Component Value Date    TRIG 54 09/17/2024     Lab Results   Component Value Date    CHOLHDL 43.4 09/17/2024     Lab Results   Component Value Date    MICALBCREAT 7.7 01/08/2025     Lab Results   Component Value Date    TSH 1.374 09/17/2024     Vit D, 25-Hydroxy   Date Value Ref Range Status   10/15/2024 21 (L) 30 - 96 ng/mL Final     Comment:     Vitamin D deficiency.........<10 ng/mL                              Vitamin D insufficiency......10-29 ng/mL       Vitamin D sufficiency........> or equal to 30 ng/mL  Vitamin D toxicity............>100 ng/mL             Assessment:       1. Type 2 diabetes mellitus without complication, with long-term current use of insulin  Comprehensive Metabolic Panel    Hemoglobin A1C    Lipid Panel    Microalbumin/Creatinine Ratio, Urine    pen needle, diabetic 32 gauge x 5/32" Ndle      2. Primary hypertension        3. Other hyperlipidemia                  Plan:     --on benazepril  20 mg --i--on rosuvastatin 10   Cont mounjaro from 7.5 mg.        Add Novolog ss 1:50     ORDERS 05/29/2025       4 months  w/ fasting cmp, lipids, A1c  urine m/c  piror             "

## 2025-06-01 RX ORDER — PROMETHAZINE HYDROCHLORIDE 25 MG/1
25 TABLET ORAL EVERY 6 HOURS PRN
Qty: 20 TABLET | Refills: 1 | Status: SHIPPED | OUTPATIENT
Start: 2025-06-01 | End: 2025-11-28

## 2025-06-09 RX ORDER — TIZANIDINE 4 MG/1
4 TABLET ORAL EVERY 8 HOURS PRN
Qty: 90 TABLET | Refills: 2 | Status: SHIPPED | OUTPATIENT
Start: 2025-06-09

## 2025-07-03 ENCOUNTER — OFFICE VISIT (OUTPATIENT)
Dept: RHEUMATOLOGY | Facility: CLINIC | Age: 65
End: 2025-07-03
Payer: COMMERCIAL

## 2025-07-03 VITALS
HEIGHT: 66 IN | BODY MASS INDEX: 26.82 KG/M2 | SYSTOLIC BLOOD PRESSURE: 164 MMHG | DIASTOLIC BLOOD PRESSURE: 78 MMHG | HEART RATE: 76 BPM | WEIGHT: 166.88 LBS

## 2025-07-03 DIAGNOSIS — R76.8 POSITIVE ANA (ANTINUCLEAR ANTIBODY): ICD-10-CM

## 2025-07-03 DIAGNOSIS — M25.562 CHRONIC PAIN OF LEFT KNEE: Primary | ICD-10-CM

## 2025-07-03 DIAGNOSIS — M54.16 LUMBAR RADICULOPATHY: ICD-10-CM

## 2025-07-03 DIAGNOSIS — F41.9 ANXIETY: ICD-10-CM

## 2025-07-03 DIAGNOSIS — M54.12 CERVICAL RADICULOPATHY: ICD-10-CM

## 2025-07-03 DIAGNOSIS — M50.30 DDD (DEGENERATIVE DISC DISEASE), CERVICAL: ICD-10-CM

## 2025-07-03 DIAGNOSIS — M17.12 PRIMARY OSTEOARTHRITIS OF LEFT KNEE: ICD-10-CM

## 2025-07-03 DIAGNOSIS — G89.29 CHRONIC PAIN OF LEFT KNEE: Primary | ICD-10-CM

## 2025-07-03 PROCEDURE — 99999 PR PBB SHADOW E&M-EST. PATIENT-LVL V: CPT | Mod: PBBFAC,,, | Performed by: INTERNAL MEDICINE

## 2025-07-03 PROCEDURE — 3008F BODY MASS INDEX DOCD: CPT | Mod: CPTII,S$GLB,, | Performed by: INTERNAL MEDICINE

## 2025-07-03 PROCEDURE — 99214 OFFICE O/P EST MOD 30 MIN: CPT | Mod: S$GLB,,, | Performed by: INTERNAL MEDICINE

## 2025-07-03 PROCEDURE — 3061F NEG MICROALBUMINURIA REV: CPT | Mod: CPTII,S$GLB,, | Performed by: INTERNAL MEDICINE

## 2025-07-03 PROCEDURE — 3072F LOW RISK FOR RETINOPATHY: CPT | Mod: CPTII,S$GLB,, | Performed by: INTERNAL MEDICINE

## 2025-07-03 PROCEDURE — 3066F NEPHROPATHY DOC TX: CPT | Mod: CPTII,S$GLB,, | Performed by: INTERNAL MEDICINE

## 2025-07-03 PROCEDURE — 3077F SYST BP >= 140 MM HG: CPT | Mod: CPTII,S$GLB,, | Performed by: INTERNAL MEDICINE

## 2025-07-03 PROCEDURE — 3044F HG A1C LEVEL LT 7.0%: CPT | Mod: CPTII,S$GLB,, | Performed by: INTERNAL MEDICINE

## 2025-07-03 PROCEDURE — 4010F ACE/ARB THERAPY RXD/TAKEN: CPT | Mod: CPTII,S$GLB,, | Performed by: INTERNAL MEDICINE

## 2025-07-03 PROCEDURE — 3078F DIAST BP <80 MM HG: CPT | Mod: CPTII,S$GLB,, | Performed by: INTERNAL MEDICINE

## 2025-07-03 PROCEDURE — 1160F RVW MEDS BY RX/DR IN RCRD: CPT | Mod: CPTII,S$GLB,, | Performed by: INTERNAL MEDICINE

## 2025-07-03 PROCEDURE — 1159F MED LIST DOCD IN RCRD: CPT | Mod: CPTII,S$GLB,, | Performed by: INTERNAL MEDICINE

## 2025-07-03 RX ORDER — PIROXICAM 20 MG/1
20 CAPSULE ORAL DAILY
Qty: 90 CAPSULE | Refills: 3 | Status: SHIPPED | OUTPATIENT
Start: 2025-07-03

## 2025-07-03 RX ORDER — ALPRAZOLAM 0.5 MG/1
0.5 TABLET ORAL 3 TIMES DAILY
Qty: 9 TABLET | Refills: 0 | Status: SHIPPED | OUTPATIENT
Start: 2025-07-03 | End: 2025-07-06

## 2025-07-03 ASSESSMENT — ROUTINE ASSESSMENT OF PATIENT INDEX DATA (RAPID3)
PSYCHOLOGICAL DISTRESS SCORE: 4.4
MDHAQ FUNCTION SCORE: 1.6
PAIN SCORE: 8
TOTAL RAPID3 SCORE: 7.44
PATIENT GLOBAL ASSESSMENT SCORE: 9

## 2025-07-03 NOTE — PROGRESS NOTES
Subjective:     Patient ID:  Aaron Garcia    Chief Complaint:  Disease Management     History of Present Illness:  Pt is a 64 y.o. female with a h/o pos oksana  inflammatory arthritis and osteoarthritis. She is having issue  with blood glucose and  L knee.She continues to have muscle spasms in her arms and legs. She is followed by pain management on norco prn. She is taking piroxicam 20 mg daily.She complains of increased GERD sx.She has been under significant stress with family situations over the last year and a half.      Rheumatologic History:   - Diagnosis/es:  - Positive serologies:  - Infectious screening labs:  - Previous Treatments:  - Current Treatments:     Interval History:   Hospitalization since last office visit: No    Patient Active Problem List    Diagnosis Date Noted    Type 2 diabetes mellitus with diabetic polyneuropathy, with long-term current use of insulin 05/07/2025    Chronic migraine without aura without status migrainosus, not intractable 04/30/2025    Coronary artery disease (CAD) excluded 10/11/2023    PSA (psoriatic arthritis) 09/28/2023    Neuropathy 11/03/2021    Low TSH level 06/25/2021    Hypoglycemia associated with diabetes 06/02/2021    Mild intermittent asthma without complication 01/22/2021    Stenosis of cervical spine with myelopathy 01/21/2021    Left hand pain 08/10/2020    Decreased range of motion of finger of left hand 08/10/2020    Decreased  strength of left hand 08/10/2020    Decreased pinch strength 08/10/2020    Decreased activities of daily living (ADL) 08/10/2020    AV block, 1st degree 07/15/2020    RYLAN (obstructive sleep apnea) 07/15/2020    Trigger finger of left thumb 06/23/2020    Trigger finger, left middle finger 06/23/2020    Pre-op testing 06/23/2020    Frontal sinusitis 04/28/2020    Primary insomnia 02/07/2020    Essential hypertension 03/26/2019    Other hyperlipidemia 03/26/2019    Vitamin D deficiency 03/26/2019    Type 2 diabetes mellitus  without complication, with long-term current use of insulin 2018    Gastroesophageal reflux disease 10/31/2018    Carpal tunnel syndrome of right wrist 2018    Lumbar radiculopathy 2017    Chondromalacia, left knee 11/15/2016    Left knee pain 11/15/2016    Facet hypertrophy of cervical region 2016    Cervical spondylosis 2016    Cervical radiculopathy 2016    Chronic back pain 10/13/2014    Anxiety 07/15/2013    Asthma 07/15/2013    Depression 07/15/2013     Past Surgical History:   Procedure Laterality Date    ANTERIOR CERVICAL DISCECTOMY W/ FUSION N/A 2021    Procedure: DISCECTOMY, SPINE, CERVICAL, ANTERIOR APPROACH, WITH FUSION , C3-4, C4-5, C5-6, C6 CORPECTOMYc-3-c7 anterior instrumented fusion;  Surgeon: Denise Real MD;  Location: [facility] OR;  Service: Neurosurgery;  Laterality: N/A;    CARPAL TUNNEL RELEASE Left     CARPAL TUNNEL RELEASE Right 2018    Procedure: RELEASE, CARPAL TUNNEL;  Surgeon: Anurag Mathis MD;  Location: [facility] OR;  Service: Orthopedics;  Laterality: Right;    CAUDAL EPIDURAL STEROID INJECTION N/A 2021    Procedure: Injection-steroid-epidural-caudal;  Surgeon: Erik Galan MD;  Location: [facility] OR;  Service: Pain Management;  Laterality: N/A;    CAUDAL EPIDURAL STEROID INJECTION N/A 2022    Procedure: Injection-steroid-epidural-caudal;  Surgeon: Erik Galan MD;  Location: [facility] OR;  Service: Pain Management;  Laterality: N/A;     SECTION      CHOLECYSTECTOMY      DILATION AND CURETTAGE OF UTERUS      Epidural steroid Injection      Pain management, cervical    ESOPHAGEAL DILATION      FACETECTOMY OF VERTEBRA N/A 2021    Procedure: HPURBATEMEM-Y6-2 Medial;  Surgeon: Denise Real MD;  Location: [facility] OR;  Service: Neurosurgery;  Laterality: N/A;    INJECTION OF ANESTHETIC AGENT AROUND MEDIAL BRANCH NERVES INNERVATING CERVICAL FACET JOINT Right 2024    Procedure: Block-nerve-medial  branch-cervical  TON, C7/T1;  Surgeon: Erik Galan MD;  Location: [facility] OR;  Service: Pain Management;  Laterality: Right;    INJECTION OF ANESTHETIC AGENT AROUND MEDIAL BRANCH NERVES INNERVATING CERVICAL FACET JOINT Right 6/28/2024    Procedure: Block-nerve-medial branch-cervical-TON-C7-T1;  Surgeon: Erik Galan MD;  Location: [facility] OR;  Service: Pain Management;  Laterality: Right;    LAMINECTOMY USING MINIMALLY INVASIVE TECHNIQUE N/A 6/24/2021    Procedure: LAMINECTOMY, SPINE, MINIMALLY INVASIVE RIGHT L5-S1 AND L4-L5 HEMILAMINECTOMY;  Surgeon: Denise Real MD;  Location: [facility] OR;  Service: Neurosurgery;  Laterality: N/A;    MICRODISCECTOMY OF SPINE N/A 6/24/2021    Procedure: MICRODISCECTOMY, SPINE L5-S1;  Surgeon: Denise Real MD;  Location: [facility] OR;  Service: Neurosurgery;  Laterality: N/A;    RADIOFREQUENCY ABLATION Right 1/3/2025    Procedure: Radiofrequency Ablation    Cervical  TON and C7/T1;  Surgeon: Erik Galan MD;  Location: [facility] OR;  Service: Pain Management;  Laterality: Right;    TRANSFORAMINAL EPIDURAL INJECTION OF STEROID Right 1/11/2021    Procedure: Injection,steroid,epidural,transforaminal approach L4/5 and L5/S1;  Surgeon: Mariano Luna MD;  Location: [facility] OR;  Service: Pain Management;  Laterality: Right;    TRIGGER FINGER RELEASE Left 7/16/2020    Procedure: RELEASE, TRIGGER FINGER//Left thumb, middle and ring finger trigger release;  Surgeon: Jose Mata MD;  Location: [facility] OR;  Service: Orthopedics;  Laterality: Left;    ulcer on tongue removed      WOUND DEBRIDEMENT Left     leg at ankle level     Social History[1]  Family History   Problem Relation Name Age of Onset    Diabetes Father      Diabetes Sister      Heart disease Brother      Glaucoma Paternal Aunt      Glaucoma Daughter          suspect    Macular degeneration Neg Hx       Review of patient's allergies indicates:   Allergen Reactions    Metformin Diarrhea     With extended  release preparation as well    Peanuts [peanut] Itching     Itches    Penicillins Itching     Swelling (extremities)^  Swelling (extremities)^      [place] Itching     throat       Review of Systems   Review of Systems   Constitutional:  Positive for activity change. Negative for appetite change, chills, diaphoresis, fatigue, fever and unexpected weight change.   HENT:  Negative for congestion, dental problem, ear discharge, ear pain, facial swelling, mouth sores, nosebleeds, postnasal drip, rhinorrhea, sinus pressure, sneezing, sore throat, tinnitus, trouble swallowing and voice change.    Eyes:  Negative for photophobia, pain, discharge, redness and itching.   Respiratory:  Negative for apnea, cough, chest tightness, shortness of breath and wheezing.    Cardiovascular:  Positive for leg swelling. Negative for chest pain and palpitations.   Gastrointestinal:  Negative for abdominal distention, abdominal pain, constipation, diarrhea, nausea and vomiting.   Endocrine: Negative for cold intolerance, heat intolerance, polydipsia and polyuria.   Genitourinary:  Negative for decreased urine volume, difficulty urinating, dysuria, flank pain, frequency, hematuria and urgency.   Musculoskeletal:  Positive for arthralgias, back pain, gait problem, joint swelling, myalgias, neck pain and neck stiffness.   Skin:  Negative for pallor, rash and wound.   Allergic/Immunologic: Positive for immunocompromised state.   Neurological:  Negative for dizziness, tremors, weakness, numbness and headaches.   Hematological:  Negative for adenopathy. Does not bruise/bleed easily.   Psychiatric/Behavioral:  Negative for sleep disturbance. The patient is not nervous/anxious.         Current Medications:  Current Outpatient Medications   Medication Instructions    ALBUTEROL SULFATE (PROAIR HFA INHL) 1 puff, Every 6 hours PRN    ALPRAZolam (XANAX) 0.5 mg, Oral, 3 times daily    amitriptyline (ELAVIL) 25 mg, Nightly PRN    amitriptyline (ELAVIL) 50  "mg, Oral, Nightly    BD ULTRA-FINE GISELA PEN NEEDLE 32 gauge x 5/32" Ndle No dose, route, or frequency recorded.    benazepriL (LOTENSIN) 40 mg, Oral, Daily    blood-glucose sensor (FREESTYLE DASHA 3 SENSOR) Haritha Inject 1 Device into the skin every 15 (fourteen) days.    clotrimazole-betamethasone 1-0.05% (LOTRISONE) cream Topical (Top), 2 times daily    EMGALITY  mg, Subcutaneous, Every 28 days    flash glucose sensor (FREESTYLE DASHA 2 SENSOR) Kit APPLY 1 SENSOR TO THE SKIN EVERY 14 DAYS AS DIRECTED.    gabapentin (NEURONTIN) 800 mg, Oral, 3 times daily    galcanezumab-gnlm 120 mg/mL PnIj Inject 240 mg (2 injections) subcutaneous at separate sites, once (loading dose). Start maintenance dose 28 days later    HYDROcodone-acetaminophen (NORCO)  mg per tablet 1 tablet, Oral, 3 times daily PRN    insulin aspart U-100 (NOVOLOG FLEXPEN U-100 INSULIN) 100 unit/mL (3 mL) InPn pen Use on premeal readings; 150-200=+2, 201-250=+4; 251-300=+6; 301-350=+8, over 350, + 10 units, 50 units a day max.    insulin glargine, TOUJEO, (TOUJEO SOLOSTAR U-300 INSULIN) 300 unit/mL (1.5 mL) InPn pen ADMINISTER 60 UNITS UNDER THE SKIN IN THE EVENING AS DIRECTED    linaCLOtide (LINZESS) 72 mcg, Oral, Before breakfast    meclizine (ANTIVERT) 25 mg    montelukast (SINGULAIR) 10 mg, Nightly    MOUNJARO 7.5 mg, Subcutaneous, Every 7 days    nebivoloL (BYSTOLIC) 10 mg, Nightly    pantoprazole (PROTONIX) 40 mg, Oral, Daily    pen needle, diabetic 32 gauge x 5/32" Ndle Use to inject insulin 4 times a day as directed only when taking steriods.    piroxicam (FELDENE) 20 mg, Oral, Daily    promethazine (PHENERGAN) 25 mg, Oral, Every 6 hours PRN    rizatriptan (MAXALT-MLT) 10 mg, Oral, As needed (PRN), May repeat in 2 hours if needed    rosuvastatin (CRESTOR) 10 mg, Nightly    tiZANidine (ZANAFLEX) 4 mg, Oral, Every 8 hours PRN    topiramate (TOPAMAX) 25 mg, Oral, 2 times daily    UBRELVY 100 mg, Oral, As needed (PRN), If symptoms persist " "or return, may repeat dose after 2 hours. Maximum: 200 mg per 24 hours         Objective:     Vitals:    07/03/25 1441   BP: (!) 164/78   Pulse: 76   Weight: 75.7 kg (166 lb 14.2 oz)   Height: 5' 5.98" (1.676 m)   PainSc:   5      Body mass index is 26.95 kg/m².     Physical Examinations:  Physical Exam   Constitutional: She is oriented to person, place, and time.   HENT:   Head: Normocephalic and atraumatic.   Mouth/Throat: Oropharynx is clear and moist.   Eyes: Pupils are equal, round, and reactive to light.   Neck: No thyromegaly present.   Cardiovascular: Normal rate, regular rhythm and normal heart sounds. Exam reveals no gallop and no friction rub.   No murmur heard.  Pulmonary/Chest: Effort normal and breath sounds normal. She has no wheezes. She has no rales. She exhibits no tenderness.   Abdominal: There is no abdominal tenderness. There is no rebound and no guarding.   Musculoskeletal:         General: Tenderness and deformity present.      Right shoulder: Tenderness present.      Left shoulder: Tenderness present.      Right elbow: Normal.      Left elbow: Normal.      Right wrist: Swelling and tenderness present.      Left wrist: Swelling and tenderness present.      Cervical back: Normal range of motion and neck supple.      Right knee: Swelling present. No effusion. Tenderness present.      Left knee: No effusion. Tenderness present.      Left ankle: Swelling present.      Comments: Mild oa   Lymphadenopathy:     She has no cervical adenopathy.   Neurological: She is alert and oriented to person, place, and time. She has normal sensation. Gait normal.   Reflex Scores:       Patellar reflexes are 3+ on the right side and 3+ on the left side.  Skin: No rash noted. No erythema. No pallor.   Psychiatric: Mood and affect normal.   Nursing note and vitals reviewed.      Right Side Rheumatological Exam     Examination finds the elbow, 1st PIP, 1st MCP, 2nd PIP, 2nd MCP, 3rd PIP, 3rd MCP, 4th PIP, 4th MCP, 5th " PIP and 5th MCP normal.    The patient is tender to palpation of the shoulder, wrist, knee, 1st PIP, 1st MCP, 2nd PIP, 2nd MCP, 3rd PIP, 3rd MCP, 4th PIP, 4th MCP, 5th PIP and 5th MCP    She has swelling of the wrist, knee, 1st PIP, 1st MCP, 2nd PIP, 2nd MCP, 3rd PIP, 3rd MCP, 4th PIP, 4th MCP, 5th PIP and 5th MCP    The patient has an enlarged wrist, 1st PIP, 2nd PIP, 3rd PIP, 4th PIP and 5th PIP    Shoulder Exam   Tenderness Location: no tenderness    Range of Motion   Active abduction:  abnormal   Adduction: abnormal  Sensation: normal    Knee Exam   Tenderness Location: lateral joint line  Patellofemoral Crepitus: positive  Effusion: negative  Sensation: normal    Hip Exam   Tenderness Location: posterior  Sensation: normal    Elbow/Wrist Exam   Tenderness Location: no tenderness  Sensation: normal    Muscle Strength (0-5 scale):  Neck Flexion:  2  Neck Extension: 2  : 2/5     Left Side Rheumatological Exam     Examination finds the elbow, 1st PIP, 1st MCP, 2nd PIP, 2nd MCP, 3rd PIP, 3rd MCP, 4th PIP, 4th MCP, 5th PIP and 5th MCP normal.    The patient is tender to palpation of the shoulder, wrist, knee, 1st PIP, 1st MCP, 2nd PIP, 2nd MCP, 3rd PIP, 3rd MCP, 4th PIP, 4th MCP, 5th PIP, 5th MCP and temporomandibular.    She has swelling of the wrist, 1st PIP, 1st MCP, 2nd PIP, 2nd MCP, 3rd PIP, 3rd MCP, 4th PIP, 4th MCP, 5th PIP, 5th MCP, 1st CMC, 2nd DIP, 3rd DIP, 4th DIP, 5th DIP, knee, 1st MTP, 2nd MTP, 3rd MTP, 4th MTP, 1st toe IP, 2nd toe IP, 3rd toe IP, 4th toe IP and 5th toe IP    The patient has an enlarged wrist, 1st PIP, 2nd PIP, 3rd PIP, 4th PIP, 5th PIP, 1st CMC, 2nd DIP, 3rd DIP, 4th DIP, 5th DIP, 1st toe IP, 2nd toe IP, 3rd toe IP, 4th toe IP and 5th toe IP.    Shoulder Exam   Tenderness Location: acromioclavicular joint    Range of Motion   Active abduction:  abnormal   Sensation: normal    Knee Exam   Tenderness Location: lateral joint line and medial joint line    Patellofemoral Crepitus:  positive  Effusion: negative  Sensation: normal    Hip Exam   Tenderness Location: posterior  Sensation: normal    Elbow/Wrist Exam   Sensation: normal    Muscle Strength (0-5 scale):  Neck Flexion:  2  Neck Extension: 2  :  1/5       Back/Neck Exam   General Inspection   Gait: normal       Tenderness Right paramedian tenderness of the Lower C-Spine and Lower L-Spine.Left paramedian tenderness of the Upper C-Spine and Lower L-Spine.         Disease Assessment Scores:  Patient's Global Assessment of arthritis (0-10): 2  Physician's Global Assessment of arthritis (0-10): 2  Number of Tender Joints (0-28): 2  Number of Swollen Joints (0-28): 2         No data to display                Monitoring Lab Results:  Lab Results   Component Value Date    WBC 5.44 09/17/2024    RBC 3.68 (L) 09/17/2024    HGB 10.8 (L) 09/17/2024    HCT 35.8 (L) 09/17/2024    MCV 97 09/17/2024    MCH 29.3 09/17/2024    MCHC 30.2 (L) 09/17/2024    RDW 12.5 09/17/2024     09/17/2024        Lab Results   Component Value Date     06/26/2025    K 3.4 (L) 06/26/2025     09/17/2024    CO2 29 06/26/2025     09/17/2024    BUN 10 06/26/2025    CREATININE 0.85 06/26/2025    CALCIUM 8.8 (L) 06/26/2025    PROT 6 (L) 06/26/2025    ALBUMIN 3.8 06/26/2025    BILITOT 1 06/26/2025    ALKPHOS 57 06/26/2025    AST 17 06/26/2025    ALT 12 06/26/2025    ANIONGAP 6 (L) 06/26/2025    EGFRNORACEVR >60.0 09/17/2024       Lab Results   Component Value Date    SEDRATE 15 09/17/2024    CRP 0.4 11/03/2023        Lab Results   Component Value Date    LGTXTAXE65SH 21 (L) 10/15/2024    ODGZNLMD46 >2000 (H) 05/04/2022        Lab Results   Component Value Date    CHOL 113 (L) 09/17/2024    HDL 49 09/17/2024    LDLCALC 53.2 (L) 09/17/2024    TRIG 54 09/17/2024       Lab Results   Component Value Date    RF <13.0 05/04/2022    CCPANTIBODIE <0.5 07/07/2020     Lab Results   Component Value Date    ANASCREEN Negative <1:80 09/17/2024    ANATITER 1:80  "09/21/2023    DSDNA Negative 1:10 09/17/2024     Lab Results   Component Value Date    HLABB27 Negative 05/04/2022       Infectious Disease Screening:  Lab Results   Component Value Date    HEPBSAG Negative 07/07/2020    HEPBIGM Negative 07/07/2020     Lab Results   Component Value Date    HEPCAB Negative 07/07/2020     Lab Results   Component Value Date    TBGOLDPLUS Negative 07/07/2020     No results found for: "QUANTIFERON", "SVCMT", "QUANTAGVALUE", "QUANTNILVALU", "QUANTMITOGEN", "QFTTBAG", "QINT"     Imaging: DEXA, Xrays, MRIs, CTs, etc    Old & Outside Medical Records:  ReviNarrative    EXAM: CT HEAD WO CONTRAST    CLINICAL HISTORY: Headache    TECHNIQUE: Contiguous axial images were obtained from the skull base through the vertex without intravenous contrast.    COMPARISON: 04/08/2025.    FINDINGS: No intracranial hemorrhage.  No mass effect or midline shift.  No extra axial fluid collections. Patchy areas of decreased attenuation are seen within the periventricular and deep white matter most consistent with chronic microvascular ischemic   change.  Age-related involutional changes are seen within the sulci and ventricles. There is no evidence of hydrocephalus.  The pineal region is unremarkable.  The posterior fossa structures are grossly unremarkable within the limits of CT scan.  The  paranasal sinuses and mastoid air cells are clear.  No fractures are identified.  No concerning osseous lesions.ewed old and all outside medical records available in Care Everywhere     Assessment:     Encounter Diagnoses   Name Primary?    Chronic pain of left knee Yes    Primary osteoarthritis of left knee     Anxiety     Positive ZAIRA (antinuclear antibody)     DDD (degenerative disc disease), cervical     Cervical radiculopathy     Lumbar radiculopathy        Plan:      Encounter Diagnoses   Name Primary?    Chronic pain of left knee Yes    Primary osteoarthritis of left knee     Anxiety     Positive ZAIRA (antinuclear " antibody)     DDD (degenerative disc disease), cervical     Cervical radiculopathy     Lumbar radiculopathy      Aaron was seen today for disease management.    Diagnoses and all orders for this visit:    Chronic pain of left knee  -     MRI Knee Without Contrast Left; Future  -     Sedimentation rate; Future  -     Sjogrens syndrome-B extractable nuclear antibody; Future  -     Sjogrens syndrome-A extractable nuclear antibody; Future  -     Comprehensive Metabolic Panel; Future  -     CBC Auto Differential; Future  -     Anti-Smith Antibody; Future  -     Anti-Scleroderma Antibody; Future  -     Anti-Histone Antibody; Future  -     Anti-DNA Ab, Double-Stranded; Future  -     Anti Sm/RNP Antibody; Future  -     ZAIRA Screen w/Reflex; Future    Primary osteoarthritis of left knee  -     MRI Knee Without Contrast Left; Future  -     Sedimentation rate; Future  -     Sjogrens syndrome-B extractable nuclear antibody; Future  -     Sjogrens syndrome-A extractable nuclear antibody; Future  -     Comprehensive Metabolic Panel; Future  -     CBC Auto Differential; Future  -     Anti-Smith Antibody; Future  -     Anti-Scleroderma Antibody; Future  -     Anti-Histone Antibody; Future  -     Anti-DNA Ab, Double-Stranded; Future  -     Anti Sm/RNP Antibody; Future  -     ZAIRA Screen w/Reflex; Future    Anxiety  -     ALPRAZolam (XANAX) 0.5 MG tablet; Take 1 tablet (0.5 mg total) by mouth 3 (three) times daily. for 3 days  -     Sedimentation rate; Future  -     Sjogrens syndrome-B extractable nuclear antibody; Future  -     Sjogrens syndrome-A extractable nuclear antibody; Future  -     Comprehensive Metabolic Panel; Future  -     CBC Auto Differential; Future  -     Anti-Smith Antibody; Future  -     Anti-Scleroderma Antibody; Future  -     Anti-Histone Antibody; Future  -     Anti-DNA Ab, Double-Stranded; Future  -     Anti Sm/RNP Antibody; Future  -     ZAIRA Screen w/Reflex; Future    Positive ZAIRA (antinuclear antibody)  -      Sedimentation rate; Future  -     Sjogrens syndrome-B extractable nuclear antibody; Future  -     Sjogrens syndrome-A extractable nuclear antibody; Future  -     Comprehensive Metabolic Panel; Future  -     CBC Auto Differential; Future  -     Anti-Smith Antibody; Future  -     Anti-Scleroderma Antibody; Future  -     Anti-Histone Antibody; Future  -     Anti-DNA Ab, Double-Stranded; Future  -     Anti Sm/RNP Antibody; Future  -     ZAIRA Screen w/Reflex; Future    DDD (degenerative disc disease), cervical  -     piroxicam (FELDENE) 20 MG capsule; Take 1 capsule (20 mg total) by mouth once daily.    Cervical radiculopathy  -     piroxicam (FELDENE) 20 MG capsule; Take 1 capsule (20 mg total) by mouth once daily.    Lumbar radiculopathy  -     piroxicam (FELDENE) 20 MG capsule; Take 1 capsule (20 mg total) by mouth once daily.        1. Recheck labs  2. Ordered a MRI of the knee  3. F/u prn    More than 50% of the  30 minute encounter was spent face to face counseling the patient regarding current status and future plan of care as well as side effects  of the medications. All questions were answered to patient's satisfaction also includes  non-face to face time preparing to see the patient (eg, review of tests), Obtaining and/or reviewing separately obtained history, Documenting clinical information in the electronic or other health record, Independently interpreting results            [1]   Social History  Tobacco Use    Smoking status: Never    Smokeless tobacco: Never   Substance Use Topics    Alcohol use: No     Alcohol/week: 0.0 standard drinks of alcohol    Drug use: Yes     Types: Hydrocodone

## 2025-07-08 DIAGNOSIS — G43.909 MIGRAINE WITHOUT STATUS MIGRAINOSUS, NOT INTRACTABLE, UNSPECIFIED MIGRAINE TYPE: ICD-10-CM

## 2025-07-08 RX ORDER — TOPIRAMATE 25 MG/1
25 TABLET, FILM COATED ORAL 2 TIMES DAILY
Qty: 180 TABLET | Refills: 3 | Status: SHIPPED | OUTPATIENT
Start: 2025-07-08

## 2025-07-18 DIAGNOSIS — M51.369 DDD (DEGENERATIVE DISC DISEASE), LUMBAR: ICD-10-CM

## 2025-07-18 NOTE — TELEPHONE ENCOUNTER
Copied from CRM #[id number]. Topic: Medications - Medication Refill  >> Jul 18, 2025  3:21 PM Lidia wrote:  Type:  RX Refill Request    Who Called:  pt   Refill or New Rx:  refill  RX Name and Strength:  HYDROcodone-acetaminophen (NORCO)  mg per tablet  How is the patient currently taking it? (ex. 1XDay):  as direct   Is this a 30 day or 90 day RX:  30  Preferred Pharmacy with phone number:    [id number] - [place], [place] - [address] AT [facility] OF [address] [address]  [address] [facility] [place] [address]  Phone: [phone] Fax: [phone]        Local or Mail Order:  local  Ordering Provider:  guillermo   Best Call Back Number:  [phone]      Additional Information:  please advise

## 2025-07-21 RX ORDER — HYDROCODONE BITARTRATE AND ACETAMINOPHEN 10; 325 MG/1; MG/1
1 TABLET ORAL 3 TIMES DAILY PRN
Qty: 90 TABLET | Refills: 0 | Status: SHIPPED | OUTPATIENT
Start: 2025-07-21 | End: 2025-08-20

## 2025-07-24 DIAGNOSIS — M17.12 PRIMARY OSTEOARTHRITIS OF LEFT KNEE: Primary | ICD-10-CM

## 2025-07-29 ENCOUNTER — HOSPITAL ENCOUNTER (OUTPATIENT)
Dept: RADIOLOGY | Facility: HOSPITAL | Age: 65
Discharge: HOME | End: 2025-07-29
Attending: ORTHOPAEDIC SURGERY
Payer: COMMERCIAL

## 2025-07-29 ENCOUNTER — OFFICE VISIT (OUTPATIENT)
Dept: ORTHOPEDICS | Facility: CLINIC | Age: 65
End: 2025-07-29
Payer: COMMERCIAL

## 2025-07-29 DIAGNOSIS — Z01.818 PRE-OP TESTING: ICD-10-CM

## 2025-07-29 DIAGNOSIS — A18.4: ICD-10-CM

## 2025-07-29 DIAGNOSIS — L70.0 ACNE VULGARIS: ICD-10-CM

## 2025-07-29 DIAGNOSIS — M17.12 PRIMARY OSTEOARTHRITIS OF LEFT KNEE: Primary | ICD-10-CM

## 2025-07-29 DIAGNOSIS — M17.12 PRIMARY OSTEOARTHRITIS OF LEFT KNEE: ICD-10-CM

## 2025-07-29 PROCEDURE — 1159F MED LIST DOCD IN RCRD: CPT | Mod: CPTII,S$GLB,, | Performed by: ORTHOPAEDIC SURGERY

## 2025-07-29 PROCEDURE — 3066F NEPHROPATHY DOC TX: CPT | Mod: CPTII,S$GLB,, | Performed by: ORTHOPAEDIC SURGERY

## 2025-07-29 PROCEDURE — 99214 OFFICE O/P EST MOD 30 MIN: CPT | Mod: S$GLB,,, | Performed by: ORTHOPAEDIC SURGERY

## 2025-07-29 PROCEDURE — 4010F ACE/ARB THERAPY RXD/TAKEN: CPT | Mod: CPTII,S$GLB,, | Performed by: ORTHOPAEDIC SURGERY

## 2025-07-29 PROCEDURE — 1160F RVW MEDS BY RX/DR IN RCRD: CPT | Mod: CPTII,S$GLB,, | Performed by: ORTHOPAEDIC SURGERY

## 2025-07-29 PROCEDURE — 99999 PR PBB SHADOW E&M-EST. PATIENT-LVL III: CPT | Mod: PBBFAC,,, | Performed by: ORTHOPAEDIC SURGERY

## 2025-07-29 PROCEDURE — 73560 X-RAY EXAM OF KNEE 1 OR 2: CPT | Mod: TC,PO,RT

## 2025-07-29 PROCEDURE — 3072F LOW RISK FOR RETINOPATHY: CPT | Mod: CPTII,S$GLB,, | Performed by: ORTHOPAEDIC SURGERY

## 2025-07-29 PROCEDURE — 73562 X-RAY EXAM OF KNEE 3: CPT | Mod: 26,LT,, | Performed by: RADIOLOGY

## 2025-07-29 PROCEDURE — 3044F HG A1C LEVEL LT 7.0%: CPT | Mod: CPTII,S$GLB,, | Performed by: ORTHOPAEDIC SURGERY

## 2025-07-29 PROCEDURE — 73560 X-RAY EXAM OF KNEE 1 OR 2: CPT | Mod: 26,RT,, | Performed by: RADIOLOGY

## 2025-07-29 PROCEDURE — 3061F NEG MICROALBUMINURIA REV: CPT | Mod: CPTII,S$GLB,, | Performed by: ORTHOPAEDIC SURGERY

## 2025-07-30 RX ORDER — SODIUM CHLORIDE 0.9 % (FLUSH) 0.9 %
10 SYRINGE (ML) INJECTION EVERY 6 HOURS PRN
Status: SHIPPED | OUTPATIENT
Start: 2025-08-18

## 2025-07-30 RX ORDER — TOLMETIN SODIUM 600 MG/1
600 TABLET, FILM COATED ORAL 3 TIMES DAILY
Qty: 90 EACH | Refills: 3 | Status: SHIPPED | OUTPATIENT
Start: 2025-07-30

## 2025-07-30 RX ORDER — CLINDAMYCIN PHOSPHATE 900 MG/50ML
900 INJECTION, SOLUTION INTRAVENOUS
OUTPATIENT
Start: 2025-07-30

## 2025-07-30 RX ORDER — CLOTRIMAZOLE AND BETAMETHASONE DIPROPIONATE 10; .64 MG/G; MG/G
CREAM TOPICAL 2 TIMES DAILY
Qty: 15 G | Refills: 2 | Status: SHIPPED | OUTPATIENT
Start: 2025-07-30

## 2025-08-05 NOTE — H&P
Chief Complaint   Patient presents with    Left Knee - Pain         HPI:   This is a 64 y.o. who presents to clinic today complaining of left knee pain for 3 years after no known trauma. Pain is progressively worsening. No numbness or tingling. No associated signs or symptoms. She has failed NSAIDs, PT, and injections. She is unable to perform ADLs.     Past Medical History:   Diagnosis Date    Arthritis     Asthma     Cataract     OU    Diabetes mellitus, type 2     Gastric ulcer     Hyperlipidemia     Hypertension     Mitral valve prolapse     RYLAN (obstructive sleep apnea)     does not wear CPAP-can't tolerate-claustraphobic    Snores      unable to lay flat, sleeps elevated    Thyroid disease     Goiter     Past Surgical History:   Procedure Laterality Date    ANTERIOR CERVICAL DISCECTOMY W/ FUSION N/A 2021    Procedure: DISCECTOMY, SPINE, CERVICAL, ANTERIOR APPROACH, WITH FUSION , C3-4, C4-5, C5-6, C6 CORPECTOMYc-3-c7 anterior instrumented fusion;  Surgeon: Denise Real MD;  Location: [facility] OR;  Service: Neurosurgery;  Laterality: N/A;    CARPAL TUNNEL RELEASE Left     CARPAL TUNNEL RELEASE Right 2018    Procedure: RELEASE, CARPAL TUNNEL;  Surgeon: Anurag Mathis MD;  Location: [facility] OR;  Service: Orthopedics;  Laterality: Right;    CAUDAL EPIDURAL STEROID INJECTION N/A 2021    Procedure: Injection-steroid-epidural-caudal;  Surgeon: Erik Galan MD;  Location: [facility] OR;  Service: Pain Management;  Laterality: N/A;    CAUDAL EPIDURAL STEROID INJECTION N/A 2022    Procedure: Injection-steroid-epidural-caudal;  Surgeon: Erik Galan MD;  Location: [facility] OR;  Service: Pain Management;  Laterality: N/A;     SECTION      CHOLECYSTECTOMY      DILATION AND CURETTAGE OF UTERUS      Epidural steroid Injection      Pain management, cervical    ESOPHAGEAL DILATION      FACETECTOMY OF VERTEBRA N/A 2021    Procedure: YMXOMEMZDUY-D1-0 Medial;  Surgeon: Denise HOLLAND  MD Addie;  Location: [facility] OR;  Service: Neurosurgery;  Laterality: N/A;    INJECTION OF ANESTHETIC AGENT AROUND MEDIAL BRANCH NERVES INNERVATING CERVICAL FACET JOINT Right 6/12/2024    Procedure: Block-nerve-medial branch-cervical  TON, C7/T1;  Surgeon: Erik Galan MD;  Location: [facility] OR;  Service: Pain Management;  Laterality: Right;    INJECTION OF ANESTHETIC AGENT AROUND MEDIAL BRANCH NERVES INNERVATING CERVICAL FACET JOINT Right 6/28/2024    Procedure: Block-nerve-medial branch-cervical-TON-C7-T1;  Surgeon: Erik Galan MD;  Location: [facility] OR;  Service: Pain Management;  Laterality: Right;    LAMINECTOMY USING MINIMALLY INVASIVE TECHNIQUE N/A 6/24/2021    Procedure: LAMINECTOMY, SPINE, MINIMALLY INVASIVE RIGHT L5-S1 AND L4-L5 HEMILAMINECTOMY;  Surgeon: Denise Real MD;  Location: [facility] OR;  Service: Neurosurgery;  Laterality: N/A;    MICRODISCECTOMY OF SPINE N/A 6/24/2021    Procedure: MICRODISCECTOMY, SPINE L5-S1;  Surgeon: Denise Real MD;  Location: [facility] OR;  Service: Neurosurgery;  Laterality: N/A;    RADIOFREQUENCY ABLATION Right 1/3/2025    Procedure: Radiofrequency Ablation    Cervical  TON and C7/T1;  Surgeon: Erik Galan MD;  Location: [facility] OR;  Service: Pain Management;  Laterality: Right;    TRANSFORAMINAL EPIDURAL INJECTION OF STEROID Right 1/11/2021    Procedure: Injection,steroid,epidural,transforaminal approach L4/5 and L5/S1;  Surgeon: Mariano Luna MD;  Location: [facility] OR;  Service: Pain Management;  Laterality: Right;    TRIGGER FINGER RELEASE Left 7/16/2020    Procedure: RELEASE, TRIGGER FINGER//Left thumb, middle and ring finger trigger release;  Surgeon: Jose Mata MD;  Location: [facility] OR;  Service: Orthopedics;  Laterality: Left;    ulcer on tongue removed      WOUND DEBRIDEMENT Left     leg at ankle level     Medications Ordered Prior to Encounter[1]  Review of patient's allergies indicates:   Allergen Reactions    Metformin  Diarrhea     With extended release preparation as well    Peanuts [peanut] Itching     Itches    Penicillins Itching     Swelling (extremities)^  Swelling (extremities)^      [place] Itching     throat     Family History   Problem Relation Name Age of Onset    Diabetes Father      Diabetes Sister      Heart disease Brother      Glaucoma Paternal Aunt      Glaucoma Daughter          suspect    Macular degeneration Neg Hx       Social History[2]    Review of Systems:  Constitutional:  Denies fever or chills   Eyes:  Denies change in visual acuity   HENT:  Denies nasal congestion or sore throat   Respiratory:  Denies cough or shortness of breath   Cardiovascular:  Denies chest pain or edema   GI:  Denies abdominal pain, nausea, vomiting, bloody stools or diarrhea   :  Denies dysuria   Integument:  Denies rash   Neurologic:  Denies headache, focal weakness or sensory changes   Endocrine:  Denies polyuria or polydipsia   Lymphatic:  Denies swollen glands   Psychiatric:  Denies depression or anxiety     Physical Exam:   Constitutional:  Well developed, well nourished, no acute distress, non-toxic appearance   Integument:  Well hydrated, no rash   Lymphatic:  No lymphadenopathy noted   Neurologic:  Alert & oriented x 3, CN 2-12 normal, normal motor function, normal sensory function, no focal deficits noted   Psychiatric:  Speech and behavior appropriate   Eyes: EOMI  Gi: abdomen soft    Bilateral Knee Exam    left Knee Exam     Tenderness   The patient is experiencing tenderness in the medial joint line.    Range of Motion   Extension: 5  Flexion: 100     Muscle Strength     The patient has normal knee strength.    Tests   Deni:  Medial - positive   Lachman:  Anterior - negative      Varus: negative  Valgus: negative  Patellar Apprehension: negative    Other   Erythema: absent  Sensation: normal  Pulse: present  Swelling: mild      right Knee Exam   right knee exam performed same as contralateral side and is  "normal.            X-rays were performed, personally reviewed by me and findings discussed with the patient.  3 views of the left knee show tricompartmental degenerative change most pronounced in the medial compartment with Kellgren 3 changes    Primary osteoarthritis of left knee  -     CT Knee Without Contrast Left; Future; Expected date: 07/29/2025  -     Vital signs; Standing  -     Diet NPO; Standing  -     Place TRACY hose; Standing  -     Place sequential compression device; Standing  -     Case Request Operating Room: ROBOTIC ARTHROPLASTY, TOTAL KNEE  -     Place in Outpatient; Standing  -     tolmetin 600 mg Tab; Take 600 mg by mouth 3 (three) times daily.  Dispense: 90 each; Refill: 3    Pre-op testing  -     CBC auto differential; Future; Expected date: 07/30/2025  -     Comprehensive metabolic panel; Future; Expected date: 07/30/2025    Other orders  -     sodium chloride 0.9% flush 10 mL  -     IP VTE LOW RISK PATIENT; Standing  -     tranexamic acid (CYKLOKAPRON) 1,000 mg in 0.9% NaCl 100 mL IVPB (MB+)  -     clindamycin in D5W 900 mg/50 mL IVPB 900 mg            I had a long discussion with the patient regarding the benefits and risks of left TKA. They voiced understanding and wish to proceed with surgery. Consents signed in clinic.           [1]   Current Outpatient Medications on File Prior to Visit   Medication Sig Dispense Refill    ALBUTEROL SULFATE (PROAIR HFA INHL) Inhale 1 puff into the lungs every 6 (six) hours as needed. PRN      amitriptyline (ELAVIL) 50 MG tablet Take 1 tablet (50 mg total) by mouth every evening. 30 tablet 11    BD ULTRA-FINE GISELA PEN NEEDLE 32 gauge x 5/32" Ndle       benazepriL (LOTENSIN) 40 MG tablet Take 1 tablet (40 mg total) by mouth once daily. 90 tablet 3    blood-glucose sensor (FREESTYLE DASHA 3 SENSOR) Haritha Inject 1 Device into the skin every 15 (fourteen) days. 2 each 12    flash glucose sensor (FREESTYLE DASHA 2 SENSOR) Kit APPLY 1 SENSOR TO THE SKIN EVERY 14 " "DAYS AS DIRECTED. 2 kit 12    gabapentin (NEURONTIN) 800 MG tablet Take 1 tablet (800 mg total) by mouth 3 (three) times daily. 270 tablet 3    galcanezumab-gnlm (EMGALITY PEN) 120 mg/mL PnIj Inject 1 mL (120 mg total) into the skin every 28 days. 1 mL 11    galcanezumab-gnlm 120 mg/mL PnIj Inject 240 mg (2 injections) subcutaneous at separate sites, once (loading dose). Start maintenance dose 28 days later 2 mL 0    HYDROcodone-acetaminophen (NORCO)  mg per tablet Take 1 tablet by mouth 3 (three) times daily as needed for Pain. 90 tablet 0    insulin aspart U-100 (NOVOLOG FLEXPEN U-100 INSULIN) 100 unit/mL (3 mL) InPn pen Use on premeal readings; 150-200=+2, 201-250=+4; 251-300=+6; 301-350=+8, over 350, + 10 units, 50 units a day max. 15 mL 0    insulin glargine, TOUJEO, (TOUJEO SOLOSTAR U-300 INSULIN) 300 unit/mL (1.5 mL) InPn pen ADMINISTER 60 UNITS UNDER THE SKIN IN THE EVENING AS DIRECTED 4.5 mL 6    meclizine (ANTIVERT) 25 mg tablet Take 25 mg by mouth.      montelukast (SINGULAIR) 10 mg tablet Take 10 mg by mouth every evening.      nebivolol (BYSTOLIC) 10 MG Tab Take 10 mg by mouth every evening.       pantoprazole (PROTONIX) 40 MG tablet Take 1 tablet (40 mg total) by mouth once daily. 60 tablet 3    pen needle, diabetic 32 gauge x 5/32" Ndle Use to inject insulin 4 times a day as directed only when taking steriods. 100 each PRN    piroxicam (FELDENE) 20 MG capsule Take 1 capsule (20 mg total) by mouth once daily. 90 capsule 3    promethazine (PHENERGAN) 25 MG tablet Take 1 tablet (25 mg total) by mouth every 6 (six) hours as needed for Nausea. 20 tablet 1    rosuvastatin (CRESTOR) 10 MG tablet Take 10 mg by mouth every evening.       tirzepatide (MOUNJARO) 7.5 mg/0.5 mL PnIj Inject 7.5 mg into the skin every 7 days. 4 Pen 6    tiZANidine (ZANAFLEX) 4 MG tablet TAKE 1 TABLET(4 MG) BY MOUTH EVERY 8 HOURS AS NEEDED 90 tablet 2    topiramate (TOPAMAX) 25 MG tablet TAKE 1 TABLET(25 MG) BY MOUTH TWICE " DAILY 180 tablet 3    ubrogepant (UBRELVY) 100 mg tablet Take 1 tablet (100 mg total) by mouth as needed for Migraine. If symptoms persist or return, may repeat dose after 2 hours. Maximum: 200 mg per 24 hours 16 tablet 11    ALPRAZolam (XANAX) 0.5 MG tablet Take 1 tablet (0.5 mg total) by mouth 3 (three) times daily. for 3 days 9 tablet 0    amitriptyline (ELAVIL) 25 MG tablet Take 25 mg by mouth nightly as needed for Insomnia. (Patient not taking: Reported on 5/29/2025)      rizatriptan (MAXALT-MLT) 10 MG disintegrating tablet Take 1 tablet (10 mg total) by mouth as needed for Migraine. May repeat in 2 hours if needed (Patient not taking: Reported on 4/30/2025) 12 tablet 3     Current Facility-Administered Medications on File Prior to Visit   Medication Dose Route Frequency Provider Last Rate Last Admin    chlorhexidine 0.12 % solution 15 mL  15 mL Mouth/Throat Once Denise Real MD        mupirocin 2 % ointment   Nasal Once Denise Real MD       [2]   Social History  Socioeconomic History    Marital status:    Tobacco Use    Smoking status: Never    Smokeless tobacco: Never   Substance and Sexual Activity    Alcohol use: No     Alcohol/week: 0.0 standard drinks of alcohol    Drug use: Yes     Types: Hydrocodone    Sexual activity: Yes     Partners: Male   Social History Narrative        5 children    Prior secretarial work      Social Drivers of Health     Financial Resource Strain: Medium Risk (6/25/2025)    Received from [facility]    Overall Financial Resource Strain (CARDIA)     How hard is it for you to pay for the very basics like food, housing, medical care, and heating?: Somewhat hard   Food Insecurity: Unknown (6/25/2025)    Received from [facility]    Hunger Vital Sign     Worried About Running Out of Food in the Last Year: Never true   Transportation Needs: No Transportation Needs (6/25/2025)    Received from [facility]    PRAPARE - Transportation     In the past 12 months, has lack of transportation kept you from medical appointments or from getting medications?: No     In the past 12 months, has lack of transportation kept you from meetings, work, or from getting things needed for daily living?: No   Recent Concern: Transportation Needs - Unmet Transportation Needs (5/7/2025)    PRAPARE - Transportation     Lack of Transportation (Medical): Yes     Lack of Transportation (Non-Medical): Yes   Physical Activity: Inactive (5/7/2025)    Exercise Vital Sign     Days of Exercise per Week: 0 days     Minutes of Exercise per Session: 10 min   Stress: Stress Concern Present (5/7/2025)    [facility] [employer] - Occupational Stress Questionnaire     Feeling of Stress : To some extent   Housing Stability: High Risk (6/25/2025)    Received from [facility]    Housing Stability Vital Sign     Unable to Pay for Housing in the Last Year: Yes     Homeless in the Last Year: No

## 2025-08-06 ENCOUNTER — OFFICE VISIT (OUTPATIENT)
Dept: PAIN MEDICINE | Facility: CLINIC | Age: 65
End: 2025-08-06
Payer: COMMERCIAL

## 2025-08-06 VITALS
HEART RATE: 73 BPM | DIASTOLIC BLOOD PRESSURE: 78 MMHG | WEIGHT: 165.69 LBS | BODY MASS INDEX: 26.75 KG/M2 | SYSTOLIC BLOOD PRESSURE: 138 MMHG

## 2025-08-06 DIAGNOSIS — M51.362 DEGENERATION OF INTERVERTEBRAL DISC OF LUMBAR REGION WITH DISCOGENIC BACK PAIN AND LOWER EXTREMITY PAIN: ICD-10-CM

## 2025-08-06 DIAGNOSIS — M54.16 LUMBAR RADICULOPATHY: ICD-10-CM

## 2025-08-06 DIAGNOSIS — M47.812 CERVICAL SPONDYLOSIS: ICD-10-CM

## 2025-08-06 DIAGNOSIS — M48.061 SPINAL STENOSIS OF LUMBAR REGION WITHOUT NEUROGENIC CLAUDICATION: ICD-10-CM

## 2025-08-06 DIAGNOSIS — M25.562 CHRONIC PAIN OF LEFT KNEE: ICD-10-CM

## 2025-08-06 DIAGNOSIS — Z79.891 OPIOID CONTRACT EXISTS: Primary | ICD-10-CM

## 2025-08-06 DIAGNOSIS — G89.29 CHRONIC PAIN OF LEFT KNEE: ICD-10-CM

## 2025-08-06 DIAGNOSIS — M51.369 DDD (DEGENERATIVE DISC DISEASE), LUMBAR: ICD-10-CM

## 2025-08-06 PROCEDURE — 4010F ACE/ARB THERAPY RXD/TAKEN: CPT | Mod: CPTII,S$GLB,,

## 2025-08-06 PROCEDURE — 1159F MED LIST DOCD IN RCRD: CPT | Mod: CPTII,S$GLB,,

## 2025-08-06 PROCEDURE — 99999 PR PBB SHADOW E&M-EST. PATIENT-LVL IV: CPT | Mod: PBBFAC,,,

## 2025-08-06 PROCEDURE — 3078F DIAST BP <80 MM HG: CPT | Mod: CPTII,S$GLB,,

## 2025-08-06 PROCEDURE — 3072F LOW RISK FOR RETINOPATHY: CPT | Mod: CPTII,S$GLB,,

## 2025-08-06 PROCEDURE — 3075F SYST BP GE 130 - 139MM HG: CPT | Mod: CPTII,S$GLB,,

## 2025-08-06 PROCEDURE — 3061F NEG MICROALBUMINURIA REV: CPT | Mod: CPTII,S$GLB,,

## 2025-08-06 PROCEDURE — 3066F NEPHROPATHY DOC TX: CPT | Mod: CPTII,S$GLB,,

## 2025-08-06 PROCEDURE — 99214 OFFICE O/P EST MOD 30 MIN: CPT | Mod: S$GLB,,,

## 2025-08-06 PROCEDURE — 3044F HG A1C LEVEL LT 7.0%: CPT | Mod: CPTII,S$GLB,,

## 2025-08-06 PROCEDURE — 3008F BODY MASS INDEX DOCD: CPT | Mod: CPTII,S$GLB,,

## 2025-08-06 RX ORDER — HYDROCODONE BITARTRATE AND ACETAMINOPHEN 10; 325 MG/1; MG/1
1 TABLET ORAL 3 TIMES DAILY PRN
Qty: 90 TABLET | Refills: 0 | Status: SHIPPED | OUTPATIENT
Start: 2025-10-19 | End: 2025-11-18

## 2025-08-06 RX ORDER — HYDROCODONE BITARTRATE AND ACETAMINOPHEN 10; 325 MG/1; MG/1
1 TABLET ORAL 3 TIMES DAILY PRN
Qty: 90 TABLET | Refills: 0 | Status: SHIPPED | OUTPATIENT
Start: 2025-09-19 | End: 2025-10-19

## 2025-08-06 RX ORDER — HYDROCODONE BITARTRATE AND ACETAMINOPHEN 10; 325 MG/1; MG/1
1 TABLET ORAL 3 TIMES DAILY PRN
Qty: 90 TABLET | Refills: 0 | Status: SHIPPED | OUTPATIENT
Start: 2025-08-20 | End: 2025-09-19

## 2025-08-07 ENCOUNTER — OFFICE VISIT (OUTPATIENT)
Dept: NEUROLOGY | Facility: CLINIC | Age: 65
End: 2025-08-07
Payer: COMMERCIAL

## 2025-08-07 VITALS
RESPIRATION RATE: 17 BRPM | BODY MASS INDEX: 27.29 KG/M2 | TEMPERATURE: 98 F | DIASTOLIC BLOOD PRESSURE: 82 MMHG | HEART RATE: 98 BPM | WEIGHT: 163.81 LBS | SYSTOLIC BLOOD PRESSURE: 141 MMHG | HEIGHT: 65 IN

## 2025-08-07 DIAGNOSIS — G43.709 CHRONIC MIGRAINE WITHOUT AURA WITHOUT STATUS MIGRAINOSUS, NOT INTRACTABLE: Primary | ICD-10-CM

## 2025-08-07 PROCEDURE — 99213 OFFICE O/P EST LOW 20 MIN: CPT | Mod: S$GLB,,, | Performed by: NURSE PRACTITIONER

## 2025-08-07 PROCEDURE — 99999 PR PBB SHADOW E&M-EST. PATIENT-LVL V: CPT | Mod: PBBFAC,,, | Performed by: NURSE PRACTITIONER

## 2025-08-07 PROCEDURE — 3066F NEPHROPATHY DOC TX: CPT | Mod: CPTII,S$GLB,, | Performed by: NURSE PRACTITIONER

## 2025-08-07 PROCEDURE — 3008F BODY MASS INDEX DOCD: CPT | Mod: CPTII,S$GLB,, | Performed by: NURSE PRACTITIONER

## 2025-08-07 PROCEDURE — 4010F ACE/ARB THERAPY RXD/TAKEN: CPT | Mod: CPTII,S$GLB,, | Performed by: NURSE PRACTITIONER

## 2025-08-07 PROCEDURE — 1159F MED LIST DOCD IN RCRD: CPT | Mod: CPTII,S$GLB,, | Performed by: NURSE PRACTITIONER

## 2025-08-07 PROCEDURE — 3079F DIAST BP 80-89 MM HG: CPT | Mod: CPTII,S$GLB,, | Performed by: NURSE PRACTITIONER

## 2025-08-07 PROCEDURE — 3077F SYST BP >= 140 MM HG: CPT | Mod: CPTII,S$GLB,, | Performed by: NURSE PRACTITIONER

## 2025-08-07 PROCEDURE — 3072F LOW RISK FOR RETINOPATHY: CPT | Mod: CPTII,S$GLB,, | Performed by: NURSE PRACTITIONER

## 2025-08-07 PROCEDURE — 3061F NEG MICROALBUMINURIA REV: CPT | Mod: CPTII,S$GLB,, | Performed by: NURSE PRACTITIONER

## 2025-08-07 PROCEDURE — 3044F HG A1C LEVEL LT 7.0%: CPT | Mod: CPTII,S$GLB,, | Performed by: NURSE PRACTITIONER

## 2025-08-07 NOTE — PROGRESS NOTES
Date of service: 8/7/2025  Referring provider: No ref. provider found    Subjective:      Chief complaint: Headache       Patient ID: Aaron Garcia is a 64 y.o. .    History of Present Illness  INTERVAL HISTORY: 08/07/2025.  She presents today for follow up. Since her last visit, she continues Emgality, increased Elavil to 50 mg nightly and Ubrelvy as needed. She reports significant improvement to 0-1 migraine per week. She can go up to 2 weeks without a migraine. She has plans for a knee replacement this month. Otherwise information below is reviewed and verified with no changes made.    ORIGINAL HEADACHE HISTORY -   Age at onset and course over time: 15 years old. Today, she reports headaches 2-3 times per week, all of which escalate to become severe.     Location: frontal and parietal region   Quality:  [] Stabbing [x] Pressure [x] Tight [x] Throbbing/pounding [] Sharp    Duration: [] Seconds [] Minutes [] Hours [x] Days [] Constant   Frequency: [] Daily [x] Weekly [] Monthly   How many days per month is your head or neck 100% pain free: 0- neck, 10-12 head   Headaches awaken at night?:   1-2   Worst time of day: evening   Intensity of pain: at best 3/10, at worst 10/10   Associated with: [x] Photophobia [x]  Phonophobia [] Osmophobia [x] Loss of appetite [x] Nausea [] Vomiting   [x] Dizziness [x] Vertigo [] Ringing in the ears [] Blurry vision [] Double vision  [] Anxiety/Anger/Irritability [] Problems with concentration [] Problems with memory [] Problems with task completion   [] Problems with relaxation [] Neck tightness/ neck pain [] Nasal congestion [] Nasal or sinus pressure [] Aura   Alleviated by:  [] Sleep [x] Darkness [] Local pressure [] Massage [] Heat [x] Ice [] Menses [x] Medication  Exacerbated by:  [] Fatigue [x] Light [x] Noise [] Smells [] Coughing [] Sneezing  [x] Bending over [] Change in weather [] Ovulation [] Menses [] Alcohol [] Stress []  Food  Ipsilateral autonomic: [] nasal  congestion [] lacrimation [] ptosis [] injection [] edema [] foreign body sensation [] ear fullness   ICP:  [] transient visual obscurations  [] tinnitus   [] positional headache  [] non-positional     Bowl Habits: [] Normal [x] Constipation [] Diarrhea   Caffeine intake: 0   Sleep habits: trouble falling and staying asleep, snoring, RYLAN   Water intake: 3-4 bottles per day    Eye Exam: up to date   Family history of migraine: none   Gyn status (if female) (birth control with estrogen, hysterectomy):   History of asthma, cancer, glaucoma, kidney stones, CVA and osteoporosis: asthma (inhaler infrequently), cataracts (monitoring), osteopenia     HIT 6: 73    Current acute treatment:  Zanaflex  Ubrelvy     Current prevention:  Nebivolol  Benazepril  Elavil   Gabapentin   Emgality     Previously tried/failed acute treatment:  Cambia  Mobic  Fioricet  Maxalt - groggy, dizziness, ineffective   Imitrex injection   Xanax  HERACLIO     Previously tried/failed preventative treatment:  Topamax - groggy, dizziness   Trazodone   Gabapentin     Considerations:     Review of patient's allergies indicates:   Allergen Reactions    Metformin Diarrhea     With extended release preparation as well    Peanuts [peanut] Itching     Itches    Penicillins Itching     Swelling (extremities)^  Swelling (extremities)^      Carlton Itching     throat     Current Medications[1]    Past Medical History  Past Medical History:   Diagnosis Date    Arthritis     Asthma     Cataract     OU    Diabetes mellitus, type 2 2001    Gastric ulcer     Headache     Hyperlipidemia     Hypertension     Mitral valve prolapse     RYLAN (obstructive sleep apnea)     does not wear CPAP-can't tolerate-claustraphobic    Snores      unable to lay flat, sleeps elevated    Thyroid disease     Goiter       Past Surgical History  Past Surgical History:   Procedure Laterality Date    ANTERIOR CERVICAL DISCECTOMY W/ FUSION N/A 1/21/2021    Procedure: DISCECTOMY, SPINE, CERVICAL,  ANTERIOR APPROACH, WITH FUSION , C3-4, C4-5, C5-6, C6 CORPECTOMYc-3-c7 anterior instrumented fusion;  Surgeon: Denise Real MD;  Location: [facility] OR;  Service: Neurosurgery;  Laterality: N/A;    CARPAL TUNNEL RELEASE Left     CARPAL TUNNEL RELEASE Right 2018    Procedure: RELEASE, CARPAL TUNNEL;  Surgeon: Anurag Mathis MD;  Location: [facility] OR;  Service: Orthopedics;  Laterality: Right;    CAUDAL EPIDURAL STEROID INJECTION N/A 2021    Procedure: Injection-steroid-epidural-caudal;  Surgeon: Erik Galan MD;  Location: [facility] OR;  Service: Pain Management;  Laterality: N/A;    CAUDAL EPIDURAL STEROID INJECTION N/A 2022    Procedure: Injection-steroid-epidural-caudal;  Surgeon: Erik Galan MD;  Location: [facility] OR;  Service: Pain Management;  Laterality: N/A;     SECTION      CHOLECYSTECTOMY      DILATION AND CURETTAGE OF UTERUS      Epidural steroid Injection      Pain management, cervical    ESOPHAGEAL DILATION      FACETECTOMY OF VERTEBRA N/A 2021    Procedure: [id number] Medial;  Surgeon: Denise Real MD;  Location: [facility] OR;  Service: Neurosurgery;  Laterality: N/A;    INJECTION OF ANESTHETIC AGENT AROUND MEDIAL BRANCH NERVES INNERVATING CERVICAL FACET JOINT Right 2024    Procedure: Block-nerve-medial branch-cervical  TON, C7/T1;  Surgeon: Erik Galan MD;  Location: [facility] OR;  Service: Pain Management;  Laterality: Right;    INJECTION OF ANESTHETIC AGENT AROUND MEDIAL BRANCH NERVES INNERVATING CERVICAL FACET JOINT Right 2024    Procedure: Block-nerve-medial branch-cervical-TON-C7-T1;  Surgeon: Erik Galan MD;  Location: [facility] OR;  Service: Pain Management;  Laterality: Right;    LAMINECTOMY USING MINIMALLY INVASIVE TECHNIQUE N/A 2021    Procedure: LAMINECTOMY, SPINE, MINIMALLY INVASIVE RIGHT L5-S1 AND L4-L5 HEMILAMINECTOMY;  Surgeon: Denise Real MD;  Location: [facility] OR;  Service: Neurosurgery;  Laterality:  N/A;    MICRODISCECTOMY OF SPINE N/A 6/24/2021    Procedure: MICRODISCECTOMY, SPINE L5-S1;  Surgeon: Denies Real MD;  Location: [facility];  Service: Neurosurgery;  Laterality: N/A;    RADIOFREQUENCY ABLATION Right 1/3/2025    Procedure: Radiofrequency Ablation    Cervical  TON and C7/T1;  Surgeon: Erik Galan MD;  Location: [facility] OR;  Service: Pain Management;  Laterality: Right;    TRANSFORAMINAL EPIDURAL INJECTION OF STEROID Right 1/11/2021    Procedure: Injection,steroid,epidural,transforaminal approach L4/5 and L5/S1;  Surgeon: Mariano Luna MD;  Location: [facility] OR;  Service: Pain Management;  Laterality: Right;    TRIGGER FINGER RELEASE Left 7/16/2020    Procedure: RELEASE, TRIGGER FINGER//Left thumb, middle and ring finger trigger release;  Surgeon: Jose Mata MD;  Location: [facility] OR;  Service: Orthopedics;  Laterality: Left;    ulcer on tongue removed      WOUND DEBRIDEMENT Left     leg at ankle level       Family History  Family History   Problem Relation Name Age of Onset    Diabetes Father      Diabetes Sister      Heart disease Brother      Glaucoma Paternal Aunt      Glaucoma Daughter          suspect    Macular degeneration Neg Hx         Social History  Social History[2]     Review of Systems  14-point review of systems as follows:   No check mckenna indicates NEGATIVE response   Constitutional: [] weight loss [] change to appetite   Eyes: [] change in vision [] double vision   Ears, nose, mouth, throat: [] frequent nose bleeds [] ringing in the ears   Respiratory: [] cough [] wheezing   Cardiovascular: [] chest pain [] palpitations   Gastrointestinal: [] jaundice [] nausea/vomiting   Genitourinary: [] incontinence [] burning with urination   Hematologic/lymphatic: [] easy bruising/bleeding [] night sweats   Neurological: [] numbness [] weakness   Endocrine: [] fatigue [] heat/cold intolerance   Allergy/Immunologic: [] fevers [] chills   Musculoskeletal: [] muscle pain [] joint  pain   Psychiatric: [] thoughts of harming self/others [] depression   Integumentary: [] rashes [] sores that do not heal     Objective:        Vitals:    08/07/25 0929   BP: (!) 141/82   Pulse: 98   Resp: 17   Temp: 97.6 °F (36.4 °C)     Body mass index is 27.26 kg/m².    General exam:  Alert, cooperative, not in distress  Normocephalic and atraumatic  Pink conjunctiva, anicteric sclera, moist mucous membranes  No cervical lymphadenopathy  No joint swelling or tenderness    Data Review:     I have personally reviewed the referring provider's notes, labs, & imaging made available to me today.      RADIOLOGY STUDIES:  I have personally reviewed the pertinent images performed.       No results found. However, due to the size of the patient record, not all encounters were searched. Please check Results Review for a complete set of results.    Lab Results   Component Value Date     06/26/2025     09/17/2024    K 3.4 (L) 06/26/2025    K 3.9 09/17/2024    MG 2.1 05/04/2022     09/17/2024    CO2 29 06/26/2025    CO2 26 09/17/2024    BUN 10 06/26/2025    BUN 11 09/17/2024    CREATININE 0.85 06/26/2025    CREATININE 0.7 09/17/2024     09/17/2024    HGBA1C 6.7 (H) 05/27/2025    HGBA1C 6.1 (H) 01/08/2025    AST 17 06/26/2025    AST 18 09/17/2024    ALT 12 06/26/2025    ALT 12 09/17/2024    ALBUMIN 3.8 06/26/2025    ALBUMIN 3.9 09/17/2024    PROT 6 (L) 06/26/2025    PROT 7.2 09/17/2024    BILITOT 1 06/26/2025    BILITOT 0.5 09/17/2024    CHOL 113 (L) 09/17/2024    HDL 49 09/17/2024    LDLCALC 53.2 (L) 09/17/2024    TRIG 54 09/17/2024       Lab Results   Component Value Date    WBC 5.44 09/17/2024    HGB 10.8 (L) 09/17/2024    HCT 35.8 (L) 09/17/2024    MCV 97 09/17/2024     09/17/2024       Lab Results   Component Value Date    TSH 1.374 09/17/2024           Assessment & Plan:       Problem List Items Addressed This Visit       Chronic migraine without aura without status migrainosus, not  intractable - Primary    Overview   Headaches are typically moderate to severe in intensity, worsen with activity, pounding in quality and associated with sensitivity to light and sound.     Gradual progression pattern, lack of red flag features on history, and normal neurological exam are reassuring for primary as opposed to secondary etiology of headaches thus imaging will not be pursued for this history and this exam at this time.    Continue Nebivolol, Benazepril, Elavil and Gabapentin as prescribed by another provider. Continue Emgality every 28 days thereafter. Continue Ubrelvy 100 mg as needed. May repeat once 2 hours later to a max of 2 per day. No more than 200 mg per day. Ok to continue Tizanidine as needed. Headache journal.                 Please call our clinic at [phone] or send a message on the [employer] portal if there are any changes to the plan described below, for example,if you are not contacted for the requested tests, referral(s) within one week, if you are unable to receive the medications prescribed, or if you feel you need to change the treatment course for any reason.     TESTING: none    REFERRALS: none     PREVENTION (use daily regardless of headache):  - Continue Nebivolol, Benazepril, Elavil and Gabapentin as prescribed by another provider.   - Continue Elavil 50 mg nightly  - Continue Emgality every 28 days   - Consider Botox in the future      AS-NEEDED TREATMENT (use total no more than 10 days per month unless otherwise stated):  - Continue Ubrelvy 100 mg for headache attacks. May repeat once 2 hours later to a max of 2 per day. No more than 200 mg per day. With this medication do not drink grapefruit juice or eat grapefruit or some medications like ketoconazole, itraconazole, or antibiotics clarithromycin   - Ok to continue Tizanidine as needed     OTHER:   - Headache journal     Follow up in about 6 months (around 2/7/2026).       ELLEN Ortega      I have spent 20 minutes  "of total time on the total encounter which includes face to face time and non-face to face time preparing to see the patient (eg, review of labs, previous encounters, care everywhere), obtaining and/or reviewing separately obtained history, documenting clinical information in the electronic or health record, independently interpreting results, and communicating results to the patient/family/caregiver, or care coordination.        [1]   Current Outpatient Medications   Medication Sig Dispense Refill    ALBUTEROL SULFATE (PROAIR HFA INHL) Inhale 1 puff into the lungs every 6 (six) hours as needed. PRN      amitriptyline (ELAVIL) 25 MG tablet Take 25 mg by mouth nightly as needed for Insomnia.      amitriptyline (ELAVIL) 50 MG tablet Take 1 tablet (50 mg total) by mouth every evening. 30 tablet 11    BD ULTRA-FINE GISELA PEN NEEDLE 32 gauge x 5/32" Ndle       benazepriL (LOTENSIN) 40 MG tablet Take 1 tablet (40 mg total) by mouth once daily. 90 tablet 3    blood-glucose sensor (FREESTYLE DASHA 3 SENSOR) Haritha Inject 1 Device into the skin every 15 (fourteen) days. 2 each 12    clotrimazole-betamethasone 1-0.05% (LOTRISONE) cream Apply topically 2 (two) times daily. 15 g 2    flash glucose sensor (FREESTYLE DASHA 2 SENSOR) Kit APPLY 1 SENSOR TO THE SKIN EVERY 14 DAYS AS DIRECTED. 2 kit 12    gabapentin (NEURONTIN) 800 MG tablet Take 1 tablet (800 mg total) by mouth 3 (three) times daily. 270 tablet 3    galcanezumab-gnlm (EMGALITY PEN) 120 mg/mL PnIj Inject 1 mL (120 mg total) into the skin every 28 days. 1 mL 11    galcanezumab-gnlm 120 mg/mL PnIj Inject 240 mg (2 injections) subcutaneous at separate sites, once (loading dose). Start maintenance dose 28 days later 2 mL 0    [START ON 8/20/2025] HYDROcodone-acetaminophen (NORCO)  mg per tablet Take 1 tablet by mouth 3 (three) times daily as needed for Pain. 90 tablet 0    [START ON 9/19/2025] HYDROcodone-acetaminophen (NORCO)  mg per tablet Take 1 tablet by " "mouth 3 (three) times daily as needed for Pain. 90 tablet 0    [START ON 10/19/2025] HYDROcodone-acetaminophen (NORCO)  mg per tablet Take 1 tablet by mouth 3 (three) times daily as needed for Pain. 90 tablet 0    insulin aspart U-100 (NOVOLOG FLEXPEN U-100 INSULIN) 100 unit/mL (3 mL) InPn pen Use on premeal readings; 150-200=+2, 201-250=+4; 251-300=+6; 301-350=+8, over 350, + 10 units, 50 units a day max. 15 mL 0    insulin glargine, TOUJEO, (TOUJEO SOLOSTAR U-300 INSULIN) 300 unit/mL (1.5 mL) InPn pen ADMINISTER 60 UNITS UNDER THE SKIN IN THE EVENING AS DIRECTED 4.5 mL 6    linaCLOtide (LINZESS) 72 mcg Cap capsule Take 1 capsule (72 mcg total) by mouth before breakfast. 90 capsule 2    meclizine (ANTIVERT) 25 mg tablet Take 25 mg by mouth.      montelukast (SINGULAIR) 10 mg tablet Take 10 mg by mouth every evening.      nebivolol (BYSTOLIC) 10 MG Tab Take 10 mg by mouth every evening.       pantoprazole (PROTONIX) 40 MG tablet Take 1 tablet (40 mg total) by mouth once daily. 60 tablet 3    pen needle, diabetic 32 gauge x 5/32" Ndle Use to inject insulin 4 times a day as directed only when taking steriods. 100 each PRN    piroxicam (FELDENE) 20 MG capsule Take 1 capsule (20 mg total) by mouth once daily. 90 capsule 3    promethazine (PHENERGAN) 25 MG tablet Take 1 tablet (25 mg total) by mouth every 6 (six) hours as needed for Nausea. 20 tablet 1    rosuvastatin (CRESTOR) 10 MG tablet Take 10 mg by mouth every evening.       tirzepatide (MOUNJARO) 7.5 mg/0.5 mL PnIj Inject 7.5 mg into the skin every 7 days. 4 Pen 6    tiZANidine (ZANAFLEX) 4 MG tablet TAKE 1 TABLET(4 MG) BY MOUTH EVERY 8 HOURS AS NEEDED 90 tablet 2    tolmetin 600 mg Tab Take 600 mg by mouth 3 (three) times daily. 90 each 3    topiramate (TOPAMAX) 25 MG tablet TAKE 1 TABLET(25 MG) BY MOUTH TWICE DAILY 180 tablet 3    ubrogepant (UBRELVY) 100 mg tablet Take 1 tablet (100 mg total) by mouth as needed for Migraine. If symptoms persist or return, " may repeat dose after 2 hours. Maximum: 200 mg per 24 hours 16 tablet 11    ALPRAZolam (XANAX) 0.5 MG tablet Take 1 tablet (0.5 mg total) by mouth 3 (three) times daily. for 3 days 9 tablet 0    rizatriptan (MAXALT-MLT) 10 MG disintegrating tablet Take 1 tablet (10 mg total) by mouth as needed for Migraine. May repeat in 2 hours if needed (Patient not taking: Reported on 4/30/2025) 12 tablet 3     Current Facility-Administered Medications   Medication Dose Route Frequency Provider Last Rate Last Admin    [START ON 8/18/2025] sodium chloride 0.9% flush 10 mL  10 mL Intravenous Q6H PRN Fidel Weaver MD         Facility-Administered Medications Ordered in Other Visits   Medication Dose Route Frequency Provider Last Rate Last Admin    chlorhexidine 0.12 % solution 15 mL  15 mL Mouth/Throat Once Denise Real MD        mupirocin 2 % ointment   Nasal Once Denise Real MD       [2]   Social History  Socioeconomic History    Marital status:    Tobacco Use    Smoking status: Never    Smokeless tobacco: Never   Substance and Sexual Activity    Alcohol use: No     Alcohol/week: 0.0 standard drinks of alcohol    Drug use: Yes     Types: Hydrocodone    Sexual activity: Yes     Partners: Male   Social History Narrative        5 children    Prior secretarial work      Social Drivers of Health     Financial Resource Strain: Medium Risk (6/25/2025)    Received from [facility]    Overall Financial Resource Strain (CARDIA)     How hard is it for you to pay for the very basics like food, housing, medical care, and heating?: Somewhat hard   Food Insecurity: Unknown (6/25/2025)    Received from [facility]    Hunger Vital Sign     Worried About Running Out of Food in the Last Year: Never true   Transportation Needs: No Transportation Needs (6/25/2025)    [facility] from [facility]    [facility]     In the past 12  months, has lack of transportation kept you from medical appointments or from getting medications?: No     In the past 12 months, has lack of transportation kept you from meetings, work, or from getting things needed for daily living?: No   Recent Concern: Transportation Needs - Unmet Transportation Needs (5/7/2025)    PRAPARE - Transportation     Lack of Transportation (Medical): Yes     Lack of Transportation (Non-Medical): Yes   Physical Activity: Inactive (5/7/2025)    Exercise Vital Sign     Days of Exercise per Week: 0 days     Minutes of Exercise per Session: 10 min   Stress: Stress Concern Present (5/7/2025)    [employer] [place] [employer] - Occupational Stress Questionnaire     Feeling of Stress : To some extent   Housing Stability: High Risk (6/25/2025)    Received from [facility]    Housing Stability Vital Sign     Unable to Pay for Housing in the Last Year: Yes     Homeless in the Last Year: No

## 2025-08-07 NOTE — PATIENT INSTRUCTIONS
Please call our clinic at [phone] or send a message on the [employer] portal if there are any changes to the plan described below, for example,if you are not contacted for the requested tests, referral(s) within one week, if you are unable to receive the medications prescribed, or if you feel you need to change the treatment course for any reason.     TESTING: none    REFERRALS: none     PREVENTION (use daily regardless of headache):  - Continue Nebivolol, Benazepril, Elavil and Gabapentin as prescribed by another provider.   - Continue Elavil 50 mg nightly  - Continue Emgality every 28 days   - Consider Botox in the future      AS-NEEDED TREATMENT (use total no more than 10 days per month unless otherwise stated):  - Continue Ubrelvy 100 mg for headache attacks. May repeat once 2 hours later to a max of 2 per day. No more than 200 mg per day. With this medication do not drink grapefruit juice or eat grapefruit or some medications like ketoconazole, itraconazole, or antibiotics clarithromycin   - Ok to continue Tizanidine as needed     OTHER:   - Headache journal

## 2025-08-11 ENCOUNTER — TELEPHONE (OUTPATIENT)
Dept: ORTHOPEDICS | Facility: CLINIC | Age: 65
End: 2025-08-11
Payer: COMMERCIAL

## 2025-08-19 ENCOUNTER — TELEPHONE (OUTPATIENT)
Dept: PAIN MEDICINE | Facility: CLINIC | Age: 65
End: 2025-08-19
Payer: COMMERCIAL

## 2025-08-27 DIAGNOSIS — M17.12 PRIMARY OSTEOARTHRITIS OF LEFT KNEE: Primary | ICD-10-CM

## 2025-08-27 RX ORDER — TIZANIDINE 4 MG/1
4 TABLET ORAL
Qty: 90 TABLET | Refills: 2 | Status: SHIPPED | OUTPATIENT
Start: 2025-08-27

## (undated) DEVICE — NDL TUOHY EPIDURAL 20G X 3.5

## (undated) DEVICE — MARKER SKIN STND TIP BLUE BARR

## (undated) DEVICE — APPLICATOR CHLORAPREP CLR 10.5

## (undated) DEVICE — CHLORAPREP 10.5 ML APPLICATOR

## (undated) DEVICE — SEE MEDLINE ITEM 152514

## (undated) DEVICE — CORD BIPOLAR 12 FOOT

## (undated) DEVICE — TOWEL OR DISP STRL BLUE 4/PK

## (undated) DEVICE — NDL HYPO STD REG BVL 18GX1.5IN

## (undated) DEVICE — MARKER SKIN RULER STERILE

## (undated) DEVICE — GLOVE PROTEXIS LTX MICRO  7.5

## (undated) DEVICE — SYR GLASS 5CC LUER LOK

## (undated) DEVICE — TRAY NERVE BLOCK

## (undated) DEVICE — SUT 3-0 VICRYL / SH (J416)

## (undated) DEVICE — CANNULA CVD 100MM X 20G

## (undated) DEVICE — SEE MEDLINE ITEM 157173

## (undated) DEVICE — SEE MEDLINE ITEM 146270

## (undated) DEVICE — GOWN B1 X-LG X-LONG

## (undated) DEVICE — GLOVE SURGICAL LATEX SZ 7

## (undated) DEVICE — GLOVE SURGICAL LATEX SZ 8

## (undated) DEVICE — SEE MEDLINE ITEM 152622

## (undated) DEVICE — GLOVE PROTEXIS LTX MICRO 8

## (undated) DEVICE — TOURNIQUET SB QC DP 18X4IN

## (undated) DEVICE — DRESSING N ADH OIL EMUL 3X3

## (undated) DEVICE — SYR 10CC LUER LOCK

## (undated) DEVICE — GAUZE SPONGE 4X4 12PLY

## (undated) DEVICE — PAD ELECTROSURGICAL PAT PLATE

## (undated) DEVICE — APPLICATOR CHLORAPREP ORN 26ML

## (undated) DEVICE — NDL SPINAL SPINOCAN 22GX3.5

## (undated) DEVICE — ALCOHOL 70% ISOP RUBBING 4OZ

## (undated) DEVICE — Device

## (undated) DEVICE — SEE MEDLINE ITEM 152487

## (undated) DEVICE — SEE MEDLINE ITEM 157128

## (undated) DEVICE — SEE MEDLINE ITEM 157131

## (undated) DEVICE — SLEEVE SCD EXPRESS CALF MEDIUM

## (undated) DEVICE — DRAPE STERI-DRAPE 1000 17X11IN

## (undated) DEVICE — HANDLE SURG LIGHT NONRIGID

## (undated) DEVICE — UNDERGLOVES BIOGEL PI SIZE 7.5

## (undated) DEVICE — PADDING CAST 4IN SPECIALIST

## (undated) DEVICE — PAD CAST SPECIALIST STRL 4

## (undated) DEVICE — NDL 27G X 1 1/4

## (undated) DEVICE — PENCIL ROCKER SWITCH 10FT CORD

## (undated) DEVICE — NDL HYPO REG 25G X 1 1/2

## (undated) DEVICE — DRAPE PLASTIC U 60X72

## (undated) DEVICE — SEE MEDLINE ITEM 146308

## (undated) DEVICE — GLOVE SENSICARE PI MICRO 7

## (undated) DEVICE — SUT PROLENE 4-0 MONO 18IN

## (undated) DEVICE — BANDAGE ESMARK LATEX FREE 4INX

## (undated) DEVICE — SEE L#120831

## (undated) DEVICE — FORCEP STRAIGHT DISP

## (undated) DEVICE — GLOVE 7.5 PROTEXIS PI MICRO

## (undated) DEVICE — SOL SOD CHLORIDE 0.9% 10ML

## (undated) DEVICE — DRESSING XEROFORM FOIL PK 1X8

## (undated) DEVICE — SUT ETHILON 4-0 PS2 18 BLK

## (undated) DEVICE — PAD CAST SPECIALIST STRL 3

## (undated) DEVICE — SEE MEDLINE ITEM 146313